# Patient Record
Sex: FEMALE | Race: WHITE | NOT HISPANIC OR LATINO | Employment: OTHER | URBAN - METROPOLITAN AREA
[De-identification: names, ages, dates, MRNs, and addresses within clinical notes are randomized per-mention and may not be internally consistent; named-entity substitution may affect disease eponyms.]

---

## 2017-01-09 ENCOUNTER — INFUSION THERAPY VISIT (OUTPATIENT)
Dept: INFUSION THERAPY | Facility: CLINIC | Age: 72
End: 2017-01-09
Attending: INTERNAL MEDICINE
Payer: MEDICARE

## 2017-01-09 VITALS
SYSTOLIC BLOOD PRESSURE: 140 MMHG | RESPIRATION RATE: 16 BRPM | TEMPERATURE: 97.3 F | DIASTOLIC BLOOD PRESSURE: 88 MMHG | HEART RATE: 76 BPM

## 2017-01-09 DIAGNOSIS — J44.9 STEROID-DEPENDENT CHRONIC OBSTRUCTIVE PULMONARY DISEASE (H): Primary | ICD-10-CM

## 2017-01-09 DIAGNOSIS — Z92.241 STEROID-DEPENDENT CHRONIC OBSTRUCTIVE PULMONARY DISEASE (H): Primary | ICD-10-CM

## 2017-01-09 DIAGNOSIS — J45.50 UNCOMPLICATED SEVERE PERSISTENT ASTHMA (H): ICD-10-CM

## 2017-01-09 PROCEDURE — 96372 THER/PROPH/DIAG INJ SC/IM: CPT | Mod: ZF

## 2017-01-09 PROCEDURE — 25000128 H RX IP 250 OP 636: Mod: ZF | Performed by: INTERNAL MEDICINE

## 2017-01-09 PROCEDURE — 96401 CHEMO ANTI-NEOPL SQ/IM: CPT

## 2017-01-09 RX ADMIN — MEPOLIZUMAB 100 MG: 100 INJECTION, POWDER, FOR SOLUTION SUBCUTANEOUS at 16:10

## 2017-01-09 NOTE — PROGRESS NOTES
Pt reports to [facility] for monthly Nucala injection. Pt denies any concerns today; she states that she has been able to be off steroids since beginning Nucala.   and her asthma has been very well controlled.   She is afebrile, vss.    1610 Nucala injection given subcutaneously in left arm , without difficulty. Pt then discharged to home. Moises Ayoub RN

## 2017-01-22 ASSESSMENT — ENCOUNTER SYMPTOMS
SPUTUM PRODUCTION: 1
COUGH: 1
SINUS CONGESTION: 1
COUGH DISTURBING SLEEP: 1

## 2017-01-24 ENCOUNTER — PRE VISIT (OUTPATIENT)
Dept: CARDIOLOGY | Facility: CLINIC | Age: 72
End: 2017-01-24

## 2017-01-24 NOTE — TELEPHONE ENCOUNTER
CTA chest/abdomen/pelvis : 9/19/16  1. 4.4 cm aneurysmal dilatation of ascending aorta, not significant changed as compared to 6/17/2015 exam where it measures 4.5 cm.  2. 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  3. Multiple stable bilateral pulmonary nodules, the largest is a 7 x 5 mm posterior left lower lobe pulmonary nodule, previously measured 7 x 4 mm on 6/17/2013 exam.

## 2017-01-25 ENCOUNTER — OFFICE VISIT (OUTPATIENT)
Dept: CARDIOLOGY | Facility: CLINIC | Age: 72
End: 2017-01-25
Payer: MEDICARE

## 2017-01-25 VITALS
BODY MASS INDEX: 26.21 KG/M2 | HEIGHT: 67 IN | WEIGHT: 167 LBS | SYSTOLIC BLOOD PRESSURE: 127 MMHG | OXYGEN SATURATION: 96 % | DIASTOLIC BLOOD PRESSURE: 85 MMHG | HEART RATE: 84 BPM

## 2017-01-25 DIAGNOSIS — I71.20 THORACIC AORTIC ANEURYSM WITHOUT RUPTURE (H): Primary | ICD-10-CM

## 2017-01-25 PROBLEM — R91.8 PULMONARY NODULES: Status: ACTIVE | Noted: 2017-01-25

## 2017-01-25 PROBLEM — I10 ESSENTIAL HYPERTENSION: Status: ACTIVE | Noted: 2017-01-25

## 2017-01-25 PROBLEM — K22.4 ESOPHAGEAL HYPERTENSION: Status: RESOLVED | Noted: 2017-01-25 | Resolved: 2017-01-25

## 2017-01-25 PROBLEM — K22.4 ESOPHAGEAL HYPERTENSION: Status: ACTIVE | Noted: 2017-01-25

## 2017-01-25 PROCEDURE — 99213 OFFICE O/P EST LOW 20 MIN: CPT | Mod: ZF

## 2017-01-25 PROCEDURE — 99203 OFFICE O/P NEW LOW 30 MIN: CPT | Mod: ZP

## 2017-01-25 ASSESSMENT — PAIN SCALES - GENERAL: PAINLEVEL: NO PAIN (0)

## 2017-01-25 NOTE — NURSING NOTE
Chief Complaint   Patient presents with     New Patient     72 y/o female for initial evaluation of hypertension and thoracic aortic aneurysm.

## 2017-01-25 NOTE — NURSING NOTE
Cardiac Testing: Patient given instructions regarding  echocardiogram prior to 1 year follow up. Discussed purpose, preparation, procedure and when to expect results reported back to the patient. Patient demonstrated understanding of this information and agreed to call with further questions or concerns.  Med Reconcile: Reviewed and verified all current medications with the patient. The updated medication list was printed and given to the patient.  Patient will monitor home blood pressures twice per week.  Blood pressure goal is less than 130/90.  If trending higher than this patient will contact this office.  Patient states understanding and agrees to call with any questions or concerns.  Return Appointment: 1 year with Dr. Patiño.  Patient given instructions regarding scheduling next clinic visit. Patient demonstrated understanding of this information and agreed to call with further questions or concerns.  Patient stated she understood all health information given and agreed to call with further questions or concerns.

## 2017-01-25 NOTE — PROGRESS NOTES
HPI: Niurka Cisneros is a 71 year old year old patient who receives primary care from Dr. Mara Combs.  The patient is seen in consultation at the request of Dr. Inman in order to assume her vascular care in the context of her dialted 4.4 cm aortic root.    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 Lorenzo Inman in order for me to assume surveillance of her ascending aortic dilation, with a stable 3-year followup diameter of 4.4 cm, and with a tricuspid aortic valve.  At today's visit, I introduced Roopa to our program of aortic disease followup, and I believe that she was quite reassured.      As per my routine, I also noted her significant persistent asthma; exposure to chronic high-dose steroids; and previously documented pulmonary nodules for which CTA followup is, I assume, performed by you.  We also reviewed her global cardiovascular risk, which is surprisingly low based on her current treated risk factors, despite her current age.  She is compliant with use of Pravachol, prefers not to take aspirin but would consider doing so; and was appreciative that followup will be sustained.      Her only prominent current risk factor is that of her hypertension, but she was not aware of her blood pressure goal.  This goal is usually best set in individuals with aortic disease to be similar as that for any individual who attempts to avoid heart attack and stroke, and thus a less than 130/85 mmHg goal has been set.  In the past, she has utilized blood pressures reported in the primary care center, but she notes that often she is wheezing at that time.  We  discussed the roles of office-based blood pressures; use of an ambulatory blood pressure monitor; or of a calibrated home cuff.  She prefers to use a calibrated home cuff and report these both to you and to Dell Hunt, the vascular medicine nurse clinician.  Blood pressure to date have generally been in the target range.      Finally, I note her most recent CT scan performed on 09/19/2016 did demonstrate a stable root diameter of 4.4 cm.  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  The pulmonary nodules were noted and are known to you.     PAST MEDICAL HISTORY  Past Medical History   Diagnosis Date     Abdominal aortic aneurysm without rupture (H) 11/10/2015     [  ] repeat imaging due spring 2016 Descending aortic aneurysm.  Being monitored with serial angiogram      Female bladder prolapse 11/10/2015     Seizures (H) 11/10/2015     Temporal seziure d/o.  Does not have LOC.  Has prodromal symptoms      Severe persistent asthma 11/10/2015     Since childhood.  Has been steroid dependent for many years. Has had cydney x2       Steroid-dependent chronic obstructive pulmonary disease (H) 11/10/2015     secondary to asthma     Hearing loss      CURRENT MEDICATIONS  Current Outpatient Prescriptions   Medication Sig Dispense Refill     albuterol (PROAIR HFA, PROVENTIL HFA, VENTOLIN HFA) 108 (90 BASE) MCG/ACT inhaler Inhale 2 puffs into the lungs every 6 hours as needed for shortness of breath / dyspnea or wheezing 3 Inhaler 3     beclomethasone (QVAR) 80 MCG/ACT Inhaler Inhale 2 puffs into the lungs 2 times daily 3 Inhaler 3     estradiol (ESTRING) 2 MG vaginal ring Place 1 each vaginally every 3 months 1 each 3     fluticasone-salmeterol (ADVAIR) 500-50 MCG/DOSE diskus inhaler Inhale 1 puff into the lungs 2 times daily 3 Inhaler 3     montelukast (SINGULAIR) 10 MG tablet Take 1 tablet (10 mg) by mouth as needed 30 tablet 11      "triamterene-hydrochlorothiazide (MAXZIDE-25) 37.5-25 MG per tablet Take 1 tablet by mouth daily 90 tablet 3     losartan (COZAAR) 25 MG tablet Take 1 tablet (25 mg) by mouth daily 90 tablet 3     pravastatin (PRAVACHOL) 20 MG tablet Take 1 tablet (20 mg) by mouth daily 90 tablet 3     predniSONE (DELTASONE) 10 MG tablet Take 1 tablet (10 mg) by mouth every other day 30 tablet 11     predniSONE (DELTASONE) 20 MG tablet Take 1 tablet (20 mg) by mouth daily As needed with acute exacerbation of asthma 10 tablet 2     potassium chloride SA (POTASSIUM CHLORIDE) 20 MEQ tablet Take 1 tablet (20 mEq) by mouth 2 times daily 180 tablet 3     Cranberry 300 MG TABS        mepolizumab (NUCALA) 100 MG injection Inject 1 mL (100 mg) Subcutaneous every 28 days 1 mL 11     CALCIUM-VITAMIN D PO Take 1 tablet by mouth daily       PAST SURGICAL HISTORY:  Past Surgical History   Procedure Laterality Date     Hysterectomy       fibroids     Nissen fundoplication       x2     H cataract surgical package       Joint replacement Left      ALLERGIES   No Known Allergies    FAMILY HISTORY  Family History   Problem Relation Age of Onset     Dementia Mother      Asthma Sister      x2     Depression Sister      CANCER Father      prostate cancer     Heart Failure Mother      Schizophrenia Maternal Grandmother      Coronary Artery Disease Maternal Grandfather      he was diabetic and smoked     Coronary Artery Disease Sister      MI when being ventilated for asthma     Hypertension Mother      Hypertension Father      Breast Cancer Mother      post menopausal     Prostate Cancer Father       of it at 82     Asthma Father      \"outgrew\" it     Asthma Sister      both sisters with asthma, one severe     SOCIAL HISTORY  Social History     Social History     Marital Status:      Spouse Name: N/A     Number of Children: N/A     Years of Education: 20     Occupational History     pediatrician      Social History Main Topics     Smoking " "status: Never Smoker      Smokeless tobacco: Never Used     Alcohol Use: 0.0 oz/week     0 Standard drinks or equivalent per week      Comment: 1 glass of wine with dinner     Drug Use: Not on file     Sexual Activity: Not on file     Other Topics Concern     Not on file     Social History Narrative    Retired [employer]       ROS:   Constitutional: No fever, chills, or sweats. No weight gain/loss   ENT: No visual disturbance, ear ache, epistaxis, sore throat  Allergies/Immunologic: Negative  Respiratory: No cough, hemoptysia  Cardiovascular: As per HPI  GI: No nausea, vomiting, hematemesis, melena, or hematochezia  : No urinary frequency, dysuria, or hematuria  Integument: Negative  Psychiatric: Negative  Neuro: Negative  Endocrinology: Negative   Musculoskeletal: Negative  Vascular: No walking impairment, claudication, ischemic rest pain or nonhealing wounds    EXAM:  /85 mmHg  Pulse 84  Ht 1.702 m (5' 7\")  Wt 75.751 kg (167 lb)  BMI 26.15 kg/m2  SpO2 96%  In general, the patient is a pleasant female in no apparent distress.    HEENT: NC/AT.  PERRLA.  EOMI.  Sclerae white, not injected.  Nares clear.  Pharynx without erythema or exudate.  Dentition intact.    Neck: No adenopathy.  No thyromegaly. Carotids +2/2 bilaterally without bruits.  No jugular venous distension.   Heart: RRR. Normal S1, S2 splits physiologically. No murmur, rub, click, or gallop. The PMI is in the 5th ICS in the midclavicular line. There is no heave.    Lungs: CTA.  No ronchi, wheezes, rales.  No dullness to percussion.   Abdomen: Soft, nontender, nondistended. No organomegaly. No AAA.  No bruits.   Extremities: No clubbing, cyanosis, or edema.  No wounds. No varicose veins signs of chronic venous insufficiency.   Vascular: No bruits are noted.   Brachial Radial Ulnar Femoral Popliteal DP PT   Left 2/2 2/2 2/2 2/2 2/2 2/2 2/2   Right 2/2 2/2 2/2 2/2 2/2 2/2 2/2     Labs:  LIPID RESULTS:  Lab Results   Component Value Date    " CHOL 163 11/10/2015    HDL 55 11/10/2015    LDL 87 11/10/2015    TRIG 106 11/10/2015    CHOLHDLRATIO 3.0 11/10/2015     CBC RESULTS:  Lab Results   Component Value Date    WBC 6.4 05/16/2016    RBC 4.56 05/16/2016    HGB 12.7 05/16/2016    HCT 38.0 05/16/2016    MCV 83 05/16/2016    MCH 27.9 05/16/2016    MCHC 33.4 05/16/2016    RDW 13.5 05/16/2016     05/16/2016     BMP RESULTS:  Lab Results   Component Value Date     11/10/2015    POTASSIUM 3.3* 11/10/2015    CHLORIDE 104 11/10/2015    CO2 26 11/10/2015    ANIONGAP 9 11/10/2015    * 11/10/2015    BUN 14 11/10/2015    CR 0.75 11/10/2015    GFRESTIMATED 77 11/10/2015    GFRESTBLACK >90   GFR Calc   11/10/2015    ZACH 9.3 11/10/2015      A1C RESULTS:  Lab Results   Component Value Date    A1C 6.0 09/07/2016     Procedures:    CTA of the chest and abdomen and pelvis dated 9/19/2016 2:31 PM     CLINICAL INFORMATION: The study is done to evaluate for potential dissection of the aorta.       TECHNIQUE:  Axial images through the chest and abdomen were obtained before the administration of iopamidol (ISOVUE-370) 76% solution 100 mL ccs of Ultravist 370 intravenous contrast media and following the injection of contrast media  in the  arterial phase. Source images were reviewed as well as 3D and multi-planar reconstructions at a 3D workstation.     COMPARISON: Outside CTA on 6/17/2013 .  Ordering provider: CHRIS PARDO     FINDINGS:        Diameters:       Sinuses of Valsalva: 3.6 x 3.4 x 3.4 cm    Sinotubular ridge ; 3.3 x 3.4 cm    Ascending aorta ;  4.4 x 4.3 cm    Arch ; 3.3 x 3.4 cm    Proximal descending thoracic aorta ; 2.7 x 2.7 cm    Mid descending thoracic aorta ; 2.3 x 2.2 cm    Descending thoracic aorta at the diaphragm ; 2.5 x 2.5 cm       There is normal branching pattern of the great vessels. Origins of the brachiocephalic artery, left common carotid artery and left subclavian artery show no focal abnormality. The proximal  pulmonary vasculature    appears normal.       The celiac axis, SMA and TARIQ are patent . The renal arteries are patent bilaterally. 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     Chest/mediastinum: Thyroid gland is unremarkable caliber of the main pulmonary artery is within normal limits. Heart size is mildly enlarged. No pericardial effusion. Few prominent mediastinal and hilar lymph nodes. Trachea and central bronchi are clear. Few scattered atherosclerotic calcified and noncalcified plaques of the coronary arteries and major vessels.     Lung/pleura: No evidence of pleural effusion or pneumothorax. Few scattered pulmonary nodules, for example 4 mm left upper lobe pulmonary nodule (series 10 image 181), not significantly changed as  compared to 6/25/2012 exam. 7 x 5 mm posterior left lower lobe pulmonary nodule (series 10 image 172), not significantly changed as compared to 6/17/2013 exam. 4 mm right middle lobe pulmonary nodule  (series 10 image 195), not significantly changed as compared to 6/17/2013 exam. Few tiny triangular densities along the lung fissures, most evident along the right major fissure (series 10 image 170, 173  and 176), consistent with intrafissural lymph nodes.     Abdomen/pelvis: Postsurgical changes of Nissen's fundoplication and hysterectomy with vaginal pessary. The liver, spleen, adrenal glands, gallbladder and kidneys are unremarkable. Mild fatty infiltration of  the pancreas. No abnormally dilated bowel loops. No significant retroperitoneal or mesenteric lymph nodes. Colonic diverticula stenosis without CT evidence of acute diverticulitis.     Bones: Dextroconvex rotoscoliosis of the lower thoracic and levoconvex rotoscoliosis of the lumbar spine with associated multilevel degenerative changes of the thoracolumbar  spine. Postsurgical changes  of total left hip arthroplasty.     Dose: 877 mGy-cm.                                                                     Impression:  1. 4.4 cm aneurysmal dilatation of ascending aorta, not significant changed as compared to 6/17/2015 exam where it measures 4.5 cm.  2. 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  3. Multiple stable bilateral pulmonary nodules, the largest is a 7 x 5 mm posterior left lower lobe pulmonary nodule, previously measured 7 x 4 mm on 6/17/2013 exam.    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  Thus, as stability has been demonstrated for 3 years, it is quite likely that she may never require operative intervention during her lifetime.  This would please her.      As you may or may not be aware, blood pressure control is not closely associated (and no classic risk factors are associated) with aortic expansion except for exposure to tobacco smoke.  Certainly, there has been no evidence of alpha-1 antitrypsin deficiency, although I suspect that this has been evaluated in the past.  Thus, she is doing the best she can do simply maintaining her risk factor control, using a home blood pressure cuff and maintaining her use of pravastatin.  She prefers at the current time not to utilize additional low-dose aspirin.      I would like to see Roopa back again in September when her annual CT study is performed that will evaluate both the aortic root and all first-order branches including the saccular right renal artery aneurysm.      As for any woman at her age, despite any low calculated cardiovascular risk, any atypical chest symptoms that are suspicious of a  coronary ischemic event should prompt an ER evaluation and/or a call to our office.  As always, Francesca, my best wishes for a healthy and productive winter season.      Sincerely yours,            Etienne Patiño MD  Director, Vascular Medicine Program  Professor of Medicine, Epidemiology and Community Health  [facility] [place]  [address]    Tel: [phone]  Fax: [phone]  Pager: [phone]    Vascular medicine nurse clinician: Dell Hunt - Office [phone]  : Nivia Diamond - Office [phone]

## 2017-01-25 NOTE — Clinical Note
2017      RE: Niurka Cisneros  [address]   [place] [address]       Dear Colleague,    Thank you for the opportunity to participate in the care of your patient, Niurka Cisneros, at the [facility]. Please see a copy of my visit note below.    HPI: Niurka Cisneros is a 71 year old year old patient who receives primary care from Dr. Mara Combs.  The patient is seen in consultation at the request of Dr. Inman in order to assume her vascular care in the context of her dialted 4.4 cm aortic root.    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 Lorenzo Inman in order for me to assume surveillance of her ascending aortic dilation, with a stable 3-year followup diameter of 4.4 cm, and with a tricuspid aortic valve.  At today's visit, I introduced Roopa to our program of aortic disease followup, and I believe that she was quite reassured.      As per my routine, I also noted her significant persistent asthma; exposure to chronic high-dose steroids; and previously documented pulmonary nodules for which CTA followup is, I assume, performed by you.  We also reviewed her global cardiovascular risk, which is surprisingly low based on her current treated risk factors, despite her current age.  She is compliant with use of Pravachol, prefers not to take aspirin but would consider doing so; and was appreciative that followup will be sustained.      Her only prominent current risk factor is that of her hypertension, but she was not aware of her blood pressure goal.  This goal is usually best set  in individuals with aortic disease to be similar as that for any individual who attempts to avoid heart attack and stroke, and thus a less than 130/85 mmHg goal has been set.  In the past, she has utilized blood pressures reported in the primary care center, but she notes that often she is wheezing at that time.  We discussed the roles of office-based blood pressures; use of an ambulatory blood pressure monitor; or of a calibrated home cuff.  She prefers to use a calibrated home cuff and report these both to you and to Dell Hunt, the vascular medicine nurse clinician.  Blood pressure to date have generally been in the target range.      Finally, I note her most recent CT scan performed on 09/19/2016 did demonstrate a stable root diameter of 4.4 cm.  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  The pulmonary nodules were noted and are known to you.     PAST MEDICAL HISTORY  Past Medical History   Diagnosis Date     Abdominal aortic aneurysm without rupture (H) 11/10/2015     [  ] repeat imaging due spring 2016 Descending aortic aneurysm.  Being monitored with serial angiogram      Female bladder prolapse 11/10/2015     Seizures (H) 11/10/2015     Temporal seziure d/o.  Does not have LOC.  Has prodromal symptoms      Severe persistent asthma 11/10/2015     Since childhood.  Has been steroid dependent for many years. Has had cydney x2       Steroid-dependent chronic obstructive pulmonary disease (H) 11/10/2015     secondary to asthma     Hearing loss      CURRENT MEDICATIONS  Current Outpatient Prescriptions   Medication Sig Dispense Refill     albuterol (PROAIR HFA, PROVENTIL HFA, VENTOLIN HFA) 108 (90 BASE) MCG/ACT inhaler Inhale 2 puffs into the lungs every 6 hours as needed for shortness of breath / dyspnea or wheezing 3 Inhaler 3     beclomethasone (QVAR) 80 MCG/ACT Inhaler Inhale 2 puffs into the lungs 2 times daily 3  Inhaler 3     estradiol (ESTRING) 2 MG vaginal ring Place 1 each vaginally every 3 months 1 each 3     fluticasone-salmeterol (ADVAIR) 500-50 MCG/DOSE diskus inhaler Inhale 1 puff into the lungs 2 times daily 3 Inhaler 3     montelukast (SINGULAIR) 10 MG tablet Take 1 tablet (10 mg) by mouth as needed 30 tablet 11     triamterene-hydrochlorothiazide (MAXZIDE-25) 37.5-25 MG per tablet Take 1 tablet by mouth daily 90 tablet 3     losartan (COZAAR) 25 MG tablet Take 1 tablet (25 mg) by mouth daily 90 tablet 3     pravastatin (PRAVACHOL) 20 MG tablet Take 1 tablet (20 mg) by mouth daily 90 tablet 3     predniSONE (DELTASONE) 10 MG tablet Take 1 tablet (10 mg) by mouth every other day 30 tablet 11     predniSONE (DELTASONE) 20 MG tablet Take 1 tablet (20 mg) by mouth daily As needed with acute exacerbation of asthma 10 tablet 2     potassium chloride SA (POTASSIUM CHLORIDE) 20 MEQ tablet Take 1 tablet (20 mEq) by mouth 2 times daily 180 tablet 3     Cranberry 300 MG TABS        mepolizumab (NUCALA) 100 MG injection Inject 1 mL (100 mg) Subcutaneous every 28 days 1 mL 11     CALCIUM-VITAMIN D PO Take 1 tablet by mouth daily       PAST SURGICAL HISTORY:  Past Surgical History   Procedure Laterality Date     Hysterectomy       fibroids     Nissen fundoplication       x2     H cataract surgical package       Joint replacement Left      ALLERGIES   No Known Allergies    FAMILY HISTORY  Family History   Problem Relation Age of Onset     Dementia Mother      Asthma Sister      x2     Depression Sister      CANCER Father      prostate cancer     Heart Failure Mother      Schizophrenia Maternal Grandmother      Coronary Artery Disease Maternal Grandfather      he was diabetic and smoked     Coronary Artery Disease Sister      MI when being ventilated for asthma     Hypertension Mother      Hypertension Father      Breast Cancer Mother      post menopausal     Prostate Cancer Father       of it at 82     Asthma Father       "\"outgrew\" it     Asthma Sister      both sisters with asthma, one severe     SOCIAL HISTORY  Social History     Social History     Marital Status:      Spouse Name: N/A     Number of Children: N/A     Years of Education: 20     Occupational History     pediatrician      Social History Main Topics     Smoking status: Never Smoker      Smokeless tobacco: Never Used     Alcohol Use: 0.0 oz/week     0 Standard drinks or equivalent per week      Comment: 1 glass of wine with dinner     Drug Use: Not on file     Sexual Activity: Not on file     Other Topics Concern     Not on file     Social History Narrative    Retired [employer]       ROS:   Constitutional: No fever, chills, or sweats. No weight gain/loss   ENT: No visual disturbance, ear ache, epistaxis, sore throat  Allergies/Immunologic: Negative  Respiratory: No cough, hemoptysia  Cardiovascular: As per HPI  GI: No nausea, vomiting, hematemesis, melena, or hematochezia  : No urinary frequency, dysuria, or hematuria  Integument: Negative  Psychiatric: Negative  Neuro: Negative  Endocrinology: Negative   Musculoskeletal: Negative  Vascular: No walking impairment, claudication, ischemic rest pain or nonhealing wounds    EXAM:  /85 mmHg  Pulse 84  Ht 1.702 m (5' 7\")  Wt 75.751 kg (167 lb)  BMI 26.15 kg/m2  SpO2 96%  In general, the patient is a pleasant female in no apparent distress.    HEENT: NC/AT.  PERRLA.  EOMI.  Sclerae white, not injected.  Nares clear.  Pharynx without erythema or exudate.  Dentition intact.    Neck: No adenopathy.  No thyromegaly. Carotids +2/2 bilaterally without bruits.  No jugular venous distension.   Heart: RRR. Normal S1, S2 splits physiologically. No murmur, rub, click, or gallop. The PMI is in the 5th ICS in the midclavicular line. There is no heave.    Lungs: CTA.  No ronchi, wheezes, rales.  No dullness to percussion.   Abdomen: Soft, nontender, nondistended. No organomegaly. No AAA.  No bruits.   Extremities: No " clubbing, cyanosis, or edema.  No wounds. No varicose veins signs of chronic venous insufficiency.   Vascular: No bruits are noted.   Brachial Radial Ulnar Femoral Popliteal DP PT   Left 2/2 2/2 2/2 2/2 2/2 2/2 2/2   Right 2/2 2/2 2/2 2/2 2/2 2/2 2/2     Labs:  LIPID RESULTS:  Lab Results   Component Value Date    CHOL 163 11/10/2015    HDL 55 11/10/2015    LDL 87 11/10/2015    TRIG 106 11/10/2015    CHOLHDLRATIO 3.0 11/10/2015     CBC RESULTS:  Lab Results   Component Value Date    WBC 6.4 05/16/2016    RBC 4.56 05/16/2016    HGB 12.7 05/16/2016    HCT 38.0 05/16/2016    MCV 83 05/16/2016    MCH 27.9 05/16/2016    MCHC 33.4 05/16/2016    RDW 13.5 05/16/2016     05/16/2016     BMP RESULTS:  Lab Results   Component Value Date     11/10/2015    POTASSIUM 3.3* 11/10/2015    CHLORIDE 104 11/10/2015    CO2 26 11/10/2015    ANIONGAP 9 11/10/2015    * 11/10/2015    BUN 14 11/10/2015    CR 0.75 11/10/2015    GFRESTIMATED 77 11/10/2015    GFRESTBLACK >90   GFR Calc   11/10/2015    ZACH 9.3 11/10/2015      A1C RESULTS:  Lab Results   Component Value Date    A1C 6.0 09/07/2016     Procedures:    CTA of the chest and abdomen and pelvis dated 9/19/2016 2:31 PM     CLINICAL INFORMATION: The study is done to evaluate for potential dissection of the aorta.       TECHNIQUE:  Axial images through the chest and abdomen were obtained before the administration of iopamidol (ISOVUE-370) 76% solution 100 mL ccs of Ultravist 370 intravenous contrast media and following the injection of contrast media  in the  arterial phase. Source images were reviewed as well as 3D and multi-planar reconstructions at a 3D workstation.     COMPARISON: Outside CTA on 6/17/2013 .  Ordering provider: CHRIS PARDO     FINDINGS:        Diameters:       Sinuses of Valsalva: 3.6 x 3.4 x 3.4 cm    Sinotubular ridge ; 3.3 x 3.4 cm    Ascending aorta ;  4.4 x 4.3 cm    Arch ; 3.3 x 3.4 cm    Proximal descending thoracic aorta ; 2.7  x 2.7 cm    Mid descending thoracic aorta ; 2.3 x 2.2 cm    Descending thoracic aorta at the diaphragm ; 2.5 x 2.5 cm       There is normal branching pattern of the great vessels. Origins of the brachiocephalic artery, left common carotid artery and left subclavian artery show no focal abnormality. The proximal pulmonary vasculature    appears normal.       The celiac axis, SMA and TARIQ are patent . The renal arteries are patent bilaterally. 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     Chest/mediastinum: Thyroid gland is unremarkable caliber of the main pulmonary artery is within normal limits. Heart size is mildly enlarged. No pericardial effusion. Few prominent mediastinal and hilar lymph nodes. Trachea and central bronchi are clear. Few scattered atherosclerotic calcified and noncalcified plaques of the coronary arteries and major vessels.     Lung/pleura: No evidence of pleural effusion or pneumothorax. Few scattered pulmonary nodules, for example 4 mm left upper lobe pulmonary nodule (series 10 image 181), not significantly changed as  compared to 6/25/2012 exam. 7 x 5 mm posterior left lower lobe pulmonary nodule (series 10 image 172), not significantly changed as compared to 6/17/2013 exam. 4 mm right middle lobe pulmonary nodule  (series 10 image 195), not significantly changed as compared to 6/17/2013 exam. Few tiny triangular densities along the lung fissures, most evident along the right major fissure (series 10 image 170, 173  and 176), consistent with intrafissural lymph nodes.     Abdomen/pelvis: Postsurgical changes of Nissen's fundoplication and hysterectomy with vaginal pessary. The liver, spleen, adrenal glands, gallbladder and kidneys are unremarkable. Mild fatty infiltration of  the pancreas. No abnormally dilated bowel loops. No  significant retroperitoneal or mesenteric lymph nodes. Colonic diverticula stenosis without CT evidence of acute diverticulitis.     Bones: Dextroconvex rotoscoliosis of the lower thoracic and levoconvex rotoscoliosis of the lumbar spine with associated multilevel degenerative changes of the thoracolumbar spine. Postsurgical changes  of total left hip arthroplasty.     Dose: 877 mGy-cm.                                                                     Impression:  1. 4.4 cm aneurysmal dilatation of ascending aorta, not significant changed as compared to 6/17/2015 exam where it measures 4.5 cm.  2. 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  3. Multiple stable bilateral pulmonary nodules, the largest is a 7 x 5 mm posterior left lower lobe pulmonary nodule, previously measured 7 x 4 mm on 6/17/2013 exam.    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  Thus, as stability has been demonstrated for 3 years, it is quite likely that she may never require operative intervention during her lifetime.  This would please her.      As you may or may not be aware, blood pressure control is not closely associated (and no classic risk factors are associated) with aortic expansion except for exposure to tobacco smoke.  Certainly, there has been no evidence of alpha-1 antitrypsin deficiency, although I suspect that this has been evaluated in the past.  Thus, she is doing the best she can do simply maintaining her risk factor control, using a home blood pressure cuff and maintaining her use of pravastatin.  She prefers at the current time not to utilize additional low-dose aspirin.      I would like to see Roopa back again in  September when her annual CT study is performed that will evaluate both the aortic root and all first-order branches including the saccular right renal artery aneurysm.      As for any woman at her age, despite any low calculated cardiovascular risk, any atypical chest symptoms that are suspicious of a coronary ischemic event should prompt an ER evaluation and/or a call to our office.  As always, Francesca, my best wishes for a healthy and productive winter season.      Sincerely yours,            Etienne Patiño MD  Director, Vascular Medicine Program  Professor of Medicine, Epidemiology and Community Health  [facility] [place]  [address]    Tel: [phone]  Fax: [phone]  Pager: [phone]    Vascular medicine nurse clinician: Dell Hunt - Office [phone]  : Nivia Diamond - Office [phone]

## 2017-01-25 NOTE — PATIENT INSTRUCTIONS
You were seen today in the Cardiovascular Clinic at the [facility].      Cardiology Providers you saw during your visit:  Dr. Patiño    Diagnosis:  Ascending aortic aneurysm    Results:  None today    Recommendations:   Home blood pressures.  Twice weekly.  Goal is less than 130/90.  If trending above that let Dell know.     Echo in September prior to appointment.    Follow-up:  September with Dr. Patiño.      For emergencies call 911.    For any scheduling needs, please call [phone]. Option 1 then option 3    Thank you for your visit today!     Please call if you have any questions or concerns.  Dell Hunt RN

## 2017-02-13 ENCOUNTER — INFUSION THERAPY VISIT (OUTPATIENT)
Dept: INFUSION THERAPY | Facility: CLINIC | Age: 72
End: 2017-02-13
Attending: INTERNAL MEDICINE
Payer: MEDICARE

## 2017-02-13 VITALS
HEART RATE: 74 BPM | DIASTOLIC BLOOD PRESSURE: 77 MMHG | OXYGEN SATURATION: 97 % | TEMPERATURE: 95.5 F | SYSTOLIC BLOOD PRESSURE: 131 MMHG

## 2017-02-13 DIAGNOSIS — Z92.241 STEROID-DEPENDENT CHRONIC OBSTRUCTIVE PULMONARY DISEASE (H): Primary | ICD-10-CM

## 2017-02-13 DIAGNOSIS — J45.50 UNCOMPLICATED SEVERE PERSISTENT ASTHMA (H): ICD-10-CM

## 2017-02-13 DIAGNOSIS — J44.9 STEROID-DEPENDENT CHRONIC OBSTRUCTIVE PULMONARY DISEASE (H): Primary | ICD-10-CM

## 2017-02-13 PROCEDURE — 96401 CHEMO ANTI-NEOPL SQ/IM: CPT

## 2017-02-13 PROCEDURE — 25000128 H RX IP 250 OP 636: Mod: ZF | Performed by: INTERNAL MEDICINE

## 2017-02-13 RX ADMIN — MEPOLIZUMAB 100 MG: 100 INJECTION, POWDER, FOR SOLUTION SUBCUTANEOUS at 13:28

## 2017-02-13 NOTE — NURSING NOTE
Patient presents to the [facility] for Nucala injection.  Order written by Dr. Ring was completed today. Name and  verified with patient. See MAR for medication details. Medication was given in the following sites left upper arm SubQ. Patient tolerated injection well and was discharged to home.    Administrations This Visit     mepolizumab (NUCALA) injection 100 mg     Admin Date Action Dose Route Administered By             2017 Given 100 mg Subcutaneous Lawrence Eastman LPN Shacara Hunt LPN

## 2017-02-13 NOTE — MR AVS SNAPSHOT
After Visit Summary   2/13/2017    Niurka Cisneros    MRN: [id number]           Patient Information     Date Of Birth          1945        Visit Information        Provider Department      2/13/2017 1:00 PM UC 45 ATC; UC SPEC INFUSION [facility]        Today's Diagnoses     Steroid-dependent chronic obstructive pulmonary disease (H)    -  1    Uncomplicated severe persistent asthma           Follow-ups after your visit        Your next 10 appointments already scheduled     Mar 29, 2017  3:00 PM CDT   Infusion 60 with UC SPEC INFUSION, UC 46 ATC   [facility] ([facility])    [address] [place] [address]  [address]nd [facility] [address]   [phone]            Apr 10, 2017  1:00 PM CDT   PFT VISIT with SHANELL PFL DAMARI   [facility] Pulmonary Function Testing ([facility])    [address] [place] [id number] [phone]            Apr 10, 2017  1:30 PM CDT   (Arrive by 1:15 PM)   Return Visit with Stephania Ring MD   [facility] ([facility])    [address] [place] [id number] [phone]              Who to contact     If you have questions or need follow up information about today's clinic visit or your schedule please contact [facility] PROC[facility] directly at [phone].  Normal or non-critical lab and imaging results will be communicated to you by MyChart, letter or phone within 4 business days after the clinic has received the results. If you do not hear from us within 7 days, please contact the clinic through MyChart or phone. If you have a critical or abnormal lab result, we will notify you by phone as soon as possible.  Submit refill requests through [employer] or call your pharmacy and they will  forward the refill request to us. Please allow 3 business days for your refill to be completed.          Additional Information About Your Visit        [facility] Information     [email or web address] gives you secure access to your electronic health record. If you see a primary care provider, you can also send messages to your care team and make appointments. If you have questions, please call your primary care clinic.  If you do not have a primary care provider, please call [phone] and they will assist you.        Care EveryWhere ID     This is your Care EveryWhere ID. This could be used by other organizations to access your [place] medical records  [id number]        Your Vitals Were     Pulse Temperature Pulse Oximetry             74 95.5  F (35.3  C) (Oral) 97%          Blood Pressure from Last 3 Encounters:   02/13/17 131/77   01/25/17 127/85   01/09/17 140/88    Weight from Last 3 Encounters:   01/25/17 75.8 kg (167 lb)   10/21/16 74.9 kg (165 lb 3.2 oz)   09/07/16 75.9 kg (167 lb 4.8 oz)              We Performed the Following     Treatment Conditions     Treatment Conditions        Primary Care Provider Office Phone # Fax #    Maragerman Combs [phone]       [address]  [facility] [address]        Thank you!     Thank you for choosing [facility]  for your care. Our goal is always to provide you with excellent care. Hearing back from our patients is one way we can continue to improve our services. Please take a few minutes to complete the written survey that you may receive in the mail after your visit with us. Thank you!             Your Updated Medication List - Protect others around you: Learn how to safely use, store and throw away your medicines at www.disposemymeds.org.          This list is accurate as of: 2/13/17  2:08 PM.  Always use your most recent med list.                   Brand Name Dispense  Instructions for use    albuterol 108 (90 BASE) MCG/ACT Inhaler    PROAIR HFA/PROVENTIL HFA/VENTOLIN HFA    3 Inhaler    Inhale 2 puffs into the lungs every 6 hours as needed for shortness of breath / dyspnea or wheezing       beclomethasone 80 MCG/ACT Inhaler    QVAR    3 Inhaler    Inhale 2 puffs into the lungs 2 times daily       CALCIUM-VITAMIN D PO      Take 1 tablet by mouth daily       Cranberry 300 MG Tabs          estradiol 2 MG vaginal ring    ESTRING    1 each    Place 1 each vaginally every 3 months       fluticasone-salmeterol 500-50 MCG/DOSE diskus inhaler    ADVAIR    3 Inhaler    Inhale 1 puff into the lungs 2 times daily       losartan 25 MG tablet    COZAAR    90 tablet    Take 1 tablet (25 mg) by mouth daily       mepolizumab 100 MG injection    NUCALA    1 mL    Inject 1 mL (100 mg) Subcutaneous every 28 days       montelukast 10 MG tablet    SINGULAIR    30 tablet    Take 1 tablet (10 mg) by mouth as needed       potassium chloride SA 20 MEQ CR tablet    potassium chloride    180 tablet    Take 1 tablet (20 mEq) by mouth 2 times daily       pravastatin 20 MG tablet    PRAVACHOL    90 tablet    Take 1 tablet (20 mg) by mouth daily       * predniSONE 10 MG tablet    DELTASONE    30 tablet    Take 1 tablet (10 mg) by mouth every other day       * predniSONE 20 MG tablet    DELTASONE    10 tablet    Take 1 tablet (20 mg) by mouth daily As needed with acute exacerbation of asthma       triamterene-hydrochlorothiazide 37.5-25 MG per tablet    MAXZIDE-25    90 tablet    Take 1 tablet by mouth daily       * Notice:  This list has 2 medication(s) that are the same as other medications prescribed for you. Read the directions carefully, and ask your doctor or other care provider to review them with you.

## 2017-02-23 DIAGNOSIS — I10 BENIGN ESSENTIAL HYPERTENSION: ICD-10-CM

## 2017-02-23 DIAGNOSIS — J45.50 UNCOMPLICATED SEVERE PERSISTENT ASTHMA (H): ICD-10-CM

## 2017-02-23 RX ORDER — MONTELUKAST SODIUM 10 MG/1
10 TABLET ORAL PRN
Qty: 90 TABLET | Refills: 1 | Status: SHIPPED | OUTPATIENT
Start: 2017-02-23 | End: 2017-04-10

## 2017-02-23 NOTE — TELEPHONE ENCOUNTER
Losartan   Last Written Prescription Date: 9/7/16  Last Fill Quantity: 90, # refills: 3  Last Office Visit with [facility] or [facility] prescribing provider: 9/7/16      Potassium   Date Value Ref Range Status   11/10/2015 3.3 (L) 3.4 - 5.3 mmol/L Final     Creatinine   Date Value Ref Range Status   11/10/2015 0.75 0.52 - 1.04 mg/dL Final     BP Readings from Last 3 Encounters:   02/13/17 131/77   01/25/17 127/85   01/09/17 140/88     Pt states mail order pharmacy can not locate rx sent 9/7/16 pt need supply sent to local pharmacy and then another 90 day supply sent to mail order. Please approve local pharmacy and then I will set up for Mail order  Routing due to K+ overdue per protocol

## 2017-02-23 NOTE — TELEPHONE ENCOUNTER
Singular  Last Written Prescription Date:  9/7/16  Last Fill Quantity: 30,   # refills: 11  Last Office Visit : 9/7/16  Future Office visit:  No future    Routing refill request to provider for review/approval because:  Pt requesting 90 day supply rather #30 with refills for mail order savings

## 2017-02-24 RX ORDER — LOSARTAN POTASSIUM 25 MG/1
25 TABLET ORAL DAILY
Qty: 30 TABLET | Refills: 0 | Status: SHIPPED | OUTPATIENT
Start: 2017-02-24 | End: 2017-02-24

## 2017-02-24 RX ORDER — LOSARTAN POTASSIUM 25 MG/1
25 TABLET ORAL DAILY
Qty: 90 TABLET | Refills: 3 | Status: SHIPPED | OUTPATIENT
Start: 2017-02-24 | End: 2017-11-22

## 2017-03-29 ENCOUNTER — INFUSION THERAPY VISIT (OUTPATIENT)
Dept: INFUSION THERAPY | Facility: CLINIC | Age: 72
End: 2017-03-29
Attending: INTERNAL MEDICINE
Payer: MEDICARE

## 2017-03-29 VITALS
HEART RATE: 85 BPM | DIASTOLIC BLOOD PRESSURE: 83 MMHG | TEMPERATURE: 96.1 F | SYSTOLIC BLOOD PRESSURE: 140 MMHG | OXYGEN SATURATION: 94 %

## 2017-03-29 DIAGNOSIS — Z92.241 STEROID-DEPENDENT CHRONIC OBSTRUCTIVE PULMONARY DISEASE (H): Primary | ICD-10-CM

## 2017-03-29 DIAGNOSIS — J44.9 STEROID-DEPENDENT CHRONIC OBSTRUCTIVE PULMONARY DISEASE (H): Primary | ICD-10-CM

## 2017-03-29 DIAGNOSIS — J45.50 UNCOMPLICATED SEVERE PERSISTENT ASTHMA (H): ICD-10-CM

## 2017-03-29 PROCEDURE — 25000128 H RX IP 250 OP 636: Mod: ZF | Performed by: INTERNAL MEDICINE

## 2017-03-29 PROCEDURE — 96401 CHEMO ANTI-NEOPL SQ/IM: CPT

## 2017-03-29 RX ADMIN — MEPOLIZUMAB 100 MG: 100 INJECTION, POWDER, FOR SOLUTION SUBCUTANEOUS at 16:26

## 2017-03-29 NOTE — PROGRESS NOTES
Infusion Nursing Note  Niurka Cisneros [facility]:   Chief Complaint   Patient presents with     Other     Nucala injection     During [facility]'s [facility] appointment, orders from Dr. Ring were completed.  Frequency: Q28 days    Progress note:  Patient identification verified by name and date of birth.  Assessment completed.  Vitals recorded in Doc Flowsheets.  Patient was provided with education regarding infusion and possible side effects.  Patient verbalized understanding.     Premedications: were not ordered.  Injection: Administered to anterior LUE SQ  Patient tolerated injection: well    Discharge Plan:   Follow up plan of care with: ongoing infusions at [facility]. and primary medical doctor.  Discharge instructions were reviewed with patient.  Patient/representative verbalized understanding of discharge instructions and all questions answered.  [facility] discharged [facility] in stable condition.    Minoo Gutierrez RN      Administrations This Visit     mepolizumab (NUCALA) injection 100 mg     Admin Date Action Dose Route Administered By             03/29/2017 Given 100 mg Subcutaneous Minoo Gutierrez RN                          /83  Pulse 85  Temp 96.1  F (35.6  C) (Oral)  SpO2 94%

## 2017-03-29 NOTE — MR AVS SNAPSHOT
After Visit Summary   3/29/2017    Niurka Cisneros    MRN: [id number]           Patient Information     Date Of Birth          1945        Visit Information        Provider Department      3/29/2017 4:00 PM UC 46 ATC; UC SPEC INFUSION [facility] Specialty and Procedure        Today's Diagnoses     Steroid-dependent chronic obstructive pulmonary disease (H)    -  1    Uncomplicated severe persistent asthma           Follow-ups after your visit        Your next 10 appointments already scheduled     Apr 10, 2017  1:00 PM CDT   PFT VISIT with SHANELL PFL DAMARI   [facility] Pulmonary Function Testing ([facility])    [address]  3rd [facility] [id number] [phone]            Apr 10, 2017  1:30 PM CDT   (Arrive by 1:15 PM)   Return Visit with Stephania Ring MD   [facility] ([facility])    [address] [place] [id number] [phone]            Apr 26, 2017  1:00 PM CDT   Infusion 60 with UC SPEC INFUSION, UC 44 ATC   [facility] Specialty and Procedure ([facility])    [address]  2nd [facility] [id number] [phone]            May 24, 2017  1:00 PM CDT   Infusion 60 with UC SPEC INFUSION, UC 49 ATC   [facility] Specialty and Procedure ([facility])    [address] [id number] [phone]              Who to contact     If you have questions or need follow up information about today's clinic visit or your schedule please contact [facility] SPECIALTY AND PROCEDURE directly at [phone].  Normal or non-critical lab and imaging results will be communicated to you by MyChart, letter or phone within 4 business days after the clinic has received the results. If you  do not hear from us within 7 days, please contact the clinic through [employer] or phone. If you have a critical or abnormal lab result, we will notify you by phone as soon as possible.  Submit refill requests through [employer] or call your pharmacy and they will forward the refill request to us. Please allow 3 business days for your refill to be completed.          Additional Information About Your Visit        [employer] Information     [employer] gives you secure access to your electronic health record. If you see a primary care provider, you can also send messages to your care team and make appointments. If you have questions, please call your primary care clinic.  If you do not have a primary care provider, please call [phone] and they will assist you.        Care EveryWhere ID     This is your Care EveryWhere ID. This could be used by other organizations to access your [facility] medical records  [id number]        Your Vitals Were     Pulse Temperature Pulse Oximetry             85 96.1  F (35.6  C) (Oral) 94%          Blood Pressure from Last 3 Encounters:   03/29/17 140/83   02/13/17 131/77   01/25/17 127/85    Weight from Last 3 Encounters:   01/25/17 75.8 kg (167 lb)   10/21/16 74.9 kg (165 lb 3.2 oz)   09/07/16 75.9 kg (167 lb 4.8 oz)              We Performed the Following     Treatment Conditions     Treatment Conditions        Primary Care Provider Office Phone # Fax #    Mara Regina Combs [phone]       [address] [place] [address]        Thank you!     Thank you for choosing [facility] SPECIALTY AND PROCEDURE  for your care. Our goal is always to provide you with excellent care. Hearing back from our patients is one way we can continue to improve our services. Please take a few minutes to complete the written survey that you may receive in the mail after your visit with us. Thank you!             Your Updated Medication  List - Protect others around you: Learn how to safely use, store and throw away your medicines at www.disposemymeds.org.          This list is accurate as of: 3/29/17  4:32 PM.  Always use your most recent med list.                   Brand Name Dispense Instructions for use    albuterol 108 (90 BASE) MCG/ACT Inhaler    PROAIR HFA/PROVENTIL HFA/VENTOLIN HFA    3 Inhaler    Inhale 2 puffs into the lungs every 6 hours as needed for shortness of breath / dyspnea or wheezing       beclomethasone 80 MCG/ACT Inhaler    QVAR    3 Inhaler    Inhale 2 puffs into the lungs 2 times daily       CALCIUM-VITAMIN D PO      Take 1 tablet by mouth daily       Cranberry 300 MG Tabs          estradiol 2 MG vaginal ring    ESTRING    1 each    Place 1 each vaginally every 3 months       fluticasone-salmeterol 500-50 MCG/DOSE diskus inhaler    ADVAIR    3 Inhaler    Inhale 1 puff into the lungs 2 times daily       losartan 25 MG tablet    COZAAR    90 tablet    Take 1 tablet (25 mg) by mouth daily       mepolizumab 100 MG injection    NUCALA    1 mL    Inject 1 mL (100 mg) Subcutaneous every 28 days       montelukast 10 MG tablet    SINGULAIR    90 tablet    Take 1 tablet (10 mg) by mouth as needed       potassium chloride SA 20 MEQ CR tablet    potassium chloride    180 tablet    Take 1 tablet (20 mEq) by mouth 2 times daily       pravastatin 20 MG tablet    PRAVACHOL    90 tablet    Take 1 tablet (20 mg) by mouth daily       * predniSONE 10 MG tablet    DELTASONE    30 tablet    Take 1 tablet (10 mg) by mouth every other day       * predniSONE 20 MG tablet    DELTASONE    10 tablet    Take 1 tablet (20 mg) by mouth daily As needed with acute exacerbation of asthma       triamterene-hydrochlorothiazide 37.5-25 MG per tablet    MAXZIDE-25    90 tablet    Take 1 tablet by mouth daily       * Notice:  This list has 2 medication(s) that are the same as other medications prescribed for you. Read the directions carefully, and ask your doctor  or other care provider to review them with you.

## 2017-04-05 ASSESSMENT — ENCOUNTER SYMPTOMS
POSTURAL DYSPNEA: 0
COUGH: 1
HEMOPTYSIS: 0
SPUTUM PRODUCTION: 1
RESPIRATORY PAIN: 0
DYSPNEA ON EXERTION: 1
SHORTNESS OF BREATH: 1
COUGH DISTURBING SLEEP: 0
WHEEZING: 1
SNORES LOUDLY: 1

## 2017-04-10 ENCOUNTER — OFFICE VISIT (OUTPATIENT)
Dept: PULMONOLOGY | Facility: CLINIC | Age: 72
End: 2017-04-10
Attending: INTERNAL MEDICINE
Payer: MEDICARE

## 2017-04-10 VITALS
HEART RATE: 76 BPM | HEIGHT: 67 IN | RESPIRATION RATE: 16 BRPM | WEIGHT: 162 LBS | DIASTOLIC BLOOD PRESSURE: 75 MMHG | TEMPERATURE: 98.1 F | OXYGEN SATURATION: 97 % | BODY MASS INDEX: 25.43 KG/M2 | SYSTOLIC BLOOD PRESSURE: 133 MMHG

## 2017-04-10 DIAGNOSIS — Z92.241 STEROID-DEPENDENT CHRONIC OBSTRUCTIVE PULMONARY DISEASE (H): ICD-10-CM

## 2017-04-10 DIAGNOSIS — J45.50 UNCOMPLICATED SEVERE PERSISTENT ASTHMA (H): Primary | ICD-10-CM

## 2017-04-10 DIAGNOSIS — J44.9 STEROID-DEPENDENT CHRONIC OBSTRUCTIVE PULMONARY DISEASE (H): ICD-10-CM

## 2017-04-10 DIAGNOSIS — J45.50 UNCOMPLICATED SEVERE PERSISTENT ASTHMA (H): ICD-10-CM

## 2017-04-10 PROCEDURE — 99212 OFFICE O/P EST SF 10 MIN: CPT | Mod: ZF

## 2017-04-10 RX ORDER — MONTELUKAST SODIUM 10 MG/1
10 TABLET ORAL PRN
Qty: 90 TABLET | Refills: 3 | Status: SHIPPED | OUTPATIENT
Start: 2017-04-10 | End: 2017-11-22

## 2017-04-10 RX ORDER — ALBUTEROL SULFATE 90 UG/1
2 AEROSOL, METERED RESPIRATORY (INHALATION) EVERY 6 HOURS PRN
Qty: 3 INHALER | Refills: 3 | Status: SHIPPED | OUTPATIENT
Start: 2017-04-10 | End: 2017-11-22

## 2017-04-10 RX ORDER — PREDNISONE 20 MG/1
20 TABLET ORAL DAILY
Qty: 10 TABLET | Refills: 2 | Status: SHIPPED | OUTPATIENT
Start: 2017-04-10 | End: 2017-11-22

## 2017-04-10 ASSESSMENT — PAIN SCALES - GENERAL: PAINLEVEL: NO PAIN (0)

## 2017-04-10 NOTE — PROGRESS NOTES
Answers for HPI/ROS submitted by the patient on 4/5/2017   General Symptoms: No  Skin Symptoms: No  HENT Symptoms: No  EYE SYMPTOMS: No  HEART SYMPTOMS: No  LUNG SYMPTOMS: Yes  INTESTINAL SYMPTOMS: No  URINARY SYMPTOMS: No  GYNECOLOGIC SYMPTOMS: No  BREAST SYMPTOMS: No  SKELETAL SYMPTOMS: No  BLOOD SYMPTOMS: No  NERVOUS SYSTEM SYMPTOMS: No  MENTAL HEALTH SYMPTOMS: No  Cough: Yes  Sputum or phlegm: Yes  Coughing up blood: No  Difficulty breating or shortness of breath: Yes  Snoring: Yes  Wheezing: Yes  Difficulty breathing on exertion: Yes  Respiratory pain: No  Nighttime Cough: No  Difficulty breathing when lying flat: No

## 2017-04-10 NOTE — LETTER
4/10/2017      RE: Niurka Cisneros  [address]   [place] [address]     CHIEF COMPLAINT:  Asthma.      HISTORY OF PRESENT ILLNESS:  The patient is a 71-year-old woman with a history of asthma.  She has been on fluticasone/salmeterol, Singulair and mepolizumab.  She says that she has not required steroids since starting the mepolizumab, which has been a big victory for her.  She is feeling a little bit congested today.  She thinks that this might be the explanation for the slight decline in her pulmonary function testing today.  Overall, she feels like her breathing is very good.  She has no concerns today.      PAST MEDICAL HISTORY:   1.  Asthma.   2.  History of seizure.   3.  History of AAA.      FAMILY HISTORY:  Mother had breast cancer, hypertension and dementia.  Father had hypertension, asthma and prostate cancer.  Sister has asthma and depression.      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  She has a dog.  She has 2 grandchildren who live in the [facility].      REVIEW OF SYSTEMS:  A full 14-point review of systems was done and is negative, except as noted above and in the HPI.      PHYSICAL EXAMINATION:   VITAL SIGNS:  Blood pressure 133/75, pulse is 76, respiratory rate is 16, SpO2 is 97% on room air, BMI is 25.37.   GENERAL APPEARANCE:  Well-developed, well-nourished, in no distress.   EYES:  PERRL.  No conjunctival injection.    HEENT:  Nasal mucosa is within normal limits.   MOUTH:  Oral mucosa is moist.  No posterior oropharyngeal erythema or exudate.   RESPIRATORY:  Good air movement bilaterally.  No wheezes, rales or rhonchi.   CARDIOVASCULAR:  Regular rate and rhythm, normal S1, S2, no murmurs, rubs or gallops.      RESULTS:  Pulmonary function testing from today shows mild obstruction, no significant bronchodilator response, normal DLCO.      ASSESSMENT AND PLAN:  The patient is a 71-year-old woman with a history of asthma, here for ongoing  followup.  Currently on fluticasone/salmeterol, Montelukast and mepolizumab.  This is a stable regimen for her, and we would like to continue it.  I will try and refill her mepolizumab therapy planned today.  Other medications placed for refill within the [facility].  I request a 1-year followup, and she will contact me with any issues that come up in the meantime.         DAYANA NEWELL MD             D: 04/10/2017 14:14   T: 04/10/2017 15:42   MT: AAMIR      Name:     JANESSA LAWRENCE   MRN:      [id number]        Account:      [id number]   :      1945           Service Date: 04/10/2017      Document: [id number]

## 2017-04-10 NOTE — PATIENT INSTRUCTIONS
It was nice to see you again today.    I will make sure the Nucala plan is updated.    Please call if you have any issues.    Stephania Ring

## 2017-04-10 NOTE — PROGRESS NOTES
CHIEF COMPLAINT:  Asthma.      HISTORY OF PRESENT ILLNESS:  The patient is a 71-year-old woman with a history of asthma.  She has been on fluticasone/salmeterol, Singulair and mepolizumab.  She says that she has not required steroids since starting the mepolizumab, which has been a big victory for her.  She is feeling a little bit congested today.  She thinks that this might be the explanation for the slight decline in her pulmonary function testing today.  Overall, she feels like her breathing is very good.  She has no concerns today.      PAST MEDICAL HISTORY:   1.  Asthma.   2.  History of seizure.   3.  History of AAA.      FAMILY HISTORY:  Mother had breast cancer, hypertension and dementia.  Father had hypertension, asthma and prostate cancer.  Sister has asthma and depression.      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  She has a dog.  She has 2 grandchildren who live in the [facility].      REVIEW OF SYSTEMS:  A full 14-point review of systems was done and is negative, except as noted above and in the HPI.      PHYSICAL EXAMINATION:   VITAL SIGNS:  Blood pressure 133/75, pulse is 76, respiratory rate is 16, SpO2 is 97% on room air, BMI is 25.37.   GENERAL APPEARANCE:  Well-developed, well-nourished, in no distress.   EYES:  PERRL.  No conjunctival injection.    HEENT:  Nasal mucosa is within normal limits.   MOUTH:  Oral mucosa is moist.  No posterior oropharyngeal erythema or exudate.   RESPIRATORY:  Good air movement bilaterally.  No wheezes, rales or rhonchi.   CARDIOVASCULAR:  Regular rate and rhythm, normal S1, S2, no murmurs, rubs or gallops.      RESULTS:  Pulmonary function testing from today shows mild obstruction, no significant bronchodilator response, normal DLCO.      ASSESSMENT AND PLAN:  The patient is a 71-year-old woman with a history of asthma, here for ongoing followup.  Currently on fluticasone/salmeterol, Montelukast and mepolizumab.  This is a  stable regimen for her, and we would like to continue it.  I will try and refill her mepolizumab therapy planned today.  Other medications placed for refill within the [facility].  I request a 1-year followup, and she will contact me with any issues that come up in the meantime.         DAYANA NEWELL MD             D: 04/10/2017 14:14   T: 04/10/2017 15:42   MT: AAMIR      Name:     JANESSA LAWRENCE   MRN:      [id number]        Account:      [id number]   :      1945           Service Date: 04/10/2017      Document: [id number]

## 2017-04-10 NOTE — MR AVS SNAPSHOT
After Visit Summary   4/10/2017    Niurka Cisneros    MRN: [id number]           Patient Information     Date Of Birth          1945        Visit Information        Provider Department      4/10/2017 1:30 PM Stephania Ring MD [facility] Lung Science and Health        Today's Diagnoses     Uncomplicated severe persistent asthma        Steroid-dependent chronic obstructive pulmonary disease (H)          Care Instructions    It was nice to see you again today.    I will make sure the Nucala plan is updated.    Please call if you have any issues.    Stephania Ring         Follow-ups after your visit        Follow-up notes from your care team     Return in about 1 year (around 4/10/2018).      Your next 10 appointments already scheduled     Apr 26, 2017  5:00 PM CDT   Infusion 60 with UC SPEC INFUSION, UC 47 ATC   [facility] Specialty and Procedure ([facility])    [address] [place] [id number] [phone]            May 24, 2017  1:00 PM CDT   Infusion 60 with UC SPEC INFUSION, UC 49 ATC   [facility] Specialty and Procedure ([facility])    [address] [place] [id number] [phone]            Apr 09, 2018  1:00 PM CDT   (Arrive by 12:45 PM)   Return Visit with Stephania Ring MD   [facility] Lung Science and Health ([facility])    [address] [place] [id number] [phone]              Who to contact     If you have questions or need follow up information about today's clinic visit or your schedule please contact [facility] LUNG SCIENCE AND HEALTH directly at [phone].  Normal or non-critical lab and imaging results will be communicated to you by MyChart, letter or phone within 4 business days after the clinic has  "received the results. If you do not hear from us within 7 days, please contact the clinic through [employer] or phone. If you have a critical or abnormal lab result, we will notify you by phone as soon as possible.  Submit refill requests through [employer] or call your pharmacy and they will forward the refill request to us. Please allow 3 business days for your refill to be completed.          Additional Information About Your Visit        [employer] Information     [employer] gives you secure access to your electronic health record. If you see a primary care provider, you can also send messages to your care team and make appointments. If you have questions, please call your primary care clinic.  If you do not have a primary care provider, please call [phone] and they will assist you.        Care EveryWhere ID     This is your Care EveryWhere ID. This could be used by other organizations to access your [place] medical records  [id number]        Your Vitals Were     Pulse Temperature Respirations Height Pulse Oximetry BMI (Body Mass Index)    76 98.1  F (36.7  C) (Oral) 16 1.702 m (5' 7\") 97% 25.37 kg/m2       Blood Pressure from Last 3 Encounters:   04/10/17 133/75   03/29/17 140/83   02/13/17 131/77    Weight from Last 3 Encounters:   04/10/17 73.5 kg (162 lb)   01/25/17 75.8 kg (167 lb)   10/21/16 74.9 kg (165 lb 3.2 oz)              Today, you had the following     No orders found for display         Where to get your medicines      [facility] [place] [place] - [address] AT Portal to [facility]istered [facility] Sites  [address], [facility] [address]     Phone:  [phone]     albuterol 108 (90 BASE) MCG/ACT Inhaler    fluticasone-salmeterol 500-50 MCG/DOSE diskus inhaler    montelukast 10 MG tablet    predniSONE 20 MG tablet          Primary Care Provider Office Phone # Fax #    Mara Combs [phone]       [place] [address] " [facility] [address]  [facility] [address]        Thank you!     Thank you for choosing [facility] FOR LUNG SCIENCE AND HEALTH  for your care. Our goal is always to provide you with excellent care. Hearing back from our patients is one way we can continue to improve our services. Please take a few minutes to complete the written survey that you may receive in the mail after your visit with us. Thank you!             Your Updated Medication List - Protect others around you: Learn how to safely use, store and throw away your medicines at www.disposemymeds.org.          This list is accurate as of: 4/10/17  2:17 PM.  Always use your most recent med list.                   Brand Name Dispense Instructions for use    albuterol 108 (90 BASE) MCG/ACT Inhaler    PROAIR HFA/PROVENTIL HFA/VENTOLIN HFA    3 Inhaler    Inhale 2 puffs into the lungs every 6 hours as needed for shortness of breath / dyspnea or wheezing       beclomethasone 80 MCG/ACT Inhaler    QVAR    3 Inhaler    Inhale 2 puffs into the lungs 2 times daily       CALCIUM-VITAMIN D PO      Take 1 tablet by mouth daily       Cranberry 300 MG Tabs          estradiol 2 MG vaginal ring    ESTRING    1 each    Place 1 each vaginally every 3 months       fluticasone-salmeterol 500-50 MCG/DOSE diskus inhaler    ADVAIR    3 Inhaler    Inhale 1 puff into the lungs 2 times daily       losartan 25 MG tablet    COZAAR    90 tablet    Take 1 tablet (25 mg) by mouth daily       mepolizumab 100 MG injection    NUCALA    1 mL    Inject 1 mL (100 mg) Subcutaneous every 28 days       montelukast 10 MG tablet    SINGULAIR    90 tablet    Take 1 tablet (10 mg) by mouth as needed       potassium chloride SA 20 MEQ CR tablet    potassium chloride    180 tablet    Take 1 tablet (20 mEq) by mouth 2 times daily       pravastatin 20 MG tablet    PRAVACHOL    90 tablet    Take 1 tablet (20 mg) by mouth daily       * predniSONE 10 MG tablet    DELTASONE    30 tablet    Take 1 tablet  (10 mg) by mouth every other day       * predniSONE 20 MG tablet    DELTASONE    10 tablet    Take 1 tablet (20 mg) by mouth daily As needed with acute exacerbation of asthma       triamterene-hydrochlorothiazide 37.5-25 MG per tablet    MAXZIDE-25    90 tablet    Take 1 tablet by mouth daily       * Notice:  This list has 2 medication(s) that are the same as other medications prescribed for you. Read the directions carefully, and ask your doctor or other care provider to review them with you.

## 2017-04-25 LAB
DLCOUNC-%PRED-PRE: 104 %
DLCOUNC-PRE: 22.73 ML/MIN/MMHG
DLCOUNC-PRED: 21.78 ML/MIN/MMHG
ERV-%PRED-PRE: 34 %
ERV-PRE: 0.27 L
ERV-PRED: 0.79 L
EXPTIME-PRE: 10.33 SEC
FEF2575-%PRED-POST: 41 %
FEF2575-%PRED-PRE: 33 %
FEF2575-POST: 0.84 L/SEC
FEF2575-PRE: 0.68 L/SEC
FEF2575-PRED: 2.01 L/SEC
FEFMAX-%PRED-PRE: 70 %
FEFMAX-PRE: 4.35 L/SEC
FEFMAX-PRED: 6.15 L/SEC
FEV1-%PRED-PRE: 57 %
FEV1-PRE: 1.41 L
FEV1FEV6-PRE: 58 %
FEV1FEV6-PRED: 79 %
FEV1FVC-PRE: 56 %
FEV1FVC-PRED: 78 %
FEV1SVC-PRE: 54 %
FEV1SVC-PRED: 71 %
FIFMAX-PRE: 5.5 L/SEC
FVC-%PRED-PRE: 78 %
FVC-PRE: 2.51 L
FVC-PRED: 3.18 L
IC-%PRED-PRE: 88 %
IC-PRE: 2.35 L
IC-PRED: 2.66 L
VA-%PRED-PRE: 77 %
VA-PRE: 4.32 L
VC-%PRED-PRE: 76 %
VC-PRE: 2.62 L
VC-PRED: 3.45 L

## 2017-04-26 ENCOUNTER — INFUSION THERAPY VISIT (OUTPATIENT)
Dept: INFUSION THERAPY | Facility: CLINIC | Age: 72
End: 2017-04-26
Attending: INTERNAL MEDICINE
Payer: MEDICARE

## 2017-04-26 VITALS
DIASTOLIC BLOOD PRESSURE: 67 MMHG | RESPIRATION RATE: 16 BRPM | HEART RATE: 79 BPM | SYSTOLIC BLOOD PRESSURE: 150 MMHG | TEMPERATURE: 98.1 F

## 2017-04-26 DIAGNOSIS — J44.9 STEROID-DEPENDENT CHRONIC OBSTRUCTIVE PULMONARY DISEASE (H): Primary | ICD-10-CM

## 2017-04-26 DIAGNOSIS — Z92.241 STEROID-DEPENDENT CHRONIC OBSTRUCTIVE PULMONARY DISEASE (H): Primary | ICD-10-CM

## 2017-04-26 DIAGNOSIS — J45.50 UNCOMPLICATED SEVERE PERSISTENT ASTHMA (H): ICD-10-CM

## 2017-04-26 PROCEDURE — 96372 THER/PROPH/DIAG INJ SC/IM: CPT

## 2017-04-26 PROCEDURE — 25000128 H RX IP 250 OP 636: Mod: ZF | Performed by: INTERNAL MEDICINE

## 2017-04-26 PROCEDURE — 96401 CHEMO ANTI-NEOPL SQ/IM: CPT

## 2017-04-26 RX ADMIN — MEPOLIZUMAB 100 MG: 100 INJECTION, POWDER, FOR SOLUTION SUBCUTANEOUS at 16:32

## 2017-04-26 ASSESSMENT — PAIN SCALES - GENERAL: PAINLEVEL: NO PAIN (0)

## 2017-04-26 NOTE — MR AVS SNAPSHOT
After Visit Summary   4/26/2017    Niurka Cisneros    MRN: [id number]           Patient Information     Date Of Birth          1945        Visit Information        Provider Department      4/26/2017 5:00 PM UC 47 ATC; UC SPEC INFUSION [facility]        Today's Diagnoses     Steroid-dependent chronic obstructive pulmonary disease (H)    -  1    Uncomplicated severe persistent asthma           Follow-ups after your visit        Your next 10 appointments already scheduled     Apr 26, 2017  5:00 PM CDT   Infusion 60 with UC SPEC INFUSION, UC 47 ATC   [facility] ([facility])    [address] [place] [address]  2nd [facility] [id number] [phone]            May 24, 2017  1:00 PM CDT   Infusion 60 with UC SPEC INFUSION, UC 49 ATC   [facility] ([facility])    [address] [place] [id number] [phone]            Apr 09, 2018  1:00 PM CDT   (Arrive by 12:45 PM)   Return Visit with Stephania Ring MD   [facility] ([facility])    [address] [place] [address]  3rd [facility] [id number] [phone]              Who to contact     If you have questions or need follow up information about today's clinic visit or your schedule please contact [facility]ED[facility] directly at [phone].  Normal or non-critical lab and imaging results will be communicated to you by MyChart, letter or phone within 4 business days after the clinic has received the results. If you do not hear from us within 7 days, please contact the clinic through MyChart or phone. If you have a critical or abnormal lab result, we will notify you by phone as soon as possible.  Submit refill requests through  [employer] or call your pharmacy and they will forward the refill request to us. Please allow 3 business days for your refill to be completed.          Additional Information About Your Visit        [employer] Information     [employer] gives you secure access to your electronic health record. If you see a primary care provider, you can also send messages to your care team and make appointments. If you have questions, please call your primary care clinic.  If you do not have a primary care provider, please call [phone] and they will assist you.        Care EveryWhere ID     This is your Care EveryWhere ID. This could be used by other organizations to access your [place] medical records  [id number]        Your Vitals Were     Pulse Temperature Respirations             79 98.1  F (36.7  C) (Oral) 16          Blood Pressure from Last 3 Encounters:   04/26/17 150/67   04/10/17 133/75   03/29/17 140/83    Weight from Last 3 Encounters:   04/10/17 73.5 kg (162 lb)   01/25/17 75.8 kg (167 lb)   10/21/16 74.9 kg (165 lb 3.2 oz)              We Performed the Following     Treatment Conditions     Treatment Conditions        Primary Care Provider Office Phone # Fax #    Mara Regina Combs [phone]       [place] [address]  [facility] [address]        Thank you!     Thank you for choosing [facility] PROCED[facility]  for your care. Our goal is always to provide you with excellent care. Hearing back from our patients is one way we can continue to improve our services. Please take a few minutes to complete the written survey that you may receive in the mail after your visit with us. Thank you!             Your Updated Medication List - Protect others around you: Learn how to safely use, store and throw away your medicines at www.disposemymeds.org.          This list is accurate as of: 4/26/17  4:35 PM.  Always use your most recent med list.                    Brand Name Dispense Instructions for use    albuterol 108 (90 BASE) MCG/ACT Inhaler    PROAIR HFA/PROVENTIL HFA/VENTOLIN HFA    3 Inhaler    Inhale 2 puffs into the lungs every 6 hours as needed for shortness of breath / dyspnea or wheezing       beclomethasone 80 MCG/ACT Inhaler    QVAR    3 Inhaler    Inhale 2 puffs into the lungs 2 times daily       CALCIUM-VITAMIN D PO      Take 1 tablet by mouth daily       Cranberry 300 MG Tabs          estradiol 2 MG vaginal ring    ESTRING    1 each    Place 1 each vaginally every 3 months       fluticasone-salmeterol 500-50 MCG/DOSE diskus inhaler    ADVAIR    3 Inhaler    Inhale 1 puff into the lungs 2 times daily       losartan 25 MG tablet    COZAAR    90 tablet    Take 1 tablet (25 mg) by mouth daily       mepolizumab 100 MG injection    NUCALA    1 mL    Inject 1 mL (100 mg) Subcutaneous every 28 days       montelukast 10 MG tablet    SINGULAIR    90 tablet    Take 1 tablet (10 mg) by mouth as needed       potassium chloride SA 20 MEQ CR tablet    potassium chloride    180 tablet    Take 1 tablet (20 mEq) by mouth 2 times daily       pravastatin 20 MG tablet    PRAVACHOL    90 tablet    Take 1 tablet (20 mg) by mouth daily       * predniSONE 10 MG tablet    DELTASONE    30 tablet    Take 1 tablet (10 mg) by mouth every other day       * predniSONE 20 MG tablet    DELTASONE    10 tablet    Take 1 tablet (20 mg) by mouth daily As needed with acute exacerbation of asthma       triamterene-hydrochlorothiazide 37.5-25 MG per tablet    MAXZIDE-25    90 tablet    Take 1 tablet by mouth daily       * Notice:  This list has 2 medication(s) that are the same as other medications prescribed for you. Read the directions carefully, and ask your doctor or other care provider to review them with you.

## 2017-04-26 NOTE — PROGRESS NOTES
"PRIOR TO INFUSION OF BIOLOGICAL MEDICATIONS OR ANY OF THESE AS LISTED: Nucala \".rheumbiologicalchecklist\"    Prior to Infusion of biological medications or any of these as listed:    1. Elevated temperature, fever, chills, productive cough or abnormal vital signs, night sweats, coughing up blood or sputum, no appetite or abnormal vital signs : NO    2. Open wounds or new incisions: NO    3. Recent hospitalization: NO    4.  Recent surgeries:  NO    5. Any upcoming surgeries or dental procedures?:NO    6. Any current or recent bouts of illness or infection? On any antibiotics? : NO    7. Any new, sudden or worsening abdominal pain :NO    8. Vaccination within 4 weeks? Patient or someone in the household is scheduled to receive vaccination? No live virus vaccines prior to or during treatment :NO    9. Any nervous system diseases [i.e. multiple sclerosis, Guillain-[place], seizures, neurological  changes]: NO    10. Pregnant or breast feeding; or plans on pregnancy in the future: NO    11. Signs of worsening depression or suicidal ideations while taking benlysta:NO    12. New-onset medical symptoms: NO    13.  New cancer diagnosis or on chemotherapy or radiation NO    14.  Evaluate for any sign of active TB [Unexplained weight loss, Loss of appetite, Night sweats, Fever, Fatigue, Chills, Coughing for 3 weeks or longer, Hemoptysis (coughing up blood), Chest pain]: NO    **Note: If answered yes to any of the above, hold the infusion and contact ordering rheumatologist or on-call rheumatologist.     Ingrid Calixto RN    Pt given nucala in left upper arm SQ.   Pt tolerated injection without any adverse effects.    Thao Loza R.N.  Administrations This Visit     mepolizumab (NUCALA) injection 100 mg     Admin Date Action Dose Route Administered By             04/26/2017 Given 100 mg Subcutaneous Thao Loza RN                          "

## 2017-05-24 ENCOUNTER — INFUSION THERAPY VISIT (OUTPATIENT)
Dept: INFUSION THERAPY | Facility: CLINIC | Age: 72
End: 2017-05-24
Attending: INTERNAL MEDICINE
Payer: MEDICARE

## 2017-05-24 VITALS — DIASTOLIC BLOOD PRESSURE: 78 MMHG | HEART RATE: 80 BPM | SYSTOLIC BLOOD PRESSURE: 131 MMHG

## 2017-05-24 DIAGNOSIS — J45.50 UNCOMPLICATED SEVERE PERSISTENT ASTHMA (H): ICD-10-CM

## 2017-05-24 DIAGNOSIS — J44.9 STEROID-DEPENDENT CHRONIC OBSTRUCTIVE PULMONARY DISEASE (H): Primary | ICD-10-CM

## 2017-05-24 DIAGNOSIS — Z92.241 STEROID-DEPENDENT CHRONIC OBSTRUCTIVE PULMONARY DISEASE (H): Primary | ICD-10-CM

## 2017-05-24 PROCEDURE — 96401 CHEMO ANTI-NEOPL SQ/IM: CPT

## 2017-05-24 PROCEDURE — 25000128 H RX IP 250 OP 636: Mod: ZF | Performed by: INTERNAL MEDICINE

## 2017-05-24 RX ADMIN — MEPOLIZUMAB 100 MG: 100 INJECTION, POWDER, FOR SOLUTION SUBCUTANEOUS at 13:34

## 2017-05-24 NOTE — PROGRESS NOTES
General assessment for IV and SQ medications    1. Elevated temperature, fever, chills, productive cough or abnormal vital signs, night sweats, coughing up blood or sputum, no appetite or abnormal vital signs, SOB : NO    2. Open wounds or new incisions: NO    3. Recent hospitalization: NO    4.  Recent surgeries:  NO    5. Any upcoming surgeries or dental procedures?:NO    6. Any current or recent bouts of illness or infection? On any antibiotics? : NO    7. Any new, sudden or worsening abdominal pain :NO    8. Vaccination within 4 weeks? Patient or someone in the household is scheduled to receive vaccination? No live virus vaccines prior to or during treatment :NO    9. Any nervous system diseases [i.e. multiple sclerosis, Guillain-[place], seizures, neurological  Changes]  Ask if on a biologic medication* : NO    10. Pregnant or breast feeding; or plans on pregnancy in the future: N/A    11. Signs of worsening depression or suicidal ideations while taking benlysta n/a    12. New-onset medical symptoms: NO    13.  New cancer diagnosis or on chemotherapy or radiation NO    14.  Evaluate for any sign of active TB [Unexplained weight loss, Loss of appetite, Night sweats, Fever, Fatigue, Chills, Coughing for 3 weeks or longer, Hemoptysis (coughing up blood), Chest pain]: NO          **Note: If answered yes to any of the above, hold the infusion and contact ordering provider.

## 2017-05-24 NOTE — NURSING NOTE
Patient presents to the [facility] for Nucala.  Order written by Dr Stephania Ring was completed today. Name and  verified with patient. See MAR for medication details. Medication was given in the following sites: left arm SQ Patient tolerated injection well and was discharged to home.  MICHELLE Parkinson

## 2017-05-24 NOTE — MR AVS SNAPSHOT
After Visit Summary   5/24/2017    Niurka Cisneros    MRN: [id number]           Patient Information     Date Of Birth          1945        Visit Information        Provider Department      5/24/2017 1:00 PM UC 49 ATC; UC SPEC INFUSION [facility]        Today's Diagnoses     Steroid-dependent chronic obstructive pulmonary disease (H)    -  1    Uncomplicated severe persistent asthma           Follow-ups after your visit        Your next 10 appointments already scheduled     Jun 21, 2017  2:00 PM CDT   Infusion 60 with UC SPEC INFUSION, UC 49 ATC   [facility] ([facility])    [address]  2nd [facility] [id number] [phone]            Jul 19, 2017  2:00 PM CDT   Infusion 60 with UC SPEC INFUSION, UC 45 ATC   [facility] ([facility])    [address]  2nd [facility] [id number] [phone]            Aug 16, 2017  2:00 PM CDT   Infusion 60 with UC SPEC INFUSION, UC 50 ATC   [facility] ([facility])    [address]  2[address] [facility] [id number] [phone]            Apr 09, 2018  1:00 PM CDT   (Arrive by 12:45 PM)   Return Visit with Stephania Ring MD   [facility] ([facility])    [address] [place] [address]  3rd [facility] [id number] [phone]              Who to contact     If you have questions or need follow up information about today's clinic visit or your schedule please contact [facility] SPECIAL[facility] AND PROCEDURE directly at [phone].  Normal or non-critical lab and imaging results will be communicated to you by MyChart, letter or phone within 4 business days after  the clinic has received the results. If you do not hear from us within 7 days, please contact the clinic through [employer] or phone. If you have a critical or abnormal lab result, we will notify you by phone as soon as possible.  Submit refill requests through [employer] or call your pharmacy and they will forward the refill request to us. Please allow 3 business days for your refill to be completed.          Additional Information About Your Visit        [employer] Information     [employer] gives you secure access to your electronic health record. If you see a primary care provider, you can also send messages to your care team and make appointments. If you have questions, please call your primary care clinic.  If you do not have a primary care provider, please call [phone] and they will assist you.        Care EveryWhere ID     This is your Care EveryWhere ID. This could be used by other organizations to access your [place] medical records  [id number]        Your Vitals Were     Pulse                   80            Blood Pressure from Last 3 Encounters:   05/24/17 131/78   04/26/17 150/67   04/10/17 133/75    Weight from Last 3 Encounters:   04/10/17 73.5 kg (162 lb)   01/25/17 75.8 kg (167 lb)   10/21/16 74.9 kg (165 lb 3.2 oz)              Today, you had the following     No orders found for display       Primary Care Provider Office Phone # Fax #    Maragerman Combs [phone]       [place] [address]  [facility] [address]        Thank you!     Thank you for choosing [facility] PROCEDURE  for your care. Our goal is always to provide you with excellent care. Hearing back from our patients is one way we can continue to improve our services. Please take a few minutes to complete the written survey that you may receive in the mail after your visit with us. Thank you!             Your Updated Medication List - Protect others around  you: Learn how to safely use, store and throw away your medicines at www.disposemymeds.org.          This list is accurate as of: 5/24/17  1:46 PM.  Always use your most recent med list.                   Brand Name Dispense Instructions for use    albuterol 108 (90 BASE) MCG/ACT Inhaler    PROAIR HFA/PROVENTIL HFA/VENTOLIN HFA    3 Inhaler    Inhale 2 puffs into the lungs every 6 hours as needed for shortness of breath / dyspnea or wheezing       beclomethasone 80 MCG/ACT Inhaler    QVAR    3 Inhaler    Inhale 2 puffs into the lungs 2 times daily       CALCIUM-VITAMIN D PO      Take 1 tablet by mouth daily       Cranberry 300 MG Tabs          estradiol 2 MG vaginal ring    ESTRING    1 each    Place 1 each vaginally every 3 months       fluticasone-salmeterol 500-50 MCG/DOSE diskus inhaler    ADVAIR    3 Inhaler    Inhale 1 puff into the lungs 2 times daily       losartan 25 MG tablet    COZAAR    90 tablet    Take 1 tablet (25 mg) by mouth daily       mepolizumab 100 MG injection    NUCALA    1 mL    Inject 1 mL (100 mg) Subcutaneous every 28 days       montelukast 10 MG tablet    SINGULAIR    90 tablet    Take 1 tablet (10 mg) by mouth as needed       potassium chloride SA 20 MEQ CR tablet    potassium chloride    180 tablet    Take 1 tablet (20 mEq) by mouth 2 times daily       pravastatin 20 MG tablet    PRAVACHOL    90 tablet    Take 1 tablet (20 mg) by mouth daily       * predniSONE 10 MG tablet    DELTASONE    30 tablet    Take 1 tablet (10 mg) by mouth every other day       * predniSONE 20 MG tablet    DELTASONE    10 tablet    Take 1 tablet (20 mg) by mouth daily As needed with acute exacerbation of asthma       triamterene-hydrochlorothiazide 37.5-25 MG per tablet    MAXZIDE-25    90 tablet    Take 1 tablet by mouth daily       * Notice:  This list has 2 medication(s) that are the same as other medications prescribed for you. Read the directions carefully, and ask your doctor or other care provider to  review them with you.

## 2017-06-23 ENCOUNTER — INFUSION THERAPY VISIT (OUTPATIENT)
Dept: INFUSION THERAPY | Facility: CLINIC | Age: 72
End: 2017-06-23
Attending: INTERNAL MEDICINE
Payer: MEDICARE

## 2017-06-23 VITALS
WEIGHT: 164 LBS | DIASTOLIC BLOOD PRESSURE: 67 MMHG | TEMPERATURE: 97.7 F | RESPIRATION RATE: 16 BRPM | HEART RATE: 74 BPM | OXYGEN SATURATION: 96 % | SYSTOLIC BLOOD PRESSURE: 124 MMHG | BODY MASS INDEX: 25.69 KG/M2

## 2017-06-23 DIAGNOSIS — J45.50 UNCOMPLICATED SEVERE PERSISTENT ASTHMA (H): ICD-10-CM

## 2017-06-23 DIAGNOSIS — J44.9 STEROID-DEPENDENT CHRONIC OBSTRUCTIVE PULMONARY DISEASE (H): Primary | ICD-10-CM

## 2017-06-23 DIAGNOSIS — Z92.241 STEROID-DEPENDENT CHRONIC OBSTRUCTIVE PULMONARY DISEASE (H): Primary | ICD-10-CM

## 2017-06-23 PROCEDURE — 96401 CHEMO ANTI-NEOPL SQ/IM: CPT

## 2017-06-23 PROCEDURE — 25000128 H RX IP 250 OP 636: Mod: ZF | Performed by: INTERNAL MEDICINE

## 2017-06-23 RX ADMIN — MEPOLIZUMAB 100 MG: 100 INJECTION, POWDER, FOR SOLUTION SUBCUTANEOUS at 15:33

## 2017-06-23 ASSESSMENT — PAIN SCALES - GENERAL: PAINLEVEL: NO PAIN (0)

## 2017-06-23 NOTE — PROGRESS NOTES
Nursing Note  Niur[facility] Cisneros [facility]: Nucala injection  During [facility]'s [facility] appointment, orders from Dr. Ring were completed.  Frequency: every 28 days    Progress note:  Patient identification verified by name and date of birth.  Assessment completed.  Vitals recorded in Doc Flowsheets.  Patient was provided with education regarding infusion and possible side effects.  Patient verbalized understanding.      needed: No  Premedications: were not ordered.  Injection given SubQ to abdomen.   Labs: were not ordered for this appointment.  Treatment Conditions: Biologic/Chemo Checklist   ~~~ NOTE: If the patient answers yes to any of the questions below, hold the infusion and contact ordering provider or on-call provider.    1. Have you recently had an elevated temperature, fever, chills, productive cough, coughing for 3 weeks or longer or hemoptysis,  abnormal vital signs, night sweats,  chest pain or have you noticed a decrease in your appetite, unexplained weight loss or fatigue? No  2. Do you have any open wounds or new incisions? No  3. Do you have any recent or upcoming hospitalizations, surgeries or dental procedures? No  4. Do you currently have or recently have had any signs of illness or infection or are you on any antibiotics? No  5. Have you had any new, sudden or worsening abdominal pain? No  6. Have you or anyone in your household received a live vaccination in the past 4 weeks? Please note:  No live vaccines while on biologic/chemotherapy until 6 months after the last treatment.  Patient can receive the flu vaccine (shot only) and the pneumovax.  It is optimal for the patient to get these vaccines mid cycle, but they can be given at any time as long as it is not on the day of the infusion. No  7. Have you recently been diagnosed with any new nervous system diseases (ie. Multiple sclerosis, Guillain  [place], seizures, neurological changes) or cancer diagnosis? Are you on any form of radiation or chemotherapy? No  8. Are you pregnant or breast feeding or do you have plans of pregnancy in the future? No  9. Have you been having any signs of worsening depression or suicidal ideations?  (benlysta only) No  10. Have there been any other new onset medical symptoms? No    Discharge Plan:   AVS printed and given to patient.   Follow up plan of care with: ongoing [facility]usions [facility].  Discharge instructions were reviewed with patient.  Patient/representative verbalized understanding of discharge instructions and all questions answered.  Patient discharged from [facility] in stable condition.    Janes Rivas RN

## 2017-06-23 NOTE — MR AVS SNAPSHOT
After Visit Summary   6/23/2017    Niurka Cisneros    MRN: [id number]           Patient Information     Date Of Birth          1945        Visit Information        Provider Department      6/23/2017 3:00 PM UC 49 ATC; UC SPEC INFUSION [facility] Specialty and Procedure        Today's Diagnoses     Steroid-dependent chronic obstructive pulmonary disease (H)    -  1    Uncomplicated severe persistent asthma          Care Instructions    Dear Niurka Cisneros    Thank you for choosing [facility] ([facility]) for your Nucala injection.  The following information is a summary of our appointment as well as important reminders.      We look forward in seeing you on your next appointment here at [facility].  Please don t hesitate to call us at [phone] to reschedule any of your appointments or to speak with one of the [facility] registered nurses.  It was a pleasure taking care of you today.    Sincerely,  Ilsa ROJAS RN  [facility]  [address] [place] [address]  [address]  Phone:  [phone]            Follow-ups after your visit        Your next 10 appointments already scheduled     Jul 21, 2017  3:00 PM CDT   Infusion 60 with UC SPEC INFUSION, UC 46 ATC   [facility] Specialty and Procedure ([facility])    [address] [place] [address] [phone]            Aug 18, 2017  1:00 PM CDT   Infusion 60 with UC SPEC INFUSION, UC 50 ATC   [facility] Specialty and Procedure ([facility])    [address] [place] [id number] [phone]            Apr 09, 2018  1:00 PM CDT   (Arrive by 12:45 PM)   Return Visit with Stephania Ring MD   [facility] for [facility] ([facility])    [address]  [address] [facility] [address] [phone]              Who to contact     If you have questions or need follow up information about today's clinic visit or your schedule please contact [facility] SPECIALTY AND PROCEDURE directly at [phone].  Normal or non-critical lab and imaging results will be communicated to you by MyChart, letter or phone within 4 business days after the clinic has received the results. If you do not hear from us within 7 days, please contact the clinic through Whereoscopehart or phone. If you have a critical or abnormal lab result, we will notify you by phone as soon as possible.  Submit refill requests through Belly or call your pharmacy and they will forward the refill request to us. Please allow 3 business days for your refill to be completed.          Additional Information About Your Visit        MyChart Information     Belly gives you secure access to your electronic health record. If you see a primary care provider, you can also send messages to your care team and make appointments. If you have questions, please call your primary care clinic.  If you do not have a primary care provider, please call [phone] and they will assist you.        Care EveryWhere ID     This is your Care EveryWhere ID. This could be used by other organizations to access your [place] medical records  [id number]        Your Vitals Were     Pulse Temperature Respirations Pulse Oximetry BMI (Body Mass Index)       74 97.7  F (36.5  C) (Oral) 16 96% 25.69 kg/m2        Blood Pressure from Last 3 Encounters:   06/23/17 124/67   05/24/17 131/78   04/26/17 150/67    Weight from Last 3 Encounters:   06/23/17 74.4 kg (164 lb)   04/10/17 73.5 kg (162 lb)   01/25/17 75.8 kg (167 lb)              Today, you had the following     No orders found for display       Primary Care Provider Office Phone # Fax #    Mara Combs MD  [phone]       [address]  [facility] [address]        Equal Access to Services     CINTHIA JOSEPH : Hadii aad ku hadsoniajoaquina Babcock, waalessia duff, julianemikey cortezjudith jimenez, ashwini spann laTriciazana alvarado. So [facility] [phone].    ATENCIÓN: Si habla español, tiene a dinh disposición servicios gratuitos de asistencia lingüística. Llame al [phone].    We comply with applicable federal civil rights laws and [place] laws. We do not discriminate on the basis of race, color, national origin, age, disability sex, sexual orientation or gender identity.            Thank you!     Thank you for choosing [facility] SPECIALTY AND PROCEDURE  for your care. Our goal is always to provide you with excellent care. Hearing back from our patients is one way we can continue to improve our services. Please take a few minutes to complete the written survey that you may receive in the mail after your visit with us. Thank you!             Your Updated Medication List - Protect others around you: Learn how to safely use, store and throw away your medicines at www.disposemymeds.org.          This list is accurate as of: 6/23/17  3:16 PM.  Always use your most recent med list.                   Brand Name Dispense Instructions for use Diagnosis    albuterol 108 (90 BASE) MCG/ACT Inhaler    PROAIR HFA/PROVENTIL HFA/VENTOLIN HFA    3 Inhaler    Inhale 2 puffs into the lungs every 6 hours as needed for shortness of breath / dyspnea or wheezing    Uncomplicated severe persistent asthma       beclomethasone 80 MCG/ACT Inhaler    QVAR    3 Inhaler    Inhale 2 puffs into the lungs 2 times daily    Uncomplicated severe persistent asthma       CALCIUM-VITAMIN D PO      Take 1 tablet by mouth daily        Cranberry 300 MG Tabs           estradiol 2 MG vaginal ring    ESTRING    1 each    Place 1 each vaginally every 3 months    Bladder prolapse, female, acquired        fluticasone-salmeterol 500-50 MCG/DOSE diskus inhaler    ADVAIR    3 Inhaler    Inhale 1 puff into the lungs 2 times daily    Uncomplicated severe persistent asthma       losartan 25 MG tablet    COZAAR    90 tablet    Take 1 tablet (25 mg) by mouth daily    Benign essential hypertension       mepolizumab 100 MG injection    NUCALA    1 mL    Inject 1 mL (100 mg) Subcutaneous every 28 days    Steroid-dependent chronic obstructive pulmonary disease (H), Uncomplicated severe persistent asthma       montelukast 10 MG tablet    SINGULAIR    90 tablet    Take 1 tablet (10 mg) by mouth as needed    Uncomplicated severe persistent asthma       potassium chloride SA 20 MEQ CR tablet    potassium chloride    180 tablet    Take 1 tablet (20 mEq) by mouth 2 times daily    Uncomplicated severe persistent asthma       pravastatin 20 MG tablet    PRAVACHOL    90 tablet    Take 1 tablet (20 mg) by mouth daily    Steroid-dependent chronic obstructive pulmonary disease (H)       * predniSONE 10 MG tablet    DELTASONE    30 tablet    Take 1 tablet (10 mg) by mouth every other day    Steroid-dependent chronic obstructive pulmonary disease (H)       * predniSONE 20 MG tablet    DELTASONE    10 tablet    Take 1 tablet (20 mg) by mouth daily As needed with acute exacerbation of asthma    Steroid-dependent chronic obstructive pulmonary disease (H)       triamterene-hydrochlorothiazide 37.5-25 MG per tablet    MAXZIDE-25    90 tablet    Take 1 tablet by mouth daily    Benign essential hypertension       * Notice:  This list has 2 medication(s) that are the same as other medications prescribed for you. Read the directions carefully, and ask your doctor or other care provider to review them with you.

## 2017-06-23 NOTE — PATIENT INSTRUCTIONS
Dear Niurka Cisneros    Thank you for choosing [facility] ([facility]) for your Nucala injection.  The following information is a summary of our appointment as well as important reminders.      We look forward in seeing you on your next appointment here at [facility].  Please don t hesitate to call us at [phone] to reschedule any of your appointments or to speak with one of the [facility] registered nurses.  It was a pleasure taking care of you today.    Sincerely,  Ilsa ROJAS RN  [facility]  Specialty [facility]fusion [facility]  [address] [place] [address]  [address]  Phone:  [phone]

## 2017-07-07 DIAGNOSIS — J45.50 UNCOMPLICATED SEVERE PERSISTENT ASTHMA (H): ICD-10-CM

## 2017-07-07 DIAGNOSIS — I10 ESSENTIAL HYPERTENSION: Primary | ICD-10-CM

## 2017-07-07 RX ORDER — POTASSIUM CHLORIDE 1500 MG/1
20 TABLET, EXTENDED RELEASE ORAL 2 TIMES DAILY
Qty: 120 TABLET | Refills: 3 | Status: SHIPPED | OUTPATIENT
Start: 2017-07-07 | End: 2017-11-22

## 2017-07-07 NOTE — TELEPHONE ENCOUNTER
potassium chloride SA (POTASSIUM CHLORIDE) 20 MEQ tablet   Last Written Prescription Date:  9/7/16  Last Fill Quantity: 180,   # refills: 3  Last [facility] Visit with [facility] provider: 9/7/16  Future Office visit:   None    Potassium   Date Value Ref Range Status   11/10/2015 3.3 (L) 3.4 - 5.3 mmol/L Final   ]  Routing refill request to provider for review/approval because:   potassium chloride SA (POTASSIUM CHLORIDE) 20 MEQ tablet.. K+   L,    past due.

## 2017-07-10 NOTE — TELEPHONE ENCOUNTER
Regarding: Drug interaction  Contact: [phone]  Larisa [facility]  Ref # [id number]  Re: potassium chloride SA (POTASSIUM CHLORIDE) 20 MEQ CR tablet  She reports there is a drug to drug interaction between the potassium chloride and triamterene-hydrochlorothiazide (MAXZIDE-25) 37.5-25 MG per tablet.  Wants to know if that is okay and if they should refill it.     July 10, 2017 3:22 PM  Spoke with pharmacist at [facility]. Drug interaction is that Maxide is a potassium-sparing diuretic, and with being on KCl, potassium level could be elevated. Advised that okay to fill both medications. Patient has been on both medications, and is being asked to check BMP.   Left detailed message for patient regarding need to check BMP. Order placed. Requested call back with any questions/concerns.   Stacey Whitehead RN, Care Coordinator

## 2017-08-04 ENCOUNTER — INFUSION THERAPY VISIT (OUTPATIENT)
Dept: INFUSION THERAPY | Facility: CLINIC | Age: 72
End: 2017-08-04
Attending: INTERNAL MEDICINE
Payer: MEDICARE

## 2017-08-04 VITALS
DIASTOLIC BLOOD PRESSURE: 65 MMHG | BODY MASS INDEX: 25.37 KG/M2 | RESPIRATION RATE: 18 BRPM | OXYGEN SATURATION: 97 % | SYSTOLIC BLOOD PRESSURE: 144 MMHG | WEIGHT: 162 LBS | TEMPERATURE: 98.2 F | HEART RATE: 64 BPM

## 2017-08-04 DIAGNOSIS — Z92.241 STEROID-DEPENDENT CHRONIC OBSTRUCTIVE PULMONARY DISEASE (H): Primary | ICD-10-CM

## 2017-08-04 DIAGNOSIS — I10 ESSENTIAL HYPERTENSION: ICD-10-CM

## 2017-08-04 DIAGNOSIS — J44.9 STEROID-DEPENDENT CHRONIC OBSTRUCTIVE PULMONARY DISEASE (H): Primary | ICD-10-CM

## 2017-08-04 DIAGNOSIS — J45.50 UNCOMPLICATED SEVERE PERSISTENT ASTHMA (H): ICD-10-CM

## 2017-08-04 LAB
ANION GAP SERPL CALCULATED.3IONS-SCNC: 6 MMOL/L (ref 3–14)
BUN SERPL-MCNC: 17 MG/DL (ref 7–30)
CALCIUM SERPL-MCNC: 9 MG/DL (ref 8.5–10.1)
CHLORIDE SERPL-SCNC: 106 MMOL/L (ref 94–109)
CO2 SERPL-SCNC: 27 MMOL/L (ref 20–32)
CREAT SERPL-MCNC: 0.76 MG/DL (ref 0.52–1.04)
GFR SERPL CREATININE-BSD FRML MDRD: 75 ML/MIN/1.7M2
GLUCOSE SERPL-MCNC: 93 MG/DL (ref 70–99)
POTASSIUM SERPL-SCNC: 4 MMOL/L (ref 3.4–5.3)
SODIUM SERPL-SCNC: 139 MMOL/L (ref 133–144)

## 2017-08-04 PROCEDURE — 25000128 H RX IP 250 OP 636: Mod: ZF | Performed by: INTERNAL MEDICINE

## 2017-08-04 PROCEDURE — 96372 THER/PROPH/DIAG INJ SC/IM: CPT

## 2017-08-04 RX ADMIN — MEPOLIZUMAB 100 MG: 100 INJECTION, POWDER, FOR SOLUTION SUBCUTANEOUS at 09:50

## 2017-08-04 NOTE — NURSING NOTE
General assessment for IV and SQ medications    1. Elevated temperature, fever, chills, productive cough or abnormal vital signs, night sweats, coughing up blood or sputum, no appetite or abnormal vital signs, SOB : NO  2. Open wounds or new incisions: NO  3. Recent hospitalization: NO  4.  Recent surgeries:  NO  5. Any upcoming surgeries or dental procedures?:NO  6. Any current or recent bouts of illness or infection? On any antibiotics? : NO  7. Any new, sudden or worsening abdominal pain :NO  8. Vaccination within 4 weeks? Patient or someone in the household is scheduled to receive vaccination? No live virus vaccines prior to or during treatment :NO  9. Any nervous system diseases [i.e. multiple sclerosis, Guillain-[place], seizures, neurological  Changes]  Ask if on a biologic medication* : NO  10. Pregnant or breast feeding; or plans on pregnancy in the future: NO  11. Signs of worsening depression or suicidal ideations while taking benlysta:NO  12. New-onset medical symptoms: NO  13.  New cancer diagnosis or on chemotherapy or radiation NO  14.  Evaluate for any sign of active TB [Unexplained weight loss, Loss of appetite, Night sweats, Fever, Fatigue, Chills, Coughing for 3 weeks or longer, Hemoptysis (coughing up blood), Chest pain]: NO  15.  Current weight 162.0 lbs    **Note: If answered yes to any of the above, hold the infusion and contact ordering provider.

## 2017-08-04 NOTE — PROGRESS NOTES
Patient presents to the [facility] for Nucala.  Order written by Dr Stephania Ring was completed today. Name and  verified with patient. See MAR for medication details. Medication was given in the following sites: left arm SQ Patient tolerated injection well and was discharged to home.

## 2017-08-04 NOTE — MR AVS SNAPSHOT
After Visit Summary   8/4/2017    Niurka Cisneros    MRN: [id number]           Patient Information     Date Of Birth          1945        Visit Information        Provider Department      8/4/2017 9:00 AM UC 44 ATC; UC SPEC INFUSION [facility] Specialty and Procedure        Today's Diagnoses     Steroid-dependent chronic obstructive pulmonary disease (H)    -  1    Uncomplicated severe persistent asthma           Follow-ups after your visit        Your next 10 appointments already scheduled     Sep 01, 2017 10:00 AM CDT   Infusion 60 with UC SPEC INFUSION, UC 50 ATC   [facility] Specialty and Procedure ([facility])    [address] [place] [address]  [address] [facility] [address] [phone]            Sep 29, 2017  8:00 AM CDT   Infusion 60 with UC SPEC INFUSION, UC 51 ATC   [facility] Specialty and Procedure ([facility])    [address] [place] [id number] [phone]            Apr 09, 2018  1:00 PM CDT   (Arrive by 12:45 PM)   Return Visit with Stephania Ring MD   [facility] for Lung Science and Health ([facility])    [address] [place] [address]  [address] [facility] [id number] [phone]              Who to contact     If you have questions or need follow up information about today's clinic visit or your schedule please contact [facility] SPECIALTY AND PROCEDURE directly at [phone].  Normal or non-critical lab and imaging results will be communicated to you by MyChart, letter or phone within 4 business days after the clinic has received the results. If you do not hear from us within 7 days, please contact the clinic through MyChart or phone. If you have a critical or abnormal lab result, we will notify you by phone as soon as possible.  Submit refill requests through  Your Policy Managerfreya or call your pharmacy and they will forward the refill request to us. Please allow 3 business days for your refill to be completed.          Additional Information About Your Visit        MyChart Information     ArticleAlley gives you secure access to your electronic health record. If you see a primary care provider, you can also send messages to your care team and make appointments. If you have questions, please call your primary care clinic.  If you do not have a primary care provider, please call [phone] and they will assist you.        Care EveryWhere ID     This is your Care EveryWhere ID. This could be used by other organizations to access your [place] medical records  [id number]        Your Vitals Were     Pulse Temperature Respirations Pulse Oximetry BMI (Body Mass Index)       64 98.2  F (36.8  C) (Oral) 18 97% 25.37 kg/m2        Blood Pressure from Last 3 Encounters:   08/04/17 144/65   06/23/17 124/67   05/24/17 131/78    Weight from Last 3 Encounters:   08/04/17 73.5 kg (162 lb)   06/23/17 74.4 kg (164 lb)   04/10/17 73.5 kg (162 lb)              Today, you had the following     No orders found for display       Primary Care Provider Office Phone # Fax #    Mara Regina Combs [phone]       [address] [place] [address]        Equal Access to Services     CINTHIA JOSEPH : Hadii aad ku hadasho Soadonis, waaxda luqadaha, qaybta kaalmada adejustinyada, ashwini hernadez . So [facility] [phone].    ATENCIÓN: Si habla español, tiene a dinh disposición servicios gratuitos de asistencia lingüística. Llame al [phone].    We comply with applicable federal civil rights laws and [place] laws. We do not discriminate on the basis of race, color, national origin, age, disability sex, sexual orientation or gender identity.            Thank you!     Thank you for choosing [facility] SPECIALTY AND PROCEDURE   for your care. Our goal is always to provide you with excellent care. Hearing back from our patients is one way we can continue to improve our services. Please take a few minutes to complete the written survey that you may receive in the mail after your visit with us. Thank you!             Your Updated Medication List - Protect others around you: Learn how to safely use, store and throw away your medicines at www.disposemymeds.org.          This list is accurate as of: 8/4/17 11:30 AM.  Always use your most recent med list.                   Brand Name Dispense Instructions for use Diagnosis    albuterol 108 (90 BASE) MCG/ACT Inhaler    PROAIR HFA/PROVENTIL HFA/VENTOLIN HFA    3 Inhaler    Inhale 2 puffs into the lungs every 6 hours as needed for shortness of breath / dyspnea or wheezing    Uncomplicated severe persistent asthma       beclomethasone 80 MCG/ACT Inhaler    QVAR    3 Inhaler    Inhale 2 puffs into the lungs 2 times daily    Uncomplicated severe persistent asthma       CALCIUM-VITAMIN D PO      Take 1 tablet by mouth daily        Cranberry 300 MG Tabs           estradiol 2 MG vaginal ring    ESTRING    1 each    Place 1 each vaginally every 3 months    Bladder prolapse, female, acquired       fluticasone-salmeterol 500-50 MCG/DOSE diskus inhaler    ADVAIR    3 Inhaler    Inhale 1 puff into the lungs 2 times daily    Uncomplicated severe persistent asthma       losartan 25 MG tablet    COZAAR    90 tablet    Take 1 tablet (25 mg) by mouth daily    Benign essential hypertension       mepolizumab 100 MG injection    NUCALA    1 mL    Inject 1 mL (100 mg) Subcutaneous every 28 days    Steroid-dependent chronic obstructive pulmonary disease (H), Uncomplicated severe persistent asthma       montelukast 10 MG tablet    SINGULAIR    90 tablet    Take 1 tablet (10 mg) by mouth as needed    Uncomplicated severe persistent asthma       potassium chloride SA 20 MEQ CR tablet    potassium chloride    120 tablet     Take 1 tablet (20 mEq) by mouth 2 times daily    Uncomplicated severe persistent asthma       pravastatin 20 MG tablet    PRAVACHOL    90 tablet    Take 1 tablet (20 mg) by mouth daily    Steroid-dependent chronic obstructive pulmonary disease (H)       * predniSONE 10 MG tablet    DELTASONE    30 tablet    Take 1 tablet (10 mg) by mouth every other day    Steroid-dependent chronic obstructive pulmonary disease (H)       * predniSONE 20 MG tablet    DELTASONE    10 tablet    Take 1 tablet (20 mg) by mouth daily As needed with acute exacerbation of asthma    Steroid-dependent chronic obstructive pulmonary disease (H)       triamterene-hydrochlorothiazide 37.5-25 MG per tablet    MAXZIDE-25    90 tablet    Take 1 tablet by mouth daily    Benign essential hypertension       * Notice:  This list has 2 medication(s) that are the same as other medications prescribed for you. Read the directions carefully, and ask your doctor or other care provider to review them with you.

## 2017-09-01 ENCOUNTER — INFUSION THERAPY VISIT (OUTPATIENT)
Dept: INFUSION THERAPY | Facility: CLINIC | Age: 72
End: 2017-09-01
Attending: INTERNAL MEDICINE
Payer: MEDICARE

## 2017-09-01 VITALS
DIASTOLIC BLOOD PRESSURE: 73 MMHG | RESPIRATION RATE: 18 BRPM | SYSTOLIC BLOOD PRESSURE: 129 MMHG | TEMPERATURE: 96.5 F | OXYGEN SATURATION: 97 %

## 2017-09-01 DIAGNOSIS — J45.50 UNCOMPLICATED SEVERE PERSISTENT ASTHMA (H): ICD-10-CM

## 2017-09-01 DIAGNOSIS — J44.9 STEROID-DEPENDENT CHRONIC OBSTRUCTIVE PULMONARY DISEASE (H): Primary | ICD-10-CM

## 2017-09-01 DIAGNOSIS — Z92.241 STEROID-DEPENDENT CHRONIC OBSTRUCTIVE PULMONARY DISEASE (H): Primary | ICD-10-CM

## 2017-09-01 PROCEDURE — 96401 CHEMO ANTI-NEOPL SQ/IM: CPT

## 2017-09-01 PROCEDURE — 25000128 H RX IP 250 OP 636: Mod: ZF | Performed by: INTERNAL MEDICINE

## 2017-09-01 RX ADMIN — MEPOLIZUMAB 100 MG: 100 INJECTION, POWDER, FOR SOLUTION SUBCUTANEOUS at 10:38

## 2017-09-01 NOTE — PROGRESS NOTES
Nursing Note  Niurka Cisneros [facility] for her monthly Nucala injection as ordered by Dr. Ring    Patient identification verified by name and date of birth.  Vitals recorded. Patient was provided with education regarding medication and possible side effects.  Patient verbalized understanding.     Injection given SQ to left posterior arm.   Vitals stable  No s/s of reaction.    Discharge instructions were reviewed with patient. Declined AVS.  Patient/representative verbalized understanding of discharge instructions and all questions answered.  [facility] in stable condition.    Ericka Severson, RN    Administrations This Visit     mepolizumab (NUCALA) injection 100 mg     Admin Date Action Dose Route Administered By             09/01/2017 Given 100 mg Subcutaneous Severson, Ericka, RN                          /73  Temp 96.5  F (35.8  C) (Oral)  Resp 18  SpO2 97%     ~~~ NOTE: If the patient answers yes to any of the questions below, hold the infusion and contact ordering provider or on-call provider.    1. Have you recently had an elevated temperature, fever, chills, productive cough, coughing for 3 weeks or longer or hemoptysis,  abnormal vital signs, night sweats,  chest pain or have you noticed a decrease in your appetite, unexplained weight loss or fatigue? No  2. Do you have any open wounds or new incisions? No  3. Do you have any recent or upcoming hospitalizations, surgeries or dental procedures? No  4. Do you currently have or recently have had any signs of illness or infection or are you on any antibiotics? No  5. Have you had any new, sudden or worsening abdominal pain? No  6. Have you or anyone in your household received a live vaccination in the past 4 weeks? Please note:  No live vaccines while on biologic/chemotherapy until 6 months after the last treatment.  Patient can receive the flu  vaccine (shot only) and the pneumovax.  It is optimal for the patient to get these vaccines mid cycle, but they can be given at any time as long as it is not on the day of the infusion. No  7. Have you recently been diagnosed with any new nervous system diseases (ie. Multiple sclerosis, Guillain [place], seizures, neurological changes) or cancer diagnosis? Are you on any form of radiation or chemotherapy? No  8. Are you pregnant or breast feeding or do you have plans of pregnancy in the future? No  9. Have you been having any signs of worsening depression or suicidal ideations?  (benlysta only) No  10. Have there been any other new onset medical symptoms? No

## 2017-09-01 NOTE — MR AVS SNAPSHOT
After Visit Summary   9/1/2017    Niurka Cisneros    MRN: [id number]           Patient Information     Date Of Birth          1945        Visit Information        Provider Department      9/1/2017 10:00 AM UC 50 ATC; UC SPEC INFUSION [facility]        Today's Diagnoses     Steroid-dependent chronic obstructive pulmonary disease (H)    -  1    Uncomplicated severe persistent asthma           Follow-ups after your visit        Your next 10 appointments already scheduled     Sep 29, 2017  8:00 AM CDT   Infusion 60 with UC SPEC INFUSION, UC 51 ATC   [facility] ([facility])    [address] [place] [address]  [address] [facility] [id number] [phone]            Oct 27, 2017 10:00 AM CDT   Infusion 60 with UC SPEC INFUSION, UC 44 ATC   [facility] ([facility])    [address] [place] [id number] [phone]            Apr 09, 2018  1:00 PM CDT   (Arrive by 12:45 PM)   Return Visit with Stephania Ring MD   [facility] ([facility])    [address] [place] [address]  [address] [facility] [id number] [phone]              Who to contact     If you have questions or need follow up information about today's clinic visit or your schedule please contact [facility] directly at [phone].  Normal or non-critical lab and imaging results will be communicated to you by MyChart, letter or phone within 4 business days after the clinic has received the results. If you do not hear from us within 7 days, please contact the clinic through MyChart or phone. If you have a critical or abnormal lab result, we will notify you by phone as soon as possible.  Submit refill requests through  [employer] or call your pharmacy and they will forward the refill request to us. Please allow 3 business days for your refill to be completed.          Additional Information About Your Visit        MyChart Information     [employer] gives you secure access to your electronic health record. If you see a primary care provider, you can also send messages to your care team and make appointments. If you have questions, please call your primary care clinic.  If you do not have a primary care provider, please call [phone] and they will assist you.        Care EveryWhere ID     This is your Care EveryWhere ID. This could be used by other organizations to access your [place] medical records  [id number]        Your Vitals Were     Temperature Respirations Pulse Oximetry             96.5  F (35.8  C) (Oral) 18 97%          Blood Pressure from Last 3 Encounters:   09/01/17 129/73   08/04/17 144/65   06/23/17 124/67    Weight from Last 3 Encounters:   08/04/17 73.5 kg (162 lb)   06/23/17 74.4 kg (164 lb)   04/10/17 73.5 kg (162 lb)              Today, you had the following     No orders found for display       Primary Care Provider Office Phone # Fax #    Mara Regina Combs [phone]       [address] [place] [address] [address] [place] [id number]        Equal Access to Services     CINTHIA JOSEPH : Hadii yvette reeves hadasho Soadonis, waaxda luqadaha, qaybta kaalmada adeegyada, ashwini donnelly hayzana hernadez . So [facility] [phone].    ATENCIÓN: Si habla español, tiene a dinh disposición servicios gratuitos de asistencia lingüística. Llame al [phone].    We comply with applicable federal civil rights laws and [place] laws. We do not discriminate on the basis of race, color, national origin, age, disability sex, sexual orientation or gender identity.            Thank you!     Thank you for choosing [facility] PROCED[facility]  for your care. Our goal is always to provide you  with excellent care. Hearing back from our patients is one way we can continue to improve our services. Please take a few minutes to complete the written survey that you may receive in the mail after your visit with us. Thank you!             Your Updated Medication List - Protect others around you: Learn how to safely use, store and throw away your medicines at www.disposemymeds.org.          This list is accurate as of: 9/1/17  3:34 PM.  Always use your most recent med list.                   Brand Name Dispense Instructions for use Diagnosis    albuterol 108 (90 BASE) MCG/ACT Inhaler    PROAIR HFA/PROVENTIL HFA/VENTOLIN HFA    3 Inhaler    Inhale 2 puffs into the lungs every 6 hours as needed for shortness of breath / dyspnea or wheezing    Uncomplicated severe persistent asthma       beclomethasone 80 MCG/ACT Inhaler    QVAR    3 Inhaler    Inhale 2 puffs into the lungs 2 times daily    Uncomplicated severe persistent asthma       CALCIUM-VITAMIN D PO      Take 1 tablet by mouth daily        Cranberry 300 MG Tabs           estradiol 2 MG vaginal ring    ESTRING    1 each    Place 1 each vaginally every 3 months    Bladder prolapse, female, acquired       fluticasone-salmeterol 500-50 MCG/DOSE diskus inhaler    ADVAIR    3 Inhaler    Inhale 1 puff into the lungs 2 times daily    Uncomplicated severe persistent asthma       losartan 25 MG tablet    COZAAR    90 tablet    Take 1 tablet (25 mg) by mouth daily    Benign essential hypertension       mepolizumab 100 MG injection    NUCALA    1 mL    Inject 1 mL (100 mg) Subcutaneous every 28 days    Steroid-dependent chronic obstructive pulmonary disease (H), Uncomplicated severe persistent asthma       montelukast 10 MG tablet    SINGULAIR    90 tablet    Take 1 tablet (10 mg) by mouth as needed    Uncomplicated severe persistent asthma       potassium chloride SA 20 MEQ CR tablet    potassium chloride    120 tablet    Take 1 tablet (20 mEq) by mouth 2 times daily     Uncomplicated severe persistent asthma       pravastatin 20 MG tablet    PRAVACHOL    90 tablet    Take 1 tablet (20 mg) by mouth daily    Steroid-dependent chronic obstructive pulmonary disease (H)       * predniSONE 10 MG tablet    DELTASONE    30 tablet    Take 1 tablet (10 mg) by mouth every other day    Steroid-dependent chronic obstructive pulmonary disease (H)       * predniSONE 20 MG tablet    DELTASONE    10 tablet    Take 1 tablet (20 mg) by mouth daily As needed with acute exacerbation of asthma    Steroid-dependent chronic obstructive pulmonary disease (H)       triamterene-hydrochlorothiazide 37.5-25 MG per tablet    MAXZIDE-25    90 tablet    Take 1 tablet by mouth daily    Benign essential hypertension       * Notice:  This list has 2 medication(s) that are the same as other medications prescribed for you. Read the directions carefully, and ask your doctor or other care provider to review them with you.

## 2017-09-18 ENCOUNTER — MYC MEDICAL ADVICE (OUTPATIENT)
Dept: INTERNAL MEDICINE | Facility: CLINIC | Age: 72
End: 2017-09-18

## 2017-09-18 DIAGNOSIS — I10 BENIGN ESSENTIAL HYPERTENSION: ICD-10-CM

## 2017-09-18 RX ORDER — TRIAMTERENE/HYDROCHLOROTHIAZID 37.5-25 MG
1 TABLET ORAL DAILY
Qty: 90 TABLET | Refills: 0 | Status: SHIPPED | OUTPATIENT
Start: 2017-09-18 | End: 2017-11-07

## 2017-09-29 ENCOUNTER — INFUSION THERAPY VISIT (OUTPATIENT)
Dept: INFUSION THERAPY | Facility: CLINIC | Age: 72
End: 2017-09-29
Attending: INTERNAL MEDICINE
Payer: MEDICARE

## 2017-09-29 VITALS
OXYGEN SATURATION: 98 % | SYSTOLIC BLOOD PRESSURE: 140 MMHG | HEART RATE: 74 BPM | DIASTOLIC BLOOD PRESSURE: 78 MMHG | TEMPERATURE: 97.8 F

## 2017-09-29 DIAGNOSIS — J45.50 UNCOMPLICATED SEVERE PERSISTENT ASTHMA (H): ICD-10-CM

## 2017-09-29 DIAGNOSIS — Z92.241 STEROID-DEPENDENT CHRONIC OBSTRUCTIVE PULMONARY DISEASE (H): Primary | ICD-10-CM

## 2017-09-29 DIAGNOSIS — J44.9 STEROID-DEPENDENT CHRONIC OBSTRUCTIVE PULMONARY DISEASE (H): Primary | ICD-10-CM

## 2017-09-29 PROCEDURE — 96372 THER/PROPH/DIAG INJ SC/IM: CPT

## 2017-09-29 PROCEDURE — 25000128 H RX IP 250 OP 636: Mod: ZF | Performed by: INTERNAL MEDICINE

## 2017-09-29 RX ADMIN — MEPOLIZUMAB 100 MG: 100 INJECTION, POWDER, FOR SOLUTION SUBCUTANEOUS at 08:26

## 2017-09-29 NOTE — PROGRESS NOTES
Patient presents to the [facility] for Nucula injection.  Order written by Dr. Ring was completed today. Name and  verified with patient. See MAR for medication details. Medication was in 1 syringe given by pharmacy and given in the following sites left back side of upper arm. Patient tolerated injection well and was discharged to home. Patient to return in 28 days.    Javed GRAY LPN    Administrations This Visit     mepolizumab (NUCALA) injection 100 mg     Admin Date Action Dose Route Administered By             2017 Given 100 mg Subcutaneous Javed Cary LPN

## 2017-09-29 NOTE — MR AVS SNAPSHOT
After Visit Summary   9/29/2017    Niurka Cisneros    MRN: [id number]           Patient Information     Date Of Birth          1945        Visit Information        Provider Department      9/29/2017 8:00 AM UC 51 ATC; UC SPEC INFUSION [facility] Procedure        Today's Diagnoses     Steroid-dependent chronic obstructive pulmonary disease (H)    -  1    Uncomplicated severe persistent asthma           Follow-ups after your visit        Your next 10 appointments already scheduled     Oct 27, 2017 10:00 AM CDT   Infusion 60 with UC SPEC INFUSION, UC 44 ATC   [facility] ([facility])    [address] [facility]  2nd Floor  [facility] [id number] [phone]            Nov 22, 2017  1:30 PM CST   (Arrive by 1:15 PM)   Negrita Gutierrez with Mara Combs MD   [facility] ([facility])    [address] [place] [address]  4th Floor  [facility] [phone] [phone]            Apr 09, 2018  1:00 PM CDT   (Arrive by 12:45 PM)   Return Visit with Stephania iRng MD   [facility] ([facility])    [address] [place] [address]  3rd Floor  [facility] [id number] [phone]              Who to contact     If you have questions or need follow up information about today's clinic visit or your schedule please contact [facility] PROCEDURE directly at [phone].  Normal or non-critical lab and imaging results will be communicated to you by MyChart, letter or phone within 4 business days after the clinic has received the results. If you do not hear from us within 7 days, please contact the clinic through [employer] or phone. If you have a critical or abnormal lab result, we will notify you by phone as soon as possible.  Submit refill requests through [employer]  or call your pharmacy and they will forward the refill request to us. Please allow 3 business days for your refill to be completed.          Additional Information About Your Visit        MyChart Information     [employer] gives you secure access to your electronic health record. If you see a primary care provider, you can also send messages to your care team and make appointments. If you have questions, please call your primary care clinic.  If you do not have a primary care provider, please call [phone] and they will assist you.        Care EveryWhere ID     This is your Care EveryWhere ID. This could be used by other organizations to access your [place] medical records  [id number]        Your Vitals Were     Pulse Temperature Pulse Oximetry             74 97.8  F (36.6  C) (Oral) 98%          Blood Pressure from Last 3 Encounters:   09/29/17 140/78   09/01/17 129/73   08/04/17 144/65    Weight from Last 3 Encounters:   08/04/17 73.5 kg (162 lb)   06/23/17 74.4 kg (164 lb)   04/10/17 73.5 kg (162 lb)              Today, you had the following     No orders found for display       Primary Care Provider Office Phone # Fax #    Mara Regina Combs [phone]       [address] [place] [address] [address] [place] [address]        Equal Access to Services     CINTHIA JOSEPH : Hadii yvette ku hadasho Soomaali, waaxda luqadaha, qaybta kaalmada adeegyada, ashwini donnelly hayzana hernadez . So [facility] [phone].    ATENCIÓN: Si habla español, tiene a dinh disposición servicios gratuitos de asistencia lingüística. Llame al [phone].    We comply with applicable federal civil rights laws and [place] laws. We do not discriminate on the basis of race, color, national origin, age, disability sex, sexual orientation or gender identity.            Thank you!     Thank you for choosing [facility] PROCEDURE  for your care. Our goal is always to provide you with excellent  care. Hearing back from our patients is one way we can continue to improve our services. Please take a few minutes to complete the written survey that you may receive in the mail after your visit with us. Thank you!             Your Updated Medication List - Protect others around you: Learn how to safely use, store and throw away your medicines at www.disposemymeds.org.          This list is accurate as of: 9/29/17 10:15 AM.  Always use your most recent med list.                   Brand Name Dispense Instructions for use Diagnosis    albuterol 108 (90 BASE) MCG/ACT Inhaler    PROAIR HFA/PROVENTIL HFA/VENTOLIN HFA    3 Inhaler    Inhale 2 puffs into the lungs every 6 hours as needed for shortness of breath / dyspnea or wheezing    Uncomplicated severe persistent asthma       beclomethasone 80 MCG/ACT Inhaler    QVAR    3 Inhaler    Inhale 2 puffs into the lungs 2 times daily    Uncomplicated severe persistent asthma       CALCIUM-VITAMIN D PO      Take 1 tablet by mouth daily        Cranberry 300 MG Tabs           estradiol 2 MG vaginal ring    ESTRING    1 each    Place 1 each vaginally every 3 months    Bladder prolapse, female, acquired       fluticasone-salmeterol 500-50 MCG/DOSE diskus inhaler    ADVAIR    3 Inhaler    Inhale 1 puff into the lungs 2 times daily    Uncomplicated severe persistent asthma       losartan 25 MG tablet    COZAAR    90 tablet    Take 1 tablet (25 mg) by mouth daily    Benign essential hypertension       mepolizumab 100 MG injection    NUCALA    1 mL    Inject 1 mL (100 mg) Subcutaneous every 28 days    Steroid-dependent chronic obstructive pulmonary disease (H), Uncomplicated severe persistent asthma       montelukast 10 MG tablet    SINGULAIR    90 tablet    Take 1 tablet (10 mg) by mouth as needed    Uncomplicated severe persistent asthma       potassium chloride SA 20 MEQ CR tablet    KLOR-CON    120 tablet    Take 1 tablet (20 mEq) by mouth 2 times daily    Uncomplicated severe  persistent asthma       pravastatin 20 MG tablet    PRAVACHOL    90 tablet    Take 1 tablet (20 mg) by mouth daily    Steroid-dependent chronic obstructive pulmonary disease (H)       * predniSONE 10 MG tablet    DELTASONE    30 tablet    Take 1 tablet (10 mg) by mouth every other day    Steroid-dependent chronic obstructive pulmonary disease (H)       * predniSONE 20 MG tablet    DELTASONE    10 tablet    Take 1 tablet (20 mg) by mouth daily As needed with acute exacerbation of asthma    Steroid-dependent chronic obstructive pulmonary disease (H)       triamterene-hydrochlorothiazide 37.5-25 MG per tablet    MAXZIDE-25    90 tablet    Take 1 tablet by mouth daily    Benign essential hypertension       * Notice:  This list has 2 medication(s) that are the same as other medications prescribed for you. Read the directions carefully, and ask your doctor or other care provider to review them with you.

## 2017-10-08 ENCOUNTER — MYC MEDICAL ADVICE (OUTPATIENT)
Dept: INTERNAL MEDICINE | Facility: CLINIC | Age: 72
End: 2017-10-08

## 2017-10-09 ENCOUNTER — TELEPHONE (OUTPATIENT)
Dept: INTERNAL MEDICINE | Facility: CLINIC | Age: 72
End: 2017-10-09
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
## 2017-10-10 ENCOUNTER — OFFICE VISIT (OUTPATIENT)
Dept: INTERNAL MEDICINE | Facility: CLINIC | Age: 72
End: 2017-10-10

## 2017-10-10 VITALS
WEIGHT: 162.9 LBS | SYSTOLIC BLOOD PRESSURE: 116 MMHG | BODY MASS INDEX: 25.51 KG/M2 | HEART RATE: 87 BPM | DIASTOLIC BLOOD PRESSURE: 76 MMHG

## 2017-10-10 DIAGNOSIS — S72.112A: ICD-10-CM

## 2017-10-10 DIAGNOSIS — M25.552 HIP PAIN, LEFT: Primary | ICD-10-CM

## 2017-10-10 RX ORDER — TRAMADOL HYDROCHLORIDE 50 MG/1
TABLET ORAL
Qty: 30 TABLET | Refills: 0 | Status: SHIPPED | OUTPATIENT
Start: 2017-10-10 | End: 2017-11-22

## 2017-10-10 ASSESSMENT — PAIN SCALES - GENERAL: PAINLEVEL: MODERATE PAIN (5)

## 2017-10-10 NOTE — MR AVS SNAPSHOT
After Visit Summary   10/10/2017    Niurka Cisneros    MRN: [id number]           Patient Information     Date Of Birth          1945        Visit Information        Provider Department      10/10/2017 7:35 AM Sabino Saxena MD [facility]        Today's Diagnoses     Hip pain, left    -  1    Closed avulsion fracture of greater trochanter of left femur, initial encounter (H)           Follow-ups after your visit        Additional Services     ORTHOPEDICS ADULT REFERRAL       Your provider has referred you to: PREFERRED PROVIDERS:    Please be aware that coverage of these services is subject to the terms and limitations of your health insurance plan.  Call member services at your health plan with any benefit or coverage questions.      Please bring the following to your appointment:    >>   Any x-rays, CTs or MRIs which have been performed.  Contact the facility where they were done to arrange for  prior to your scheduled appointment.    >>   List of current medications   >>   This referral request   >>   Any documents/labs given to you for this referral                  Your next 10 appointments already scheduled     Oct 10, 2017 10:10 AM CDT   (Arrive by 9:55 AM)   XR HIP LEFT G/E 2 VIEWS with UCXR1   [facility] ([facility])    [address] [place] [address] [phone]           Please bring a list of your current medicines to your exam. (Include vitamins, minerals and over-thecounter medicines.) Leave your valuables at home.  Tell your doctor if there is a chance you may be pregnant.  You do not need to do anything special for this exam.            Oct 10, 2017 10:35 AM CDT   XR PELVIS 1/2 VIEWS with UCXR1   [facility] ([facility])    [address] [address] [phone]           Please bring a list of your  current medicines to your exam. (Include vitamins, minerals and over-thecounter medicines.) Leave your valuables at home.  Tell your doctor if there is a chance you may be pregnant.  You do not need to do anything special for this exam.            Nov 10, 2017  7:00 AM CST   Infusion 60 with UC SPEC INFUSION, UC 51 ATC   [facility] ([facility])    [address] [place] [address]  2nd Floor  [facility] [id number] [phone]            Nov 22, 2017  1:30 PM CST   (Arrive by 1:15 PM)   MyChart Long with Mara Combs MD   [facility] ([facility])    [address] [place] [address]  [address] [facility] [address] [phone]            Apr 09, 2018  1:00 PM CDT   (Arrive by 12:45 PM)   Return Visit with Stephania Ring MD   [facility] ([facility])    [address] [place] [address]  [address] [facility] [address] [phone]              Who to contact     Please call your clinic at [phone] to:    Ask questions about your health    Make or cancel appointments    Discuss your medicines    Learn about your test results    Speak to your doctor   If you have compliments or concerns about an experience at your clinic, or if you wish to file a complaint, please contact [facility] Patient Relations at [phone] or email us at [email or web address]         Additional Information About Your Visit        [facility] Information     [employer] gives you secure access to your electronic health record. If you see a primary care provider, you can also send messages to your care team and make appointments. If you have questions, please call your primary care clinic.  If you do not have a primary care provider, please call [phone] and they will assist you.      [employer] is an electronic gateway that provides  easy, online access to your medical records. With via680, you can request a clinic appointment, read your test results, renew a prescription or communicate with your care team.     To access your existing account, please contact your [facility] or call [phone] for assistance.        Care EveryWhere ID     This is your Care EveryWhere ID. This could be used by other organizations to access your [facility] medical records  [id number]        Your Vitals Were     Pulse Breastfeeding? BMI (Body Mass Index)             87 No 25.51 kg/m2          Blood Pressure from Last 3 Encounters:   10/10/17 116/76   09/29/17 140/78   09/01/17 129/73    Weight from Last 3 Encounters:   10/10/17 73.9 kg (162 lb 14.4 oz)   08/04/17 73.5 kg (162 lb)   06/23/17 74.4 kg (164 lb)              We Performed the Following     ORTHOPEDICS ADULT REFERRAL          Today's Medication Changes          These changes are accurate as of: 10/10/17  9:21 AM.  If you have any questions, ask your nurse or doctor.               Start taking these medicines.        Dose/Directions    traMADol 50 MG tablet   Commonly known as:  ULTRAM   Used for:  Closed avulsion fracture of greater trochanter of left femur, initial encounter (H)   Started by:  Sabino Saxena MD        Take one tablet daily as needed for break thru pain   Quantity:  30 tablet   Refills:  0            Where to get your medicines      Some of these will need a paper prescription and others can be bought over the counter.  Ask your nurse if you have questions.     Bring a paper prescription for each of these medications     traMADol 50 MG tablet                Primary Care Provider Office Phone # Fax #    Mara Combs [phone]       [address] [facility] [address]  [facility] [address]        Equal Access to Services     CINTHIA JOSEPH AH: giovanni Le qaybta kaalmada adeegyada, waxay idiin hayaan  christi luciowaleskamalik seth'aan ah. So [facility] [phone].    ATENCIÓN: Si habla martinez, tiene a dinh disposición servicios gratuitos de asistencia lingüística. Aissatou al [phone].    We comply with applicable federal civil rights laws and [place] laws. We do not discriminate on the basis of race, color, national origin, age, disability, sex, sexual orientation, or gender identity.            Thank you!     Thank you for choosing [facility]  for your care. Our goal is always to provide you with excellent care. Hearing back from our patients is one way we can continue to improve our services. Please take a few minutes to complete the written survey that you may receive in the mail after your visit with us. Thank you!             Your Updated Medication List - Protect others around you: Learn how to safely use, store and throw away your medicines at www.disposemymeds.org.          This list is accurate as of: 10/10/17  9:21 AM.  Always use your most recent med list.                   Brand Name Dispense Instructions for use Diagnosis    albuterol 108 (90 BASE) MCG/ACT Inhaler    PROAIR HFA/PROVENTIL HFA/VENTOLIN HFA    3 Inhaler    Inhale 2 puffs into the lungs every 6 hours as needed for shortness of breath / dyspnea or wheezing    Uncomplicated severe persistent asthma       beclomethasone 80 MCG/ACT Inhaler    QVAR    3 Inhaler    Inhale 2 puffs into the lungs 2 times daily    Uncomplicated severe persistent asthma       CALCIUM-VITAMIN D PO      Take 1 tablet by mouth daily        Cranberry 300 MG Tabs           estradiol 2 MG vaginal ring    ESTRING    1 each    Place 1 each vaginally every 3 months    Bladder prolapse, female, acquired       fluticasone-salmeterol 500-50 MCG/DOSE diskus inhaler    ADVAIR    3 Inhaler    Inhale 1 puff into the lungs 2 times daily    Uncomplicated severe persistent asthma       losartan 25 MG tablet    COZAAR    90 tablet    Take 1 tablet (25 mg) by mouth daily    Benign  essential hypertension       mepolizumab 100 MG injection    NUCALA    1 mL    Inject 1 mL (100 mg) Subcutaneous every 28 days    Steroid-dependent chronic obstructive pulmonary disease (H), Uncomplicated severe persistent asthma       montelukast 10 MG tablet    SINGULAIR    90 tablet    Take 1 tablet (10 mg) by mouth as needed    Uncomplicated severe persistent asthma       NAPROXEN PO      Take 220 mg by mouth        potassium chloride SA 20 MEQ CR tablet    KLOR-CON    120 tablet    Take 1 tablet (20 mEq) by mouth 2 times daily    Uncomplicated severe persistent asthma       pravastatin 20 MG tablet    PRAVACHOL    90 tablet    Take 1 tablet (20 mg) by mouth daily    Steroid-dependent chronic obstructive pulmonary disease (H)       * predniSONE 10 MG tablet    DELTASONE    30 tablet    Take 1 tablet (10 mg) by mouth every other day    Steroid-dependent chronic obstructive pulmonary disease (H)       * predniSONE 20 MG tablet    DELTASONE    10 tablet    Take 1 tablet (20 mg) by mouth daily As needed with acute exacerbation of asthma    Steroid-dependent chronic obstructive pulmonary disease (H)       traMADol 50 MG tablet    ULTRAM    30 tablet    Take one tablet daily as needed for break thru pain    Closed avulsion fracture of greater trochanter of left femur, initial encounter (H)       triamterene-hydrochlorothiazide 37.5-25 MG per tablet    MAXZIDE-25    90 tablet    Take 1 tablet by mouth daily    Benign essential hypertension       * Notice:  This list has 2 medication(s) that are the same as other medications prescribed for you. Read the directions carefully, and ask your doctor or other care provider to review them with you.
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
## 2017-10-24 ENCOUNTER — OFFICE VISIT (OUTPATIENT)
Dept: ORTHOPEDICS | Facility: CLINIC | Age: 72
End: 2017-10-24

## 2017-10-24 VITALS
BODY MASS INDEX: 25.43 KG/M2 | HEIGHT: 67 IN | SYSTOLIC BLOOD PRESSURE: 154 MMHG | WEIGHT: 162 LBS | DIASTOLIC BLOOD PRESSURE: 80 MMHG

## 2017-10-24 DIAGNOSIS — M25.552 HIP PAIN, LEFT: Primary | ICD-10-CM
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
## 2017-10-24 NOTE — MR AVS SNAPSHOT
After Visit Summary   10/24/2017    Niurka Cisneros    MRN: [id number]           Patient Information     Date Of Birth          1945        Visit Information        Provider Department      10/24/2017 4:20 PM Terry Sue DO [facility]        Today's Diagnoses     Hip pain, left    -  1       Follow-ups after your visit        Your next 10 appointments already scheduled     Nov 10, 2017  7:00 AM CST   Infusion 60 with UC SPEC INFUSION, UC 51 ATC   [facility] [place] [facility] ([facility])    [address] [place] [address]  2nd [facility] [id number] [phone]            Nov 22, 2017  1:30 PM CST   (Arrive by 1:15 PM)   MyChart Long with Mara Combs MD   [facility] ([facility])    [address] [place] [address]  4th [facility] [id number] [phone]            Apr 09, 2018  1:00 PM CDT   (Arrive by 12:45 PM)   Return Visit with Stephania Ring MD   [facility] ([facility])    [address] [place] [address]  3rd [facility] [id number] [phone]              Who to contact     Please call your clinic at [phone] to:    Ask questions about your health    Make or cancel appointments    Discuss your medicines    Learn about your test results    Speak to your doctor   If you have compliments or concerns about an experience at your clinic, or if you wish to file a complaint, please contact [facility] Patient Relations at [phone] or email us at [email or web address]         Additional Information About Your Visit        MyChart Information     [employer] gives you secure access to your electronic health record. If you see a primary care provider, you can also send messages to your care team and make appointments. If you have questions, please call  "your primary care clinic.  If you do not have a primary care provider, please call [phone] and they will assist you.      [employer] is an electronic gateway that provides easy, online access to your medical records. With [employer], you can request a clinic appointment, read your test results, renew a prescription or communicate with your care team.     To access your existing account, please contact your [facility] or call [phone] for assistance.        Care EveryWhere ID     This is your Care EveryWhere ID. This could be used by other organizations to access your [facility] medical records  [id number]        Your Vitals Were     Height BMI (Body Mass Index)                5' 7\" (1.702 m) 25.37 kg/m2           Blood Pressure from Last 3 Encounters:   10/24/17 154/80   10/10/17 116/76   09/29/17 140/78    Weight from Last 3 Encounters:   10/24/17 162 lb (73.5 kg)   10/10/17 162 lb 14.4 oz (73.9 kg)   08/04/17 162 lb (73.5 kg)               Primary Care Provider Office Phone # Fax #    Mara Regina Combs [phone]       [address] [place] [address] 741  [facility] [address]        Equal Access to Services     CINTHIA JOSEPH : Hadii aad ku hadasho Soomaali, waaxda luqadaha, qaybta kaalmada adeegyada, waxay idiin hayjayceen christi khjeronimo lapatrice alvarado. So [facility] [phone].    ATENCIÓN: Si habla español, tiene a dinh disposición servicios gratuitos de asistencia lingüística. Llame al [phone].    We comply with applicable federal civil rights laws and [place] laws. We do not discriminate on the basis of race, color, national origin, age, disability, sex, sexual orientation, or gender identity.            Thank you!     Thank you for choosing [facility]  for your care. Our goal is always to provide you with excellent care. Hearing back from our patients is one way we can continue to improve our services. Please take a few minutes to complete the written " survey that you may receive in the mail after your visit with us. Thank you!             Your Updated Medication List - Protect others around you: Learn how to safely use, store and throw away your medicines at www.disposemymeds.org.          This list is accurate as of: 10/24/17  6:09 PM.  Always use your most recent med list.                   Brand Name Dispense Instructions for use Diagnosis    albuterol 108 (90 BASE) MCG/ACT Inhaler    PROAIR HFA/PROVENTIL HFA/VENTOLIN HFA    3 Inhaler    Inhale 2 puffs into the lungs every 6 hours as needed for shortness of breath / dyspnea or wheezing    Uncomplicated severe persistent asthma       beclomethasone 80 MCG/ACT Inhaler    QVAR    3 Inhaler    Inhale 2 puffs into the lungs 2 times daily    Uncomplicated severe persistent asthma       CALCIUM-VITAMIN D PO      Take 1 tablet by mouth daily        Cranberry 300 MG Tabs           estradiol 2 MG vaginal ring    ESTRING    1 each    Place 1 each vaginally every 3 months    Bladder prolapse, female, acquired       fluticasone-salmeterol 500-50 MCG/DOSE diskus inhaler    ADVAIR    3 Inhaler    Inhale 1 puff into the lungs 2 times daily    Uncomplicated severe persistent asthma       losartan 25 MG tablet    COZAAR    90 tablet    Take 1 tablet (25 mg) by mouth daily    Benign essential hypertension       mepolizumab 100 MG injection    NUCALA    1 mL    Inject 1 mL (100 mg) Subcutaneous every 28 days    Steroid-dependent chronic obstructive pulmonary disease (H), Uncomplicated severe persistent asthma       montelukast 10 MG tablet    SINGULAIR    90 tablet    Take 1 tablet (10 mg) by mouth as needed    Uncomplicated severe persistent asthma       NAPROXEN PO      Take 220 mg by mouth        potassium chloride SA 20 MEQ CR tablet    KLOR-CON    120 tablet    Take 1 tablet (20 mEq) by mouth 2 times daily    Uncomplicated severe persistent asthma       pravastatin 20 MG tablet    PRAVACHOL    90 tablet    Take 1 tablet (20  mg) by mouth daily    Steroid-dependent chronic obstructive pulmonary disease (H)       * predniSONE 10 MG tablet    DELTASONE    30 tablet    Take 1 tablet (10 mg) by mouth every other day    Steroid-dependent chronic obstructive pulmonary disease (H)       * predniSONE 20 MG tablet    DELTASONE    10 tablet    Take 1 tablet (20 mg) by mouth daily As needed with acute exacerbation of asthma    Steroid-dependent chronic obstructive pulmonary disease (H)       traMADol 50 MG tablet    ULTRAM    30 tablet    Take one tablet daily as needed for break thru pain    Closed avulsion fracture of greater trochanter of left femur, initial encounter (H)       triamterene-hydrochlorothiazide 37.5-25 MG per tablet    MAXZIDE-25    90 tablet    Take 1 tablet by mouth daily    Benign essential hypertension       * Notice:  This list has 2 medication(s) that are the same as other medications prescribed for you. Read the directions carefully, and ask your doctor or other care provider to review them with you.

## 2017-10-25 ENCOUNTER — TELEPHONE (OUTPATIENT)
Dept: ORTHOPEDICS | Facility: CLINIC | Age: 72
End: 2017-10-25

## 2017-10-25 NOTE — TELEPHONE ENCOUNTER
Dr Ferguson reviewed Niurka's chart and images, stating she could be weight bearing as tolerated and follow up with Dr Ferguson in about three weeks.  He states her greater trochanteric fracture can be treated non surgically.  Voicemail was left for Niurka with the above message and to call clinic and we will help set up an appt for November 15 at 11:00 (overbook) or November 8 at 5:15, if either works for her.

## 2017-11-01 ASSESSMENT — ENCOUNTER SYMPTOMS
DYSPNEA ON EXERTION: 1
WHEEZING: 1
NECK PAIN: 0
COUGH: 1
ARTHRALGIAS: 1
SHORTNESS OF BREATH: 1
JOINT SWELLING: 0
MYALGIAS: 0
MUSCLE CRAMPS: 0
SNORES LOUDLY: 0
STIFFNESS: 0
BACK PAIN: 0
MUSCLE WEAKNESS: 0

## 2017-11-07 ENCOUNTER — PRE VISIT (OUTPATIENT)
Dept: ORTHOPEDICS | Facility: CLINIC | Age: 72
End: 2017-11-07

## 2017-11-07 DIAGNOSIS — I10 BENIGN ESSENTIAL HYPERTENSION: ICD-10-CM

## 2017-11-07 NOTE — TELEPHONE ENCOUNTER
APPT INFO    Date /Time: 11/15/17- 11 AM    Reason for Appt: Left greater trochanteric fracture   Ref Provider/Clinic: Dr. Terry Sue    Are there internal records? If yes, list: -[facility]  -[facility]     Patient Contact (Y/N) & Call Details: No- Pt is referred. Records and imaging in Epic/PACS.    Action: Closing encounter.

## 2017-11-09 RX ORDER — TRIAMTERENE/HYDROCHLOROTHIAZID 37.5-25 MG
1 TABLET ORAL DAILY
Qty: 90 TABLET | Refills: 0 | Status: SHIPPED | OUTPATIENT
Start: 2017-11-09 | End: 2017-11-22

## 2017-11-09 NOTE — TELEPHONE ENCOUNTER
Last Written Prescription Date:  9/18/17  Last Fill Quantity: 90,   # refills: 0  Last Office Visit : 10/10/17  Future Office visit:  11/22/17  *  Can refill for 3 months if BP > 140/90, and refer to PCP for follow up

## 2017-11-10 ENCOUNTER — INFUSION THERAPY VISIT (OUTPATIENT)
Dept: INFUSION THERAPY | Facility: CLINIC | Age: 72
End: 2017-11-10
Attending: INTERNAL MEDICINE
Payer: MEDICARE

## 2017-11-10 VITALS
HEART RATE: 78 BPM | DIASTOLIC BLOOD PRESSURE: 90 MMHG | SYSTOLIC BLOOD PRESSURE: 145 MMHG | OXYGEN SATURATION: 97 % | TEMPERATURE: 98.3 F

## 2017-11-10 DIAGNOSIS — J44.9 STEROID-DEPENDENT CHRONIC OBSTRUCTIVE PULMONARY DISEASE (H): Primary | ICD-10-CM

## 2017-11-10 DIAGNOSIS — Z92.241 STEROID-DEPENDENT CHRONIC OBSTRUCTIVE PULMONARY DISEASE (H): Primary | ICD-10-CM

## 2017-11-10 DIAGNOSIS — J45.50 UNCOMPLICATED SEVERE PERSISTENT ASTHMA (H): ICD-10-CM

## 2017-11-10 PROCEDURE — 96372 THER/PROPH/DIAG INJ SC/IM: CPT

## 2017-11-10 PROCEDURE — 25000128 H RX IP 250 OP 636: Mod: ZF | Performed by: INTERNAL MEDICINE

## 2017-11-10 RX ADMIN — MEPOLIZUMAB 100 MG: 100 INJECTION, POWDER, FOR SOLUTION SUBCUTANEOUS at 07:42

## 2017-11-10 NOTE — PROGRESS NOTES
Nursing Note  Niurka Cisneros [facility] for her monthly Nucala injection as ordered by Dr. Ring    Patient identification verified by name and date of birth.  Vitals recorded. Patient was provided with education regarding medication and possible side effects.  Patient verbalized understanding.     Injection given SQ to left posterior arm.   Vitals stable  No s/s of reaction.    Discharge instructions were reviewed with patient. Declined AVS.  Patient/representative verbalized understanding of discharge instructions and all questions answered.  Patient discharged from [facility] in stable condition.    Regina Lees RN        /90  Pulse 78  Temp 98.3  F (36.8  C) (Tympanic)  SpO2 97%

## 2017-11-10 NOTE — MR AVS SNAPSHOT
After Visit Summary   11/10/2017    Niurka Cisneros    MRN: [id number]           Patient Information     Date Of Birth          1945        Visit Information        Provider Department      11/10/2017 7:00 AM UC 51 ATC; UC SPEC INFUSION [facility]        Today's Diagnoses     Steroid-dependent chronic obstructive pulmonary disease (H)    -  1    Uncomplicated severe persistent asthma           Follow-ups after your visit        Your next 10 appointments already scheduled     Nov 15, 2017 11:00 AM CST   (Arrive by 10:45 AM)   NEW HIP with William Ferguson MD   [facility] ([facility])    [address] [place] [address]  4th [facility] [id number] [phone]            Nov 22, 2017  1:30 PM CST   (Arrive by 1:15 PM)   Negrita Gutierrez with Mara Combs MD   [facility] ([facility])    [address] [place] [address]  4th [facility] [id number] [phone]            Apr 09, 2018  1:00 PM CDT   (Arrive by 12:45 PM)   Return Visit with Stephania Ring MD   [facility] ([facility])    [address] [place] [address]  3rd [facility] [id number] [phone]              Who to contact     If you have questions or need follow up information about today's clinic visit or your schedule please contact [facility] directly at [phone].  Normal or non-critical lab and imaging results will be communicated to you by MyChart, letter or phone within 4 business days after the clinic has received the results. If you do not hear from us within 7 days, please contact the clinic through [employer] or phone. If you have a critical or abnormal lab result, we will notify you by phone as soon as possible.  Submit refill requests through [employer] or call your  pharmacy and they will forward the refill request to us. Please allow 3 business days for your refill to be completed.          Additional Information About Your Visit        MyChart Information     [employer] gives you secure access to your electronic health record. If you see a primary care provider, you can also send messages to your care team and make appointments. If you have questions, please call your primary care clinic.  If you do not have a primary care provider, please call [phone] and they will assist you.        Care EveryWhere ID     This is your Care EveryWhere ID. This could be used by other organizations to access your [place] medical records  [id number]        Your Vitals Were     Pulse Temperature Pulse Oximetry             78 98.3  F (36.8  C) (Tympanic) 97%          Blood Pressure from Last 3 Encounters:   11/10/17 145/90   10/24/17 154/80   10/10/17 116/76    Weight from Last 3 Encounters:   10/24/17 73.5 kg (162 lb)   10/10/17 73.9 kg (162 lb 14.4 oz)   08/04/17 73.5 kg (162 lb)              Today, you had the following     No orders found for display       Primary Care Provider Office Phone # Fax #    Mara Regina Combs [phone]       [address] [place] [address] [address] [place] [address]        Equal Access to Services     CINTHIA JOSEPH : Hadii aad ku hadasho Soomaali, waaxda luqadaha, qaybta kaalmada adeegyada, ashwini donnelly hayzana hernadez . So [facility] [phone].    ATENCIÓN: Si habla español, tiene a dinh disposición servicios gratuitos de asistencia lingüística. Aissatou al [phone].    We comply with applicable federal civil rights laws and [place] laws. We do not discriminate on the basis of race, color, national origin, age, disability, sex, sexual orientation, or gender identity.            Thank you!     Thank you for choosing [facility]  for your care. Our goal is always to provide you with excellent  care. Hearing back from our patients is one way we can continue to improve our services. Please take a few minutes to complete the written survey that you may receive in the mail after your visit with us. Thank you!             Your Updated Medication List - Protect others around you: Learn how to safely use, store and throw away your medicines at www.disposemymeds.org.          This list is accurate as of: 11/10/17  7:50 AM.  Always use your most recent med list.                   Brand Name Dispense Instructions for use Diagnosis    albuterol 108 (90 BASE) MCG/ACT Inhaler    PROAIR HFA/PROVENTIL HFA/VENTOLIN HFA    3 Inhaler    Inhale 2 puffs into the lungs every 6 hours as needed for shortness of breath / dyspnea or wheezing    Uncomplicated severe persistent asthma       beclomethasone 80 MCG/ACT Inhaler    QVAR    3 Inhaler    Inhale 2 puffs into the lungs 2 times daily    Uncomplicated severe persistent asthma       CALCIUM-VITAMIN D PO      Take 1 tablet by mouth daily        Cranberry 300 MG Tabs           estradiol 2 MG vaginal ring    ESTRING    1 each    Place 1 each vaginally every 3 months    Bladder prolapse, female, acquired       fluticasone-salmeterol 500-50 MCG/DOSE diskus inhaler    ADVAIR    3 Inhaler    Inhale 1 puff into the lungs 2 times daily    Uncomplicated severe persistent asthma       losartan 25 MG tablet    COZAAR    90 tablet    Take 1 tablet (25 mg) by mouth daily    Benign essential hypertension       mepolizumab 100 MG injection    NUCALA    1 mL    Inject 1 mL (100 mg) Subcutaneous every 28 days    Steroid-dependent chronic obstructive pulmonary disease (H), Uncomplicated severe persistent asthma       montelukast 10 MG tablet    SINGULAIR    90 tablet    Take 1 tablet (10 mg) by mouth as needed    Uncomplicated severe persistent asthma       NAPROXEN PO      Take 220 mg by mouth        potassium chloride SA 20 MEQ CR tablet    KLOR-CON    120 tablet    Take 1 tablet (20 mEq) by  mouth 2 times daily    Uncomplicated severe persistent asthma       pravastatin 20 MG tablet    PRAVACHOL    90 tablet    Take 1 tablet (20 mg) by mouth daily    Steroid-dependent chronic obstructive pulmonary disease (H)       * predniSONE 10 MG tablet    DELTASONE    30 tablet    Take 1 tablet (10 mg) by mouth every other day    Steroid-dependent chronic obstructive pulmonary disease (H)       * predniSONE 20 MG tablet    DELTASONE    10 tablet    Take 1 tablet (20 mg) by mouth daily As needed with acute exacerbation of asthma    Steroid-dependent chronic obstructive pulmonary disease (H)       traMADol 50 MG tablet    ULTRAM    30 tablet    Take one tablet daily as needed for break thru pain    Closed avulsion fracture of greater trochanter of left femur, initial encounter (H)       triamterene-hydrochlorothiazide 37.5-25 MG per tablet    MAXZIDE-25    90 tablet    Take 1 tablet by mouth daily    Benign essential hypertension       * Notice:  This list has 2 medication(s) that are the same as other medications prescribed for you. Read the directions carefully, and ask your doctor or other care provider to review them with you.

## 2017-11-12 ASSESSMENT — ENCOUNTER SYMPTOMS
NECK PAIN: 0
ARTHRALGIAS: 1
STIFFNESS: 0
MYALGIAS: 0
MUSCLE CRAMPS: 0
MUSCLE WEAKNESS: 0
BACK PAIN: 0
JOINT SWELLING: 0

## 2017-11-12 ASSESSMENT — ACTIVITIES OF DAILY LIVING (ADL)
IN_THE_PAST_7_DAYS,_DID_YOU_NEED_HELP_FROM_OTHERS_TO_TAKE_CARE_OF_THINGS_SUCH_AS_LAUNDRY_AND_HOUSEKEEPING,_BANKING,_SHOPPING,_USING_THE_TELEPHONE,_FOOD_PREPARATION,_TRANSPORTATION,_OR_TAKING_YOUR_OWN_MEDICATIONS?: N
IN_THE_PAST_7_DAYS,_DID_YOU_NEED_HELP_FROM_OTHERS_TO_PERFORM_EVERYDAY_ACTIVITIES_SUCH_AS_EATING,_GETTING_DRESSED,_GROOMING,_BATHING,_WALKING,_OR_USING_THE_TOILET: N

## 2017-11-15 ENCOUNTER — OFFICE VISIT (OUTPATIENT)
Dept: ORTHOPEDICS | Facility: CLINIC | Age: 72
End: 2017-11-15

## 2017-11-15 VITALS — WEIGHT: 161 LBS | HEIGHT: 67 IN | BODY MASS INDEX: 25.27 KG/M2

## 2017-11-15 DIAGNOSIS — M25.552 HIP PAIN, LEFT: Primary | ICD-10-CM

## 2017-11-15 DIAGNOSIS — S72.002A HIP FRACTURE, LEFT (H): Primary | ICD-10-CM
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
## 2017-11-15 NOTE — MR AVS SNAPSHOT
After Visit Summary   11/15/2017    Niurka Cisneros    MRN: [id number]           Patient Information     Date Of Birth          1945        Visit Information        Provider Department      11/15/2017 11:00 AM William Ferguson MD [facility]        Today's Diagnoses     Hip pain, left    -  1       Follow-ups after your visit        Your next 10 appointments already scheduled     Nov 22, 2017  1:30 PM CST   (Arrive by 1:15 PM)   Negrita Gutierrez with Mara Combs MD   [facility] ([facility])    [address] [place] [address]  [address] [facility] [address]   [phone]            Dec 08, 2017 10:00 AM CST   Infusion 60 with UC SPEC INFUSION, UC 50 ATC   [facility] ([facility])    [address] [place] [id number]   [phone]            Jan 05, 2018 12:00 PM CST   Infusion 60 with UC SPEC INFUSION, UC 50 ATC   [facility] ([facility])    [address] [place] [id number]   [phone]            Feb 02, 2018 10:00 AM CST   Infusion 60 with UC SPEC INFUSION, UC 44 ATC   [facility] ([facility])    [address] [place] [address]   [phone]            Mar 02, 2018  8:00 AM CST   Infusion 60 with UC SPEC INFUSION, UC 44 ATC   [facility] ([facility])    [address] [place] [id number]   [phone]            Apr 02, 2018  8:00 AM CDT   Infusion 60 with UC SPEC INFUSION   [facility] ([facility])    [address] [place] [address]  "  [phone]              Who to contact     Please call your clinic at [phone] to:    Ask questions about your health    Make or cancel appointments    Discuss your medicines    Learn about your test results    Speak to your doctor   If you have compliments or concerns about an experience at your clinic, or if you wish to file a complaint, please contact [facility] Patient Relations at [phone] or email us at [email or web address]         Additional Information About Your Visit        [employer] Information     [employer] gives you secure access to your electronic health record. If you see a primary care provider, you can also send messages to your care team and make appointments. If you have questions, please call your primary care clinic.  If you do not have a primary care provider, please call [phone] and they will assist you.      [employer] is an electronic gateway that provides easy, online access to your medical records. With [employer], you can request a clinic appointment, read your test results, renew a prescription or communicate with your care team.     To access your existing account, please contact your [facility] or call [phone] for assistance.        Care EveryWhere ID     This is your Care EveryWhere ID. This could be used by other organizations to access your [place] medical records  [id number]        Your Vitals Were     Height BMI (Body Mass Index)                1.702 m (5' 7\") 25.22 kg/m2           Blood Pressure from Last 3 Encounters:   11/10/17 145/90   10/24/17 154/80   10/10/17 116/76    Weight from Last 3 Encounters:   11/15/17 73 kg (161 lb)   10/24/17 73.5 kg (162 lb)   10/10/17 73.9 kg (162 lb 14.4 oz)              Today, you had the following     No orders found for display       Primary Care Provider Office Phone # Fax #    Mara Combs [phone]       [address] [place] [address]" [phone]  [facility] [address]        Equal Access to Services     CINTHIA JOSEPH : Hadii aad ku hadsoniajoaquina Analiali, wamichaelda luqdomingoha, qapacota diegobiancaashwini zhou. So [facility] [phone].    ATENCIÓN: Si habla español, tiene a dinh disposición servicios gratuitos de asistencia lingüística. Kinaame al [phone].    We comply with applicable federal civil rights laws and [place] laws. We do not discriminate on the basis of race, color, national origin, age, disability, sex, sexual orientation, or gender identity.            Thank you!     Thank you for choosing [facility]  for your care. Our goal is always to provide you with excellent care. Hearing back from our patients is one way we can continue to improve our services. Please take a few minutes to complete the written survey that you may receive in the mail after your visit with us. Thank you!             Your Updated Medication List - Protect others around you: Learn how to safely use, store and throw away your medicines at www.disposemymeds.org.          This list is accurate as of: 11/15/17 11:30 AM.  Always use your most recent med list.                   Brand Name Dispense Instructions for use Diagnosis    albuterol 108 (90 BASE) MCG/ACT Inhaler    PROAIR HFA/PROVENTIL HFA/VENTOLIN HFA    3 Inhaler    Inhale 2 puffs into the lungs every 6 hours as needed for shortness of breath / dyspnea or wheezing    Uncomplicated severe persistent asthma       beclomethasone 80 MCG/ACT Inhaler    QVAR    3 Inhaler    Inhale 2 puffs into the lungs 2 times daily    Uncomplicated severe persistent asthma       CALCIUM-VITAMIN D PO      Take 1 tablet by mouth daily        Cranberry 300 MG Tabs           estradiol 2 MG vaginal ring    ESTRING    1 each    Place 1 each vaginally every 3 months    Bladder prolapse, female, acquired       fluticasone-salmeterol 500-50 MCG/DOSE diskus inhaler    ADVAIR    3 Inhaler    Inhale 1 puff  into the lungs 2 times daily    Uncomplicated severe persistent asthma       losartan 25 MG tablet    COZAAR    90 tablet    Take 1 tablet (25 mg) by mouth daily    Benign essential hypertension       mepolizumab 100 MG injection    NUCALA    1 mL    Inject 1 mL (100 mg) Subcutaneous every 28 days    Steroid-dependent chronic obstructive pulmonary disease (H), Uncomplicated severe persistent asthma       montelukast 10 MG tablet    SINGULAIR    90 tablet    Take 1 tablet (10 mg) by mouth as needed    Uncomplicated severe persistent asthma       NAPROXEN PO      Take 220 mg by mouth        potassium chloride SA 20 MEQ CR tablet    KLOR-CON    120 tablet    Take 1 tablet (20 mEq) by mouth 2 times daily    Uncomplicated severe persistent asthma       pravastatin 20 MG tablet    PRAVACHOL    90 tablet    Take 1 tablet (20 mg) by mouth daily    Steroid-dependent chronic obstructive pulmonary disease (H)       * predniSONE 10 MG tablet    DELTASONE    30 tablet    Take 1 tablet (10 mg) by mouth every other day    Steroid-dependent chronic obstructive pulmonary disease (H)       * predniSONE 20 MG tablet    DELTASONE    10 tablet    Take 1 tablet (20 mg) by mouth daily As needed with acute exacerbation of asthma    Steroid-dependent chronic obstructive pulmonary disease (H)       traMADol 50 MG tablet    ULTRAM    30 tablet    Take one tablet daily as needed for break thru pain    Closed avulsion fracture of greater trochanter of left femur, initial encounter (H)       triamterene-hydrochlorothiazide 37.5-25 MG per tablet    MAXZIDE-25    90 tablet    Take 1 tablet by mouth daily    Benign essential hypertension       * Notice:  This list has 2 medication(s) that are the same as other medications prescribed for you. Read the directions carefully, and ask your doctor or other care provider to review them with you.
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
## 2017-11-22 ENCOUNTER — OFFICE VISIT (OUTPATIENT)
Dept: INTERNAL MEDICINE | Facility: CLINIC | Age: 72
End: 2017-11-22

## 2017-11-22 VITALS
HEART RATE: 87 BPM | DIASTOLIC BLOOD PRESSURE: 72 MMHG | OXYGEN SATURATION: 99 % | WEIGHT: 161.2 LBS | BODY MASS INDEX: 25.25 KG/M2 | SYSTOLIC BLOOD PRESSURE: 116 MMHG

## 2017-11-22 DIAGNOSIS — Z00.00 ROUTINE GENERAL MEDICAL EXAMINATION AT A HEALTH CARE FACILITY: Primary | ICD-10-CM

## 2017-11-22 DIAGNOSIS — Z92.241 STEROID-DEPENDENT CHRONIC OBSTRUCTIVE PULMONARY DISEASE (H): ICD-10-CM

## 2017-11-22 DIAGNOSIS — Z00.00 ROUTINE GENERAL MEDICAL EXAMINATION AT A HEALTH CARE FACILITY: ICD-10-CM

## 2017-11-22 DIAGNOSIS — Z78.0 ASYMPTOMATIC MENOPAUSAL STATE: ICD-10-CM

## 2017-11-22 DIAGNOSIS — I10 BENIGN ESSENTIAL HYPERTENSION: ICD-10-CM

## 2017-11-22 DIAGNOSIS — Z13.820 SCREENING FOR OSTEOPOROSIS: ICD-10-CM

## 2017-11-22 DIAGNOSIS — I71.20 THORACIC AORTIC ANEURYSM WITHOUT RUPTURE (H): ICD-10-CM

## 2017-11-22 DIAGNOSIS — N81.10 BLADDER PROLAPSE, FEMALE, ACQUIRED: ICD-10-CM

## 2017-11-22 DIAGNOSIS — J45.50 UNCOMPLICATED SEVERE PERSISTENT ASTHMA (H): ICD-10-CM

## 2017-11-22 DIAGNOSIS — R73.03 PREDIABETES: ICD-10-CM

## 2017-11-22 DIAGNOSIS — J44.9 STEROID-DEPENDENT CHRONIC OBSTRUCTIVE PULMONARY DISEASE (H): ICD-10-CM

## 2017-11-22 LAB — HBA1C MFR BLD: 5.5 % (ref 4.3–6)

## 2017-11-22 RX ORDER — TRIAMTERENE/HYDROCHLOROTHIAZID 37.5-25 MG
1 TABLET ORAL DAILY
Qty: 90 TABLET | Refills: 3 | Status: SHIPPED | OUTPATIENT
Start: 2017-11-22 | End: 2018-12-26

## 2017-11-22 RX ORDER — LOSARTAN POTASSIUM 25 MG/1
25 TABLET ORAL DAILY
Qty: 90 TABLET | Refills: 3 | Status: SHIPPED | OUTPATIENT
Start: 2017-11-22 | End: 2018-12-26

## 2017-11-22 RX ORDER — MONTELUKAST SODIUM 10 MG/1
10 TABLET ORAL PRN
Qty: 90 TABLET | Refills: 3 | Status: SHIPPED | OUTPATIENT
Start: 2017-11-22 | End: 2018-04-09

## 2017-11-22 RX ORDER — POTASSIUM CHLORIDE 1500 MG/1
20 TABLET, EXTENDED RELEASE ORAL DAILY
Qty: 90 TABLET | Refills: 3 | Status: SHIPPED | OUTPATIENT
Start: 2017-11-22 | End: 2018-12-26

## 2017-11-22 RX ORDER — ALBUTEROL SULFATE 90 UG/1
2 AEROSOL, METERED RESPIRATORY (INHALATION) EVERY 6 HOURS PRN
Qty: 3 INHALER | Refills: 3 | Status: SHIPPED | OUTPATIENT
Start: 2017-11-22 | End: 2018-04-09

## 2017-11-22 RX ORDER — PRAVASTATIN SODIUM 20 MG
20 TABLET ORAL DAILY
Qty: 90 TABLET | Refills: 3 | Status: SHIPPED | OUTPATIENT
Start: 2017-11-22 | End: 2018-12-26
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
## 2017-11-22 NOTE — MR AVS SNAPSHOT
After Visit Summary   11/22/2017    Niurka Cisneros    MRN: [id number]           Patient Information     Date Of Birth          1945        Visit Information        Provider Department      11/22/2017 1:30 PM Mara Combs MD [facility]        Today's Diagnoses     Routine general medical examination at a health care facility    -  1    Thoracic aortic aneurysm without rupture (H)        Benign essential hypertension        Steroid-dependent chronic obstructive pulmonary disease (H)        Uncomplicated severe persistent asthma        Bladder prolapse, female, acquired        Screening for osteoporosis        Asymptomatic menopausal state         Prediabetes          Care Instructions    DEXA Screening Tool    Dexa Scan  Is the patient taking any calcium supplements? yes, if yes patient must stop calcium supplements 24 hours prior to appointment.    Dexa [phone] [facility]    Cardiovascular [phone] ([facility], 3rd Floor S)   Ophthalmology [phone] ([facility], 4th Floor S)             Follow-ups after your visit        Additional Services     Cardiovascular Surgery Referral       Dr Carina Jones            OPHTHALMOLOGY ADULT REFERRAL       Your provider has referred you to: PREFERRED PROVIDERS:    Please be aware that coverage of these services is subject to the terms and limitations of your health insurance plan.  Call member services at your health plan with any benefit or coverage questions.      Please bring the following with you to your appointment:    (1) Any X-Rays, CTs or MRIs which have been performed.  Contact the facility where they were done to arrange for  prior to your scheduled appointment.    (2) List of current medications  (3) This referral request   (4) Any documents/labs given to you for this referral                  Your next 10 appointments already scheduled     Dec 08, 2017 10:00 AM CST   Infusion 60 with UC SPEC  INFUSION, UC 50 ATC   [facility] ([facility])    [address]  2nd [facility] [address]   [phone]            Jan 05, 2018 12:00 PM CST   Infusion 60 with UC SPEC INFUSION, UC 50 ATC   [facility] ([facility])    [address]  2nd [facility] [address]   [phone]            Feb 02, 2018 10:00 AM CST   Infusion 60 with UC SPEC INFUSION, UC 44 ATC   [facility] ([facility])    [address]  2[address] [facility] [address]   [phone]            Mar 02, 2018  8:00 AM CST   Infusion 60 with UC SPEC INFUSION, UC 44 ATC   [facility] ([facility])    [address] [address]   [phone]            Apr 02, 2018  8:00 AM CDT   Infusion 60 with UC SPEC INFUSION, UC 51 ATC   [facility] ([facility])    [address] [address]   [phone]              Future tests that were ordered for you today     Open Future Orders        Priority Expected Expires Ordered    Hemoglobin A1c Routine 11/22/2017 12/6/2017 11/22/2017    Dexa hip/pelvis/spine* Routine  11/22/2018 11/22/2017            Who to contact     Please call your clinic at [phone] to:    Ask questions about your health    Make or cancel appointments    Discuss your medicines    Learn about your test results    Speak to your doctor   If you have compliments or concerns about an experience at your clinic, or if you wish to file a complaint, please contact [facility] Patient Relations at [phone] or email us at [email or web address]          Additional Information About Your Visit        FeedBurn[employer] Information     [email or web address] gives you secure access to your electronic health record. If you see a primary care provider, you can also send messages to your care team and make appointments. If you have questions, please call your primary care clinic.  If you do not have a primary care provider, please call [phone] and they will assist you.      [email or web address] is an electronic gateway that provides easy, online access to your medical records. With [email or web address], you can request a clinic appointment, read your test results, renew a prescription or communicate with your care team.     To access your existing account, please contact your [facility] or call [phone] for assistance.        Care EveryWhere ID     This is your Care EveryWhere ID. This could be used by other organizations to access your [place] medical records  [id number]        Your Vitals Were     Pulse Pulse Oximetry BMI (Body Mass Index)             87 99% 25.25 kg/m2          Blood Pressure from Last 3 Encounters:   11/22/17 116/72   11/10/17 145/90   10/24/17 154/80    Weight from Last 3 Encounters:   11/22/17 73.1 kg (161 lb 3.2 oz)   11/15/17 73 kg (161 lb)   10/24/17 73.5 kg (162 lb)              We Performed the Following     Cardiovascular Surgery Referral     OPHTHALMOLOGY ADULT REFERRAL          Today's Medication Changes          These changes are accurate as of: 11/22/17  2:08 PM.  If you have any questions, ask your nurse or doctor.               Stop taking these medicines if you haven't already. Please contact your care team if you have questions.     Cranberry 300 MG Tabs           predniSONE 10 MG tablet   Commonly known as:  DELTASONE           predniSONE 20 MG tablet   Commonly known as:  DELTASONE           traMADol 50 MG tablet   Commonly known as:  ULTRAM                Where to get your medicines      These medications were sent to [facility]  [facility] - [place], [place] - [address] AT Portal to Registered [facility]  [address], [facility] [address]     Phone:  [phone]     albuterol 108 (90 BASE) MCG/ACT Inhaler    estradiol 2 MG vaginal ring    fluticasone-salmeterol 500-50 MCG/DOSE diskus inhaler    losartan 25 MG tablet    montelukast 10 MG tablet    potassium chloride SA 20 MEQ CR tablet    pravastatin 20 MG tablet    triamterene-hydrochlorothiazide 37.5-25 MG per tablet                Primary Care Provider Office Phone # Fax #    Mara Regina Combs [phone]       [address] [place] [address] [address] [place] [address]        Equal Access to Services     CINTHIA JOSEPH : Yajaira lirao Soadonis, waaxda luqadaha, qaybta kaalmada adeegyaruss, ashwini alvarado. So [facility] [phone].    ATENCIÓN: Si habla español, tiene a dinh disposición servicios gratuitos de asistencia lingüística. [facility] [phone].    We comply with applicable federal civil rights laws and [place] laws. We do not discriminate on the basis of race, color, national origin, age, disability, sex, sexual orientation, or gender identity.            Thank you!     Thank you for choosing [facility]  for your care. Our goal is always to provide you with excellent care. Hearing back from our patients is one way we can continue to improve our services. Please take a few minutes to complete the written survey that you may receive in the mail after your visit with us. Thank you!             Your Updated Medication List - Protect others around you: Learn how to safely use, store and throw away your medicines at www.disposemymeds.org.          This list is accurate as of: 11/22/17  2:08 PM.  Always use your most recent med list.                   Brand Name Dispense Instructions for use Diagnosis    albuterol 108 (90 BASE) MCG/ACT Inhaler    PROAIR HFA/PROVENTIL HFA/VENTOLIN HFA    3 Inhaler    Inhale  2 puffs into the lungs every 6 hours as needed for shortness of breath / dyspnea or wheezing    Uncomplicated severe persistent asthma       beclomethasone 80 MCG/ACT Inhaler    QVAR    3 Inhaler    Inhale 2 puffs into the lungs 2 times daily    Uncomplicated severe persistent asthma       CALCIUM-VITAMIN D PO      Take 1 tablet by mouth daily        estradiol 2 MG vaginal ring    ESTRING    1 each    Place 1 each vaginally every 3 months    Bladder prolapse, female, acquired       fluticasone-salmeterol 500-50 MCG/DOSE diskus inhaler    ADVAIR    3 Inhaler    Inhale 1 puff into the lungs 2 times daily    Uncomplicated severe persistent asthma       losartan 25 MG tablet    COZAAR    90 tablet    Take 1 tablet (25 mg) by mouth daily    Benign essential hypertension       mepolizumab 100 MG injection    NUCALA    1 mL    Inject 1 mL (100 mg) Subcutaneous every 28 days    Steroid-dependent chronic obstructive pulmonary disease (H), Uncomplicated severe persistent asthma       montelukast 10 MG tablet    SINGULAIR    90 tablet    Take 1 tablet (10 mg) by mouth as needed    Uncomplicated severe persistent asthma       NAPROXEN PO      Take 220 mg by mouth 2 times daily (with meals)        potassium chloride SA 20 MEQ CR tablet    KLOR-CON    90 tablet    Take 1 tablet (20 mEq) by mouth daily    Uncomplicated severe persistent asthma       pravastatin 20 MG tablet    PRAVACHOL    90 tablet    Take 1 tablet (20 mg) by mouth daily    Steroid-dependent chronic obstructive pulmonary disease (H)       triamterene-hydrochlorothiazide 37.5-25 MG per tablet    MAXZIDE-25    90 tablet    Take 1 tablet by mouth daily    Benign essential hypertension

## 2017-11-22 NOTE — NURSING NOTE
Rooming Note    Health Maintenance  Health Maintenance Due   Topic Date Due     ADVANCE DIRECTIVE PLANNING Q5 YRS  09/08/2002     DEXA SCAN SCREENING (SYSTEM ASSIGNED)  09/08/2012   All health maintenance items discussed and pended.

## 2017-11-22 NOTE — PATIENT INSTRUCTIONS
DEXA Screening Tool    Dexa Scan  Is the patient taking any calcium supplements? yes, if yes patient must stop calcium supplements 24 hours prior to appointment.    Dexa [phone] [facility]    Cardiovascular [phone] ([facility], 3rd Floor S)   Ophthalmology [phone] ([facility], 4th Floor S)

## 2017-12-01 ASSESSMENT — ENCOUNTER SYMPTOMS
NECK PAIN: 0
TROUBLE SWALLOWING: 0
DEPRESSION: 0
ARTHRALGIAS: 1
CONSTIPATION: 0
INCREASED ENERGY: 0
SPEECH CHANGE: 0
NERVOUS/ANXIOUS: 0
EYE PAIN: 0
INSOMNIA: 0
DISTURBANCES IN COORDINATION: 0
HEMOPTYSIS: 0
MYALGIAS: 0
SMELL DISTURBANCE: 0
DECREASED CONCENTRATION: 0
COUGH: 0
POLYDIPSIA: 0
SKIN CHANGES: 0
PANIC: 0
STIFFNESS: 0
TACHYCARDIA: 0
DIFFICULTY URINATING: 0
RECTAL BLEEDING: 0
NAIL CHANGES: 0
ABDOMINAL PAIN: 0
MUSCLE WEAKNESS: 0
CLAUDICATION: 0
LIGHT-HEADEDNESS: 0
SNORES LOUDLY: 0
TINGLING: 0
EXTREMITY NUMBNESS: 0
BREAST PAIN: 0
CHILLS: 0
MEMORY LOSS: 0
POLYPHAGIA: 0
MUSCLE CRAMPS: 0
COUGH DISTURBING SLEEP: 0
PALPITATIONS: 0
SPUTUM PRODUCTION: 0
RESPIRATORY PAIN: 0
JAUNDICE: 0
HOT FLASHES: 0
NUMBNESS: 0
DECREASED LIBIDO: 0
WHEEZING: 0
DYSPNEA ON EXERTION: 0
TASTE DISTURBANCE: 0
LEG SWELLING: 0
BRUISES/BLEEDS EASILY: 0
DIZZINESS: 0
NECK MASS: 0
WEAKNESS: 0
SHORTNESS OF BREATH: 0
VOMITING: 0
LOSS OF CONSCIOUSNESS: 0
DIARRHEA: 0
SORE THROAT: 0
POSTURAL DYSPNEA: 0
FEVER: 0
DECREASED APPETITE: 0
SLEEP DISTURBANCES DUE TO BREATHING: 0
FLANK PAIN: 0
EYE REDNESS: 0
BLOOD IN STOOL: 0
SEIZURES: 0
PARALYSIS: 0
FATIGUE: 0
RECTAL PAIN: 0
NAUSEA: 0
SINUS CONGESTION: 0
JOINT SWELLING: 0
ALTERED TEMPERATURE REGULATION: 0
SINUS PAIN: 0
WEIGHT GAIN: 0
HYPERTENSION: 0
ORTHOPNEA: 0
HEADACHES: 0
HOARSE VOICE: 0
WEIGHT LOSS: 0
DYSURIA: 0
EYE IRRITATION: 0
POOR WOUND HEALING: 0
HYPOTENSION: 0
LEG PAIN: 0
EYE WATERING: 0
BREAST MASS: 0
NIGHT SWEATS: 0
BOWEL INCONTINENCE: 0
BACK PAIN: 0
HALLUCINATIONS: 0
BLOATING: 0
SWOLLEN GLANDS: 0
DOUBLE VISION: 0
EXERCISE INTOLERANCE: 0
TREMORS: 0
SYNCOPE: 0
HEMATURIA: 0
HEARTBURN: 0

## 2017-12-06 DIAGNOSIS — I71.20 THORACIC AORTIC ANEURYSM WITHOUT RUPTURE (H): Primary | ICD-10-CM

## 2017-12-08 ENCOUNTER — INFUSION THERAPY VISIT (OUTPATIENT)
Dept: INFUSION THERAPY | Facility: CLINIC | Age: 72
End: 2017-12-08
Attending: INTERNAL MEDICINE
Payer: MEDICARE

## 2017-12-08 VITALS
OXYGEN SATURATION: 96 % | HEART RATE: 85 BPM | RESPIRATION RATE: 16 BRPM | DIASTOLIC BLOOD PRESSURE: 72 MMHG | SYSTOLIC BLOOD PRESSURE: 105 MMHG | TEMPERATURE: 97.6 F

## 2017-12-08 DIAGNOSIS — J44.9 STEROID-DEPENDENT CHRONIC OBSTRUCTIVE PULMONARY DISEASE (H): Primary | ICD-10-CM

## 2017-12-08 DIAGNOSIS — Z92.241 STEROID-DEPENDENT CHRONIC OBSTRUCTIVE PULMONARY DISEASE (H): Primary | ICD-10-CM

## 2017-12-08 DIAGNOSIS — J45.50 UNCOMPLICATED SEVERE PERSISTENT ASTHMA (H): ICD-10-CM

## 2017-12-08 PROCEDURE — 25000128 H RX IP 250 OP 636: Mod: ZF | Performed by: INTERNAL MEDICINE

## 2017-12-08 PROCEDURE — 96372 THER/PROPH/DIAG INJ SC/IM: CPT

## 2017-12-08 RX ADMIN — MEPOLIZUMAB 100 MG: 100 INJECTION, POWDER, FOR SOLUTION SUBCUTANEOUS at 10:43

## 2017-12-08 NOTE — MR AVS SNAPSHOT
After Visit Summary   12/8/2017    Niurka Cisneros    MRN: [id number]           Patient Information     Date Of Birth          1945        Visit Information        Provider Department      12/8/2017 10:00 AM  50 ATC;  SPEC INFUSION [facility]        Today's Diagnoses     Steroid-dependent chronic obstructive pulmonary disease (H)    -  1    Uncomplicated severe persistent asthma           Follow-ups after your visit        Your next 10 appointments already scheduled     Dec 08, 2017 11:00 AM CST   DX HIP/PELVIS/SPINE with UCDX1   [facility] Dexa ([facility])    [address] [place] [address]  1st [facility] [id number] [phone]           Please do not take any of the following 24 hours prior to the day of your exam: vitamins, calcium tablets, antacids.  If possible, please wear clothes without metal (snaps, zippers). A sweatsuit works well.            Dec 14, 2017  9:30 AM CST   Ech Complete with UCECHCR1   [facility] ([facility])    [address] [place] [address]  3rd [facility] [id number] [phone]           1. Please bring or wear a comfortable two-piece outfit. 2. You may eat, drink and take your normal medicines. 3. For any questions that cannot be answered, please contact the ordering physician            Dec 14, 2017 10:45 AM CST   (Arrive by 10:30 AM)   MA SCREENING BILATERAL W/ MARGO with UCBCMA1   [facility] ([facility])    [address] [place] [address]  2nd [facility] [id number] [phone]           Three-dimensional (3D) mammograms are available at [place] locations in [facility], [facility], [facility], [place], [place], and [place]. [facility] locations include [place] [facility] in [place]. Benefits of 3D mammograms include: - Improved rate of cancer detection  "- Decreases your chance of having to go back for more tests, which means fewer: - \"False-positive\" results (This means that there is an abnormal area but it isn't cancer.) - Invasive testing procedures, such as a biopsy or surgery - Can provide clearer images of the breast if you have dense breast tissue. 3D mammography is an optional exam that anyone can have with a 2D mammogram. It doesn't replace or take the place of a 2D mammogram. 2D mammograms remain an effective screening test for all women.  Not all insurance companies cover the cost of a 3D mammogram. Check with your insurance.            Dec 14, 2017 11:30 AM CST   (Arrive by 11:15 AM)   New Patient Visit with Elliot Thao MD   [facility] ([facility])    [address] [place] [address]  3rd Floor  [facility] [address]   [phone]            Jan 05, 2018 12:00 PM CST   Infusion 60 with UC SPEC INFUSION, UC 50 ATC   [facility] ([facility])    [address] [place] [address]  2nd [facility] [address]   [phone]            Feb 02, 2018  7:30 AM CST   NEW GENERAL with Maddie Bal MD   [facility] ([facility])    [address] Clin [address]  [facility] [address]   [phone]            Feb 02, 2018 10:00 AM CST   Infusion 60 with UC SPEC INFUSION, UC 44 ATC   [facility] ([facility])    [address] [facility]  2nd [facility] [address]   [phone]            Mar 02, 2018  8:00 AM CST   Infusion 60 with UC SPEC INFUSION   [facility] ([facility])    [address] [address] [phone]              Who to contact     If you have questions or need follow up information about today's clinic visit or your schedule " please contact [facility] PROCED[facility] directly at [phone].  Normal or non-critical lab and imaging results will be communicated to you by MyChart, letter or phone within 4 business days after the clinic has received the results. If you do not hear from us within 7 days, please contact the clinic through [employer] or phone. If you have a critical or abnormal lab result, we will notify you by phone as soon as possible.  Submit refill requests through [employer] or call your pharmacy and they will forward the refill request to us. Please allow 3 business days for your refill to be completed.          Additional Information About Your Visit        [employer] Information     [employer] gives you secure access to your electronic health record. If you see a primary care provider, you can also send messages to your care team and make appointments. If you have questions, please call your primary care clinic.  If you do not have a primary care provider, please call [phone] and they will assist you.        Care EveryWhere ID     This is your Care EveryWhere ID. This could be used by other organizations to access your [place] medical records  [id number]        Your Vitals Were     Pulse Temperature Respirations Pulse Oximetry          85 97.6  F (36.4  C) (Oral) 16 96%         Blood Pressure from Last 3 Encounters:   12/08/17 105/72   11/22/17 116/72   11/10/17 145/90    Weight from Last 3 Encounters:   11/22/17 73.1 kg (161 lb 3.2 oz)   11/15/17 73 kg (161 lb)   10/24/17 73.5 kg (162 lb)              Today, you had the following     No orders found for display       Primary Care Provider Office Phone # Fax #    Mara Regina Combs [phone]       [address] [place] [address] [address] [place] [address]        Equal Access to Services     CINTHIA JOSEPH : Yajaira Babcock, giovanni duff, ashwini garrison. So  [facility] [phone].    ATENCIÓN: Si mariela martinez, tiene a dinh disposición servicios gratuitos de asistencia lingüística. Aissatou hensley [phone].    We comply with applicable federal civil rights laws and [place] laws. We do not discriminate on the basis of race, color, national origin, age, disability, sex, sexual orientation, or gender identity.            Thank you!     Thank you for choosing [facility] SPECIALTY AND PROCEDURE  for your care. Our goal is always to provide you with excellent care. Hearing back from our patients is one way we can continue to improve our services. Please take a few minutes to complete the written survey that you may receive in the mail after your visit with us. Thank you!             Your Updated Medication List - Protect others around you: Learn how to safely use, store and throw away your medicines at www.disposemymeds.org.          This list is accurate as of: 12/8/17 10:59 AM.  Always use your most recent med list.                   Brand Name Dispense Instructions for use Diagnosis    albuterol 108 (90 BASE) MCG/ACT Inhaler    PROAIR HFA/PROVENTIL HFA/VENTOLIN HFA    3 Inhaler    Inhale 2 puffs into the lungs every 6 hours as needed for shortness of breath / dyspnea or wheezing    Uncomplicated severe persistent asthma       beclomethasone 80 MCG/ACT Inhaler    QVAR    3 Inhaler    Inhale 2 puffs into the lungs 2 times daily    Uncomplicated severe persistent asthma       CALCIUM-VITAMIN D PO      Take 1 tablet by mouth daily        estradiol 2 MG vaginal ring    ESTRING    1 each    Place 1 each vaginally every 3 months    Bladder prolapse, female, acquired       fluticasone-salmeterol 500-50 MCG/DOSE diskus inhaler    ADVAIR    3 Inhaler    Inhale 1 puff into the lungs 2 times daily    Uncomplicated severe persistent asthma       losartan 25 MG tablet    COZAAR    90 tablet    Take 1 tablet (25 mg) by mouth daily    Benign essential hypertension        mepolizumab 100 MG injection    NUCALA    1 mL    Inject 1 mL (100 mg) Subcutaneous every 28 days    Steroid-dependent chronic obstructive pulmonary disease (H), Uncomplicated severe persistent asthma       montelukast 10 MG tablet    SINGULAIR    90 tablet    Take 1 tablet (10 mg) by mouth as needed    Uncomplicated severe persistent asthma       NAPROXEN PO      Take 220 mg by mouth 2 times daily (with meals)        potassium chloride SA 20 MEQ CR tablet    KLOR-CON    90 tablet    Take 1 tablet (20 mEq) by mouth daily    Uncomplicated severe persistent asthma       pravastatin 20 MG tablet    PRAVACHOL    90 tablet    Take 1 tablet (20 mg) by mouth daily    Steroid-dependent chronic obstructive pulmonary disease (H)       triamterene-hydrochlorothiazide 37.5-25 MG per tablet    MAXZIDE-25    90 tablet    Take 1 tablet by mouth daily    Benign essential hypertension

## 2017-12-08 NOTE — PROGRESS NOTES
Nursing Note  Niur[facility]neros [facility]:   Chief Complaint   Patient presents with     Imm/Inj     Nucala     During today's [facility] appointment, orders from Dr ALYSIA Ring were completed.  Frequency: monthly    Progress note:  Patient identification verified by name and date of birth.  Assessment completed.  Vitals recorded in Doc Flowsheets.  Patient was provided with education regarding injection and possible side effects.  Patient verbalized understanding.    needed: No  Premedications: were not ordered.  Labs: were not ordered for this appointment.  Vascular access: NA  Treatment Conditions: ~~~ NOTE: If the patient answers yes to any of the questions below, hold the infusion and contact ordering provider or on-call provider.    1. Have you recently had an elevated temperature, fever, chills, productive cough, coughing for 3 weeks or longer or hemoptysis,  abnormal vital signs, night sweats,  chest pain or have you noticed a decrease in your appetite, unexplained weight loss or fatigue? No  2. Do you have any open wounds or new incisions? No  3. Do you have any recent or upcoming hospitalizations, surgeries or dental procedures? No  4. Do you currently have or recently have had any signs of illness or infection or are you on any antibiotics? No  5. Have you had any new, sudden or worsening abdominal pain? No  6. Have you or anyone in your household received a live vaccination in the past 4 weeks? Please note:  No live vaccines while on biologic/chemotherapy until 6 months after the last treatment.  Patient can receive the flu vaccine (shot only) and the pneumovax.  It is optimal for the patient to get these vaccines mid cycle, but they can be given at any time as long as it is not on the day of the infusion. No  7. Have you recently been diagnosed with any new nervous system diseases (ie. Multiple sclerosis,  Guillain [place], seizures, neurological changes) or cancer diagnosis? Are you on any form of radiation or chemotherapy? No  8. Are you pregnant or breast feeding or do you have plans of pregnancy in the future? No  9. Have you been having any signs of worsening depression or suicidal ideations?  (benlysta only) NA  10. Have there been any other new onset medical symptoms? No    Patient tolerated injection: well.    Discharge Plan:   Follow up plan of care with: ongoing [facility].  Discharge instructions were reviewed with patient.  Patient/representative verbalized understanding of discharge instructions and all questions answered.  Patient discharged from [facility] in stable condition.    Breana Newman RN       Administrations This Visit     mepolizumab (NUCALA) injection 100 mg     Admin Date Action Dose Route Administered By             12/08/2017 Given 100 mg Subcutaneous Breana Newman RN                            /72 (BP Location: Left arm)  Pulse 85  Temp 97.6  F (36.4  C) (Oral)  Resp 16  SpO2 96%

## 2017-12-11 ASSESSMENT — ENCOUNTER SYMPTOMS
POSTURAL DYSPNEA: 0
JOINT SWELLING: 0
DYSPNEA ON EXERTION: 1
MUSCLE WEAKNESS: 0
COUGH: 1
SHORTNESS OF BREATH: 1
BACK PAIN: 0
MYALGIAS: 0
MUSCLE CRAMPS: 0
ARTHRALGIAS: 1
COUGH DISTURBING SLEEP: 0
SNORES LOUDLY: 0
HEMOPTYSIS: 0
SPUTUM PRODUCTION: 1
NECK PAIN: 0
WHEEZING: 1

## 2017-12-13 ENCOUNTER — PRE VISIT (OUTPATIENT)
Dept: CARDIOLOGY | Facility: CLINIC | Age: 72
End: 2017-12-13

## 2017-12-13 NOTE — TELEPHONE ENCOUNTER
ASKED BY REFERRING PHYSICIAN: Mara Combs MD    CHIEF COMPLAINT: Pre Visit Planning - Done (F/U to monitor ascending aortic aneurysm)      HPI: Niurka is a 72 year old female who presents to establish with new cardiothoracic surgeon.  Previously saw Dr. Inman and Dr. Patiño. Per Dr. Patiño's previous note BP goal is <130/85.  Patient  has generally met this target.  Patient does monitor her BP at home.  Patient's other history includes; esophageal hypertension, essential hypertension, seizures, and asthma.       PROCEDURES/IMAGING    ECHOCARDIOGRAM: 12/14/17  Pending    ECHO:  09/19/16  Interpretation Summary  Global and regional left ventricular function is normal with an EF of 55-60%. Right ventricular function, chamber size, wall motion, and thickness are normal. No pericardial effusion is present. No significant valvular dysfunction. Pulmonary artery systolic pressure cannot be assessed. Ascending aorta measures 4.4 cm (2.3 cm/m2), aortic arch measures 4.0 cm (2.1 cm/m2).    Left Ventricle  Global and regional left ventricular function is normal with an EF of 55-60%. Left ventricular size is normal. Borderline concentric wall thickening consistent with left ventricular hypertrophy is present. Grade I or early diastolic dysfunction. No regional wall motion abnormalities are seen.      Right Ventricle  Right ventricular function, chamber size, wall motion, and thickness are normal.    Atria  The right atria appears normal. Mild to moderate left atrial enlargement is present.     Mitral Valve  Trace to mild mitral insufficiency is present.     Aortic Valve  Aortic valve is normal in structure and function. The aortic valve is tricuspid.     Tricuspid Valve  The tricuspid valve is normal. Trace tricuspid insufficiency is present. The peak velocity of the tricuspid regurgitant jet is not obtainable. Pulmonary artery systolic pressure cannot be assessed.      Pulmonic Valve  The pulmonic valve is  normal.     Vessels  Ascending aorta measures 4.4 cm (2.3 cm/m2), aortic arch measures 4.0 cm (2.1 cm/m2),. The pulmonary artery cannot be assessed. The inferior vena cava was normal in size with preserved respiratory variability. Ascending aorta 4.4 cm.    Pericardium  No pericardial effusion is present.       ASSESSMENT/PLAN:   Niurka is a 72 year old female who presents with          Approximately 50 minutes spent with this case including review of the clinical history and data; discussion with the patient and his family and coordination of the care    Thank you for including me in the care of this kind patient. Do not hesitate to contact me with any questions.    Dr. Elliot Thao     Cardiothoracic Surgery  [facility]  Patient Care Team:  Mara Combs MD as PCP - General (Internal Medicine)  Mara Combs MD as MD (Internal Medicine)  Stephania Ring MD as MD (Pulmonary)  Carmen Goodman MD as MD (Urology)  Mara Combs MD as Referring Physician (Internal Medicine)  Sabino Saxena MD as Resident (Student in organized health care education/training program)  Maddie Bal MD as Resident (Student in organized health care education/training program)

## 2017-12-14 ENCOUNTER — RADIANT APPOINTMENT (OUTPATIENT)
Dept: CARDIOLOGY | Facility: CLINIC | Age: 72
End: 2017-12-14
Attending: THORACIC SURGERY (CARDIOTHORACIC VASCULAR SURGERY)
Payer: COMMERCIAL

## 2017-12-14 ENCOUNTER — OFFICE VISIT (OUTPATIENT)
Dept: CARDIOLOGY | Facility: CLINIC | Age: 72
End: 2017-12-14
Attending: THORACIC SURGERY (CARDIOTHORACIC VASCULAR SURGERY)
Payer: MEDICARE

## 2017-12-14 ENCOUNTER — RADIANT APPOINTMENT (OUTPATIENT)
Dept: MAMMOGRAPHY | Facility: CLINIC | Age: 72
End: 2017-12-14
Attending: INTERNAL MEDICINE
Payer: COMMERCIAL

## 2017-12-14 VITALS
DIASTOLIC BLOOD PRESSURE: 88 MMHG | HEIGHT: 67 IN | OXYGEN SATURATION: 96 % | SYSTOLIC BLOOD PRESSURE: 136 MMHG | HEART RATE: 72 BPM

## 2017-12-14 DIAGNOSIS — I71.20 THORACIC AORTIC ANEURYSM WITHOUT RUPTURE (H): ICD-10-CM

## 2017-12-14 DIAGNOSIS — Z12.31 VISIT FOR SCREENING MAMMOGRAM: ICD-10-CM

## 2017-12-14 PROCEDURE — 99213 OFFICE O/P EST LOW 20 MIN: CPT | Mod: ZF

## 2017-12-14 ASSESSMENT — PAIN SCALES - GENERAL: PAINLEVEL: NO PAIN (0)

## 2017-12-14 NOTE — NURSING NOTE
Chief Complaint   Patient presents with     New Patient     F/U to monitor ascending aortic aneurysm     Vitals were taken and medications were reconciled.  Nelson Gray, RMA  11:09 AM]\

## 2017-12-14 NOTE — LETTER
12/14/2017      RE: Niurka Cisneros  [address]   [place] [address]       Dear Colleague,    Thank you for the opportunity to participate in the care of your patient, Niurka Cisneros, at the [facility] at [facility]. Please see a copy of my visit note below.    ASKED BY REFERRING PHYSICIAN: Mara Combs MD to see patient regarding treatment opinion of ascending aortic aneurysm     CHIEF COMPLAINT: F/U to monitor ascending aortic aneurysm)      HPI: Niurka is a 72 year old female who presents to establish with new cardiothoracic surgeon to follow up with her ascending aortic aneurysm.  Previously saw Dr. Inman and Dr. Patiño. Per Dr. Patiño's previous note BP goal is <130/85.  Patient  has generally met this target.  Patient does monitor her BP at home.  Patient's other history includes; esophageal hypertension, essential hypertension, seizures, and asthma.     She denies chest pain or back pain.     PAST MEDICAL HISTORY:  Past Medical History:   Diagnosis Date     Abdominal aortic aneurysm without rupture (H) 11/10/2015    [  ] repeat imaging due spring 2016 Descending aortic aneurysm.  Being monitored with serial angiogram      Esophageal hypertension 1/25/2017     Essential hypertension 1/25/2017     Female bladder prolapse 11/10/2015     Hearing loss      Pulmonary nodules 1/25/2017     Seizures (H) 11/10/2015    Temporal seziure d/o.  Does not have LOC.  Has prodromal symptoms      Severe persistent asthma 11/10/2015    Since childhood.  Has been steroid dependent for many years. Has had cydney x2         PAST SURGICAL HISTORY:  Past Surgical History:   Procedure Laterality Date     H CATARACT SURGICAL PACKAGE       HYSTERECTOMY      fibroids     JOINT REPLACEMENT Left      NISSEN FUNDOPLICATION      x2       FAMILY HISTORY:   Family History   Problem Relation Age of Onset     Dementia Mother      Heart Failure Mother      Hypertension Mother   "    Breast Cancer Mother      post menopausal     Asthma Sister      x2     Depression Sister      CANCER Father      prostate cancer     Hypertension Father      Prostate Cancer Father       of it at 82     Asthma Father      \"outgrew\" it     Schizophrenia Maternal Grandmother      Coronary Artery Disease Maternal Grandfather      he was diabetic and smoked     Coronary Artery Disease Sister      MI when being ventilated for asthma     Asthma Sister      both sisters with asthma, one severe       SOCIAL HISTORY:  Social History     Social History     Marital status:      Spouse name: N/A     Number of children: N/A     Years of education: 20     Occupational History     pediatrician      Social History Main Topics     Smoking status: Never Smoker     Smokeless tobacco: Never Used     Alcohol use 0.0 oz/week     0 Standard drinks or equivalent per week      Comment: 1 glass of wine with dinner     Drug use: Not on file     Sexual activity: Not on file     Other Topics Concern     Not on file     Social History Narrative    Retired Pediatrician, 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        ALLERGIES:   No Known Allergies    CURRENT MEDICATIONS:   Prescription Medications as of 2017             triamterene-hydrochlorothiazide (MAXZIDE-25) 37.5-25 MG per tablet Take 1 tablet by mouth daily    pravastatin (PRAVACHOL) 20 MG tablet Take 1 tablet (20 mg) by mouth daily    potassium chloride SA (KLOR-CON) 20 MEQ CR tablet Take 1 tablet (20 mEq) by mouth daily    montelukast (SINGULAIR) 10 MG tablet Take 1 tablet (10 mg) by mouth as needed    losartan (COZAAR) 25 MG tablet Take 1 tablet (25 mg) by mouth daily    fluticasone-salmeterol (ADVAIR) 500-50 MCG/DOSE diskus inhaler Inhale 1 puff into the lungs 2 times daily    estradiol (ESTRING) 2 MG vaginal ring Place 1 each vaginally every 3 months    albuterol (PROAIR HFA/PROVENTIL HFA/VENTOLIN HFA) 108 (90 BASE) MCG/ACT Inhaler Inhale 2 puffs into the lungs " "every 6 hours as needed for shortness of breath / dyspnea or wheezing    NAPROXEN PO Take 220 mg by mouth 2 times daily (with meals)     mepolizumab (NUCALA) 100 MG injection Inject 1 mL (100 mg) Subcutaneous every 28 days    CALCIUM-VITAMIN D PO Take 1 tablet by mouth daily    beclomethasone (QVAR) 80 MCG/ACT Inhaler Inhale 2 puffs into the lungs 2 times daily          REVIEW OF SYSTEMS:   Gen: denies frequent headaches, double/blurry vision, insomnia, fatigue, unexplained weight loss/gain. No previous anesthesia reactions.  CV: denies chest pain, shortness of breath, palpitations, peripheral edema.  Patient sleeps   Pulm: denies shortness of breath, asthma or wheezing  GI/: denies liver or kidney problems, blood in stool or BRBPR, difficulty urinating  Endo: denies thyroid problems or Diabetes  Heme/Onc: denies bleeding or clotting disorders, no family problems with bleeding/clotting diorders  MS: no weakness, tremors or gait instability  Neuro: denies depression, memory problems, no dysesthesias, no previous strokes, no migraines, no dysphagia  Skin: No petechiae, purpura or rash.    PHYSICAL EXAMINATION:   /88 (BP Location: Left arm, Patient Position: Chair, Cuff Size: Adult Regular)  Pulse 72  Ht 1.702 m (5' 7\")  SpO2 96%  General: alert and oriented x 3, pleasant, no acute distress  CV: S1 S2, no murmurs, rubs or gallops, regular rate and rhythm, no peripheral edema, no carotid or abdomenal bruits, pulses in upper and lower extremities palpable  Pulm: bilateral breath sounds, clear to auscultation, easy work of breathing  GI: (+) bowel sounds, soft non-tender and non-distended  : voiding without problems  MS: moves all extremities x 4,  5+/5+ equal strength bilaterally  Neuro: pupils equal round and reactive to light, cranial nerves, II-XII grossly intact, no gross neurologic deficits noted    LABS:  BMP RESULTS:  Lab Results   Component Value Date     08/04/2017    POTASSIUM 4.0 08/04/2017 "    CHLORIDE 106 08/04/2017    CO2 27 08/04/2017    ANIONGAP 6 08/04/2017    GLC 93 08/04/2017    BUN 17 08/04/2017    CR 0.76 08/04/2017    GFRESTIMATED 75 08/04/2017    GFRESTBLACK >90   GFR Calc   08/04/2017    ZACH 9.0 08/04/2017        CBC RESULTS:  Lab Results   Component Value Date    WBC 6.4 05/16/2016    RBC 4.56 05/16/2016    HGB 12.7 05/16/2016    HCT 38.0 05/16/2016    MCV 83 05/16/2016    MCH 27.9 05/16/2016    MCHC 33.4 05/16/2016    RDW 13.5 05/16/2016     05/16/2016       No results found for: INR  No results found for: PTT  No results found for: UA  Lab Results   Component Value Date    A1C 5.5 11/22/2017       PROCEDURES/IMAGING:    CTA of the chest and abdomen and pelvis dated 9/19/2016 2:31 PM     CLINICAL INFORMATION: The study is done to evaluate for potential  dissection of the aorta.      TECHNIQUE:  Axial images through the chest and abdomen were obtained  before the administration of iopamidol (ISOVUE-370) 76% solution 100  mL ccs of Ultravist 370 intravenous contrast media and following the  injection of contrast media  in the  arterial phase. Source images  were reviewed as well as 3D and multi-planar reconstructions at a 3D  workstation.     COMPARISON: Outside CTA on 6/17/2013 .     Ordering provider: CHRIS PARDO     FINDINGS:       Diameters:       Sinuses of Valsalva: 3.6 x 3.4 x 3.4 cm    Sinotubular ridge ; 3.3 x 3.4 cm    Ascending aorta ;  4.4 x 4.3 cm    Arch ; 3.3 x 3.4 cm    Proximal descending thoracic aorta ; 2.7 x 2.7 cm    Mid descending thoracic aorta ; 2.3 x 2.2 cm    Descending thoracic aorta at the diaphragm ; 2.5 x 2.5 cm      There is normal branching pattern of the great vessels. Origins of the  brachiocephalic artery, left common carotid artery and left subclavian  artery show no focal abnormality. The proximal pulmonary vasculature   appears normal.      The celiac axis, SMA and TARIQ are patent . The renal arteries are  patent bilaterally.  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     Chest/mediastinum: Thyroid gland is unremarkable caliber of the main  pulmonary artery is within normal limits. Heart size is mildly  enlarged. No pericardial effusion. Few prominent mediastinal and hilar  lymph nodes. Trachea and central bronchi are clear. Few scattered  atherosclerotic calcified and noncalcified plaques of the coronary  arteries and major vessels.     Lung/pleura: No evidence of pleural effusion or pneumothorax. Few  scattered pulmonary nodules, for example 4 mm left upper lobe  pulmonary nodule (series 10 image 181), not significantly changed as  compared to 6/25/2012 exam. 7 x 5 mm posterior left lower lobe  pulmonary nodule (series 10 image 172), not significantly changed as  compared to 6/17/2013 exam. 4 mm right middle lobe pulmonary nodule  (series 10 image 195), not significantly changed as compared to  6/17/2013 exam. Few tiny triangular densities along the lung fissures,  most evident along the right major fissure (series 10 image 170, 173  and 176), consistent with intrafissural lymph nodes.     Abdomen/pelvis: Postsurgical changes of Nissen's fundoplication and  hysterectomy with vaginal pessary. The liver, spleen, adrenal glands,  gallbladder and kidneys are unremarkable. Mild fatty infiltration of  the pancreas. No abnormally dilated bowel loops. No significant  retroperitoneal or mesenteric lymph nodes. Colonic diverticula  stenosis without CT evidence of acute diverticulitis.     Bones: Dextroconvex rotoscoliosis of the lower thoracic and levoconvex  rotoscoliosis of the lumbar spine with associated multilevel  degenerative changes of the thoracolumbar spine. Postsurgical changes  of total left hip arthroplasty.     Dose: 877 mGy-cm.         Impression:  1. 4.4 cm  aneurysmal dilatation of ascending aorta, not significant  changed as compared to 2015 exam where it measures 4.5 cm.  2. 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  3. Multiple stable bilateral pulmonary nodules, the largest is a 7 x 5  mm posterior left lower lobe pulmonary nodule, previously measured 7 x  4 mm on 2013 exam.     I have personally reviewed the examination and initial interpretation  and I agree with the findings.     NAKUL KEANE MD    ECHOCARDIOGRAM: 17       Echocardiogram Complete   Order: [id number]   Status:  Edited Result - FINAL   Visible to patient:  No (Not Released) Next appt:  2018 at 12:00 PM in Infusion Therapy (Specialty Infusion) Dx:  Thoracic aortic aneurysm without rupt...   Details   Reading Physician Reading Date Result Priority   Beto Cassidy MD 2017    Narrative         [id number]  [facility]ly [facility]  [id number]  [id number]^LILLIANA^TARAS^ZION           [facility]  Diagnostic and Treamtent-3rd Floor  [address] [id number]     Name: JANESSA LAWRENCE  MRN: [id number]  : 1945  Study Date: 2017 09:33 AM  Age: 72 yrs  Gender: Female  [place] [facility] [place]  Reason For Study: Thoracic aortic aneurysm without rupture (H)  Ordering Physician: TARAS TIJERINA  Referring Physician: TARAS TIJERINA  Performed By: Cindy Lucas RDCS     BSA: 1.8 m2  Height: 67 in  Weight: 161 lb  HR: 72  BP: 105/72 mmHg  _____________________________________________________________________________  __        Procedure  Echocardiogram with two-dimensional, color and spectral Doppler performed.  _____________________________________________________________________________  __        Interpretation Summary  Normal left  ventricular size, thickness, and systolic function. EF 60-65%.  Mild right ventricular dilation with normal function.  Moderate dilation of the proximal ascending aorta (4.5 cm, 2.5 cm/m2, Z score  +4.5). Mild dilation of the aortic arch (4.0 cm, 2.2 cm/m2).  No pericardial effusion.     Compared to the prior study 9/19/16 there has been no significant change  (ascending aorta 4.4 cm -> 4.5 cm).  _____________________________________________________________________________  __        Left Ventricle  Left ventricular size is normal. Left ventricular wall thickness is normal.  Global and regional left ventricular function is normal with an EF of 60-65%.  Left ventricular diastolic function is indeterminate.     Right Ventricle  Global right ventricular function is normal. Mild right ventricular dilation  is present.     Atria  The right atria appears normal. Severe left atrial enlargement is present.     Mitral Valve  The mitral valve is normal. Trace mitral insufficiency is present.        Aortic Valve  The aortic valve is tricuspid. Mild aortic valve sclerosis is present. Trace  aortic insufficiency is present.     Tricuspid Valve  The tricuspid valve is normal. Trace tricuspid insufficiency is present. The  peak velocity of the tricuspid regurgitant jet is not obtainable.     Pulmonic Valve  The pulmonic valve is normal.     Vessels  The inferior vena cava was normal in size with preserved respiratory  variability. Moderate dilation of the proximal ascending aorta (4.5 cm, 2.5  cm/m2, Z score +4.5). Mild dilation of the aortic arch (4.0 cm, 2.2 cm/m2).  Estimated mean right atrial pressure is 3 mmHg.     Pericardium  No pericardial effusion is present.        Attestation  I have personally viewed the imaging and agree with the interpretation and  report as documented by the fellow, De العلي, and/or edited by me.  _____________________________________________________________________________  __  MMode/2D  Measurements & Calculations  IVSd: 1.1 cm     LVIDd: 3.9 cm  LVIDs: 2.3 cm  LVPWd: 1.1 cm  FS: 40.7 %  EDV(Teich): 65.5 ml  ESV(Teich): 18.2 ml  LV mass(C)d: 136.4 grams  LV mass(C)dI: 74.0 grams/m2  asc Aorta Diam: 4.5 cm  LVOT diam: 2.3 cm  LVOT area: 4.1 cm2  LA Volume (BP): 91.3 ml  LA Volume Index (BP): 49.6 ml/m2  RWT: 0.57           Doppler Measurements & Calculations  MV E max krysten: 67.1 cm/sec  MV A max krysten: 79.0 cm/sec  MV E/A: 0.85  MV dec time: 0.24 sec  E/E' av.5  Lateral E/e': 10.9  Medial E/e': 12.1     _____________________________________________________________________________  __           Report approved by: Jose Maria Faustin 2017 11:57 AM         Specimen Collected: 17  9:33 AM Last Resulted: 17  9:33 AM                ECHO:  16  Interpretation Summary  Global and regional left ventricular function is normal with an EF of 55-60%. Right ventricular function, chamber size, wall motion, and thickness are normal. No pericardial effusion is present. No significant valvular dysfunction. Pulmonary artery systolic pressure cannot be assessed. Ascending aorta measures 4.4 cm (2.3 cm/m2), aortic arch measures 4.0 cm (2.1 cm/m2).    Left Ventricle  Global and regional left ventricular function is normal with an EF of 55-60%. Left ventricular size is normal. Borderline concentric wall thickening consistent with left ventricular hypertrophy is present. Grade I or early diastolic dysfunction. No regional wall motion abnormalities are seen.      Right Ventricle  Right ventricular function, chamber size, wall motion, and thickness are normal.   Atria  The right atria appears normal. Mild to moderate left atrial enlargement is present.     Mitral Valve  Trace to mild mitral insufficiency is present.     Aortic Valve  Aortic valve is normal in structure and function. The aortic valve is tricuspid.     Tricuspid Valve  The tricuspid valve is normal. Trace tricuspid insufficiency is  present. The peak velocity of the tricuspid regurgitant jet is not obtainable. Pulmonary artery systolic pressure cannot be assessed.      Pulmonic Valve  The pulmonic valve is normal.     Vessels  Ascending aorta measures 4.4 cm (2.3 cm/m2), aortic arch measures 4.0 cm (2.1 cm/m2),. The pulmonary artery cannot be assessed. The inferior vena cava was normal in size with preserved respiratory variability. Ascending aorta 4.4 cm.   Pericardium  No pericardial effusion is present.      ASSESSMENT/PLAN:     Niurka is a 72 year old female who presents with following up with her ascending aortic aneurysm. Repeat ECHO over one year apart only showed 1 cm change In diameter.  Patient remains asymptomatic .   Explained to the patient about the findings .   Advise patient to return in one year with anther CT of chest or ECHO to followup the aneurysm change.      Approximately 50 minutes spent with this case including review of the clinical history and data; discussion with the patient and his family and coordination of the care     Thank you for including me in the care of this kind patient. Do not hesitate to contact me with any questions.     Dr. Elliot Thao     Cardiothoracic Surgery  [facility]  Patient Care Team:  Robin Oakley MD as PCP - General (Internal Medicine)  Robin Oakley MD as MD (Internal Medicine)  Stephania Ring MD as MD (Pulmonary)  Carmen Goodman MD as MD (Urology)  Robin Oakley MD as Referring Physician (Internal Medicine)  Sabino Saxena MD as Resident (Student in organized health care education/training program)  Maddie Bal MD as Resident (Student in organized health care education/training program)  Genevieve Tovar, RN as Nurse Coordinator (Thoracic Surgery (Cardiothoracic Vascular Surgery))  ROBIN OAKLEY    Please do not hesitate to contact me if you have any questions/concerns.     Sincerely,      Elliot Thao MD

## 2017-12-14 NOTE — MR AVS SNAPSHOT
After Visit Summary   12/14/2017    Niurka Cisneros    MRN: [id number]           Patient Information     Date Of Birth          1945        Visit Information        Provider Department      12/14/2017 11:30 AM Elliot Thao MD [facility]        Today's Diagnoses     Thoracic aortic aneurysm without rupture (H)          Care Instructions    You were seen today in the [facility]                     Cardiothoracic Surgery Clinic    Your surgeon was Dr Elliot Thao recommends:    1. Follow up in one year with Dr Brandee Wang with a CT of the Chest with Contrast.      Please feel free to call me with any questions or concerns.    Genevieve Tovar, RN Care Coordinator  Cardiothoracic Surgery Division  [facility]   [phone]                  Follow-ups after your visit        Follow-up notes from your care team     Return in about 1 year (around 12/14/2018).      Your next 10 appointments already scheduled     Jan 05, 2018 12:00 PM CST   Infusion 60 with UC SPEC INFUSION, UC 50 ATC   [facility] ([facility])    [address]  [address] [facility] [address]   [phone]            Feb 02, 2018  7:30 AM CST   NEW GENERAL with Maddie Bal MD   Eye Clinic ([facility])    [address]  [facility] [address]   [phone]            Feb 02, 2018 10:00 AM CST   Infusion 60 with UC SPEC INFUSION, UC 44 ATC   [facility] ([facility])    [address] [address]   [phone]            Mar 02, 2018  8:00 AM CST   Infusion 60 with UC SPEC INFUSION, UC 44 ATC   [facility] ([facility])    [address] [place] [address]  Floor  [facility] [address] [phone]            Apr 02, 2018  8:00 AM CDT   Infusion 60 with UC SPEC INFUSION, UC 51 ATC   [facility] Specialty and Procedure ([facility])    [address] [place] [address]  [address] [facility] [address] [phone]            Apr 09, 2018  1:00 PM CDT   (Arrive by 12:45 PM)   Return Visit with Stephania Ring MD   [facility] ([facility])    [address] [place] [address]  3rd [facility] [id number] [phone]              Who to contact     If you have questions or need follow up information about today's clinic visit or your schedule please contact [facility] directly at [phone].  Normal or non-critical lab and imaging results will be communicated to you by MyChart, letter or phone within 4 business days after the clinic has received the results. If you do not hear from us within 7 days, please contact the clinic through [employer] or phone. If you have a critical or abnormal lab result, we will notify you by phone as soon as possible.  Submit refill requests through [employer] or call your pharmacy and they will forward the refill request to us. Please allow 3 business days for your refill to be completed.          Additional Information About Your Visit        MyChart Information     [employer] gives you secure access to your electronic health record. If you see a primary care provider, you can also send messages to your care team and make appointments. If you have questions, please call your primary care clinic.  If you do not have a primary care provider, please call [phone] and they will assist you.        Care EveryWhere ID     This is your Care EveryWhere ID. This could be used by other organizations to access your [facility] medical records  [id number]        Your Vitals Were     Pulse Height Pulse Oximetry             72 1.702 m  "(5' 7\") 96%          Blood Pressure from Last 3 Encounters:   12/14/17 136/88   12/08/17 105/72   11/22/17 116/72    Weight from Last 3 Encounters:   11/22/17 73.1 kg (161 lb 3.2 oz)   11/15/17 73 kg (161 lb)   10/24/17 73.5 kg (162 lb)              Today, you had the following     No orders found for display       Primary Care Provider Office Phone # Fax #    Mara Regina Combs [phone]       [address] [place] [address]  [facility] [address]        Equal Access to Services     [facility]: Hadii aad leandro hadsoniao Soadonis, waaxda luqadaha, qaybta kaalmada adejustinyada, ashwini hernadez . So [facility] [phone].    ATENCIÓN: Si habla español, tiene a dinh disposición servicios gratuitos de asistencia lingüística. [facility] [phone].    We comply with applicable federal civil rights laws and [place] laws. We do not discriminate on the basis of race, color, national origin, age, disability, sex, sexual orientation, or gender identity.            Thank you!     Thank you for choosing [facility]  for your care. Our goal is always to provide you with excellent care. Hearing back from our patients is one way we can continue to improve our services. Please take a few minutes to complete the written survey that you may receive in the mail after your visit with us. Thank you!             Your Updated Medication List - Protect others around you: Learn how to safely use, store and throw away your medicines at www.disposemymeds.org.          This list is accurate as of: 12/14/17 12:11 PM.  Always use your most recent med list.                   Brand Name Dispense Instructions for use Diagnosis    albuterol 108 (90 BASE) MCG/ACT Inhaler    PROAIR HFA/PROVENTIL HFA/VENTOLIN HFA    3 Inhaler    Inhale 2 puffs into the lungs every 6 hours as needed for shortness of breath / dyspnea or wheezing    Uncomplicated severe persistent asthma       beclomethasone 80 MCG/ACT Inhaler    " QVAR    3 Inhaler    Inhale 2 puffs into the lungs 2 times daily    Uncomplicated severe persistent asthma       CALCIUM-VITAMIN D PO      Take 1 tablet by mouth daily        estradiol 2 MG vaginal ring    ESTRING    1 each    Place 1 each vaginally every 3 months    Bladder prolapse, female, acquired       fluticasone-salmeterol 500-50 MCG/DOSE diskus inhaler    ADVAIR    3 Inhaler    Inhale 1 puff into the lungs 2 times daily    Uncomplicated severe persistent asthma       losartan 25 MG tablet    COZAAR    90 tablet    Take 1 tablet (25 mg) by mouth daily    Benign essential hypertension       mepolizumab 100 MG injection    NUCALA    1 mL    Inject 1 mL (100 mg) Subcutaneous every 28 days    Steroid-dependent chronic obstructive pulmonary disease (H), Uncomplicated severe persistent asthma       montelukast 10 MG tablet    SINGULAIR    90 tablet    Take 1 tablet (10 mg) by mouth as needed    Uncomplicated severe persistent asthma       NAPROXEN PO      Take 220 mg by mouth 2 times daily (with meals)        potassium chloride SA 20 MEQ CR tablet    KLOR-CON    90 tablet    Take 1 tablet (20 mEq) by mouth daily    Uncomplicated severe persistent asthma       pravastatin 20 MG tablet    PRAVACHOL    90 tablet    Take 1 tablet (20 mg) by mouth daily    Steroid-dependent chronic obstructive pulmonary disease (H)       triamterene-hydrochlorothiazide 37.5-25 MG per tablet    MAXZIDE-25    90 tablet    Take 1 tablet by mouth daily    Benign essential hypertension

## 2017-12-14 NOTE — PROGRESS NOTES
"ASKED BY REFERRING PHYSICIAN: Mara Combs MD to see patient regarding treatment opinion of ascending aortic aneurysm     CHIEF COMPLAINT: F/U to monitor ascending aortic aneurysm)      HPI: Niurka is a 72 year old female who presents to establish with new cardiothoracic surgeon to follow up with her ascending aortic aneurysm.  Previously saw Dr. Inman and Dr. Patiño. Per Dr. Patiño's previous note BP goal is <130/85.  Patient  has generally met this target.  Patient does monitor her BP at home.  Patient's other history includes; esophageal hypertension, essential hypertension, seizures, and asthma.     She denies chest pain or back pain.     PAST MEDICAL HISTORY:  Past Medical History:   Diagnosis Date     Abdominal aortic aneurysm without rupture (H) 11/10/2015    [  ] repeat imaging due spring 2016 Descending aortic aneurysm.  Being monitored with serial angiogram      Esophageal hypertension 2017     Essential hypertension 2017     Female bladder prolapse 11/10/2015     Hearing loss      Pulmonary nodules 2017     Seizures (H) 11/10/2015    Temporal seziure d/o.  Does not have LOC.  Has prodromal symptoms      Severe persistent asthma 11/10/2015    Since childhood.  Has been steroid dependent for many years. Has had cydney x2         PAST SURGICAL HISTORY:  Past Surgical History:   Procedure Laterality Date     H CATARACT SURGICAL PACKAGE       HYSTERECTOMY      fibroids     JOINT REPLACEMENT Left      NISSEN FUNDOPLICATION      x2       FAMILY HISTORY:   Family History   Problem Relation Age of Onset     Dementia Mother      Heart Failure Mother      Hypertension Mother      Breast Cancer Mother      post menopausal     Asthma Sister      x2     Depression Sister      CANCER Father      prostate cancer     Hypertension Father      Prostate Cancer Father       of it at 82     Asthma Father      \"outgrew\" it     Schizophrenia Maternal Grandmother      Coronary Artery Disease Maternal " Grandfather      he was diabetic and smoked     Coronary Artery Disease Sister      MI when being ventilated for asthma     Asthma Sister      both sisters with asthma, one severe       SOCIAL HISTORY:  Social History     Social History     Marital status:      Spouse name: N/A     Number of children: N/A     Years of education: 20     Occupational History     pediatrician      Social History Main Topics     Smoking status: Never Smoker     Smokeless tobacco: Never Used     Alcohol use 0.0 oz/week     0 Standard drinks or equivalent per week      Comment: 1 glass of wine with dinner     Drug use: Not on file     Sexual activity: Not on file     Other Topics Concern     Not on file     Social History Narrative    Retired Pediatrician, 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        ALLERGIES:   No Known Allergies    CURRENT MEDICATIONS:   Prescription Medications as of 12/14/2017             triamterene-hydrochlorothiazide (MAXZIDE-25) 37.5-25 MG per tablet Take 1 tablet by mouth daily    pravastatin (PRAVACHOL) 20 MG tablet Take 1 tablet (20 mg) by mouth daily    potassium chloride SA (KLOR-CON) 20 MEQ CR tablet Take 1 tablet (20 mEq) by mouth daily    montelukast (SINGULAIR) 10 MG tablet Take 1 tablet (10 mg) by mouth as needed    losartan (COZAAR) 25 MG tablet Take 1 tablet (25 mg) by mouth daily    fluticasone-salmeterol (ADVAIR) 500-50 MCG/DOSE diskus inhaler Inhale 1 puff into the lungs 2 times daily    estradiol (ESTRING) 2 MG vaginal ring Place 1 each vaginally every 3 months    albuterol (PROAIR HFA/PROVENTIL HFA/VENTOLIN HFA) 108 (90 BASE) MCG/ACT Inhaler Inhale 2 puffs into the lungs every 6 hours as needed for shortness of breath / dyspnea or wheezing    NAPROXEN PO Take 220 mg by mouth 2 times daily (with meals)     mepolizumab (NUCALA) 100 MG injection Inject 1 mL (100 mg) Subcutaneous every 28 days    CALCIUM-VITAMIN D PO Take 1 tablet by mouth daily    beclomethasone (QVAR) 80 MCG/ACT Inhaler  "Inhale 2 puffs into the lungs 2 times daily          REVIEW OF SYSTEMS:   Gen: denies frequent headaches, double/blurry vision, insomnia, fatigue, unexplained weight loss/gain. No previous anesthesia reactions.  CV: denies chest pain, shortness of breath, palpitations, peripheral edema.  Patient sleeps   Pulm: denies shortness of breath, asthma or wheezing  GI/: denies liver or kidney problems, blood in stool or BRBPR, difficulty urinating  Endo: denies thyroid problems or Diabetes  Heme/Onc: denies bleeding or clotting disorders, no family problems with bleeding/clotting diorders  MS: no weakness, tremors or gait instability  Neuro: denies depression, memory problems, no dysesthesias, no previous strokes, no migraines, no dysphagia  Skin: No petechiae, purpura or rash.    PHYSICAL EXAMINATION:   /88 (BP Location: Left arm, Patient Position: Chair, Cuff Size: Adult Regular)  Pulse 72  Ht 1.702 m (5' 7\")  SpO2 96%  General: alert and oriented x 3, pleasant, no acute distress  CV: S1 S2, no murmurs, rubs or gallops, regular rate and rhythm, no peripheral edema, no carotid or abdomenal bruits, pulses in upper and lower extremities palpable  Pulm: bilateral breath sounds, clear to auscultation, easy work of breathing  GI: (+) bowel sounds, soft non-tender and non-distended  : voiding without problems  MS: moves all extremities x 4,  5+/5+ equal strength bilaterally  Neuro: pupils equal round and reactive to light, cranial nerves, II-XII grossly intact, no gross neurologic deficits noted    LABS:  BMP RESULTS:  Lab Results   Component Value Date     08/04/2017    POTASSIUM 4.0 08/04/2017    CHLORIDE 106 08/04/2017    CO2 27 08/04/2017    ANIONGAP 6 08/04/2017    GLC 93 08/04/2017    BUN 17 08/04/2017    CR 0.76 08/04/2017    GFRESTIMATED 75 08/04/2017    GFRESTBLACK >90   GFR Calc   08/04/2017    ZACH 9.0 08/04/2017        CBC RESULTS:  Lab Results   Component Value Date    WBC 6.4 " 05/16/2016    RBC 4.56 05/16/2016    HGB 12.7 05/16/2016    HCT 38.0 05/16/2016    MCV 83 05/16/2016    MCH 27.9 05/16/2016    MCHC 33.4 05/16/2016    RDW 13.5 05/16/2016     05/16/2016       No results found for: INR  No results found for: PTT  No results found for: UA  Lab Results   Component Value Date    A1C 5.5 11/22/2017       PROCEDURES/IMAGING:    CTA of the chest and abdomen and pelvis dated 9/19/2016 2:31 PM     CLINICAL INFORMATION: The study is done to evaluate for potential  dissection of the aorta.      TECHNIQUE:  Axial images through the chest and abdomen were obtained  before the administration of iopamidol (ISOVUE-370) 76% solution 100  mL ccs of Ultravist 370 intravenous contrast media and following the  injection of contrast media  in the  arterial phase. Source images  were reviewed as well as 3D and multi-planar reconstructions at a 3D  workstation.     COMPARISON: Outside CTA on 6/17/2013 .     Ordering provider: CHRIS PARDO     FINDINGS:       Diameters:       Sinuses of Valsalva: 3.6 x 3.4 x 3.4 cm    Sinotubular ridge ; 3.3 x 3.4 cm    Ascending aorta ;  4.4 x 4.3 cm    Arch ; 3.3 x 3.4 cm    Proximal descending thoracic aorta ; 2.7 x 2.7 cm    Mid descending thoracic aorta ; 2.3 x 2.2 cm    Descending thoracic aorta at the diaphragm ; 2.5 x 2.5 cm      There is normal branching pattern of the great vessels. Origins of the  brachiocephalic artery, left common carotid artery and left subclavian  artery show no focal abnormality. The proximal pulmonary vasculature   appears normal.      The celiac axis, SMA and TARIQ are patent . The renal arteries are  patent bilaterally. 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     Chest/mediastinum: Thyroid gland is unremarkable caliber of the main  pulmonary artery is within  normal limits. Heart size is mildly  enlarged. No pericardial effusion. Few prominent mediastinal and hilar  lymph nodes. Trachea and central bronchi are clear. Few scattered  atherosclerotic calcified and noncalcified plaques of the coronary  arteries and major vessels.     Lung/pleura: No evidence of pleural effusion or pneumothorax. Few  scattered pulmonary nodules, for example 4 mm left upper lobe  pulmonary nodule (series 10 image 181), not significantly changed as  compared to 6/25/2012 exam. 7 x 5 mm posterior left lower lobe  pulmonary nodule (series 10 image 172), not significantly changed as  compared to 6/17/2013 exam. 4 mm right middle lobe pulmonary nodule  (series 10 image 195), not significantly changed as compared to  6/17/2013 exam. Few tiny triangular densities along the lung fissures,  most evident along the right major fissure (series 10 image 170, 173  and 176), consistent with intrafissural lymph nodes.     Abdomen/pelvis: Postsurgical changes of Nissen's fundoplication and  hysterectomy with vaginal pessary. The liver, spleen, adrenal glands,  gallbladder and kidneys are unremarkable. Mild fatty infiltration of  the pancreas. No abnormally dilated bowel loops. No significant  retroperitoneal or mesenteric lymph nodes. Colonic diverticula  stenosis without CT evidence of acute diverticulitis.     Bones: Dextroconvex rotoscoliosis of the lower thoracic and levoconvex  rotoscoliosis of the lumbar spine with associated multilevel  degenerative changes of the thoracolumbar spine. Postsurgical changes  of total left hip arthroplasty.     Dose: 877 mGy-cm.         Impression:  1. 4.4 cm aneurysmal dilatation of ascending aorta, not significant  changed as compared to 6/17/2015 exam where it measures 4.5 cm.  2. 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  3. Multiple stable bilateral pulmonary nodules, the largest is a 7 x 5  mm posterior left lower lobe pulmonary nodule, previously measured 7 x  4 mm on 2013 exam.     I have personally reviewed the examination and initial interpretation  and I agree with the findings.     NAKUL KEANE MD    ECHOCARDIOGRAM: 17       Echocardiogram Complete   Order: [id number]   Status:  Edited Result - FINAL   Visible to patient:  No (Not Released) Next appt:  2018 at 12:00 PM in Infusion Therapy (Specialty Infusion) Dx:  Thoracic aortic aneurysm without rupt...   Details   Reading Physician Reading Date Result Priority   Beto Cassidy MD 2017    Narrative         [id number]  [id number]  [id number]  [id number]^LILLIANA^TARAS^ZION           [facility]  Diagnostic and Treamtent-3rd Floor  [address] [place] [address]     Name: JANESSA LAWRENCE  MRN: [id number]  : 1945  Study Date: 2017 09:33 AM  Age: 72 yrs  Gender: Female  Patient Location: [facility]  Reason For Study: Thoracic aortic aneurysm without rupture (H)  Ordering Physician: TARAS TIJERINA  Referring Physician: TARAS TIJERINA  Performed By: Cindy Lucas RDCS     BSA: 1.8 m2  Height: 67 in  Weight: 161 lb  HR: 72  BP: 105/72 mmHg  _____________________________________________________________________________  __        Procedure  Echocardiogram with two-dimensional, color and spectral Doppler performed.  _____________________________________________________________________________  __        Interpretation Summary  Normal left ventricular size, thickness, and systolic function. EF 60-65%.  Mild right ventricular dilation with normal function.  Moderate dilation of the proximal ascending aorta (4.5 cm, 2.5 cm/m2, Z score  +4.5). Mild dilation of the aortic arch (4.0 cm, 2.2 cm/m2).  No pericardial effusion.     Compared to the prior study  9/19/16 there has been no significant change  (ascending aorta 4.4 cm -> 4.5 cm).  _____________________________________________________________________________  __        Left Ventricle  Left ventricular size is normal. Left ventricular wall thickness is normal.  Global and regional left ventricular function is normal with an EF of 60-65%.  Left ventricular diastolic function is indeterminate.     Right Ventricle  Global right ventricular function is normal. Mild right ventricular dilation  is present.     Atria  The right atria appears normal. Severe left atrial enlargement is present.     Mitral Valve  The mitral valve is normal. Trace mitral insufficiency is present.        Aortic Valve  The aortic valve is tricuspid. Mild aortic valve sclerosis is present. Trace  aortic insufficiency is present.     Tricuspid Valve  The tricuspid valve is normal. Trace tricuspid insufficiency is present. The  peak velocity of the tricuspid regurgitant jet is not obtainable.     Pulmonic Valve  The pulmonic valve is normal.     Vessels  The inferior vena cava was normal in size with preserved respiratory  variability. Moderate dilation of the proximal ascending aorta (4.5 cm, 2.5  cm/m2, Z score +4.5). Mild dilation of the aortic arch (4.0 cm, 2.2 cm/m2).  Estimated mean right atrial pressure is 3 mmHg.     Pericardium  No pericardial effusion is present.        Attestation  I have personally viewed the imaging and agree with the interpretation and  report as documented by the fellow, De العلي, and/or edited by me.  _____________________________________________________________________________  __  MMode/2D Measurements & Calculations  IVSd: 1.1 cm     LVIDd: 3.9 cm  LVIDs: 2.3 cm  LVPWd: 1.1 cm  FS: 40.7 %  EDV(Teich): 65.5 ml  ESV(Teich): 18.2 ml  LV mass(C)d: 136.4 grams  LV mass(C)dI: 74.0 grams/m2  asc Aorta Diam: 4.5 cm  LVOT diam: 2.3 cm  LVOT area: 4.1 cm2  LA Volume (BP): 91.3 ml  LA Volume Index (BP): 49.6  ml/m2  RWT: 0.57           Doppler Measurements & Calculations  MV E max krysten: 67.1 cm/sec  MV A max krysten: 79.0 cm/sec  MV E/A: 0.85  MV dec time: 0.24 sec  E/E' av.5  Lateral E/e': 10.9  Medial E/e': 12.1     _____________________________________________________________________________  __           Report approved by: Jose Maria Faustin 2017 11:57 AM         Specimen Collected: 17  9:33 AM Last Resulted: 17  9:33 AM                ECHO:  16  Interpretation Summary  Global and regional left ventricular function is normal with an EF of 55-60%. Right ventricular function, chamber size, wall motion, and thickness are normal. No pericardial effusion is present. No significant valvular dysfunction. Pulmonary artery systolic pressure cannot be assessed. Ascending aorta measures 4.4 cm (2.3 cm/m2), aortic arch measures 4.0 cm (2.1 cm/m2).    Left Ventricle  Global and regional left ventricular function is normal with an EF of 55-60%. Left ventricular size is normal. Borderline concentric wall thickening consistent with left ventricular hypertrophy is present. Grade I or early diastolic dysfunction. No regional wall motion abnormalities are seen.      Right Ventricle  Right ventricular function, chamber size, wall motion, and thickness are normal.   Atria  The right atria appears normal. Mild to moderate left atrial enlargement is present.     Mitral Valve  Trace to mild mitral insufficiency is present.     Aortic Valve  Aortic valve is normal in structure and function. The aortic valve is tricuspid.     Tricuspid Valve  The tricuspid valve is normal. Trace tricuspid insufficiency is present. The peak velocity of the tricuspid regurgitant jet is not obtainable. Pulmonary artery systolic pressure cannot be assessed.      Pulmonic Valve  The pulmonic valve is normal.     Vessels  Ascending aorta measures 4.4 cm (2.3 cm/m2), aortic arch measures 4.0 cm (2.1 cm/m2),. The pulmonary artery cannot be  assessed. The inferior vena cava was normal in size with preserved respiratory variability. Ascending aorta 4.4 cm.   Pericardium  No pericardial effusion is present.      ASSESSMENT/PLAN:     Niurka is a 72 year old female who presents with following up with her ascending aortic aneurysm. Repeat ECHO over one year apart only showed 1 cm change In diameter.  Patient remains asymptomatic .   Explained to the patient about the findings .   Advise patient to return in one year with anther CT of chest or ECHO to followup the aneurysm change.      Approximately 50 minutes spent with this case including review of the clinical history and data; discussion with the patient and his family and coordination of the care     Thank you for including me in the care of this kind patient. Do not hesitate to contact me with any questions.     Dr. Elliot Thao     Cardiothoracic Surgery  [facility]  Patient Care Team:  Robin Oakley MD as PCP - General (Internal Medicine)  Robin Oakley MD as MD (Internal Medicine)  Stephania Ring MD as MD (Pulmonary)  Carmen Goodman MD as MD (Urology)  Robin Oakley MD as Referring Physician (Internal Medicine)  Sabino Saxena MD as Resident (Student in organized health care education/training program)  Maddie Bal MD as Resident (Student in organized health care education/training program)  Genevieve Tovar, RN as Nurse Coordinator (Thoracic Surgery (Cardiothoracic Vascular Surgery))  ROBIN OAKLEY

## 2017-12-14 NOTE — LETTER
12/14/2017      RE: Niurka Cisneros  [address]   [place] [address]       Dear Colleague,    Thank you for the opportunity to participate in the care of your patient, Niurka Cisneros, at the [facility] at [facility]. Please see a copy of my visit note below.    ASKED BY REFERRING PHYSICIAN: Mara Combs MD to see patient regarding treatment opinion of ascending aortic aneurysm     CHIEF COMPLAINT: F/U to monitor ascending aortic aneurysm)      HPI: Niurka is a 72 year old female who presents to establish with new cardiothoracic surgeon to follow up with her ascending aortic aneurysm.  Previously saw Dr. Inman and Dr. Patiño. Per Dr. Patiño's previous note BP goal is <130/85.  Patient  has generally met this target.  Patient does monitor her BP at home.  Patient's other history includes; esophageal hypertension, essential hypertension, seizures, and asthma.     She denies chest pain or back pain.     PAST MEDICAL HISTORY:  Past Medical History:   Diagnosis Date     Abdominal aortic aneurysm without rupture (H) 11/10/2015    [  ] repeat imaging due spring 2016 Descending aortic aneurysm.  Being monitored with serial angiogram      Esophageal hypertension 1/25/2017     Essential hypertension 1/25/2017     Female bladder prolapse 11/10/2015     Hearing loss      Pulmonary nodules 1/25/2017     Seizures (H) 11/10/2015    Temporal seziure d/o.  Does not have LOC.  Has prodromal symptoms      Severe persistent asthma 11/10/2015    Since childhood.  Has been steroid dependent for many years. Has had cydney x2       PAST SURGICAL HISTORY:  Past Surgical History:   Procedure Laterality Date     H CATARACT SURGICAL PACKAGE       HYSTERECTOMY      fibroids     JOINT REPLACEMENT Left      NISSEN FUNDOPLICATION      x2     FAMILY HISTORY:   Family History   Problem Relation Age of Onset     Dementia Mother      Heart Failure Mother      Hypertension Mother       "Breast Cancer Mother      post menopausal     Asthma Sister      x2     Depression Sister      CANCER Father      prostate cancer     Hypertension Father      Prostate Cancer Father       of it at 82     Asthma Father      \"outgrew\" it     Schizophrenia Maternal Grandmother      Coronary Artery Disease Maternal Grandfather      he was diabetic and smoked     Coronary Artery Disease Sister      MI when being ventilated for asthma     Asthma Sister      both sisters with asthma, one severe     SOCIAL HISTORY:  Social History     Social History     Marital status:      Spouse name: N/A     Number of children: N/A     Years of education: 20     Occupational History     pediatrician      Social History Main Topics     Smoking status: Never Smoker     Smokeless tobacco: Never Used     Alcohol use 0.0 oz/week     0 Standard drinks or equivalent per week      Comment: 1 glass of wine with dinner     Drug use: Not on file     Sexual activity: Not on file     Other Topics Concern     Not on file     Social History Narrative    Retired Pediatrician, 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      ALLERGIES:   No Known Allergies    CURRENT MEDICATIONS:   Prescription Medications as of 2017             triamterene-hydrochlorothiazide (MAXZIDE-25) 37.5-25 MG per tablet Take 1 tablet by mouth daily    pravastatin (PRAVACHOL) 20 MG tablet Take 1 tablet (20 mg) by mouth daily    potassium chloride SA (KLOR-CON) 20 MEQ CR tablet Take 1 tablet (20 mEq) by mouth daily    montelukast (SINGULAIR) 10 MG tablet Take 1 tablet (10 mg) by mouth as needed    losartan (COZAAR) 25 MG tablet Take 1 tablet (25 mg) by mouth daily    fluticasone-salmeterol (ADVAIR) 500-50 MCG/DOSE diskus inhaler Inhale 1 puff into the lungs 2 times daily    estradiol (ESTRING) 2 MG vaginal ring Place 1 each vaginally every 3 months    albuterol (PROAIR HFA/PROVENTIL HFA/VENTOLIN HFA) 108 (90 BASE) MCG/ACT Inhaler Inhale 2 puffs into the lungs every 6 " "hours as needed for shortness of breath / dyspnea or wheezing    NAPROXEN PO Take 220 mg by mouth 2 times daily (with meals)     mepolizumab (NUCALA) 100 MG injection Inject 1 mL (100 mg) Subcutaneous every 28 days    CALCIUM-VITAMIN D PO Take 1 tablet by mouth daily    beclomethasone (QVAR) 80 MCG/ACT Inhaler Inhale 2 puffs into the lungs 2 times daily        REVIEW OF SYSTEMS:   Gen: denies frequent headaches, double/blurry vision, insomnia, fatigue, unexplained weight loss/gain. No previous anesthesia reactions.  CV: denies chest pain, shortness of breath, palpitations, peripheral edema.  Patient sleeps   Pulm: denies shortness of breath, asthma or wheezing  GI/: denies liver or kidney problems, blood in stool or BRBPR, difficulty urinating  Endo: denies thyroid problems or Diabetes  Heme/Onc: denies bleeding or clotting disorders, no family problems with bleeding/clotting diorders  MS: no weakness, tremors or gait instability  Neuro: denies depression, memory problems, no dysesthesias, no previous strokes, no migraines, no dysphagia  Skin: No petechiae, purpura or rash.    PHYSICAL EXAMINATION:   /88 (BP Location: Left arm, Patient Position: Chair, Cuff Size: Adult Regular)  Pulse 72  Ht 1.702 m (5' 7\")  SpO2 96%  General: alert and oriented x 3, pleasant, no acute distress  CV: S1 S2, no murmurs, rubs or gallops, regular rate and rhythm, no peripheral edema, no carotid or abdomenal bruits, pulses in upper and lower extremities palpable  Pulm: bilateral breath sounds, clear to auscultation, easy work of breathing  GI: (+) bowel sounds, soft non-tender and non-distended  : voiding without problems  MS: moves all extremities x 4,  5+/5+ equal strength bilaterally  Neuro: pupils equal round and reactive to light, cranial nerves, II-XII grossly intact, no gross neurologic deficits noted    LABS:  BMP RESULTS:  Lab Results   Component Value Date     08/04/2017    POTASSIUM 4.0 08/04/2017    " CHLORIDE 106 08/04/2017    CO2 27 08/04/2017    ANIONGAP 6 08/04/2017    GLC 93 08/04/2017    BUN 17 08/04/2017    CR 0.76 08/04/2017    GFRESTIMATED 75 08/04/2017    GFRESTBLACK >90   GFR Calc   08/04/2017    ZACH 9.0 08/04/2017      CBC RESULTS:  Lab Results   Component Value Date    WBC 6.4 05/16/2016    RBC 4.56 05/16/2016    HGB 12.7 05/16/2016    HCT 38.0 05/16/2016    MCV 83 05/16/2016    MCH 27.9 05/16/2016    MCHC 33.4 05/16/2016    RDW 13.5 05/16/2016     05/16/2016     No results found for: INR  No results found for: PTT  No results found for: UA  Lab Results   Component Value Date    A1C 5.5 11/22/2017     PROCEDURES/IMAGING:  CTA of the chest and abdomen and pelvis dated 9/19/2016 2:31 PM     CLINICAL INFORMATION: The study is done to evaluate for potential  dissection of the aorta.      TECHNIQUE:  Axial images through the chest and abdomen were obtained  before the administration of iopamidol (ISOVUE-370) 76% solution 100  mL ccs of Ultravist 370 intravenous contrast media and following the  injection of contrast media  in the  arterial phase. Source images  were reviewed as well as 3D and multi-planar reconstructions at a 3D  workstation.     COMPARISON: Outside CTA on 6/17/2013 .     Ordering provider: CHRIS PARDO     FINDINGS:       Diameters:    Sinuses of Valsalva: 3.6 x 3.4 x 3.4 cm    Sinotubular ridge ; 3.3 x 3.4 cm    Ascending aorta ;  4.4 x 4.3 cm    Arch ; 3.3 x 3.4 cm    Proximal descending thoracic aorta ; 2.7 x 2.7 cm    Mid descending thoracic aorta ; 2.3 x 2.2 cm    Descending thoracic aorta at the diaphragm ; 2.5 x 2.5 cm      There is normal branching pattern of the great vessels. Origins of the  brachiocephalic artery, left common carotid artery and left subclavian  artery show no focal abnormality. The proximal pulmonary vasculature   appears normal.      The celiac axis, SMA and TARIQ are patent . The renal arteries are  patent bilaterally. 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     Chest/mediastinum: Thyroid gland is unremarkable caliber of the main  pulmonary artery is within normal limits. Heart size is mildly  enlarged. No pericardial effusion. Few prominent mediastinal and hilar  lymph nodes. Trachea and central bronchi are clear. Few scattered  atherosclerotic calcified and noncalcified plaques of the coronary  arteries and major vessels.     Lung/pleura: No evidence of pleural effusion or pneumothorax. Few  scattered pulmonary nodules, for example 4 mm left upper lobe  pulmonary nodule (series 10 image 181), not significantly changed as  compared to 6/25/2012 exam. 7 x 5 mm posterior left lower lobe  pulmonary nodule (series 10 image 172), not significantly changed as  compared to 6/17/2013 exam. 4 mm right middle lobe pulmonary nodule  (series 10 image 195), not significantly changed as compared to  6/17/2013 exam. Few tiny triangular densities along the lung fissures,  most evident along the right major fissure (series 10 image 170, 173  and 176), consistent with intrafissural lymph nodes.     Abdomen/pelvis: Postsurgical changes of Nissen's fundoplication and  hysterectomy with vaginal pessary. The liver, spleen, adrenal glands,  gallbladder and kidneys are unremarkable. Mild fatty infiltration of  the pancreas. No abnormally dilated bowel loops. No significant  retroperitoneal or mesenteric lymph nodes. Colonic diverticula  stenosis without CT evidence of acute diverticulitis.     Bones: Dextroconvex rotoscoliosis of the lower thoracic and levoconvex  rotoscoliosis of the lumbar spine with associated multilevel  degenerative changes of the thoracolumbar spine. Postsurgical changes  of total left hip arthroplasty.     Dose: 877 mGy-cm.      Impression:  1. 4.4 cm aneurysmal dilatation  of ascending aorta, not significant  changed as compared to 2015 exam where it measures 4.5 cm.  2. 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  3. Multiple stable bilateral pulmonary nodules, the largest is a 7 x 5  mm posterior left lower lobe pulmonary nodule, previously measured 7 x  4 mm on 2013 exam.     I have personally reviewed the examination and initial interpretation  and I agree with the findings.     NAKUL KEANE MD    ECHOCARDIOGRAM: 17     Echocardiogram Complete   Order: [id number]   Status:  Edited Result - FINAL   Visible to patient:  No (Not Released) Next appt:  2018 at 12:00 PM in Infusion Therapy (Specialty Infusion) Dx:  Thoracic aortic aneurysm without rupt...   Details   Reading Physician Reading Date Result Priority   Beto Cassidy MD 2017    Narrative         [id number]  [facility]  [id number]  [id number]^LILLIANA^TARAS^ZION           [facility]  Diagnostic and Treamtent-3rd Floor  [address] [place] [id number]     Name: JANESSA LAWRENCE  MRN: [id number]  : 1945  Study Date: 2017 09:33 AM  Age: 72 yrs  Gender: Female  Patient Location: [facility] [place]  Reason For Study: Thoracic aortic aneurysm without rupture (H)  Ordering Physician: TARAS TIJERINA  Referring Physician: TARAS TIJERINA  Performed By: Cindy Lucas RDCS     BSA: 1.8 m2  Height: 67 in  Weight: 161 lb  HR: 72  BP: 105/72 mmHg  _____________________________________________________________________________  __        Procedure  Echocardiogram with two-dimensional, color and spectral Doppler performed.  _____________________________________________________________________________  __        Interpretation Summary  Normal left ventricular size,  thickness, and systolic function. EF 60-65%.  Mild right ventricular dilation with normal function.  Moderate dilation of the proximal ascending aorta (4.5 cm, 2.5 cm/m2, Z score  +4.5). Mild dilation of the aortic arch (4.0 cm, 2.2 cm/m2).  No pericardial effusion.     Compared to the prior study 9/19/16 there has been no significant change  (ascending aorta 4.4 cm -> 4.5 cm).  _____________________________________________________________________________  __        Left Ventricle  Left ventricular size is normal. Left ventricular wall thickness is normal.  Global and regional left ventricular function is normal with an EF of 60-65%.  Left ventricular diastolic function is indeterminate.     Right Ventricle  Global right ventricular function is normal. Mild right ventricular dilation  is present.     Atria  The right atria appears normal. Severe left atrial enlargement is present.     Mitral Valve  The mitral valve is normal. Trace mitral insufficiency is present.        Aortic Valve  The aortic valve is tricuspid. Mild aortic valve sclerosis is present. Trace  aortic insufficiency is present.     Tricuspid Valve  The tricuspid valve is normal. Trace tricuspid insufficiency is present. The  peak velocity of the tricuspid regurgitant jet is not obtainable.     Pulmonic Valve  The pulmonic valve is normal.     Vessels  The inferior vena cava was normal in size with preserved respiratory  variability. Moderate dilation of the proximal ascending aorta (4.5 cm, 2.5  cm/m2, Z score +4.5). Mild dilation of the aortic arch (4.0 cm, 2.2 cm/m2).  Estimated mean right atrial pressure is 3 mmHg.     Pericardium  No pericardial effusion is present.        Attestation  I have personally viewed the imaging and agree with the interpretation and  report as documented by the fellow, De العلي, and/or edited by me.  _____________________________________________________________________________  __  MMode/2D Measurements &  Calculations  IVSd: 1.1 cm     LVIDd: 3.9 cm  LVIDs: 2.3 cm  LVPWd: 1.1 cm  FS: 40.7 %  EDV(Teich): 65.5 ml  ESV(Teich): 18.2 ml  LV mass(C)d: 136.4 grams  LV mass(C)dI: 74.0 grams/m2  asc Aorta Diam: 4.5 cm  LVOT diam: 2.3 cm  LVOT area: 4.1 cm2  LA Volume (BP): 91.3 ml  LA Volume Index (BP): 49.6 ml/m2  RWT: 0.57           Doppler Measurements & Calculations  MV E max krysten: 67.1 cm/sec  MV A max krysten: 79.0 cm/sec  MV E/A: 0.85  MV dec time: 0.24 sec  E/E' av.5  Lateral E/e': 10.9  Medial E/e': 12.1     _____________________________________________________________________________  __           Report approved by: Jose Maria Faustin 2017 11:57 AM         Specimen Collected: 17  9:33 AM Last Resulted: 17  9:33 AM              ECHO:  16  Interpretation Summary  Global and regional left ventricular function is normal with an EF of 55-60%. Right ventricular function, chamber size, wall motion, and thickness are normal. No pericardial effusion is present. No significant valvular dysfunction. Pulmonary artery systolic pressure cannot be assessed. Ascending aorta measures 4.4 cm (2.3 cm/m2), aortic arch measures 4.0 cm (2.1 cm/m2).    Left Ventricle  Global and regional left ventricular function is normal with an EF of 55-60%. Left ventricular size is normal. Borderline concentric wall thickening consistent with left ventricular hypertrophy is present. Grade I or early diastolic dysfunction. No regional wall motion abnormalities are seen.      Right Ventricle  Right ventricular function, chamber size, wall motion, and thickness are normal.   Atria  The right atria appears normal. Mild to moderate left atrial enlargement is present.     Mitral Valve  Trace to mild mitral insufficiency is present.     Aortic Valve  Aortic valve is normal in structure and function. The aortic valve is tricuspid.     Tricuspid Valve  The tricuspid valve is normal. Trace tricuspid insufficiency is present. The peak  velocity of the tricuspid regurgitant jet is not obtainable. Pulmonary artery systolic pressure cannot be assessed.      Pulmonic Valve  The pulmonic valve is normal.     Vessels  Ascending aorta measures 4.4 cm (2.3 cm/m2), aortic arch measures 4.0 cm (2.1 cm/m2),. The pulmonary artery cannot be assessed. The inferior vena cava was normal in size with preserved respiratory variability. Ascending aorta 4.4 cm.   Pericardium  No pericardial effusion is present.      ASSESSMENT/PLAN:   Niurka is a 72 year old female who presents with following up with her ascending aortic aneurysm. Repeat ECHO over one year apart only showed 1 cm change In diameter.  Patient remains asymptomatic .   Explained to the patient about the findings .   Advise patient to return in one year with anther CT of chest or ECHO to followup the aneurysm change.      Approximately 50 minutes spent with this case including review of the clinical history and data; discussion with the patient and his family and coordination of the care     Thank you for including me in the care of this kind patient. Do not hesitate to contact me with any questions.     Dr. Elliot Thao     Cardiothoracic Surgery  [facility]  Patient Care Team:  Mara Combs MD as PCP - General (Internal Medicine)  Stephania Ring MD as MD (Pulmonary)  Carmen Goodman MD as MD (Urology)  Sabino Saxena MD as Resident (Student in organized health care education/training program)  Maddie Bal MD as Resident (Student in organized health care education/training program)  Genevieve Tovar, RN as Nurse Coordinator (Thoracic Surgery (Cardiothoracic Vascular Surgery))

## 2017-12-15 NOTE — NURSING NOTE
Patient seen today for one year follow up for ascending aortic aneurysm.     Results of echo reviewed with patient and aneurysm has not changed in size.    Patient is to follow up in one year with a CT and a clinic visit with Dr Wang.    Patient will be called to schedule.    Patient verbalized understanding of instructions and when to call.

## 2018-01-05 ENCOUNTER — INFUSION THERAPY VISIT (OUTPATIENT)
Dept: INFUSION THERAPY | Facility: CLINIC | Age: 73
End: 2018-01-05
Attending: INTERNAL MEDICINE
Payer: MEDICARE

## 2018-01-05 VITALS
SYSTOLIC BLOOD PRESSURE: 135 MMHG | TEMPERATURE: 97.1 F | RESPIRATION RATE: 16 BRPM | HEART RATE: 82 BPM | DIASTOLIC BLOOD PRESSURE: 81 MMHG

## 2018-01-05 DIAGNOSIS — J44.9 STEROID-DEPENDENT CHRONIC OBSTRUCTIVE PULMONARY DISEASE (H): Primary | ICD-10-CM

## 2018-01-05 DIAGNOSIS — J45.50 UNCOMPLICATED SEVERE PERSISTENT ASTHMA (H): ICD-10-CM

## 2018-01-05 DIAGNOSIS — Z92.241 STEROID-DEPENDENT CHRONIC OBSTRUCTIVE PULMONARY DISEASE (H): Primary | ICD-10-CM

## 2018-01-05 PROCEDURE — 25000128 H RX IP 250 OP 636: Mod: ZF | Performed by: INTERNAL MEDICINE

## 2018-01-05 PROCEDURE — 96372 THER/PROPH/DIAG INJ SC/IM: CPT

## 2018-01-05 RX ADMIN — MEPOLIZUMAB 100 MG: 100 INJECTION, POWDER, FOR SOLUTION SUBCUTANEOUS at 12:46

## 2018-01-05 NOTE — MR AVS SNAPSHOT
After Visit Summary   1/5/2018    Niurka Cisneros    MRN: [id number]           Patient Information     Date Of Birth          1945        Visit Information        Provider Department      1/5/2018 12:00 PM UC 50 ATC; UC SPEC INFUSION [facility] Specialty and Procedure        Today's Diagnoses     Steroid-dependent chronic obstructive pulmonary disease (H)    -  1    Uncomplicated severe persistent asthma          Care Instructions    Dear Niurka Cisneros    Thank you for choosing [facility] ([facility]) for your injection.  The following information is a summary of our appointment as well as important reminders.      POST-INFUSION OF BIOLOGICAL MEDICATION:    Reviewed with patient.  Given biologic medication or medication hand-out. Inform patient if any fever, chills or signs of infection, new symptoms, abdominal pain, heart palpitations, shortness of breath, reaction, weakness, neurological changes, seek medical attention immediately and should not receive infusions. No live virus vaccines prior to or during treatment or up to 6 months post infusion. If the patient has an upcoming procedure or surgery, this should be discussed with the rheumatologist and surgeon or provider.      We look forward in seeing you on your next appointment here at [facility].  Please don t hesitate to call us at [phone] to reschedule any of your appointments or to speak with one of the [facility] registered nurses.  It was a pleasure taking care of you today.    Sincerely,    [facility]  Specialty Infusion & Procedure Center  [address] [place] [address]  [address]  Phone:  [phone]            Follow-ups after your visit        Your next 10 appointments already scheduled     Feb 02, 2018  7:30 AM MAKEDA HERNANDEZ with Maddie Bal MD   Eye Clinic ([facility])    [facility] [address]  [address]  [address] Clin [address]  [facility] [address]   [phone]            Feb 02, 2018 10:00 AM CST   Infusion 60 with UC SPEC INFUSION, UC 44 ATC   [facility] Specialty and Procedure ([facility])    [address]  [address]  [facility] [address]   [phone]            Mar 02, 2018  8:00 AM CST   Infusion 60 with UC SPEC INFUSION, UC 44 ATC   [facility] Specialty and Procedure ([facility])    [address]  [address]  [facility] [address]   [phone]            Apr 02, 2018  8:00 AM CDT   Infusion 60 with UC SPEC INFUSION, UC 51 ATC   [facility] Specialty and Procedure ([facility])    [address]  [address]  [facility] [address]   [phone]            Apr 09, 2018  1:00 PM CDT   (Arrive by 12:45 PM)   Return Visit with Stephania Ring MD   [facility] ([facility])    [address]  [address]  [facility] [address]   [phone]              Who to contact     If you have questions or need follow up information about today's clinic visit or your schedule please contact [facility] SPECIALTY AND PROCEDURE directly at [phone].  Normal or non-critical lab and imaging results will be communicated to you by MyChart, letter or phone within 4 business days after the clinic has received the results. If you do not hear from us within 7 days, please contact the clinic through iGluehart or phone. If you have a critical or abnormal lab result, we will notify you by phone as soon as possible.  Submit refill requests through Houserie or call your pharmacy and they will forward the refill request to us. Please allow 3 business days for your refill to be completed.          Additional Information About Your Visit        [facility] Care[facility]  Information     [employer]-S[employer]Game gives you secure access to your electronic health record. If you see a primary care provider, you can also send messages to your care team and make appointments. If you have questions, please call your primary care clinic.  If you do not have a primary care provider, please call [phone] and they will assist you.        Care EveryWhere ID     This is your Care EveryWhere ID. This could be used by other organizations to access your [place] medical records  [id number]        Your Vitals Were     Pulse Temperature Respirations             82 97.1  F (36.2  C) (Oral) 16          Blood Pressure from Last 3 Encounters:   01/05/18 135/81   12/14/17 136/88   12/08/17 105/72    Weight from Last 3 Encounters:   11/22/17 73.1 kg (161 lb 3.2 oz)   11/15/17 73 kg (161 lb)   10/24/17 73.5 kg (162 lb)              Today, you had the following     No orders found for display       Primary Care Provider Office Phone # Fax #    Mara Regina Combs [phone]       [address] [place] [address]  [facility] [address]        Equal Access to Services     CHARLEE [facility] HospitalAL[facility] : Hadii aad ku hadasho Soadonis, waaxda luqadaha, qaybta kaalmada adeegyada, ashwini hernadez . So [facility] [phone].    ATENCIÓN: Si habla español, tiene a dinh disposición servicios gratuitos de asistencia lingüística. Llame al [phone].    We comply with applicable federal civil rights laws and [place] laws. We do not discriminate on the basis of race, color, national origin, age, disability, sex, sexual orientation, or gender identity.            Thank you!     Thank you for choosing [facility] SPECIALTY AND PROCEDURE  for your care. Our goal is always to provide you with excellent care. Hearing back from our patients is one way we can continue to improve our services. Please take a few minutes to complete the written survey that you may receive in the mail after your  visit with us. Thank you!             Your Updated Medication List - Protect others around you: Learn how to safely use, store and throw away your medicines at www.disposemymeds.org.          This list is accurate as of: 1/5/18  1:04 PM.  Always use your most recent med list.                   Brand Name Dispense Instructions for use Diagnosis    albuterol 108 (90 BASE) MCG/ACT Inhaler    PROAIR HFA/PROVENTIL HFA/VENTOLIN HFA    3 Inhaler    Inhale 2 puffs into the lungs every 6 hours as needed for shortness of breath / dyspnea or wheezing    Uncomplicated severe persistent asthma       beclomethasone 80 MCG/ACT Inhaler    QVAR    3 Inhaler    Inhale 2 puffs into the lungs 2 times daily    Uncomplicated severe persistent asthma       CALCIUM-VITAMIN D PO      Take 1 tablet by mouth daily        estradiol 2 MG vaginal ring    ESTRING    1 each    Place 1 each vaginally every 3 months    Bladder prolapse, female, acquired       fluticasone-salmeterol 500-50 MCG/DOSE diskus inhaler    ADVAIR    3 Inhaler    Inhale 1 puff into the lungs 2 times daily    Uncomplicated severe persistent asthma       losartan 25 MG tablet    COZAAR    90 tablet    Take 1 tablet (25 mg) by mouth daily    Benign essential hypertension       mepolizumab 100 MG injection    NUCALA    1 mL    Inject 1 mL (100 mg) Subcutaneous every 28 days    Steroid-dependent chronic obstructive pulmonary disease (H), Uncomplicated severe persistent asthma       montelukast 10 MG tablet    SINGULAIR    90 tablet    Take 1 tablet (10 mg) by mouth as needed    Uncomplicated severe persistent asthma       NAPROXEN PO      Take 220 mg by mouth 2 times daily (with meals)        potassium chloride SA 20 MEQ CR tablet    KLOR-CON    90 tablet    Take 1 tablet (20 mEq) by mouth daily    Uncomplicated severe persistent asthma       pravastatin 20 MG tablet    PRAVACHOL    90 tablet    Take 1 tablet (20 mg) by mouth daily    Steroid-dependent chronic obstructive  pulmonary disease (H)       triamterene-hydrochlorothiazide 37.5-25 MG per tablet    MAXZIDE-25    90 tablet    Take 1 tablet by mouth daily    Benign essential hypertension

## 2018-01-05 NOTE — PROGRESS NOTES
Nursing Note  Niurka Cisneros presents today [facility] for:   Chief Complaint   Patient presents with     Imm/Inj     Nucala     During today's [facility] appointment, orders from Dr. Ring were completed.  Frequency: monthly    Progress note:  Patient identification verified by name and date of birth.  Assessment completed.  Vitals recorded in Doc Flowsheets.  Patient was provided with education regarding infusion and possible side effects.  Patient verbalized understanding.      needed: No  Premedications: were not ordered.  Approximate appointment length:45 minutes.   Labs: were not ordered for this appointment.  Vascular access: N/A  Treatment Conditions: Biologic/Chemo Checklist   ~~~ NOTE: If the patient answers yes to any of the questions below, hold the infusion and contact ordering provider or on-call provider.    1. Have you recently had an elevated temperature, fever, chills, productive cough, coughing for 3 weeks or longer or hemoptysis,  abnormal vital signs, night sweats,  chest pain or have you noticed a decrease in your appetite, unexplained weight loss or fatigue? No  2. Do you have any open wounds or new incisions? No  3. Do you have any recent or upcoming hospitalizations, surgeries or dental procedures? No  4. Do you currently have or recently have had any signs of illness or infection or are you on any antibiotics? No  5. Have you had any new, sudden or worsening abdominal pain? No  6. Have you or anyone in your household received a live vaccination in the past 4 weeks? Please note:  No live vaccines while on biologic/chemotherapy until 6 months after the last treatment.  Patient can receive the flu vaccine (shot only) and the pneumovax.  It is optimal for the patient to get these vaccines mid cycle, but they can be given at any time as long as it is not on the day of the infusion. No  7. Have you recently been  diagnosed with any new nervous system diseases (ie. Multiple sclerosis, Guillain Beulah, seizures, neurological changes) or cancer diagnosis? Are you on any form of radiation or chemotherapy? No  8. Are you pregnant or breast feeding or do you have plans of pregnancy in the future? No  9. Have you been having any signs of worsening depression or suicidal ideations?  (benlysta only) No  10. Have there been any other new onset medical symptoms? No    Patient tolerated infusion: well.      Discharge Plan:   Follow up plan of care with: ongoing infusions at [facility].  Discharge instructions were reviewed with patient.  Patient/representative verbalized understanding of discharge instructions and all questions answered.  Patient discharged from [facility] in stable condition.    ERIC MADISON RN    Administrations This Visit     mepolizumab (NUCALA) injection 100 mg     Admin Date Action Dose Route Administered By             01/05/2018 Given 100 mg Subcutaneous Karissa Ibarra RN                          /81  Pulse 82  Temp 97.1  F (36.2  C) (Oral)  Resp 16

## 2018-01-05 NOTE — PATIENT INSTRUCTIONS
Dear Niurka Cisneros    Thank you for choosing [facility] Specialty Infusion and Procedure Center ([facility]) for your injection.  The following information is a summary of our appointment as well as important reminders.      POST-INFUSION OF BIOLOGICAL MEDICATION:    Reviewed with patient.  Given biologic medication or medication hand-out. Inform patient if any fever, chills or signs of infection, new symptoms, abdominal pain, heart palpitations, shortness of breath, reaction, weakness, neurological changes, seek medical attention immediately and should not receive infusions. No live virus vaccines prior to or during treatment or up to 6 months post infusion. If the patient has an upcoming procedure or surgery, this should be discussed with the rheumatologist and surgeon or provider.      We look forward in seeing you on your next appointment here at [facility].  Please don t hesitate to call us at [phone] to reschedule any of your appointments or to speak with one of the [facility] registered nurses.  It was a pleasure taking care of you today.    Sincerely,    [facility]  Specialty Infusion & Procedure Center  [address] [place] [address]  [address]  Phone:  [phone]

## 2018-02-02 ENCOUNTER — OFFICE VISIT (OUTPATIENT)
Dept: OPHTHALMOLOGY | Facility: CLINIC | Age: 73
End: 2018-02-02
Attending: OPHTHALMOLOGY
Payer: MEDICARE

## 2018-02-02 ENCOUNTER — INFUSION THERAPY VISIT (OUTPATIENT)
Dept: INFUSION THERAPY | Facility: CLINIC | Age: 73
End: 2018-02-02
Attending: INTERNAL MEDICINE
Payer: MEDICARE

## 2018-02-02 VITALS
RESPIRATION RATE: 16 BRPM | TEMPERATURE: 98.1 F | DIASTOLIC BLOOD PRESSURE: 77 MMHG | HEART RATE: 66 BPM | SYSTOLIC BLOOD PRESSURE: 144 MMHG

## 2018-02-02 DIAGNOSIS — R73.03 PREDIABETES: ICD-10-CM

## 2018-02-02 DIAGNOSIS — H52.7 REFRACTIVE ERROR: Primary | ICD-10-CM

## 2018-02-02 DIAGNOSIS — Z92.241 STEROID-DEPENDENT CHRONIC OBSTRUCTIVE PULMONARY DISEASE (H): Primary | ICD-10-CM

## 2018-02-02 DIAGNOSIS — J44.9 STEROID-DEPENDENT CHRONIC OBSTRUCTIVE PULMONARY DISEASE (H): Primary | ICD-10-CM

## 2018-02-02 DIAGNOSIS — J45.50 UNCOMPLICATED SEVERE PERSISTENT ASTHMA (H): ICD-10-CM

## 2018-02-02 DIAGNOSIS — Z96.1 PSEUDOPHAKIA OF BOTH EYES: ICD-10-CM

## 2018-02-02 PROCEDURE — 92015 DETERMINE REFRACTIVE STATE: CPT | Mod: GY,ZF

## 2018-02-02 PROCEDURE — 96372 THER/PROPH/DIAG INJ SC/IM: CPT

## 2018-02-02 PROCEDURE — 25000128 H RX IP 250 OP 636: Mod: ZF | Performed by: INTERNAL MEDICINE

## 2018-02-02 PROCEDURE — G0463 HOSPITAL OUTPT CLINIC VISIT: HCPCS | Mod: 25

## 2018-02-02 PROCEDURE — G0463 HOSPITAL OUTPT CLINIC VISIT: HCPCS | Mod: ZF

## 2018-02-02 RX ADMIN — MEPOLIZUMAB 100 MG: 100 INJECTION, POWDER, FOR SOLUTION SUBCUTANEOUS at 09:45

## 2018-02-02 ASSESSMENT — REFRACTION_WEARINGRX
OD_ADD: +2.00
OS_CYLINDER: +1.00
OS_AXIS: 170
OD_SPHERE: PLANO
OD_CYLINDER: +1.00
OS_ADD: +2.00
OS_SPHERE: -0.75
OD_AXIS: 165

## 2018-02-02 ASSESSMENT — CUP TO DISC RATIO
OS_RATIO: 0.3
OD_RATIO: 0.35

## 2018-02-02 ASSESSMENT — REFRACTION_MANIFEST
OD_CYLINDER: +1.00
OD_ADD: +2.50
OD_SPHERE: -0.75
OD_AXIS: 155
OS_ADD: +2.50
OS_CYLINDER: +0.50
OS_AXIS: 005
OS_SPHERE: +1.00

## 2018-02-02 ASSESSMENT — EXTERNAL EXAM - LEFT EYE: OS_EXAM: NORMAL

## 2018-02-02 ASSESSMENT — CONF VISUAL FIELD
OS_NORMAL: 1
METHOD: COUNTING FINGERS
OD_NORMAL: 1

## 2018-02-02 ASSESSMENT — PAIN SCALES - GENERAL: PAINLEVEL: NO PAIN (0)

## 2018-02-02 ASSESSMENT — VISUAL ACUITY
METHOD: SNELLEN - LINEAR
OD_CC: 20/30
OS_CC: 20/25
CORRECTION_TYPE: GLASSES

## 2018-02-02 ASSESSMENT — TONOMETRY
IOP_METHOD: TONOPEN
OS_IOP_MMHG: 14
OD_IOP_MMHG: 13

## 2018-02-02 ASSESSMENT — EXTERNAL EXAM - RIGHT EYE: OD_EXAM: NORMAL

## 2018-02-02 ASSESSMENT — SLIT LAMP EXAM - LIDS
COMMENTS: MGD 1+
COMMENTS: MGD 1+

## 2018-02-02 NOTE — NURSING NOTE
Chief Complaints and History of Present Illnesses   Patient presents with     Eye Exam For Diabetes     HPI    Affected eye(s):  Both   Symptoms:        Frequency:  Constant       Do you have eye pain now?:  No      Comments:  States the near va has decreased  Dist va is good  No F&F  No red watery or dry   BS does not check  Lab Results       Component                Value               Date                       A1C                      5.5                 11/22/2017                 A1C                      6.0                 09/07/2016                 A1C                      5.6                 11/10/2015            Lurdes Batista COT 7:19 AM February 2, 2018

## 2018-02-02 NOTE — PROGRESS NOTES
Chief Complaints and History of Present Illnesses   Patient presents with     Eye Exam For Diabetes       HPI: Patient is a 72 y.o F who presents today for pre-diabetic eye exam. Patient reports she has had borderline high sugars in the past, most recently 110s. Last A1C was 5.5 on 11/22/17 currently with diet control.  Patient reports she has noted a progressive decrease in her near vision and feels that she may need some new glasses. Eyes are comfortable without irritation, eye pain, or itching.     No family history of Glaucoma, macular degeneration, or ocular issues outside of refractive error.     POHx:  Pseudophakia both eyes     Past medical history:   Asthma     Current Eye Medications:   None     Assessment & Plan     Niurka Cisneros is a 72 year old female with the following diagnoses:   1. Refractive error    2. Prediabetes       1. Prediabetes  No signs of diabetic retinopathy  A1C 5.5  Plan:   -Continue good BS/BG control    - Would recommend continuation of monitoring q1-2 years    2. Refractive Error   Myopia with astigmatism and presbyopia right eye  Hyperopia with astigmatism and presbyopia left eye   Plan:   -Refracts to 20/20 both eyes    -New MRx given      3. Pseudophakia both eyes   S/p yag. Centrally clear.   Plan:   -monitor    Return to clinic 1-2 year for Annual Visit     Maddie Bal MD  Ophthalmology PGY3       ~~~~~~~~~~~~~~~~~~~~~~~~~~~~~~~~~~~~~~~~~~~~~~~~~~~~~~~~~~~~~~~~    Complete documentation of historical and exam elements from today's encounter can be found in the full encounter summary report (not reduplicated in this progress note). I personally obtained the chief complaint(s) and history of present illness.  I confirmed and edited as necessary the review of systems, past medical/surgical history, family history, social history, and examination findings as documented by others.  I examined the patient myself, and I personally reviewed the relevant tests, images, and reports as  documented above. I formulated and edited as necessary the assessment and plan and discussed the findings and management plan with the patient and family.     Harley Mendieta MD, MA  Director, Cornea & Anterior Segment  [facility] Department of Ophthalmology & Visual Neuroscience

## 2018-02-02 NOTE — PROGRESS NOTES
FYI: Pt here for nucala injection and she stated that in January when cross country skiing she got SOB with wheezing.  It lasted a week so she took a burst of prednisone that she had at hand 20mg  Daily x 5 days.    She is now feeling well without any SOB.      Nucala administered SQ left upper arm.    Pt tolerated the injection without any adverse effects.      Administrations This Visit     mepolizumab (NUCALA) injection 100 mg     Admin Date Action Dose Route Administered By             02/02/2018 Given 100 mg Subcutaneous Thao Loza RN

## 2018-02-02 NOTE — MR AVS SNAPSHOT
After Visit Summary   2/2/2018    Niurka Cisneros    MRN: [id number]           Patient Information     Date Of Birth          1945        Visit Information        Provider Department      2/2/2018 7:30 AM Maddie Bal MD [facility] Clinic        Today's Diagnoses     Refractive error    -  1    Prediabetes        Pseudophakia of both eyes           Follow-ups after your visit        Follow-up notes from your care team     Return in about 1 year (around 2/2/2019) for Annual Visit.      Your next 10 appointments already scheduled     Feb 02, 2018 10:00 AM CST   Infusion 60 with UC SPEC INFUSION, UC 44 ATC   [facility] Specialty and Procedure ([facility])    [address]  [address]  [facility] [id number] [phone]            Mar 02, 2018  8:00 AM CST   Infusion 60 with UC SPEC INFUSION, UC 44 ATC   [facility] Specialty and Procedure ([facility])    [address]  [address]  [facility] [address] [phone]            Apr 02, 2018  8:00 AM CDT   Infusion 60 with UC SPEC INFUSION, UC 51 ATC   [facility] Specialty and Procedure ([facility])    [address]  [address]  [facility] [address] [phone]            Apr 09, 2018  1:00 PM CDT   (Arrive by 12:45 PM)   Return Visit with Stephania Ring MD   [facility] for [facility] and Health ([facility])    [address] [place] [address]  [address]  [facility] [address] [phone]              Who to contact     Please call your clinic at [phone] to:    Ask questions about your health    Make or cancel appointments    Discuss your medicines    Learn about your test results    Speak to your doctor   If you have compliments or concerns about an experience at your clinic, or if you wish to file a complaint, please contact  [facility] Patient Relations at [phone] or email us at [email or web address]         Additional Information About Your Visit        MyChart Information     Urbitahart gives you secure access to your electronic health record. If you see a primary care provider, you can also send messages to your care team and make appointments. If you have questions, please call your primary care clinic.  If you do not have a primary care provider, please call [phone] and they will assist you.      Oncolix is an electronic gateway that provides easy, online access to your medical records. With Oncolix, you can request a clinic appointment, read your test results, renew a prescription or communicate with your care team.     To access your existing account, please contact your [facility] Clinic or call [phone] for assistance.        Care EveryWhere ID     This is your Care EveryWhere ID. This could be used by other organizations to access your [place] medical records  [id number]         Blood Pressure from Last 3 Encounters:   01/05/18 135/81   12/14/17 136/88   12/08/17 105/72    Weight from Last 3 Encounters:   11/22/17 73.1 kg (161 lb 3.2 oz)   11/15/17 73 kg (161 lb)   10/24/17 73.5 kg (162 lb)              Today, you had the following     No orders found for display       Primary Care Provider Office Phone # Fax #    Mara Regina Combs [phone]       [address]  [facility] [address]        Equal Access to Services     CINTHIA JOSEPH : Hadii aad ku hadasho Soomaali, waaxda luqadaha, qaybta kaalmada adeegyada, ashwini alvarado. So [facility] [phone].    ATENCIÓN: Si habla español, tiene a dinh disposición servicios gratuitos de asistencia lingüística. Llame al [phone].    We comply with applicable federal civil rights laws and [place] laws. We do not discriminate on the basis of race, color,  national origin, age, disability, sex, sexual orientation, or gender identity.            Thank you!     Thank you for choosing EYE CLINIC  for your care. Our goal is always to provide you with excellent care. Hearing back from our patients is one way we can continue to improve our services. Please take a few minutes to complete the written survey that you may receive in the mail after your visit with us. Thank you!             Your Updated Medication List - Protect others around you: Learn how to safely use, store and throw away your medicines at www.disposemymeds.org.          This list is accurate as of 2/2/18  8:54 AM.  Always use your most recent med list.                   Brand Name Dispense Instructions for use Diagnosis    albuterol 108 (90 BASE) MCG/ACT Inhaler    PROAIR HFA/PROVENTIL HFA/VENTOLIN HFA    3 Inhaler    Inhale 2 puffs into the lungs every 6 hours as needed for shortness of breath / dyspnea or wheezing    Uncomplicated severe persistent asthma       beclomethasone 80 MCG/ACT Inhaler    QVAR    3 Inhaler    Inhale 2 puffs into the lungs 2 times daily    Uncomplicated severe persistent asthma       CALCIUM-VITAMIN D PO      Take 1 tablet by mouth daily        estradiol 2 MG vaginal ring    ESTRING    1 each    Place 1 each vaginally every 3 months    Bladder prolapse, female, acquired       fluticasone-salmeterol 500-50 MCG/DOSE diskus inhaler    ADVAIR    3 Inhaler    Inhale 1 puff into the lungs 2 times daily    Uncomplicated severe persistent asthma       losartan 25 MG tablet    COZAAR    90 tablet    Take 1 tablet (25 mg) by mouth daily    Benign essential hypertension       mepolizumab 100 MG injection    NUCALA    1 mL    Inject 1 mL (100 mg) Subcutaneous every 28 days    Steroid-dependent chronic obstructive pulmonary disease (H), Uncomplicated severe persistent asthma       montelukast 10 MG tablet    SINGULAIR    90 tablet    Take 1 tablet (10 mg) by mouth as needed    Uncomplicated  severe persistent asthma       NAPROXEN PO      Take 220 mg by mouth 2 times daily (with meals)        potassium chloride SA 20 MEQ CR tablet    KLOR-CON    90 tablet    Take 1 tablet (20 mEq) by mouth daily    Uncomplicated severe persistent asthma       pravastatin 20 MG tablet    PRAVACHOL    90 tablet    Take 1 tablet (20 mg) by mouth daily    Steroid-dependent chronic obstructive pulmonary disease (H)       triamterene-hydrochlorothiazide 37.5-25 MG per tablet    MAXZIDE-25    90 tablet    Take 1 tablet by mouth daily    Benign essential hypertension

## 2018-02-02 NOTE — MR AVS SNAPSHOT
After Visit Summary   2/2/2018    Niurka Cisneros    MRN: [id number]           Patient Information     Date Of Birth          1945        Visit Information        Provider Department      2/2/2018 10:00 AM UC 44 ATC; UC SPEC INFUSION [facility] Specialty and Procedure        Today's Diagnoses     Steroid-dependent chronic obstructive pulmonary disease (H)    -  1    Uncomplicated severe persistent asthma           Follow-ups after your visit        Your next 10 appointments already scheduled     Mar 02, 2018  8:00 AM CST   Infusion 60 with UC SPEC INFUSION, UC 44 ATC   [facility] ([facility])    [address]  [address]  [facility] [address] [phone]            Apr 02, 2018  8:00 AM CDT   Infusion 60 with UC SPEC INFUSION, UC 51 ATC   [facility] ([facility])    [address] [place] [address]  [address]  [facility] [id number] [phone]            Apr 09, 2018  1:00 PM CDT   (Arrive by 12:45 PM)   Return Visit with Stephania Ring MD   [facility] ([facility])    [address] [place] [address]  [address]  [facility] [id number] [phone]              Who to contact     If you have questions or need follow up information about today's clinic visit or your schedule please contact [facility] SPECIALTY AND PROCEDURE directly at [phone].  Normal or non-critical lab and imaging results will be communicated to you by MyChart, letter or phone within 4 business days after the clinic has received the results. If you do not hear from us within 7 days, please contact the clinic through MyChart or phone. If you have a critical or abnormal lab result, we will notify you by phone as soon as possible.  Submit refill requests through  Mambu or call your pharmacy and they will forward the refill request to us. Please allow 3 business days for your refill to be completed.          Additional Information About Your Visit        MyChart Information     Mambu gives you secure access to your electronic health record. If you see a primary care provider, you can also send messages to your care team and make appointments. If you have questions, please call your primary care clinic.  If you do not have a primary care provider, please call [phone] and they will assist you.        Care EveryWhere ID     This is your Care EveryWhere ID. This could be used by other organizations to access your [place] medical records  [id number]        Your Vitals Were     Pulse Temperature Respirations             66 98.1  F (36.7  C) (Oral) 16          Blood Pressure from Last 3 Encounters:   02/02/18 144/77   01/05/18 135/81   12/14/17 136/88    Weight from Last 3 Encounters:   11/22/17 73.1 kg (161 lb 3.2 oz)   11/15/17 73 kg (161 lb)   10/24/17 73.5 kg (162 lb)              Today, you had the following     No orders found for display       Primary Care Provider Office Phone # Fax #    Mara Regina Combs [phone]       [address] [place] [address] [address] [place] [address]        Equal Access to Services     CINTHIA JOSEPH : Hadii yvette ku hadasho Soomaali, waaxda luqadaha, qaybta kaalmada adeegyada, ashwini donnelly hayzana hernadez . So [facility] [phone].    ATENCIÓN: Si habla español, tiene a dinh disposición servicios gratuitos de asistencia lingüística. Kinaame al [phone].    We comply with applicable federal civil rights laws and [place] laws. We do not discriminate on the basis of race, color, national origin, age, disability, sex, sexual orientation, or gender identity.            Thank you!     Thank you for choosing [facility] SPECIALTY AND PROCEDURE  for your care. Our goal is always to provide you  with excellent care. Hearing back from our patients is one way we can continue to improve our services. Please take a few minutes to complete the written survey that you may receive in the mail after your visit with us. Thank you!             Your Updated Medication List - Protect others around you: Learn how to safely use, store and throw away your medicines at www.disposemymeds.org.          This list is accurate as of 2/2/18 11:59 PM.  Always use your most recent med list.                   Brand Name Dispense Instructions for use Diagnosis    albuterol 108 (90 BASE) MCG/ACT Inhaler    PROAIR HFA/PROVENTIL HFA/VENTOLIN HFA    3 Inhaler    Inhale 2 puffs into the lungs every 6 hours as needed for shortness of breath / dyspnea or wheezing    Uncomplicated severe persistent asthma       beclomethasone 80 MCG/ACT Inhaler    QVAR    3 Inhaler    Inhale 2 puffs into the lungs 2 times daily    Uncomplicated severe persistent asthma       CALCIUM-VITAMIN D PO      Take 1 tablet by mouth daily        estradiol 2 MG vaginal ring    ESTRING    1 each    Place 1 each vaginally every 3 months    Bladder prolapse, female, acquired       fluticasone-salmeterol 500-50 MCG/DOSE diskus inhaler    ADVAIR    3 Inhaler    Inhale 1 puff into the lungs 2 times daily    Uncomplicated severe persistent asthma       losartan 25 MG tablet    COZAAR    90 tablet    Take 1 tablet (25 mg) by mouth daily    Benign essential hypertension       mepolizumab 100 MG injection    NUCALA    1 mL    Inject 1 mL (100 mg) Subcutaneous every 28 days    Steroid-dependent chronic obstructive pulmonary disease (H), Uncomplicated severe persistent asthma       montelukast 10 MG tablet    SINGULAIR    90 tablet    Take 1 tablet (10 mg) by mouth as needed    Uncomplicated severe persistent asthma       NAPROXEN PO      Take 220 mg by mouth 2 times daily (with meals)        potassium chloride SA 20 MEQ CR tablet    KLOR-CON    90 tablet    Take 1 tablet (20  mEq) by mouth daily    Uncomplicated severe persistent asthma       pravastatin 20 MG tablet    PRAVACHOL    90 tablet    Take 1 tablet (20 mg) by mouth daily    Steroid-dependent chronic obstructive pulmonary disease (H)       triamterene-hydrochlorothiazide 37.5-25 MG per tablet    MAXZIDE-25    90 tablet    Take 1 tablet by mouth daily    Benign essential hypertension

## 2018-03-02 ENCOUNTER — INFUSION THERAPY VISIT (OUTPATIENT)
Dept: INFUSION THERAPY | Facility: CLINIC | Age: 73
End: 2018-03-02
Attending: INTERNAL MEDICINE
Payer: MEDICARE

## 2018-03-02 VITALS
BODY MASS INDEX: 25.98 KG/M2 | DIASTOLIC BLOOD PRESSURE: 77 MMHG | SYSTOLIC BLOOD PRESSURE: 123 MMHG | WEIGHT: 165.9 LBS | HEART RATE: 68 BPM

## 2018-03-02 DIAGNOSIS — J44.9 STEROID-DEPENDENT CHRONIC OBSTRUCTIVE PULMONARY DISEASE (H): Primary | ICD-10-CM

## 2018-03-02 DIAGNOSIS — J45.50 UNCOMPLICATED SEVERE PERSISTENT ASTHMA (H): ICD-10-CM

## 2018-03-02 DIAGNOSIS — Z92.241 STEROID-DEPENDENT CHRONIC OBSTRUCTIVE PULMONARY DISEASE (H): Primary | ICD-10-CM

## 2018-03-02 PROCEDURE — 96372 THER/PROPH/DIAG INJ SC/IM: CPT

## 2018-03-02 PROCEDURE — 25000128 H RX IP 250 OP 636: Mod: ZF | Performed by: INTERNAL MEDICINE

## 2018-03-02 RX ADMIN — MEPOLIZUMAB 100 MG: 100 INJECTION, POWDER, FOR SOLUTION SUBCUTANEOUS at 08:37

## 2018-03-02 NOTE — MR AVS SNAPSHOT
After Visit Summary   3/2/2018    Niurka Cisneros    MRN: [id number]           Patient Information     Date Of Birth          1945        Visit Information        Provider Department      3/2/2018 8:00 AM UC 44 ATC; UC SPEC INFUSION [facility] Specialty and Procedure        Today's Diagnoses     Steroid-dependent chronic obstructive pulmonary disease (H)    -  1    Uncomplicated severe persistent asthma           Follow-ups after your visit        Your next 10 appointments already scheduled     Apr 02, 2018  8:00 AM CDT   Infusion 60 with UC SPEC INFUSION, UC 51 ATC   [facility] Specialty and Procedure ([facility])    [address]  [address]  [facility] [address] [phone]            Apr 09, 2018  1:00 PM CDT   (Arrive by 12:45 PM)   Return Visit with Stephania Ring MD   [facility] ([facility])    [address]  [address]  [facility] [address] [phone]              Who to contact     If you have questions or need follow up information about today's clinic visit or your schedule please contact [facility] SPECIALTY AND PROCEDURE directly at [phone].  Normal or non-critical lab and imaging results will be communicated to you by MyChart, letter or phone within 4 business days after the clinic has received the results. If you do not hear from us within 7 days, please contact the clinic through [employer] or phone. If you have a critical or abnormal lab result, we will notify you by phone as soon as possible.  Submit refill requests through [employer] or call your pharmacy and they will forward the refill request to us. Please allow 3 business days for your refill to be completed.          Additional Information About Your Visit        [employer] Information     [employer] gives you secure access to your  electronic health record. If you see a primary care provider, you can also send messages to your care team and make appointments. If you have questions, please call your primary care clinic.  If you do not have a primary care provider, please call [phone] and they will assist you.        Care EveryWhere ID     This is your Care EveryWhere ID. This could be used by other organizations to access your [place] medical records  [id number]        Your Vitals Were     Pulse BMI (Body Mass Index)                68 25.98 kg/m2           Blood Pressure from Last 3 Encounters:   03/02/18 123/77   02/02/18 144/77   01/05/18 135/81    Weight from Last 3 Encounters:   03/02/18 75.3 kg (165 lb 14.4 oz)   11/22/17 73.1 kg (161 lb 3.2 oz)   11/15/17 73 kg (161 lb)              Today, you had the following     No orders found for display       Primary Care Provider Office Phone # Fax #    Mara Regina Combs [phone]       [address] [place] [address] [address] [place] [address]        Equal Access to Services     [facility] : Hadii yvette Babcock, waaxda omega, qaybta vanessa jimenez, ashwini hernadez . So [facility] [phone].    ATENCIÓN: Si habla español, tiene a dinh disposición servicios gratuitos de asistencia lingüística. [facility] [phone].    We comply with applicable federal civil rights laws and [place] laws. We do not discriminate on the basis of race, color, national origin, age, disability, sex, sexual orientation, or gender identity.            Thank you!     Thank you for choosing [facility] SPECIALTY AND PROCEDURE  for your care. Our goal is always to provide you with excellent care. Hearing back from our patients is one way we can continue to improve our services. Please take a few minutes to complete the written survey that you may receive in the mail after your visit with us. Thank you!             Your Updated Medication  List - Protect others around you: Learn how to safely use, store and throw away your medicines at www.disposemymeds.org.          This list is accurate as of 3/2/18  8:49 AM.  Always use your most recent med list.                   Brand Name Dispense Instructions for use Diagnosis    albuterol 108 (90 BASE) MCG/ACT Inhaler    PROAIR HFA/PROVENTIL HFA/VENTOLIN HFA    3 Inhaler    Inhale 2 puffs into the lungs every 6 hours as needed for shortness of breath / dyspnea or wheezing    Uncomplicated severe persistent asthma       beclomethasone 80 MCG/ACT Inhaler    QVAR    3 Inhaler    Inhale 2 puffs into the lungs 2 times daily    Uncomplicated severe persistent asthma       CALCIUM-VITAMIN D PO      Take 1 tablet by mouth daily        estradiol 2 MG vaginal ring    ESTRING    1 each    Place 1 each vaginally every 3 months    Bladder prolapse, female, acquired       fluticasone-salmeterol 500-50 MCG/DOSE diskus inhaler    ADVAIR    3 Inhaler    Inhale 1 puff into the lungs 2 times daily    Uncomplicated severe persistent asthma       losartan 25 MG tablet    COZAAR    90 tablet    Take 1 tablet (25 mg) by mouth daily    Benign essential hypertension       mepolizumab 100 MG injection    NUCALA    1 mL    Inject 1 mL (100 mg) Subcutaneous every 28 days    Steroid-dependent chronic obstructive pulmonary disease (H), Uncomplicated severe persistent asthma       montelukast 10 MG tablet    SINGULAIR    90 tablet    Take 1 tablet (10 mg) by mouth as needed    Uncomplicated severe persistent asthma       NAPROXEN PO      Take 220 mg by mouth 2 times daily (with meals)        potassium chloride SA 20 MEQ CR tablet    KLOR-CON    90 tablet    Take 1 tablet (20 mEq) by mouth daily    Uncomplicated severe persistent asthma       pravastatin 20 MG tablet    PRAVACHOL    90 tablet    Take 1 tablet (20 mg) by mouth daily    Steroid-dependent chronic obstructive pulmonary disease (H)       triamterene-hydrochlorothiazide 37.5-25 MG  per tablet    MAXZIDE-25    90 tablet    Take 1 tablet by mouth daily    Benign essential hypertension

## 2018-03-02 NOTE — PROGRESS NOTES
Nursing Note  [facility]ur[facility]:   Chief Complaint   Patient presents with     Other     Nucala injection     During [facility]'s [facility] appointment, orders from Dr. Ring were completed.  Frequency: every 4 weeks    Progress note:  Patient identification verified by name and date of birth.  Assessment completed.  Vitals recorded in Doc Flowsheets.  Patient was provided with education regarding infusion and possible side effects.  Patient verbalized understanding.      needed: No  Premedications: were not ordered.  Treatment Conditions: patient denies fever, chills, signs of infection, recent illness, on antibiotics, productive cough or elevated temperature.  Patient tolerated injection: well.    Discharge Plan:   Follow up plan of care with: ongoing infusions [facility].  Discharge instructions were reviewed with patient.  Patient/representative verbalized understanding of discharge instructions and all questions answered.  [facility] in stable condition.    Arin Augustin RN     /77 (BP Location: Right arm)  Pulse 68  Wt 75.3 kg (165 lb 14.4 oz)  BMI 25.98 kg/m2    Administrations This Visit     mepolizumab (NUCALA) injection 100 mg     Admin Date Action Dose Route Administered By             03/02/2018 Given 100 mg Subcutaneous Arin Augustin, RN

## 2018-04-02 ENCOUNTER — INFUSION THERAPY VISIT (OUTPATIENT)
Dept: INFUSION THERAPY | Facility: CLINIC | Age: 73
End: 2018-04-02
Attending: INTERNAL MEDICINE
Payer: MEDICARE

## 2018-04-02 VITALS
HEART RATE: 71 BPM | TEMPERATURE: 97.1 F | OXYGEN SATURATION: 95 % | SYSTOLIC BLOOD PRESSURE: 135 MMHG | DIASTOLIC BLOOD PRESSURE: 81 MMHG | RESPIRATION RATE: 16 BRPM

## 2018-04-02 DIAGNOSIS — Z92.241 STEROID-DEPENDENT CHRONIC OBSTRUCTIVE PULMONARY DISEASE (H): Primary | ICD-10-CM

## 2018-04-02 DIAGNOSIS — J44.9 STEROID-DEPENDENT CHRONIC OBSTRUCTIVE PULMONARY DISEASE (H): Primary | ICD-10-CM

## 2018-04-02 DIAGNOSIS — J45.50 UNCOMPLICATED SEVERE PERSISTENT ASTHMA (H): ICD-10-CM

## 2018-04-02 PROCEDURE — 96372 THER/PROPH/DIAG INJ SC/IM: CPT

## 2018-04-02 PROCEDURE — 25000128 H RX IP 250 OP 636: Mod: ZF | Performed by: INTERNAL MEDICINE

## 2018-04-02 RX ADMIN — MEPOLIZUMAB 100 MG: 100 INJECTION, POWDER, FOR SOLUTION SUBCUTANEOUS at 09:34

## 2018-04-02 ASSESSMENT — ENCOUNTER SYMPTOMS
POSTURAL DYSPNEA: 0
SPUTUM PRODUCTION: 1
COUGH DISTURBING SLEEP: 0
SNORES LOUDLY: 0
COUGH: 1
SHORTNESS OF BREATH: 1
WHEEZING: 1
DYSPNEA ON EXERTION: 1
HEMOPTYSIS: 0

## 2018-04-02 NOTE — MR AVS SNAPSHOT
After Visit Summary   4/2/2018    Niurka Cisneros    MRN: [id number]           Patient Information     Date Of Birth          1945        Visit Information        Provider Department      4/2/2018 8:00 AM UC 51 ATC; UC SPEC INFUSION [facility] Specialty and Procedure        Today's Diagnoses     Steroid-dependent chronic obstructive pulmonary disease (H)    -  1    Uncomplicated severe persistent asthma           Follow-ups after your visit        Your next 10 appointments already scheduled     Apr 09, 2018  1:00 PM CDT   (Arrive by 12:45 PM)   Return Visit with Stephania Ring MD   [facility] ([facility])    [address]  [address]  [facility] [address] [phone]            May 02, 2018  1:00 PM CDT   Infusion 60 with UC SPEC INFUSION, UC 41 ATC   [facility] Specialty and Procedure ([facility])    [address]  [address]  [facility] [id number] [phone]            May 30, 2018 10:00 AM CDT   Infusion 60 with UC SPEC INFUSION, UC 51 ATC   [facility] Specialty and Procedure ([facility])    [address]  [address]  [facility] [address] [phone]            Jun 27, 2018  1:00 PM CDT   Infusion 60 with UC SPEC INFUSION, UC 41 ATC   [facility] Specialty and Procedure ([facility])    [address]  [address]  [facility] [address] [phone]            Jul 25, 2018  8:00 AM CDT   Infusion 60 with UC SPEC INFUSION, UC 51 ATC   [facility] Specialty [facility] Procedure ([facility])    [address]  [address]  [facility] [address] [phone]              Who to contact     If you have questions or need follow up information about today's  clinic visit or your schedule please contact [facility] SPECIAL[facility] AND PROCEDURE directly at [phone].  Normal or non-critical lab and imaging results will be communicated to you by MyChart, letter or phone within 4 business days after the clinic has received the results. If you do not hear from us within 7 days, please contact the clinic through [employer] or phone. If you have a critical or abnormal lab result, we will notify you by phone as soon as possible.  Submit refill requests through [employer] or call your pharmacy and they will forward the refill request to us. Please allow 3 business days for your refill to be completed.          Additional Information About Your Visit        [employer] Information     [employer] gives you secure access to your electronic health record. If you see a primary care provider, you can also send messages to your care team and make appointments. If you have questions, please call your primary care clinic.  If you do not have a primary care provider, please call [phone] and they will assist you.        Care EveryWhere ID     This is your Care EveryWhere ID. This could be used by other organizations to access your [place] medical records  [id number]        Your Vitals Were     Pulse Temperature Respirations Pulse Oximetry          71 97.1  F (36.2  C) (Oral) 16 95%         Blood Pressure from Last 3 Encounters:   04/02/18 135/81   03/02/18 123/77   02/02/18 144/77    Weight from Last 3 Encounters:   03/02/18 75.3 kg (165 lb 14.4 oz)   11/22/17 73.1 kg (161 lb 3.2 oz)   11/15/17 73 kg (161 lb)              Today, you had the following     No orders found for display       Primary Care Provider Office Phone # Fax #    Mara Combs [phone]       [address] [place] [address] [address] [place] [address]        Equal Access to Services     CINTHIA JOSEPH : Yajaira Babcock, giovanni duff, ashwini garrison  quiana dominiquejustin khadijahjeronimo lapatrice ah. So [facility] [phone].    ATENCIÓN: Si habla martinez, tiene a dinh disposición servicios gratuitos de asistencia lingüística. Aissatou al [phone].    We comply with applicable federal civil rights laws and [place] laws. We do not discriminate on the basis of race, color, national origin, age, disability, sex, sexual orientation, or gender identity.            Thank you!     Thank you for choosing [facility] SPECIALTY AND PROCEDURE  for your care. Our goal is always to provide you with excellent care. Hearing back from our patients is one way we can continue to improve our services. Please take a few minutes to complete the written survey that you may receive in the mail after your visit with us. Thank you!             Your Updated Medication List - Protect others around you: Learn how to safely use, store and throw away your medicines at www.disposemymeds.org.          This list is accurate as of 4/2/18  3:43 PM.  Always use your most recent med list.                   Brand Name Dispense Instructions for use Diagnosis    albuterol 108 (90 BASE) MCG/ACT Inhaler    PROAIR HFA/PROVENTIL HFA/VENTOLIN HFA    3 Inhaler    Inhale 2 puffs into the lungs every 6 hours as needed for shortness of breath / dyspnea or wheezing    Uncomplicated severe persistent asthma       beclomethasone 80 MCG/ACT Inhaler    QVAR    3 Inhaler    Inhale 2 puffs into the lungs 2 times daily    Uncomplicated severe persistent asthma       CALCIUM-VITAMIN D PO      Take 1 tablet by mouth daily        estradiol 2 MG vaginal ring    ESTRING    1 each    Place 1 each vaginally every 3 months    Bladder prolapse, female, acquired       fluticasone-salmeterol 500-50 MCG/DOSE diskus inhaler    ADVAIR    3 Inhaler    Inhale 1 puff into the lungs 2 times daily    Uncomplicated severe persistent asthma       losartan 25 MG tablet    COZAAR    90 tablet    Take 1 tablet (25 mg) by mouth daily     Benign essential hypertension       mepolizumab 100 MG injection    NUCALA    1 mL    Inject 1 mL (100 mg) Subcutaneous every 28 days    Steroid-dependent chronic obstructive pulmonary disease (H), Uncomplicated severe persistent asthma       montelukast 10 MG tablet    SINGULAIR    90 tablet    Take 1 tablet (10 mg) by mouth as needed    Uncomplicated severe persistent asthma       NAPROXEN PO      Take 220 mg by mouth 2 times daily (with meals)        potassium chloride SA 20 MEQ CR tablet    KLOR-CON    90 tablet    Take 1 tablet (20 mEq) by mouth daily    Uncomplicated severe persistent asthma       pravastatin 20 MG tablet    PRAVACHOL    90 tablet    Take 1 tablet (20 mg) by mouth daily    Steroid-dependent chronic obstructive pulmonary disease (H)       triamterene-hydrochlorothiazide 37.5-25 MG per tablet    MAXZIDE-25    90 tablet    Take 1 tablet by mouth daily    Benign essential hypertension

## 2018-04-02 NOTE — PROGRESS NOTES
Nursing Note  Niurka Cisneros [facility] for:   Chief Complaint   Patient presents with     Imm/Inj     Nucala     During [facility]'s [facility] appointment, orders from Dr ALYSIA Ring were completed.  Frequency: monthly    Progress note:  Patient identification verified by name and date of birth.  Assessment completed.  Vitals recorded in Doc Flowsheets.  Patient was provided with education regarding injection and possible side effects.  Patient verbalized understanding.    needed: No  Premedications: were not ordered.  Labs: were not ordered for this appointment.  Vascular access: NA  Treatment Conditions: ~~~ NOTE: If the patient answers yes to any of the questions below, hold the infusion and contact ordering provider or on-call provider.    1. Have you recently had an elevated temperature, fever, chills, productive cough, coughing for 3 weeks or longer or hemoptysis,  abnormal vital signs, night sweats,  chest pain or have you noticed a decrease in your appetite, unexplained weight loss or fatigue? No  2. Do you have any open wounds or new incisions? No  3. Do you have any recent or upcoming hospitalizations, surgeries or dental procedures? No  4. Do you currently have or recently have had any signs of illness or infection or are you on any antibiotics? No  5. Have you had any new, sudden or worsening abdominal pain? No  6. Have you or anyone in your household received a live vaccination in the past 4 weeks? Please note:  No live vaccines while on biologic/chemotherapy until 6 months after the last treatment.  Patient can receive the flu vaccine (shot only) and the pneumovax.  It is optimal for the patient to get these vaccines mid cycle, but they can be given at any time as long as it is not on the day of the infusion. No  7. Have you recently been diagnosed with any new nervous system diseases (ie. Multiple sclerosis,  Guillain [place], seizures, neurological changes) or cancer diagnosis? Are you on any form of radiation or chemotherapy? No  8. Are you pregnant or breast feeding or do you have plans of pregnancy in the future? No  9. Have you been having any signs of worsening depression or suicidal ideations?  (benlysta only) NA  10. Have there been any other new onset medical symptoms? No    Patient tolerated injection: well.    Discharge Plan:   Follow up plan of care with: ongoing infusions [facility].  Discharge instructions were reviewed with patient.  Patient/representative verbalized understanding of discharge instructions and all questions answered.  Patient discharged [facility] in stable condition.    Regina De La Paz RN       Administrations This Visit     mepolizumab (NUCALA) injection 100 mg     Admin Date Action Dose Route Administered By             04/02/2018 Given 100 mg Subcutaneous Regina De La Paz RN                                /81  Pulse 71  Temp 97.1  F (36.2  C) (Oral)  Resp 16  SpO2 95%

## 2018-04-09 ENCOUNTER — OFFICE VISIT (OUTPATIENT)
Dept: PULMONOLOGY | Facility: CLINIC | Age: 73
End: 2018-04-09
Attending: INTERNAL MEDICINE
Payer: MEDICARE

## 2018-04-09 VITALS
OXYGEN SATURATION: 96 % | RESPIRATION RATE: 16 BRPM | SYSTOLIC BLOOD PRESSURE: 146 MMHG | BODY MASS INDEX: 25.11 KG/M2 | HEART RATE: 69 BPM | WEIGHT: 160 LBS | HEIGHT: 67 IN | DIASTOLIC BLOOD PRESSURE: 84 MMHG

## 2018-04-09 DIAGNOSIS — J45.50 UNCOMPLICATED SEVERE PERSISTENT ASTHMA (H): ICD-10-CM

## 2018-04-09 PROCEDURE — G0463 HOSPITAL OUTPT CLINIC VISIT: HCPCS | Mod: ZF

## 2018-04-09 RX ORDER — MONTELUKAST SODIUM 10 MG/1
10 TABLET ORAL PRN
Qty: 90 TABLET | Refills: 3 | Status: SHIPPED | OUTPATIENT
Start: 2018-04-09 | End: 2018-12-26

## 2018-04-09 RX ORDER — ALBUTEROL SULFATE 90 UG/1
2 AEROSOL, METERED RESPIRATORY (INHALATION) EVERY 6 HOURS PRN
Qty: 3 INHALER | Refills: 3 | Status: SHIPPED | OUTPATIENT
Start: 2018-04-09 | End: 2018-12-26

## 2018-04-09 ASSESSMENT — PAIN SCALES - GENERAL: PAINLEVEL: NO PAIN (0)

## 2018-04-09 NOTE — LETTER
4/9/2018       RE: Niurka Cisneros  [address]   [place] [address]     Dear Colleague,    Thank you for referring your patient, Niurka Cisneros, to the [facility] at [facility]. Please see a copy of my visit note below.      Service Date: 04/09/2018      CHIEF COMPLAINT:  Asthma.      HISTORY OF PRESENT ILLNESS:  The patient is a 72-year-old woman known to me from previous visits, here for followup of asthma.  She says that for the most part she has been doing very well.  Her exercise tolerance is limited to roughly 2 flights of stairs at a time.  She is quite active and she rides a bike.  She says that since starting mepolizumab injections she has not required any prednisone which is a significant improvement for her.  She is continuing to use her albuterol on a relatively regular basis.  She uses it before taking her fluticasone/salmeterol and maybe once to twice a day in addition.  Overall, she is very pleased with her current level of asthma control.  She has been enrolled in the severe asthma research program (SARP) since its inception.      PAST MEDICAL HISTORY:   1.  Asthma.   2.  Seizure.   3.  Abdominal aortic aneurysm.      FAMILY HISTORY:  Mother had breast cancer, hypertension and dementia.  Father had hypertension and prostate cancer.  She has a sister with asthma and depression.      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  She has a dog.      REVIEW OF SYSTEMS:  A full 14-point review of systems was done and is negative except as noted above and in the HPI.      PHYSICAL EXAMINATION:   VITAL SIGNS:  Blood pressure 146/84, pulse is 69, respiratory rate is 16, SpO2 is 96% on room air.   GENERAL APPEARANCE:  Well-developed, well-nourished, in no distress.   EYES:  PERRL.  No conjunctival injection.   HEENT:  Nasal mucosa is  within normal limits.   MOUTH:  Oral mucosa is moist.  No posterior oropharyngeal erythema or exudate.   RESPIRATORY:  Good air movement bilaterally.  No wheezes, rales or rhonchi.   CARDIOVASCULAR:  Regular rate and rhythm, normal S1, S2, no murmurs, rubs or gallops.  JVP not appreciated with patient sitting upright at 90 degrees and no lower extremity edema is noted.      RESULTS:  Pulmonary function testing reviewed directly by me from 2017.  Mild obstruction, no significant bronchodilator response, normal DLCO.      ASSESSMENT AND PLAN:  The patient is a 72-year-old woman with asthma, here for ongoing followup.      Severe persistent asthma.  The patient is on a current inhaler package of fluticasone/salmeterol and montelukast, and takes albuterol as needed.  She has been on mepolizumab for several years and has had significant improvement in her overall asthma control with a reduced rate of exacerbations since then.  She does have some questions about rate of lung function decline and generally we expect that people lose approximately 30 mL per year of lung capacity after about age 30.  This is within the expected rate of loss for her with potentially some mild increase given her longstanding asthma.  She does have irreversible airflow obstruction, again related to longstanding asthma.  I will have her come back and see me in approximately 1 year for ongoing followup.  I refilled all of her asthma medications and will renew her mepolizumab therapy plan when the current therapy plan expires.         DAYANA NEWELL MD             D: 2018   T: 04/10/2018   MT: LG      Name:     JANESSA LAWRENCE   MRN:      [id number]        Account:      [id number]   :      1945           Service Date: 2018      Document: [id number]

## 2018-04-09 NOTE — PROGRESS NOTES
Answers for HPI/ROS submitted by the patient on 4/2/2018   General Symptoms: No  Skin Symptoms: No  HENT Symptoms: No  EYE SYMPTOMS: No  HEART SYMPTOMS: No  LUNG SYMPTOMS: Yes  INTESTINAL SYMPTOMS: No  URINARY SYMPTOMS: No  GYNECOLOGIC SYMPTOMS: No  BREAST SYMPTOMS: No  SKELETAL SYMPTOMS: No  BLOOD SYMPTOMS: No  NERVOUS SYSTEM SYMPTOMS: No  MENTAL HEALTH SYMPTOMS: No  Cough: Yes  Sputum or phlegm: Yes  Coughing up blood: No  Difficulty breating or shortness of breath: Yes  Snoring: No  Wheezing: Yes  Difficulty breathing on exertion: Yes  Nighttime Cough: No  Difficulty breathing when lying flat: No

## 2018-04-09 NOTE — MR AVS SNAPSHOT
After Visit Summary   4/9/2018    Niurka Cisneros    MRN: [id number]           Patient Information     Date Of Birth          1945        Visit Information        Provider Department      4/9/2018 1:00 PM Stephania Ring MD [facility]        Today's Diagnoses     Uncomplicated severe persistent asthma          Care Instructions    It was nice to see you again today. I'm glad everything is stable.    Stephania Ring           Follow-ups after your visit        Follow-up notes from your care team     Return in about 1 year (around 4/9/2019).      Your next 10 appointments already scheduled     May 02, 2018  1:00 PM CDT   Infusion 60 with UC SPEC INFUSION, UC 41 ATC   [facility] Special[facility] Procedure ([facility])    [address] [place] [address]  [address]  [facility] [id number] [phone]            May 30, 2018 10:00 AM CDT   Infusion 60 with UC SPEC INFUSION, UC 51 ATC   [facility] ([facility])    [address] [place] [address]  [address]  [facility] [id number] [phone]            Jun 27, 2018  1:00 PM CDT   Infusion 60 with UC SPEC INFUSION, UC 41 ATC   [facility] ([facility])    [address] [place] [address]  [address]  [facility] [id number] [phone]            Jul 25, 2018  8:00 AM CDT   Infusion 60 with UC SPEC INFUSION, UC 51 ATC   [facility] ([facility])    [address] [place] [address]  [address]  [facility] [id number] [phone]              Who to contact     If you have questions or need follow up information about today's clinic visit or your schedule please contact [facility] LUNG SCIENCE AND HEALTH directly at [phone].  Normal or non-critical lab  "and imaging results will be communicated to you by MyChart, letter or phone within 4 business days after the clinic has received the results. If you do not hear from us within 7 days, please contact the clinic through [employer] or phone. If you have a critical or abnormal lab result, we will notify you by phone as soon as possible.  Submit refill requests through [employer] or call your pharmacy and they will forward the refill request to us. Please allow 3 business days for your refill to be completed.          Additional Information About Your Visit        [employer] Information     [employer] gives you secure access to your electronic health record. If you see a primary care provider, you can also send messages to your care team and make appointments. If you have questions, please call your primary care clinic.  If you do not have a primary care provider, please call [phone] and they will assist you.        Care EveryWhere ID     This is your Care EveryWhere ID. This could be used by other organizations to access your [place] medical records  [id number]        Your Vitals Were     Pulse Respirations Height Pulse Oximetry BMI (Body Mass Index)       69 16 1.702 m (5' 7\") 96% 25.06 kg/m2        Blood Pressure from Last 3 Encounters:   04/09/18 146/84   04/02/18 135/81   03/02/18 123/77    Weight from Last 3 Encounters:   04/09/18 72.6 kg (160 lb)   03/02/18 75.3 kg (165 lb 14.4 oz)   11/22/17 73.1 kg (161 lb 3.2 oz)              Today, you had the following     No orders found for display         Where to get your medicines      These medications were sent to [facility] - [place], [place] - [address] AT Portal to Registered [facility] Sites  [address], [facility] [address]     Phone:  [phone]     albuterol 108 (90 Base) MCG/ACT Inhaler    fluticasone-salmeterol 500-50 MCG/DOSE diskus inhaler    montelukast 10 MG tablet          Primary Care Provider Office Phone # Fax #    Mara " Regina Combs -[phone]       [address] [place] [address]  [facility] [address]        Equal Access to Services     CINTHIA JOSEPH : Hadii aad ku hadsoniajoaquina Babcock, giovanni duff, julianemikey cortezbiancaruss foxshailaruss, ashwini tatyanain hayaaky foxjustin laurenmalik alvarado. So [facility] [phone].    ATENCIÓN: Si habla español, tiene a dinh disposición servicios gratuitos de asistencia lingüística. Llame al [phone].    We comply with applicable federal civil rights laws and [place] laws. We do not discriminate on the basis of race, color, national origin, age, disability, sex, sexual orientation, or gender identity.            Thank you!     Thank you for choosing [facility] FOR LUNG SCIENCE AND HEALTH  for your care. Our goal is always to provide you with excellent care. Hearing back from our patients is one way we can continue to improve our services. Please take a few minutes to complete the written survey that you may receive in the mail after your visit with us. Thank you!             Your Updated Medication List - Protect others around you: Learn how to safely use, store and throw away your medicines at www.disposemymeds.org.          This list is accurate as of 4/9/18 11:59 PM.  Always use your most recent med list.                   Brand Name Dispense Instructions for use Diagnosis    albuterol 108 (90 Base) MCG/ACT Inhaler    PROAIR HFA/PROVENTIL HFA/VENTOLIN HFA    3 Inhaler    Inhale 2 puffs into the lungs every 6 hours as needed for shortness of breath / dyspnea or wheezing    Uncomplicated severe persistent asthma       beclomethasone 80 MCG/ACT Inhaler    QVAR    3 Inhaler    Inhale 2 puffs into the lungs 2 times daily    Uncomplicated severe persistent asthma       CALCIUM-VITAMIN D PO      Take 1 tablet by mouth daily        estradiol 2 MG vaginal ring    ESTRING    1 each    Place 1 each vaginally every 3 months    Bladder prolapse, female, acquired       fluticasone-salmeterol 500-50 MCG/DOSE  diskus inhaler    ADVAIR    3 Inhaler    Inhale 1 puff into the lungs 2 times daily    Uncomplicated severe persistent asthma       losartan 25 MG tablet    COZAAR    90 tablet    Take 1 tablet (25 mg) by mouth daily    Benign essential hypertension       mepolizumab 100 MG injection    NUCALA    1 mL    Inject 1 mL (100 mg) Subcutaneous every 28 days    Steroid-dependent chronic obstructive pulmonary disease (H), Uncomplicated severe persistent asthma       montelukast 10 MG tablet    SINGULAIR    90 tablet    Take 1 tablet (10 mg) by mouth as needed    Uncomplicated severe persistent asthma       NAPROXEN PO      Take 220 mg by mouth 2 times daily (with meals)        potassium chloride SA 20 MEQ CR tablet    KLOR-CON    90 tablet    Take 1 tablet (20 mEq) by mouth daily    Uncomplicated severe persistent asthma       pravastatin 20 MG tablet    PRAVACHOL    90 tablet    Take 1 tablet (20 mg) by mouth daily    Steroid-dependent chronic obstructive pulmonary disease (H)       triamterene-hydrochlorothiazide 37.5-25 MG per tablet    MAXZIDE-25    90 tablet    Take 1 tablet by mouth daily    Benign essential hypertension

## 2018-04-10 NOTE — PROGRESS NOTES
Service Date: 04/09/2018      CHIEF COMPLAINT:  Asthma.      HISTORY OF PRESENT ILLNESS:  The patient is a 72-year-old woman known to me from previous visits, here for followup of asthma.  She says that for the most part she has been doing very well.  Her exercise tolerance is limited to roughly 2 flights of stairs at a time.  She is quite active and she rides a bike.  She says that since starting mepolizumab injections she has not required any prednisone which is a significant improvement for her.  She is continuing to use her albuterol on a relatively regular basis.  She uses it before taking her fluticasone/salmeterol and maybe once to twice a day in addition.  Overall, she is very pleased with her current level of asthma control.  She has been enrolled in the severe asthma research program (SARP) since its inception.      PAST MEDICAL HISTORY:   1.  Asthma.   2.  Seizure.   3.  Abdominal aortic aneurysm.      FAMILY HISTORY:  Mother had breast cancer, hypertension and dementia.  Father had hypertension and prostate cancer.  She has a sister with asthma and depression.      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  She has a dog.      REVIEW OF SYSTEMS:  A full 14-point review of systems was done and is negative except as noted above and in the HPI.      PHYSICAL EXAMINATION:   VITAL SIGNS:  Blood pressure 146/84, pulse is 69, respiratory rate is 16, SpO2 is 96% on room air.   GENERAL APPEARANCE:  Well-developed, well-nourished, in no distress.   EYES:  PERRL.  No conjunctival injection.   HEENT:  Nasal mucosa is within normal limits.   MOUTH:  Oral mucosa is moist.  No posterior oropharyngeal erythema or exudate.   RESPIRATORY:  Good air movement bilaterally.  No wheezes, rales or rhonchi.   CARDIOVASCULAR:  Regular rate and rhythm, normal S1, S2, no murmurs, rubs or gallops.  JVP not appreciated with patient sitting  upright at 90 degrees and no lower extremity edema is noted.      RESULTS:  Pulmonary function testing reviewed directly by me from 2017.  Mild obstruction, no significant bronchodilator response, normal DLCO.      ASSESSMENT AND PLAN:  The patient is a 72-year-old woman with asthma, here for ongoing followup.      Severe persistent asthma.  The patient is on a current inhaler package of fluticasone/salmeterol and montelukast, and takes albuterol as needed.  She has been on mepolizumab for several years and has had significant improvement in her overall asthma control with a reduced rate of exacerbations since then.  She does have some questions about rate of lung function decline and generally we expect that people lose approximately 30 mL per year of lung capacity after about age 30.  This is within the expected rate of loss for her with potentially some mild increase given her longstanding asthma.  She does have irreversible airflow obstruction, again related to longstanding asthma.  I will have her come back and see me in approximately 1 year for ongoing followup.  I refilled all of her asthma medications and will renew her mepolizumab therapy plan when the current therapy plan expires.         DAYANA NEWELL MD             D: 2018   T: 04/10/2018   MT: NICK      Name:     JANESSA LAWRENCE   MRN:      [id number]        Account:      [id number]   :      1945           Service Date: 2018      Document: [id number]

## 2018-05-02 ENCOUNTER — INFUSION THERAPY VISIT (OUTPATIENT)
Dept: INFUSION THERAPY | Facility: CLINIC | Age: 73
End: 2018-05-02
Attending: INTERNAL MEDICINE
Payer: MEDICARE

## 2018-05-02 VITALS
TEMPERATURE: 97.8 F | HEART RATE: 86 BPM | SYSTOLIC BLOOD PRESSURE: 126 MMHG | OXYGEN SATURATION: 96 % | DIASTOLIC BLOOD PRESSURE: 82 MMHG

## 2018-05-02 DIAGNOSIS — Z92.241 STEROID-DEPENDENT CHRONIC OBSTRUCTIVE PULMONARY DISEASE (H): Primary | ICD-10-CM

## 2018-05-02 DIAGNOSIS — J44.9 STEROID-DEPENDENT CHRONIC OBSTRUCTIVE PULMONARY DISEASE (H): Primary | ICD-10-CM

## 2018-05-02 DIAGNOSIS — J45.50 UNCOMPLICATED SEVERE PERSISTENT ASTHMA (H): ICD-10-CM

## 2018-05-02 PROCEDURE — 96372 THER/PROPH/DIAG INJ SC/IM: CPT

## 2018-05-02 PROCEDURE — 25000128 H RX IP 250 OP 636: Mod: ZF | Performed by: INTERNAL MEDICINE

## 2018-05-02 RX ADMIN — MEPOLIZUMAB 100 MG: 100 INJECTION, POWDER, FOR SOLUTION SUBCUTANEOUS at 13:31

## 2018-05-02 NOTE — MR AVS SNAPSHOT
After Visit Summary   5/2/2018    Niurka Cisneros    MRN: [id number]           Patient Information     Date Of Birth          1945        Visit Information        Provider Department      5/2/2018 1:00 PM UC 41 ATC; UC SPEC INFUSION [facility] Specialty and Procedure        Today's Diagnoses     Steroid-dependent chronic obstructive pulmonary disease (H)    -  1    Uncomplicated severe persistent asthma           Follow-ups after your visit        Your next 10 appointments already scheduled     May 30, 2018 10:00 AM CDT   Infusion 60 with UC SPEC INFUSION, UC 51 ATC   [facility] ([facility])    [address]  [address]  [facility] [address] [phone]            Jun 27, 2018  1:00 PM CDT   Infusion 60 with UC SPEC INFUSION, UC 41 ATC   [facility] ([facility])    [address]  [address]  [facility] [id number] [phone]            Jul 25, 2018  8:00 AM CDT   Infusion 60 with UC SPEC INFUSION, UC 51 ATC   [facility] ([facility])    [address]  [address]  [facility] [id number] [phone]              Who to contact     If you have questions or need follow up information about today's clinic visit or your schedule please contact [facility] SPECIALTY AND PROCEDURE directly at [phone].  Normal or non-critical lab and imaging results will be communicated to you by MyChart, letter or phone within 4 business days after the clinic has received the results. If you do not hear from us within 7 days, please contact the clinic through MyChart or phone. If you have a critical or abnormal lab result, we will notify you by phone as soon as possible.  Submit refill requests through [employer] or call  your pharmacy and they will forward the refill request to us. Please allow 3 business days for your refill to be completed.          Additional Information About Your Visit        MyChart Information     [employer] gives you secure access to your electronic health record. If you see a primary care provider, you can also send messages to your care team and make appointments. If you have questions, please call your primary care clinic.  If you do not have a primary care provider, please call [phone] and they will assist you.        Care EveryWhere ID     This is your Care EveryWhere ID. This could be used by other organizations to access your [place] medical records  [id number]        Your Vitals Were     Pulse Temperature Pulse Oximetry             86 97.8  F (36.6  C) (Oral) 96%          Blood Pressure from Last 3 Encounters:   05/02/18 126/82   04/09/18 146/84   04/02/18 135/81    Weight from Last 3 Encounters:   04/09/18 72.6 kg (160 lb)   03/02/18 75.3 kg (165 lb 14.4 oz)   11/22/17 73.1 kg (161 lb 3.2 oz)              Today, you had the following     No orders found for display       Primary Care Provider Office Phone # Fax #    Mara Regina Combs [phone]       [address] [place] [address] [address] [place] [address]        Equal Access to Services     [facility]: Hadii aad ku hadasho Soomaali, waaxda luqadaha, qaybta kaalmada adeegyada, ashwini hernadez . So [facility] [phone].    ATENCIÓN: Si habla español, tiene a dinh disposición servicios gratuitos de asistencia lingüística. Llame al [phone].    We comply with applicable federal civil rights laws and [place] laws. We do not discriminate on the basis of race, color, national origin, age, disability, sex, sexual orientation, or gender identity.            Thank you!     Thank you for choosing [facility] SPECIALTY AND PROCEDURE  for your care. Our goal is always to provide you with  excellent care. Hearing back from our patients is one way we can continue to improve our services. Please take a few minutes to complete the written survey that you may receive in the mail after your visit with us. Thank you!             Your Updated Medication List - Protect others around you: Learn how to safely use, store and throw away your medicines at www.disposemymeds.org.          This list is accurate as of 5/2/18  1:38 PM.  Always use your most recent med list.                   Brand Name Dispense Instructions for use Diagnosis    albuterol 108 (90 Base) MCG/ACT Inhaler    PROAIR HFA/PROVENTIL HFA/VENTOLIN HFA    3 Inhaler    Inhale 2 puffs into the lungs every 6 hours as needed for shortness of breath / dyspnea or wheezing    Uncomplicated severe persistent asthma       beclomethasone 80 MCG/ACT Inhaler    QVAR    3 Inhaler    Inhale 2 puffs into the lungs 2 times daily    Uncomplicated severe persistent asthma       CALCIUM-VITAMIN D PO      Take 1 tablet by mouth daily        estradiol 2 MG vaginal ring    ESTRING    1 each    Place 1 each vaginally every 3 months    Bladder prolapse, female, acquired       fluticasone-salmeterol 500-50 MCG/DOSE diskus inhaler    ADVAIR    3 Inhaler    Inhale 1 puff into the lungs 2 times daily    Uncomplicated severe persistent asthma       losartan 25 MG tablet    COZAAR    90 tablet    Take 1 tablet (25 mg) by mouth daily    Benign essential hypertension       mepolizumab 100 MG injection    NUCALA    1 mL    Inject 1 mL (100 mg) Subcutaneous every 28 days    Steroid-dependent chronic obstructive pulmonary disease (H), Uncomplicated severe persistent asthma       montelukast 10 MG tablet    SINGULAIR    90 tablet    Take 1 tablet (10 mg) by mouth as needed    Uncomplicated severe persistent asthma       NAPROXEN PO      Take 220 mg by mouth 2 times daily (with meals)        potassium chloride SA 20 MEQ CR tablet    KLOR-CON    90 tablet    Take 1 tablet (20 mEq) by  mouth daily    Uncomplicated severe persistent asthma       pravastatin 20 MG tablet    PRAVACHOL    90 tablet    Take 1 tablet (20 mg) by mouth daily    Steroid-dependent chronic obstructive pulmonary disease (H)       triamterene-hydrochlorothiazide 37.5-25 MG per tablet    MAXZIDE-25    90 tablet    Take 1 tablet by mouth daily    Benign essential hypertension

## 2018-05-02 NOTE — PROGRESS NOTES
Patient presents to the [facility] for Nucala injection.  Order written by Dr. Ring was completed today. Name and  verified with patient. See MAR for medication details. Medication was divided into 1 syringes by pharmacy and given in the following sites left back side of upper arm. Patient tolerated injection well and was discharged to home. Patient to return back in a month.    HUONG Cary LPN    Administrations This Visit     mepolizumab (NUCALA) injection 100 mg     Admin Date Action Dose Route Administered By             2018 Given 100 mg Subcutaneous Javed Cary LPN

## 2018-05-30 ENCOUNTER — INFUSION THERAPY VISIT (OUTPATIENT)
Dept: INFUSION THERAPY | Facility: CLINIC | Age: 73
End: 2018-05-30
Attending: INTERNAL MEDICINE
Payer: MEDICARE

## 2018-05-30 VITALS
DIASTOLIC BLOOD PRESSURE: 67 MMHG | OXYGEN SATURATION: 95 % | TEMPERATURE: 97.8 F | HEART RATE: 76 BPM | SYSTOLIC BLOOD PRESSURE: 118 MMHG

## 2018-05-30 DIAGNOSIS — J45.50 UNCOMPLICATED SEVERE PERSISTENT ASTHMA (H): ICD-10-CM

## 2018-05-30 DIAGNOSIS — J44.9 STEROID-DEPENDENT CHRONIC OBSTRUCTIVE PULMONARY DISEASE (H): Primary | ICD-10-CM

## 2018-05-30 DIAGNOSIS — Z92.241 STEROID-DEPENDENT CHRONIC OBSTRUCTIVE PULMONARY DISEASE (H): Primary | ICD-10-CM

## 2018-05-30 PROCEDURE — 96372 THER/PROPH/DIAG INJ SC/IM: CPT

## 2018-05-30 PROCEDURE — 25000128 H RX IP 250 OP 636: Mod: ZF | Performed by: INTERNAL MEDICINE

## 2018-05-30 RX ADMIN — MEPOLIZUMAB 100 MG: 100 INJECTION, POWDER, FOR SOLUTION SUBCUTANEOUS at 10:16

## 2018-05-30 NOTE — PROGRESS NOTES
Patient presents to the [facility] for Nucala injection.  Order written by Dr. Ring was completed today. Name and  verified with patient. See MAR for medication details. Medication was divided into 1 syringes by pharmacy and given in the following sites back side of left upper arm. Patient tolerated injection well and was discharged to home. Patient to return back in 4 weeks.     HUONG Cary LPN    Administrations This Visit     mepolizumab (NUCALA) injection 100 mg     Admin Date Action Dose Route Administered By             2018 Given 100 mg Subcutaneous Javed Cary LPN

## 2018-05-30 NOTE — PROGRESS NOTES

## 2018-05-30 NOTE — MR AVS SNAPSHOT
After Visit Summary   5/30/2018    Niurka Cisneros    MRN: [id number]           Patient Information     Date Of Birth          1945        Visit Information        Provider Department      5/30/2018 10:00 AM UC 51 ATC; UC SPEC INFUSION [facility]ty [facility] Procedure        Today's Diagnoses     Steroid-dependent chronic obstructive pulmonary disease (H)    -  1    Uncomplicated severe persistent asthma           Follow-ups after your visit        Your next 10 appointments already scheduled     Jun 27, 2018  1:00 PM CDT   Infusion 60 with UC SPEC INFUSION, UC 41 ATC   [facility] Procedure ([facility])    [address]  [address]  [facility] [id number] [phone]            Jul 18, 2018  4:45 PM CDT   (Arrive by 4:30 PM)   RETURN HIP with William Ferguson MD   [facility] ([facility])    [address] [place] [address]  4th Floor  [facility] [id number] [phone]            Jul 25, 2018  8:00 AM CDT   Infusion 60 with UC SPEC INFUSION, UC 51 ATC   [facility] ([facility])    [address]  [address]  [facility] [id number] [phone]              Who to contact     If you have questions or need follow up information about today's clinic visit or your schedule please contact [facility] SPECIALTY AND PROCEDURE directly at [phone].  Normal or non-critical lab and imaging results will be communicated to you by MyChart, letter or phone within 4 business days after the clinic has received the results. If you do not hear from us within 7 days, please contact the clinic through MyChart or phone. If you have a critical or abnormal lab result, we will notify you by phone as soon as possible.  Submit refill requests through [employer] or call your  pharmacy and they will forward the refill request to us. Please allow 3 business days for your refill to be completed.          Additional Information About Your Visit        MyChart Information     [employer] gives you secure access to your electronic health record. If you see a primary care provider, you can also send messages to your care team and make appointments. If you have questions, please call your primary care clinic.  If you do not have a primary care provider, please call [phone] and they will assist you.        Care EveryWhere ID     This is your Care EveryWhere ID. This could be used by other organizations to access your [facility] medical records  [id number]        Your Vitals Were     Pulse Temperature Pulse Oximetry             76 97.8  F (36.6  C) (Oral) 95%          Blood Pressure from Last 3 Encounters:   05/30/18 118/67   05/02/18 126/82   04/09/18 146/84    Weight from Last 3 Encounters:   04/09/18 72.6 kg (160 lb)   03/02/18 75.3 kg (165 lb 14.4 oz)   11/22/17 73.1 kg (161 lb 3.2 oz)              Today, you had the following     No orders found for display       Primary Care Provider Office Phone # Fax #    Mara Regina Combs [phone]       [address] [place] [address] [address] [place] [address]        Equal Access to Services     CINTHIA JOSEPH : Hadii aad ku hadasho Soadonis, waaxda luqadaha, qaybta kaalmada adeegyada, ashwini hernadez . So [facility] [phone].    ATENCIÓN: Si habla español, tiene a dinh disposición servicios gratuitos de asistencia lingüística. Llame al [phone].    We comply with applicable federal civil rights laws and [place] laws. We do not discriminate on the basis of race, color, national origin, age, disability, sex, sexual orientation, or gender identity.            Thank you!     Thank you for choosing [facility] SPECIALTY AND PROCEDURE  for your care. Our goal is always to provide you with  excellent care. Hearing back from our patients is one way we can continue to improve our services. Please take a few minutes to complete the written survey that you may receive in the mail after your visit with us. Thank you!             Your Updated Medication List - Protect others around you: Learn how to safely use, store and throw away your medicines at www.disposemymeds.org.          This list is accurate as of 5/30/18 10:28 AM.  Always use your most recent med list.                   Brand Name Dispense Instructions for use Diagnosis    albuterol 108 (90 Base) MCG/ACT Inhaler    PROAIR HFA/PROVENTIL HFA/VENTOLIN HFA    3 Inhaler    Inhale 2 puffs into the lungs every 6 hours as needed for shortness of breath / dyspnea or wheezing    Uncomplicated severe persistent asthma       beclomethasone 80 MCG/ACT Inhaler    QVAR    3 Inhaler    Inhale 2 puffs into the lungs 2 times daily    Uncomplicated severe persistent asthma       CALCIUM-VITAMIN D PO      Take 1 tablet by mouth daily        estradiol 2 MG vaginal ring    ESTRING    1 each    Place 1 each vaginally every 3 months    Bladder prolapse, female, acquired       fluticasone-salmeterol 500-50 MCG/DOSE diskus inhaler    ADVAIR    3 Inhaler    Inhale 1 puff into the lungs 2 times daily    Uncomplicated severe persistent asthma       losartan 25 MG tablet    COZAAR    90 tablet    Take 1 tablet (25 mg) by mouth daily    Benign essential hypertension       mepolizumab 100 MG injection    NUCALA    1 mL    Inject 1 mL (100 mg) Subcutaneous every 28 days    Steroid-dependent chronic obstructive pulmonary disease (H), Uncomplicated severe persistent asthma       montelukast 10 MG tablet    SINGULAIR    90 tablet    Take 1 tablet (10 mg) by mouth as needed    Uncomplicated severe persistent asthma       NAPROXEN PO      Take 220 mg by mouth 2 times daily (with meals)        potassium chloride SA 20 MEQ CR tablet    KLOR-CON    90 tablet    Take 1 tablet (20 mEq) by  mouth daily    Uncomplicated severe persistent asthma       pravastatin 20 MG tablet    PRAVACHOL    90 tablet    Take 1 tablet (20 mg) by mouth daily    Steroid-dependent chronic obstructive pulmonary disease (H)       triamterene-hydrochlorothiazide 37.5-25 MG per tablet    MAXZIDE-25    90 tablet    Take 1 tablet by mouth daily    Benign essential hypertension

## 2018-06-27 ENCOUNTER — INFUSION THERAPY VISIT (OUTPATIENT)
Dept: INFUSION THERAPY | Facility: CLINIC | Age: 73
End: 2018-06-27
Attending: INTERNAL MEDICINE
Payer: MEDICARE

## 2018-06-27 VITALS
HEART RATE: 64 BPM | OXYGEN SATURATION: 96 % | DIASTOLIC BLOOD PRESSURE: 89 MMHG | TEMPERATURE: 97.9 F | SYSTOLIC BLOOD PRESSURE: 135 MMHG

## 2018-06-27 DIAGNOSIS — J45.50 UNCOMPLICATED SEVERE PERSISTENT ASTHMA (H): ICD-10-CM

## 2018-06-27 DIAGNOSIS — Z92.241 STEROID-DEPENDENT CHRONIC OBSTRUCTIVE PULMONARY DISEASE (H): Primary | ICD-10-CM

## 2018-06-27 DIAGNOSIS — J44.9 STEROID-DEPENDENT CHRONIC OBSTRUCTIVE PULMONARY DISEASE (H): Primary | ICD-10-CM

## 2018-06-27 PROCEDURE — 25000128 H RX IP 250 OP 636: Mod: ZF | Performed by: INTERNAL MEDICINE

## 2018-06-27 PROCEDURE — 96372 THER/PROPH/DIAG INJ SC/IM: CPT

## 2018-06-27 RX ADMIN — MEPOLIZUMAB 100 MG: 100 INJECTION, POWDER, FOR SOLUTION SUBCUTANEOUS at 13:16

## 2018-06-27 NOTE — MR AVS SNAPSHOT
After Visit Summary   6/27/2018    Niurka Cisneros    MRN: [id number]           Patient Information     Date Of Birth          1945        Visit Information        Provider Department      6/27/2018 1:00 PM UC 41 ATC; UC SPEC INFUSION [facility] Specialty and Procedure        Today's Diagnoses     Steroid-dependent chronic obstructive pulmonary disease (H)    -  1    Uncomplicated severe persistent asthma           Follow-ups after your visit        Your next 10 appointments already scheduled     Jul 18, 2018  4:45 PM CDT   (Arrive by 4:30 PM)   RETURN HIP with William Ferguson MD   [facility] ([facility])    [address]  4th Floor  [facility] [id number] [phone]            Jul 25, 2018  8:00 AM CDT   Infusion 60 with UC SPEC INFUSION, UC 51 ATC   [facility] Specialty and Procedure ([facility])    [address]  [address]  [facility] [id number] [phone]              Who to contact     If you have questions or need follow up information about today's clinic visit or your schedule please contact [facility] SPECIALTY AND PROCEDURE directly at [phone].  Normal or non-critical lab and imaging results will be communicated to you by MyChart, letter or phone within 4 business days after the clinic has received the results. If you do not hear from us within 7 days, please contact the clinic through [employer] or phone. If you have a critical or abnormal lab result, we will notify you by phone as soon as possible.  Submit refill requests through [employer] or call your pharmacy and they will forward the refill request to us. Please allow 3 business days for your refill to be completed.          Additional Information About Your Visit        MyChart Information     [employer] gives you secure access to your electronic health record. If  you see a primary care provider, you can also send messages to your care team and make appointments. If you have questions, please call your primary care clinic.  If you do not have a primary care provider, please call [phone] and they will assist you.        Care EveryWhere ID     This is your Care EveryWhere ID. This could be used by other organizations to access your [place] medical records  [id number]        Your Vitals Were     Pulse Temperature Pulse Oximetry             64 97.9  F (36.6  C) (Oral) 96%          Blood Pressure from Last 3 Encounters:   06/27/18 135/89   05/30/18 118/67   05/02/18 126/82    Weight from Last 3 Encounters:   04/09/18 72.6 kg (160 lb)   03/02/18 75.3 kg (165 lb 14.4 oz)   11/22/17 73.1 kg (161 lb 3.2 oz)              Today, you had the following     No orders found for display       Primary Care Provider Office Phone # Fax #    Mara Regina Combs [phone]       [address] [place] [address] [address] [place] [address]        Equal Access to Services     [facility] : Hadii aad ku hadasho Soadonis, waaxda luqadaha, qaybta kaalmada adetamera, ashwini hernadez . So [facility] [phone].    ATENCIÓN: Si habla español, tiene a dinh disposición servicios gratuitos de asistencia lingüística. [facility] [phone].    We comply with applicable federal civil rights laws and [place] laws. We do not discriminate on the basis of race, color, national origin, age, disability, sex, sexual orientation, or gender identity.            Thank you!     Thank you for choosing [facility] SPECIALTY AND PROCEDURE  for your care. Our goal is always to provide you with excellent care. Hearing back from our patients is one way we can continue to improve our services. Please take a few minutes to complete the written survey that you may receive in the mail after your visit with us. Thank you!             Your Updated Medication List -  Protect others around you: Learn how to safely use, store and throw away your medicines at www.disposemymeds.org.          This list is accurate as of 6/27/18  1:39 PM.  Always use your most recent med list.                   Brand Name Dispense Instructions for use Diagnosis    albuterol 108 (90 Base) MCG/ACT Inhaler    PROAIR HFA/PROVENTIL HFA/VENTOLIN HFA    3 Inhaler    Inhale 2 puffs into the lungs every 6 hours as needed for shortness of breath / dyspnea or wheezing    Uncomplicated severe persistent asthma       beclomethasone 80 MCG/ACT Inhaler    QVAR    3 Inhaler    Inhale 2 puffs into the lungs 2 times daily    Uncomplicated severe persistent asthma       CALCIUM-VITAMIN D PO      Take 1 tablet by mouth daily        estradiol 2 MG vaginal ring    ESTRING    1 each    Place 1 each vaginally every 3 months    Bladder prolapse, female, acquired       fluticasone-salmeterol 500-50 MCG/DOSE diskus inhaler    ADVAIR    3 Inhaler    Inhale 1 puff into the lungs 2 times daily    Uncomplicated severe persistent asthma       losartan 25 MG tablet    COZAAR    90 tablet    Take 1 tablet (25 mg) by mouth daily    Benign essential hypertension       mepolizumab 100 MG injection    NUCALA    1 mL    Inject 1 mL (100 mg) Subcutaneous every 28 days    Steroid-dependent chronic obstructive pulmonary disease (H), Uncomplicated severe persistent asthma       montelukast 10 MG tablet    SINGULAIR    90 tablet    Take 1 tablet (10 mg) by mouth as needed    Uncomplicated severe persistent asthma       NAPROXEN PO      Take 220 mg by mouth 2 times daily (with meals)        potassium chloride SA 20 MEQ CR tablet    KLOR-CON    90 tablet    Take 1 tablet (20 mEq) by mouth daily    Uncomplicated severe persistent asthma       pravastatin 20 MG tablet    PRAVACHOL    90 tablet    Take 1 tablet (20 mg) by mouth daily    Steroid-dependent chronic obstructive pulmonary disease (H)       triamterene-hydrochlorothiazide 37.5-25 MG per  tablet    MAXZIDE-25    90 tablet    Take 1 tablet by mouth daily    Benign essential hypertension

## 2018-06-27 NOTE — PROGRESS NOTES
Patient presents to the [facility] for Nucala injection.  Order written by Dr. Ring was completed today. Name and  verified with patient. See MAR for medication details. Medication was divided into 1 syringes by pharmacy and given in the following sites back side of left upper arm. Patient tolerated injection well and was discharged to home. Patient to return back in 4 weeks.      HUONG Cary LPN    Administrations This Visit     mepolizumab (NUCALA) injection 100 mg     Admin Date Action Dose Route Administered By             2018 Given 100 mg Subcutaneous Javed Cary LPN

## 2018-07-18 ENCOUNTER — RADIANT APPOINTMENT (OUTPATIENT)
Dept: GENERAL RADIOLOGY | Facility: CLINIC | Age: 73
End: 2018-07-18
Attending: ORTHOPAEDIC SURGERY
Payer: COMMERCIAL

## 2018-07-18 ENCOUNTER — OFFICE VISIT (OUTPATIENT)
Dept: ORTHOPEDICS | Facility: CLINIC | Age: 73
End: 2018-07-18
Payer: COMMERCIAL

## 2018-07-18 VITALS — BODY MASS INDEX: 26 KG/M2 | WEIGHT: 166 LBS

## 2018-07-18 DIAGNOSIS — M25.552 HIP PAIN, LEFT: ICD-10-CM

## 2018-07-18 DIAGNOSIS — Z96.642 HISTORY OF LEFT HIP REPLACEMENT: Primary | ICD-10-CM

## 2018-07-18 ASSESSMENT — HOOS S4: HOW SEVERE IS YOUR HIP JOINT STIFFNESS AFTER FIRST WAKENING IN THE MORNING?: MILD

## 2018-07-18 ASSESSMENT — ACTIVITIES OF DAILY LIVING (ADL)
ADL_SUM: 3
ADL_SUBSCALE_SCORE: 95.58
ADL_MEAN: .17
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
## 2018-07-18 NOTE — MR AVS SNAPSHOT
After Visit Summary   7/18/2018    Niurka Cisneros    MRN: [id number]           Patient Information     Date Of Birth          1945        Visit Information        Provider Department      7/18/2018 4:45 PM William Ferguson MD [facility]        Today's Diagnoses     History of left hip replacement    -  1       Follow-ups after your visit        Your next 10 appointments already scheduled     Aug 22, 2018  8:00 AM CDT   Infusion 60 with UC SPEC INFUSION, UC 44 ATC   [facility] ([facility])    [address]  [address]  [facility] [address] [phone]            Sep 26, 2018 10:00 AM CDT   Infusion 60 with UC SPEC INFUSION, UC 44 ATC   [facility] ([facility])    [address]  [address]  [facility] [id number] [phone]            Oct 24, 2018  8:30 AM CDT   Infusion 60 with UC SPEC INFUSION, UC 48 ATC   [facility] ([facility])    [address]  [address]  [facility] [id number] [phone]              Who to contact     Please call your clinic at [phone] to:    Ask questions about your health    Make or cancel appointments    Discuss your medicines    Learn about your test results    Speak to your doctor            Additional Information About Your Visit        MyChart Information     [employer] gives you secure access to your electronic health record. If you see a primary care provider, you can also send messages to your care team and make appointments. If you have questions, please call your primary care clinic.  If you do not have a primary care provider, please call [phone] and they will assist you.      [employer] is an electronic gateway that provides easy, online access to your medical records. With [facility],  you can request a clinic appointment, read your test results, renew a prescription or communicate with your care team.     To access your existing account, please contact your [facility] or call [phone] for assistance.        Care EveryWhere ID     This is your Care EveryWhere ID. This could be used by other organizations to access your [facility] medical records  [id number]        Your Vitals Were     BMI (Body Mass Index)                   26 kg/m2            Blood Pressure from Last 3 Encounters:   07/25/18 138/84   06/27/18 135/89   05/30/18 118/67    Weight from Last 3 Encounters:   07/18/18 75.3 kg (166 lb)   04/09/18 72.6 kg (160 lb)   03/02/18 75.3 kg (165 lb 14.4 oz)               Primary Care Provider Office Phone # Fax #    Mara Regina Combs [phone]       [address] [place] [address] [address]  [facility] [address]        Equal Access to Services     CINTHIA JOSEPH : Hadii aad ku hadasho Soomaali, waaxda luqadaha, qaybta kaalmada adeegyada, waxay idiin hayaan christi lucioaramalik hernadez . So [facility] [phone].    ATENCIÓN: Si habla español, tiene a dinh disposición servicios gratuitos de asistencia lingüística. Llame al [phone].    We comply with applicable federal civil rights laws and [place] laws. We do not discriminate on the basis of race, color, national origin, age, disability, sex, sexual orientation, or gender identity.            Thank you!     Thank you for choosing [facility]  for your care. Our goal is always to provide you with excellent care. Hearing back from our patients is one way we can continue to improve our services. Please take a few minutes to complete the written survey that you may receive in the mail after your visit with us. Thank you!             Your Updated Medication List - Protect others around you: Learn how to safely use, store and throw away your medicines at www.disposemymeds.org.          This list is  accurate as of 7/18/18 11:59 PM.  Always use your most recent med list.                   Brand Name Dispense Instructions for use Diagnosis    albuterol 108 (90 Base) MCG/ACT Inhaler    PROAIR HFA/PROVENTIL HFA/VENTOLIN HFA    3 Inhaler    Inhale 2 puffs into the lungs every 6 hours as needed for shortness of breath / dyspnea or wheezing    Uncomplicated severe persistent asthma       beclomethasone 80 MCG/ACT Inhaler    QVAR    3 Inhaler    Inhale 2 puffs into the lungs 2 times daily    Uncomplicated severe persistent asthma       CALCIUM-VITAMIN D PO      Take 1 tablet by mouth daily        estradiol 2 MG vaginal ring    ESTRING    1 each    Place 1 each vaginally every 3 months    Bladder prolapse, female, acquired       fluticasone-salmeterol 500-50 MCG/DOSE diskus inhaler    ADVAIR    3 Inhaler    Inhale 1 puff into the lungs 2 times daily    Uncomplicated severe persistent asthma       losartan 25 MG tablet    COZAAR    90 tablet    Take 1 tablet (25 mg) by mouth daily    Benign essential hypertension       mepolizumab 100 MG injection    NUCALA    1 mL    Inject 1 mL (100 mg) Subcutaneous every 28 days    Steroid-dependent chronic obstructive pulmonary disease (H), Uncomplicated severe persistent asthma       montelukast 10 MG tablet    SINGULAIR    90 tablet    Take 1 tablet (10 mg) by mouth as needed    Uncomplicated severe persistent asthma       NAPROXEN PO      Take 220 mg by mouth 2 times daily (with meals)        potassium chloride SA 20 MEQ CR tablet    KLOR-CON    90 tablet    Take 1 tablet (20 mEq) by mouth daily    Uncomplicated severe persistent asthma       pravastatin 20 MG tablet    PRAVACHOL    90 tablet    Take 1 tablet (20 mg) by mouth daily    Steroid-dependent chronic obstructive pulmonary disease (H)       triamterene-hydrochlorothiazide 37.5-25 MG per tablet    MAXZIDE-25    90 tablet    Take 1 tablet by mouth daily    Benign essential hypertension
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
## 2018-07-18 NOTE — NURSING NOTE
Reason For Visit:   Chief Complaint   Patient presents with     Hip Pain     F/U left hip pain       Primary MD: Mara Combs      Wt 75.3 kg (166 lb)  BMI 26 kg/m2    Pain Assessment  Patient Currently in Pain: Yes  0-10 Pain Scale: 1  Primary Pain Location: Hip  Pain Orientation: Left  Pain Descriptors: Aching    Current Outpatient Prescriptions   Medication     albuterol (PROAIR HFA/PROVENTIL HFA/VENTOLIN HFA) 108 (90 BASE) MCG/ACT Inhaler     CALCIUM-VITAMIN D PO     estradiol (ESTRING) 2 MG vaginal ring     fluticasone-salmeterol (ADVAIR) 500-50 MCG/DOSE diskus inhaler     losartan (COZAAR) 25 MG tablet     mepolizumab (NUCALA) 100 MG injection     montelukast (SINGULAIR) 10 MG tablet     NAPROXEN PO     potassium chloride SA (KLOR-CON) 20 MEQ CR tablet     pravastatin (PRAVACHOL) 20 MG tablet     triamterene-hydrochlorothiazide (MAXZIDE-25) 37.5-25 MG per tablet     beclomethasone (QVAR) 80 MCG/ACT Inhaler     No current facility-administered medications for this visit.         No Known Allergies        Alba Reyes LPN

## 2018-07-25 ENCOUNTER — INFUSION THERAPY VISIT (OUTPATIENT)
Dept: INFUSION THERAPY | Facility: CLINIC | Age: 73
End: 2018-07-25
Attending: INTERNAL MEDICINE
Payer: MEDICARE

## 2018-07-25 VITALS
DIASTOLIC BLOOD PRESSURE: 84 MMHG | TEMPERATURE: 98 F | OXYGEN SATURATION: 96 % | SYSTOLIC BLOOD PRESSURE: 138 MMHG | HEART RATE: 76 BPM

## 2018-07-25 DIAGNOSIS — Z92.241 STEROID-DEPENDENT CHRONIC OBSTRUCTIVE PULMONARY DISEASE (H): Primary | ICD-10-CM

## 2018-07-25 DIAGNOSIS — J44.9 STEROID-DEPENDENT CHRONIC OBSTRUCTIVE PULMONARY DISEASE (H): Primary | ICD-10-CM

## 2018-07-25 DIAGNOSIS — J45.50 UNCOMPLICATED SEVERE PERSISTENT ASTHMA (H): ICD-10-CM

## 2018-07-25 PROCEDURE — 96372 THER/PROPH/DIAG INJ SC/IM: CPT

## 2018-07-25 PROCEDURE — 25000128 H RX IP 250 OP 636: Mod: ZF | Performed by: INTERNAL MEDICINE

## 2018-07-25 RX ADMIN — MEPOLIZUMAB 100 MG: 100 INJECTION, POWDER, FOR SOLUTION SUBCUTANEOUS at 08:17

## 2018-07-25 ASSESSMENT — PAIN SCALES - GENERAL: PAINLEVEL: NO PAIN (0)

## 2018-07-25 NOTE — PROGRESS NOTES
Patient presents to the [facility] for Nucala injection.  Order written by Dr. Ring was completed today. Name and  verified with patient. See MAR for medication details. Medication was divided into 1 syringes by pharmacy and given in the following sites left back sides of upper arm. Patient tolerated injection well and was discharged to home. Patient to return back in 4 weeks.     HUONG Cary LPN    Administrations This Visit     mepolizumab (NUCALA) injection 100 mg     Admin Date Action Dose Route Administered By             2018 Given 100 mg Subcutaneous Javed Cary LPN

## 2018-07-25 NOTE — MR AVS SNAPSHOT
After Visit Summary   7/25/2018    Niurka Cisneros    MRN: [id number]           Patient Information     Date Of Birth          1945        Visit Information        Provider Department      7/25/2018 8:00 AM UC 51 ATC; UC SPEC INFUSION [facility]        Today's Diagnoses     Steroid-dependent chronic obstructive pulmonary disease (H)    -  1    Uncomplicated severe persistent asthma           Follow-ups after your visit        Your next 10 appointments already scheduled     Aug 22, 2018  8:00 AM CDT   Infusion 60 with UC SPEC INFUSION, UC 44 ATC   [facility] ([facility])    [address]  [address]  [facility] [id number] [phone]            Sep 26, 2018 10:00 AM CDT   Infusion 60 with UC SPEC INFUSION, UC 44 ATC   [facility] ([facility])    [address]  [address]  [facility] [id number] [phone]            Oct 24, 2018  8:30 AM CDT   Infusion 60 with UC SPEC INFUSION, UC 48 ATC   [facility] ([facility])    [address]  [address]  [facility] [id number] [phone]              Who to contact     If you have questions or need follow up information about today's clinic visit or your schedule please contact [facility] PROCED[facility] directly at [phone].  Normal or non-critical lab and imaging results will be communicated to you by MyChart, letter or phone within 4 business days after the clinic has received the results. If you do not hear from us within 7 days, please contact the clinic through MyChart or phone. If you have a critical or abnormal lab result, we will notify you by phone as soon as possible.  Submit refill requests through [employer] or  call your pharmacy and they will forward the refill request to us. Please allow 3 business days for your refill to be completed.          Additional Information About Your Visit        MyChart Information     [facility] gives you secure access to your electronic health record. If you see a primary care provider, you can also send messages to your care team and make appointments. If you have questions, please call your primary care clinic.  If you do not have a primary care provider, please call [phone] and they will assist you.        Care EveryWhere ID     This is your Care EveryWhere ID. This could be used by other organizations to access your [facility] medical records  [id number]        Your Vitals Were     Pulse Temperature Pulse Oximetry             76 98  F (36.7  C) (Oral) 96%          Blood Pressure from Last 3 Encounters:   07/25/18 138/84   06/27/18 135/89   05/30/18 118/67    Weight from Last 3 Encounters:   07/18/18 75.3 kg (166 lb)   04/09/18 72.6 kg (160 lb)   03/02/18 75.3 kg (165 lb 14.4 oz)              Today, you had the following     No orders found for display       Primary Care Provider Office Phone # Fax #    Mara Regina Combs [phone]       [address] [place] [address] [address] [place] [address]        Equal Access to Services     CINTHIA JOSEPH : Hadii aad ku hadasho Soomaali, waaxda luqadaha, qaybta kaalmada adeegyada, ashwini donnelly hayzana alvarado. So [facility] [phone].    ATENCIÓN: Si habla español, tiene a dinh disposición servicios gratuitos de asistencia lingüística. Aissatou al [phone].    We comply with applicable federal civil rights laws and [place] laws. We do not discriminate on the basis of race, color, national origin, age, disability, sex, sexual orientation, or gender identity.            Thank you!     Thank you for choosing [facility] PROCED[facility]  for your care. Our goal is always to provide you with  excellent care. Hearing back from our patients is one way we can continue to improve our services. Please take a few minutes to complete the written survey that you may receive in the mail after your visit with us. Thank you!             Your Updated Medication List - Protect others around you: Learn how to safely use, store and throw away your medicines at www.disposemymeds.org.          This list is accurate as of 7/25/18  8:34 AM.  Always use your most recent med list.                   Brand Name Dispense Instructions for use Diagnosis    albuterol 108 (90 Base) MCG/ACT Inhaler    PROAIR HFA/PROVENTIL HFA/VENTOLIN HFA    3 Inhaler    Inhale 2 puffs into the lungs every 6 hours as needed for shortness of breath / dyspnea or wheezing    Uncomplicated severe persistent asthma       beclomethasone 80 MCG/ACT Inhaler    QVAR    3 Inhaler    Inhale 2 puffs into the lungs 2 times daily    Uncomplicated severe persistent asthma       CALCIUM-VITAMIN D PO      Take 1 tablet by mouth daily        estradiol 2 MG vaginal ring    ESTRING    1 each    Place 1 each vaginally every 3 months    Bladder prolapse, female, acquired       fluticasone-salmeterol 500-50 MCG/DOSE diskus inhaler    ADVAIR    3 Inhaler    Inhale 1 puff into the lungs 2 times daily    Uncomplicated severe persistent asthma       losartan 25 MG tablet    COZAAR    90 tablet    Take 1 tablet (25 mg) by mouth daily    Benign essential hypertension       mepolizumab 100 MG injection    NUCALA    1 mL    Inject 1 mL (100 mg) Subcutaneous every 28 days    Steroid-dependent chronic obstructive pulmonary disease (H), Uncomplicated severe persistent asthma       montelukast 10 MG tablet    SINGULAIR    90 tablet    Take 1 tablet (10 mg) by mouth as needed    Uncomplicated severe persistent asthma       NAPROXEN PO      Take 220 mg by mouth 2 times daily (with meals)        potassium chloride SA 20 MEQ CR tablet    KLOR-CON    90 tablet    Take 1 tablet (20 mEq) by  mouth daily    Uncomplicated severe persistent asthma       pravastatin 20 MG tablet    PRAVACHOL    90 tablet    Take 1 tablet (20 mg) by mouth daily    Steroid-dependent chronic obstructive pulmonary disease (H)       triamterene-hydrochlorothiazide 37.5-25 MG per tablet    MAXZIDE-25    90 tablet    Take 1 tablet by mouth daily    Benign essential hypertension

## 2018-08-22 ENCOUNTER — INFUSION THERAPY VISIT (OUTPATIENT)
Dept: INFUSION THERAPY | Facility: CLINIC | Age: 73
End: 2018-08-22
Attending: INTERNAL MEDICINE
Payer: MEDICARE

## 2018-08-22 VITALS
TEMPERATURE: 97.6 F | DIASTOLIC BLOOD PRESSURE: 78 MMHG | HEIGHT: 67 IN | SYSTOLIC BLOOD PRESSURE: 140 MMHG | RESPIRATION RATE: 16 BRPM | HEART RATE: 72 BPM | OXYGEN SATURATION: 96 %

## 2018-08-22 DIAGNOSIS — J44.9 STEROID-DEPENDENT CHRONIC OBSTRUCTIVE PULMONARY DISEASE (H): Primary | ICD-10-CM

## 2018-08-22 DIAGNOSIS — J45.50 UNCOMPLICATED SEVERE PERSISTENT ASTHMA (H): ICD-10-CM

## 2018-08-22 DIAGNOSIS — Z92.241 STEROID-DEPENDENT CHRONIC OBSTRUCTIVE PULMONARY DISEASE (H): Primary | ICD-10-CM

## 2018-08-22 PROCEDURE — 96372 THER/PROPH/DIAG INJ SC/IM: CPT

## 2018-08-22 PROCEDURE — 25000128 H RX IP 250 OP 636: Mod: ZF | Performed by: INTERNAL MEDICINE

## 2018-08-22 RX ADMIN — MEPOLIZUMAB 100 MG: 100 INJECTION, POWDER, FOR SOLUTION SUBCUTANEOUS at 08:32

## 2018-08-22 ASSESSMENT — PAIN SCALES - GENERAL: PAINLEVEL: NO PAIN (0)

## 2018-08-22 NOTE — PROGRESS NOTES
"Patient presents to the [facility] for Nucala injection. Order written by Stephania Ring MD was completed today. Name and  verified with patient. See MAR for medication details. Medication was divided into 1 syringes by pharmacy and given in the following sites: left arm. Patient tolerated injection well and was discharged to home.     /78  Pulse 72  Temp 97.6  F (36.4  C) (Oral)  Resp 16  Ht 1.702 m (5' 7\")  SpO2 96%    Administrations This Visit     mepolizumab (NUCALA) injection 100 mg     Admin Date Action Dose Route Administered By             2018 Given 100 mg Subcutaneous Ainsley Simmons, HAN Simmons CMA      "

## 2018-08-22 NOTE — MR AVS SNAPSHOT
After Visit Summary   8/22/2018    Niurka Cisneros    MRN: [id number]           Patient Information     Date Of Birth          1945        Visit Information        Provider Department      8/22/2018 8:00 AM UC 44 ATC; UC SPEC INFUSION [facility] Specialty and Procedure        Today's Diagnoses     Steroid-dependent chronic obstructive pulmonary disease (H)    -  1    Uncomplicated severe persistent asthma           Follow-ups after your visit        Your next 10 appointments already scheduled     Sep 26, 2018 10:00 AM CDT   Infusion 60 with UC SPEC INFUSION, UC 44 ATC   [facility] Specialty and Procedure ([facility])    [address]  [address]  [facility] [address] [phone]            Oct 24, 2018  8:30 AM CDT   Infusion 60 with UC SPEC INFUSION, UC 48 ATC   [facility] Specialty and Procedure ([facility])    [address] [place] [address]  [address]  [facility] [address] [phone]              Who to contact     If you have questions or need follow up information about today's clinic visit or your schedule please contact [facility] SPECIALTY AND PROCEDURE directly at [phone].  Normal or non-critical lab and imaging results will be communicated to you by MyChart, letter or phone within 4 business days after the clinic has received the results. If you do not hear from us within 7 days, please contact the clinic through [employer] or phone. If you have a critical or abnormal lab result, we will notify you by phone as soon as possible.  Submit refill requests through [employer] or call your pharmacy and they will forward the refill request to us. Please allow 3 business days for your refill to be completed.          Additional Information About Your Visit        MyChart Information     [employer] gives you secure access to your electronic  "health record. If you see a primary care provider, you can also send messages to your care team and make appointments. If you have questions, please call your primary care clinic.  If you do not have a primary care provider, please call [phone] and they will assist you.        Care EveryWhere ID     This is your Care EveryWhere ID. This could be used by other organizations to access your [place] medical records  [id number]        Your Vitals Were     Pulse Temperature Respirations Height Pulse Oximetry       72 97.6  F (36.4  C) (Oral) 16 1.702 m (5' 7\") 96%        Blood Pressure from Last 3 Encounters:   08/22/18 140/78   07/25/18 138/84   06/27/18 135/89    Weight from Last 3 Encounters:   07/18/18 75.3 kg (166 lb)   04/09/18 72.6 kg (160 lb)   03/02/18 75.3 kg (165 lb 14.4 oz)              Today, you had the following     No orders found for display       Primary Care Provider Office Phone # Fax #    Mara Regina Combs [phone]       [address] [facility] [address]  [facility] [address]        Equal Access to Services     CINTHIA JOSEPH AH: Hadii yvette lirao Sotamicaali, waaxda luqadaha, qaybta kaalmada adeegyada, ashwini alvarado. So [facility] [phone].    ATENCIÓN: Si habla español, tiene a dinh disposición servicios gratuitos de asistencia lingüística. Kinaame al [phone].    We comply with applicable federal civil rights laws and [place] laws. We do not discriminate on the basis of race, color, national origin, age, disability, sex, sexual orientation, or gender identity.            Thank you!     Thank you for choosing [facility] SPECIALTY AND PROCEDURE  for your care. Our goal is always to provide you with excellent care. Hearing back from our patients is one way we can continue to improve our services. Please take a few minutes to complete the written survey that you may receive in the mail after your visit with us. Thank you!      "        Your Updated Medication List - Protect others around you: Learn how to safely use, store and throw away your medicines at www.disposemymeds.org.          This list is accurate as of 8/22/18  8:39 AM.  Always use your most recent med list.                   Brand Name Dispense Instructions for use Diagnosis    albuterol 108 (90 Base) MCG/ACT inhaler    PROAIR HFA/PROVENTIL HFA/VENTOLIN HFA    3 Inhaler    Inhale 2 puffs into the lungs every 6 hours as needed for shortness of breath / dyspnea or wheezing    Uncomplicated severe persistent asthma       beclomethasone 80 MCG/ACT Inhaler    QVAR    3 Inhaler    Inhale 2 puffs into the lungs 2 times daily    Uncomplicated severe persistent asthma       CALCIUM-VITAMIN D PO      Take 1 tablet by mouth daily        estradiol 2 MG vaginal ring    ESTRING    1 each    Place 1 each vaginally every 3 months    Bladder prolapse, female, acquired       fluticasone-salmeterol 500-50 MCG/DOSE diskus inhaler    ADVAIR    3 Inhaler    Inhale 1 puff into the lungs 2 times daily    Uncomplicated severe persistent asthma       losartan 25 MG tablet    COZAAR    90 tablet    Take 1 tablet (25 mg) by mouth daily    Benign essential hypertension       mepolizumab 100 MG injection    NUCALA    1 mL    Inject 1 mL (100 mg) Subcutaneous every 28 days    Steroid-dependent chronic obstructive pulmonary disease (H), Uncomplicated severe persistent asthma       montelukast 10 MG tablet    SINGULAIR    90 tablet    Take 1 tablet (10 mg) by mouth as needed    Uncomplicated severe persistent asthma       NAPROXEN PO      Take 220 mg by mouth 2 times daily (with meals)        potassium chloride SA 20 MEQ CR tablet    KLOR-CON    90 tablet    Take 1 tablet (20 mEq) by mouth daily    Uncomplicated severe persistent asthma       pravastatin 20 MG tablet    PRAVACHOL    90 tablet    Take 1 tablet (20 mg) by mouth daily    Steroid-dependent chronic obstructive pulmonary disease (H)        triamterene-hydrochlorothiazide 37.5-25 MG per tablet    MAXZIDE-25    90 tablet    Take 1 tablet by mouth daily    Benign essential hypertension

## 2018-09-26 ENCOUNTER — INFUSION THERAPY VISIT (OUTPATIENT)
Dept: INFUSION THERAPY | Facility: CLINIC | Age: 73
End: 2018-09-26
Attending: INTERNAL MEDICINE
Payer: MEDICARE

## 2018-09-26 VITALS
RESPIRATION RATE: 16 BRPM | SYSTOLIC BLOOD PRESSURE: 136 MMHG | DIASTOLIC BLOOD PRESSURE: 77 MMHG | HEART RATE: 88 BPM | OXYGEN SATURATION: 97 %

## 2018-09-26 DIAGNOSIS — Z92.241 STEROID-DEPENDENT CHRONIC OBSTRUCTIVE PULMONARY DISEASE (H): Primary | ICD-10-CM

## 2018-09-26 DIAGNOSIS — J45.50 UNCOMPLICATED SEVERE PERSISTENT ASTHMA (H): ICD-10-CM

## 2018-09-26 DIAGNOSIS — J44.9 STEROID-DEPENDENT CHRONIC OBSTRUCTIVE PULMONARY DISEASE (H): Primary | ICD-10-CM

## 2018-09-26 PROCEDURE — 96372 THER/PROPH/DIAG INJ SC/IM: CPT

## 2018-09-26 PROCEDURE — 25000128 H RX IP 250 OP 636: Mod: ZF | Performed by: INTERNAL MEDICINE

## 2018-09-26 RX ADMIN — MEPOLIZUMAB 100 MG: 100 INJECTION, POWDER, FOR SOLUTION SUBCUTANEOUS at 10:20

## 2018-09-26 ASSESSMENT — PAIN SCALES - GENERAL: PAINLEVEL: NO PAIN (0)

## 2018-09-26 NOTE — PROGRESS NOTES
Nucala medication given by student during [facility] appointment. Preceptor observed student during patient care activities.     Ainsley Simmons, CMA

## 2018-09-26 NOTE — PROGRESS NOTES
Pt denies any s/s of infection and is not on any antibiotics.    Okay for CMA to proceed with Nucala injection.

## 2018-09-26 NOTE — MR AVS SNAPSHOT
After Visit Summary   9/26/2018    Niurka Cisneros    MRN: [id number]           Patient Information     Date Of Birth          1945        Visit Information        Provider Department      9/26/2018 10:00 AM UC 44 ATC; UC SPEC INFUSION [facility] Specialty and Procedure        Today's Diagnoses     Steroid-dependent chronic obstructive pulmonary disease (H)    -  1    Uncomplicated severe persistent asthma           Follow-ups after your visit        Your next 10 appointments already scheduled     Oct 24, 2018  8:30 AM CDT   Infusion 60 with UC SPEC INFUSION, UC 48 ATC   [facility] Specialty and Procedure ([facility])    [address]  [address]  [facility] [address] [phone]            Nov 23, 2018 10:00 AM CST   Infusion 60 with UC SPEC INFUSION, UC 44 ATC   [facility] Specialty and Procedure ([facility])    [address]  [address]  [facility] [address] [phone]            Dec 26, 2018 10:00 AM CST   Infusion 60 with UC SPEC INFUSION, UC 44 ATC   [facility] Specialty and Procedure ([facility])    [address]  [address]  [facility] [address] [phone]              Who to contact     If you have questions or need follow up information about today's clinic visit or your schedule please contact [facility] SPECIALTY AND PROCEDURE directly at [phone].  Normal or non-critical lab and imaging results will be communicated to you by MyChart, letter or phone within 4 business days after the clinic has received the results. If you do not hear from us within 7 days, please contact the clinic through MyChart or phone. If you have a critical or abnormal lab result, we will notify you by phone as soon as possible.  Submit refill requests through [employer] or  call your pharmacy and they will forward the refill request to us. Please allow 3 business days for your refill to be completed.          Additional Information About Your Visit        [employer] Information     [employer] gives you secure access to your electronic health record. If you see a primary care provider, you can also send messages to your care team and make appointments. If you have questions, please call your primary care clinic.  If you do not have a primary care provider, please call [phone] and they will assist you.        Care EveryWhere ID     This is your Care EveryWhere ID. This could be used by other organizations to access your [place] medical records  [id number]        Your Vitals Were     Pulse Respirations Pulse Oximetry             88 16 97%          Blood Pressure from Last 3 Encounters:   09/26/18 136/77   08/22/18 140/78   07/25/18 138/84    Weight from Last 3 Encounters:   07/18/18 75.3 kg (166 lb)   04/09/18 72.6 kg (160 lb)   03/02/18 75.3 kg (165 lb 14.4 oz)              Today, you had the following     No orders found for display       Primary Care Provider Office Phone # Fax #    Mara Regina Combs [phone]       [address] [facility] [address]  [facility] [address]        Equal Access to Services     CINTHIA JOSEPH : Hadii yvette ku hadasho Soomaali, waaxda luqadaha, qaybta kaalmada adeegyada, ashwini alvarado. So [facility] [phone].    ATENCIÓN: Si habla español, tiene a dinh disposición servicios gratuitos de asistencia lingüística. Llame al [phone].    We comply with applicable federal civil rights laws and [place] laws. We do not discriminate on the basis of race, color, national origin, age, disability, sex, sexual orientation, or gender identity.            Thank you!     Thank you for choosing [facility] SPECIALTY AND PROCEDURE  for your care. Our goal is always to provide you with excellent care. Hearing  back from our patients is one way we can continue to improve our services. Please take a few minutes to complete the written survey that you may receive in the mail after your visit with us. Thank you!             Your Updated Medication List - Protect others around you: Learn how to safely use, store and throw away your medicines at www.disposemymeds.org.          This list is accurate as of 9/26/18 11:59 PM.  Always use your most recent med list.                   Brand Name Dispense Instructions for use Diagnosis    albuterol 108 (90 Base) MCG/ACT inhaler    PROAIR HFA/PROVENTIL HFA/VENTOLIN HFA    3 Inhaler    Inhale 2 puffs into the lungs every 6 hours as needed for shortness of breath / dyspnea or wheezing    Uncomplicated severe persistent asthma       beclomethasone 80 MCG/ACT Inhaler    QVAR    3 Inhaler    Inhale 2 puffs into the lungs 2 times daily    Uncomplicated severe persistent asthma       CALCIUM-VITAMIN D PO      Take 1 tablet by mouth daily        estradiol 2 MG vaginal ring    ESTRING    1 each    Place 1 each vaginally every 3 months    Bladder prolapse, female, acquired       fluticasone-salmeterol 500-50 MCG/DOSE diskus inhaler    ADVAIR    3 Inhaler    Inhale 1 puff into the lungs 2 times daily    Uncomplicated severe persistent asthma       losartan 25 MG tablet    COZAAR    90 tablet    Take 1 tablet (25 mg) by mouth daily    Benign essential hypertension       mepolizumab 100 MG injection    NUCALA    1 mL    Inject 1 mL (100 mg) Subcutaneous every 28 days    Steroid-dependent chronic obstructive pulmonary disease (H), Uncomplicated severe persistent asthma       montelukast 10 MG tablet    SINGULAIR    90 tablet    Take 1 tablet (10 mg) by mouth as needed    Uncomplicated severe persistent asthma       NAPROXEN PO      Take 220 mg by mouth 2 times daily (with meals)        potassium chloride SA 20 MEQ CR tablet    KLOR-CON    90 tablet    Take 1 tablet (20 mEq) by mouth daily     Uncomplicated severe persistent asthma       pravastatin 20 MG tablet    PRAVACHOL    90 tablet    Take 1 tablet (20 mg) by mouth daily    Steroid-dependent chronic obstructive pulmonary disease (H)       triamterene-hydrochlorothiazide 37.5-25 MG per tablet    MAXZIDE-25    90 tablet    Take 1 tablet by mouth daily    Benign essential hypertension

## 2018-09-26 NOTE — PROGRESS NOTES
Patient presents to the [facility] for Nucala injection.  Order written by Stephania Ring MD was completed today. Name and  verified with patient. See MAR for medication details. Medication was divided into 1 syringe by pharmacy and given in the following sites left arm Patient tolerated injection well and was discharged to home.    Administrations This Visit     mepolizumab (NUCALA) injection 100 mg     Admin Date Action Dose Route Administered By             2018 Given 100 mg Subcutaneous Samantha Hayden                        /77  Pulse 88  Resp 16  SpO2 97%     Samantha Hayden [facility] student

## 2018-10-24 ENCOUNTER — INFUSION THERAPY VISIT (OUTPATIENT)
Dept: INFUSION THERAPY | Facility: CLINIC | Age: 73
End: 2018-10-24
Attending: INTERNAL MEDICINE
Payer: MEDICARE

## 2018-10-24 VITALS
DIASTOLIC BLOOD PRESSURE: 80 MMHG | SYSTOLIC BLOOD PRESSURE: 141 MMHG | HEART RATE: 81 BPM | TEMPERATURE: 97.8 F | OXYGEN SATURATION: 97 % | RESPIRATION RATE: 16 BRPM

## 2018-10-24 DIAGNOSIS — J44.9 STEROID-DEPENDENT CHRONIC OBSTRUCTIVE PULMONARY DISEASE (H): Primary | ICD-10-CM

## 2018-10-24 DIAGNOSIS — J45.50 UNCOMPLICATED SEVERE PERSISTENT ASTHMA (H): ICD-10-CM

## 2018-10-24 DIAGNOSIS — Z92.241 STEROID-DEPENDENT CHRONIC OBSTRUCTIVE PULMONARY DISEASE (H): Primary | ICD-10-CM

## 2018-10-24 PROCEDURE — 25000128 H RX IP 250 OP 636: Mod: ZF | Performed by: INTERNAL MEDICINE

## 2018-10-24 PROCEDURE — 96372 THER/PROPH/DIAG INJ SC/IM: CPT

## 2018-10-24 RX ADMIN — MEPOLIZUMAB 100 MG: 100 INJECTION, POWDER, FOR SOLUTION SUBCUTANEOUS at 09:35

## 2018-10-24 ASSESSMENT — PAIN SCALES - GENERAL: PAINLEVEL: NO PAIN (0)

## 2018-10-24 NOTE — PROGRESS NOTES
Patient presents to the [facility] for Nucala injection. Order written by Stephania Ring MD was completed today. Name and  verified with patient. See MAR for medication details. Medication was divided into 1 syringes by pharmacy and given in the following sites: left arm. Patient tolerated injection well and was discharged to home.     /80  Pulse 81  Temp 97.8  F (36.6  C) (Oral)  Resp 16  SpO2 97%    Administrations This Visit     mepolizumab (NUCALA) injection 100 mg     Admin Date Action Dose Route Administered By             10/24/2018 Given 100 mg Subcutaneous Ainsley Simmons, EFFIE Simmons CMA

## 2018-10-24 NOTE — MR AVS SNAPSHOT
After Visit Summary   10/24/2018    Niurka Cisneros    MRN: [id number]           Patient Information     Date Of Birth          1945        Visit Information        Provider Department      10/24/2018 8:30 AM UC 48 ATC; UC SPEC INFUSION [facility] Specialty and Procedure        Today's Diagnoses     Steroid-dependent chronic obstructive pulmonary disease (H)    -  1    Uncomplicated severe persistent asthma           Follow-ups after your visit        Your next 10 appointments already scheduled     Nov 23, 2018 10:00 AM CST   Infusion 60 with UC SPEC INFUSION, UC 44 ATC   [facility] Specialty and Procedure ([facility])    [address]  [address]  [facility] [address] [phone]            Dec 26, 2018 10:00 AM CST   Infusion 60 with UC SPEC INFUSION, UC 44 ATC   [facility] Specialty and Procedure ([facility])    [address] [place] [address]  [address]  [facility] [address] [phone]              Who to contact     If you have questions or need follow up information about today's clinic visit or your schedule please contact [facility] SPECIALTY AND PROCEDURE directly at [phone].  Normal or non-critical lab and imaging results will be communicated to you by MyChart, letter or phone within 4 business days after the clinic has received the results. If you do not hear from us within 7 days, please contact the clinic through [employer] or phone. If you have a critical or abnormal lab result, we will notify you by phone as soon as possible.  Submit refill requests through [employer] or call your pharmacy and they will forward the refill request to us. Please allow 3 business days for your refill to be completed.          Additional Information About Your Visit        MyChart Information     [employer] gives you secure access to your electronic  health record. If you see a primary care provider, you can also send messages to your care team and make appointments. If you have questions, please call your primary care clinic.  If you do not have a primary care provider, please call [phone] and they will assist you.        Care EveryWhere ID     This is your Care EveryWhere ID. This could be used by other organizations to access your [place] medical records  [id number]        Your Vitals Were     Pulse Temperature Respirations Pulse Oximetry          81 97.8  F (36.6  C) (Oral) 16 97%         Blood Pressure from Last 3 Encounters:   10/24/18 141/80   09/26/18 136/77   08/22/18 140/78    Weight from Last 3 Encounters:   07/18/18 75.3 kg (166 lb)   04/09/18 72.6 kg (160 lb)   03/02/18 75.3 kg (165 lb 14.4 oz)              Today, you had the following     No orders found for display       Primary Care Provider Office Phone # Fax #    Mara Regina Combs [phone]       [address] [place] [address] [address] [place] [address]        Equal Access to Services     [facility] : Hadii aad ku hadasho Sotamicaali, waaxda luqadaha, qaybta kaalmada adejustinyada, ashwini hernadez . So [facility] [phone].    ATENCIÓN: Si habla español, tiene a dinh disposición servicios gratuitos de asistencia lingüística. Ll[facility]ostoria City [facility] [phone].    We comply with applicable federal civil rights laws and [place] laws. We do not discriminate on the basis of race, color, national origin, age, disability, sex, sexual orientation, or gender identity.            Thank you!     Thank you for choosing [facility] SPECIALTY AND PROCEDURE  for your care. Our goal is always to provide you with excellent care. Hearing back from our patients is one way we can continue to improve our services. Please take a few minutes to complete the written survey that you may receive in the mail after your visit with us. Thank you!             Your Updated  Medication List - Protect others around you: Learn how to safely use, store and throw away your medicines at www.disposemymeds.org.          This list is accurate as of 10/24/18  9:54 AM.  Always use your most recent med list.                   Brand Name Dispense Instructions for use Diagnosis    albuterol 108 (90 Base) MCG/ACT inhaler    PROAIR HFA/PROVENTIL HFA/VENTOLIN HFA    3 Inhaler    Inhale 2 puffs into the lungs every 6 hours as needed for shortness of breath / dyspnea or wheezing    Uncomplicated severe persistent asthma       beclomethasone 80 MCG/ACT Aers IS A DISCONTINUED MEDICATION    QVAR    3 Inhaler    Inhale 2 puffs into the lungs 2 times daily    Uncomplicated severe persistent asthma       CALCIUM-VITAMIN D PO      Take 1 tablet by mouth daily        estradiol 2 MG vaginal ring    ESTRING    1 each    Place 1 each vaginally every 3 months    Bladder prolapse, female, acquired       fluticasone-salmeterol 500-50 MCG/DOSE diskus inhaler    ADVAIR    3 Inhaler    Inhale 1 puff into the lungs 2 times daily    Uncomplicated severe persistent asthma       losartan 25 MG tablet    COZAAR    90 tablet    Take 1 tablet (25 mg) by mouth daily    Benign essential hypertension       mepolizumab 100 MG injection    NUCALA    1 mL    Inject 1 mL (100 mg) Subcutaneous every 28 days    Steroid-dependent chronic obstructive pulmonary disease (H), Uncomplicated severe persistent asthma       montelukast 10 MG tablet    SINGULAIR    90 tablet    Take 1 tablet (10 mg) by mouth as needed    Uncomplicated severe persistent asthma       NAPROXEN PO      Take 220 mg by mouth 2 times daily (with meals)        potassium chloride SA 20 MEQ CR tablet    KLOR-CON    90 tablet    Take 1 tablet (20 mEq) by mouth daily    Uncomplicated severe persistent asthma       pravastatin 20 MG tablet    PRAVACHOL    90 tablet    Take 1 tablet (20 mg) by mouth daily    Steroid-dependent chronic obstructive pulmonary disease (H)        triamterene-hydrochlorothiazide 37.5-25 MG per tablet    MAXZIDE-25    90 tablet    Take 1 tablet by mouth daily    Benign essential hypertension

## 2018-11-13 ENCOUNTER — HEALTH MAINTENANCE LETTER (OUTPATIENT)
Age: 73
End: 2018-11-13

## 2018-11-23 ENCOUNTER — INFUSION THERAPY VISIT (OUTPATIENT)
Dept: INFUSION THERAPY | Facility: CLINIC | Age: 73
End: 2018-11-23
Attending: INTERNAL MEDICINE
Payer: MEDICARE

## 2018-11-23 VITALS
SYSTOLIC BLOOD PRESSURE: 133 MMHG | HEART RATE: 59 BPM | DIASTOLIC BLOOD PRESSURE: 82 MMHG | TEMPERATURE: 98.1 F | OXYGEN SATURATION: 96 % | RESPIRATION RATE: 16 BRPM

## 2018-11-23 DIAGNOSIS — Z92.241 STEROID-DEPENDENT CHRONIC OBSTRUCTIVE PULMONARY DISEASE (H): Primary | ICD-10-CM

## 2018-11-23 DIAGNOSIS — J44.9 STEROID-DEPENDENT CHRONIC OBSTRUCTIVE PULMONARY DISEASE (H): Primary | ICD-10-CM

## 2018-11-23 DIAGNOSIS — J45.50 UNCOMPLICATED SEVERE PERSISTENT ASTHMA (H): ICD-10-CM

## 2018-11-23 PROCEDURE — 96372 THER/PROPH/DIAG INJ SC/IM: CPT

## 2018-11-23 PROCEDURE — 25000128 H RX IP 250 OP 636: Mod: ZF | Performed by: INTERNAL MEDICINE

## 2018-11-23 RX ADMIN — MEPOLIZUMAB 100 MG: 100 INJECTION, POWDER, FOR SOLUTION SUBCUTANEOUS at 10:11

## 2018-11-23 ASSESSMENT — PAIN SCALES - GENERAL: PAINLEVEL: NO PAIN (0)

## 2018-11-23 NOTE — PROGRESS NOTES
Patient presents to the [facility] for Nucala injection. Order written by Stephania Ring MD was completed today. Name and  verified with patient. See MAR for medication details. Medication was divided into 1 syringes by pharmacy and given in the following sites: left arm. Patient tolerated injection well and was discharged to home.     Ainsley Simmons CMA    /82  Pulse 59  Temp 98.1  F (36.7  C) (Oral)  Resp 16  SpO2 96%    Administrations This Visit     mepolizumab (NUCALA) injection 100 mg     Admin Date Action Dose Route Administered By             2018 Given 100 mg Subcutaneous Ainsley Simmons CMA

## 2018-11-23 NOTE — MR AVS SNAPSHOT
After Visit Summary   11/23/2018    Niurka Cisneros    MRN: [id number]           Patient Information     Date Of Birth          1945        Visit Information        Provider Department      11/23/2018 10:00 AM UC 44 ATC; UC SPEC INFUSION [facility] Procedure        Today's Diagnoses     Steroid-dependent chronic obstructive pulmonary disease (H)    -  1    Uncomplicated severe persistent asthma           Follow-ups after your visit        Your next 10 appointments already scheduled     Dec 26, 2018 10:00 AM CST   Infusion 60 with UC SPEC INFUSION, UC 44 ATC   [facility] ([facility])    [address]  [address]  [facility] [address] [phone]              Who to contact     If you have questions or need follow up information about today's clinic visit or your schedule please contact [facility] SPECIAL[facility] PROCEDURE directly at [phone].  Normal or non-critical lab and imaging results will be communicated to you by Kuraturhart, letter or phone within 4 business days after the clinic has received the results. If you do not hear from us within 7 days, please contact the clinic through Kuraturhart or phone. If you have a critical or abnormal lab result, we will notify you by phone as soon as possible.  Submit refill requests through iStreamPlanet or call your pharmacy and they will forward the refill request to us. Please allow 3 business days for your refill to be completed.          Additional Information About Your Visit        MyChart Information     iStreamPlanet gives you secure access to your electronic health record. If you see a primary care provider, you can also send messages to your care team and make appointments. If you have questions, please call your primary care clinic.  If you do not have a primary care provider, please call [phone] and they  will assist you.        Care EveryWhere ID     This is your Care EveryWhere ID. This could be used by other organizations to access your [place] medical records  [id number]        Your Vitals Were     Pulse Temperature Respirations Pulse Oximetry          59 98.1  F (36.7  C) (Oral) 16 96%         Blood Pressure from Last 3 Encounters:   11/23/18 133/82   10/24/18 141/80   09/26/18 136/77    Weight from Last 3 Encounters:   07/18/18 75.3 kg (166 lb)   04/09/18 72.6 kg (160 lb)   03/02/18 75.3 kg (165 lb 14.4 oz)              Today, you had the following     No orders found for display       Primary Care Provider Office Phone # Fax #    Mara Regina Combs [phone]       [address] [place] [address] [address]  [facility] [address]        Equal Access to Services     [facility] : Hadii yvette reeves hadasho Soadonis, waaxda luqadaha, qaybta kaalmada adeegyada, ashwini hernadez . So [facility] [phone].    ATENCIÓN: Si habla español, tiene a dinh disposición servicios gratuitos de asistencia lingüística. Aissatou al [phone].    We comply with applicable federal civil rights laws and [place] laws. We do not discriminate on the basis of race, color, national origin, age, disability, sex, sexual orientation, or gender identity.            Thank you!     Thank you for choosing [facility]  for your care. Our goal is always to provide you with excellent care. Hearing back from our patients is one way we can continue to improve our services. Please take a few minutes to complete the written survey that you may receive in the mail after your visit with us. Thank you!             Your Updated Medication List - Protect others around you: Learn how to safely use, store and throw away your medicines at www.disposemymeds.org.          This list is accurate as of 11/23/18 10:13 AM.  Always use your most recent med list.                   Brand Name  Dispense Instructions for use Diagnosis    albuterol 108 (90 Base) MCG/ACT inhaler    PROAIR HFA/PROVENTIL HFA/VENTOLIN HFA    3 Inhaler    Inhale 2 puffs into the lungs every 6 hours as needed for shortness of breath / dyspnea or wheezing    Uncomplicated severe persistent asthma       beclomethasone 80 MCG/ACT Aers IS A DISCONTINUED MEDICATION    QVAR    3 Inhaler    Inhale 2 puffs into the lungs 2 times daily    Uncomplicated severe persistent asthma       CALCIUM-VITAMIN D PO      Take 1 tablet by mouth daily        estradiol 2 MG vaginal ring    ESTRING    1 each    Place 1 each vaginally every 3 months    Bladder prolapse, female, acquired       fluticasone-salmeterol 500-50 MCG/DOSE inhaler    ADVAIR    3 Inhaler    Inhale 1 puff into the lungs 2 times daily    Uncomplicated severe persistent asthma       losartan 25 MG tablet    COZAAR    90 tablet    Take 1 tablet (25 mg) by mouth daily    Benign essential hypertension       mepolizumab 100 MG injection    NUCALA    1 mL    Inject 1 mL (100 mg) Subcutaneous every 28 days    Steroid-dependent chronic obstructive pulmonary disease (H), Uncomplicated severe persistent asthma       montelukast 10 MG tablet    SINGULAIR    90 tablet    Take 1 tablet (10 mg) by mouth as needed    Uncomplicated severe persistent asthma       NAPROXEN PO      Take 220 mg by mouth 2 times daily (with meals)        potassium chloride SA 20 MEQ CR tablet    KLOR-CON    90 tablet    Take 1 tablet (20 mEq) by mouth daily    Uncomplicated severe persistent asthma       pravastatin 20 MG tablet    PRAVACHOL    90 tablet    Take 1 tablet (20 mg) by mouth daily    Steroid-dependent chronic obstructive pulmonary disease (H)       triamterene-hydrochlorothiazide 37.5-25 MG per tablet    MAXZIDE-25    90 tablet    Take 1 tablet by mouth daily    Benign essential hypertension

## 2018-12-12 ENCOUNTER — PATIENT OUTREACH (OUTPATIENT)
Dept: CARE COORDINATION | Facility: CLINIC | Age: 73
End: 2018-12-12

## 2018-12-26 ENCOUNTER — ANCILLARY PROCEDURE (OUTPATIENT)
Dept: MAMMOGRAPHY | Facility: CLINIC | Age: 73
End: 2018-12-26
Attending: INTERNAL MEDICINE
Payer: MEDICARE

## 2018-12-26 ENCOUNTER — TELEPHONE (OUTPATIENT)
Dept: GASTROENTEROLOGY | Facility: CLINIC | Age: 73
End: 2018-12-26

## 2018-12-26 ENCOUNTER — INFUSION THERAPY VISIT (OUTPATIENT)
Dept: INFUSION THERAPY | Facility: CLINIC | Age: 73
End: 2018-12-26
Attending: INTERNAL MEDICINE
Payer: MEDICARE

## 2018-12-26 ENCOUNTER — OFFICE VISIT (OUTPATIENT)
Dept: INTERNAL MEDICINE | Facility: CLINIC | Age: 73
End: 2018-12-26
Payer: MEDICARE

## 2018-12-26 VITALS
HEART RATE: 74 BPM | SYSTOLIC BLOOD PRESSURE: 125 MMHG | BODY MASS INDEX: 25.69 KG/M2 | WEIGHT: 164 LBS | OXYGEN SATURATION: 97 % | DIASTOLIC BLOOD PRESSURE: 82 MMHG

## 2018-12-26 VITALS
HEART RATE: 61 BPM | TEMPERATURE: 97.4 F | DIASTOLIC BLOOD PRESSURE: 79 MMHG | OXYGEN SATURATION: 97 % | SYSTOLIC BLOOD PRESSURE: 137 MMHG

## 2018-12-26 DIAGNOSIS — Z12.31 VISIT FOR SCREENING MAMMOGRAM: ICD-10-CM

## 2018-12-26 DIAGNOSIS — Z92.241 STEROID-DEPENDENT CHRONIC OBSTRUCTIVE PULMONARY DISEASE (H): Primary | ICD-10-CM

## 2018-12-26 DIAGNOSIS — Z12.11 SPECIAL SCREENING FOR MALIGNANT NEOPLASMS, COLON: Primary | ICD-10-CM

## 2018-12-26 DIAGNOSIS — R79.9 ABNORMAL FINDING OF BLOOD CHEMISTRY: ICD-10-CM

## 2018-12-26 DIAGNOSIS — I10 BENIGN ESSENTIAL HYPERTENSION: ICD-10-CM

## 2018-12-26 DIAGNOSIS — J44.9 STEROID-DEPENDENT CHRONIC OBSTRUCTIVE PULMONARY DISEASE (H): Primary | ICD-10-CM

## 2018-12-26 DIAGNOSIS — J44.9 STEROID-DEPENDENT CHRONIC OBSTRUCTIVE PULMONARY DISEASE (H): ICD-10-CM

## 2018-12-26 DIAGNOSIS — J45.50 UNCOMPLICATED SEVERE PERSISTENT ASTHMA (H): ICD-10-CM

## 2018-12-26 DIAGNOSIS — N81.10 BLADDER PROLAPSE, FEMALE, ACQUIRED: ICD-10-CM

## 2018-12-26 DIAGNOSIS — I71.20 THORACIC AORTIC ANEURYSM WITHOUT RUPTURE (H): ICD-10-CM

## 2018-12-26 DIAGNOSIS — Z92.241 STEROID-DEPENDENT CHRONIC OBSTRUCTIVE PULMONARY DISEASE (H): ICD-10-CM

## 2018-12-26 LAB
ANION GAP SERPL CALCULATED.3IONS-SCNC: 6 MMOL/L (ref 3–14)
BUN SERPL-MCNC: 19 MG/DL (ref 7–30)
CALCIUM SERPL-MCNC: 8.9 MG/DL (ref 8.5–10.1)
CHLORIDE SERPL-SCNC: 103 MMOL/L (ref 94–109)
CHOLEST SERPL-MCNC: 190 MG/DL
CO2 SERPL-SCNC: 28 MMOL/L (ref 20–32)
CREAT SERPL-MCNC: 0.8 MG/DL (ref 0.52–1.04)
GFR SERPL CREATININE-BSD FRML MDRD: 74 ML/MIN/{1.73_M2}
GLUCOSE SERPL-MCNC: 88 MG/DL (ref 70–99)
HBA1C MFR BLD: 5.8 % (ref 0–5.6)
HDLC SERPL-MCNC: 55 MG/DL
LDLC SERPL CALC-MCNC: 104 MG/DL
NONHDLC SERPL-MCNC: 135 MG/DL
POTASSIUM SERPL-SCNC: 3.8 MMOL/L (ref 3.4–5.3)
SODIUM SERPL-SCNC: 138 MMOL/L (ref 133–144)
TRIGL SERPL-MCNC: 156 MG/DL

## 2018-12-26 PROCEDURE — 96372 THER/PROPH/DIAG INJ SC/IM: CPT

## 2018-12-26 PROCEDURE — 25000128 H RX IP 250 OP 636: Mod: ZF | Performed by: INTERNAL MEDICINE

## 2018-12-26 RX ORDER — LOSARTAN POTASSIUM 25 MG/1
25 TABLET ORAL DAILY
Qty: 90 TABLET | Refills: 3 | Status: SHIPPED | OUTPATIENT
Start: 2018-12-26 | End: 2019-12-01

## 2018-12-26 RX ORDER — POTASSIUM CHLORIDE 1500 MG/1
20 TABLET, EXTENDED RELEASE ORAL DAILY
Qty: 90 TABLET | Refills: 3 | Status: SHIPPED | OUTPATIENT
Start: 2018-12-26 | End: 2019-12-20

## 2018-12-26 RX ORDER — PRAVASTATIN SODIUM 20 MG
20 TABLET ORAL DAILY
Qty: 90 TABLET | Refills: 3 | Status: SHIPPED | OUTPATIENT
Start: 2018-12-26 | End: 2019-12-01

## 2018-12-26 RX ORDER — ALBUTEROL SULFATE 90 UG/1
2 AEROSOL, METERED RESPIRATORY (INHALATION) EVERY 6 HOURS PRN
Qty: 3 INHALER | Refills: 3 | Status: SHIPPED | OUTPATIENT
Start: 2018-12-26 | End: 2019-04-22

## 2018-12-26 RX ORDER — MONTELUKAST SODIUM 10 MG/1
10 TABLET ORAL PRN
Qty: 90 TABLET | Refills: 3 | Status: SHIPPED | OUTPATIENT
Start: 2018-12-26 | End: 2019-09-16

## 2018-12-26 RX ORDER — TRIAMTERENE/HYDROCHLOROTHIAZID 37.5-25 MG
1 TABLET ORAL DAILY
Qty: 90 TABLET | Refills: 3 | Status: SHIPPED | OUTPATIENT
Start: 2018-12-26 | End: 2019-02-18

## 2018-12-26 RX ADMIN — MEPOLIZUMAB 100 MG: 100 INJECTION, POWDER, FOR SOLUTION SUBCUTANEOUS at 10:40

## 2018-12-26 ASSESSMENT — PAIN SCALES - GENERAL
PAINLEVEL: NO PAIN (0)
PAINLEVEL: NO PAIN (0)

## 2018-12-26 NOTE — PROGRESS NOTES
Rooming Note  Health Maintenance   Health Maintenance Due   Topic Date Due     COPD ACTION PLAN Q1 YR  1945     ASTHMA ACTION PLAN Q1 YR  09/08/1952     ASTHMA CONTROL TEST Q6 MOS  09/08/1952     ADVANCE DIRECTIVE PLANNING Q5 YRS  09/08/2002     ZOSTER IMMUNIZATION (2 of 3) 11/26/2007     INFLUENZA VACCINE (1) 09/01/2018     COLONOSCOPY Q5 YR  09/09/2018        Patient got her flu shot in October, declines shingles shot today as she is planning on getting it from her pharmacy in the future. The patient would like to schedule a colonoscopy today.

## 2018-12-26 NOTE — PROGRESS NOTES
CC: Annual physical exam    HPI:    Niurka Cisneros is here for a routine physical.  SHe is overall doing well.    HTN: 's at home.    Gyne:  vagianl estrogen continues to be helpful    Depression screen:  PHQ-2 Score:     PHQ-2 ( 1999 Pfizer) 7/18/2018 11/10/2015   Q1: Little interest or pleasure in doing things 0 0   Q2: Feeling down, depressed or hopeless 0 0   PHQ-2 Score 0 0   Q1: Little interest or pleasure in doing things Not at all -   Q2: Feeling down, depressed or hopeless Not at all -   PHQ-2 Score 0 -       Tobacco screen:  History   Smoking Status     Never Smoker   Smokeless Tobacco     Never Used     Obesity screen:  Body mass index is 25.69 kg/m .  Exercising regularly with biking    ROS    Medications, allergies, family history, and social history were reviewed and updated in the chart    Past medical history was reviewed and updated  Patient Active Problem List   Diagnosis     Seizures (H)     Thoracic aortic aneurysm without rupture (H)     Moderate persistent asthma without complication     Prediabetes     Pulmonary nodules     Essential hypertension     Osteopenia of multiple sites       OBJECTIVE:  Physical Exam:  /82 (BP Location: Right arm, Patient Position: Sitting)   Pulse 74   Wt 74.4 kg (164 lb)   LMP  (LMP Unknown)   SpO2 97%   Breastfeeding? No   BMI 25.69 kg/m      GENERAL APPEARANCE: healthy, alert and no distress     EYES: EOMI, fundi benign- PERRL     HENT: ear canals and TM's normal and nose and mouth without ulcers or lesions     NECK: no adenopathy, no asymmetry, masses, or scars and thyroid normal to palpation     RESP: few wheezes bl.  Good air movement     CV: regular rates and rhythm, normal S1 S2, no S3 or S4 and no murmur, click or rub -     ABDOMEN:  soft, nontender, no HSM or masses and bowel sounds normal     MS: extremities normal- no gross deformities noted     SKIN: no suspicious lesions or rashes     NEURO: mentation intact and speech normal      PSYCH: mentation appears normal. and affect normal/bright     LYMPHATICS: No cervical, or supraclavicular nodes      ASSESSMENT/PLAN:  # Preventive Care Visit:     Special screening for malignant neoplasms, colon  - GASTROENTEROLOGY ADULT REF PROCEDURE ONLY    Benign essential hypertension  - triamterene-HCTZ (MAXZIDE-25) 37.5-25 MG tablet  Dispense: 90 tablet; Refill: 3  - losartan (COZAAR) 25 MG tablet  Dispense: 90 tablet; Refill: 3  - Lipid panel reflex to direct LDL Fasting  - Basic metabolic panel  - Hemoglobin A1c    Steroid-dependent chronic obstructive pulmonary disease (H)  - pravastatin (PRAVACHOL) 20 MG tablet  Dispense: 90 tablet; Refill: 3    Uncomplicated severe persistent asthma  - potassium chloride ER (KLOR-CON) 20 MEQ CR tablet  Dispense: 90 tablet; Refill: 3  - montelukast (SINGULAIR) 10 MG tablet  Dispense: 90 tablet; Refill: 3  - fluticasone-salmeterol (ADVAIR) 500-50 MCG/DOSE inhaler  Dispense: 3 Inhaler; Refill: 3  - albuterol (PROAIR HFA/PROVENTIL HFA/VENTOLIN HFA) 108 (90 Base) MCG/ACT inhaler  Dispense: 3 Inhaler; Refill: 3    Bladder prolapse, female, acquired  - estradiol (ESTRING) 2 MG vaginal ring  Dispense: 1 each; Refill: 3    Thoracic aortic aneurysm without rupture (H)  - Cardiovascular Surgery Referral    Abnormal finding of blood chemistry   - Hemoglobin A1c      RTC: prn      Mara Combs MD

## 2018-12-26 NOTE — NURSING NOTE
Chief Complaint   Patient presents with     Refill Request     patient here to renew medications      Desiree Ardon at 8:30 AM on 12/26/2018.

## 2018-12-26 NOTE — PROGRESS NOTES
Patient presents to the [facility] for Nucala injection.  Order written by Dr. Ring was completed today. Name and  verified with patient. See MAR for medication details. Medication was divided into 1 syringes by pharmacy and given in the following sites back side of left upper arm. Patient tolerated injection well and was discharged to home. Patient to return back in 4 weeks.    HUONG Cary LPN    Administrations This Visit     mepolizumab (NUCALA) injection 100 mg     Admin Date  2018 Action  Given Dose  100 mg Route  Subcutaneous Administered By  Javed Cary LPN

## 2018-12-26 NOTE — PROGRESS NOTES
1. Have you recently had an elevated temperature, fever, chills, productive cough, coughing for 3 weeks or longer or hemoptysis,  abnormal vital signs, night sweats,  chest pain or have you noticed a decrease in your appetite, unexplained weight loss or fatigue? No  2. Do you have any open wounds or new incisions? No  3. Do you have any recent or upcoming hospitalizations, surgeries or dental procedures? No  4. Do you currently have or recently have had any signs of illness or infection or are you on any antibiotics? No  5. Have you had any new, sudden or worsening abdominal pain? No  6. Have you or anyone in your household received a live vaccination in the past 4 weeks? Please note:  No live vaccines while on biologic/chemotherapy until 6 months after the last treatment.  Patient can receive the flu vaccine (shot only) and the pneumovax.  It is optimal for the patient to get these vaccines mid cycle, but they can be given at any time as long as it is not on the day of the infusion. No  7. Have you recently been diagnosed with any new nervous system diseases (ie. Multiple sclerosis, Guillain [place], seizures, neurological changes) or cancer diagnosis? Are you on any form of radiation or chemotherapy? No  8. Are you pregnant or breast feeding or do you have plans of pregnancy in the future? No  9. Have you been having any signs of worsening depression or suicidal ideations?  (benlysta only) NA  10. Have there been any other new onset medical symptoms? No    Regina Lees RN

## 2019-01-04 ENCOUNTER — TELEPHONE (OUTPATIENT)
Dept: INTERNAL MEDICINE | Facility: CLINIC | Age: 74
End: 2019-01-04

## 2019-01-04 DIAGNOSIS — H91.90 HEARING LOSS: Primary | ICD-10-CM

## 2019-01-04 NOTE — TELEPHONE ENCOUNTER
Patient calling asking for a referral for hearing test. Patient has hearing aids and would like to start coming to audiology here at the [facility]. Order was placed. Trudy Cho LPN 1/4/2019 2:23 PM

## 2019-01-04 NOTE — TELEPHONE ENCOUNTER
[facility] Call Center    Phone Message    May a detailed message be left on voicemail: yes    Reason for Call: Other: pt request orders for hearing test.      Action Taken: Message routed to:  [facility] ([facility] RA

## 2019-01-09 ENCOUNTER — TELEPHONE (OUTPATIENT)
Dept: CARDIOLOGY | Facility: CLINIC | Age: 74
End: 2019-01-09

## 2019-01-09 DIAGNOSIS — I71.21 ASCENDING AORTIC ANEURYSM (H): Primary | ICD-10-CM

## 2019-01-09 NOTE — TELEPHONE ENCOUNTER
Called patient to explain that she will be scheduled with Dr Maria Del Rosario Wang in vascular cardiology and she will be following the patient and monitoring her ascending aortic aneurysm until or if it reaches the point when surgery is recommended.   Left voicemail message, waiting return call.

## 2019-01-10 NOTE — TELEPHONE ENCOUNTER
Called patient and discussed reasoning for asking her to see Dr Wang to follow her aneurysm.  Patient verbalized understanding and will wait to hear from Dell Hunt RN to schedule the echo, CT and appointment.

## 2019-01-11 DIAGNOSIS — I71.20 THORACIC AORTIC ANEURYSM WITHOUT RUPTURE (H): Primary | ICD-10-CM

## 2019-02-04 ENCOUNTER — INFUSION THERAPY VISIT (OUTPATIENT)
Dept: INFUSION THERAPY | Facility: CLINIC | Age: 74
End: 2019-02-04
Attending: INTERNAL MEDICINE
Payer: MEDICARE

## 2019-02-04 VITALS
DIASTOLIC BLOOD PRESSURE: 82 MMHG | SYSTOLIC BLOOD PRESSURE: 131 MMHG | OXYGEN SATURATION: 96 % | HEART RATE: 81 BPM | TEMPERATURE: 97.5 F

## 2019-02-04 DIAGNOSIS — J45.50 UNCOMPLICATED SEVERE PERSISTENT ASTHMA (H): ICD-10-CM

## 2019-02-04 DIAGNOSIS — Z92.241 STEROID-DEPENDENT CHRONIC OBSTRUCTIVE PULMONARY DISEASE (H): Primary | ICD-10-CM

## 2019-02-04 DIAGNOSIS — J44.9 STEROID-DEPENDENT CHRONIC OBSTRUCTIVE PULMONARY DISEASE (H): Primary | ICD-10-CM

## 2019-02-04 PROCEDURE — 25000128 H RX IP 250 OP 636: Mod: ZF | Performed by: INTERNAL MEDICINE

## 2019-02-04 PROCEDURE — 96372 THER/PROPH/DIAG INJ SC/IM: CPT

## 2019-02-04 RX ADMIN — MEPOLIZUMAB 100 MG: 100 INJECTION, POWDER, FOR SOLUTION SUBCUTANEOUS at 12:08

## 2019-02-04 ASSESSMENT — PAIN SCALES - GENERAL: PAINLEVEL: NO PAIN (0)

## 2019-02-04 NOTE — PROGRESS NOTES
Patient reports feeling healthy. Denies being on any antibiotics or having fevers. Nucala released.     Mara Camilo RN

## 2019-02-04 NOTE — PROGRESS NOTES
Patient presents to the [facility] for Nucala injection.  Order written by Dr. Ring was completed today. Name and  verified with patient. See MAR for medication details. Medication was divided into 1 syringes by pharmacy and given in the following sites back side of left upper arm. Patient tolerated injection well and was discharged to home. Patient to return back in 4 weeks.    HUONG Cary LPN    Administrations This Visit     mepolizumab (NUCALA) injection 100 mg     Admin Date  2019 Action  Given Dose  100 mg Route  Subcutaneous Administered By  Javed Cary LPN

## 2019-02-07 ENCOUNTER — TELEPHONE (OUTPATIENT)
Dept: GASTROENTEROLOGY | Facility: CLINIC | Age: 74
End: 2019-02-07

## 2019-02-07 DIAGNOSIS — Z12.11 SPECIAL SCREENING FOR MALIGNANT NEOPLASMS, COLON: Primary | ICD-10-CM

## 2019-02-07 NOTE — TELEPHONE ENCOUNTER
Associated Diagnoses     Special screening for malignant neoplasms, colon [Z12.11]  - Primary        Patient scheduled for colonoscoy    Indication for procedure. Previous polypectomies    Referring Provider. Mara Combs    ? no    Arrival time verified? 7:45 am    Facility location verified? [address]; 1st floor    Instructions given regarding prep and procedure    Prep Type golytely sent E script to [facility]     Are you taking any anticoagulants or blood thinners? no    Instructions given? yes    Electronic implanted devices? no    Pre procedure teaching completed? Yes    Transportation from procedure? yes    H&P / Pre op physical completed? n/a

## 2019-02-13 RX ORDER — ONDANSETRON 2 MG/ML
4 INJECTION INTRAMUSCULAR; INTRAVENOUS
Status: CANCELLED | OUTPATIENT
Start: 2019-02-13

## 2019-02-13 RX ORDER — LIDOCAINE 40 MG/G
CREAM TOPICAL
Status: CANCELLED | OUTPATIENT
Start: 2019-02-13

## 2019-02-14 ENCOUNTER — HOSPITAL ENCOUNTER (OUTPATIENT)
Facility: CLINIC | Age: 74
Discharge: HOME | End: 2019-02-14
Attending: INTERNAL MEDICINE | Admitting: INTERNAL MEDICINE
Payer: MEDICARE

## 2019-02-14 VITALS
HEART RATE: 67 BPM | SYSTOLIC BLOOD PRESSURE: 126 MMHG | RESPIRATION RATE: 19 BRPM | BODY MASS INDEX: 25.69 KG/M2 | OXYGEN SATURATION: 97 % | HEIGHT: 67 IN | DIASTOLIC BLOOD PRESSURE: 74 MMHG

## 2019-02-14 LAB — COLONOSCOPY: NORMAL

## 2019-02-14 PROCEDURE — 25000128 H RX IP 250 OP 636: Performed by: INTERNAL MEDICINE

## 2019-02-14 PROCEDURE — 88305 TISSUE EXAM BY PATHOLOGIST: CPT | Performed by: INTERNAL MEDICINE

## 2019-02-14 PROCEDURE — 25000132 ZZH RX MED GY IP 250 OP 250 PS 637: Mod: GY | Performed by: INTERNAL MEDICINE

## 2019-02-14 PROCEDURE — 45378 DIAGNOSTIC COLONOSCOPY: CPT | Performed by: INTERNAL MEDICINE

## 2019-02-14 PROCEDURE — 88341 IMHCHEM/IMCYTCHM EA ADD ANTB: CPT | Performed by: INTERNAL MEDICINE

## 2019-02-14 PROCEDURE — G0500 MOD SEDAT ENDO SERVICE >5YRS: HCPCS | Performed by: INTERNAL MEDICINE

## 2019-02-14 PROCEDURE — 99153 MOD SED SAME PHYS/QHP EA: CPT | Performed by: INTERNAL MEDICINE

## 2019-02-14 PROCEDURE — G0105 COLORECTAL SCRN; HI RISK IND: HCPCS | Performed by: INTERNAL MEDICINE

## 2019-02-14 PROCEDURE — 88342 IMHCHEM/IMCYTCHM 1ST ANTB: CPT | Performed by: INTERNAL MEDICINE

## 2019-02-14 RX ORDER — FLUMAZENIL 0.1 MG/ML
0.2 INJECTION, SOLUTION INTRAVENOUS
Status: CANCELLED | OUTPATIENT
Start: 2019-02-14 | End: 2019-02-14

## 2019-02-14 RX ORDER — ONDANSETRON 4 MG/1
4 TABLET, ORALLY DISINTEGRATING ORAL EVERY 6 HOURS PRN
Status: CANCELLED | OUTPATIENT
Start: 2019-02-14

## 2019-02-14 RX ORDER — SIMETHICONE
LIQUID (ML) MISCELLANEOUS PRN
Status: DISCONTINUED | OUTPATIENT
Start: 2019-02-14 | End: 2019-02-14 | Stop reason: HOSPADM

## 2019-02-14 RX ORDER — ONDANSETRON 2 MG/ML
4 INJECTION INTRAMUSCULAR; INTRAVENOUS EVERY 6 HOURS PRN
Status: CANCELLED | OUTPATIENT
Start: 2019-02-14

## 2019-02-14 RX ORDER — FENTANYL CITRATE 50 UG/ML
INJECTION, SOLUTION INTRAMUSCULAR; INTRAVENOUS PRN
Status: DISCONTINUED | OUTPATIENT
Start: 2019-02-14 | End: 2019-02-14 | Stop reason: HOSPADM

## 2019-02-14 RX ORDER — NALOXONE HYDROCHLORIDE 0.4 MG/ML
.1-.4 INJECTION, SOLUTION INTRAMUSCULAR; INTRAVENOUS; SUBCUTANEOUS
Status: CANCELLED | OUTPATIENT
Start: 2019-02-14 | End: 2019-02-15

## 2019-02-14 NOTE — DISCHARGE INSTRUCTIONS
Discharge Instructions after Colonoscopy    Today you had a __x__ Colonoscopy ____ Sigmoidoscopy    Activity and Diet  You were given medicine for pain. You may be dizzy or sleepy.  For 24 hours:    Do not drive or use heavy equipment.    Do not make important decisions.    Do not drink any alcohol.  You may return to your normal diet and medicines.    Discomfort    Air was placed in your colon during the exam in order to see it. Walking helps to pass the air.    You may take Tylenol (acetaminophen) for pain unless your doctor has told you not to.  Do not take aspirin or ibuprofen (Advil, Motrin, or other anti-inflammatory  drugs) for ___3__ days.    Follow-up  __x__ We took small tissue samples or polyps to study. Your doctor will call you or send you the results  within two weeks.    When to call:    Call right away if you have:    Unusual pain in belly or chest pain not relieved with passing air.    More than 1 to 2 Tablespoons of bleeding from your rectum.    Fever above 100.6  F (37.5  C).    If you have severe pain, bleeding, or shortness of breath, go to an emergency room.    If you have questions, call:  Monday to Friday, 7 a.m. to 4:30 p.m.  Endoscopy: [phone] (We may have to call you back)    After hours  Hospital: [phone] (Ask for the GI fellow on call)

## 2019-02-14 NOTE — LETTER
"                  2/20/2019     Niurka Cisneros  [address]   [place] [address]      Dear Niurka:    The pathology results returned from the \"polyps\" removed at your recent colonoscopy.    The \"polyps\" were benign tissue with no added risk of colon cancer.    Current guidelines recommend that you undergo a follow-up colonoscopy in 5-10 years based on your prior history of polyps.    You should discuss the need for a follow-up colonoscopy based on your health with your primary care physician closer to the time.    Sincerely,            Haim Moreno MD  [facility] [place], ENDOSCOPY  [address], [place] [address]  Phone: [phone]     "

## 2019-02-16 ENCOUNTER — MYC MEDICAL ADVICE (OUTPATIENT)
Dept: INTERNAL MEDICINE | Facility: CLINIC | Age: 74
End: 2019-02-16

## 2019-02-16 DIAGNOSIS — I10 BENIGN ESSENTIAL HYPERTENSION: ICD-10-CM

## 2019-02-18 RX ORDER — TRIAMTERENE/HYDROCHLOROTHIAZID 37.5-25 MG
1 TABLET ORAL DAILY
Qty: 30 TABLET | Refills: 0 | Status: SHIPPED | OUTPATIENT
Start: 2019-02-18 | End: 2019-03-19

## 2019-02-20 LAB — COPATH REPORT: NORMAL

## 2019-03-04 ENCOUNTER — INFUSION THERAPY VISIT (OUTPATIENT)
Dept: INFUSION THERAPY | Facility: CLINIC | Age: 74
End: 2019-03-04
Attending: INTERNAL MEDICINE
Payer: MEDICARE

## 2019-03-04 VITALS
RESPIRATION RATE: 16 BRPM | DIASTOLIC BLOOD PRESSURE: 77 MMHG | SYSTOLIC BLOOD PRESSURE: 118 MMHG | TEMPERATURE: 98.4 F | OXYGEN SATURATION: 97 % | HEART RATE: 72 BPM

## 2019-03-04 DIAGNOSIS — J44.9 STEROID-DEPENDENT CHRONIC OBSTRUCTIVE PULMONARY DISEASE (H): Primary | ICD-10-CM

## 2019-03-04 DIAGNOSIS — Z92.241 STEROID-DEPENDENT CHRONIC OBSTRUCTIVE PULMONARY DISEASE (H): Primary | ICD-10-CM

## 2019-03-04 DIAGNOSIS — J45.50 UNCOMPLICATED SEVERE PERSISTENT ASTHMA (H): ICD-10-CM

## 2019-03-04 PROCEDURE — 25000128 H RX IP 250 OP 636: Mod: ZF | Performed by: INTERNAL MEDICINE

## 2019-03-04 PROCEDURE — 96372 THER/PROPH/DIAG INJ SC/IM: CPT

## 2019-03-04 RX ADMIN — MEPOLIZUMAB 100 MG: 100 INJECTION, POWDER, FOR SOLUTION SUBCUTANEOUS at 12:19

## 2019-03-04 ASSESSMENT — PAIN SCALES - GENERAL: PAINLEVEL: NO PAIN (0)

## 2019-03-04 NOTE — PROGRESS NOTES
Patient presents to the [facility] for Nucala injection. Order written by Stephania Ring MD was completed today. Name and  verified with patient. See MAR for medication details. Medication was divided into 1 syringes by pharmacy and given in the following sites: left arm. Patient tolerated injection well and was discharged to home.     Aisnley Simmons CMA    /77   Pulse 72   Temp 98.4  F (36.9  C) (Oral)   Resp 16   LMP  (LMP Unknown)   SpO2 97%     Administrations This Visit     mepolizumab (NUCALA) injection 100 mg     Admin Date  2019 Action  Given Dose  100 mg Route  Subcutaneous Administered By  Ainsley Simmons CMA

## 2019-03-19 DIAGNOSIS — I10 BENIGN ESSENTIAL HYPERTENSION: Primary | ICD-10-CM

## 2019-03-19 RX ORDER — TRIAMTERENE/HYDROCHLOROTHIAZID 37.5-25 MG
1 TABLET ORAL DAILY
Qty: 30 TABLET | Refills: 9 | Status: SHIPPED | OUTPATIENT
Start: 2019-03-19 | End: 2019-04-09 | Stop reason: DRUGHIGH

## 2019-04-08 ENCOUNTER — INFUSION THERAPY VISIT (OUTPATIENT)
Dept: INFUSION THERAPY | Facility: CLINIC | Age: 74
End: 2019-04-08
Attending: INTERNAL MEDICINE
Payer: MEDICARE

## 2019-04-08 ENCOUNTER — OFFICE VISIT (OUTPATIENT)
Dept: AUDIOLOGY | Facility: CLINIC | Age: 74
End: 2019-04-08
Payer: MEDICARE

## 2019-04-08 ENCOUNTER — OFFICE VISIT (OUTPATIENT)
Dept: OTOLARYNGOLOGY | Facility: CLINIC | Age: 74
End: 2019-04-08
Payer: MEDICARE

## 2019-04-08 VITALS
BODY MASS INDEX: 26.06 KG/M2 | HEART RATE: 80 BPM | WEIGHT: 166 LBS | SYSTOLIC BLOOD PRESSURE: 136 MMHG | HEIGHT: 67 IN | DIASTOLIC BLOOD PRESSURE: 86 MMHG | TEMPERATURE: 98.1 F

## 2019-04-08 VITALS
DIASTOLIC BLOOD PRESSURE: 83 MMHG | TEMPERATURE: 98.4 F | HEART RATE: 79 BPM | SYSTOLIC BLOOD PRESSURE: 131 MMHG | OXYGEN SATURATION: 95 %

## 2019-04-08 DIAGNOSIS — H93.13 TINNITUS OF BOTH EARS: ICD-10-CM

## 2019-04-08 DIAGNOSIS — J44.9 STEROID-DEPENDENT CHRONIC OBSTRUCTIVE PULMONARY DISEASE (H): Primary | ICD-10-CM

## 2019-04-08 DIAGNOSIS — Z92.241 STEROID-DEPENDENT CHRONIC OBSTRUCTIVE PULMONARY DISEASE (H): Primary | ICD-10-CM

## 2019-04-08 DIAGNOSIS — J45.50 UNCOMPLICATED SEVERE PERSISTENT ASTHMA (H): ICD-10-CM

## 2019-04-08 DIAGNOSIS — H90.3 SENSORINEURAL HEARING LOSS, ASYMMETRICAL: Primary | ICD-10-CM

## 2019-04-08 DIAGNOSIS — T16.2XXA FB EAR, LEFT, INITIAL ENCOUNTER: Primary | ICD-10-CM

## 2019-04-08 PROCEDURE — 96372 THER/PROPH/DIAG INJ SC/IM: CPT

## 2019-04-08 PROCEDURE — 25000128 H RX IP 250 OP 636: Mod: ZF | Performed by: INTERNAL MEDICINE

## 2019-04-08 RX ADMIN — MEPOLIZUMAB 100 MG: 100 INJECTION, POWDER, FOR SOLUTION SUBCUTANEOUS at 11:59

## 2019-04-08 ASSESSMENT — MIFFLIN-ST. JEOR: SCORE: 1299.47

## 2019-04-08 ASSESSMENT — PAIN SCALES - GENERAL
PAINLEVEL: NO PAIN (0)
PAINLEVEL: NO PAIN (0)

## 2019-04-08 NOTE — PROGRESS NOTES
HISTORY OF PRESENT ILLNESS:  Ms. Cisneros is here today to have a hearing aid part removed from her left ear canal.        PROCEDURE NOTE:  The left ear is visualized under the microscope.  The patient has the cap from a hearing aid earphone in her ear canal against her eardrum.  This is removed under the microscope with alligator forceps revealing a normal tympanic membrane.  The patient tolerated the procedure well.

## 2019-04-08 NOTE — PROGRESS NOTES
Patient presents to the [facility] for Nucala injection.  Order written by Dr. Ring was completed today. Name and  verified with patient. See MAR for medication details. Medication was divided into 1 syringes by pharmacy and given in the following sites back side of left upper arm. Patient tolerated injection well and was discharged to home. Patient to return back in 28 days.     HUONG Cary LPN    Administrations This Visit     mepolizumab (NUCALA) injection 100 mg     Admin Date  2019 Action  Given Dose  100 mg Route  Subcutaneous Administered By  Javed Cary LPN

## 2019-04-08 NOTE — PROGRESS NOTES
AUDIOLOGY REPORT    SUBJECTIVE:  Niurka Cisneros is a 73 year old female who was seen in the Audiology Clinic at the [facility] for audiologic evaluation and to establish care as a hearing aid patient, referred by Mara Combs MD.  The patient has a known bilateral hearing loss for which she wears binaural ReSound hearing aids fit at an outside clinic. The patient reports bilateral constant tinnitus that has been present for many years. The patient denies bilateral otalgia, bilateral drainage, bilateral aural fullness, and dizziness.  The patient notes difficulty with communication in a variety of listening situations.  She works as a pediatrician.    It was reviewed with the patient that because she did not obtain the hearing aids from us there would be a charge for programming of the devices. She expressed understanding and wanted to proceed.    OBJECTIVE:  Fall Risk Screen:  1. Have you fallen two or more times in the past year? No  2. Have you fallen and had an injury in the past year? No    Timed Up and Go Score (in seconds): not tested  Is patient a fall risk? No  Referral initiated: No  Fall Risk Assessment Completed by Audiology    Otoscopic exam indicates a clear right ear canal.  An older dome was visualized in the left ear, provider attempted to remove but it was too deep. Patient saw ENT prior to hearing evaluation for dome removal from the left ear canal.    Pure Tone Thresholds assessed using conventional audiometry with good  reliability from 250-8000 Hz bilaterally using insert earphones and circumaural headphones     RIGHT:  Normal to severe sensorineural hearing loss    LEFT:    Asymmetric normal to severe sensorineural hearing loss    Tympanogram:    RIGHT: normal eardrum mobility    LEFT:   normal eardrum mobility    Reflexes (reported by stimulus ear):  RIGHT: Ipsilateral is present at normal levels  RIGHT: Contralateral is present at  "normal levels  LEFT:   Ipsilateral is present at normal levels  LEFT:   Contralateral is present at normal levels    Speech Reception Threshold:    RIGHT: 15 dB HL    LEFT:   15 dB HL    Word Recognition Score:     RIGHT: 100% at 65 dB HL using NU-6 recorded word list.    LEFT:   84% at 70 dB HL using NU-6 recorded word list.    The ReSound Linx 3D 9 RITE hearing aids were cleaned. A listening check revealed that the hearing aids sounded crisp and clear with no distortion or weakness noted. An electroacoustic evaluation confirmed that the hearing aids were functioning appropriately.    Simulated real-ear measurements in the Verifit test box revealed that the right hearing aid was meeting NAL-NL1 target gain but that the left hearing aid was well below target gain in the high frequencies.  The domes were changed to Phonak Medium closed domes and the hearing aids were re-calibrated in an effort to raise the limits of feedback.  Gain in the left hearing aid was increased as much as possible but it was limited due to feedback. Patient was counseled that the hearing aid was fit under-target but was now within about 5 dB of target gain in the high frequencies. She expressed understanding.  Under environmental optimizer, soft speech was decreased and patient reported good quality with her own voice. Sound shaper was turned on to \"moderate\" in the left ear.    Patient reported good sound quality and volume following the changes. The possibility of a custom earmold given her hearing thresholds in the high frequencies was reviewed. Patient will try out the settings and return if she would like additional changes.      ASSESSMENT:    Today's results revealed a bilateral asymmetric sensorineural hearing loss.  A hearing aid check was also performed today. Today s results were discussed with the patient in detail.     PLAN:  Patient was counseled regarding hearing loss and impact on communication.  Patient continues to be a good " candidate for amplification at this time. It is recommended that the patient follow-up with an ENT regarding the significant ear difference (patient reported that she thought she had already done that and so did not wish to schedule an appointment today).  Patient should continue to monitor her hearing status every 1-2 years, sooner if concerns. Patient is welcome to return as needed for hearing aid adjustments, it is recommended to have the hearing aids cleaned and evaluated every 10-12 months, sooner if concerns. Please call this clinic with questions regarding these results or recommendations.        Chele Brice  Audiologist  [place] License  #[id number]

## 2019-04-08 NOTE — NURSING NOTE
"Chief Complaint   Patient presents with     Consult     ear cap per audio      Blood pressure 136/86, pulse 80, temperature 98.1  F (36.7  C), height 1.7 m (5' 6.93\"), weight 75.3 kg (166 lb), not currently breastfeeding.    Ezequiel Das LPN    "

## 2019-04-08 NOTE — LETTER
4/8/2019       RE: Niurka Cisneros  [address]  [place] [address]     Dear Colleague,    Thank you for referring your patient, Niurka Cisneros, to the [facility] EAR NOSE AND THROAT at [facility]. Please see a copy of my visit note below.    HISTORY OF PRESENT ILLNESS:  Ms. Cisneros is here today to have a hearing aid part removed from her left ear canal.        PROCEDURE NOTE:  The left ear is visualized under the microscope.  The patient has the cap from a hearing aid earphone in her ear canal against her eardrum.  This is removed under the microscope with alligator forceps revealing a normal tympanic membrane.  The patient tolerated the procedure well.         Again, thank you for allowing me to participate in the care of your patient.      Sincerely,    Santosh Real MD

## 2019-04-09 ENCOUNTER — OFFICE VISIT (OUTPATIENT)
Dept: CARDIOLOGY | Facility: CLINIC | Age: 74
End: 2019-04-09
Attending: INTERNAL MEDICINE
Payer: MEDICARE

## 2019-04-09 ENCOUNTER — ANCILLARY PROCEDURE (OUTPATIENT)
Dept: CARDIOLOGY | Facility: CLINIC | Age: 74
End: 2019-04-09
Payer: MEDICARE

## 2019-04-09 VITALS
DIASTOLIC BLOOD PRESSURE: 84 MMHG | BODY MASS INDEX: 24.48 KG/M2 | OXYGEN SATURATION: 96 % | SYSTOLIC BLOOD PRESSURE: 145 MMHG | HEART RATE: 73 BPM | WEIGHT: 156 LBS | HEIGHT: 67 IN

## 2019-04-09 DIAGNOSIS — I10 BENIGN ESSENTIAL HYPERTENSION: ICD-10-CM

## 2019-04-09 DIAGNOSIS — I72.2 ANEURYSM OF RIGHT RENAL ARTERY (H): ICD-10-CM

## 2019-04-09 DIAGNOSIS — I71.21 ASCENDING AORTIC ANEURYSM (H): Primary | ICD-10-CM

## 2019-04-09 DIAGNOSIS — I71.20 THORACIC AORTIC ANEURYSM WITHOUT RUPTURE (H): ICD-10-CM

## 2019-04-09 PROCEDURE — 93010 ELECTROCARDIOGRAM REPORT: CPT | Mod: ZP | Performed by: INTERNAL MEDICINE

## 2019-04-09 PROCEDURE — G0463 HOSPITAL OUTPT CLINIC VISIT: HCPCS

## 2019-04-09 PROCEDURE — 99204 OFFICE O/P NEW MOD 45 MIN: CPT | Mod: 25 | Performed by: INTERNAL MEDICINE

## 2019-04-09 PROCEDURE — 93005 ELECTROCARDIOGRAM TRACING: CPT | Mod: ZF

## 2019-04-09 RX ORDER — ZOSTER VACCINE RECOMBINANT, ADJUVANTED 50 MCG/0.5
KIT INTRAMUSCULAR
COMMUNITY
Start: 2019-02-15 | End: 2022-04-22

## 2019-04-09 RX ORDER — TRIAMTERENE AND HYDROCHLOROTHIAZIDE 75; 50 MG/1; MG/1
1 TABLET ORAL DAILY
Qty: 30 TABLET | Refills: 3 | Status: SHIPPED | OUTPATIENT
Start: 2019-04-09 | End: 2019-04-09

## 2019-04-09 RX ORDER — TRIAMTERENE AND HYDROCHLOROTHIAZIDE 75; 50 MG/1; MG/1
1 TABLET ORAL DAILY
Qty: 90 TABLET | Refills: 3 | Status: SHIPPED | OUTPATIENT
Start: 2019-04-09 | End: 2020-04-19

## 2019-04-09 ASSESSMENT — MIFFLIN-ST. JEOR: SCORE: 1255.24

## 2019-04-09 ASSESSMENT — PAIN SCALES - GENERAL: PAINLEVEL: NO PAIN (0)

## 2019-04-09 NOTE — NURSING NOTE
Cardiac/Vascular Testing: Patient given instructions regarding CT head/neck and renal artery ultrasound in next 2-3 weeks.  CT chest/abdomen/pelvis prior to 1 year follow up. Discussed purpose, preparation, procedure and when to expect results reported back to the patient. Patient demonstrated understanding of this information and agreed to call with further questions or concerns.  Labs:  Patient was instructed to return for the next laboratory testing in 1 week . Patient demonstrated understanding of this information and agreed to call with further questions or concerns.   Med Reconcile: Reviewed and verified all current medications with the patient. The updated medication list was printed and given to the patient.  Return Appointment: 1 year with Dr. Wang in genetics clinic.  Patient given instructions regarding scheduling next clinic visit. Patient demonstrated understanding of this information and agreed to call with further questions or concerns.  Medication Change: increase tramtarene/Hctz to 75/50 mg once per day.  Patient was educated regarding prescribed medication change, including discussion of the indication, administration, side effects, and when to report to MD or RN. Patient demonstrated understanding of this information and agreed to call with further questions or concerns.  Patient stated she understood all health information given and agreed to call with further questions or concerns.

## 2019-04-09 NOTE — LETTER
4/9/2019      RE: Niurka Cisneros  [address]  [place] [address]       Dear Colleague,    Thank you for the opportunity to participate in the care of your patient, Niurka Cisneros, at the [facility]. Please see a copy of my visit note below.             Vascular Cardiology Consultation: Adult Congenital Clinic      HPI:     This is a 73 year old female here for new patient visit. Has a PMH of bladder prolapse, HTN, seizures, asthma and a newly diagnosed thoracic aneurysm (descending) measuring 4.4 cm.    Reviewed her history in detail: grandmother with aneurysm, at 89 years old, possible rupture and inoperable, leading to death. She has had 2 kids normal vaginal delivery who are healthy in their 40s. She has had 1 miscarriage. H/o bladder prolapse. Mild hypermobility, chronic back pain from scoliosis. Denies abnormal wound healing or bruising/bleeding, hernias, translucent skin, dental crowding, palatal deformities or chest wall deformities.     Thoracic aneurysm picked up 5 years ago and no scanning since. 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 She reports h/o HTN for 10 years, controlled, on 2 drug regimen. Has been on losartan and cannot take beta blocker due to asthma.      PAST MEDICAL HISTORY  Past Medical History:   Diagnosis Date     Abdominal aortic aneurysm without rupture (H) 11/10/2015    [  ] repeat imaging due spring 2016 Descending aortic aneurysm.  Being monitored with serial angiogram      Esophageal hypertension 1/25/2017     Essential hypertension 1/25/2017     Female bladder prolapse 11/10/2015     Hearing loss      Pulmonary nodules 1/25/2017     Seizures (H) 11/10/2015    Temporal seziure d/o.  Does not have LOC.  Has prodromal symptoms      Severe persistent asthma 11/10/2015    Since childhood.  Has been steroid dependent for many years. Has had cydney x2         CURRENT MEDICATIONS  Current  Outpatient Medications   Medication Sig Dispense Refill     albuterol (PROAIR HFA/PROVENTIL HFA/VENTOLIN HFA) 108 (90 Base) MCG/ACT inhaler Inhale 2 puffs into the lungs every 6 hours as needed for shortness of breath / dyspnea or wheezing 3 Inhaler 3     CALCIUM-VITAMIN D PO Take 1 tablet by mouth daily       estradiol (ESTRING) 2 MG vaginal ring Place 1 each vaginally every 3 months 1 each 3     fluticasone-salmeterol (ADVAIR) 500-50 MCG/DOSE inhaler Inhale 1 puff into the lungs 2 times daily 3 Inhaler 3     losartan (COZAAR) 25 MG tablet Take 1 tablet (25 mg) by mouth daily 90 tablet 3     mepolizumab (NUCALA) 100 MG injection Inject 1 mL (100 mg) Subcutaneous every 28 days 1 mL 11     montelukast (SINGULAIR) 10 MG tablet Take 1 tablet (10 mg) by mouth as needed 90 tablet 3     NAPROXEN PO Take 220 mg by mouth 2 times daily (with meals)        potassium chloride ER (KLOR-CON) 20 MEQ CR tablet Take 1 tablet (20 mEq) by mouth daily 90 tablet 3     pravastatin (PRAVACHOL) 20 MG tablet Take 1 tablet (20 mg) by mouth daily 90 tablet 3     SHINGRIX injection        triamterene-HCTZ (MAXZIDE-25) 37.5-25 MG tablet Take 1 tablet by mouth daily 30 tablet 9       PAST SURGICAL HISTORY:  Past Surgical History:   Procedure Laterality Date     CATARACT IOL, RT/LT Bilateral      COLONOSCOPY N/A 2019    Procedure: COLONOSCOPY;  Surgeon: Haim Burgos MD;  Location: [facility]ACT SURGICAL [facility]ACKAGE       HYSTERECTOMY      fibroids     JOINT REPLACEMENT Left      NISSEN FUNDOPLICATION      x2       ALLERGIES   No Known Allergies    FAMILY HISTORY  Family History   Problem Relation Age of Onset     Dementia Mother      Heart Failure Mother      Hypertension Mother      Breast Cancer Mother         post menopausal     Asthma Sister         x2     Depression Sister      Cancer Father         prostate cancer     Hypertension Father      Prostate Cancer Father          of it at 82     Asthma Father          "\"outgrew\" it     Schizophrenia Maternal Grandmother      Coronary Artery Disease Maternal Grandfather         he was diabetic and smoked     Coronary Artery Disease Sister         MI when being ventilated for asthma     Asthma Sister         both sisters with asthma, one severe       SOCIAL HISTORY  Social History     Socioeconomic History     Marital status:      Spouse name: Not on file     Number of children: Not on file     Years of education: 20     Highest education level: Not on file   Occupational History     Occupation: pediatrician   Social Needs     Financial resource strain: Not on file     Food insecurity:     Worry: Not on file     Inability: Not on file     Transportation needs:     Medical: Not on file     Non-medical: Not on file   Tobacco Use     Smoking status: Never Smoker     Smokeless tobacco: Never Used   Substance and Sexual Activity     Alcohol use: Yes     Alcohol/week: 0.0 oz     Comment: 1 glass of wine with dinner     Drug use: No     Sexual activity: Not on file   Lifestyle     Physical activity:     Days per week: Not on file     Minutes per session: Not on file     Stress: Not on file   Relationships     Social connections:     Talks on phone: Not on file     Gets together: Not on file     Attends Oriental orthodox service: Not on file     Active member of club or organization: Not on file     Attends meetings of clubs or organizations: Not on file     Relationship status: Not on file     Intimate partner violence:     Fear of current or ex partner: Not on file     Emotionally abused: Not on file     Physically abused: Not on file     Forced sexual activity: Not on file   Other Topics Concern     Parent/sibling w/ CABG, MI or angioplasty before 65F 55M? 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       ROS:   Constitutional: No fever, chills, or sweats. No weight gain/loss   ENT: No visual disturbance, ear ache, epistaxis, sore " "throat  Allergies/Immunologic: Negative  Respiratory: No cough, hemoptysia  Cardiovascular: As per HPI  GI: No nausea, vomiting, hematemesis, melena, or hematochezia  : No urinary frequency, dysuria, or hematuria  Integument: Negative  Psychiatric: Negative  Neuro: Negative  Endocrinology: Negative   Musculoskeletal: Negative  Vascular: No walking impairment, claudication, ischemic rest pain or nonhealing wounds    EXAM:  /84 (BP Location: Left arm, Patient Position: Chair, Cuff Size: Adult Regular)   Pulse 73   Ht 1.702 m (5' 7\")   Wt 70.8 kg (156 lb)   LMP  (LMP Unknown)   SpO2 96%   BMI 24.43 kg/m     In general, the patient is a pleasant female in no apparent distress.    HEENT: NC/AT.  PERRLA.  EOMI.  Sclerae white, not injected.  Nares clear.  Pharynx without erythema or exudate.  Dentition intact.    Neck: No adenopathy.  No thyromegaly. Carotids +2/2 bilaterally without bruits.  No jugular venous distension.   Heart: RRR. Normal S1, S2 splits physiologically. No murmur, rub, click, or gallop. The PMI is in the 5th ICS in the midclavicular line. There is no heave.    Lungs: CTA.  No ronchi, wheezes, rales.  No dullness to percussion.   Abdomen: Soft, nontender, nondistended. No organomegaly. No AAA.  No bruits.   Extremities: No clubbing, cyanosis, or edema.  No wounds. No varicose veins signs of chronic venous insufficiency.   Vascular: No bruits are noted. 2+DP, PT, Fem, radial, carotid    Labs:  LIPID RESULTS:  Lab Results   Component Value Date    CHOL 190 12/26/2018    HDL 55 12/26/2018     (H) 12/26/2018    TRIG 156 (H) 12/26/2018    CHOLHDLRATIO 3.0 11/10/2015    NHDL 135 (H) 12/26/2018       LIVER ENZYME RESULTS:  No results found for: AST, ALT    CBC RESULTS:  Lab Results   Component Value Date    WBC 6.4 05/16/2016    RBC 4.56 05/16/2016    HGB 12.7 05/16/2016    HCT 38.0 05/16/2016    MCV 83 05/16/2016    MCH 27.9 05/16/2016    MCHC 33.4 05/16/2016    RDW 13.5 05/16/2016    PLT " 254 05/16/2016       BMP RESULTS:  Lab Results   Component Value Date     12/26/2018    POTASSIUM 3.8 12/26/2018    CHLORIDE 103 12/26/2018    CO2 28 12/26/2018    ANIONGAP 6 12/26/2018    GLC 88 12/26/2018    BUN 19 12/26/2018    CR 0.80 12/26/2018    GFRESTIMATED 74 12/26/2018    GFRESTBLACK 86 12/26/2018    ZACH 8.9 12/26/2018        A1C RESULTS:  Lab Results   Component Value Date    A1C 5.8 (H) 12/26/2018       Procedures:                                                                   CT Chest/Abdomen/Pelvis   Angiogram 9/2016     Lung/pleura: No evidence of pleural effusion or pneumothorax. Few  scattered pulmonary nodules, for example 4 mm left upper lobe  pulmonary nodule (series 10 image 181), not significantly changed as  compared to 6/25/2012 exam. 7 x 5 mm posterior left lower lobe  pulmonary nodule (series 10 image 172), not significantly changed as  compared to 6/17/2013 exam. 4 mm right middle lobe pulmonary nodule  (series 10 image 195), not significantly changed as compared to  6/17/2013 exam. Few tiny triangular densities along the lung fissures,  most evident along the right major fissure (series 10 image 170, 173  and 176), consistent with intrafissural lymph nodes.     Abdomen/pelvis: Postsurgical changes of Nissen's fundoplication and  hysterectomy with vaginal pessary. The liver, spleen, adrenal glands,  gallbladder and kidneys are unremarkable. Mild fatty infiltration of  the pancreas. No abnormally dilated bowel loops. No significant  retroperitoneal or mesenteric lymph nodes. Colonic diverticula  stenosis without CT evidence of acute diverticulitis.     Bones: Dextroconvex rotoscoliosis of the lower thoracic and levoconvex  rotoscoliosis of the lumbar spine with associated multilevel  degenerative changes of the thoracolumbar spine. Postsurgical changes  of total left hip arthroplasty.    Impression:  1. 4.4 cm aneurysmal dilatation of ascending aorta, not significant  changed as  compared to 6/17/2015 exam where it measures 4.5 cm.  2. Focal saccular partially calcified aneurysm of the distal right  renal artery, measuring 1.8 cm x 1.4 cm maximum diameter, not  significantly changed as compared to 6/25/2012 exam, where it measured  1.7 x 1.5 cm.  3. Multiple stable bilateral pulmonary nodules, the largest is a 7 x 5  mm posterior left lower lobe pulmonary nodule, previously measured 7 x  4 mm on 6/17/2013 exam.    Echocardiogram 4/9/2019     Interpretation Summary  Global and regional left ventricular function is normal with an EF of 60-65%.  Global right ventricular function is normal.  No significant valvular abnormalities were noted.  Mild dilation of the proximal ascending aorta (4.4 cm, 2.4 cm/m2)  The inferior vena cava was normal in size with preserved respiratory  variability.  No pericardial effusion is present.    EKG 4/9/2019          Assessment and Plan:     73 year old female with scoliosis, bladder prolapse, family history of aneurysmal disease with newly diagnosed thoracic aortic aneurysm measuring 4.4 cm as well as saccular renal artery aneurysm. 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    We discussed genetic testing and counseling, where the variable penetrance and expression of various mutations associated with FTAAD make a definitive diagnosis difficult. TGFBR2 (~5%), ACTA2 (~14%) and MYH11 (~1%) are known mutations that comprise up to 20% of positively tested cases. There is an 80% chance that no identifiable mutation will be discovered in testing. No bicuspid valve by echocardiogram.    With this information in mind, the patient wishes to think about the genetic counseling and testing and discuss with family. Regardless, patient warrants careful surveillance as FTAAD or other MCTD associated aneurysms can rupture/dissect in a more unpredictable manner than degenerative types.     Renal artery  aneurysm will continue to monitor. Not at a point needing repair at this time and stable in size on surveillance imaging. Do not suspect it is altering any renal blood flow.    Plan:    1) CTA C/A/P  2) CTA Head/Neck as prevalent brain aneurysms associated with thoracic aneurysmal disease  3) SBP goal < 120, HR < 80 which she is at  4) No heavy lifting > 30 lbs  5) Echocardiography screening for 1st degree relatives recommended  6) Will potentially offer genetic testing and counseling if patient wishes during future visit     Brandee Wang MD MSc   Aortopathy Clinic  Staff Cardiologist  Vascular Section  [facility]

## 2019-04-09 NOTE — NURSING NOTE
Chief Complaint   Patient presents with     Follow Up      74 y/o for annual follow up of thoracic aortic aneurysm     Medications reviewed and vitals/ EKG performed.  Haley Jose CMA

## 2019-04-09 NOTE — PROGRESS NOTES
Vascular Cardiology Consultation: [facility]      HPI:     This is a 73 year old female here for new patient visit. Has a PMH of bladder prolapse, HTN, seizures, asthma and a newly diagnosed thoracic aneurysm (descending) measuring 4.4 cm.    Reviewed her history in detail: grandmother with aneurysm, at 89 years old, possible rupture and inoperable, leading to death. She has had 2 kids normal vaginal delivery who are healthy in their 40s. She has had 1 miscarriage. H/o bladder prolapse. Mild hypermobility, chronic back pain from scoliosis. Denies abnormal wound healing or bruising/bleeding, hernias, translucent skin, dental crowding, palatal deformities or chest wall deformities.     Thoracic aneurysm picked up 5 years ago and no scanning since. 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 She reports h/o HTN for 10 years, controlled, on 2 drug regimen. Has been on losartan and cannot take beta blocker due to asthma.      PAST MEDICAL HISTORY  Past Medical History:   Diagnosis Date     Abdominal aortic aneurysm without rupture (H) 11/10/2015    [  ] repeat imaging due spring 2016 Descending aortic aneurysm.  Being monitored with serial angiogram      Esophageal hypertension 1/25/2017     Essential hypertension 1/25/2017     Female bladder prolapse 11/10/2015     Hearing loss      Pulmonary nodules 1/25/2017     Seizures (H) 11/10/2015    Temporal seziure d/o.  Does not have LOC.  Has prodromal symptoms      Severe persistent asthma 11/10/2015    Since childhood.  Has been steroid dependent for many years. Has had cydney x2         CURRENT MEDICATIONS  Current Outpatient Medications   Medication Sig Dispense Refill     albuterol (PROAIR HFA/PROVENTIL HFA/VENTOLIN HFA) 108 (90 Base) MCG/ACT inhaler Inhale 2 puffs into the lungs every 6 hours as needed for shortness of breath / dyspnea or wheezing 3 Inhaler 3     CALCIUM-VITAMIN D PO Take 1 tablet by mouth daily       estradiol  "(ESTRING) 2 MG vaginal ring Place 1 each vaginally every 3 months 1 each 3     fluticasone-salmeterol (ADVAIR) 500-50 MCG/DOSE inhaler Inhale 1 puff into the lungs 2 times daily 3 Inhaler 3     losartan (COZAAR) 25 MG tablet Take 1 tablet (25 mg) by mouth daily 90 tablet 3     mepolizumab (NUCALA) 100 MG injection Inject 1 mL (100 mg) Subcutaneous every 28 days 1 mL 11     montelukast (SINGULAIR) 10 MG tablet Take 1 tablet (10 mg) by mouth as needed 90 tablet 3     NAPROXEN PO Take 220 mg by mouth 2 times daily (with meals)        potassium chloride ER (KLOR-CON) 20 MEQ CR tablet Take 1 tablet (20 mEq) by mouth daily 90 tablet 3     pravastatin (PRAVACHOL) 20 MG tablet Take 1 tablet (20 mg) by mouth daily 90 tablet 3     SHINGRIX injection        triamterene-HCTZ (MAXZIDE-25) 37.5-25 MG tablet Take 1 tablet by mouth daily 30 tablet 9       PAST SURGICAL HISTORY:  Past Surgical History:   Procedure Laterality Date     CATARACT IOL, RT/LT Bilateral      COLONOSCOPY N/A 2019    Procedure: COLONOSCOPY;  Surgeon: Haim Burgos MD;  Location:  GI     H CATARACT SURGICAL PACKAGE       HYSTERECTOMY      fibroids     JOINT REPLACEMENT Left      NISSEN FUNDOPLICATION      x2       ALLERGIES   No Known Allergies    FAMILY HISTORY  Family History   Problem Relation Age of Onset     Dementia Mother      Heart Failure Mother      Hypertension Mother      Breast Cancer Mother         post menopausal     Asthma Sister         x2     Depression Sister      Cancer Father         prostate cancer     Hypertension Father      Prostate Cancer Father          of it at 82     Asthma Father         \"outgrew\" it     Schizophrenia Maternal Grandmother      Coronary Artery Disease Maternal Grandfather         he was diabetic and smoked     Coronary Artery Disease Sister         MI when being ventilated for asthma     Asthma Sister         both sisters with asthma, one severe       SOCIAL HISTORY  Social History "     Socioeconomic History     Marital status:      Spouse name: Not on file     Number of children: Not on file     Years of education: 20     Highest education level: Not on file   Occupational History     Occupation: pediatrician   Social Needs     Financial resource strain: Not on file     Food insecurity:     Worry: Not on file     Inability: Not on file     Transportation needs:     Medical: Not on file     Non-medical: Not on file   Tobacco Use     Smoking status: Never Smoker     Smokeless tobacco: Never Used   Substance and Sexual Activity     Alcohol use: Yes     Alcohol/week: 0.0 oz     Comment: 1 glass of wine with dinner     Drug use: No     Sexual activity: Not on file   Lifestyle     Physical activity:     Days per week: Not on file     Minutes per session: Not on file     Stress: Not on file   Relationships     Social connections:     Talks on phone: Not on file     Gets together: Not on file     Attends [employer] service: Not on file     Active member of club or organization: Not on file     Attends meetings of clubs or organizations: Not on file     Relationship status: Not on file     Intimate partner violence:     Fear of current or ex partner: Not on file     Emotionally abused: Not on file     Physically abused: Not on file     Forced sexual activity: Not on file   Other Topics Concern     Parent/sibling w/ CABG, MI or angioplasty before 65F 55M? 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       ROS:   Constitutional: No fever, chills, or sweats. No weight gain/loss   ENT: No visual disturbance, ear ache, epistaxis, sore throat  Allergies/Immunologic: Negative  Respiratory: No cough, hemoptysia  Cardiovascular: As per HPI  GI: No nausea, vomiting, hematemesis, melena, or hematochezia  : No urinary frequency, dysuria, or hematuria  Integument: Negative  Psychiatric: Negative  Neuro: Negative  Endocrinology: Negative  "  Musculoskeletal: Negative  Vascular: No walking impairment, claudication, ischemic rest pain or nonhealing wounds    EXAM:  /84 (BP Location: Left arm, Patient Position: Chair, Cuff Size: Adult Regular)   Pulse 73   Ht 1.702 m (5' 7\")   Wt 70.8 kg (156 lb)   LMP  (LMP Unknown)   SpO2 96%   BMI 24.43 kg/m    In general, the patient is a pleasant female in no apparent distress.    HEENT: NC/AT.  PERRLA.  EOMI.  Sclerae white, not injected.  Nares clear.  Pharynx without erythema or exudate.  Dentition intact.    Neck: No adenopathy.  No thyromegaly. Carotids +2/2 bilaterally without bruits.  No jugular venous distension.   Heart: RRR. Normal S1, S2 splits physiologically. No murmur, rub, click, or gallop. The PMI is in the 5th ICS in the midclavicular line. There is no heave.    Lungs: CTA.  No ronchi, wheezes, rales.  No dullness to percussion.   Abdomen: Soft, nontender, nondistended. No organomegaly. No AAA.  No bruits.   Extremities: No clubbing, cyanosis, or edema.  No wounds. No varicose veins signs of chronic venous insufficiency.   Vascular: No bruits are noted. 2+DP, PT, Fem, radial, carotid    Labs:  LIPID RESULTS:  Lab Results   Component Value Date    CHOL 190 12/26/2018    HDL 55 12/26/2018     (H) 12/26/2018    TRIG 156 (H) 12/26/2018    CHOLHDLRATIO 3.0 11/10/2015    NHDL 135 (H) 12/26/2018       LIVER ENZYME RESULTS:  No results found for: AST, ALT    CBC RESULTS:  Lab Results   Component Value Date    WBC 6.4 05/16/2016    RBC 4.56 05/16/2016    HGB 12.7 05/16/2016    HCT 38.0 05/16/2016    MCV 83 05/16/2016    MCH 27.9 05/16/2016    MCHC 33.4 05/16/2016    RDW 13.5 05/16/2016     05/16/2016       BMP RESULTS:  Lab Results   Component Value Date     12/26/2018    POTASSIUM 3.8 12/26/2018    CHLORIDE 103 12/26/2018    CO2 28 12/26/2018    ANIONGAP 6 12/26/2018    GLC 88 12/26/2018    BUN 19 12/26/2018    CR 0.80 12/26/2018    GFRESTIMATED 74 12/26/2018    GFRESTBLACK " 86 12/26/2018    ZACH 8.9 12/26/2018        A1C RESULTS:  Lab Results   Component Value Date    A1C 5.8 (H) 12/26/2018       Procedures:                                                                   CT Chest/Abdomen/Pelvis   Angiogram 9/2016     Lung/pleura: No evidence of pleural effusion or pneumothorax. Few  scattered pulmonary nodules, for example 4 mm left upper lobe  pulmonary nodule (series 10 image 181), not significantly changed as  compared to 6/25/2012 exam. 7 x 5 mm posterior left lower lobe  pulmonary nodule (series 10 image 172), not significantly changed as  compared to 6/17/2013 exam. 4 mm right middle lobe pulmonary nodule  (series 10 image 195), not significantly changed as compared to  6/17/2013 exam. Few tiny triangular densities along the lung fissures,  most evident along the right major fissure (series 10 image 170, 173  and 176), consistent with intrafissural lymph nodes.     Abdomen/pelvis: Postsurgical changes of Nissen's fundoplication and  hysterectomy with vaginal pessary. The liver, spleen, adrenal glands,  gallbladder and kidneys are unremarkable. Mild fatty infiltration of  the pancreas. No abnormally dilated bowel loops. No significant  retroperitoneal or mesenteric lymph nodes. Colonic diverticula  stenosis without CT evidence of acute diverticulitis.     Bones: Dextroconvex rotoscoliosis of the lower thoracic and levoconvex  rotoscoliosis of the lumbar spine with associated multilevel  degenerative changes of the thoracolumbar spine. Postsurgical changes  of total left hip arthroplasty.    Impression:  1. 4.4 cm aneurysmal dilatation of ascending aorta, not significant  changed as compared to 6/17/2015 exam where it measures 4.5 cm.  2. Focal saccular partially calcified aneurysm of the distal right  renal artery, measuring 1.8 cm x 1.4 cm maximum diameter, not  significantly changed as compared to 6/25/2012 exam, where it measured  1.7 x 1.5 cm.  3. Multiple stable bilateral  pulmonary nodules, the largest is a 7 x 5  mm posterior left lower lobe pulmonary nodule, previously measured 7 x  4 mm on 6/17/2013 exam.    Echocardiogram 4/9/2019     Interpretation Summary  Global and regional left ventricular function is normal with an EF of 60-65%.  Global right ventricular function is normal.  No significant valvular abnormalities were noted.  Mild dilation of the proximal ascending aorta (4.4 cm, 2.4 cm/m2)  The inferior vena cava was normal in size with preserved respiratory  variability.  No pericardial effusion is present.    EKG 4/9/2019          Assessment and Plan:     73 year old female with scoliosis, bladder prolapse, family history of aneurysmal disease with newly diagnosed thoracic aortic aneurysm measuring 4.4 cm as well as saccular renal artery aneurysm. 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    We discussed genetic testing and counseling, where the variable penetrance and expression of various mutations associated with FTAAD make a definitive diagnosis difficult. TGFBR2 (~5%), ACTA2 (~14%) and MYH11 (~1%) are known mutations that comprise up to 20% of positively tested cases. There is an 80% chance that no identifiable mutation will be discovered in testing. No bicuspid valve by echocardiogram.    With this information in mind, the patient wishes to think about the genetic counseling and testing and discuss with family. Regardless, patient warrants careful surveillance as FTAAD or other MCTD associated aneurysms can rupture/dissect in a more unpredictable manner than degenerative types.     Renal artery aneurysm will continue to monitor. Not at a point needing repair at this time and stable in size on surveillance imaging. Do not suspect it is altering any renal blood flow.    Plan:    1) CTA C/A/P  2) CTA Head/Neck as prevalent brain aneurysms associated with thoracic aneurysmal disease  3) SBP goal  < 120, HR < 80 which she is at  4) No heavy lifting > 30 lbs  5) Echocardiography screening for 1st degree relatives recommended  6) Will potentially offer genetic testing and counseling if patient wishes during future visit     Brandee Wang MD MSc   [facility]  Staff Cardiologist  Vascular Section  [facility]

## 2019-04-09 NOTE — PATIENT INSTRUCTIONS
You were seen today in the Cardiovascular Clinic at the [facility].      Cardiology Providers you saw during your visit:   Dr. Wang    Diagnosis:    Ascending aortic aneurysm (H)     Aneurysm of right renal artery    Results:  EKG and echo reviewed    Recommendations:   CT Head/neck in the next 2-3 weeks     Renal artery ultrasound.  Can be done same day as CT Head/neck     Increase Triamtarene/HCTZ to 75/50 mg     Labs one week after starting increased dosing.     CT chest/abdomen/pelvis prior to 1 year follow up    Follow-up:   1 year with Dr. Wang (genetics)    For emergencies call [phone].    For any scheduling needs, please call [phone].     Thank you for your visit today!     Please call if you have any questions or concerns.  Dell Hunt RN

## 2019-04-10 LAB — INTERPRETATION ECG - MUSE: NORMAL

## 2019-04-15 ENCOUNTER — OFFICE VISIT (OUTPATIENT)
Dept: PULMONOLOGY | Facility: CLINIC | Age: 74
End: 2019-04-15
Attending: INTERNAL MEDICINE
Payer: MEDICARE

## 2019-04-15 VITALS
DIASTOLIC BLOOD PRESSURE: 77 MMHG | BODY MASS INDEX: 25.9 KG/M2 | WEIGHT: 165 LBS | HEART RATE: 67 BPM | SYSTOLIC BLOOD PRESSURE: 135 MMHG | OXYGEN SATURATION: 96 % | RESPIRATION RATE: 17 BRPM | HEIGHT: 67 IN

## 2019-04-15 DIAGNOSIS — I10 BENIGN ESSENTIAL HYPERTENSION: ICD-10-CM

## 2019-04-15 DIAGNOSIS — J45.50 SEVERE PERSISTENT ASTHMA WITHOUT COMPLICATION (H): ICD-10-CM

## 2019-04-15 DIAGNOSIS — I72.2 ANEURYSM OF RIGHT RENAL ARTERY (H): ICD-10-CM

## 2019-04-15 DIAGNOSIS — J45.40 MODERATE PERSISTENT ASTHMA WITHOUT COMPLICATION: Primary | ICD-10-CM

## 2019-04-15 LAB
ANION GAP SERPL CALCULATED.3IONS-SCNC: 3 MMOL/L (ref 3–14)
BUN SERPL-MCNC: 19 MG/DL (ref 7–30)
CALCIUM SERPL-MCNC: 9.4 MG/DL (ref 8.5–10.1)
CHLORIDE SERPL-SCNC: 105 MMOL/L (ref 94–109)
CO2 SERPL-SCNC: 29 MMOL/L (ref 20–32)
CREAT SERPL-MCNC: 0.72 MG/DL (ref 0.52–1.04)
DLCOUNC-%PRED-PRE: 90 %
DLCOUNC-PRE: 19.38 ML/MIN/MMHG
DLCOUNC-PRED: 21.42 ML/MIN/MMHG
ERV-%PRED-PRE: 54 %
ERV-PRE: 0.41 L
ERV-PRED: 0.75 L
EXPTIME-PRE: 8.16 SEC
FEF2575-%PRED-PRE: 37 %
FEF2575-PRE: 0.74 L/SEC
FEF2575-PRED: 1.96 L/SEC
FEFMAX-%PRED-PRE: 65 %
FEFMAX-PRE: 3.96 L/SEC
FEFMAX-PRED: 6.04 L/SEC
FEV1-%PRED-PRE: 59 %
FEV1-PRE: 1.43 L
FEV1FEV6-PRE: 60 %
FEV1FEV6-PRED: 79 %
FEV1FVC-PRE: 60 %
FEV1FVC-PRED: 78 %
FEV1SVC-PRE: 58 %
FEV1SVC-PRED: 70 %
FIFMAX-PRE: 4.27 L/SEC
FVC-%PRED-PRE: 76 %
FVC-PRE: 2.39 L
FVC-PRED: 3.13 L
GFR SERPL CREATININE-BSD FRML MDRD: 83 ML/MIN/{1.73_M2}
GLUCOSE SERPL-MCNC: 81 MG/DL (ref 70–99)
IC-%PRED-PRE: 77 %
IC-PRE: 2.07 L
IC-PRED: 2.67 L
POTASSIUM SERPL-SCNC: 3.5 MMOL/L (ref 3.4–5.3)
SODIUM SERPL-SCNC: 138 MMOL/L (ref 133–144)
VA-%PRED-PRE: 77 %
VA-PRE: 4.13 L
VC-%PRED-PRE: 72 %
VC-PRE: 2.49 L
VC-PRED: 3.42 L

## 2019-04-15 PROCEDURE — G0463 HOSPITAL OUTPT CLINIC VISIT: HCPCS | Mod: ZF

## 2019-04-15 RX ORDER — PREDNISONE 5 MG/1
TABLET ORAL
Qty: 84 TABLET | Refills: 0 | Status: SHIPPED | OUTPATIENT
Start: 2019-04-15 | End: 2019-07-11

## 2019-04-15 RX ORDER — THEOPHYLLINE 100 MG/1
200 TABLET, EXTENDED RELEASE ORAL 2 TIMES DAILY
Qty: 60 TABLET | Refills: 11 | Status: SHIPPED | OUTPATIENT
Start: 2019-04-15 | End: 2019-04-17

## 2019-04-15 ASSESSMENT — ENCOUNTER SYMPTOMS
DYSPNEA ON EXERTION: 1
MUSCLE CRAMPS: 0
NECK PAIN: 0
COUGH DISTURBING SLEEP: 0
POSTURAL DYSPNEA: 0
BACK PAIN: 1
ARTHRALGIAS: 0
HEMOPTYSIS: 0
JOINT SWELLING: 0
SNORES LOUDLY: 0
WHEEZING: 1
STIFFNESS: 0
SHORTNESS OF BREATH: 1
COUGH: 1
MYALGIAS: 0
MUSCLE WEAKNESS: 0
SPUTUM PRODUCTION: 1

## 2019-04-15 ASSESSMENT — MIFFLIN-ST. JEOR: SCORE: 1296.07

## 2019-04-15 ASSESSMENT — PAIN SCALES - GENERAL: PAINLEVEL: NO PAIN (0)

## 2019-04-15 NOTE — PROGRESS NOTES
Answers for HPI/ROS submitted by the patient on 4/15/2019   General Symptoms: No  Skin Symptoms: No  HENT Symptoms: No  EYE SYMPTOMS: No  HEART SYMPTOMS: No  LUNG SYMPTOMS: Yes  INTESTINAL SYMPTOMS: No  URINARY SYMPTOMS: No  GYNECOLOGIC SYMPTOMS: No  BREAST SYMPTOMS: No  SKELETAL SYMPTOMS: Yes  BLOOD SYMPTOMS: No  NERVOUS SYSTEM SYMPTOMS: No  MENTAL HEALTH SYMPTOMS: No  Cough: Yes  Sputum or phlegm: Yes  Coughing up blood: No  Difficulty breating or shortness of breath: Yes  Snoring: No  Wheezing: Yes  Difficulty breathing on exertion: Yes  Nighttime Cough: No  Difficulty breathing when lying flat: No  Back pain: Yes  Muscle aches: No  Neck pain: No  Swollen joints: No  Joint pain: No  Bone pain: No  Muscle cramps: No  Muscle weakness: No  Joint stiffness: No  Bone fracture: No

## 2019-04-15 NOTE — Clinical Note
Completed note for prior authorization for benralizumab (switch from mepolizumab- mepo therapy plan not discontinued)

## 2019-04-15 NOTE — LETTER
4/15/2019       RE: Niurka Cisneros  [address]  [place] [address]     Dear Colleague,    Thank you for referring your patient, Niurka Cisneros, to the [facility] at [facility]. Please see a copy of my visit note below.      Answers for HPI/ROS submitted by the patient on 4/15/2019   General Symptoms: No  Skin Symptoms: No  HENT Symptoms: No  EYE SYMPTOMS: No  HEART SYMPTOMS: No  LUNG SYMPTOMS: Yes  INTESTINAL SYMPTOMS: No  URINARY SYMPTOMS: No  GYNECOLOGIC SYMPTOMS: No  BREAST SYMPTOMS: No  SKELETAL SYMPTOMS: Yes  BLOOD SYMPTOMS: No  NERVOUS SYSTEM SYMPTOMS: No  MENTAL HEALTH SYMPTOMS: No  Cough: Yes  Sputum or phlegm: Yes  Coughing up blood: No  Difficulty breating or shortness of breath: Yes  Snoring: No  Wheezing: Yes  Difficulty breathing on exertion: Yes  Nighttime Cough: No  Difficulty breathing when lying flat: No  Back pain: Yes  Muscle aches: No  Neck pain: No  Swollen joints: No  Joint pain: No  Bone pain: No  Muscle cramps: No  Muscle weakness: No  Joint stiffness: No  Bone fracture: No      Service Date: 04/15/2019      CHIEF COMPLAINT:  Asthma.      HISTORY OF PRESENT ILLNESS:  The patient is a 73-year-old woman with longstanding severe asthma, here for ongoing followup.  She had been followed as part of the severe asthma research cohort for many years just prior to establishing with me.  She was looking to start mepolizumab for chronic eosinophilic asthma and has now been on mepolizumab for approximately 3 years.  Initially, she was able to taper herself off of her every other day chronic prednisone prescription with minimal recurrent exacerbations.  Unfortunately, this winter she has had some increase in shortness of breath, especially with climbing stairs and some increase in cough.  She said that she initially attributed it to the cold weather, but noted that she had no improvement in her shortness of breath or  exercise tolerance when spending the month of March in [place] where it was warmer.  She did eventually take a prescription for prednisone and had some improvement in her breathing; however, approximately 2 weeks after discontinuing that medication she has had a progressive worsening of her shortness of breath.  She is currently using her albuterol about 4 times daily.  Previously she used it scheduled prior to taking Advair but is also using it with exertion currently.  She overall feels like her breathing capacity is simply a little bit worse than it was before.  She is here to discuss alternatives and other management options in order to help improve her breathing.      PAST MEDICAL HISTORY:   1. Asthma.   2. Seizure.   3. Abdominal aortic aneurysm.      FAMILY HISTORY:  Mother had breast cancer, hypertension, dementia.  Father hypertension and prostate cancer.  Sister with asthma and depression.      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  She has a dog.      REVIEW OF SYSTEMS:  A full 10-point review of systems was done and is negative except as noted above and in the HPI.      PHYSICAL EXAMINATION:   VITAL SIGNS:  Blood pressure is 135/77, pulse is 67, respiratory rate 17, SpO2 is 96% on room air, BMI is 25.84.   GENERAL APPEARANCE:  Appears stated age, no distress.   EYES:  PERRLA.  No conjunctival injection.   RESPIRATORY:  Good air movement bilaterally.  No wheezes, rales or rhonchi.   CARDIOVASCULAR:  Regular rate and rhythm, normal S1, S2, no murmurs, rubs or gallops.  JVP not appreciated with patient sitting upright at 90 degrees.   MUSCULOSKELETAL:  No clubbing or cyanosis.   SKIN:  No rash on limited exam.   NEUROLOGIC:  Mentation appears intact and speech is fluent.      RESULTS:   Pulmonary function testing from today shows moderate obstruction with normal DLCO.      Most recent chest imaging is  from 09/2016, formal impression by Dr. Guerrero   1. 4.4 cm aneurysmal dilation of the ascending aorta, not significantly changed compared to 2015.   2. 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   3. Multiple stable bilateral pulmonary nodules, the largest is 7x5 mm in the posterior left lower lobe, previously measured 7x4.     Most recent CBC with differential was from 05/2016 showed a peripheral eosinophil count of 500.  The one prior to that was 11/2015 and showed a peripheral eosinophil count of 800.      ASSESSMENT AND PLAN:  The patient is a 73-year-old woman with a history of asthma, here for ongoing followup.      Severe asthma with recent exacerbation.  The patient has longstanding severe eosinophilic asthma.  She has been on mepolizumab which was started for management of chronic steroid dependent asthma.  She had been on chronic daily steroids for many years, but was able to taper off of steroids after starting mepolizumab.  Unfortunately, she has had some loss of asthma control and I am worried that she is developing neutralizing antibodies against mepolizumab.  I therefore think it would be reasonable to switch to benralizumab.      She has had consistently demonstrated elevated peripheral eosinophil count prior to initiating IL5 therapy.  Since she is currently on IL5 therapy current measurement of peripheral eosinophil count would not accurately reflect her known eosinophilic asthma phenotype.      Given my concern for neutralizing antibodies, I think switching to an alternative (ie Benralizumab, an IL-5Ralpha inhibitor) would be reasonable and I did write therapy plan for this today.      She is interested in trying additional bronchodilators therapy as well and she has had good effect using theophylline in the past.  I wrote a prescription for theophylline 200 twice daily for her today.      I also suggested potentially trying a longer slower steroid taper.  She  is hesitant to move forward with this, so I provided her with a paper prescription to fill in the future if she wishes.      She is currently on fluticasone/salmeterol as her controller medication as well as montelukast.  I did talk to her about adding long-acting muscarinic antagonist, however, with prior trials of LAMA she had no improvement in her symptoms, so will hold off on this option for now.      I will have her come back and see me in approximately 6 months.  We will work through prior authorization paperwork in the meantime.         DAYANA NEWELL MD             D: 04/15/2019   T: 04/15/2019   MT: [id number]      Name:     JANESSA LAWRENCE   MRN:      [id number]        Account:      [id number]   :      1945           Service Date: 04/15/2019      Document: [id number]

## 2019-04-15 NOTE — PATIENT INSTRUCTIONS
It was nice to see you again today.    Plan:   Theophylline first.  Prednisone/long taper if ineffective  I will do the prior authorization to switch from mepolizumab to benralizumab (Braulio).    Stephania Ring

## 2019-04-15 NOTE — PROGRESS NOTES
Service Date: 04/15/2019      CHIEF COMPLAINT:  Asthma.      HISTORY OF PRESENT ILLNESS:  The patient is a 73-year-old woman with longstanding severe asthma, here for ongoing followup.  She had been followed as part of the severe asthma research cohort for many years just prior to establishing with me.  She was looking to start mepolizumab for chronic eosinophilic asthma and has now been on mepolizumab for approximately 3 years.  Initially, she was able to taper herself off of her every other day chronic prednisone prescription with minimal recurrent exacerbations.  Unfortunately, this winter she has had some increase in shortness of breath, especially with climbing stairs and some increase in cough.  She said that she initially attributed it to the cold weather, but noted that she had no improvement in her shortness of breath or exercise tolerance when spending the month of March in [place] where it was warmer.  She did eventually take a prescription for prednisone and had some improvement in her breathing; however, approximately 2 weeks after discontinuing that medication she has had a progressive worsening of her shortness of breath.  She is currently using her albuterol about 4 times daily.  Previously she used it scheduled prior to taking Advair but is also using it with exertion currently.  She overall feels like her breathing capacity is simply a little bit worse than it was before.  She is here to discuss alternatives and other management options in order to help improve her breathing.      PAST MEDICAL HISTORY:   1. Asthma.   2. Seizure.   3. Abdominal aortic aneurysm.      FAMILY HISTORY:  Mother had breast cancer, hypertension, dementia.  Father hypertension and prostate cancer.  Sister with asthma and depression.      SOCIAL HISTORY:  She is a retired pediatrician.  She practiced in [place] before moving to the [facility] to be closer to her grandkids.  She has a dog.      REVIEW OF SYSTEMS:  A full  10-point review of systems was done and is negative except as noted above and in the HPI.      PHYSICAL EXAMINATION:   VITAL SIGNS:  Blood pressure is 135/77, pulse is 67, respiratory rate 17, SpO2 is 96% on room air, BMI is 25.84.   GENERAL APPEARANCE:  Appears stated age, no distress.   EYES:  PERRLA.  No conjunctival injection.   RESPIRATORY:  Good air movement bilaterally.  No wheezes, rales or rhonchi.   CARDIOVASCULAR:  Regular rate and rhythm, normal S1, S2, no murmurs, rubs or gallops.  JVP not appreciated with patient sitting upright at 90 degrees.   MUSCULOSKELETAL:  No clubbing or cyanosis.   SKIN:  No rash on limited exam.   NEUROLOGIC:  Mentation appears intact and speech is fluent.      RESULTS:   Pulmonary function testing from today shows moderate obstruction with normal DLCO.      Most recent chest imaging is from 09/2016, formal impression by Dr. Guerrero   1. 4.4 cm aneurysmal dilation of the ascending aorta, not significantly changed compared to 2015.   2. 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   3. Multiple stable bilateral pulmonary nodules, the largest is 7x5 mm in the posterior left lower lobe, previously measured 7x4.     Most recent CBC with differential was from 05/2016 showed a peripheral eosinophil count of 500.  The one prior to that was 11/2015 and showed a peripheral eosinophil count of 800.      ASSESSMENT AND PLAN:  The patient is a 73-year-old woman with a history of asthma, here for ongoing followup.      Severe asthma with recent exacerbation.  The patient has longstanding severe eosinophilic asthma.  She has been on mepolizumab which was started for management of chronic steroid dependent asthma.  She had been on chronic daily steroids for many years, but was able to taper off of steroids after starting mepolizumab.  Unfortunately, she has had some loss of asthma control and I am worried that she is developing neutralizing antibodies  against mepolizumab.  I therefore think it would be reasonable to switch to benralizumab.      She has had consistently demonstrated elevated peripheral eosinophil count prior to initiating IL5 therapy.  Since she is currently on IL5 therapy current measurement of peripheral eosinophil count would not accurately reflect her known eosinophilic asthma phenotype.      Given my concern for neutralizing antibodies, I think switching to an alternative (ie Benralizumab, an IL-5Ralpha inhibitor) would be reasonable and I did write therapy plan for this today.      She is interested in trying additional bronchodilators therapy as well and she has had good effect using theophylline in the past.  I wrote a prescription for theophylline 200 twice daily for her today.      I also suggested potentially trying a longer slower steroid taper.  She is hesitant to move forward with this, so I provided her with a paper prescription to fill in the future if she wishes.      She is currently on fluticasone/salmeterol as her controller medication as well as montelukast.  I did talk to her about adding long-acting muscarinic antagonist, however, with prior trials of LAMA she had no improvement in her symptoms, so will hold off on this option for now.      I will have her come back and see me in approximately 6 months.  We will work through prior authorization paperwork in the meantime.         DAYANA NEWELL MD             D: 04/15/2019   T: 04/15/2019   MT: tania      Name:     JANESSA LAWRENCE   MRN:      [id number]        Account:      [id number]   :      1945           Service Date: 04/15/2019      Document: [id number]

## 2019-04-17 ENCOUNTER — TELEPHONE (OUTPATIENT)
Dept: PULMONOLOGY | Facility: CLINIC | Age: 74
End: 2019-04-17

## 2019-04-17 DIAGNOSIS — J45.50 SEVERE PERSISTENT ASTHMA (H): Primary | ICD-10-CM

## 2019-04-17 NOTE — TELEPHONE ENCOUNTER
[facility] Call Center    Phone Message    May a detailed message be left on voicemail: no    Reason for Call: Other: [facility] requesting call back to discuss and alternative to script of theophylline (DENIA-24) 400 MG 24 hr capsule     Action Taken: Message routed to:  [facility] ([facility] pulm

## 2019-04-17 NOTE — TELEPHONE ENCOUNTER
Contacted pt to let her know that Fasenra is approved. When pt was doing Nucala, she had a $0 co-pay so she will likely have a $0 co-pay for Fasenra. She asked me to contact her insurance company to verify these benefits. I will do this in the morning tomorrow and then contact patient to schedule first injection.

## 2019-04-18 NOTE — TELEPHONE ENCOUNTER
[employer] will cover the co-insurance amount that is left over after Medicare. I am going to reach out to Dr. Ring to see when she would like Niurak to have her first Fasenra injection.

## 2019-04-22 DIAGNOSIS — J45.50 UNCOMPLICATED SEVERE PERSISTENT ASTHMA (H): ICD-10-CM

## 2019-04-22 RX ORDER — ALBUTEROL SULFATE 90 UG/1
2 AEROSOL, METERED RESPIRATORY (INHALATION) EVERY 6 HOURS PRN
Qty: 54 G | Refills: 3 | Status: SHIPPED | OUTPATIENT
Start: 2019-04-22 | End: 2019-09-16

## 2019-04-24 ENCOUNTER — ANCILLARY PROCEDURE (OUTPATIENT)
Dept: CT IMAGING | Facility: CLINIC | Age: 74
End: 2019-04-24
Attending: INTERNAL MEDICINE
Payer: MEDICARE

## 2019-04-24 ENCOUNTER — ANCILLARY PROCEDURE (OUTPATIENT)
Dept: ULTRASOUND IMAGING | Facility: CLINIC | Age: 74
End: 2019-04-24
Attending: INTERNAL MEDICINE
Payer: MEDICARE

## 2019-04-24 DIAGNOSIS — I10 BENIGN ESSENTIAL HYPERTENSION: ICD-10-CM

## 2019-04-24 DIAGNOSIS — I72.2 ANEURYSM OF RIGHT RENAL ARTERY (H): ICD-10-CM

## 2019-04-24 DIAGNOSIS — I71.21 ASCENDING AORTIC ANEURYSM (H): ICD-10-CM

## 2019-04-24 RX ORDER — IOPAMIDOL 755 MG/ML
75 INJECTION, SOLUTION INTRAVASCULAR ONCE
Status: COMPLETED | OUTPATIENT
Start: 2019-04-24 | End: 2019-04-24

## 2019-04-24 RX ADMIN — IOPAMIDOL 75 ML: 755 INJECTION, SOLUTION INTRAVASCULAR at 08:26

## 2019-04-24 NOTE — DISCHARGE INSTRUCTIONS

## 2019-05-16 ENCOUNTER — INFUSION THERAPY VISIT (OUTPATIENT)
Dept: INFUSION THERAPY | Facility: CLINIC | Age: 74
End: 2019-05-16
Attending: INTERNAL MEDICINE
Payer: MEDICARE

## 2019-05-16 VITALS
RESPIRATION RATE: 16 BRPM | OXYGEN SATURATION: 97 % | DIASTOLIC BLOOD PRESSURE: 88 MMHG | SYSTOLIC BLOOD PRESSURE: 134 MMHG | TEMPERATURE: 98.2 F | HEART RATE: 96 BPM

## 2019-05-16 DIAGNOSIS — J45.50 SEVERE PERSISTENT ASTHMA WITHOUT COMPLICATION (H): Primary | ICD-10-CM

## 2019-05-16 PROCEDURE — 96372 THER/PROPH/DIAG INJ SC/IM: CPT

## 2019-05-16 PROCEDURE — 25000128 H RX IP 250 OP 636: Mod: ZF | Performed by: INTERNAL MEDICINE

## 2019-05-16 RX ADMIN — BENRALIZUMAB 30 MG: 30 INJECTION, SOLUTION SUBCUTANEOUS at 08:43

## 2019-05-16 ASSESSMENT — PAIN SCALES - GENERAL: PAINLEVEL: NO PAIN (0)

## 2019-05-16 NOTE — PROGRESS NOTES
Patient presents to the [facility] for 1st dose of Fasenra injection. Order written by Stephania Ring MD was completed today. Name and  verified with patient. See MAR for medication details. Medication was divided into 1 syringes by pharmacy and given in the following sites: back of the left arm. Patient tolerated injection well and was discharged to home after 2 hour observation period.    Ainsley Simmons CMA    /72   Pulse 90   Temp 98.2  F (36.8  C) (Oral)   Resp 16   LMP  (LMP Unknown)   SpO2 97%     Administrations This Visit     benralizumab (FASENRA) injection 30 mg     Admin Date  2019 Action  Given Dose  30 mg Route  Subcutaneous Administered By  Ainsley Simmons CMA

## 2019-05-16 NOTE — PROGRESS NOTES

## 2019-05-16 NOTE — PATIENT INSTRUCTIONS
Dear Niurka Cisneros    Thank you for choosing [facility] ([facility]) for your injection  The following information is a summary of our appointment as well as important reminders.      You received your first injection of fasenra    We look forward in seeing you on your next appointment here at [facility] ([facility]).  Please don t hesitate to call us at [phone] to reschedule any of your appointments or to speak with one of the [facility] registered nurses.  It was a pleasure taking care of you today.    Sincerely,    [facility]  [address] [place] [address]  [id number]  Phone:  [phone]    Patient Education     Benralizumab injection  Brand Name: Fasenra  What is this medicine?  BENRALIZUMAB (DAYANA ra FELIPE oo mab) is used to help treat severe asthma. It should be used in combination with other asthma treatments.  How should I use this medicine?  This medicine is for injection under the skin. It is give by a health care professional in a hospital or clinic setting.  Talk to your pediatrician regarding the use of this medicine in children. While this drug may be prescribed for children as young as 12 years for selected conditions, precautions do apply.  What side effects may I notice from receiving this medicine?  Side effects that you should report to your doctor or health care professional as soon as possible:    allergic reactions like skin rash, itching or hives, swelling of the face, lips, or tongue    breathing problems    painful rash or blisters    signs and symptoms of low blood pressure like dizziness; feeling faint or lightheaded, falls    signs and symptoms of infection like fever or chills; cough; sore throat; pain or trouble passing urine  Side effects that usually do not require medical attention (report these to your doctor or health care  professional if they continue or are bothersome):    headache    pain, redness, or irritation at the site where injected    weak or tired  What may interact with this medicine?  Interactions are not expected.  What if I miss a dose?  Keep appointments for follow-up doses as directed. It is important not to miss your dose. Call your doctor or health care professional if you are unable to keep an appointment.  Where should I keep my medicine?  This drug is given in a hospital or clinic and will not be stored at home.  What should I tell my health care provider before I take this medicine?  They need to know if you have any of these conditions:    parasitic (helminth) infection    an unusual or allergic reaction to benralizumab, hamster proteins, other medicines, foods, dyes, or preservatives    pregnant or trying to get pregnant    breast-feeding  What should I watch for while using this medicine?  Tell your doctor or healthcare professional if your symptoms do not start to get better or if they get worse.  Do not stop taking your other asthma medicines unless instructed to do so by your doctor or health care professional.  Your condition will be monitored carefully while you are receiving this medicine.  NOTE:This sheet is a summary. It may not cover all possible information. If you have questions about this medicine, talk to your doctor, pharmacist, or health care provider. Copyright  2018 [employer]vier

## 2019-06-03 DIAGNOSIS — Z86.73 HISTORY OF STROKE: ICD-10-CM

## 2019-06-03 DIAGNOSIS — R93.0 ABNORMAL CT SCAN OF HEAD: Primary | ICD-10-CM

## 2019-06-03 DIAGNOSIS — Z87.820 HISTORY OF MULTIPLE CONCUSSIONS: ICD-10-CM

## 2019-06-04 ENCOUNTER — CARE COORDINATION (OUTPATIENT)
Dept: CARDIOLOGY | Facility: CLINIC | Age: 74
End: 2019-06-04

## 2019-06-04 NOTE — PROGRESS NOTES
Date: 6/4/2019    Time of Call: 1:27 PM     Diagnosis:  History of descending thoracic aneurysm      [ TORB ] Ordering provider: Dr Brandee Wang  Order: Neurology referral- There was an incidental findings however of a small old stroke in the frontal lobe. Also some mild plaque in the arteries of the neck      Order received by: Moses Calixto RN     Follow-up/additional notes: Patient sent My Chart message with recommendations from Dr Wang

## 2019-06-06 ENCOUNTER — TELEPHONE (OUTPATIENT)
Dept: CARDIOLOGY | Facility: CLINIC | Age: 74
End: 2019-06-06

## 2019-06-06 NOTE — TELEPHONE ENCOUNTER
Date: 6/6/2019    Time of Call: 11:19 AM     Diagnosis:  Atherosclerosis of carotid arteries     [ TORB ] Ordering provider: Brandee Wang MD  Order: ASA 81 mg po qd     Order received by: Dell Hunt RN     Follow-up/additional notes: spoke with patient by phone.  Dr. Wang recommends starting ASA 81 mg once per day.  Patient states that she has taken ASA in the past and has not had any reaction. She will start ASA today.  The patient states understanding and agrees to call with any further questions or concerns.

## 2019-06-13 ENCOUNTER — INFUSION THERAPY VISIT (OUTPATIENT)
Dept: INFUSION THERAPY | Facility: CLINIC | Age: 74
End: 2019-06-13
Attending: INTERNAL MEDICINE
Payer: MEDICARE

## 2019-06-13 ENCOUNTER — OFFICE VISIT (OUTPATIENT)
Dept: AUDIOLOGY | Facility: CLINIC | Age: 74
End: 2019-06-13
Payer: MEDICARE

## 2019-06-13 VITALS
OXYGEN SATURATION: 97 % | SYSTOLIC BLOOD PRESSURE: 131 MMHG | DIASTOLIC BLOOD PRESSURE: 81 MMHG | TEMPERATURE: 97.7 F | HEART RATE: 66 BPM

## 2019-06-13 DIAGNOSIS — H90.3 SENSORINEURAL HEARING LOSS, BILATERAL: Primary | ICD-10-CM

## 2019-06-13 DIAGNOSIS — J45.50 SEVERE PERSISTENT ASTHMA WITHOUT COMPLICATION (H): Primary | ICD-10-CM

## 2019-06-13 PROCEDURE — 25000128 H RX IP 250 OP 636: Mod: ZF | Performed by: INTERNAL MEDICINE

## 2019-06-13 PROCEDURE — 96372 THER/PROPH/DIAG INJ SC/IM: CPT

## 2019-06-13 RX ORDER — ASPIRIN 81 MG/1
81 TABLET ORAL DAILY
COMMUNITY
End: 2020-01-23

## 2019-06-13 RX ADMIN — BENRALIZUMAB 30 MG: 30 INJECTION, SOLUTION SUBCUTANEOUS at 08:18

## 2019-06-13 ASSESSMENT — PAIN SCALES - GENERAL: PAINLEVEL: NO PAIN (0)

## 2019-06-13 NOTE — PROGRESS NOTES
General assessment for IV and SQ medications    1. Elevated temperature, fever, chills, productive cough or abnormal vital signs, night sweats, coughing up blood or sputum, no appetite or abnormal vital signs, SOB : NO    2. Open wounds or new incisions: NO    3. Recent hospitalization: NO    4.  Recent surgeries:  NO    5. Any upcoming surgeries or dental procedures?:NO    6. Any current or recent bouts of illness or infection? On any antibiotics? : NO    7. Any new, sudden or worsening abdominal pain :NO    8. Vaccination within 4 weeks? Patient or someone in the household is scheduled to receive vaccination? No live virus vaccines prior to or during treatment :NO    9. Any nervous system diseases [i.e. multiple sclerosis, Guillain-[place], seizures, neurological  Changes]  Ask if on a biologic medication* : NO    10. Pregnant or breast feeding; or plans on pregnancy in the future: NO    11. Signs of worsening depression or suicidal ideations while taking benlysta:NO    12. New-onset medical symptoms: NO    13.  New cancer diagnosis or on chemotherapy or radiation NO    14.  Evaluate for any sign of active TB [Unexplained weight loss, Loss of appetite, Night sweats, Fever, Fatigue, Chills, Coughing for 3 weeks or longer, Hemoptysis (coughing up blood), Chest pain]: NO          **Note: If answered yes to any of the above, hold the infusion and contact ordering provider.

## 2019-06-13 NOTE — PROGRESS NOTES
Patient presents to the [facility] for Fasenra injection 2nd dose.  Order written by Dr. Ring was completed today. Name and  verified with patient. See MAR for medication details. Medication was divided into 1 syringes by pharmacy and given in the following sites back side of right upper arm. Patient tolerated injection well and was monitored for 2 hours after administration. Patient was discharged to home. Patient to return back in 4 weeks.    HUONG Cary LPN    Administrations This Visit     benralizumab (FASENRA) injection 30 mg     Admin Date  2019 Action  Given Dose  30 mg Route  Subcutaneous Administered By  Javed Cary LPN

## 2019-06-14 NOTE — PROGRESS NOTES
AUDIOLOGY REPORT    SUBJECTIVE:  Niurka Cisneros is a 73 year old female who was seen in the Audiology Clinic at the [facility] and [facility] for a hearing aid check The patient has a bilateral normal to severe sensorineural hearing loss for which she wears binaural ReSound hearing aids fit at an outside clinic. She reports excessive feedback from the hearing aids.      OBJECTIVE:  Otoscopy revealed clear canals bilaterally.    The feedback system was rerun and was increased to Strong. Feedback was significantly decreased with this change. We discussed that if she continues to get feedback that we will need to occlude the ears more or decrease gain. She would like to try the hearing aids as is first.    ASSESSMENT:   A hearing aid check was performed today. Today s results were discussed with the patient in detail.     PLAN:  Niurka will return for follow-up as needed, or at least every 9-12 months for cleaning and assessment of hearing aid.  . Please call this clinic with any questions regarding today s appointment.    Gucci Santos, [facility]  Licensed Audiologist  [place] License #[id number]

## 2019-07-11 ENCOUNTER — INFUSION THERAPY VISIT (OUTPATIENT)
Dept: INFUSION THERAPY | Facility: CLINIC | Age: 74
End: 2019-07-11
Attending: INTERNAL MEDICINE
Payer: MEDICARE

## 2019-07-11 ENCOUNTER — OFFICE VISIT (OUTPATIENT)
Dept: NEUROLOGY | Facility: CLINIC | Age: 74
End: 2019-07-11
Attending: INTERNAL MEDICINE
Payer: MEDICARE

## 2019-07-11 VITALS
DIASTOLIC BLOOD PRESSURE: 89 MMHG | SYSTOLIC BLOOD PRESSURE: 153 MMHG | HEART RATE: 80 BPM | TEMPERATURE: 98.4 F | OXYGEN SATURATION: 97 %

## 2019-07-11 VITALS
SYSTOLIC BLOOD PRESSURE: 153 MMHG | DIASTOLIC BLOOD PRESSURE: 89 MMHG | HEART RATE: 80 BPM | TEMPERATURE: 98.4 F | OXYGEN SATURATION: 97 %

## 2019-07-11 DIAGNOSIS — Z87.820 HISTORY OF MULTIPLE CONCUSSIONS: ICD-10-CM

## 2019-07-11 DIAGNOSIS — R93.0 ABNORMAL CT SCAN OF HEAD: ICD-10-CM

## 2019-07-11 DIAGNOSIS — J45.50 SEVERE PERSISTENT ASTHMA WITHOUT COMPLICATION (H): Primary | ICD-10-CM

## 2019-07-11 PROCEDURE — 96372 THER/PROPH/DIAG INJ SC/IM: CPT

## 2019-07-11 PROCEDURE — 25000128 H RX IP 250 OP 636: Mod: ZF | Performed by: INTERNAL MEDICINE

## 2019-07-11 RX ADMIN — BENRALIZUMAB 30 MG: 30 INJECTION, SOLUTION SUBCUTANEOUS at 08:55

## 2019-07-11 ASSESSMENT — PAIN SCALES - GENERAL
PAINLEVEL: NO PAIN (0)
PAINLEVEL: NO PAIN (0)

## 2019-07-11 NOTE — NURSING NOTE
Chief Complaint   Patient presents with     Consult     [facility] NEW - ABNORMAL FINDINGS OF CT HEAD/NECK, INDICATED OLD STROKE       Federico Evans, EMT

## 2019-07-11 NOTE — PROGRESS NOTES
Patient presents to the [facility] for Fasenra injection 3rd dose.  Order written by Dr. Ring was completed today. Name and  verified with patient. See MAR for medication details. Medication was divided into 1 syringes by pharmacy and given in the following sites back side of right upper arm.  Patient tolerated injection well and was monitored for 2 hours after administration. Patient was discharged to home. Patient to return back in 8 weeks.     HUONG Cary LPN    Administrations This Visit     benralizumab (FASENRA) injection 30 mg     Admin Date  2019 Action  Given Dose  30 mg Route  Subcutaneous Administered By  Javed Cary LPN
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
## 2019-09-05 ENCOUNTER — INFUSION THERAPY VISIT (OUTPATIENT)
Dept: INFUSION THERAPY | Facility: CLINIC | Age: 74
End: 2019-09-05
Attending: INTERNAL MEDICINE
Payer: MEDICARE

## 2019-09-05 VITALS
DIASTOLIC BLOOD PRESSURE: 83 MMHG | TEMPERATURE: 97.3 F | HEART RATE: 69 BPM | SYSTOLIC BLOOD PRESSURE: 135 MMHG | OXYGEN SATURATION: 95 %

## 2019-09-05 DIAGNOSIS — J45.50 UNCOMPLICATED SEVERE PERSISTENT ASTHMA (H): ICD-10-CM

## 2019-09-05 DIAGNOSIS — Z92.241 STEROID-DEPENDENT CHRONIC OBSTRUCTIVE PULMONARY DISEASE (H): Primary | ICD-10-CM

## 2019-09-05 DIAGNOSIS — J44.9 STEROID-DEPENDENT CHRONIC OBSTRUCTIVE PULMONARY DISEASE (H): Primary | ICD-10-CM

## 2019-09-05 DIAGNOSIS — J45.50 SEVERE PERSISTENT ASTHMA WITHOUT COMPLICATION (H): ICD-10-CM

## 2019-09-05 PROCEDURE — 96372 THER/PROPH/DIAG INJ SC/IM: CPT

## 2019-09-05 PROCEDURE — 25000128 H RX IP 250 OP 636: Mod: ZF | Performed by: INTERNAL MEDICINE

## 2019-09-05 RX ADMIN — BENRALIZUMAB 30 MG: 30 INJECTION, SOLUTION SUBCUTANEOUS at 08:40

## 2019-09-05 ASSESSMENT — PAIN SCALES - GENERAL: PAINLEVEL: NO PAIN (0)

## 2019-09-05 NOTE — PROGRESS NOTES
Patient presents to the [facility] for Fasenra injection.  Order written by Dr. Landeros was completed today. Name and  verified with patient. See MAR for medication details. Medication was divided into 1 syringes by pharmacy and given in the following sites back side of left upper arm. Patient tolerated injection well and was monitored for 30 minutes after administration. Patient was discharged to home. Patient to return back in 8 weeks.     HUONG Cary LPN    Administrations This Visit     benralizumab (FASENRA) injection 30 mg     Admin Date  2019 Action  Given Dose  30 mg Route  Subcutaneous Administered By  Javed Cary LPN

## 2019-09-16 ENCOUNTER — OFFICE VISIT (OUTPATIENT)
Dept: PULMONOLOGY | Facility: CLINIC | Age: 74
End: 2019-09-16
Payer: MEDICARE

## 2019-09-16 VITALS
OXYGEN SATURATION: 97 % | HEIGHT: 67 IN | BODY MASS INDEX: 24.8 KG/M2 | WEIGHT: 158 LBS | RESPIRATION RATE: 17 BRPM | DIASTOLIC BLOOD PRESSURE: 72 MMHG | HEART RATE: 79 BPM | SYSTOLIC BLOOD PRESSURE: 108 MMHG

## 2019-09-16 DIAGNOSIS — J45.40 MODERATE PERSISTENT ASTHMA WITHOUT COMPLICATION: Primary | ICD-10-CM

## 2019-09-16 DIAGNOSIS — R91.8 PULMONARY NODULES: ICD-10-CM

## 2019-09-16 DIAGNOSIS — J45.50 SEVERE PERSISTENT ASTHMA, UNSPECIFIED WHETHER COMPLICATED (H): Primary | ICD-10-CM

## 2019-09-16 PROCEDURE — G0463 HOSPITAL OUTPT CLINIC VISIT: HCPCS | Mod: ZF

## 2019-09-16 RX ORDER — ALBUTEROL SULFATE 90 UG/1
2 AEROSOL, METERED RESPIRATORY (INHALATION) EVERY 6 HOURS PRN
Qty: 54 G | Refills: 3 | Status: SHIPPED | OUTPATIENT
Start: 2019-09-16 | End: 2020-11-02

## 2019-09-16 RX ORDER — MONTELUKAST SODIUM 10 MG/1
10 TABLET ORAL PRN
Qty: 90 TABLET | Refills: 3 | Status: SHIPPED | OUTPATIENT
Start: 2019-09-16 | End: 2020-11-02

## 2019-09-16 ASSESSMENT — ASTHMA QUESTIONNAIRES
ACT_TOTALSCORE: 17
QUESTION_1 LAST FOUR WEEKS HOW MUCH OF THE TIME DID YOUR ASTHMA KEEP YOU FROM GETTING AS MUCH DONE AT WORK, SCHOOL OR AT HOME: A LITTLE OF THE TIME
QUESTION_5 LAST FOUR WEEKS HOW WOULD YOU RATE YOUR ASTHMA CONTROL: WELL CONTROLLED
QUESTION_2 LAST FOUR WEEKS HOW OFTEN HAVE YOU HAD SHORTNESS OF BREATH: ONCE A DAY
QUESTION_3 LAST FOUR WEEKS HOW OFTEN DID YOUR ASTHMA SYMPTOMS (WHEEZING, COUGHING, SHORTNESS OF BREATH, CHEST TIGHTNESS OR PAIN) WAKE YOU UP AT NIGHT OR EARLIER THAN USUAL IN THE MORNING: NOT AT ALL
QUESTION_4 LAST FOUR WEEKS HOW OFTEN HAVE YOU USED YOUR RESCUE INHALER OR NEBULIZER MEDICATION (SUCH AS ALBUTEROL): ONE OR TWO TIMES PER DAY

## 2019-09-16 ASSESSMENT — PAIN SCALES - GENERAL: PAINLEVEL: NO PAIN (0)

## 2019-09-16 ASSESSMENT — MIFFLIN-ST. JEOR: SCORE: 1259.31

## 2019-09-16 NOTE — NURSING NOTE
Chief Complaint   Patient presents with     RECHECK     3 month follow up     Medications reviewed and vital signs taken.   Maxx Loera CMA

## 2019-09-16 NOTE — LETTER
My Asthma Action Plan    Name: Niurka Cisneros   YOB: 1945  Date: 9/16/2019   My doctor: Jose De Jesus Vega MD   My clinic: [facility]        My Control Medicine: Fluticasone propionate + salmeterol (Advair Diskus or Wixela Inhub) -  500/50 mcg 1 puff BID, Fasenra  My Rescue Medicine: Albuterol (Proair/Ventolin/Proventil HFA) 2-4 puffs EVERY 4 HOURS as needed. Use a spacer if recommended by your provider.   My Asthma Severity:   Severe Persistent  Know your asthma triggers: Ragweed               GREEN ZONE   Good Control    I feel good    No cough or wheeze    Can work, sleep and play without asthma symptoms       Take your asthma control medicine every day.     1. If exercise triggers your asthma, take your rescue medication    15 minutes before exercise or sports, and    During exercise if you have asthma symptoms  2. Spacer to use with inhaler: If you have a spacer, make sure to use it with your inhaler             YELLOW ZONE Getting Worse  I have ANY of these:    I do not feel good    Cough or wheeze    Chest feels tight    Wake up at night   1. Keep taking your Green Zone medications  2. Start taking your rescue medicine:    every 20 minutes for up to 1 hour. Then every 4 hours for 24-48 hours.  3. If you stay in the Yellow Zone for more than 12-24 hours, contact your doctor.  4. If you do not return to the Green Zone in 12-24 hours or you get worse, start taking your oral steroid medicine if prescribed by your provider.           RED ZONE Medical Alert - Get Help  I have ANY of these:    I feel awful    Medicine is not helping    Breathing getting harder    Trouble walking or talking    Nose opens wide to breathe       1. Take your rescue medicine NOW  2. If your provider has prescribed an oral steroid medicine, start taking it NOW  3. Call your doctor NOW  4. If you are still in the Red Zone after 20 minutes and you have not reached your doctor:    Take  your rescue medicine again and    Call 911 or go to the emergency room right away    See your regular doctor within 2 weeks of an Emergency Room or Urgent Care visit for follow-up treatment.          Annual Reminders:  Meet with Asthma Educator,  Flu Shot in the Fall, consider Pneumonia Vaccination for patients with asthma (aged 19 and older).    Pharmacy[facility]    [id number] - [place], [place] - [address] [facility] [place], [place] - [address] AT [address] TO [facility] S[address]  [id number] - [place], [place] - [address]                          Asthma Triggers  How To Control Things That Make Your Asthma Worse    Triggers are things that make your asthma worse.  Look at the list below to help you find your triggers and what you can do about them.  You can help prevent asthma flare-ups by staying away from your triggers.      Trigger                                                          What you can do   Cigarette Smoke  Tobacco smoke can make asthma worse. Do not allow smoking in your home, car or around you.  Be sure no one smokes at a child s day care or school.  If you smoke, ask your health care provider for ways to help you quit.  Ask family members to quit too.  Ask your health care provider for a referral to Quit Plan to help you quit smoking, or call 0-743-168-PLAN.     Colds, Flu, Bronchitis  These are common triggers of asthma. Wash your hands often.  Don t touch your eyes, nose or mouth.  Get a flu shot every year.     Dust Mites  These are tiny bugs that live in cloth or carpet. They are too small to see. Wash sheets and blankets in hot water every week.   Encase pillows and mattress in dust mite proof covers.  Avoid having carpet if you can. If you have carpet, vacuum weekly.   Use a dust mask and HEPA vacuum.   Pollen and Outdoor Mold  Some people are allergic to trees, grass, or weed pollen, or molds. Try to  keep your windows closed.  Limit time out doors when pollen count is high.   Ask you health care provider about taking medicine during allergy season.     Animal Dander  Some people are allergic to skin flakes, urine or saliva from pets with fur or feathers. Keep pets with fur or feathers out of your home.    If you can t keep the pet outdoors, then keep the pet out of your bedroom.  Keep the bedroom door closed.  Keep pets off cloth furniture and away from stuffed toys.     Mice, Rats, and Cockroaches   Some people are allergic to the waste from these pests.   Cover food and garbage.  Clean up spills and food crumbs.  Store grease in the refrigerator.   Keep food out of the bedroom.   Indoor Mold  This can be a trigger if your home has high moisture. Fix leaking faucets, pipes, or other sources of water.   Clean moldy surfaces.  Dehumidify basement if it is damp and smelly.   Smoke, Strong Odors, and Sprays  These can reduce air quality. Stay away from strong odors and sprays, such as perfume, powder, hair spray, paints, smoke incense, paint, cleaning products, candles and new carpet.   Exercise or Sports  Some people with asthma have this trigger. Be active!  Ask your doctor about taking medicine before sports or exercise to prevent symptoms.    Warm up for 5-10 minutes before and after sports or exercise.     Other Triggers of Asthma  Cold air:  Cover your nose and mouth with a scarf.  Sometimes laughing or crying can be a trigger.  Some medicines and food can trigger asthma.

## 2019-09-16 NOTE — LETTER
9/16/2019       RE: Niurka Cisneros  [address]  [place] [address]     Dear Colleague,    Thank you for referring your patient, Niurka Cisneros, to the [facility] at [facility]. Please see a copy of my visit note below.    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  She did have an exacerbation on Nucala.  She is on inhaled corticosteroid and long-acting beta agonist in addition to theophylline.  She says theophylline is the most effective medication for her.  She is currently taking 400 mg daily.  I think it is worthwhile to continue on with Fasenra given her history of eosinophilia in the past, and her stable pulmonary symptoms.  She has also had improvement in her forced expiratory volume in 1 second.    she has received her pneumococcal vaccinations in the past.  She will get her fall flu vaccine.    RTC in 1 year       Rodney Vega MD          Today's visit note:     Chief Complaint: Niurka Cisneros is a 74 year old year old female who is being seen for RECHECK (3 month follow up )      HISTORY OF PRESENT ILLNESS:    This is a 74-year-old female with a history of asthma who is presenting to the pulmonary clinic today for regular follow-up visit.    She has had asthma her whole life.  Currently her symptoms are under good control.  She had previously been on Nucala, but had an exacerbation while on that.  She was switched to Fasenra in May, and so far her symptoms have been under control.  She can walk up a flight of stairs no problem.  She rides a bike.  She has a daily cough with a small amount of sputum.  She takes naproxen, but does not note that her symptoms are different when on this medication.               Past Medical and Surgical History:     Past Medical History:  "  Diagnosis Date     Abdominal aortic aneurysm without rupture (H) 11/10/2015    [  ] repeat imaging due spring 2016 Descending aortic aneurysm.  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  Does not have LOC.  Has prodromal symptoms      Severe persistent asthma 11/10/2015    Since childhood.  Has been steroid dependent for many years. Has had cydney x2       Past Surgical History:   Procedure Laterality Date     CATARACT IOL, RT/LT Bilateral      COLONOSCOPY N/A 2019    Procedure: COLONOSCOPY;  Surgeon: Haim Burgos MD;  Location: [facility]       HYSTERECTOMY      fibroids     JOINT REPLACEMENT Left      NISSEN FUNDOPLICATION      x2           Family History:     Family History   Problem Relation Age of Onset     Dementia Mother      Heart Failure Mother      Hypertension Mother      Breast Cancer Mother         post menopausal     Asthma Sister         x2     Depression Sister      Cancer Father         prostate cancer     Hypertension Father      Prostate Cancer Father          of it at 82     Asthma Father         \"outgrew\" it     Schizophrenia Maternal Grandmother      Coronary Artery Disease Maternal Grandfather         he was diabetic and smoked     Coronary Artery Disease Sister         MI when being ventilated for asthma     Asthma Sister         both sisters with asthma, one severe              Social History:     Social History     Socioeconomic History     Marital status:      Spouse name: Not on file     Number of children: Not on file     Years of education: 20     Highest education level: Not on file   Occupational History     Occupation: pediatrician   Social Needs     Financial resource " strain: Not on file     Food insecurity:     Worry: Not on file     Inability: Not on file     Transportation needs:     Medical: Not on file     Non-medical: Not on file   Tobacco Use     Smoking status: Never Smoker     Smokeless tobacco: Never Used   Substance and Sexual Activity     Alcohol use: Yes     Alcohol/week: 0.0 oz     Comment: 1 glass of wine with dinner     Drug use: No     Sexual activity: Not on file   Lifestyle     Physical activity:     Days per week: Not on file     Minutes per session: Not on file     Stress: Not on file   Relationships     Social connections:     Talks on phone: Not on file     Gets together: Not on file     Attends [employer] service: Not on file     Active member of club or organization: Not on file     Attends meetings of clubs or organizations: Not on file     Relationship status: Not on file     Intimate partner violence:     Fear of current or ex partner: Not on file     Emotionally abused: Not on file     Physically abused: Not on file     Forced sexual activity: Not on file   Other Topics Concern     Parent/sibling w/ CABG, MI or angioplasty before 65F 55M? Not Asked   Social History Narrative    Retired Pediatrician, moved to [facility] from [place], [place].            Medications:     Current Outpatient Medications   Medication     albuterol (PROAIR HFA/PROVENTIL HFA/VENTOLIN HFA) 108 (90 Base) MCG/ACT inhaler     aspirin 81 MG EC tablet     CALCIUM-VITAMIN D PO     estradiol (ESTRING) 2 MG vaginal ring     fluticasone-salmeterol (ADVAIR) 500-50 MCG/DOSE inhaler     losartan (COZAAR) 25 MG tablet     montelukast (SINGULAIR) 10 MG tablet     NAPROXEN PO     potassium chloride ER (KLOR-CON) 20 MEQ CR tablet     pravastatin (PRAVACHOL) 20 MG tablet     SHINGRIX injection     theophylline (DENIA-24) 400 MG 24 hr capsule     triamterene-HCTZ (MAXZIDE) 75-50 MG tablet     No current facility-administered medications for this visit.             Review of Systems:       A complete  "review of systems was otherwise negative except as noted in the HPI.      PHYSICAL EXAM:  Pulse 79   Resp 17   Ht 1.702 m (5' 7\")   Wt 71.7 kg (158 lb)   LMP  (LMP Unknown)   SpO2 97%   BMI 24.75 kg/m        General: Well developed, well nourished, No apparent distress  Eyes: Anicteric  Ears: Hearing grossly normal  Neck: supple  Respiratory: Good air movement. No crackles. No rhonchi. No wheezes  Cardiac: RRR, normal S1, S2. No murmurs. No JVD  Musculoskeletal: Extremities normal. No clubbing. No cyanosis. No edema.  Skin: No rash on limited exam  Neuro: Normal mentation. Normal speech.  Psych:Normal affect           Data:   All laboratory and imaging data reviewed.      Absolute eosinophils in May 2016 were 500    PFT:       PFT Interpretation:  Moderate airflow obstruction.  There has been improvement in her FEV1 compared to prior.  Valid Maneuver      Recent Results (from the past 168 hour(s))   General PFT Lab (Please always keep checked)    Collection Time: 09/16/19  9:25 AM   Result Value Ref Range    FVC-Pred 3.09 L    FVC-Pre 2.68 L    FVC-%Pred-Pre 86 %    FEV1-Pre 1.67 L    FEV1-%Pred-Pre 70 %    FEV1FVC-Pred 78 %    FEV1FVC-Pre 62 %    FEFMax-Pred 5.96 L/sec    FEFMax-Pre 5.17 L/sec    FEFMax-%Pred-Pre 86 %    FEF2575-Pred 1.92 L/sec    FEF2575-Pre 0.87 L/sec    DDS5865-%Pred-Pre 45 %    ExpTime-Pre 9.96 sec    FIFMax-Pre 3.30 L/sec    FEV1FEV6-Pred 79 %    FEV1FEV6-Pre 64 %              Again, thank you for allowing me to participate in the care of your patient.      Sincerely,    Jose De Jesus Vega MD      "

## 2019-09-16 NOTE — PROGRESS NOTES
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  She did have an exacerbation on Nucala.  She is on inhaled corticosteroid and long-acting beta agonist in addition to theophylline.  She says theophylline is the most effective medication for her.  She is currently taking 400 mg daily.  I think it is worthwhile to continue on with Fasenra given her history of eosinophilia in the past, and her stable pulmonary symptoms.  She has also had improvement in her forced expiratory volume in 1 second.    she has received her pneumococcal vaccinations in the past.  She will get her fall flu vaccine.    RTC in 1 year       Rodney Vega MD          Today's visit note:     Chief Complaint: Niurka Cisneros is a 74 year old year old female who is being seen for RECHECK (3 month follow up )      HISTORY OF PRESENT ILLNESS:    This is a 74-year-old female with a history of asthma who is presenting to the pulmonary clinic today for regular follow-up visit.    She has had asthma her whole life.  Currently her symptoms are under good control.  She had previously been on Nucala, but had an exacerbation while on that.  She was switched to Fasenra in May, and so far her symptoms have been under control.  She can walk up a flight of stairs no problem.  She rides a bike.  She has a daily cough with a small amount of sputum.  She takes naproxen, but does not note that her symptoms are different when on this medication.               Past Medical and Surgical History:     Past Medical History:   Diagnosis Date     Abdominal aortic aneurysm without rupture (H) 11/10/2015    [  ] repeat imaging due spring 2016 Descending aortic aneurysm.  Being monitored with serial angiogram      Aneurysm of right renal artery (H) 4/9/2019     Esophageal hypertension 1/25/2017     Essential hypertension 1/25/2017      "Female bladder prolapse 11/10/2015     Hearing loss      Pulmonary nodules 2017     Seizures (H) 11/10/2015    Temporal seziure d/o.  Does not have LOC.  Has prodromal symptoms      Severe persistent asthma 11/10/2015    Since childhood.  Has been steroid dependent for many years. Has had cydney x2       Past Surgical History:   Procedure Laterality Date     CATARACT IOL, RT/LT Bilateral      COLONOSCOPY N/A 2019    Procedure: COLONOSCOPY;  Surgeon: Haim Burgos MD;  Location: [facility]     H CATARACT SURGICAL PACKAGE       HYSTERECTOMY      fibroids     JOINT REPLACEMENT Left      NISSEN FUNDOPLICATION      x2           Family History:     Family History   Problem Relation Age of Onset     Dementia Mother      Heart Failure Mother      Hypertension Mother      Breast Cancer Mother         post menopausal     Asthma Sister         x2     Depression Sister      Cancer Father         prostate cancer     Hypertension Father      Prostate Cancer Father          of it at 82     Asthma Father         \"outgrew\" it     Schizophrenia Maternal Grandmother      Coronary Artery Disease Maternal Grandfather         he was diabetic and smoked     Coronary Artery Disease Sister         MI when being ventilated for asthma     Asthma Sister         both sisters with asthma, one severe              Social History:     Social History     Socioeconomic History     Marital status:      Spouse name: Not on file     Number of children: Not on file     Years of education: 20     Highest education level: Not on file   Occupational History     Occupation: pediatrician   Social Needs     Financial resource strain: Not on file     Food insecurity:     Worry: Not on file     Inability: Not on file     Transportation needs:     Medical: Not on file     Non-medical: Not on file   Tobacco Use     Smoking status: Never Smoker     Smokeless tobacco: Never Used   Substance and Sexual Activity     Alcohol use: Yes     " "Alcohol/week: 0.0 oz     Comment: 1 glass of wine with dinner     Drug use: No     Sexual activity: Not on file   Lifestyle     Physical activity:     Days per week: Not on file     Minutes per session: Not on file     Stress: Not on file   Relationships     Social connections:     Talks on phone: Not on file     Gets together: Not on file     Attends Hinduism service: Not on file     Active member of club or organization: Not on file     Attends meetings of clubs or organizations: Not on file     Relationship status: Not on file     Intimate partner violence:     Fear of current or ex partner: Not on file     Emotionally abused: Not on file     Physically abused: Not on file     Forced sexual activity: Not on file   Other Topics Concern     Parent/sibling w/ CABG, MI or angioplasty before 65F 55M? 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            Medications:     Current Outpatient Medications   Medication     albuterol (PROAIR HFA/PROVENTIL HFA/VENTOLIN HFA) 108 (90 Base) MCG/ACT inhaler     aspirin 81 MG EC tablet     CALCIUM-VITAMIN D PO     estradiol (ESTRING) 2 MG vaginal ring     fluticasone-salmeterol (ADVAIR) 500-50 MCG/DOSE inhaler     losartan (COZAAR) 25 MG tablet     montelukast (SINGULAIR) 10 MG tablet     NAPROXEN PO     potassium chloride ER (KLOR-CON) 20 MEQ CR tablet     pravastatin (PRAVACHOL) 20 MG tablet     SHINGRIX injection     theophylline (DENIA-24) 400 MG 24 hr capsule     triamterene-HCTZ (MAXZIDE) 75-50 MG tablet     No current facility-administered medications for this visit.             Review of Systems:       A complete review of systems was otherwise negative except as noted in the HPI.      PHYSICAL EXAM:  Pulse 79   Resp 17   Ht 1.702 m (5' 7\")   Wt 71.7 kg (158 lb)   LMP  (LMP Unknown)   SpO2 97%   BMI 24.75 kg/m       General: Well developed, well nourished, No apparent distress  Eyes: Anicteric  Ears: Hearing " grossly normal  Neck: supple  Respiratory: Good air movement. No crackles. No rhonchi. No wheezes  Cardiac: RRR, normal S1, S2. No murmurs. No JVD  Musculoskeletal: Extremities normal. No clubbing. No cyanosis. No edema.  Skin: No rash on limited exam  Neuro: Normal mentation. Normal speech.  Psych:Normal affect           Data:   All laboratory and imaging data reviewed.      Absolute eosinophils in May 2016 were 500    PFT:       PFT Interpretation:  Moderate airflow obstruction.  There has been improvement in her FEV1 compared to prior.  Valid Maneuver      Recent Results (from the past 168 hour(s))   General PFT Lab (Please always keep checked)    Collection Time: 09/16/19  9:25 AM   Result Value Ref Range    FVC-Pred 3.09 L    FVC-Pre 2.68 L    FVC-%Pred-Pre 86 %    FEV1-Pre 1.67 L    FEV1-%Pred-Pre 70 %    FEV1FVC-Pred 78 %    FEV1FVC-Pre 62 %    FEFMax-Pred 5.96 L/sec    FEFMax-Pre 5.17 L/sec    FEFMax-%Pred-Pre 86 %    FEF2575-Pred 1.92 L/sec    FEF2575-Pre 0.87 L/sec    XZQ9646-%Pred-Pre 45 %    ExpTime-Pre 9.96 sec    FIFMax-Pre 3.30 L/sec    FEV1FEV6-Pred 79 %    FEV1FEV6-Pre 64 %

## 2019-09-17 LAB
EXPTIME-PRE: 9.96 SEC
FEF2575-%PRED-PRE: 45 %
FEF2575-PRE: 0.87 L/SEC
FEF2575-PRED: 1.92 L/SEC
FEFMAX-%PRED-PRE: 86 %
FEFMAX-PRE: 5.17 L/SEC
FEFMAX-PRED: 5.96 L/SEC
FEV1-%PRED-PRE: 70 %
FEV1-PRE: 1.67 L
FEV1FEV6-PRE: 64 %
FEV1FEV6-PRED: 79 %
FEV1FVC-PRE: 62 %
FEV1FVC-PRED: 78 %
FIFMAX-PRE: 3.3 L/SEC
FVC-%PRED-PRE: 86 %
FVC-PRE: 2.68 L
FVC-PRED: 3.09 L

## 2019-09-17 ASSESSMENT — ASTHMA QUESTIONNAIRES: ACT_TOTALSCORE: 17

## 2019-11-01 ENCOUNTER — INFUSION THERAPY VISIT (OUTPATIENT)
Dept: INFUSION THERAPY | Facility: CLINIC | Age: 74
End: 2019-11-01
Attending: INTERNAL MEDICINE
Payer: MEDICARE

## 2019-11-01 VITALS
DIASTOLIC BLOOD PRESSURE: 81 MMHG | HEART RATE: 73 BPM | RESPIRATION RATE: 16 BRPM | TEMPERATURE: 97.4 F | SYSTOLIC BLOOD PRESSURE: 137 MMHG

## 2019-11-01 DIAGNOSIS — J45.50 SEVERE PERSISTENT ASTHMA WITHOUT COMPLICATION (H): Primary | ICD-10-CM

## 2019-11-01 PROCEDURE — 96372 THER/PROPH/DIAG INJ SC/IM: CPT

## 2019-11-01 PROCEDURE — 25000128 H RX IP 250 OP 636: Mod: ZF | Performed by: INTERNAL MEDICINE

## 2019-11-01 RX ADMIN — BENRALIZUMAB 30 MG: 30 INJECTION, SOLUTION SUBCUTANEOUS at 08:34

## 2019-11-01 ASSESSMENT — PAIN SCALES - GENERAL: PAINLEVEL: NO PAIN (0)

## 2019-11-01 NOTE — PROGRESS NOTES
Infusion nursing note:    Niurka Cisneros presents today to [facility] for a fasenra injection  Frequency: every 4 weeks    Progress note:  ID verified by name and .  Assessment completed.  Vitals were stable throughout time in [facility].  verbal education given to patient/representative regarding infusion and possible side effects.  Patient verbalized understanding.    Note: given subcutaneous in left upper arm      Administrations This Visit     benralizumab (FASENRA) injection 30 mg     Admin Date  2019 Action  Given Dose  30 mg Route  Subcutaneous Administered By  Thao Loza RN                /81   Pulse 73   Temp 97.4  F (36.3  C) (Oral)   Resp 16   LMP  (LMP Unknown)       Discharge plan:    Discharge instructions were reviewed with patient: Yes  Patient/representative verbalized understanding of discharge instructions and all questions answered: Yes.    Discharged from [facility] after 30 minutes of observation in stable condition.    Thao Loza RN          ~~~ NOTE: If the patient answers yes to any of the questions below, hold the infusion and contact ordering provider or on-call provider.    1. Have you recently had an elevated temperature, fever, chills, productive cough, coughing for 3 weeks or longer or hemoptysis,  abnormal vital signs, night sweats,  chest pain or have you noticed a decrease in your appetite, unexplained weight loss or fatigue? No  2. Do you have any open wounds or new incisions? No  3. Do you have any recent or upcoming hospitalizations, surgeries or dental procedures? No  4. Do you currently have or recently have had any signs of illness or infection or are you on any antibiotics? No  5. Have you had any new, sudden or worsening abdominal pain? No  6. Have you or anyone in your household received a live vaccination in the past 4 weeks? Please note:  No live vaccines while on biologic/chemotherapy until 6 months after the last treatment.  Patient can receive the flu  vaccine (shot only) and the pneumovax.  It is optimal for the patient to get these vaccines mid cycle, but they can be given at any time as long as it is not on the day of the infusion. No  7. Have you recently been diagnosed with any new nervous system diseases (ie. Multiple sclerosis, Guillain [place], seizures, neurological changes) or cancer diagnosis? Are you on any form of radiation or chemotherapy? No  8. Are you pregnant or breast feeding or do you have plans of pregnancy in the future? .n/a  9. Have you been having any signs of worsening depression or suicidal ideations?  (benlysta only) n/a  10. Have there been any other new onset medical symptoms? No

## 2019-12-01 DIAGNOSIS — I10 BENIGN ESSENTIAL HYPERTENSION: ICD-10-CM

## 2019-12-01 DIAGNOSIS — Z92.241 STEROID-DEPENDENT CHRONIC OBSTRUCTIVE PULMONARY DISEASE (H): ICD-10-CM

## 2019-12-01 DIAGNOSIS — J44.9 STEROID-DEPENDENT CHRONIC OBSTRUCTIVE PULMONARY DISEASE (H): ICD-10-CM

## 2019-12-03 RX ORDER — LOSARTAN POTASSIUM 25 MG/1
25 TABLET ORAL DAILY
Qty: 90 TABLET | Refills: 0 | Status: SHIPPED | OUTPATIENT
Start: 2019-12-03 | End: 2019-12-20

## 2019-12-03 RX ORDER — PRAVASTATIN SODIUM 20 MG
20 TABLET ORAL DAILY
Qty: 90 TABLET | Refills: 0 | Status: SHIPPED | OUTPATIENT
Start: 2019-12-03 | End: 2019-12-20

## 2019-12-03 NOTE — TELEPHONE ENCOUNTER
PRAVASTATIN  TAB 20MG   Last Written Prescription Date:  12/26/2018  Last Fill Quantity: 90,   # refills: 3  Last Office Visit : 12/26/2018  Future Office visit:  12/20/2019  90 Tabs, 0 Refills sent to pharmacy.  Pt has office visit on 12/20/2019      LOSARTAN TAB 25MG   Last Written Prescription Date:  12/26/2018  Last Fill Quantity: 90,   # refills: 3  Last Office Visit : 12/26/2018  Future Office visit:  12/20/2019  90 Tabs, 0 Refills sent to pharmacy.  Pt has office visit on 12/20/2019    Maribel Melo RN  Central Triage Red Flags/Med Refills

## 2019-12-06 ASSESSMENT — ENCOUNTER SYMPTOMS
COUGH: 1
DYSPNEA ON EXERTION: 1
BACK PAIN: 1
SHORTNESS OF BREATH: 1
ARTHRALGIAS: 1
SPUTUM PRODUCTION: 1

## 2019-12-06 ASSESSMENT — ACTIVITIES OF DAILY LIVING (ADL)
IN_THE_PAST_7_DAYS,_DID_YOU_NEED_HELP_FROM_OTHERS_TO_PERFORM_EVERYDAY_ACTIVITIES_SUCH_AS_EATING,_GETTING_DRESSED,_GROOMING,_BATHING,_WALKING,_OR_USING_THE_TOILET: N
IN_THE_PAST_7_DAYS,_DID_YOU_NEED_HELP_FROM_OTHERS_TO_TAKE_CARE_OF_THINGS_SUCH_AS_LAUNDRY_AND_HOUSEKEEPING,_BANKING,_SHOPPING,_USING_THE_TELEPHONE,_FOOD_PREPARATION,_TRANSPORTATION,_OR_TAKING_YOUR_OWN_MEDICATIONS?: N

## 2019-12-20 ENCOUNTER — OFFICE VISIT (OUTPATIENT)
Dept: INTERNAL MEDICINE | Facility: CLINIC | Age: 74
End: 2019-12-20
Payer: MEDICARE

## 2019-12-20 VITALS
DIASTOLIC BLOOD PRESSURE: 89 MMHG | OXYGEN SATURATION: 96 % | HEART RATE: 69 BPM | SYSTOLIC BLOOD PRESSURE: 132 MMHG | WEIGHT: 157 LBS | BODY MASS INDEX: 24.59 KG/M2

## 2019-12-20 DIAGNOSIS — M85.80 OSTEOPENIA, UNSPECIFIED LOCATION: Primary | ICD-10-CM

## 2019-12-20 DIAGNOSIS — I10 BENIGN ESSENTIAL HYPERTENSION: ICD-10-CM

## 2019-12-20 DIAGNOSIS — N81.10 BLADDER PROLAPSE, FEMALE, ACQUIRED: ICD-10-CM

## 2019-12-20 DIAGNOSIS — Z92.241 STEROID-DEPENDENT CHRONIC OBSTRUCTIVE PULMONARY DISEASE (H): ICD-10-CM

## 2019-12-20 DIAGNOSIS — M85.80 OSTEOPENIA, UNSPECIFIED LOCATION: ICD-10-CM

## 2019-12-20 DIAGNOSIS — E78.5 HYPERLIPIDEMIA LDL GOAL <100: ICD-10-CM

## 2019-12-20 DIAGNOSIS — Z78.0 ASYMPTOMATIC POSTMENOPAUSAL STATUS: ICD-10-CM

## 2019-12-20 DIAGNOSIS — J44.9 STEROID-DEPENDENT CHRONIC OBSTRUCTIVE PULMONARY DISEASE (H): ICD-10-CM

## 2019-12-20 DIAGNOSIS — R73.03 PREDIABETES: ICD-10-CM

## 2019-12-20 LAB
CHOLEST SERPL-MCNC: 170 MG/DL
DEPRECATED CALCIDIOL+CALCIFEROL SERPL-MC: 47 UG/L (ref 20–75)
HBA1C MFR BLD: 5.7 % (ref 0–5.6)
HDLC SERPL-MCNC: 65 MG/DL
LDLC SERPL CALC-MCNC: 83 MG/DL
NONHDLC SERPL-MCNC: 105 MG/DL
TRIGL SERPL-MCNC: 110 MG/DL

## 2019-12-20 PROCEDURE — 82306 VITAMIN D 25 HYDROXY: CPT | Performed by: INTERNAL MEDICINE

## 2019-12-20 RX ORDER — PRAVASTATIN SODIUM 20 MG
20 TABLET ORAL DAILY
Qty: 90 TABLET | Refills: 0 | Status: SHIPPED | OUTPATIENT
Start: 2019-12-20 | End: 2020-04-15

## 2019-12-20 RX ORDER — LOSARTAN POTASSIUM 25 MG/1
50 TABLET ORAL DAILY
Qty: 90 TABLET | Refills: 0
Start: 2019-12-20 | End: 2020-01-23 | Stop reason: SINTOL

## 2019-12-20 ASSESSMENT — ENCOUNTER SYMPTOMS
INCREASED ENERGY: 0
POOR WOUND HEALING: 0
SHORTNESS OF BREATH: 1
HYPERTENSION: 0
HYPOTENSION: 0
PARALYSIS: 0
TINGLING: 0
DEPRESSION: 0
DIZZINESS: 0
BACK PAIN: 1
BLOOD IN STOOL: 0
BREAST MASS: 0
DOUBLE VISION: 0
SWOLLEN GLANDS: 0
COUGH: 1
TREMORS: 0
HOT FLASHES: 0
WEIGHT GAIN: 0
CLAUDICATION: 0
EYE IRRITATION: 0
SPEECH CHANGE: 0
DECREASED CONCENTRATION: 0
SPUTUM PRODUCTION: 1
DYSPNEA ON EXERTION: 1
BREAST PAIN: 0
POLYPHAGIA: 0
HEADACHES: 0
LEG PAIN: 0
NAUSEA: 0
EYE PAIN: 0
SEIZURES: 0
VOMITING: 0
ALTERED TEMPERATURE REGULATION: 0
FATIGUE: 0
LIGHT-HEADEDNESS: 0
SYNCOPE: 0
CONSTIPATION: 0
MEMORY LOSS: 0
EYE REDNESS: 0
INSOMNIA: 0
EYE WATERING: 0
ORTHOPNEA: 0
EXERCISE INTOLERANCE: 0
DECREASED LIBIDO: 0
PALPITATIONS: 0
DECREASED APPETITE: 0
DIARRHEA: 0
WEAKNESS: 0
JAUNDICE: 0
HALLUCINATIONS: 0
PANIC: 0
SLEEP DISTURBANCES DUE TO BREATHING: 0
ARTHRALGIAS: 1
LOSS OF CONSCIOUSNESS: 0
CHILLS: 0
HEARTBURN: 0
BOWEL INCONTINENCE: 0
ABDOMINAL PAIN: 0
NERVOUS/ANXIOUS: 0
RECTAL BLEEDING: 0
POLYDIPSIA: 0
NAIL CHANGES: 0
FEVER: 0
TACHYCARDIA: 0
NUMBNESS: 0
LEG SWELLING: 0
EXTREMITY NUMBNESS: 0
BRUISES/BLEEDS EASILY: 0
BLOATING: 0
WEIGHT LOSS: 0
DISTURBANCES IN COORDINATION: 0
SKIN CHANGES: 0
RECTAL PAIN: 0
NIGHT SWEATS: 0

## 2019-12-20 ASSESSMENT — PAIN SCALES - GENERAL: PAINLEVEL: NO PAIN (0)

## 2019-12-20 NOTE — NURSING NOTE
Chief Complaint   Patient presents with     Physical     Kimberly Nissen, EMT at 8:33 AM on 12/20/2019

## 2019-12-20 NOTE — PROGRESS NOTES
CC:   Chief Complaint   Patient presents with     Physical       History of Present Illness   Niurka Cisneros is here for a routine physical.        BP: fluctuates, ~160/92 about 25% of time.  No lightheadedness or dizziness.    Checked sugar at home, has had some fasting sugars in the 105.  A1C has fluctuated in the prediabetic range int he past.  She is working on limiting sweets and losing weight    Has some chronic hip pain related to arthritis.  Using NSAIDs. Tries to minimize use.  No GI symptoms    Pulmonary symptoms have been under good control.  Only single course of steroids this year    Gyne:  Continue to use vaginal estrogen    Depression screen:  PHQ-2 Score:     PHQ-2 ( 1999 Pfizer) 4/8/2019 7/18/2018   Q1: Little interest or pleasure in doing things 0 0   Q2: Feeling down, depressed or hopeless 0 0   PHQ-2 Score 0 0   Q1: Little interest or pleasure in doing things - Not at all   Q2: Feeling down, depressed or hopeless - Not at all   PHQ-2 Score - 0       Tobacco screen:  History   Smoking Status     Never Smoker   Smokeless Tobacco     Never Used     Obesity screen:  Body mass index is 24.59 kg/m .  [facility]  2 yoga classes, weight strengthening and pilates.    Review of Systems   Review of Systems     Constitutional:  Negative for fever, chills, weight loss, weight gain, fatigue, decreased appetite, night sweats, recent stressors, height gain, height loss, post-operative complications, incisional pain, hallucinations, increased energy, hyperactivity and confused.   HENT:  Positive for hearing loss and tinnitus.    Eyes:  Negative for double vision, pain, redness, eye pain, decreased vision, eye watering, eye bulging, eye dryness, flashing lights, spots, floaters, strabismus, tunnel vision, jaundice and eye irritation.   Respiratory:   Positive for cough, sputum production, shortness of breath and dyspnea on exertion. Negative for sleep disturbances due to breathing.     Cardiovascular:  Positive for dyspnea on exertion. Negative for chest pain, palpitations, orthopnea, claudication, leg swelling, fingers/toes turn blue, hypertension, hypotension, syncope, history of heart murmur, chest pain on exertion, chest pain at rest, pacemaker, few scattered varicosities, leg pain, sleep disturbances due to breathing, tachycardia, light-headedness, exercise intolerance and edema.   Gastrointestinal:  Negative for heartburn, nausea, vomiting, abdominal pain, diarrhea, constipation, blood in stool, melena, rectal pain, bloating, hemorrhoids, bowel incontinence, jaundice, rectal bleeding, coffee ground emesis and change in stool.   Genitourinary:  Positive for nocturia. Negative for vaginal discharge, genital sores, dyspareunia, decreased libido, arousal difficulty, abnormal vaginal bleeding, excessive menstruation, menstrual changes, hot flashes, vaginal dryness and postmenopausal bleeding.   Musculoskeletal:  Positive for back pain and arthralgias.   Skin:  Negative for nail changes, itching, poor wound healing, rash, hair changes, skin changes, acne, warts, poor wound healing, scarring, flaky skin, Raynaud's phenomenon, sensitivity to sunlight and skin thickening.   Neurological:  Negative for dizziness, tingling, tremors, speech change, seizures, loss of consciousness, weakness, light-headedness, numbness, headaches, disturbances in coordination, extremity numbness, memory loss, difficulty walking and paralysis.   Endo/Heme:  Negative for anemia, swollen glands and bruises/bleeds easily.   Psychiatric/Behavioral:  Negative for depression, hallucinations, memory loss, decreased concentration, mood swings and panic attacks.    Breast:  Negative for breast discharge, breast mass, breast pain and nipple retraction.   Endocrine:  Negative for altered temperature regulation, polyphagia, polydipsia, unwanted hair growth and change in facial hair.      Medications   Medications and allergies were  reviewed and updated in the chart    Family History   Family history was reviewed and updated as needed in the chart    Past Medical History   Past medical history was reviewed and updated  Patient Active Problem List   Diagnosis     Seizures (H)     Thoracic aortic aneurysm without rupture (H)     Moderate persistent asthma without complication     Prediabetes     Pulmonary nodules     Essential hypertension     Osteopenia of multiple sites     Aneurysm of right renal artery (H)     Severe persistent asthma without complication     Abnormal CT scan of head       Physical Exam   /89   Pulse 69   Wt 71.2 kg (157 lb)   LMP  (LMP Unknown)   SpO2 96%   BMI 24.59 kg/m      GENERAL APPEARANCE: healthy, alert and no distress     EYES: EOMI, PERRL     HENT: ear canals and TM's normal and nose and mouth without ulcers or lesions     NECK: no adenopathy, no asymmetry, masses, or scars and thyroid normal to palpation     RESP: lungs clear to auscultation - no rales, rhonchi or wheezes     CV: regular rates and rhythm, normal S1 S2 and no murmur, click or rub -     ABDOMEN:  soft, nontender, no HSM or masses and bowel sounds normal     MS: extremities normal     SKIN: no suspicious lesions or rashes     NEURO: mentation intact and speech normal     PSYCH: mentation appears normal. and affect normal/bright     LYMPHATICS: No  cervical, or supraclavicular nodes      Assessment & Plan   # Preventive Care Visit:     Benign essential hypertension  Increase losartan to 50mg and stop potassium replacement.  Will check BMP in 1 week.  She will send BP readings for continued titration  - Basic metabolic panel  - losartan (COZAAR) 25 MG tablet  Dispense: 90 tablet; Refill: 0    Steroid-dependent chronic obstructive pulmonary disease (H)  - Hemoglobin A1c  - pravastatin (PRAVACHOL) 20 MG tablet  Dispense: 90 tablet; Refill: 0    Bladder prolapse, female, acquired  - estradiol (ESTRING) 2 MG vaginal ring  Dispense: 1 each;  Refill: 3    Osteopenia, unspecified location  Asymptomatic postmenopausal status  - DEXA HIP/PELVIS/SPINE - Future  - Vitamin D Deficiency    Hyperlipidemia LDL goal <100  - Lipid panel reflex to direct LDL Fasting    Prediabetes  Has had elevated sugars at home  - Hemoglobin A1c      RTC: Return in about 1 year (around 12/20/2020).      Mara Combs MD

## 2019-12-27 ENCOUNTER — INFUSION THERAPY VISIT (OUTPATIENT)
Dept: INFUSION THERAPY | Facility: CLINIC | Age: 74
End: 2019-12-27
Attending: INTERNAL MEDICINE
Payer: MEDICARE

## 2019-12-27 ENCOUNTER — ANCILLARY PROCEDURE (OUTPATIENT)
Dept: BONE DENSITY | Facility: CLINIC | Age: 74
End: 2019-12-27
Attending: INTERNAL MEDICINE
Payer: MEDICARE

## 2019-12-27 ENCOUNTER — ANCILLARY PROCEDURE (OUTPATIENT)
Dept: MAMMOGRAPHY | Facility: CLINIC | Age: 74
End: 2019-12-27
Payer: MEDICARE

## 2019-12-27 VITALS
HEART RATE: 70 BPM | SYSTOLIC BLOOD PRESSURE: 120 MMHG | OXYGEN SATURATION: 97 % | DIASTOLIC BLOOD PRESSURE: 79 MMHG | TEMPERATURE: 97.7 F

## 2019-12-27 DIAGNOSIS — Z78.0 ASYMPTOMATIC POSTMENOPAUSAL STATUS: ICD-10-CM

## 2019-12-27 DIAGNOSIS — M85.80 OSTEOPENIA, UNSPECIFIED LOCATION: ICD-10-CM

## 2019-12-27 DIAGNOSIS — Z12.31 VISIT FOR SCREENING MAMMOGRAM: ICD-10-CM

## 2019-12-27 DIAGNOSIS — J45.50 SEVERE PERSISTENT ASTHMA WITHOUT COMPLICATION (H): Primary | ICD-10-CM

## 2019-12-27 DIAGNOSIS — I10 BENIGN ESSENTIAL HYPERTENSION: ICD-10-CM

## 2019-12-27 LAB
ANION GAP SERPL CALCULATED.3IONS-SCNC: 4 MMOL/L (ref 3–14)
BUN SERPL-MCNC: 15 MG/DL (ref 7–30)
CALCIUM SERPL-MCNC: 9.4 MG/DL (ref 8.5–10.1)
CHLORIDE SERPL-SCNC: 101 MMOL/L (ref 94–109)
CO2 SERPL-SCNC: 30 MMOL/L (ref 20–32)
CREAT SERPL-MCNC: 0.76 MG/DL (ref 0.52–1.04)
GFR SERPL CREATININE-BSD FRML MDRD: 78 ML/MIN/{1.73_M2}
GLUCOSE SERPL-MCNC: 89 MG/DL (ref 70–99)
POTASSIUM SERPL-SCNC: 3.2 MMOL/L (ref 3.4–5.3)
SODIUM SERPL-SCNC: 134 MMOL/L (ref 133–144)

## 2019-12-27 PROCEDURE — 25000128 H RX IP 250 OP 636: Mod: ZF

## 2019-12-27 PROCEDURE — 96372 THER/PROPH/DIAG INJ SC/IM: CPT

## 2019-12-27 RX ORDER — POTASSIUM CHLORIDE 1500 MG/1
20 TABLET, EXTENDED RELEASE ORAL 2 TIMES DAILY
Start: 2019-12-27 | End: 2020-04-19

## 2019-12-27 RX ADMIN — BENRALIZUMAB 30 MG: 30 INJECTION, SOLUTION SUBCUTANEOUS at 08:51

## 2019-12-27 ASSESSMENT — PAIN SCALES - GENERAL: PAINLEVEL: NO PAIN (0)

## 2019-12-27 NOTE — PROGRESS NOTES
Patient presents to the [facility] for Fasenra injection.  Order written by Dr. Vega was completed today. Name and  verified with patient. See MAR for medication details. Medication was divided into 1 syringes by pharmacy and given in the following sites back side of left upper arm. Patient tolerated injection well and was monitored for 30 minutes after administration. Patient was discharged to home. Patient to return back in 8 weeks.     HUONG Cary LPN     Administrations This Visit     benralizumab (FASENRA) injection 30 mg     Admin Date  2019 Action  Given Dose  30 mg Route  Subcutaneous Administered By  Javed Cary LPN

## 2019-12-27 NOTE — PROGRESS NOTES
General assessment for IV and SQ medications    1. Elevated temperature, fever, chills, productive cough or abnormal vital signs, night sweats, coughing up blood or sputum, no appetite or abnormal vital signs, SOB : NO    2. Open wounds or new incisions: NO    3. Recent hospitalization: NO    4.  Recent surgeries:  NO    5. Any upcoming surgeries or dental procedures?:NO    6. Any current or recent bouts of illness or infection? On any antibiotics? : NO    7. Any new, sudden or worsening abdominal pain :NO    8. Vaccination within 4 weeks? Patient or someone in the household is scheduled to receive vaccination? No live virus vaccines prior to or during treatment :NO    9. Any nervous system diseases [i.e. multiple sclerosis, Guillain-[place], seizures, neurological  Changes]  Ask if on a biologic medication* : NO    10. Pregnant or breast feeding; or plans on pregnancy in the future: NO    11. Signs of worsening depression or suicidal ideations while taking benlysta:NO    12. New-onset medical symptoms: NO    13.  New cancer diagnosis or on chemotherapy or radiation NO    14.  Evaluate for any sign of active TB [Unexplained weight loss, Loss of appetite, Night sweats, Fever, Fatigue, Chills, Coughing for 3 weeks or longer, Hemoptysis (coughing up blood), Chest pain]: NO            **Note: If answered yes to any of the above, hold the infusion and contact ordering provider.

## 2020-01-10 ENCOUNTER — MYC MEDICAL ADVICE (OUTPATIENT)
Dept: INTERNAL MEDICINE | Facility: CLINIC | Age: 75
End: 2020-01-10

## 2020-01-10 DIAGNOSIS — M85.89 OSTEOPENIA OF MULTIPLE SITES: ICD-10-CM

## 2020-01-17 PROBLEM — M85.89 OSTEOPENIA OF MULTIPLE SITES: Status: ACTIVE | Noted: 2017-12-15

## 2020-01-17 RX ORDER — HEPARIN SODIUM (PORCINE) LOCK FLUSH IV SOLN 100 UNIT/ML 100 UNIT/ML
5 SOLUTION INTRAVENOUS
Status: CANCELLED | OUTPATIENT
Start: 2020-01-17

## 2020-01-17 RX ORDER — HEPARIN SODIUM,PORCINE 10 UNIT/ML
5 VIAL (ML) INTRAVENOUS
Status: CANCELLED | OUTPATIENT
Start: 2020-01-17

## 2020-01-17 RX ORDER — ZOLEDRONIC ACID 5 MG/100ML
5 INJECTION, SOLUTION INTRAVENOUS ONCE
Status: CANCELLED
Start: 2020-01-17

## 2020-01-17 NOTE — TELEPHONE ENCOUNTER
Will order zolendronic acid infusion.    Charles -- can you help with BP?  I assume she is referring to losartan which we increased in December?  She had been on losartan prevously, so I'm not sure the risk of developing a cough with dose increase.  I liked the ACE/ARB option because she's chronically had some hypokalemia and required potassium supplements.  But we could try a CCB and restart K supplements.

## 2020-01-21 ENCOUNTER — TELEPHONE (OUTPATIENT)
Dept: PHARMACY | Facility: CLINIC | Age: 75
End: 2020-01-21

## 2020-01-21 NOTE — TELEPHONE ENCOUNTER
I called Niurka today to set up time to discuss her blood pressure medications over the phone. We agree to talk on Thursday, January 23rd at 9 AM.

## 2020-01-23 ENCOUNTER — ALLIED HEALTH/NURSE VISIT (OUTPATIENT)
Dept: PHARMACY | Facility: CLINIC | Age: 75
End: 2020-01-23
Payer: COMMERCIAL

## 2020-01-23 DIAGNOSIS — I10 ESSENTIAL HYPERTENSION: Primary | ICD-10-CM

## 2020-01-23 DIAGNOSIS — E78.5 HYPERLIPIDEMIA LDL GOAL <100: ICD-10-CM

## 2020-01-23 DIAGNOSIS — J45.50 SEVERE PERSISTENT ASTHMA WITHOUT COMPLICATION (H): ICD-10-CM

## 2020-01-23 DIAGNOSIS — Z78.9 TAKES DIETARY SUPPLEMENTS: ICD-10-CM

## 2020-01-23 PROCEDURE — 99605 MTMS BY PHARM NP 15 MIN: CPT | Performed by: PHARMACIST

## 2020-01-23 PROCEDURE — 99607 MTMS BY PHARM ADDL 15 MIN: CPT | Performed by: PHARMACIST

## 2020-01-23 RX ORDER — MULTIVITAMIN WITH IRON
1 TABLET ORAL DAILY
COMMUNITY

## 2020-01-23 NOTE — PROGRESS NOTES
SUBJECTIVE/OBJECTIVE:                           Niurka Cisneros is a 74 year old female called for an initial visit for Medication Therapy Management.  She was referred to me from Mara Combs.    Chief Complaint: blood pressure control    Allergies/ADRs: Reviewed in Epic  Tobacco:  reports that she has never smoked. She has never used smokeless tobacco.  Alcohol: not currently using  Caffeine: 2-3 cups/day of coffee  Activity: not reviewed  PMH: Reviewed in Epic    Medication Adherence/Access:  no issues reported  Patient is a retired pediatrician.     Hypertension: Current medications include potassium ER 20 mEq twice daily, losartan 50 mg daily (recent dose increase; not taking at all) and triamterene-hydrochlorothiazide 75-50 mg daily.  Patient does self-monitor BP.  Patient reports no current medication side effects but was having some issues with losartan. A few weeks after increasing the losartan dose, she had an increase in cough and thought it was related to the dose increase in losartan (similar reaction lisinopril; occurred 4-5 years ago). The similar reaction she had with lisinopril in the past coincided with a fungal infection in her lung. She feels the reaction she is having with losartan is similar to what she had with lisinopril despite the complicating factor of a pneumonia. She is pretty confident that this is not a cold and she would prefer to avoid use of ACE/ARBs in the future if she needs additional treatment for blood pressure.     12//82   12//64   1/3-120/82   1/9-117/67    BP Readings from Last 3 Encounters:   12/27/19 120/79   12/20/19 132/89   11/01/19 137/81     Last Comprehensive Metabolic Panel:  Sodium   Date Value Ref Range Status   12/27/2019 134 133 - 144 mmol/L Final     Potassium   Date Value Ref Range Status   12/27/2019 3.2 (L) 3.4 - 5.3 mmol/L Final     Chloride   Date Value Ref Range Status   12/27/2019 101 94 - 109 mmol/L Final     Carbon Dioxide   Date  Value Ref Range Status   12/27/2019 30 20 - 32 mmol/L Final     Anion Gap   Date Value Ref Range Status   12/27/2019 4 3 - 14 mmol/L Final     Glucose   Date Value Ref Range Status   12/27/2019 89 70 - 99 mg/dL Final     Urea Nitrogen   Date Value Ref Range Status   12/27/2019 15 7 - 30 mg/dL Final     Creatinine   Date Value Ref Range Status   12/27/2019 0.76 0.52 - 1.04 mg/dL Final     GFR Estimate   Date Value Ref Range Status   12/27/2019 78 >60 mL/min/[1.73_m2] Final     Comment:     Non  GFR Calc  Starting 12/18/2018, serum creatinine based estimated GFR (eGFR) will be   calculated using the Chronic Kidney Disease Epidemiology Collaboration   (CKD-EPI) equation.       Calcium   Date Value Ref Range Status   12/27/2019 9.4 8.5 - 10.1 mg/dL Final     Estimated Creatinine Clearance: 75.2 mL/min (based on SCr of 0.76 mg/dL).    Severe Persistent Asthma: Current medications: Short-Acting Bronchodilator: Albuterol MDI  ICS/LABA- Wixela 500-50 mcg/g 1 puff twice daily  Montelukast (Singulair) once daily as needed  Theophylline 400 mg once daily. Pt rinses their mouth after using steroid inhaler.    She follows with Dr. Vega in pulmonology. She is currently using prednisone to treat the cough she reports.   Asthma triggers include: upper respiratory infections.  Pt reports the following symptoms: none.  AAP on file: NO  ACT Total Scores 9/16/2019   ACT TOTAL SCORE (Goal Greater than or Equal to 20) 17   In the past 12 months, how many times did you visit the emergency room for your asthma without being admitted to the hospital? 0   In the past 12 months, how many times were you hospitalized overnight because of your asthma? 0     Hyperlipidemia: Current therapy includes pravastatin 20 mg once daily and aspirin 81 mg daily.  Pt reports no significant myalgias or other side effects. Patient is fairly sure that she has not had a stroke. Imaging has suggested that she may have had a stroke in the  "distant pass but it is more likely related to a head injury.   The ASCVD Risk score ([place] OLVERA Jr., et al., 2013) failed to calculate for the following reasons:    The patient has a prior MI or stroke diagnosis     Recent Labs   Lab Test 12/20/19  0939 12/26/18  0926 11/10/15  0857   CHOL 170 190 163   HDL 65 55 55   LDL 83 104* 87   TRIG 110 156* 106   CHOLHDLRATIO  --   --  3.0     Liver Function Studies - No results for input(s): PROTTOTAL, ALBUMIN, BILITOTAL, ALKPHOS, AST, ALT, BILIDIRECT in the last 46373 hours.    Vitamins/OTCs: Current medications include calcium+D daily, magnesium 250 mg daily. She has no questions or concerns today.     Today's Vitals: LMP  (LMP Unknown)  - telemed     BP Readings from Last 1 Encounters:   12/27/19 120/79     Pulse Readings from Last 1 Encounters:   12/27/19 70     Wt Readings from Last 1 Encounters:   12/20/19 157 lb (71.2 kg)     Ht Readings from Last 1 Encounters:   09/16/19 5' 7\" (1.702 m)     Estimated body mass index is 24.59 kg/m  as calculated from the following:    Height as of 9/16/19: 5' 7\" (1.702 m).    Weight as of 12/20/19: 157 lb (71.2 kg).    Temp Readings from Last 1 Encounters:   12/27/19 97.7  F (36.5  C) (Oral)     ASSESSMENT:                            Medication Adherence: good, no issues identified    Hypertension: Stable. Patient is meeting BP goal of < 130/80mmHg. If BP remains well controlled on triamterene-hydrochlorothiazide alone, another therapy will not be required. Continue to monitor. If BP begins to rise again, would add a CCB next, respecting patient's preference to avoid ACE/ARB therapy.     Severe Persistent Asthma: Stable. Patient would benefit from follow-up with pulmonology regarding this cough vs. ACE/ARB cough. Cough with ARB is very unusual.     Hyperlipidemia: Stable. LDL is satisfactory.     Vitamins/OTCs: Stable.     PLAN:                            1. Continue self-monitoring of blood pressure.     I spent 20 minutes with this " patient today. A copy of the visit note was provided to the patient's primary care provider.    Will follow up in 2 weeks via [employer].    The patient declined a summary of these recommendations as an after visit summary.     Rosmery Nieves Pharm.D., [facility]  Medication Therapy Management Pharmacist  Page/VM:  [phone]

## 2020-02-06 ENCOUNTER — ALLIED HEALTH/NURSE VISIT (OUTPATIENT)
Dept: PHARMACY | Facility: CLINIC | Age: 75
End: 2020-02-06
Payer: COMMERCIAL

## 2020-02-06 DIAGNOSIS — I10 ESSENTIAL HYPERTENSION: Primary | ICD-10-CM

## 2020-02-06 PROCEDURE — 99606 MTMS BY PHARM EST 15 MIN: CPT | Performed by: PHARMACIST

## 2020-02-06 NOTE — PROGRESS NOTES
"SUBJECTIVE/OBJECTIVE:                Niurka Cisneros is a 74 year old female called for a follow-up visit for Medication Therapy Management.  She was referred to me from Mara Combs.     Chief Complaint: Follow up from MT visit on 1/23/20.  We are following up on high blood pressure.    Tobacco:  reports that she has never smoked. She has never used smokeless tobacco.  Alcohol: not currently using    Medication Adherence/Access:  no issues reported    Hypertension: Current medications include potassium ER 20 mEq twice daily triamterene-hydrochlorothiazide 75-50 mg daily.  Patient does self-monitor BP.  Patient reports no current medication side effects. We review the risks and benefits of treating blood pressure at this time and goals associated with her BP.     Ranges are 115/83 - 150/96    Home readings (at various times of the day):  115/83  135/??  129/??  124/??  150/96    The 150/90s seem to be an outlier at this time, according to patient.     Measurements from last visit:    12//82   12//64   1/3-120/82   1/9-117/67    Today's Vitals: LMP  (LMP Unknown)  - telemed     BP Readings from Last 1 Encounters:   12/27/19 120/79     Pulse Readings from Last 1 Encounters:   12/27/19 70     Wt Readings from Last 1 Encounters:   12/20/19 157 lb (71.2 kg)     Ht Readings from Last 1 Encounters:   09/16/19 5' 7\" (1.702 m)     Estimated body mass index is 24.59 kg/m  as calculated from the following:    Height as of 9/16/19: 5' 7\" (1.702 m).    Weight as of 12/20/19: 157 lb (71.2 kg).    Temp Readings from Last 1 Encounters:   12/27/19 97.7  F (36.5  C) (Oral)     ASSESSMENT:                  Medication Adherence: good, no issues identified    Hypertension: Stable. Patient is meeting BP goal of < 130/80mmHg most of the time at home. If BP remains well controlled on triamterene-hydrochlorothiazide alone, another therapy will not be required. Continue to monitor. If BP begins to rise again, would add a " CCB next, respecting patient's preference to avoid ACE/ARB therapy.      PLAN:                  1. Continue home monitoring. Write on MyChart if readings trend higher than 140/90 on a routine basis.     I spent 15 minutes with this patient today. A copy of the visit note was provided to the patient's primary care provider.     Will follow up in 1-2 months.    The patient declined a summary of these recommendations as an after visit summary.    Rosmery Nieves, Pharm.D., [facility]  Medication Therapy Management Pharmacist  Page/VM:  [phone]

## 2020-02-21 ENCOUNTER — INFUSION THERAPY VISIT (OUTPATIENT)
Dept: INFUSION THERAPY | Facility: CLINIC | Age: 75
End: 2020-02-21
Attending: INTERNAL MEDICINE
Payer: MEDICARE

## 2020-02-21 VITALS
HEART RATE: 73 BPM | SYSTOLIC BLOOD PRESSURE: 126 MMHG | TEMPERATURE: 98 F | DIASTOLIC BLOOD PRESSURE: 75 MMHG | OXYGEN SATURATION: 95 %

## 2020-02-21 DIAGNOSIS — J45.50 SEVERE PERSISTENT ASTHMA WITHOUT COMPLICATION (H): Primary | ICD-10-CM

## 2020-02-21 PROCEDURE — 25000128 H RX IP 250 OP 636: Mod: ZF

## 2020-02-21 PROCEDURE — 96372 THER/PROPH/DIAG INJ SC/IM: CPT

## 2020-02-21 RX ADMIN — BENRALIZUMAB 30 MG: 30 INJECTION, SOLUTION SUBCUTANEOUS at 10:29

## 2020-02-21 ASSESSMENT — PAIN SCALES - GENERAL: PAINLEVEL: NO PAIN (0)

## 2020-02-21 NOTE — PROGRESS NOTES
Treatment Conditions: ~~~ NOTE: If the patient answers yes to any of the questions below, hold the infusion and contact ordering provider or on-call provider.    1. Have you recently had an elevated temperature, fever, chills, productive cough, coughing for 3 weeks or longer or hemoptysis, abnormal vital signs, night sweats,  chest pain or have you noticed a decrease in your appetite, unexplained weight loss or fatigue? No  2. Do you have any open wounds or new incisions? No  3. Do you have any recent or upcoming hospitalizations, surgeries or dental procedures? No  4. Do you currently have or recently have had any signs of illness or infection or are you on any antibiotics? No  5. Have you had any new, sudden or worsening abdominal pain? No  6. Have you or anyone in your household received a live vaccination in the past 4 weeks? Please note:  No live vaccines while on biologic/chemotherapy until 6 months after the last treatment.  Patient can receive the flu vaccine (shot only) and the pneumovax.  It is optimal for the patient to get these vaccines mid cycle, but they can be given at any time as long as it is not on the day of the infusion. No  7. Have you recently been diagnosed with any new nervous system diseases (ie. Multiple sclerosis, Guillain [place], seizures, neurological changes) or cancer diagnosis? No  8. Are you on any form of radiation or chemotherapy? No  9. Are you pregnant or breast feeding or do you have plans of pregnancy in the future? No  10. Have you been having any signs of worsening depression or suicidal ideations?  (benlysta only) No  11. Have there been any other new onset medical symptoms? No      
Patient presents to the [facility] for Fasenra.  Order written by Dr. Vega was completed today. Name and  verified with patient. See MAR for medication details. Medication was divided into 1 syringes by pharmacy and given in the following sites back side of upper left arm.  Patient was monitored for 30 minutes. Patient tolerated injection well and was discharged to home.  Patient is to return in 8 weeks.    Carissa Corona MA    Administrations This Visit     benralizumab (FASENRA) injection 30 mg     Admin Date  2020 Action  Given Dose  30 mg Route  Subcutaneous Administered By  Carissa Corona MA                  
stretcher

## 2020-04-13 DIAGNOSIS — Z92.241 STEROID-DEPENDENT CHRONIC OBSTRUCTIVE PULMONARY DISEASE (H): ICD-10-CM

## 2020-04-13 DIAGNOSIS — J44.9 STEROID-DEPENDENT CHRONIC OBSTRUCTIVE PULMONARY DISEASE (H): ICD-10-CM

## 2020-04-15 RX ORDER — PRAVASTATIN SODIUM 20 MG
20 TABLET ORAL DAILY
Qty: 90 TABLET | Refills: 2 | Status: SHIPPED | OUTPATIENT
Start: 2020-04-15 | End: 2020-12-02

## 2020-04-17 ENCOUNTER — INFUSION THERAPY VISIT (OUTPATIENT)
Dept: INFUSION THERAPY | Facility: CLINIC | Age: 75
End: 2020-04-17
Attending: INTERNAL MEDICINE
Payer: MEDICARE

## 2020-04-17 VITALS
OXYGEN SATURATION: 95 % | HEART RATE: 83 BPM | SYSTOLIC BLOOD PRESSURE: 128 MMHG | TEMPERATURE: 97.6 F | DIASTOLIC BLOOD PRESSURE: 85 MMHG

## 2020-04-17 DIAGNOSIS — J45.50 SEVERE PERSISTENT ASTHMA WITHOUT COMPLICATION (H): Primary | ICD-10-CM

## 2020-04-17 PROCEDURE — 25000128 H RX IP 250 OP 636: Mod: ZF

## 2020-04-17 PROCEDURE — 96372 THER/PROPH/DIAG INJ SC/IM: CPT

## 2020-04-17 RX ADMIN — BENRALIZUMAB 30 MG: 30 INJECTION, SOLUTION SUBCUTANEOUS at 10:26

## 2020-04-17 ASSESSMENT — PAIN SCALES - GENERAL: PAINLEVEL: NO PAIN (0)

## 2020-04-17 NOTE — PROGRESS NOTES
Patient presents to the [facility] for Fasenra.  Order written by Dr. Vega was completed today. Name and  verified with patient. See MAR for medication details. Medication was divided into 1 syringes by pharmacy and given in the following sites back side of upper left arm.  Patient was monitored for 30 minutes. Patient tolerated injection well and was discharged to home.  Patient is to return in 8 weeks.  Carissa Corona MA  Administrations This Visit     benralizumab (FASENRA) injection 30 mg     Admin Date  2020 Action  Given Dose  30 mg Route  Subcutaneous Administered By  Carissa Corona MA

## 2020-04-17 NOTE — PROGRESS NOTES
General assessment for IV and SQ medications    1. Elevated temperature, fever, chills, productive cough or abnormal vital signs, night sweats, coughing up blood or sputum, no appetite or abnormal vital signs, SOB : NO    2. Open wounds or new incisions: NO    3. Recent hospitalization: NO    4.  Recent surgeries:  NO    5. Any upcoming surgeries or dental procedures?:NO    6. Any current or recent bouts of illness or infection? On any antibiotics? : NO    7. Any new, sudden or worsening abdominal pain :NO    8. Vaccination within 4 weeks? Patient or someone in the household is scheduled to receive vaccination? No live virus vaccines prior to or during treatment :NO    9. Any nervous system diseases [i.e. multiple sclerosis, Guillain-[place], seizures, neurological  Changes]  Ask if on a biologic medication* : NO    10. Pregnant or breast feeding; or plans on pregnancy in the future: NO    11. Signs of worsening depression or suicidal ideations while taking benlysta:NO    12. New-onset medical symptoms: NO    13.  New cancer diagnosis or on chemotherapy or radiation NO    14.  Evaluate for any sign of active TB [Unexplained weight loss, Loss of appetite, Night sweats, Fever, Fatigue, Chills, Coughing for 3 weeks or longer, Hemoptysis (coughing up blood), Chest pain]: NO      **Note: If answered yes to any of the above, hold the infusion and contact ordering provider.

## 2020-04-19 ENCOUNTER — MYC REFILL (OUTPATIENT)
Dept: INTERNAL MEDICINE | Facility: CLINIC | Age: 75
End: 2020-04-19

## 2020-04-19 ENCOUNTER — MYC REFILL (OUTPATIENT)
Dept: CARDIOLOGY | Facility: CLINIC | Age: 75
End: 2020-04-19

## 2020-04-19 DIAGNOSIS — I10 BENIGN ESSENTIAL HYPERTENSION: ICD-10-CM

## 2020-04-19 DIAGNOSIS — I72.2 ANEURYSM OF RIGHT RENAL ARTERY (H): ICD-10-CM

## 2020-04-19 DIAGNOSIS — I71.21 ASCENDING AORTIC ANEURYSM (H): ICD-10-CM

## 2020-04-20 RX ORDER — TRIAMTERENE AND HYDROCHLOROTHIAZIDE 75; 50 MG/1; MG/1
1 TABLET ORAL DAILY
Qty: 90 TABLET | Refills: 0 | Status: SHIPPED | OUTPATIENT
Start: 2020-04-20 | End: 2020-06-17

## 2020-04-20 RX ORDER — POTASSIUM CHLORIDE 1500 MG/1
20 TABLET, EXTENDED RELEASE ORAL DAILY
Qty: 90 TABLET | Refills: 2 | Status: SHIPPED | OUTPATIENT
Start: 2020-04-20 | End: 2020-12-02

## 2020-04-20 NOTE — TELEPHONE ENCOUNTER
Last Clinic Visit: 4/9/2019  [facility]  Scheduling has been notified to contact the pt for appointment.

## 2020-04-23 ENCOUNTER — MYC MEDICAL ADVICE (OUTPATIENT)
Dept: CARDIOLOGY | Facility: CLINIC | Age: 75
End: 2020-04-23

## 2020-04-23 NOTE — TELEPHONE ENCOUNTER
RN will send to Dr. Wang to inquire if any testing needed prior to virtual visit or if virtual visit can be completed and then necessary testing determined at time of virtual visit and scheduled for future date.           [employer] message received.    I  received a letter from the cardiovascular center to schedule and appointment for follow-up on my dilated aorta.  Are you doing such appointments yet?  Usually I have my imaging procedure before the appointment. What procedure do you want done this year? Angiogram or echocardiogram?  Are those being done on an elective basis yet?

## 2020-04-24 NOTE — TELEPHONE ENCOUNTER
RN responded to patient via [employer].       Dr. Wang's recommendation    Can do virtual and then determine follow up imaging for now

## 2020-04-28 ENCOUNTER — VIRTUAL VISIT (OUTPATIENT)
Dept: CARDIOLOGY | Facility: CLINIC | Age: 75
End: 2020-04-28
Payer: MEDICARE

## 2020-04-28 DIAGNOSIS — I71.21 ASCENDING AORTIC ANEURYSM (H): Primary | ICD-10-CM

## 2020-04-28 DIAGNOSIS — I72.2 ANEURYSM OF RIGHT RENAL ARTERY (H): ICD-10-CM

## 2020-04-28 DIAGNOSIS — Z86.73 HISTORY OF STROKE: ICD-10-CM

## 2020-04-28 DIAGNOSIS — I10 BENIGN ESSENTIAL HYPERTENSION: ICD-10-CM

## 2020-04-28 PROCEDURE — 99214 OFFICE O/P EST MOD 30 MIN: CPT | Mod: 95 | Performed by: INTERNAL MEDICINE

## 2020-04-28 NOTE — PROGRESS NOTES
"      Niurka Cisneros is a 74 year old female who is being evaluated via a billable video visit.      The patient has been notified of following:     \"This video visit will be conducted via a call between you and your physician/provider. We have found that certain health care needs can be provided without the need for an in-person physical exam.  This service lets us provide the care you need with a video conversation.  If a prescription is necessary we can send it directly to your pharmacy.  If lab work is needed we can place an order for that and you can then stop by our lab to have the test done at a later time.    Video visits are billed at different rates depending on your insurance coverage.  Please reach out to your insurance provider with any questions.    If during the course of the call the physician/provider feels a video visit is not appropriate, you will not be charged for this service.\"    Patient has given verbal consent for Video visit? Yes    How would you like to obtain your AVS? Negrita    Patient would like the video invitation sent by: Text to cell phone: [phone]    Will anyone else be joining your video visit? No    VITALS REPORTED BY PATIENT:  Weight-156 lbs  BP- 122/86    Review Of Systems:  Skin: NEGATIVE  Eyes:Ears/Nose/Throat: NEGATIVE  Respiratory: POSITIVE: Asthma  Cardiovascular:NEGATIVE  Gastrointestinal: NEGATIVE  Genitourinary:NEGATIVE   Musculoskeletal: NEGATIVE  Neurologic: NEGATIVE  Psychiatric: NEGATIVE  Hematologic/Lymphatic/Immunologic: NEGATIVE  Endocrine:  NEGATIVE    MICHELLE Boykin    HPI and Plan:     This is a 73 year old female here for follow up visit. Has a PMH of bladder prolapse, HTN, seizures, asthma and a newly diagnosed thoracic aneurysm (descending) measuring 4.4 cm.     Prior History:   Reviewed her history in detail: grandmother with aneurysm, at 89 years old, possible rupture and inoperable, leading to death. She has had 2 kids normal vaginal delivery " who are healthy in their 40s. She has had 1 miscarriage. H/o bladder prolapse. Mild hypermobility, chronic back pain from scoliosis. Denies abnormal wound healing or bruising/bleeding, hernias, translucent skin, dental crowding, palatal deformities or chest wall deformities. Thoracic aneurysm picked up 5 years ago and no scanning since. 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 She reports h/o HTN for 10 years, controlled, on 2 drug regimen. Has been on losartan and cannot take beta blocker due to asthma.     Since last visit, her daughter has been screened and has mild aneurysm. She is 47 year olds and will be seeing me in clinic. Older sister screened negative by echo, and son's echocardiogram was canceled.     She denies chest pain, back pain, fevers, chills, ns, LE edema, orthopnea, PND. She is wondering about upcoming imaging which has been deferred.       Assessment and Plan:     74 year old female with scoliosis, bladder prolapse, family history of aneurysmal disease with newly diagnosed thoracic aortic aneurysm measuring 4.4 cm as well as saccular renal artery aneurysm. 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 We again discussed genetic testing and counseling, where the variable penetrance and expression of various mutations associated with FTAAD make a definitive diagnosis difficult. TGFBR2 (~5%), ACTA2 (~14%) and MYH11 (~1%) are known mutations that comprise up to 20% of positively tested cases. There is an 80% chance that no identifiable mutation will be discovered in testing. No bicuspid valve by echocardiogram. We decided to defer on genetic testing at this time and her family is undergoing screening by echocardiogram. 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      Renal artery aneurysm:  will continue to monitor. Not at a point needing repair at this time and  stable in size on surveillance imaging. Do not suspect it is altering any renal blood flow. We discussed obtaining yearly renal artery ultrasound for this and avoiding CTA unless there is any concern in increased size by ultrasound. Similarly, if aneurysm increases in size by echocardiogram then we can obtain CTA at that time. Her CTA head/neck demonstrated no FMD or aneurysms but there was some plaque. We can also obtain surveillance carotid ultrasound.     HTN: Stopped losartan and BP has been stable.     Plan:     1) 6 month follow up with me  2) Surveillance ultrasound of the neck every 2 years for mild <50% stenosis    3) SBP goal < 120, HR < 80 which she is at  4) No heavy lifting > 30 lbs  5) Echocardiography screening for 1st degree relatives recommended and is underway  6) Will defer on offer genetic testing and counseling if patient wishes during future visit   7) CTA deferred, echo and renal us in 6 months before visit   8) Daughter sushma can make virtual appointment with me for TAAD     Virtual Video Visit Duration: 32 minutes    Brandee Wang MD [facility]    CURRENT MEDICATIONS:  Current Outpatient Medications   Medication Sig Dispense Refill     albuterol (PROAIR HFA/PROVENTIL HFA/VENTOLIN HFA) 108 (90 Base) MCG/ACT inhaler Inhale 2 puffs into the lungs every 6 hours as needed for shortness of breath / dyspnea or wheezing 54 g 3     CALCIUM-VITAMIN D PO Take 1 tablet by mouth daily       estradiol (ESTRING) 2 MG vaginal ring Place 1 each vaginally every 3 months 1 each 3     fluticasone-salmeterol (WIXELA INHUB) 500-50 MCG/DOSE inhaler Inhale 1 puff into the lungs every 12 hours 3 Inhaler 3     magnesium 250 MG tablet Take 1 tablet by mouth daily       montelukast (SINGULAIR) 10 MG tablet Take 1 tablet (10 mg) by mouth as needed 90 tablet 3     NAPROXEN PO Take 220 mg by mouth as needed        potassium chloride ER (KLOR-CON M) 20 MEQ CR tablet Take 1 tablet (20 mEq) by mouth daily 90  tablet 2     pravastatin (PRAVACHOL) 20 MG tablet Take 1 tablet (20 mg) by mouth daily 90 tablet 2     theophylline (DENIA-24) 400 MG 24 hr capsule Take 1 capsule (400 mg) by mouth daily 90 capsule 3     triamterene-HCTZ (MAXZIDE) 75-50 MG tablet Take 1 tablet by mouth daily Call clinic to schedule follow up appointment. 90 tablet 0     SHINGRIX injection          ALLERGIES   No Known Allergies    PAST MEDICAL, SURGICAL, FAMILY, SOCIAL HISTORY:  History was reviewed and updated as needed, see medical record.    Review of Systems:  A 12-point review of systems was completed, see medical record for detailed review of systems information.    Physical Exam:    GENERAL: healthy, alert and no distress  EYES: Eyes grossly normal to inspection, conjunctivae and sclerae normal  RESP: no audible wheeze, cough, or visible cyanosis.  No visible retractions or increased work of breathing.  Able to speak fully in complete sentences  NEURO: Cranial nerves grossly intact, mentation intact and speech normal  PSYCH: mentation appears normal, affect normal/bright, judgement and insight intact, normal speech and appearance well-groomed  CARDIOVASCULAR: Neck veins not appreciated indicating probable normal JVP. No LE edema. Normal skin appearance, no stasis dermatitis.       Recent Lab Results:  LIPID RESULTS:  Lab Results   Component Value Date    CHOL 170 12/20/2019    HDL 65 12/20/2019    LDL 83 12/20/2019    TRIG 110 12/20/2019    CHOLHDLRATIO 3.0 11/10/2015       LIVER ENZYME RESULTS:  No results found for: AST, ALT    CBC RESULTS:  Lab Results   Component Value Date    WBC 6.4 05/16/2016    RBC 4.56 05/16/2016    HGB 12.7 05/16/2016    HCT 38.0 05/16/2016    MCV 83 05/16/2016    MCH 27.9 05/16/2016    MCHC 33.4 05/16/2016    RDW 13.5 05/16/2016     05/16/2016       BMP RESULTS:  Lab Results   Component Value Date     12/27/2019    POTASSIUM 3.2 (L) 12/27/2019    CHLORIDE 101 12/27/2019    CO2 30 12/27/2019    ANIONGAP  4 12/27/2019    GLC 89 12/27/2019    BUN 15 12/27/2019    CR 0.76 12/27/2019    GFRESTIMATED 78 12/27/2019    GFRESTBLACK >90 12/27/2019    ZACH 9.4 12/27/2019        A1C RESULTS:  Lab Results   Component Value Date    A1C 5.7 (H) 12/20/2019       INR RESULTS:  No results found for: INR      CC  No referring provider defined for this encounter.      Video-Visit Details    Type of service:  Video Visit    Time of Video Visit: 32 minutes     Originating Location (pt. Location): Home     Distant Location (provider location):  [facility] : Home Office     Mode of Communication:  [facility] Conference [facility]

## 2020-04-28 NOTE — LETTER
4/28/2020      RE: Niurka Cisneros  [address]  [place] [address]       Dear Colleague,    Thank you for the opportunity to participate in the care of your patient, Niurka Cisneros, at the [facility]. Please see a copy of my visit note below.    This is a 73 year old female here for follow up visit. Has a PMH of bladder prolapse, HTN, seizures, asthma and a newly diagnosed thoracic aneurysm (descending) measuring 4.4 cm.     Prior History:   Reviewed her history in detail: grandmother with aneurysm, at 89 years old, possible rupture and inoperable, leading to death. She has had 2 kids normal vaginal delivery who are healthy in their 40s. She has had 1 miscarriage. H/o bladder prolapse. Mild hypermobility, chronic back pain from scoliosis. Denies abnormal wound healing or bruising/bleeding, hernias, translucent skin, dental crowding, palatal deformities or chest wall deformities. Thoracic aneurysm picked up 5 years ago and no scanning since. 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 She reports h/o HTN for 10 years, controlled, on 2 drug regimen. Has been on losartan and cannot take beta blocker due to asthma.     Since last visit, her daughter has been screened and has mild aneurysm. She is 47 year olds and will be seeing me in clinic. Older sister screened negative by echo, and son's echocardiogram was canceled.     She denies chest pain, back pain, fevers, chills, ns, LE edema, orthopnea, PND. She is wondering about upcoming imaging which has been deferred.     Assessment and Plan:     74 year old female with scoliosis, bladder prolapse, family history of aneurysmal disease with newly diagnosed thoracic aortic aneurysm measuring 4.4 cm as well as saccular renal artery aneurysm. 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  We again discussed genetic testing and counseling, where the variable penetrance and expression of various mutations associated with FTAAD make a definitive diagnosis difficult. TGFBR2 (~5%), ACTA2 (~14%) and MYH11 (~1%) are known mutations that comprise up to 20% of positively tested cases. There is an 80% chance that no identifiable mutation will be discovered in testing. No bicuspid valve by echocardiogram. We decided to defer on genetic testing at this time and her family is undergoing screening by echocardiogram. 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      Renal artery aneurysm:  will continue to monitor. Not at a point needing repair at this time and stable in size on surveillance imaging. Do not suspect it is altering any renal blood flow. We discussed obtaining yearly renal artery ultrasound for this and avoiding CTA unless there is any concern in increased size by ultrasound. Similarly, if aneurysm increases in size by echocardiogram then we can obtain CTA at that time. Her CTA head/neck demonstrated no FMD or aneurysms but there was some plaque. We can also obtain surveillance carotid ultrasound.     HTN: Stopped losartan and BP has been stable.     Plan:     1) 6 month follow up with me  2) Surveillance ultrasound of the neck every 2 years for mild <50% stenosis    3) SBP goal < 120, HR < 80 which she is at  4) No heavy lifting > 30 lbs  5) Echocardiography screening for 1st degree relatives recommended and is underway  6) Will defer on offer genetic testing and counseling if patient wishes during future visit   7) CTA deferred, echo and renal us in 6 months before visit   8) Daughter sushma can make virtual appointment with me for TAAD     Please do not hesitate to contact me if you have any questions/concerns.     Sincerely,     Brandee Wang MD

## 2020-06-12 ENCOUNTER — INFUSION THERAPY VISIT (OUTPATIENT)
Dept: INFUSION THERAPY | Facility: CLINIC | Age: 75
End: 2020-06-12
Attending: INTERNAL MEDICINE
Payer: MEDICARE

## 2020-06-12 VITALS
TEMPERATURE: 98.3 F | DIASTOLIC BLOOD PRESSURE: 73 MMHG | OXYGEN SATURATION: 97 % | SYSTOLIC BLOOD PRESSURE: 121 MMHG | HEART RATE: 72 BPM

## 2020-06-12 DIAGNOSIS — J45.50 SEVERE PERSISTENT ASTHMA WITHOUT COMPLICATION (H): Primary | ICD-10-CM

## 2020-06-12 PROCEDURE — 96372 THER/PROPH/DIAG INJ SC/IM: CPT

## 2020-06-12 PROCEDURE — 25000128 H RX IP 250 OP 636: Mod: ZF

## 2020-06-12 RX ADMIN — BENRALIZUMAB 30 MG: 30 INJECTION, SOLUTION SUBCUTANEOUS at 10:21

## 2020-06-12 ASSESSMENT — PAIN SCALES - GENERAL: PAINLEVEL: NO PAIN (0)

## 2020-06-12 NOTE — LETTER
2020         RE: Niurka Cisneros  [address]  [place] [address]        Dear Colleague,    Thank you for referring your patient, Niurka Cisneros, to the [facility]. Please see a copy of my visit note below.    General assessment for IV and SQ medications    1. Elevated temperature, fever, chills, productive cough or abnormal vital signs, night sweats, coughing up blood or sputum, no appetite or abnormal vital signs, SOB : NO    2. Open wounds or new incisions: NO    3. Recent hospitalization: NO    4.  Recent surgeries:  NO    5. Any upcoming surgeries or dental procedures?:NO    6. Any current or recent bouts of illness or infection? On any antibiotics? : NO    7. Any new, sudden or worsening abdominal pain :NO    8. Vaccination within 4 weeks? Patient or someone in the household is scheduled to receive vaccination? No live virus vaccines prior to or during treatment :NO    9. Any nervous system diseases [i.e. multiple sclerosis, Guillain-[place], seizures, neurological  Changes]  Ask if on a biologic medication* : NO    10. Pregnant or breast feeding; or plans on pregnancy in the future: NO    11. Signs of worsening depression or suicidal ideations while taking benlysta:NO    12. New-onset medical symptoms: NO    13.  New cancer diagnosis or on chemotherapy or radiation NO    14.  Evaluate for any sign of active TB [Unexplained weight loss, Loss of appetite, Night sweats, Fever, Fatigue, Chills, Coughing for 3 weeks or longer, Hemoptysis (coughing up blood), Chest pain]: NO    **Note: If answered yes to any of the above, hold the infusion and contact ordering provider.          Patient presents to the [facility] for Fasenra.  Order written by Dr. Vega  was completed today. Name and  verified with patient. See MAR for medication details. Medication was divided into 1 syringes by pharmacy and given in the following sites back side of upper right  arm.   Patient was monitored for 30 minutes. Patient tolerated injection well and was discharged to home.  Patient is to return in 56 days.    Carissa Corona MA    Administrations This Visit     benralizumab (FASENRA) injection 30 mg     Admin Date  06/12/2020 Action  Given Dose  30 mg Route  Subcutaneous Administered By  Carissa Corona MA                    Again, thank you for allowing me to participate in the care of your patient.        Sincerely,        [facility]

## 2020-06-12 NOTE — PROGRESS NOTES
General assessment for IV and SQ medications    1. Elevated temperature, fever, chills, productive cough or abnormal vital signs, night sweats, coughing up blood or sputum, no appetite or abnormal vital signs, SOB : NO    2. Open wounds or new incisions: NO    3. Recent hospitalization: NO    4.  Recent surgeries:  NO    5. Any upcoming surgeries or dental procedures?:NO    6. Any current or recent bouts of illness or infection? On any antibiotics? : NO    7. Any new, sudden or worsening abdominal pain :NO    8. Vaccination within 4 weeks? Patient or someone in the household is scheduled to receive vaccination? No live virus vaccines prior to or during treatment :NO    9. Any nervous system diseases [i.e. multiple sclerosis, Guillain-[place], seizures, neurological  Changes]  Ask if on a biologic medication* : NO    10. Pregnant or breast feeding; or plans on pregnancy in the future: NO    11. Signs of worsening depression or suicidal ideations while taking benlysta:NO    12. New-onset medical symptoms: NO    13.  New cancer diagnosis or on chemotherapy or radiation NO    14.  Evaluate for any sign of active TB [Unexplained weight loss, Loss of appetite, Night sweats, Fever, Fatigue, Chills, Coughing for 3 weeks or longer, Hemoptysis (coughing up blood), Chest pain]: NO    **Note: If answered yes to any of the above, hold the infusion and contact ordering provider.

## 2020-06-12 NOTE — PROGRESS NOTES
Patient presents to the [facility] for Fasenra.  Order written by Dr. Vega  was completed today. Name and  verified with patient. See MAR for medication details. Medication was divided into 1 syringes by pharmacy and given in the following sites back side of upper right arm.   Patient was monitored for 30 minutes. Patient tolerated injection well and was discharged to home.  Patient is to return in 56 days.    Carissa Corona MA    Administrations This Visit     benralizumab (FASENRA) injection 30 mg     Admin Date  2020 Action  Given Dose  30 mg Route  Subcutaneous Administered By  Carissa Corona MA

## 2020-06-14 DIAGNOSIS — I10 BENIGN ESSENTIAL HYPERTENSION: ICD-10-CM

## 2020-06-14 DIAGNOSIS — I71.21 ASCENDING AORTIC ANEURYSM (H): ICD-10-CM

## 2020-06-14 DIAGNOSIS — I72.2 ANEURYSM OF RIGHT RENAL ARTERY (H): ICD-10-CM

## 2020-06-17 RX ORDER — TRIAMTERENE AND HYDROCHLOROTHIAZIDE 75; 50 MG/1; MG/1
TABLET ORAL
Qty: 90 TABLET | Refills: 0 | Status: SHIPPED | OUTPATIENT
Start: 2020-06-17 | End: 2020-07-14

## 2020-06-17 RX ORDER — LOSARTAN POTASSIUM 25 MG/1
TABLET ORAL
Qty: 90 TABLET | Refills: 0 | OUTPATIENT
Start: 2020-06-17

## 2020-06-17 NOTE — TELEPHONE ENCOUNTER
losartan (COZAAR) 25 MG tablet  Take 1 tablet (25 mg) by mouth daily        Last Written Prescription Date:  12/3/19-12/20/19,  Last Fill Quantity:  90  # refills: 0  Last Office Visit : 12/20/19  Future Office visit: 1/8/20    Routing refill request to provider for review/approval because:  Drug not active on patient's medication list

## 2020-06-17 NOTE — TELEPHONE ENCOUNTER
TRIAMT/HCTZ  TAB 75-50MG   Last Written Prescription Date:  4/20/20  Last Fill Quantity: 90,   # refills: 0  Last Office Visit : 4/28/20 kasia Wang  Future Office visit:  None    Routing refill request to provider for review/approval because: K=3.2 12/27/2019   [facility] : Home Office

## 2020-07-11 DIAGNOSIS — I10 BENIGN ESSENTIAL HYPERTENSION: ICD-10-CM

## 2020-07-11 DIAGNOSIS — I72.2 ANEURYSM OF RIGHT RENAL ARTERY (H): ICD-10-CM

## 2020-07-11 DIAGNOSIS — I71.21 ASCENDING AORTIC ANEURYSM (H): ICD-10-CM

## 2020-07-14 RX ORDER — TRIAMTERENE AND HYDROCHLOROTHIAZIDE 75; 50 MG/1; MG/1
1 TABLET ORAL DAILY
Qty: 90 TABLET | Refills: 3 | Status: SHIPPED | OUTPATIENT
Start: 2020-07-14 | End: 2020-12-02

## 2020-07-14 NOTE — TELEPHONE ENCOUNTER
TRIAMT/HCTZ  TAB 75-50MG     Last Written Prescription Date:  6/17/2020  Last Fill Quantity: 90,   # refills: 0  Last Office Visit : 4/28/2020  Future Office visit:  None  90 Tabs, 3 Refills sent to pharm 7/14/2020    Maribel Melo RN  Central Triage Red Flags/Med Refills        Overridden by Yue Carrero LPN on Jun 17, 2020 1:42 PM    Drug-Drug    1. POTASSIUM-SPARING DIURETICS / POTASSIUM PREPARATIONS [Level: Major]    Other Orders:  potassium chloride ER (KLOR-CON M) 20 MEQ CR tablet

## 2020-08-07 ENCOUNTER — INFUSION THERAPY VISIT (OUTPATIENT)
Dept: INFUSION THERAPY | Facility: CLINIC | Age: 75
End: 2020-08-07
Payer: MEDICARE

## 2020-08-07 VITALS
RESPIRATION RATE: 16 BRPM | OXYGEN SATURATION: 95 % | TEMPERATURE: 98.8 F | DIASTOLIC BLOOD PRESSURE: 77 MMHG | HEART RATE: 78 BPM | SYSTOLIC BLOOD PRESSURE: 128 MMHG

## 2020-08-07 DIAGNOSIS — J45.50 SEVERE PERSISTENT ASTHMA WITHOUT COMPLICATION (H): Primary | ICD-10-CM

## 2020-08-07 PROCEDURE — 25000128 H RX IP 250 OP 636: Mod: ZF

## 2020-08-07 PROCEDURE — 96372 THER/PROPH/DIAG INJ SC/IM: CPT

## 2020-08-07 RX ADMIN — BENRALIZUMAB 30 MG: 30 INJECTION, SOLUTION SUBCUTANEOUS at 07:35

## 2020-08-07 NOTE — LETTER
2020         RE: Niurka Cisneros  [address]  [place] [address]        Dear Colleague,    Thank you for referring your patient, Niurka Cisneros, to the [facility] SPECIALTY [facility] PROCEDURE. Please see a copy of my visit note below.    General assessment for IV and SQ medications    1. Elevated temperature, fever, chills, productive cough or abnormal vital signs, night sweats, coughing up blood or sputum, no appetite or abnormal vital signs, SOB : NO    2. Open wounds or new incisions: NO    3. Recent hospitalization: NO    4.  Recent surgeries:  NO    5. Any upcoming surgeries or dental procedures?:NO    6. Any current or recent bouts of illness or infection? On any antibiotics? : NO    7. Any new, sudden or worsening abdominal pain :NO    8. Vaccination within 4 weeks? Patient or someone in the household is scheduled to receive vaccination? No live virus vaccines prior to or during treatment :NO    9. Any nervous system diseases [i.e. multiple sclerosis, Guillain-[place], seizures, neurological  Changes]  Ask if on a biologic medication* : NO    10. Pregnant or breast feeding; or plans on pregnancy in the future: NO    11. Signs of worsening depression or suicidal ideations while taking benlysta:NO    12. New-onset medical symptoms: NO    13.  New cancer diagnosis or on chemotherapy or radiation NO    14.  Evaluate for any sign of active TB [Unexplained weight loss, Loss of appetite, Night sweats, Fever, Fatigue, Chills, Coughing for 3 weeks or longer, Hemoptysis (coughing up blood), Chest pain]: NO    **Note: If answered yes to any of the above, hold the infusion and contact ordering provider.      Patient presents to the [facility] for Fasenra.  Order written by Dr. Vega  was completed today. Name and  verified with patient. See MAR for medication details. Medication was divided into 1 syringes by pharmacy and given in the following sites back side of upper left arm.    Patient was monitored for 30 minutes. Patient tolerated injection well and was discharged to home.  Patient is to return in 56 days.    Regina De La Paz RN      Administrations This Visit     benralizumab (FASENRA) injection 30 mg     Admin Date  08/07/2020 Action  Given Dose  30 mg Route  Subcutaneous Administered By  Regina De La Paz RN                    Again, thank you for allowing me to participate in the care of your patient.        Sincerely,        [facility]

## 2020-08-07 NOTE — PROGRESS NOTES
General assessment for IV and SQ medications    1. Elevated temperature, fever, chills, productive cough or abnormal vital signs, night sweats, coughing up blood or sputum, no appetite or abnormal vital signs, SOB : NO    2. Open wounds or new incisions: NO    3. Recent hospitalization: NO    4.  Recent surgeries:  NO    5. Any upcoming surgeries or dental procedures?:NO    6. Any current or recent bouts of illness or infection? On any antibiotics? : NO    7. Any new, sudden or worsening abdominal pain :NO    8. Vaccination within 4 weeks? Patient or someone in the household is scheduled to receive vaccination? No live virus vaccines prior to or during treatment :NO    9. Any nervous system diseases [i.e. multiple sclerosis, Guillain-[place], seizures, neurological  Changes]  Ask if on a biologic medication* : NO    10. Pregnant or breast feeding; or plans on pregnancy in the future: NO    11. Signs of worsening depression or suicidal ideations while taking benlysta:NO    12. New-onset medical symptoms: NO    13.  New cancer diagnosis or on chemotherapy or radiation NO    14.  Evaluate for any sign of active TB [Unexplained weight loss, Loss of appetite, Night sweats, Fever, Fatigue, Chills, Coughing for 3 weeks or longer, Hemoptysis (coughing up blood), Chest pain]: NO    **Note: If answered yes to any of the above, hold the infusion and contact ordering provider.      Patient presents to the [facility] for Fasenra.  Order written by Dr. Vega  was completed today. Name and  verified with patient. See MAR for medication details. Medication was divided into 1 syringes by pharmacy and given in the following sites back side of upper left arm.   Patient was monitored for 30 minutes. Patient tolerated injection well and was discharged to home.  Patient is to return in 56 days.    Regina Lees RN      Administrations This Visit     benralizumab (FASENRA) injection 30 mg     Admin Date  2020 Action  Given  Dose  30 mg Route  Subcutaneous Administered By  Regina De La Paz, RN

## 2020-09-28 DIAGNOSIS — J45.909 ASTHMA: ICD-10-CM

## 2020-10-02 ENCOUNTER — INFUSION THERAPY VISIT (OUTPATIENT)
Dept: INFUSION THERAPY | Facility: CLINIC | Age: 75
End: 2020-10-02
Payer: MEDICARE

## 2020-10-02 VITALS
SYSTOLIC BLOOD PRESSURE: 146 MMHG | OXYGEN SATURATION: 97 % | RESPIRATION RATE: 18 BRPM | HEART RATE: 87 BPM | TEMPERATURE: 98 F | DIASTOLIC BLOOD PRESSURE: 88 MMHG

## 2020-10-02 DIAGNOSIS — J45.50 SEVERE PERSISTENT ASTHMA WITHOUT COMPLICATION (H): Primary | ICD-10-CM

## 2020-10-02 PROCEDURE — 96372 THER/PROPH/DIAG INJ SC/IM: CPT

## 2020-10-02 PROCEDURE — 99207 PR NO CHARGE LOS: CPT

## 2020-10-02 PROCEDURE — 250N000011 HC RX IP 250 OP 636

## 2020-10-02 RX ADMIN — BENRALIZUMAB 30 MG: 30 INJECTION, SOLUTION SUBCUTANEOUS at 07:30

## 2020-10-02 NOTE — PATIENT INSTRUCTIONS
Dear Niurka Cisneros    Thank you for choosing [facility] ([facility] for your injectionPatient Education     Benralizumab injection  Brand Name: Fasenra  What is this medicine?  BENRALIZUMAB (DAYANA ra FELIPE oo mab) is used to help treat severe asthma. It should be used in combination with other asthma treatments.  How should I use this medicine?  This medicine is for injection under the skin. It is give by a health care professional in a hospital or clinic setting.  Talk to your pediatrician regarding the use of this medicine in children. While this drug may be prescribed for children as young as 12 years for selected conditions, precautions do apply.  What side effects may I notice from receiving this medicine?  Side effects that you should report to your doctor or health care professional as soon as possible:    allergic reactions like skin rash, itching or hives, swelling of the face, lips, or tongue    breathing problems    painful rash or blisters    signs and symptoms of low blood pressure like dizziness; feeling faint or lightheaded, falls    signs and symptoms of infection like fever or chills; cough; sore throat; pain or trouble passing urine  Side effects that usually do not require medical attention (report these to your doctor or health care professional if they continue or are bothersome):    headache    pain, redness, or irritation at the site where injected    weak or tired  What may interact with this medicine?  Interactions are not expected.  What if I miss a dose?  Keep appointments for follow-up doses as directed. It is important not to miss your dose. Call your doctor or health care professional if you are unable to keep an appointment.  Where should I keep my medicine?  This drug is given in a hospital or clinic and will not be stored at home.  What should I tell my health care provider before I take this medicine?  They need to know if  you have any of these conditions:    parasitic (helminth) infection    an unusual or allergic reaction to benralizumab, hamster proteins, other medicines, foods, dyes, or preservatives    pregnant or trying to get pregnant    breast-feeding  What should I watch for while using this medicine?  Tell your doctor or healthcare professional if your symptoms do not start to get better or if they get worse.  Do not stop taking your other asthma medicines unless instructed to do so by your doctor or health care professional.  Your condition will be monitored carefully while you are receiving this medicine.  NOTE:This sheet is a summary. It may not cover all possible information. If you have questions about this medicine, talk to your doctor, pharmacist, or health care provider. Copyright  2019 [employer]         .  The following information is a summary of our appointment as well as important reminders.      We look forward in seeing you on your next appointment here at [facility] ([facility]).  Please don t hesitate to call us at [phone] to reschedule any of your appointments or to speak with one of the [facility] registered nurses.  It was a pleasure taking care of you today.    Sincerely,    [facility]  [address] [place] [address]  [address]  Phone:  [phone]

## 2020-10-02 NOTE — PROGRESS NOTES
Nursing Note  Niurka Cisneros presents today to [facility] for:   Chief Complaint   Patient presents with     Infusion     Mercedesa     During today's [facility] appointment, orders from Dr. MARICEL Vega were completed.  Frequency: every 8 weeks    Progress note:  Patient identification verified by name and date of birth.  Assessment completed.  Vitals recorded in Doc Flowsheets.  Patient was provided with education regarding medication/procedure and possible side effects.  Patient verbalized understanding.     present during visit today: Not Applicable.    Treatment Conditions: ~~~ NOTE: If the patient answers yes to any of the questions below, hold the infusion and contact ordering provider or on-call provider.    1. Have you recently had an elevated temperature, fever, chills, productive cough, coughing for 3 weeks or longer or hemoptysis, abnormal vital signs, night sweats,  chest pain or have you noticed a decrease in your appetite, unexplained weight loss or fatigue? No  2. Do you have any open wounds or new incisions? No  3. Do you have any recent or upcoming hospitalizations, surgeries or dental procedures? No  4. Do you currently have or recently have had any signs of illness or infection or are you on any antibiotics? No  5. Have you had any new, sudden or worsening abdominal pain? No  6. Have you or anyone in your household received a live vaccination in the past 4 weeks? Please note:  No live vaccines while on biologic/chemotherapy until 6 months after the last treatment.  Patient can receive the flu vaccine (shot only) and the pneumovax.  It is optimal for the patient to get these vaccines mid cycle, but they can be given at any time as long as it is not on the day of the infusion. No  7. Have you recently been diagnosed with any new nervous system diseases (ie. Multiple sclerosis, Guillain [place], seizures, neurological changes) or  cancer diagnosis? No  8. Are you on any form of radiation or chemotherapy? No  9. Are you pregnant or breast feeding or do you have plans of pregnancy in the future? No  10. Have you been having any signs of worsening depression or suicidal ideations?  (benlysta only) No  11. Have there been any other new onset medical symptoms? No  30 min observation post injection    Premedications: were not ordered.    Drug Waste Record: No    The following medication was given:     MEDICATION: benralizumab (Fasenera)  ROUTE: SQ  SITE: Arm - Left 9back of arm)  DOSE: 30 mg  LOT #: [id number]  :  [facility]  EXPIRATION DATE:  07/2021  NDC#: 6749-1733-60       Post Infusion Assessment:  Patient tolerated injection without incident.  Site patent and intact, free from redness, edema or discomfort.   Site covered with Band-Aid after injection.   30 min observation period.     Discharge Plan:   Follow up plan of care with: ongoing injections at [facility].  Discharge instructions were reviewed with patient. Patient declined printed AVS.   Patient/representative verbalized understanding of discharge instructions and all questions answered.  Patient discharged from [facility] in stable condition.    Margo Callahan RN       Administrations This Visit     benralizumab (FASENRA) injection 30 mg     Admin Date  10/02/2020 Action  Given Dose  30 mg Route  Subcutaneous Administered By  Margo Callahan RN                BP (!) 128/91   Pulse 83   Resp 18   LMP  (LMP Unknown)   SpO2 97%

## 2020-10-02 NOTE — LETTER
10/2/2020         RE: Niurka Cisneros  [address]  [place] [address]        Dear Colleague,    Thank you for referring your patient, Niurka Cisneros, to the [facility]. Please see a copy of my visit note below.    Nursing Note  Niurka Cisneros presents [facility] for:   Chief Complaint   Patient presents with     Infusion     Fasnera     During today's [facility] appointment, orders from Dr. MARICEL Vega were completed.  Frequency: every 8 weeks    Progress note:  Patient identification verified by name and date of birth.  Assessment completed.  Vitals recorded in Doc Flowsheets.  Patient was provided with education regarding medication/procedure and possible side effects.  Patient verbalized understanding.     present during visit today: Not Applicable.    Treatment Conditions: ~~~ NOTE: If the patient answers yes to any of the questions below, hold the infusion and contact ordering provider or on-call provider.    1. Have you recently had an elevated temperature, fever, chills, productive cough, coughing for 3 weeks or longer or hemoptysis, abnormal vital signs, night sweats,  chest pain or have you noticed a decrease in your appetite, unexplained weight loss or fatigue? No  2. Do you have any open wounds or new incisions? No  3. Do you have any recent or upcoming hospitalizations, surgeries or dental procedures? No  4. Do you currently have or recently have had any signs of illness or infection or are you on any antibiotics? No  5. Have you had any new, sudden or worsening abdominal pain? No  6. Have you or anyone in your household received a live vaccination in the past 4 weeks? Please note:  No live vaccines while on biologic/chemotherapy until 6 months after the last treatment.  Patient can receive the flu vaccine (shot only) and the pneumovax.  It is optimal for the  patient to get these vaccines mid cycle, but they can be given at any time as long as it is not on the day of the infusion. No  7. Have you recently been diagnosed with any new nervous system diseases (ie. Multiple sclerosis, Guillain [place], seizures, neurological changes) or cancer diagnosis? No  8. Are you on any form of radiation or chemotherapy? No  9. Are you pregnant or breast feeding or do you have plans of pregnancy in the future? No  10. Have you been having any signs of worsening depression or suicidal ideations?  (benlysta only) No  11. Have there been any other new onset medical symptoms? No  30 min observation post injection    Premedications: were not ordered.    Drug Waste Record: No    The following medication was given:     MEDICATION: benralizumab (Fasenera)  ROUTE: SQ  SITE: Arm - Left 9back of arm)  DOSE: 30 mg  LOT #: [id number]  :  [employer]  EXPIRATION DATE:  07/2021  NDC#: 2708-3372-17       Post Infusion Assessment:  Patient tolerated injection without incident.  Site patent and intact, free from redness, edema or discomfort.   Site covered with Band-Aid after injection.   30 min observation period.     Discharge Plan:   Follow up plan of care with: ongoing injections at [facility].  Discharge instructions were reviewed with patient. Patient declined printed AVS.   Patient/representative verbalized understanding of discharge instructions and all questions answered.  Patient discharged from [facility] in stable condition.    Margo Callahan RN       Administrations This Visit     benralizumab (FASENRA) injection 30 mg     Admin Date  10/02/2020 Action  Given Dose  30 mg Route  Subcutaneous Administered By  Margo Callahan RN                BP (!) 128/91   Pulse 83   Resp 18   LMP  (LMP Unknown)   SpO2 97%         Again, thank you for allowing me to participate in the care of your patient.         Sincerely,        [facility]

## 2020-10-09 ENCOUNTER — VIRTUAL VISIT (OUTPATIENT)
Dept: CARDIOLOGY | Facility: CLINIC | Age: 75
End: 2020-10-09
Payer: MEDICARE

## 2020-10-09 DIAGNOSIS — I71.20 THORACIC AORTIC ANEURYSM WITHOUT RUPTURE (H): Primary | ICD-10-CM

## 2020-10-09 PROCEDURE — 99214 OFFICE O/P EST MOD 30 MIN: CPT | Mod: 95 | Performed by: INTERNAL MEDICINE

## 2020-10-09 NOTE — PROGRESS NOTES
"         Vascular Cardiology Consultation      Niurka Cisneros is a 75 year old female who is being evaluated via a billable video visit.      The patient has been notified of following:     \"This video visit will be conducted via a call between you and your physician/provider. We have found that certain health care needs can be provided without the need for an in-person physical exam.  This service lets us provide the care you need with a video conversation.  If a prescription is necessary we can send it directly to your pharmacy.  If lab work is needed we can place an order for that and you can then stop by our lab to have the test done at a later time.    Video visits are billed at different rates depending on your insurance coverage.  Please reach out to your insurance provider with any questions.    If during the course of the call the physician/provider feels a video visit is not appropriate, you will not be charged for this service.\"    Patient has given verbal consent for Video visit? Yes  How would you like to obtain your AVS? MyChart  If you are dropped from the video visit, the video invite should be resent to: Send to e-mail at: [email or web address]  Will anyone else be joining your video visit? Daughter [phone],  Son [phone]  {If patient encounters technical issues they should call [phone]}   Review Of Systems  Skin: NEGATIVE  Eyes:Ears/Nose/Throat: NEGATIVE  Respiratory: Cough in AM  Cardiovascular:NEGATIVE  Gastrointestinal: NEGATIVE  Genitourinary:NEGATIVE   Musculoskeletal: NEGATIVE  Neurologic: temporal lobe seizures  Psychiatric: NEGATIVE  Hematologic/Lymphatic/Immunologic: NEGATIVE  Endocrine:  Prediabetic    Telephone number of patient: [phone]   Vitals - Patient Reported  Systolic (Patient Reported): 133  Diastolic (Patient Reported): (!) 91  Pulse (Patient Reported): 78  Queta Douglass  This is a 73 year old female here for follow up visit. Has a PMH of bladder " prolapse, HTN, seizures, asthma and a newly diagnosed thoracic aneurysm (descending) measuring 4.4 cm.     Prior History:   Reviewed her history in detail: grandmother with aneurysm, at 89 years old, possible rupture and inoperable, leading to death. She has had 2 kids normal vaginal delivery who are healthy in their 40s. She has had 1 miscarriage. H/o bladder prolapse. Mild hypermobility, chronic back pain from scoliosis. Denies abnormal wound healing or bruising/bleeding, hernias, translucent skin, dental crowding, palatal deformities or chest wall deformities. Thoracic aneurysm picked up 5 years ago and no scanning since. 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 She reports h/o HTN for 10 years, controlled, on 2 drug regimen. Has been on losartan and cannot take beta blocker due to asthma.      Since last visit, her daughter has been screened and has mild aneurysm. She is 47 year olds and will be seeing me in clinic. Older sister screened negative by echo, and son's echocardiogram was canceled.      She denies chest pain, back pain, fevers, chills, ns, LE edema, orthopnea, PND. She is wondering about upcoming imaging which has been deferred.         Assessment and Plan:      74 year old female with scoliosis, bladder prolapse, family history of aneurysmal disease with newly diagnosed thoracic aortic aneurysm measuring 4.4 cm as well as saccular renal artery aneurysm. 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 We again discussed genetic testing and counseling, where the variable penetrance and expression of various mutations associated with FTAAD make a definitive diagnosis difficult. TGFBR2 (~5%), ACTA2 (~14%) and MYH11 (~1%) are known mutations that comprise up to 20% of positively tested cases. There is an 80% chance that no identifiable mutation will be discovered in testing. No bicuspid valve by echocardiogram. We  "decided to defer on genetic testing at this time and her family is undergoing screening by echocardiogram. 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      Renal artery aneurysm:  will continue to monitor. Not at a point needing repair at this time and stable in size on surveillance imaging. Do not suspect it is altering any renal blood flow. We discussed obtaining yearly renal artery ultrasound for this and avoiding CTA unless there is any concern in increased size by ultrasound. Similarly, if aneurysm increases in size by echocardiogram then we can obtain CTA at that time. Her CTA head/neck demonstrated no FMD or aneurysms but there was some plaque. We can also obtain surveillance carotid ultrasound.      HTN: Stopped losartan and BP has been stable.      Plan:     1) 6 month follow up with me  2) Surveillance ultrasound of the neck every 2 years for mild <50% stenosis    3) SBP goal < 120, HR < 80 which she is at  4) No heavy lifting > 30 lbs  5) Echocardiography screening for 1st degree relatives recommended and is underway  6) Will defer on offer genetic testing and counseling if patient wishes during future visit   7) CTA deferred, echo and renal us in 6 months before visit   8) Daughter sushma can make virtual appointment with me for TAAD    - saw Dr. Pa     CT old infarct     Video-Visit Details    Type of service:  Video Visit    Video Start Time: {video visit start/end time:152948}  Video End Time: {video visit start/end time:152948}    Originating Location (pt. Location): {video visit patient location:189252::\"Home\"}    Distant Location (provider location):  [facility]     Platform used for Video Visit: {Virtual Visit Platforms:857981::\"Well\"}    {signature options:605744}      "

## 2020-10-09 NOTE — LETTER
"10/9/2020    Mara Combs MD  [address]  [place] [address]    RE: Niurka Cisneros       Dear Colleague,    I had the pleasure of seeing Niurka Cisneros in the [facility].      Niurka Cisneros is a 75 year old female who is being evaluated via a billable video visit.      The patient has been notified of following:     \"This video visit will be conducted via a call between you and your physician/provider. We have found that certain health care needs can be provided without the need for an in-person physical exam.  This service lets us provide the care you need with a video conversation.  If a prescription is necessary we can send it directly to your pharmacy.  If lab work is needed we can place an order for that and you can then stop by our lab to have the test done at a later time.    Video visits are billed at different rates depending on your insurance coverage.  Please reach out to your insurance provider with any questions.    If during the course of the call the physician/provider feels a video visit is not appropriate, you will not be charged for this service.\"    Patient has given verbal consent for Video visit? Yes  How would you like to obtain your AVS? MyChart  If you are dropped from the video visit, the video invite should be resent to: Send to e-mail at: [email or web address]  Will anyone else be joining your video visit? Daughter [phone],  Son [phone]       Review Of Systems  Skin: NEGATIVE  Eyes:Ears/Nose/Throat: NEGATIVE  Respiratory: Cough in AM  Cardiovascular:NEGATIVE  Gastrointestinal: NEGATIVE  Genitourinary:NEGATIVE   Musculoskeletal: NEGATIVE  Neurologic: temporal lobe seizures  Psychiatric: NEGATIVE  Hematologic/Lymphatic/Immunologic: NEGATIVE  Endocrine:  Prediabetic    Telephone number of patient: [phone]   Vitals - Patient Reported  Systolic (Patient Reported): 133  Diastolic (Patient Reported): (!) 91  Pulse " "(Patient Reported): 78  Queta Douglass    PROVIDER NOTES:    This is a 75 year old female here for follow up visit for thoracic aortic aneurysm. Has a PMH of bladder prolapse, HTN, seizures, asthma and a thoracic aneurysm (descending) measuring 4.4 cm.     Prior History:     Reviewed her history in detail: grandmother with aneurysm, at 89 years old, possible rupture and inoperable, leading to death. She has had 2 kids normal vaginal delivery who are healthy in their 40s. She has had 1 miscarriage. H/o bladder prolapse. Mild hypermobility, chronic back pain from scoliosis. Denies abnormal wound healing or bruising/bleeding, hernias, translucent skin, dental crowding, palatal deformities or chest wall deformities.     Thoracic aneurysm had been picked up 5 years prior to visit and she had no scanning since so we obtained echo imaging which documented stability at 4.4 cm. Had not yet had her CTA C/A/P to confirm size.     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 She reports h/o HTN for 10 years, controlled, on 2 drug regimen. Had been on losartan and cannot take beta blocker due to asthma. She recently had to go off of losartan due to side effects. Could also not tolerate ACEI.      Since last visit, her daughter has been screened and has a mild aneurysm. She is 47 year olds and is seeing me along side her mother on today's virtual visit. Son is also on virtual call, from [facility] and had an echocardiogram screen that demonstrated moderate mitral regurgitation, prominent LV trabeculations and mild aortic dilation. All children have hypermobile joints and tall stature. Son is 6'2\".      Niurka denies chest pain, back pain, fevers, chills, ns, LE edema, orthopnea, PND.      ASSESSMENT/PLAN:     Niurka is a 75 year old with ascending aortic aneurysm 4.4 cm and saccular aneurysm of renal artery.     Differential includes fibromuscular " dysplasia, syndromic TAAD with associated primary arteriopathy (MFS, LDS, vEDS), or other type of familial TAAD. It was helpful today to have son on the visit because his findings of mitral regurgitation, aortic aneurysm raise concerns for a possible syndromic (MFS or Marfanoid) TAAD. All family members described flat feet and tall stature.     There is high yield with genetic testing with syndromic types. We also discussed genetic testing and counseling for FTAAD, where the variable penetrance and expression of various mutations associated with FTAAD make a definitive diagnosis difficult. TGFBR2 (~5%), ACTA2 (~14%) and MYH11 (~1%) are known mutations that comprise up to 20% of positively tested cases. There is an 80% chance that no identifiable mutation will be discovered in testing but regardless we will treat patient and her family the same as if they were gene positive. Presumably given both children have aortic aneurysm there is autosomal dominance pattern in their aortopathy. This has implications for children.     With this information in mind, the patient wishes to proceed with genetic counseling and testing. Patient and family warrant careful surveillance as FTAAD or syndromic associated aneurysms can rupture/dissect in a more unpredictable manner than degenerative types. Knowing the particular mutation may also prove helpful in understanding the natural history of patient's specific aneurysmal disease and help in preventive repair planning.    Renal artery aneurysm:  will continue to monitor. Not at a point needing repair at this time and stable in size on surveillance imaging. Do not suspect it is altering any renal blood flow. We discussed CTA every few years with ultrasound in between, along with chest CTA to evaluate FTAAD.     Lastly I have encouraged son to obtain CMR in [place], to evaluate for LV trabeculations and LVNC cardiomyopathy. If positive, I will screen Roopa and Katlyn for this.    Plan:      1. CT angiogram chest/abdomen/pelvis  2. Echocardiogram yearly  3. Genetic testing referral for full TAAD panel  4. Daughter to repeat echocardiogram  5. Son to obtain cardiac MRI and mRA   6. Continue current medications, unable to be on losartan due to side effects. SBP goal < 120/80 mmHg, HR < 60 bpm  7. No heavy lifting > 40 lbs   8. Follow up in 3 months with me     Total time spent 20 minutes.     Brandee Wang MD MSc  [facility]       Orders Placed This Encounter   Procedures     CTA Chest Abdomen Pelvis w Contrast     Follow-Up with Cardiologist     GENETICS REFERRAL     No orders of the defined types were placed in this encounter.    There are no discontinued medications.      Encounter Diagnosis   Name Primary?     Thoracic aortic aneurysm without rupture (H) Yes       CURRENT MEDICATIONS:  Current Outpatient Medications   Medication Sig Dispense Refill     albuterol (PROAIR HFA/PROVENTIL HFA/VENTOLIN HFA) 108 (90 Base) MCG/ACT inhaler Inhale 2 puffs into the lungs every 6 hours as needed for shortness of breath / dyspnea or wheezing 54 g 3     CALCIUM-VITAMIN D PO Take 1 tablet by mouth daily       estradiol (ESTRING) 2 MG vaginal ring Place 1 each vaginally every 3 months 1 each 3     fluticasone-salmeterol (WIXELA INHUB) 500-50 MCG/DOSE inhaler Inhale 1 puff into the lungs every 12 hours 2 Inhaler 0     magnesium 250 MG tablet Take 1 tablet by mouth daily       montelukast (SINGULAIR) 10 MG tablet Take 1 tablet (10 mg) by mouth as needed 90 tablet 3     NAPROXEN PO Take 220 mg by mouth as needed        potassium chloride ER (KLOR-CON M) 20 MEQ CR tablet Take 1 tablet (20 mEq) by mouth daily 90 tablet 2     pravastatin (PRAVACHOL) 20 MG tablet Take 1 tablet (20 mg) by mouth daily 90 tablet 2     SHINGRIX injection        theophylline (DENIA-24) 400 MG 24 hr capsule Take 1 capsule (400 mg) by mouth daily 60 capsule 0     triamterene-HCTZ (MAXZIDE) 75-50 MG tablet Take 1 tablet by mouth  "daily 90 tablet 3       ALLERGIES   No Known Allergies    PAST MEDICAL HISTORY:  Past Medical History:   Diagnosis Date     Abdominal aortic aneurysm without rupture (H) 11/10/2015    [  ] repeat imaging due spring 2016 Descending aortic aneurysm.  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  Does not have LOC.  Has prodromal symptoms      Severe persistent asthma 11/10/2015    Since childhood.  Has been steroid dependent for many years. Has had cydney x2         PAST SURGICAL HISTORY:  Past Surgical History:   Procedure Laterality Date     CATARACT IOL, RT/LT Bilateral      COLONOSCOPY N/A 2019    Procedure: COLONOSCOPY;  Surgeon: Haim Burgos MD;  Location: [facility]ACT SURGICAL PACKAGE       HYSTERECTOMY      fibroids     JOINT REPLACEMENT Left      NISSEN FUNDOPLICATION      x2       FAMILY HISTORY:  Family History   Problem Relation Age of Onset     Dementia Mother      Heart Failure Mother      Hypertension Mother      Breast Cancer Mother         post menopausal     Asthma Sister         x2     Depression Sister      Cancer Father         prostate cancer     Hypertension Father      Prostate Cancer Father          of it at 82     Asthma Father         \"outgrew\" it     Schizophrenia Maternal Grandmother      Coronary Artery Disease Maternal Grandfather         he was diabetic and smoked     Coronary Artery Disease Sister         MI when being ventilated for asthma     Asthma Sister         both sisters with asthma, one severe     Other - See Comments Daughter         Enlarged Aorta       SOCIAL HISTORY:  Social History     Socioeconomic History     Marital status:      Spouse name: None     Number of children: None     " Years of education: 20     Highest education level: None   Occupational History     Occupation: pediatrician   Social Needs     Financial resource strain: None     Food insecurity     Worry: None     Inability: None     Transportation needs     Medical: None     Non-medical: None   Tobacco Use     Smoking status: Never Smoker     Smokeless tobacco: Never Used   Substance and Sexual Activity     Alcohol use: Yes     Alcohol/week: 0.0 standard drinks     Comment: 1 glass of wine with dinner     Drug use: No     Sexual activity: None   Lifestyle     Physical activity     Days per week: None     Minutes per session: None     Stress: None   Relationships     Social connections     Talks on phone: None     Gets together: None     Attends Voodoo service: None     Active member of club or organization: None     Attends meetings of clubs or organizations: None     Relationship status: None     Intimate partner violence     Fear of current or ex partner: None     Emotionally abused: None     Physically abused: None     Forced sexual activity: None   Other Topics Concern     Parent/sibling w/ CABG, MI or angioplasty before 65F 55M? 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       Review of Systems:    As in HPI     Physical Exam:    GENERAL: healthy, alert and no distress  EYES: Eyes grossly normal to inspection, conjunctivae and sclerae normal  RESP: no audible wheeze, cough, or visible cyanosis.  No visible retractions or increased work of breathing.  Able to speak fully in complete sentences  NEURO: Cranial nerves grossly intact, mentation intact and speech normal  PSYCH: mentation appears normal, affect normal/bright, judgement and insight intact, normal speech and appearance well-groomed  CARDIOVASCULAR: Neck veins not appreciated indicating probable normal JVP. No LE edema. Normal skin appearance, no stasis dermatitis.       Recent Lab Results:  LIPID RESULTS:  Lab  Results   Component Value Date    CHOL 170 12/20/2019    HDL 65 12/20/2019    LDL 83 12/20/2019    TRIG 110 12/20/2019    CHOLHDLRATIO 3.0 11/10/2015       LIVER ENZYME RESULTS:  No results found for: AST, ALT    CBC RESULTS:  Lab Results   Component Value Date    WBC 6.4 05/16/2016    RBC 4.56 05/16/2016    HGB 12.7 05/16/2016    HCT 38.0 05/16/2016    MCV 83 05/16/2016    MCH 27.9 05/16/2016    MCHC 33.4 05/16/2016    RDW 13.5 05/16/2016     05/16/2016       BMP RESULTS:  Lab Results   Component Value Date     12/27/2019    POTASSIUM 3.2 (L) 12/27/2019    CHLORIDE 101 12/27/2019    CO2 30 12/27/2019    ANIONGAP 4 12/27/2019    GLC 89 12/27/2019    BUN 15 12/27/2019    CR 0.76 12/27/2019    GFRESTIMATED 78 12/27/2019    GFRESTBLACK >90 12/27/2019    ZACH 9.4 12/27/2019        A1C RESULTS:  Lab Results   Component Value Date    A1C 5.7 (H) 12/20/2019       INR RESULTS:  No results found for: INR      Video-Visit Details    Type of service:  Video Visit    Video Time: 20 minutes    Originating Location (pt. Location): Home    Distant Location (provider location):  [facility]     Platform used for Video Visit: [employer]      Thank you for allowing me to participate in the care of your patient.    Sincerely,     Brandee Wang MD     [facility]

## 2020-10-10 NOTE — PATIENT INSTRUCTIONS
1. Obtain CT angiogram chest/abdomen/pelvis at your convenience   2. Follow up with Dr. Wang in 3 months with son and daughter present  3. Referral for genetic testing placed

## 2020-10-22 ENCOUNTER — ANCILLARY PROCEDURE (OUTPATIENT)
Dept: CT IMAGING | Facility: CLINIC | Age: 75
End: 2020-10-22
Attending: INTERNAL MEDICINE
Payer: MEDICARE

## 2020-10-22 ENCOUNTER — ANCILLARY PROCEDURE (OUTPATIENT)
Dept: GENERAL RADIOLOGY | Facility: CLINIC | Age: 75
End: 2020-10-22
Attending: FAMILY MEDICINE
Payer: MEDICARE

## 2020-10-22 ENCOUNTER — OFFICE VISIT (OUTPATIENT)
Dept: ORTHOPEDICS | Facility: CLINIC | Age: 75
End: 2020-10-22
Payer: MEDICARE

## 2020-10-22 VITALS — WEIGHT: 158 LBS | BODY MASS INDEX: 24.8 KG/M2 | HEIGHT: 67 IN

## 2020-10-22 DIAGNOSIS — I71.20 THORACIC AORTIC ANEURYSM WITHOUT RUPTURE (H): ICD-10-CM

## 2020-10-22 DIAGNOSIS — M25.552 LEFT HIP PAIN: Primary | ICD-10-CM

## 2020-10-22 LAB
CREAT BLD-MCNC: 0.8 MG/DL (ref 0.52–1.04)
GFR SERPL CREATININE-BSD FRML MDRD: 70 ML/MIN/{1.73_M2}

## 2020-10-22 PROCEDURE — 99214 OFFICE O/P EST MOD 30 MIN: CPT | Performed by: FAMILY MEDICINE

## 2020-10-22 PROCEDURE — 74174 CTA ABD&PLVS W/CONTRAST: CPT | Mod: GC | Performed by: RADIOLOGY

## 2020-10-22 PROCEDURE — 73502 X-RAY EXAM HIP UNI 2-3 VIEWS: CPT | Mod: LT | Performed by: RADIOLOGY

## 2020-10-22 PROCEDURE — 71275 CT ANGIOGRAPHY CHEST: CPT | Mod: GC | Performed by: RADIOLOGY

## 2020-10-22 PROCEDURE — 36415 COLL VENOUS BLD VENIPUNCTURE: CPT | Performed by: PATHOLOGY

## 2020-10-22 PROCEDURE — 82565 ASSAY OF CREATININE: CPT | Performed by: PATHOLOGY

## 2020-10-22 RX ORDER — IOPAMIDOL 755 MG/ML
100 INJECTION, SOLUTION INTRAVASCULAR ONCE
Status: COMPLETED | OUTPATIENT
Start: 2020-10-22 | End: 2020-10-22

## 2020-10-22 RX ADMIN — IOPAMIDOL 100 ML: 755 INJECTION, SOLUTION INTRAVASCULAR at 07:31

## 2020-10-22 ASSESSMENT — MIFFLIN-ST. JEOR: SCORE: 1254.31

## 2020-10-22 NOTE — PROGRESS NOTES
SPORTS & ORTHOPEDIC WALK-IN VISIT 10/22/2020    Primary Care Physician: Dr. Combs    Here today for left hip pain.  In 2002 she had a hip replacement.  Has had relatively few problems since that time.  On 10/18/2020 she was standing and she started to have a lot of pain in her left hip. The pain came and went throughout the day. She has noticed a lot of weakness with hip flexion. The majority of her hip pain is on the anterior hip.  Has not noticed any locking or catching.  No radiation of pain down the leg.  Pain was worst during the first 2 days.  Has slowly been getting better.  Seems much better this morning than yesterday.    Reason for visit:     What part of your body is injured / painful?  left hip    What caused the injury /pain? Standing    How long ago did your injury occur or pain begin? 10/18/2020    What are your most bothersome symptoms? Pain and Weakness    How would you characterize your symptom?  sharp    What makes your symptoms better? Rest and advil    What makes your symptoms worse? Standing and Walking    Have you been previously seen for this problem? No    Medical History:    Any recent changes to your medical history? No    Any new medication prescribed since last visit? No    Have you had surgery on this body part before? Yes, hip replacement 2002    Social History:    Occupation: Retired     Handedness: Right    Exercise: 4-5 days/week walking    Review of Systems:    Do you have fever, chills, weight loss? No    Do you have any vision problems? No    Do you have any chest pain or edema? No    Do you have any shortness of breath or wheezing?  Yes, asthma    Do you have stomach problems? No    Do you have any numbness or focal weakness? No    Do you have diabetes? No    Do you have problems with bleeding or clotting? No    Do you have an rashes or other skin lesions? No         Past Medical History, Current Medications, and Allergies are reviewed in the electronic medical record  "as appropriate.       EXAM:Ht 1.702 m (5' 7\")   Wt 71.7 kg (158 lb)   LMP  (LMP Unknown)   BMI 24.75 kg/m      Exam:  Patient is alert, in no acute distress, pleasant and conversational.    Gait: Nonantalgic.  Normal heel toe gait    Standing Exam:  Lumbar spine AROM normal.     left Hip:  Supine PROM:  Flexion: Approximately 90 , no tenderness.  External rotation: approximately 60 , no tenderness.  Internal rotation: Approximately 30 , with some pain      Strength Testing:  Hip flexion: 4+/5.  With pain  Hip adduction: 5/5.  Hip abduction: 4+/5.    Palpation:  negative tenderness to palpation over the greater trochanter.  negative tenderness to palpation over psoas  negative tenderness to palpation over ASIS  negative tenderness to palpation over Iliac crest    Special Tests:  negative Rebecca's test.   negative ELVIN's test  negative FADIR's test  positive  Scour test  negative Reported pain with resisted hip flexion to opposite shoulder     Neurovascularly intact in bilateral lower extremities        Imagin view x-rays of the left hip were performed and reviewed independently demonstrating changes from a left total hip arthroplasty that appears stable.  No signs of fracture or wear.  See EMR for formal radiology report.      Assessment: Patient is a 75 year old female status post left total hip arthroplasty in  here today with increased left anterior hip pain that is suspicious for a joint related etiology based on history and clinical exam.    Recommendations:   Reviewed imaging and assessment with the patient in detail  I recommended relative rest with gentle hip exercises.  Could consider continued anti-inflammatory and icing  If symptoms fail to improve or become recurrent have recommended follow-up with arthroplasty surgeon for further evaluation of her hip.    Ant Hernandez MD            "

## 2020-10-22 NOTE — LETTER
10/22/2020         RE: Niurka Cisneros  [address]  [place] [address]        Dear Colleague,    Thank you for referring your patient, Niurka Cisneros, to the [facility] [place]. Please see a copy of my visit note below.          SPORTS & ORTHOPEDIC WALK-IN VISIT 10/22/2020    Primary Care Physician: Dr. Combs    Here today for left hip pain.  In 2002 she had a hip replacement.  Has had relatively few problems since that time.  On 10/18/2020 she was standing and she started to have a lot of pain in her left hip. The pain came and went throughout the day. She has noticed a lot of weakness with hip flexion. The majority of her hip pain is on the anterior hip.  Has not noticed any locking or catching.  No radiation of pain down the leg.  Pain was worst during the first 2 days.  Has slowly been getting better.  Seems much better this morning than yesterday.    Reason for visit:     What part of your body is injured / painful?  left hip    What caused the injury /pain? Standing    How long ago did your injury occur or pain begin? 10/18/2020    What are your most bothersome symptoms? Pain and Weakness    How would you characterize your symptom?  sharp    What makes your symptoms better? Rest and advil    What makes your symptoms worse? Standing and Walking    Have you been previously seen for this problem? No    Medical History:    Any recent changes to your medical history? No    Any new medication prescribed since last visit? No    Have you had surgery on this body part before? Yes, hip replacement 2002    Social History:    Occupation: Retired     Handedness: Right    Exercise: 4-5 days/week walking    Review of Systems:    Do you have fever, chills, weight loss? No    Do you have any vision problems? No    Do you have any chest pain or edema? No    Do you have any shortness of breath or wheezing?  Yes, asthma    Do you have stomach problems? No    Do you have  "any numbness or focal weakness? No    Do you have diabetes? No    Do you have problems with bleeding or clotting? No    Do you have an rashes or other skin lesions? No         Past Medical History, Current Medications, and Allergies are reviewed in the electronic medical record as appropriate.       EXAM:Ht 1.702 m (5' 7\")   Wt 71.7 kg (158 lb)   LMP  (LMP Unknown)   BMI 24.75 kg/m      Exam:  Patient is alert, in no acute distress, pleasant and conversational.    Gait: Nonantalgic.  Normal heel toe gait    Standing Exam:  Lumbar spine AROM normal.     left Hip:  Supine PROM:  Flexion: Approximately 90 , no tenderness.  External rotation: approximately 60 , no tenderness.  Internal rotation: Approximately 30 , with some pain      Strength Testing:  Hip flexion: 4+/5.  With pain  Hip adduction: 5/5.  Hip abduction: 4+/5.    Palpation:  negative tenderness to palpation over the greater trochanter.  negative tenderness to palpation over psoas  negative tenderness to palpation over ASIS  negative tenderness to palpation over Iliac crest    Special Tests:  negative Rebecca's test.   negative ELVIN's test  negative FADIR's test  positive  Scour test  negative Reported pain with resisted hip flexion to opposite shoulder     Neurovascularly intact in bilateral lower extremities        Imagin view x-rays of the left hip were performed and reviewed independently demonstrating changes from a left total hip arthroplasty that appears stable.  No signs of fracture or wear.  See EMR for formal radiology report.      Assessment: Patient is a 75 year old female status post left total hip arthroplasty in  here today with increased left anterior hip pain that is suspicious for a joint related etiology based on history and clinical exam.    Recommendations:   Reviewed imaging and assessment with the patient in detail  I recommended relative rest with gentle hip exercises.  Could consider continued anti-inflammatory and " icing  If symptoms fail to improve or become recurrent have recommended follow-up with arthroplasty surgeon for further evaluation of her hip.    Ant Hernandez MD

## 2020-11-02 ENCOUNTER — VIRTUAL VISIT (OUTPATIENT)
Dept: PULMONOLOGY | Facility: CLINIC | Age: 75
End: 2020-11-02
Payer: MEDICARE

## 2020-11-02 ENCOUNTER — MYC MEDICAL ADVICE (OUTPATIENT)
Dept: CARDIOLOGY | Facility: CLINIC | Age: 75
End: 2020-11-02

## 2020-11-02 DIAGNOSIS — J45.50 SEVERE PERSISTENT ASTHMA, UNSPECIFIED WHETHER COMPLICATED (H): Primary | ICD-10-CM

## 2020-11-02 PROCEDURE — 99214 OFFICE O/P EST MOD 30 MIN: CPT | Mod: 95

## 2020-11-02 RX ORDER — MONTELUKAST SODIUM 10 MG/1
10 TABLET ORAL PRN
Qty: 90 TABLET | Refills: 3 | Status: SHIPPED | OUTPATIENT
Start: 2020-11-02 | End: 2021-11-15

## 2020-11-02 RX ORDER — ALBUTEROL SULFATE 90 UG/1
2 AEROSOL, METERED RESPIRATORY (INHALATION) EVERY 6 HOURS PRN
Qty: 3 INHALER | Refills: 3 | Status: SHIPPED | OUTPATIENT
Start: 2020-11-02 | End: 2021-08-09

## 2020-11-02 NOTE — LETTER
"    11/2/2020         RE: Niurka Cisneros  [address]  [place] [address]        Dear Colleague,    Thank you for referring your patient, Niurka Cisneros, to the [facility] FOR LUNG SCIENCE AND [facility]. Please see a copy of my visit note below.    Niurka Cisneros is a 75 year old female who is being evaluated via a billable video visit.      The patient has been notified of following:     \"This video visit will be conducted via a call between you and your physician/provider. We have found that certain health care needs can be provided without the need for an in-person physical exam.  This service lets us provide the care you need with a video conversation.  If a prescription is necessary we can send it directly to your pharmacy.  If lab work is needed we can place an order for that and you can then stop by our lab to have the test done at a later time.    Video visits are billed at different rates depending on your insurance coverage.  Please reach out to your insurance provider with any questions.    If during the course of the call the physician/provider feels a video visit is not appropriate, you will not be charged for this service.\"    Patient has given verbal consent for Video visit? Yes  How would you like to obtain your AVS? MyChart  If you are dropped from the video visit, the video invite should be resent to: Other e-mail: Oskar  Will anyone else be joining your video visit? 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  That said, she is on a lot of medication.  Currently she is taking Fasenra, 500 mcg of Wixela, theophylline, Singulair as needed, and albuterol 1-2 times a day usually preemptively prior to any sort of exercise.  With this combination of medications, she has not had any exacerbations " requiring antibiotics or steroids more than a year.  She is very pleased with her current control and does not want to make any changes.  The only thing she requests are 3-month supply of medications through her mail-in pharmacy service.    she has received her pneumococcal vaccinations in the past.  She already got her flu vaccine this year.    RTC in 1 year with spirometry.      Rodney Vega MD          Today's visit note:     Chief Complaint: Niurka Cisneros is a 75 year old year old female who is being seen for RECHECK (Return video visit )      HISTORY OF PRESENT ILLNESS:    This is a 75-year-old female with a history of asthma who is presenting to the pulmonary clinic today for asthma    This is been a good year for her in terms of asthma control.  No asthma exacerbations or hospitalizations.  No prednisone or antibiotics.  No significant adverse reactions to her Fasenra or any of her inhaled medications.  She is currently on Fasenra, Wixela, Singulair as needed, albuterol, and theophylline.  She is maintaining social distancing and rarely leaving the house on a concern for coronavirus.  She has already received her flu vaccine this year.  She has no major questions or concerns about her respiratory health.               Past Medical and Surgical History:     Past Medical History:   Diagnosis Date     Abdominal aortic aneurysm without rupture (H) 11/10/2015    [  ] repeat imaging due spring 2016 Descending aortic aneurysm.  Being monitored with serial angiogram      Aneurysm of right renal artery (H) 4/9/2019     Esophageal hypertension 1/25/2017     Essential hypertension 1/25/2017     Female bladder prolapse 11/10/2015     Hearing loss      Pulmonary nodules 1/25/2017     Seizures (H) 11/10/2015    Temporal seziure d/o.  Does not have LOC.  Has prodromal symptoms      Severe persistent asthma 11/10/2015    Since childhood.  Has been steroid dependent for many years. Has had cydney x2       Past Surgical  "History:   Procedure Laterality Date     CATARACT IOL, RT/LT Bilateral      COLONOSCOPY N/A 2019    Procedure: COLONOSCOPY;  Surgeon: Haim Burgos MD;  Location: [facility] CATARACT SURGICAL PACKAGE       HYSTERECTOMY      fibroids     JOINT REPLACEMENT Left      NISSEN FUNDOPLICATION      x2           Family History:     Family History   Problem Relation Age of Onset     Dementia Mother      Heart Failure Mother      Hypertension Mother      Breast Cancer Mother         post menopausal     Asthma Sister         x2     Depression Sister      Cancer Father         prostate cancer     Hypertension Father      Prostate Cancer Father          of it at 82     Asthma Father         \"outgrew\" it     Schizophrenia Maternal Grandmother      Coronary Artery Disease Maternal Grandfather         he was diabetic and smoked     Coronary Artery Disease Sister         MI when being ventilated for asthma     Asthma Sister         both sisters with asthma, one severe     Other - See Comments Daughter         Enlarged Aorta              Social History:     Social History     Socioeconomic History     Marital status:      Spouse name: Not on file     Number of children: Not on file     Years of education: 20     Highest education level: Not on file   Occupational History     Occupation: pediatrician   Social Needs     Financial resource strain: Not on file     Food insecurity     Worry: Not on file     Inability: Not on file     Transportation needs     Medical: Not on file     Non-medical: Not on file   Tobacco Use     Smoking status: Never Smoker     Smokeless tobacco: Never Used   Substance and Sexual Activity     Alcohol use: Yes     Alcohol/week: 0.0 standard drinks     Comment: 1 glass of wine with dinner     Drug use: No     Sexual activity: Not on file   Lifestyle     Physical activity     Days per week: Not on file     Minutes per session: Not on file     Stress: Not on file   Relationships     " Social connections     Talks on phone: Not on file     Gets together: Not on file     Attends Quaker service: Not on file     Active member of club or organization: Not on file     Attends meetings of clubs or organizations: Not on file     Relationship status: Not on file     Intimate partner violence     Fear of current or ex partner: Not on file     Emotionally abused: Not on file     Physically abused: Not on file     Forced sexual activity: Not on file   Other Topics Concern     Parent/sibling w/ CABG, MI or angioplasty before 65F 55M? 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            Medications:     Current Outpatient Medications   Medication     albuterol (PROAIR HFA/PROVENTIL HFA/VENTOLIN HFA) 108 (90 Base) MCG/ACT inhaler     CALCIUM-VITAMIN D PO     estradiol (ESTRING) 2 MG vaginal ring     fluticasone-salmeterol (WIXELA INHUB) 500-50 MCG/DOSE inhaler     magnesium 250 MG tablet     montelukast (SINGULAIR) 10 MG tablet     NAPROXEN PO     potassium chloride ER (KLOR-CON M) 20 MEQ CR tablet     pravastatin (PRAVACHOL) 20 MG tablet     SHINGRIX injection     theophylline (DENIA-24) 400 MG 24 hr capsule     triamterene-HCTZ (MAXZIDE) 75-50 MG tablet     No current facility-administered medications for this visit.             Review of Systems:       A complete review of systems was otherwise negative except as noted in the HPI.      PHYSICAL EXAM:  LMP  (LMP Unknown)      General: Well developed, well nourished, No apparent distress  Eyes: Anicteric  Ears: Hearing grossly normal  Neck: supple  Respiratory: Normal breathing pattern.  Speaking in full sentences.  Neuro: Normal mentation. Normal speech.  Psych:Normal affect           Data:   All laboratory and imaging data reviewed.      Absolute eosinophils in May 2016 were 500    PFT:       PFT Interpretation:  Moderate airflow obstruction.  There has been improvement in her FEV1 compared to  prior.  Valid Maneuver      Video-Visit Details    Type of service:  Video Visit    Video Start Time: 12:31 PM  Video End Time: 12:39 PM    Originating Location (pt. Location): Home    Distant Location (provider location):  [facility] FOR L[facility]G [facility]     Platform used for Video Visit: Favian Vega MD

## 2020-11-02 NOTE — TELEPHONE ENCOUNTER
[email or web address] message received. Results released to patient in [email or web address]. Will send to Dr. Wang for review and inquire if any further workup needed prior to 3 month f/u apt (patient does have apt with [facility] on 11/6/20).       Has anyone read my CTA?  No result has shown up.  Not urgent, just curious.      CT results  Impression:     1. Dilating ascending aorta (ascending) measures up to 4.6 cm,  previously 4.4 cm.     2. 1.8 cm maximal diameter right renal artery aneurysm with  calcification is stable in size.     I have personally reviewed the examination and initial interpretation  and I agree with the findings.     DANTE CASANOVA      10/9/20 OV Dr. Wang  ASSESSMENT/PLAN:      Niurka is a 75 year old with ascending aortic aneurysm 4.4 cm and saccular aneurysm of renal artery.      Differential includes fibromuscular dysplasia, syndromic TAAD with associated primary arteriopathy (MFS, LDS, vEDS), or other type of familial TAAD. It was helpful today to have son on the visit because his findings of mitral regurgitation, aortic aneurysm raise concerns for a possible syndromic (MFS or Marfanoid) TAAD. All family members described flat feet and tall stature.      There is high yield with genetic testing with syndromic types. We also discussed genetic testing and counseling for FTAAD, where the variable penetrance and expression of various mutations associated with FTAAD make a definitive diagnosis difficult. TGFBR2 (~5%), ACTA2 (~14%) and MYH11 (~1%) are known mutations that comprise up to 20% of positively tested cases. There is an 80% chance that no identifiable mutation will be discovered in testing but regardless we will treat patient and her family the same as if they were gene positive. Presumably given both children have aortic aneurysm there is autosomal dominance pattern in their aortopathy. This has implications for children.      With this information in mind, the patient wishes to proceed with  genetic counseling and testing. Patient and family warrant careful surveillance as FTAAD or syndromic associated aneurysms can rupture/dissect in a more unpredictable manner than degenerative types. Knowing the particular mutation may also prove helpful in understanding the natural history of patient's specific aneurysmal disease and help in preventive repair planning.     Renal artery aneurysm:  will continue to monitor. Not at a point needing repair at this time and stable in size on surveillance imaging. Do not suspect it is altering any renal blood flow. We discussed CTA every few years with ultrasound in between, along with chest CTA to evaluate FTAAD.      Lastly I have encouraged son to obtain CMR in [place], to evaluate for LV trabeculations and LVNC cardiomyopathy. If positive, I will screen Roopa and Katlyn for this.     Plan:      1. CT angiogram chest/abdomen/pelvis  2. Echocardiogram yearly  3. Genetic testing referral for full TAAD panel  4. Daughter to repeat echocardiogram  5. Son to obtain cardiac MRI and mRA   6. Continue current medications, unable to be on losartan due to side effects. SBP goal < 120/80 mmHg, HR < 60 bpm  7. No heavy lifting > 40 lbs   8. Follow up in 3 months with me      Total time spent 20 minutes.      Brandee Wang MD MSc  [facility]

## 2020-11-02 NOTE — PROGRESS NOTES
"Niurka Cisneros is a 75 year old female who is being evaluated via a billable video visit.      The patient has been notified of following:     \"This video visit will be conducted via a call between you and your physician/provider. We have found that certain health care needs can be provided without the need for an in-person physical exam.  This service lets us provide the care you need with a video conversation.  If a prescription is necessary we can send it directly to your pharmacy.  If lab work is needed we can place an order for that and you can then stop by our lab to have the test done at a later time.    Video visits are billed at different rates depending on your insurance coverage.  Please reach out to your insurance provider with any questions.    If during the course of the call the physician/provider feels a video visit is not appropriate, you will not be charged for this service.\"    Patient has given verbal consent for Video visit? Yes  How would you like to obtain your AVS? Jasonhart  If you are dropped from the video visit, the video invite should be resent to: Other e-mail: Oskar  Will anyone else be joining your video visit? 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  That said, she is on a lot of medication.  Currently she is taking Fasenra, 500 mcg of Wixela, theophylline, Singulair as needed, and albuterol 1-2 times a day usually preemptively prior to any sort of exercise.  With this combination of medications, she has not had any exacerbations requiring antibiotics or steroids more than a year.  She is very pleased with her current control and does not want to make any changes.  The only thing she requests are 3-month supply of medications through her mail-in pharmacy service.    she has received her pneumococcal vaccinations in the " past.  She already got her flu vaccine this year.    RTC in 1 year with spirometry.      Rodney Vega MD          Today's visit note:     Chief Complaint: Niurka Cisneros is a 75 year old year old female who is being seen for RECHECK (Return video visit )      HISTORY OF PRESENT ILLNESS:    This is a 75-year-old female with a history of asthma who is presenting to the pulmonary clinic today for asthma    This is been a good year for her in terms of asthma control.  No asthma exacerbations or hospitalizations.  No prednisone or antibiotics.  No significant adverse reactions to her Fasenra or any of her inhaled medications.  She is currently on Fasenra, Wixela, Singulair as needed, albuterol, and theophylline.  She is maintaining social distancing and rarely leaving the house on a concern for coronavirus.  She has already received her flu vaccine this year.  She has no major questions or concerns about her respiratory health.               Past Medical and Surgical History:     Past Medical History:   Diagnosis Date     Abdominal aortic aneurysm without rupture (H) 11/10/2015    [  ] repeat imaging due spring 2016 Descending aortic aneurysm.  Being monitored with serial angiogram      Aneurysm of right renal artery (H) 4/9/2019     Esophageal hypertension 1/25/2017     Essential hypertension 1/25/2017     Female bladder prolapse 11/10/2015     Hearing loss      Pulmonary nodules 1/25/2017     Seizures (H) 11/10/2015    Temporal seziure d/o.  Does not have LOC.  Has prodromal symptoms      Severe persistent asthma 11/10/2015    Since childhood.  Has been steroid dependent for many years. Has had cydney x2       Past Surgical History:   Procedure Laterality Date     CATARACT IOL, RT/LT Bilateral      COLONOSCOPY N/A 2/14/2019    Procedure: COLONOSCOPY;  Surgeon: Haim Burgos MD;  Location: [facility]     H CATARACT SURGICAL PACKAGE       HYSTERECTOMY      fibroids     JOINT REPLACEMENT Left      NISSEN  "FUNDOPLICATION      x2           Family History:     Family History   Problem Relation Age of Onset     Dementia Mother      Heart Failure Mother      Hypertension Mother      Breast Cancer Mother         post menopausal     Asthma Sister         x2     Depression Sister      Cancer Father         prostate cancer     Hypertension Father      Prostate Cancer Father          of it at 82     Asthma Father         \"outgrew\" it     Schizophrenia Maternal Grandmother      Coronary Artery Disease Maternal Grandfather         he was diabetic and smoked     Coronary Artery Disease Sister         MI when being ventilated for asthma     Asthma Sister         both sisters with asthma, one severe     Other - See Comments Daughter         Enlarged Aorta              Social History:     Social History     Socioeconomic History     Marital status:      Spouse name: Not on file     Number of children: Not on file     Years of education: 20     Highest education level: Not on file   Occupational History     Occupation: pediatrician   Social Needs     Financial resource strain: Not on file     Food insecurity     Worry: Not on file     Inability: Not on file     Transportation needs     Medical: Not on file     Non-medical: Not on file   Tobacco Use     Smoking status: Never Smoker     Smokeless tobacco: Never Used   Substance and Sexual Activity     Alcohol use: Yes     Alcohol/week: 0.0 standard drinks     Comment: 1 glass of wine with dinner     Drug use: No     Sexual activity: Not on file   Lifestyle     Physical activity     Days per week: Not on file     Minutes per session: Not on file     Stress: Not on file   Relationships     Social connections     Talks on phone: Not on file     Gets together: Not on file     Attends Sikh service: Not on file     Active member of club or organization: Not on file     Attends meetings of clubs or organizations: Not on file     Relationship status: Not on file     Intimate " partner violence     Fear of current or ex partner: Not on file     Emotionally abused: Not on file     Physically abused: Not on file     Forced sexual activity: Not on file   Other Topics Concern     Parent/sibling w/ CABG, MI or angioplasty before 65F 55M? 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            Medications:     Current Outpatient Medications   Medication     albuterol (PROAIR HFA/PROVENTIL HFA/VENTOLIN HFA) 108 (90 Base) MCG/ACT inhaler     CALCIUM-VITAMIN D PO     estradiol (ESTRING) 2 MG vaginal ring     fluticasone-salmeterol (WIXELA INHUB) 500-50 MCG/DOSE inhaler     magnesium 250 MG tablet     montelukast (SINGULAIR) 10 MG tablet     NAPROXEN PO     potassium chloride ER (KLOR-CON M) 20 MEQ CR tablet     pravastatin (PRAVACHOL) 20 MG tablet     SHINGRIX injection     theophylline (DNEIA-24) 400 MG 24 hr capsule     triamterene-HCTZ (MAXZIDE) 75-50 MG tablet     No current facility-administered medications for this visit.             Review of Systems:       A complete review of systems was otherwise negative except as noted in the HPI.      PHYSICAL EXAM:  LMP  (LMP Unknown)      General: Well developed, well nourished, No apparent distress  Eyes: Anicteric  Ears: Hearing grossly normal  Neck: supple  Respiratory: Normal breathing pattern.  Speaking in full sentences.  Neuro: Normal mentation. Normal speech.  Psych:Normal affect           Data:   All laboratory and imaging data reviewed.      Absolute eosinophils in May 2016 were 500    PFT:       PFT Interpretation:  Moderate airflow obstruction.  There has been improvement in her FEV1 compared to prior.  Valid Maneuver      Video-Visit Details    Type of service:  Video Visit    Video Start Time: 12:31 PM  Video End Time: 12:39 PM    Originating Location (pt. Location): Home    Distant Location (provider location):  [facility]      Platform used for Video Visit: Favian Vega MD

## 2020-11-06 ENCOUNTER — VIRTUAL VISIT (OUTPATIENT)
Dept: CARDIOLOGY | Facility: CLINIC | Age: 75
End: 2020-11-06
Attending: GENETIC COUNSELOR, MS
Payer: MEDICARE

## 2020-11-06 DIAGNOSIS — I71.20 THORACIC AORTIC ANEURYSM WITHOUT RUPTURE (H): ICD-10-CM

## 2020-11-06 PROCEDURE — 96040 HC GENETIC COUNSELING, EACH 30 MINUTES: CPT | Mod: GT | Performed by: GENETIC COUNSELOR, MS

## 2020-11-06 PROCEDURE — 99207 PR NO CHARGE LOS: CPT | Performed by: GENETIC COUNSELOR, MS

## 2020-11-06 NOTE — PATIENT INSTRUCTIONS
"Indication for Genetic Counseling:     Aortic dilation/aneurysm occur when a portion of the aorta, a large blood vessel in the heart, becomes enlarged due to weakening of the artery wall.  Depending on the size of the aneurysm, there may be a risk for the aneurysm to break open and rupture, also known as an aortic dissection.  Aortic aneurysms can be caused by disruptions or mutations in a number of genes, which can be inherited within families.  Aortic aneurysms can be the only symptom caused by the gene disruption, such as in the genetic condition, familial thoracic aortic aneurysm and/or dissection (TAAD).  Aortic aneurysms can also occur as part of a syndrome that includes other symptoms or physical features.  These syndromes are known as connective tissue disorders because the genes involved make connective tissue proteins found in joints, organs, and blood vessels.  One of these syndromes is called Marfan syndrome.  In addition to aortic aneurysms, other features include tall stature, slender limbs, long arm span, scoliosis, and lens dislocation.  Examples of two other connective tissue disorders are Loeys-Stiven syndrome and Magdaleno-Danlos syndrome.     Inheritance:   Humans have over 20,000 genes that instruct our bodies how to function.  We have two copies of each gene because we inherit one from our mother and one from our father.  In most cardiac cases with a genetic component, the condition is inherited in an autosomal dominant (AD) pattern.  This means that in order to have the condition, a person needs to inherit a mutation on one copy of a particular gene.  This mutation or pathogenic variant dominates the \"normal\" working copy of the gene.  When an affected individual has children, they can either pass on the \"normal\" copy of the gene or the mutation.  Therefore, children have a 50% chance of inheriting the mutation.  Other family members also have an increased risk but the specific risk depends on the " degree of relationship.  Additional inheritance patterns can occur within families and may alter the risk of recurrence.     Testing Options:   Genetic testing is available to assess a panel of genes known to cause this condition.  This test reads through the DNA (sequencing) of these genes to look for spelling mistakes or mutations that could cause the condition.      There are three types of results you could receive from this test.     -Positive result (mutation/pathogenic variant identified) - confirms diagnosis and provides an answer to why this happened.  In addition, identifying a mutation allows family members to have testing to determine their risk.     -Negative result (mutation not identified) - no genetic changes were identified.  This does not rule out a genetic cause for the condition since we do not know all genetic causes for this condition.    -Variant of uncertain significance (VUS) - a genetic change was identified, but there is not enough information to determine whether it is disease-causing or normal human genetic variation.     Although genetic testing may identify a mutation, it cannot provide information about the severity of symptoms or the progression of disease.  We cannot predict age of onset or severity of symptoms due to reduced penetrance and variable expressivity.    Logistics:   Genetic test involves test a sample of DNA, thru blood, saliva, or cheek cells.  The sample will be sent to a laboratory to extract the DNA and sequence the genes for mutations.  The laboratory will work with your insurance company to determine the out of pocket (OOP) cost and will notify you if the OOP cost is greater than $100.  When testing is initiated, results take about 2-4 weeks to return.     Genetic Information and Nondiscrimination Act:  The Genetic Information and Nondiscrimination Act of 2008 (REJI) is a federal law that protects individuals from genetic discrimination in health insurance and  employment. Genetic discrimination is defined as the misuse of genetic information. This law does not address potential discrimination regarding life insurance or disability insurance.      This is especially relevant for at risk individuals who are considering presymptomatic testing.    Screening Recommendations:  Recommend that first degree relatives, including parents,siblings and children, be screened for aortic aneurysms. If there is an underlying genetic syndrome, clinical evaluation with a medical geneticist may also be recommended.    Resources:  The Marfan Foundation - marfan.org  The Harjit Ritter Research Program in Aortic and Vascular Diseases - johnritterresearchprogram.org  Aortic Hope - aortichope.org    General   American Heart Association - americanheart.org  Genetics Home Reference - ghr.nlm.nih.gov  Genetic Information and Nondiscrimination Act - ginahelp.org    Contact Information:  Maddie Turner MS  Licensed Genetic Counselor  [facility]  [facility]    Office:  [phone]  Appointments:  [phone]  Fax: [phone]  Email: [email or web address]

## 2020-11-06 NOTE — PROGRESS NOTES
"Niurka Cisneros is a 75 year old female who is being evaluated via a billable video visit.      The patient has been notified of following:     \"This video visit will be conducted via a call between you and your physician/provider. We have found that certain health care needs can be provided without the need for an in-person physical exam.  This service lets us provide the care you need with a video conversation.  If a prescription is necessary we can send it directly to your pharmacy.  If lab work is needed we can place an order for that and you can then stop by our lab to have the test done at a later time.    Video visits are billed at different rates depending on your insurance coverage.  Please reach out to your insurance provider with any questions.    If during the course of the call the physician/provider feels a video visit is not appropriate, you will not be charged for this service.\"    Patient has given verbal consent for Video visit? Yes  How would you like to obtain your AVS? MyChart  If you are dropped from the video visit, the video invite should be resent to:  My Chart  Will anyone else be joining your video visit? No           "

## 2020-11-06 NOTE — PROGRESS NOTES
"Here is a copy of the progress note from your recent genetic counseling visit through the [facility] on Date: 2020.  Due to Covid-19 pandemic, this appointment was moved to a virtual visit.    PROGRESS NOTE:Roopa was referred by Brandee Wang MD for genetic counseling due to her  history of aortic aneurysm .  I had the opportunity to talk with Roopa and her children Katlyn and Reinier today to discuss the genetic component of aortic aneurysms and testing options available to her .     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 She is followed by Dr. Wang.  Differential includes fibromuscular dysplasia, syndromic TAAD with associated primary arteriopathy (MFS, LDS, vEDS), or other type of familial TAAD.     Due to possible association with connective tissue disorders, we reviewed other symptoms.  Roopa states that she was 5'11\" but is now 5'7\" tall and has these associated symptoms: bladder prolapse, mild hypermobility when younger, flat feet, chronic back pain from scoliosis (no treatment), mild bruising as she has gotten older, mild pectus, bladder prolapse, and renal artery aneursym  She denies history of abnormal wound healing or bleeding, hernias, translucent skin, dental crowding, palatal deformities, pneumothorax, lens dislocation, or retinal detachment.      FAMILY HISTORY: A detailed family history was obtained during today's consult.  Family history was significant for the following cardiac history:    Maternal grandmother with aneurysm, at 89 years old, possible rupture and inoperable, leading to death. Maternal grandfather  at 62.  He had a history of heart attack, stroke, diabetes, and he was a smoker.      Roopa's mother  at 98 years.  She had a history of Afib in her 80's which lead to heart failure.  She " "also had diabetes.  She had one sister who  in her 80's of stroke. A brother  at 96 with diabetes, and another sister  by suicide in her 40's.     Daughter with mild aneurysm (3.8 cm). She also reports hypermobile joints, flat feet, and is 5'10\"  She has two children in good health.     Son had an echocardiogram that demonstrated moderate mitral regurgitation, prominent LV trabeculations and mild aortic dilation per Dr. Wang's note. He also has hypermobile joints, flat feet, and tall stature (6'2\").  He has no children.    Roopa has two sisters who have both had imaging of their heart and have normal aortas.      Roopa's father  at 82 years with prostate cancer.  His sister  at 99 after a hip fracture.  His parents  at 99 and 101.    Roopa states that her father and mother distantly related.  Her father's great-great grandfather is also her mother's great -grandfather.     There is no additional history of cardiomyopathy, arrhythmias, heart attacks, fainting, sudden cardiac death, genetic conditions, or birth defects. (A copy of pedigree may be found under media tab).    DISCUSSION:  Explained that aortic aneurysm can be found in association with genetic syndromes, such as Marfan and Loeys-Stiven syndrome or as an isolated event that runs in families, familial thoracic aortic aneurysm and dissection (TAAD).     We reviewed autosomal dominant (AD) inheritance associated with inherited forms of TAAD including the 50% risk for recurrence. Explained reduced penetrance and variable expressivity, meaning that individuals who carry a gene mutation may or may not get the disease and onset and severity can vary from one family member to the next. This explains why you may not see aneurysms each generation of a family.    Explained that gene testing is currently available for thoracic aortic aneurysms. The testing panel includes genes associated with both syndromic and isolated aortic aneurysms. Testing " currently identifies mutations in a large majority of patients with syndromic aneurysms but only about 20% of individuals with familial TAAD. Reviewed capabilities, limitations, and logistics of testing.  The results could directly impact care and treatment.    Explained three possible outcomes of genetic testing including: positive identification of a mutation, no mutation identified, and identification of a variant of unknown significance (VUS). If a mutation is identified, presymptomatic testing would be available to at risk family members. If no mutation is identified, it does not rule out the possibility of a genetic component to this disease. Family members could still be at risk for developing the same condition. If a VUS is identified, it is unclear if the mutation is disease causing or just a normal variation. It may take time and possibly additional testing to determine the meaning of a VUS result.     Test results take approximately 2-4 weeks on average. Discussed cost of testing through commercial lab. Explained that the lab will work with insurance to determine coverage and contact patient if out of pocket costs are expected to exceed $100.     Discussed pros and cons of genetic testing. Explained that results could determine the cause for aortic aneurysm. If a mutation is identified, presymptomatic testing is available to all at risk relatives. Reviewed possible issues associated with presymptomatic testing including genetic discrimination, current laws to prevent discrimination (ie. REJI), insurance issues, and emotional and psychosocial outcomes of testing.     Recommend that first degree relatives, including parents and siblings, be screened for aortic aneurysms.    All questions answered at this time.     PLAN:Roopa elected to proceed with genetic testing.  Requisition and consent forms were completed and signed.  DNA will be collected via saliva sample and sent to [facility] laboratory. I will  contact patient when results are available.  She gave verbal consent to discuss results with her children if they were to call in.    TOTAL TIME SPENT IN COUNSELIN minutes    Maddie Turner MS, CGC  Licensed, Certified Genetic Counselor  [facility]

## 2020-11-06 NOTE — LETTER
"11/6/2020      RE: Niurka Cisneros  [address]  [place] [address]       Dear Colleague,    Thank you for the opportunity to participate in the care of your patient, Niurka Cisneros, at the [facility] HEART [facility] ER at [facility]. Please see a copy of my visit note below.    Niurka Cisneros is a 75 year old female who is being evaluated via a billable video visit.      The patient has been notified of following:     \"This video visit will be conducted via a call between you and your physician/provider. We have found that certain health care needs can be provided without the need for an in-person physical exam.  This service lets us provide the care you need with a video conversation.  If a prescription is necessary we can send it directly to your pharmacy.  If lab work is needed we can place an order for that and you can then stop by our lab to have the test done at a later time.    Video visits are billed at different rates depending on your insurance coverage.  Please reach out to your insurance provider with any questions.    If during the course of the call the physician/provider feels a video visit is not appropriate, you will not be charged for this service.\"    Patient has given verbal consent for Video visit? Yes  How would you like to obtain your AVS? MyChart  If you are dropped from the video visit, the video invite should be resent to:  My Chart  Will anyone else be joining your video visit? No             Here is a copy of the progress note from your recent genetic counseling visit through the [facility] on Date: 11/6/2020.  Due to Covid-19 pandemic, this appointment was moved to a virtual visit.    PROGRESS NOTE:Roopa was referred by Brandee Wang MD for genetic counseling due to her  history of aortic aneurysm .  I had the opportunity to talk with Roopa and her children Katlyn and Reinier today to " "discuss the genetic component of aortic aneurysms and testing options available to her .     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 She is followed by Dr. Wang.  Differential includes fibromuscular dysplasia, syndromic TAAD with associated primary arteriopathy (MFS, LDS, vEDS), or other type of familial TAAD.     Due to possible association with connective tissue disorders, we reviewed other symptoms.  Roopa states that she was 5'11\" but is now 5'7\" tall and has these associated symptoms: bladder prolapse, mild hypermobility when younger, flat feet, chronic back pain from scoliosis (no treatment), mild bruising as she has gotten older, mild pectus, bladder prolapse, and renal artery aneursym  She denies history of abnormal wound healing or bleeding, hernias, translucent skin, dental crowding, palatal deformities, pneumothorax, lens dislocation, or retinal detachment.      FAMILY HISTORY: A detailed family history was obtained during today's consult.  Family history was significant for the following cardiac history:    Maternal grandmother with aneurysm, at 89 years old, possible rupture and inoperable, leading to death. Maternal grandfather  at 62.  He had a history of heart attack, stroke, diabetes, and he was a smoker.      Roopa's mother  at 98 years.  She had a history of Afib in her 80's which lead to heart failure.  She also had diabetes.  She had one sister who  in her 80's of stroke. A brother  at 96 with diabetes, and another sister  by suicide in her 40's.     Daughter with mild aneurysm (3.8 cm). She also reports hypermobile joints, flat feet, and is 5'10\"  She has two children in good health.     Son had an echocardiogram that demonstrated moderate mitral regurgitation, prominent LV trabeculations and mild aortic dilation per Dr. Wang's " "note. He also has hypermobile joints, flat feet, and tall stature (6'2\").  He has no children.    Roopa has two sisters who have both had imaging of their heart and have normal aortas.      Roopa's father  at 82 years with prostate cancer.  His sister  at 99 after a hip fracture.  His parents  at 99 and 101.    Roopa states that her father and mother distantly related.  Her father's great-great grandfather is also her mother's great -grandfather.     There is no additional history of cardiomyopathy, arrhythmias, heart attacks, fainting, sudden cardiac death, genetic conditions, or birth defects. (A copy of pedigree may be found under media tab).    DISCUSSION:  Explained that aortic aneurysm can be found in association with genetic syndromes, such as Marfan and Loeys-Stiven syndrome or as an isolated event that runs in families, familial thoracic aortic aneurysm and dissection (TAAD).     We reviewed autosomal dominant (AD) inheritance associated with inherited forms of TAAD including the 50% risk for recurrence. Explained reduced penetrance and variable expressivity, meaning that individuals who carry a gene mutation may or may not get the disease and onset and severity can vary from one family member to the next. This explains why you may not see aneurysms each generation of a family.    Explained that gene testing is currently available for thoracic aortic aneurysms. The testing panel includes genes associated with both syndromic and isolated aortic aneurysms. Testing currently identifies mutations in a large majority of patients with syndromic aneurysms but only about 20% of individuals with familial TAAD. Reviewed capabilities, limitations, and logistics of testing.  The results could directly impact care and treatment.    Explained three possible outcomes of genetic testing including: positive identification of a mutation, no mutation identified, and identification of a variant of unknown significance " (VUS). If a mutation is identified, presymptomatic testing would be available to at risk family members. If no mutation is identified, it does not rule out the possibility of a genetic component to this disease. Family members could still be at risk for developing the same condition. If a VUS is identified, it is unclear if the mutation is disease causing or just a normal variation. It may take time and possibly additional testing to determine the meaning of a VUS result.     Test results take approximately 2-4 weeks on average. Discussed cost of testing through commercial lab. Explained that the lab will work with insurance to determine coverage and contact patient if out of pocket costs are expected to exceed $100.     Discussed pros and cons of genetic testing. Explained that results could determine the cause for aortic aneurysm. If a mutation is identified, presymptomatic testing is available to all at risk relatives. Reviewed possible issues associated with presymptomatic testing including genetic discrimination, current laws to prevent discrimination (ie. REJI), insurance issues, and emotional and psychosocial outcomes of testing.     Recommend that first degree relatives, including parents and siblings, be screened for aortic aneurysms.    All questions answered at this time.     PLAN:Roopa elected to proceed with genetic testing.  Requisition and consent forms were completed and signed.  DNA will be collected via saliva sample and sent to [facility]. I will contact patient when results are available.  She gave verbal consent to discuss results with her children if they were to call in.    TOTAL TIME SPENT IN COUNSELIN minutes    Maddie Turner MS, [facility]  Licensed, Certified Genetic Counselor  [facility]       Please do not hesitate to contact me if you have any questions/concerns.     Sincerely,     Maddie Turner GC

## 2020-11-10 NOTE — TELEPHONE ENCOUNTER
Brandee Wang MD Graf, Cedric Gutiérrez, RN 2 days ago     Overall stable. Have results been released to patient? 0.2 cm is nothing of major concern. Will continue our current surveillance plan and discuss during our next visit. The renal artery looks stable. Good news!    Message text      Patient updated via Kiosked.

## 2020-11-11 DIAGNOSIS — I71.20 THORACIC AORTIC ANEURYSM WITHOUT RUPTURE (H): ICD-10-CM

## 2020-11-11 DIAGNOSIS — I71.21 ASCENDING AORTIC ANEURYSM (H): Primary | ICD-10-CM

## 2020-11-13 ASSESSMENT — ACTIVITIES OF DAILY LIVING (ADL)
ADL_SUM: 17
ADL_MEAN: 1
ADL_SUBSCALE_SCORE: 75

## 2020-11-13 ASSESSMENT — HOOS S4: HOW SEVERE IS YOUR HIP JOINT STIFFNESS AFTER FIRST WAKENING IN THE MORNING?: MODERATE

## 2020-11-18 ENCOUNTER — OFFICE VISIT (OUTPATIENT)
Dept: ORTHOPEDICS | Facility: CLINIC | Age: 75
End: 2020-11-18
Payer: MEDICARE

## 2020-11-18 DIAGNOSIS — Z96.642 HISTORY OF TOTAL LEFT HIP REPLACEMENT: ICD-10-CM

## 2020-11-18 DIAGNOSIS — Z96.642 HISTORY OF TOTAL LEFT HIP REPLACEMENT: Primary | ICD-10-CM

## 2020-11-18 LAB
CRP SERPL-MCNC: 3.1 MG/L (ref 0–8)
ERYTHROCYTE [SEDIMENTATION RATE] IN BLOOD BY WESTERGREN METHOD: 14 MM/H (ref 0–30)

## 2020-11-18 PROCEDURE — 86140 C-REACTIVE PROTEIN: CPT | Performed by: PATHOLOGY

## 2020-11-18 PROCEDURE — 99213 OFFICE O/P EST LOW 20 MIN: CPT | Performed by: ORTHOPAEDIC SURGERY

## 2020-11-18 PROCEDURE — 36415 COLL VENOUS BLD VENIPUNCTURE: CPT | Performed by: PATHOLOGY

## 2020-11-18 PROCEDURE — 85652 RBC SED RATE AUTOMATED: CPT | Performed by: PATHOLOGY

## 2020-11-18 NOTE — PROGRESS NOTES
[facility], Orthopaedic Surgery, Arthritis, Hip and Knee Replacement    Niurka Cisneros MRN# [id number]   Age: 72 year old YOB: 1945     Requesting physician: Terry Sue              History of Present Illness:   Niurka Cisneros is a 72 year old year old female who presents today for evaluation and management of left hip pain.  She has been doing very well with regards to her left hip.  However, around mid October she was standing at the counter for prolonged period of time and she noted some pain that she describes as being deep within the groin.  The pain was somewhat limiting to the point where she was limping and started using a crutch.  The symptoms gradually improved but then returned.  Few days later the symptoms returned in a very similar fashion.  She did not have any trauma however, she did have some bruising on the medial aspect of the thigh.  The symptoms then slightly improved but then again worsened.  Over the last 24 hours they seem to be slightly improved again.  She is using a single crutch for ambulation.  She is not had any fevers or chills or problems with the incision.  She has not had any recent trauma.  She has not been taking any anticoagulation.             Past Medical History:     Patient Active Problem List   Diagnosis     Seizures (H)     Thoracic aortic aneurysm without rupture (H)     Moderate persistent asthma without complication     Prediabetes     Pulmonary nodules     Essential hypertension     Osteopenia of multiple sites     Aneurysm of right renal artery (H)     Severe persistent asthma without complication     Abnormal CT scan of head     Past Medical History:   Diagnosis Date     Abdominal aortic aneurysm without rupture (H) 11/10/2015    [  ] repeat imaging due spring 2016 Descending aortic aneurysm.  Being monitored with serial angiogram      Aneurysm of right renal artery (H) 4/9/2019     Esophageal hypertension 1/25/2017     Essential  hypertension 1/25/2017     Female bladder prolapse 11/10/2015     Hearing loss      Pulmonary nodules 1/25/2017     Seizures (H) 11/10/2015    Temporal seziure d/o.  Does not have LOC.  Has prodromal symptoms      Severe persistent asthma 11/10/2015    Since childhood.  Has been steroid dependent for many years. Has had cydney x2       Prior history of blood clot: none  Prior history of bleeding problems: none  Prior history of anesthetic complications: none  Currently on opioids:  none  History of Diabetes: no           Past Surgical History:     Past Surgical History:   Procedure Laterality Date     CATARACT IOL, RT/LT Bilateral      COLONOSCOPY N/A 2/14/2019    Procedure: COLONOSCOPY;  Surgeon: Haim Burgos MD;  Location: [facility]       HYSTERECTOMY      fibroids     JOINT REPLACEMENT Left      NISSEN FUNDOPLICATION      x2            Social History:     Social History     Socioeconomic History     Marital status:      Spouse name: Not on file     Number of children: Not on file     Years of education: 20     Highest education level: Not on file   Occupational History     Occupation: pediatrician   Social Needs     Financial resource strain: Not on file     Food insecurity     Worry: Not on file     Inability: Not on file     Transportation needs     Medical: Not on file     Non-medical: Not on file   Tobacco Use     Smoking status: Never Smoker     Smokeless tobacco: Never Used   Substance and Sexual Activity     Alcohol use: Yes     Alcohol/week: 0.0 standard drinks     Comment: 1 glass of wine with dinner     Drug use: No     Sexual activity: Not on file   Lifestyle     Physical activity     Days per week: Not on file     Minutes per session: Not on file     Stress: Not on file   Relationships     Social connections     Talks on phone: Not on file     Gets together: Not on file     Attends Holiness service: Not on file     Active member of club or organization:  "Not on file     Attends meetings of clubs or organizations: Not on file     Relationship status: Not on file     Intimate partner violence     Fear of current or ex partner: Not on file     Emotionally abused: Not on file     Physically abused: Not on file     Forced sexual activity: Not on file   Other Topics Concern     Parent/sibling w/ CABG, MI or angioplasty before 65F 55M? 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       Non smoker         Family History:       Family History   Problem Relation Age of Onset     Dementia Mother      Heart Failure Mother      Hypertension Mother      Breast Cancer Mother         post menopausal     Asthma Sister         x2     Depression Sister      Cancer Father         prostate cancer     Hypertension Father      Prostate Cancer Father          of it at 82     Asthma Father         \"outgrew\" it     Schizophrenia Maternal Grandmother      Coronary Artery Disease Maternal Grandfather         he was diabetic and smoked     Coronary Artery Disease Sister         MI when being ventilated for asthma     Asthma Sister         both sisters with asthma, one severe     Other - See Comments Daughter         Enlarged Aorta              Medications:     Current Outpatient Medications   Medication Sig     albuterol (PROAIR HFA/PROVENTIL HFA/VENTOLIN HFA) 108 (90 Base) MCG/ACT inhaler Inhale 2 puffs into the lungs every 6 hours as needed for shortness of breath / dyspnea or wheezing     CALCIUM-VITAMIN D PO Take 1 tablet by mouth daily     estradiol (ESTRING) 2 MG vaginal ring Place 1 each vaginally every 3 months     fluticasone-salmeterol (WIXELA INHUB) 500-50 MCG/DOSE inhaler Inhale 1 puff into the lungs every 12 hours     magnesium 250 MG tablet Take 1 tablet by mouth daily     montelukast (SINGULAIR) 10 MG tablet Take 1 tablet (10 mg) by mouth as needed (itching or allergies)     NAPROXEN PO Take 220 mg by mouth as needed      " potassium chloride ER (KLOR-CON M) 20 MEQ CR tablet Take 1 tablet (20 mEq) by mouth daily     pravastatin (PRAVACHOL) 20 MG tablet Take 1 tablet (20 mg) by mouth daily     SHINGRIX injection      theophylline (DENIA-24) 400 MG 24 hr capsule Take 1 capsule (400 mg) by mouth daily     triamterene-HCTZ (MAXZIDE) 75-50 MG tablet Take 1 tablet by mouth daily     No current facility-administered medications for this visit.             Physical Exam:     EXAMINATION pertinent findings:   VITAL SIGNS: not currently breastfeeding.  There is no height or weight on file to calculate BMI.  GEN: AOx3, cooperative, no distress  RESP: non labored breathing   ABD: benign   SKIN: grossly normal   LYMPHATIC: grossly normal   NEURO: grossly normal   VASCULAR: satisfactory perfusion of all extremities  MUSCULOSKELETAL:   She walks with a symmetic gait. She has full painless left hip range of motion. She has 5 out of 5 abductor strength. Quadriceps, gastrocsoleus, tibialis anterior are intact. Normal sensation diffusely throughout her foot. 2+ DP pulse.  She has no lateral hip tenderness palpation.  She has no pain with with knee extension active or passively.             Data:   Imaging:   Repeat imaging was reviewed which demonstrates persistent avulsion of the greater trochanteric fracture with mild increase in deplacement. There is also a cementless left total hip replacement. Implants appear to be well fixed and well positioned.          Assessment and Plan:   Assessment:  Niurka is a very pleasant 72-year-old woman who had a hip replacement done around 2000.   Uncertain etiology of her current symptoms.  I's do not see any evidence of implant related problems or loosening.  The bruising in the medial thigh makes me concerned for a muscle injury or hematoma.  We discussed ongoing management options.  She is quite concerned that this is now happened 3 times.  Will obtain ESR and CRP to evaluate for infection.  She will continues to  use the single crutch for support but can do weightbearing as tolerated as her pain improves.  We will also obtain an MRI to evaluate for any soft tissue injury or fluid collection.  I will contact her after the blood test and the MRI has been completed.  She will let us know if she has any questions or concerns in the meantime.         Review of Systems:         William Ferguson M.D.     Arthritis and Joint Replacement  Department of Orthopaedic Surgery, [facility]  [email or web address]  [phone] (pager)

## 2020-11-18 NOTE — LETTER
11/18/2020         RE: Niurka Cisneros  [address]  [place] [address]        Dear Colleague,    Thank you for referring your patient, Niurka Cisneros, to the [facility] [place]. Please see a copy of my visit note below.    [facility], Orthopaedic Surgery, Arthritis, Hip and Knee Replacement    Niurka Cisneros MRN# [id number]   Age: 72 year old YOB: 1945     Requesting physician: Terry Sue              History of Present Illness:   Niurka Cisneros is a 72 year old year old female who presents today for evaluation and management of left hip pain.  She has been doing very well with regards to her left hip.  However, around mid October she was standing at the counter for prolonged period of time and she noted some pain that she describes as being deep within the groin.  The pain was somewhat limiting to the point where she was limping and started using a crutch.  The symptoms gradually improved but then returned.  Few days later the symptoms returned in a very similar fashion.  She did not have any trauma however, she did have some bruising on the medial aspect of the thigh.  The symptoms then slightly improved but then again worsened.  Over the last 24 hours they seem to be slightly improved again.  She is using a single crutch for ambulation.  She is not had any fevers or chills or problems with the incision.  She has not had any recent trauma.  She has not been taking any anticoagulation.             Past Medical History:     Patient Active Problem List   Diagnosis     Seizures (H)     Thoracic aortic aneurysm without rupture (H)     Moderate persistent asthma without complication     Prediabetes     Pulmonary nodules     Essential hypertension     Osteopenia of multiple sites     Aneurysm of right renal artery (H)     Severe persistent asthma without complication     Abnormal CT scan of head     Past Medical History:   Diagnosis Date      Abdominal aortic aneurysm without rupture (H) 11/10/2015    [  ] repeat imaging due spring 2016 Descending aortic aneurysm.  Being monitored with serial angiogram      Aneurysm of right renal artery (H) 4/9/2019     Esophageal hypertension 1/25/2017     Essential hypertension 1/25/2017     Female bladder prolapse 11/10/2015     Hearing loss      Pulmonary nodules 1/25/2017     Seizures (H) 11/10/2015    Temporal seziure d/o.  Does not have LOC.  Has prodromal symptoms      Severe persistent asthma 11/10/2015    Since childhood.  Has been steroid dependent for many years. Has had cydney x2       Prior history of blood clot: none  Prior history of bleeding problems: none  Prior history of anesthetic complications: none  Currently on opioids:  none  History of Diabetes: no           Past Surgical History:     Past Surgical History:   Procedure Laterality Date     CATARACT IOL, RT/LT Bilateral      COLONOSCOPY N/A 2/14/2019    Procedure: COLONOSCOPY;  Surgeon: Haim Burgos MD;  Location: [facility]ARACT SURGICAL [facility]ACKAGE       HYSTERECTOMY      fibroids     JOINT REPLACEMENT Left      NISSEN FUNDOPLICATION      x2            Social History:     Social History     Socioeconomic History     Marital status:      Spouse name: Not on file     Number of children: Not on file     Years of education: 20     Highest education level: Not on file   Occupational History     Occupation: pediatrician   Social Needs     Financial resource strain: Not on file     Food insecurity     Worry: Not on file     Inability: Not on file     Transportation needs     Medical: Not on file     Non-medical: Not on file   Tobacco Use     Smoking status: Never Smoker     Smokeless tobacco: Never Used   Substance and Sexual Activity     Alcohol use: Yes     Alcohol/week: 0.0 standard drinks     Comment: 1 glass of wine with dinner     Drug use: No     Sexual activity: Not on file   Lifestyle     Physical activity     Days per  "week: Not on file     Minutes per session: Not on file     Stress: Not on file   Relationships     Social connections     Talks on phone: Not on file     Gets together: Not on file     Attends [employer] service: Not on file     Active member of club or organization: Not on file     Attends meetings of clubs or organizations: Not on file     Relationship status: Not on file     Intimate partner violence     Fear of current or ex partner: Not on file     Emotionally abused: Not on file     Physically abused: Not on file     Forced sexual activity: Not on file   Other Topics Concern     Parent/sibling w/ CABG, MI or angioplasty before 65F 55M? 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       Non smoker         Family History:       Family History   Problem Relation Age of Onset     Dementia Mother      Heart Failure Mother      Hypertension Mother      Breast Cancer Mother         post menopausal     Asthma Sister         x2     Depression Sister      Cancer Father         prostate cancer     Hypertension Father      Prostate Cancer Father          of it at 82     Asthma Father         \"outgrew\" it     Schizophrenia Maternal Grandmother      Coronary Artery Disease Maternal Grandfather         he was diabetic and smoked     Coronary Artery Disease Sister         MI when being ventilated for asthma     Asthma Sister         both sisters with asthma, one severe     Other - See Comments Daughter         Enlarged Aorta              Medications:     Current Outpatient Medications   Medication Sig     albuterol (PROAIR HFA/PROVENTIL HFA/VENTOLIN HFA) 108 (90 Base) MCG/ACT inhaler Inhale 2 puffs into the lungs every 6 hours as needed for shortness of breath / dyspnea or wheezing     CALCIUM-VITAMIN D PO Take 1 tablet by mouth daily     estradiol (ESTRING) 2 MG vaginal ring Place 1 each vaginally every 3 months     fluticasone-salmeterol (WIXELA INHUB) 500-50 MCG/DOSE " inhaler Inhale 1 puff into the lungs every 12 hours     magnesium 250 MG tablet Take 1 tablet by mouth daily     montelukast (SINGULAIR) 10 MG tablet Take 1 tablet (10 mg) by mouth as needed (itching or allergies)     NAPROXEN PO Take 220 mg by mouth as needed      potassium chloride ER (KLOR-CON M) 20 MEQ CR tablet Take 1 tablet (20 mEq) by mouth daily     pravastatin (PRAVACHOL) 20 MG tablet Take 1 tablet (20 mg) by mouth daily     SHINGRIX injection      theophylline (DENIA-24) 400 MG 24 hr capsule Take 1 capsule (400 mg) by mouth daily     triamterene-HCTZ (MAXZIDE) 75-50 MG tablet Take 1 tablet by mouth daily     No current facility-administered medications for this visit.             Physical Exam:     EXAMINATION pertinent findings:   VITAL SIGNS: not currently breastfeeding.  There is no height or weight on file to calculate BMI.  GEN: AOx3, cooperative, no distress  RESP: non labored breathing   ABD: benign   SKIN: grossly normal   LYMPHATIC: grossly normal   NEURO: grossly normal   VASCULAR: satisfactory perfusion of all extremities  MUSCULOSKELETAL:   She walks with a symmetic gait. She has full painless left hip range of motion. She has 5 out of 5 abductor strength. Quadriceps, gastrocsoleus, tibialis anterior are intact. Normal sensation diffusely throughout her foot. 2+ DP pulse.  She has no lateral hip tenderness palpation.  She has no pain with with knee extension active or passively.             Data:   Imaging:   Repeat imaging was reviewed which demonstrates persistent avulsion of the greater trochanteric fracture with mild increase in deplacement. There is also a cementless left total hip replacement. Implants appear to be well fixed and well positioned.          Assessment and Plan:   Assessment:  Niurka is a very pleasant 72-year-old woman who had a hip replacement done around 2000.   Uncertain etiology of her current symptoms.  I's do not see any evidence of implant related problems or  loosening.  The bruising in the medial thigh makes me concerned for a muscle injury or hematoma.  We discussed ongoing management options.  She is quite concerned that this is now happened 3 times.  Will obtain ESR and CRP to evaluate for infection.  She will continues to use the single crutch for support but can do weightbearing as tolerated as her pain improves.  We will also obtain an MRI to evaluate for any soft tissue injury or fluid collection.  I will contact her after the blood test and the MRI has been completed.  She will let us know if she has any questions or concerns in the meantime.         Review of Systems:         William Ferguson M.D.     Arthritis and Joint Replacement  Department of Orthopaedic Surgery, [facility]  [email or web address]  [phone] (pager)

## 2020-11-18 NOTE — NURSING NOTE
Reason For Visit:   Chief Complaint   Patient presents with     RECHECK     F/U Left hip        Primary MD: Mara Combs  Referring MD: Referred Self      Pain Assessment  Patient Currently in Pain: Yes  0-10 Pain Scale: 4  Primary Pain Location: Hip  Pain Descriptors: Discomfort    Current Outpatient Medications   Medication     albuterol (PROAIR HFA/PROVENTIL HFA/VENTOLIN HFA) 108 (90 Base) MCG/ACT inhaler     CALCIUM-VITAMIN D PO     estradiol (ESTRING) 2 MG vaginal ring     fluticasone-salmeterol (WIXELA INHUB) 500-50 MCG/DOSE inhaler     magnesium 250 MG tablet     montelukast (SINGULAIR) 10 MG tablet     NAPROXEN PO     potassium chloride ER (KLOR-CON M) 20 MEQ CR tablet     pravastatin (PRAVACHOL) 20 MG tablet     SHINGRIX injection     theophylline (DENIA-24) 400 MG 24 hr capsule     triamterene-HCTZ (MAXZIDE) 75-50 MG tablet     No current facility-administered medications for this visit.         No Known Allergies        Alba Reyes LPN

## 2020-11-27 ENCOUNTER — MYC MEDICAL ADVICE (OUTPATIENT)
Dept: CARDIOLOGY | Facility: CLINIC | Age: 75
End: 2020-11-27

## 2020-11-27 ENCOUNTER — INFUSION THERAPY VISIT (OUTPATIENT)
Dept: INFUSION THERAPY | Facility: CLINIC | Age: 75
End: 2020-11-27
Payer: MEDICARE

## 2020-11-27 VITALS
RESPIRATION RATE: 16 BRPM | DIASTOLIC BLOOD PRESSURE: 84 MMHG | SYSTOLIC BLOOD PRESSURE: 143 MMHG | TEMPERATURE: 98.4 F | HEART RATE: 80 BPM

## 2020-11-27 DIAGNOSIS — J45.50 SEVERE PERSISTENT ASTHMA WITHOUT COMPLICATION (H): Primary | ICD-10-CM

## 2020-11-27 PROCEDURE — 96372 THER/PROPH/DIAG INJ SC/IM: CPT

## 2020-11-27 PROCEDURE — 250N000011 HC RX IP 250 OP 636

## 2020-11-27 RX ADMIN — BENRALIZUMAB 30 MG: 30 INJECTION, SOLUTION SUBCUTANEOUS at 07:38

## 2020-11-27 ASSESSMENT — PAIN SCALES - GENERAL: PAINLEVEL: NO PAIN (0)

## 2020-11-27 NOTE — PATIENT INSTRUCTIONS
Patient Education     Benralizumab injection  Brand Name: Fasenra  What is this medicine?  BENRALIZUMAB (DAYANA ra FELIPE oo mab) is used to help treat severe asthma. It should be used in combination with other asthma treatments.  How should I use this medicine?  This medicine is for injection under the skin. It may be administered by a healthcare professional in a hospital or clinic setting or at home. If you get this medicine at home, you will be taught how to prepare and give this medicine. Use exactly as directed. Take your medicine at regular intervals. Do not take your medicine more often than directed.  It is important that you put your used injectors, needles and syringes in a special sharps container. Do not put them in a trash can. If you do not have a sharps container, call your pharmacist or healthcare provider to get one.  Talk to your pediatrician regarding the use of this medicine in children. While this drug may be prescribed for children as young as 12 years for selected conditions, precautions do apply.  What side effects may I notice from receiving this medicine?  Side effects that you should report to your doctor or health care professional as soon as possible:    allergic reactions like skin rash, itching or hives, swelling of the face, lips, or tongue    breathing problems    signs and symptoms of low blood pressure like dizziness; feeling faint or lightheaded, falls    signs and symptoms of infection like fever or chills; cough; sore throat  Side effects that usually do not require medical attention (report these to your doctor or health care professional if they continue or are bothersome):    headache    pain, redness, or irritation at the site where injected  What may interact with this medicine?  Interactions are not expected.  What if I miss a dose?  It is important not to miss your dose. Call your doctor of health care professional if you are unable to keep an appointment. If you give  yourself the medicine and you miss a dose, call your doctor or health care professional for advice.  Where should I keep my medicine?  Keep out of the reach of children.  Store unopened syringes or injectors in a refrigerator between 2 to 8 degrees C (36 to 46 degrees F). Keep in the original container until ready for use. Protect from light. Do not freeze. Do not shake. Prior to use, remove the syringe or injector from the refrigerator and use after 30 minutes at room temperature. Throw away any unused medicine after the expiration date on the label.  If needed, the unopened syringe or injector may be stored at room temperature up to 25 degrees C (77 degrees F) for a maximum of 14 days. Once removed from the refrigerator and brought to room temperature, the syringe or autoinjector must be used within 14 days or discarded.  What should I tell my health care provider before I take this medicine?  They need to know if you have any of these conditions:    parasitic (helminth) infection    an unusual or allergic reaction to benralizumab, hamster proteins, other medicines, foods, dyes, or preservatives    pregnant or trying to get pregnant    breast-feeding  What should I watch for while using this medicine?  Visit your healthcare professional for regular checks on your progress. NEVER use this medicine for an acute asthma attack. Tell your healthcare professional if your symptoms do not start to get better or if they get worse.  Do not stop taking your other asthma medicines unless instructed to do so by your doctor or health care professional.  NOTE:This sheet is a summary. It may not cover all possible information. If you have questions about this medicine, talk to your doctor, pharmacist, or health care provider. Copyright  2020 [employer]

## 2020-11-27 NOTE — LETTER
11/27/2020         RE: Niurka Cisneros  [address]  [place] [address]        Dear Colleague,    Thank you for referring your patient, Niurka Cisneros, to the [facility]. Please see a copy of my visit note below.    Administrations This Visit     benralizumab (FASENRA) injection 30 mg     Admin Date  11/27/2020 Action  Given Dose  30 mg Route  Subcutaneous Administered By  Thao Loza RN              Pt denies any s/s of infection at this time.  Fasenra given subcutaneous in the back of the left arm.      Again, thank you for allowing me to participate in the care of your patient.        Sincerely,        [facility]

## 2020-11-28 ENCOUNTER — MYC MEDICAL ADVICE (OUTPATIENT)
Dept: PULMONOLOGY | Facility: CLINIC | Age: 75
End: 2020-11-28

## 2020-11-30 ENCOUNTER — ANCILLARY PROCEDURE (OUTPATIENT)
Dept: MRI IMAGING | Facility: CLINIC | Age: 75
End: 2020-11-30
Attending: ORTHOPAEDIC SURGERY
Payer: MEDICARE

## 2020-11-30 DIAGNOSIS — Z96.642 HISTORY OF TOTAL LEFT HIP REPLACEMENT: ICD-10-CM

## 2020-11-30 PROCEDURE — 73721 MRI JNT OF LWR EXTRE W/O DYE: CPT | Mod: LT | Performed by: RADIOLOGY

## 2020-11-30 NOTE — TELEPHONE ENCOUNTER
[facility] message received from patient regarding Echo scheduled in January. Patient had CTA done last month.

## 2020-11-30 NOTE — TELEPHONE ENCOUNTER
Spoke with pt and she will email me copy of letter she received from [facility] ([phone]).

## 2020-12-01 ENCOUNTER — TELEPHONE (OUTPATIENT)
Dept: CARDIOLOGY | Facility: CLINIC | Age: 75
End: 2020-12-01

## 2020-12-01 NOTE — TELEPHONE ENCOUNTER
Spoke with Niurka today to review results of genetic testing. 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 Saliva sample was collected on November 5, 2020 and sent to [email or web address].   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  A variant of unknown significance means that there is a genetic change in which we do not have enough information to determine if it is disease causing or not. Labs review published data, functional studies, presences in population databases, similarity to normal sequence, and computer prediction models.    The TNXB gene is expressed in musculoskeletal, cardiac, and dermal tissues and is known to be associated with autosomal recessive (AR) classic-like Magdaleno Danlos Syndrome (EDS). This particular variant occurs in a position that is well conserved (meaning that it is not used to changes) and creates an amino acid with highly dissimilar properties.  Computer models predict this change will be tolerated.  TNXB haploinsufficiency has been identified in women with incomplete penetrance and is characterized by hypermobile EDS and chronic musculoskeletal pain.   Although suspicion is raisied, there is not enough current information to be certain if this variant alone is the cause of Roopa's aneurysm.   Reviewed AR inheritance associated with this gene, meaning that two mutations are necessary to cause disease.  Since two mutations are required and Roopa only has one, this is unlikely to be the sole cause for her symptoms.  In addition, her children have a 50% chance of inheriting this variant but again it seems as though that would not be enough to cause full blown disease.    At this time we do not recommend genetic testing in other family members because we cannot interpret the results and make predictions.    Reviewed recommendation for ongoing cardiac screening in all first degree relatives (parents,  siblings, children).    A summary letter and copy of the results will be sent to patient. All questions answered at this time.     Maddie Turner MS, [facility]  Licensed, Certified Genetic Counselor  [facility]  [facility]

## 2020-12-01 NOTE — TELEPHONE ENCOUNTER
Received copy of denial letter from pt regarding her Fasenra. Emailed Infusion PA team to ask what the next steps are to appeal the denial.

## 2020-12-02 NOTE — PROGRESS NOTES
Administrations This Visit     benralizumab (FASENRA) injection 30 mg     Admin Date  11/27/2020 Action  Given Dose  30 mg Route  Subcutaneous Administered By  Thao Loza RN              Pt denies any s/s of infection at this time.  Fasenra given subcutaneous in the back of the left arm.

## 2020-12-07 NOTE — TELEPHONE ENCOUNTER
"Received message back from infusion prior authorization team and they stated the following:   \"I called insurance and was able to verify that there was no denied claims in reference to this drug. I had already notified the patient. The plan for her next infusion for next year, I will re-check coverage again and update patient.\". Sent pt message on [employer] with this information and asked if she had any further questions.  "

## 2020-12-10 LAB — MISCELLANEOUS TEST: NORMAL

## 2021-01-18 ENCOUNTER — ANCILLARY PROCEDURE (OUTPATIENT)
Dept: CARDIOLOGY | Facility: CLINIC | Age: 76
End: 2021-01-18
Attending: INTERNAL MEDICINE
Payer: MEDICARE

## 2021-01-18 DIAGNOSIS — I71.21 ASCENDING AORTIC ANEURYSM (H): ICD-10-CM

## 2021-01-18 PROCEDURE — 93306 TTE W/DOPPLER COMPLETE: CPT | Performed by: INTERNAL MEDICINE

## 2021-01-22 ENCOUNTER — VIRTUAL VISIT (OUTPATIENT)
Dept: CARDIOLOGY | Facility: CLINIC | Age: 76
End: 2021-01-22
Payer: MEDICARE

## 2021-01-22 DIAGNOSIS — I71.21 ASCENDING AORTIC ANEURYSM (H): Primary | ICD-10-CM

## 2021-01-22 DIAGNOSIS — I71.20 THORACIC AORTIC ANEURYSM WITHOUT RUPTURE (H): ICD-10-CM

## 2021-01-22 PROCEDURE — 99214 OFFICE O/P EST MOD 30 MIN: CPT | Mod: 95 | Performed by: INTERNAL MEDICINE

## 2021-01-22 NOTE — PROGRESS NOTES
Roopa is a 75 year old who is being evaluated via a billable video visit.      How would you like to obtain your AVS? MyChart  If the video visit is dropped, the invitation should be resent by: Send to e-mail at: [email or web address]  Will anyone else be joining your video visit?     ++ Did not answer phone++  Review Of Systems  Skin: NEGATIVE  Eyes:Ears/Nose/Throat: NEGATIVE  Respiratory: Asthma  Cardiovascular:NEGATIVE  Gastrointestinal: NEGATIVE  Genitourinary:NEGATIVE  Musculoskeletal: NEGATIVE  Neurologic: NEGATIVE  Psychiatric: NEGATIVE  Hematologic/Lymphatic/Immunologic: NEGATIVE  Endocrine:  NEGATIVE    MICHELLE Sinha      PROVIDER NOTES:     This is a 75 year old female here for follow up visit for thoracic aortic aneurysm. Has a PMH of bladder prolapse, HTN, seizures, asthma and a thoracic aneurysm (descending) measuring 4.4 cm.     Prior History:      Reviewed her history in detail: grandmother with aneurysm, at 89 years old, possible rupture and inoperable, leading to death. She has had 2 kids normal vaginal delivery who are healthy in their 40s. She has had 1 miscarriage. H/o bladder prolapse. Mild hypermobility, chronic back pain from scoliosis. Denies abnormal wound healing or bruising/bleeding, hernias, translucent skin, dental crowding, palatal deformities or chest wall deformities.      Thoracic aneurysm had been picked up 5 years prior to visit and she had no scanning since so we obtained echo imaging which documented stability at 4.4 cm. Had not yet had her CTA C/A/P to confirm size.      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 She reports h/o HTN for 10 years, controlled, on 2 drug regimen. Had been on losartan and cannot take beta blocker due to asthma. She recently had to go off of losartan due to side effects. Could also not tolerate ACEI.      Since last visit, her daughter has been screened and has a mild aneurysm. She is 47 year  "olds and is seeing me along side her mother on today's virtual visit. Son is also on virtual call, from [facility] and had an echocardiogram screen that demonstrated moderate mitral regurgitation, prominent LV trabeculations and mild aortic dilation. He has not yet had a cardiac MRI. All children have hypermobile joints and tall stature. Son is 6'2\".      Niurka denies chest pain, back pain, fevers, chills, ns, LE edema, orthopnea, PND. SBP has been well controlled in the 130s-140s. HR in the 60s. We are reviewing today her genetic testing results.        ASSESSMENT/PLAN:      Niurka is a 75 year old with ascending aortic aneurysm 4.4 cm and saccular aneurysm of renal artery.       Differential included fibromuscular dysplasia, syndromic TAAD with associated primary arteriopathy (MFS, LDS, vEDS), or other type of familial TAAD. It was helpful today to have son on the visit because his findings of mitral regurgitation, aortic aneurysm raised concerns for a possible syndromic (MFS or Marfanoid) TAAD. All family members described flat feet and tall stature. However MFS and other well described syndromic TAAD was excluded with Roopa's genetic testing.      We also discussed genetic testing and counseling for non syndromic FTAAD, where the variable penetrance and expression of various mutations associated with FTAAD make a definitive diagnosis difficult. TGFBR2 (~5%), ACTA2 (~14%) and MYH11 (~1%) are known mutations that comprise up to 20% of positively tested cases. There is an 80% chance that no identifiable FTAAD mutation will be discovered in testing. Presumably given both children have aortic aneurysm there is autosomal dominance pattern in their aortopathy. This has implications for children.     Interestingly, the following genetic profile was discovered for Roopa.     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  A variant of unknown " significance means that there is a genetic change in which we do not have enough information to determine if it is disease causing or not. Labs review published data, functional studies, presences in population databases, similarity to normal sequence, and computer prediction models.    The TNXB gene is expressed in musculoskeletal, cardiac, and dermal tissues and is known to be associated with autosomal recessive (AR) classic-like Magdaleno Danlos Syndrome (EDS). This particular variant occurs in a position that is well conserved (meaning that it is not used to changes) and creates an amino acid with highly dissimilar properties.  Computer models predict this change will be tolerated.  TNXB haploinsufficiency has been identified in women with incomplete penetrance and is characterized by hypermobile EDS and chronic musculoskeletal pain.   Although suspicion is raisied, there is not enough current information to be certain if this variant alone is the cause of Roopa's aneurysm. Reviewed AR inheritance associated with this gene, meaning that two mutations are necessary to cause disease.  Since two mutations are required and Roopa only has one, this is unlikely to be the sole cause for her symptoms.  In addition, her children have a 50% chance of inheriting this variant but again it seems as though that would not be enough to cause full blown disease. At this time we do not recommend genetic testing in other family members because we cannot interpret the results and make predictions.     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    Plan:    1. Renal artery aneurysm:  will continue to monitor. Not at a point needing repair at this time and  stable in size on surveillance imaging. Do not suspect it is altering any renal blood flow. We discussed CTA every few years with ultrasound in between. Will also monitor kidney function.    2. CT angiogram chest/abdomen/pelvis q6 months     3. I have encouraged son to obtain CMR in [place], to evaluate for LV trabeculations and LVNC cardiomyopathy. If positive, I will screen Roopa and Katlyn for this. I have set him up with Dr. Moore in [place].    4. Continue current medications, unable to be on losartan due to side effects. SBP goal < 120/80 mmHg, HR < 60 bpm. On hydrochlorothiazide triampterene. Metoprolol 12.5 mg twice day. Transitioned to long acting. Watch for COPD and asthma exacerbation.     5. Follow up in 6 months    Brandee Wang MD MSc  [facility]       Encounter Diagnoses   Name Primary?     Ascending aortic aneurysm (H) Yes     Thoracic aortic aneurysm without rupture (H)        CURRENT MEDICATIONS:  Current Outpatient Medications   Medication Sig Dispense Refill     albuterol (PROAIR HFA/PROVENTIL HFA/VENTOLIN HFA) 108 (90 Base) MCG/ACT inhaler Inhale 2 puffs into the lungs every 6 hours as needed for shortness of breath / dyspnea or wheezing 3 Inhaler 3     CALCIUM-VITAMIN D PO Take 1 tablet by mouth daily       estradiol (ESTRING) 2 MG vaginal ring Place 1 each vaginally every 3 months 1 each 0     fluticasone-salmeterol (WIXELA INHUB) 500-50 MCG/DOSE inhaler Inhale 1 puff into the lungs every 12 hours 3 Inhaler 4     magnesium 250 MG tablet Take 1 tablet by mouth daily       montelukast (SINGULAIR) 10 MG tablet Take 1 tablet (10 mg) by mouth as needed (itching or allergies) 90 tablet 3     NAPROXEN PO Take 220 mg by mouth as needed        potassium chloride ER (KLOR-CON M) 20 MEQ CR tablet Take 1 tablet (20 mEq) by mouth daily 90 tablet 0     pravastatin (PRAVACHOL) 20 MG tablet Take 1 tablet (20 mg) by mouth daily 90 tablet 0     SHINGRIX injection        theophylline (DENIA-24)  "400 MG 24 hr capsule Take 1 capsule (400 mg) by mouth daily 90 capsule 3     triamterene-HCTZ (MAXZIDE) 75-50 MG tablet Take 1 tablet by mouth daily 90 tablet 0       ALLERGIES   No Known Allergies    PAST MEDICAL HISTORY:  Past Medical History:   Diagnosis Date     Abdominal aortic aneurysm without rupture (H) 11/10/2015    [  ] repeat imaging due spring 2016 Descending aortic aneurysm.  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  Does not have LOC.  Has prodromal symptoms      Severe persistent asthma 11/10/2015    Since childhood.  Has been steroid dependent for many years. Has had cydney x2         PAST SURGICAL HISTORY:  Past Surgical History:   Procedure Laterality Date     CATARACT IOL, RT/LT Bilateral      COLONOSCOPY N/A 2019    Procedure: COLONOSCOPY;  Surgeon: Haim Burgos MD;  Location: [facility]       HYSTERECTOMY      fibroids     JOINT REPLACEMENT Left      NISSEN FUNDOPLICATION      x2       FAMILY HISTORY:  Family History   Problem Relation Age of Onset     Dementia Mother      Heart Failure Mother      Hypertension Mother      Breast Cancer Mother         post menopausal     Asthma Sister         x2     Depression Sister      Cancer Father         prostate cancer     Hypertension Father      Prostate Cancer Father          of it at 82     Asthma Father         \"outgrew\" it     Schizophrenia Maternal Grandmother      Coronary Artery Disease Maternal Grandfather         he was diabetic and smoked     Coronary Artery Disease Sister         MI when being ventilated for asthma     Asthma Sister         both sisters with asthma, one severe     Other - See Comments Daughter         Enlarged Aorta "       SOCIAL HISTORY:  Social History     Socioeconomic History     Marital status:      Spouse name: Not on file     Number of children: Not on file     Years of education: 20     Highest education level: Not on file   Occupational History     Occupation: pediatrician   Social Needs     Financial resource strain: Not on file     Food insecurity     Worry: Not on file     Inability: Not on file     Transportation needs     Medical: Not on file     Non-medical: Not on file   Tobacco Use     Smoking status: Never Smoker     Smokeless tobacco: Never Used   Substance and Sexual Activity     Alcohol use: Yes     Alcohol/week: 0.0 standard drinks     Comment: 1 glass of wine with dinner     Drug use: No     Sexual activity: Not on file   Lifestyle     Physical activity     Days per week: Not on file     Minutes per session: Not on file     Stress: Not on file   Relationships     Social connections     Talks on phone: Not on file     Gets together: Not on file     Attends [employer] service: Not on file     Active member of club or organization: Not on file     Attends meetings of clubs or organizations: Not on file     Relationship status: Not on file     Intimate partner violence     Fear of current or ex partner: Not on file     Emotionally abused: Not on file     Physically abused: Not on file     Forced sexual activity: Not on file   Other Topics Concern     Parent/sibling w/ CABG, MI or angioplasty before 65F 55M? 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       Physical Exam:  Vitals: LMP  (LMP Unknown)   GENERAL: healthy, alert and no distress  EYES: Eyes grossly normal to inspection, conjunctivae and sclerae normal  RESP: no audible wheeze, cough, or visible cyanosis.  No visible retractions or increased work of breathing.  Able to speak fully in complete sentences  NEURO: Cranial nerves grossly intact, mentation intact and speech normal  PSYCH: mentation  appears normal, affect normal/bright, judgement and insight intact, normal speech and appearance well-groomed  CARDIOVASCULAR: Neck veins not appreciated indicating probable normal JVP. No LE edema. Normal skin appearance, no stasis dermatitis.     Recent Lab Results:  LIPID RESULTS:  Lab Results   Component Value Date    CHOL 170 12/20/2019    HDL 65 12/20/2019    LDL 83 12/20/2019    TRIG 110 12/20/2019    CHOLHDLRATIO 3.0 11/10/2015       LIVER ENZYME RESULTS:  No results found for: AST, ALT    CBC RESULTS:  Lab Results   Component Value Date    WBC 6.4 05/16/2016    RBC 4.56 05/16/2016    HGB 12.7 05/16/2016    HCT 38.0 05/16/2016    MCV 83 05/16/2016    MCH 27.9 05/16/2016    MCHC 33.4 05/16/2016    RDW 13.5 05/16/2016     05/16/2016       BMP RESULTS:  Lab Results   Component Value Date     12/27/2019    POTASSIUM 3.2 (L) 12/27/2019    CHLORIDE 101 12/27/2019    CO2 30 12/27/2019    ANIONGAP 4 12/27/2019    GLC 89 12/27/2019    BUN 15 12/27/2019    CR 0.76 12/27/2019    GFRESTIMATED 70 10/22/2020    GFRESTBLACK 85 10/22/2020    ZACH 9.4 12/27/2019        A1C RESULTS:  Lab Results   Component Value Date    A1C 5.7 (H) 12/20/2019       INR RESULTS:  No results found for: INR      CC  Brandee Wang MD  [address] [place] [address]      Video-Visit Details    Type of service:  Video Visit    Video End Time: 24 minutes    Originating Location (pt. Location):     Distant Location (provider location):  [facility]     Platform used for Video Visit: Well

## 2021-01-22 NOTE — LETTER
1/22/2021    Mara Combs MD  [address]  [facility] [address]    RE: Niurka JOE Cisneros       Dear Colleague,    I had the pleasure of seeing Niurka Cisneros in the [facility].    Roopa is a 75 year old who is being evaluated via a billable video visit.      How would you like to obtain your AVS? MyChart  If the video visit is dropped, the invitation should be resent by: Send to e-mail at: [email or web address]  Will anyone else be joining your video visit? Yes: ***. How would they like to receive their invitation? {:[id number]}  {If patient encounters technical issues they should call [phone] :[id number]}  ++ Did not answer phone++   Review Of Systems  Skin: NEGATIVE  Eyes:Ears/Nose/Throat: NEGATIVE  Respiratory: Asthma  Cardiovascular:NEGATIVE  Gastrointestinal: NEGATIVE  Genitourinary:NEGATIVE  Musculoskeletal: NEGATIVE  Neurologic: NEGATIVE  Psychiatric: NEGATIVE  Hematologic/Lymphatic/Immunologic: NEGATIVE  Endocrine:  NEGATIVE    MICHELLE Sinha      PROVIDER NOTES:     This is a 75 year old female here for follow up visit for thoracic aortic aneurysm. Has a PMH of bladder prolapse, HTN, seizures, asthma and a thoracic aneurysm (descending) measuring 4.4 cm.     Prior History:      Reviewed her history in detail: grandmother with aneurysm, at 89 years old, possible rupture and inoperable, leading to death. She has had 2 kids normal vaginal delivery who are healthy in their 40s. She has had 1 miscarriage. H/o bladder prolapse. Mild hypermobility, chronic back pain from scoliosis. Denies abnormal wound healing or bruising/bleeding, hernias, translucent skin, dental crowding, palatal deformities or chest wall deformities.      Thoracic aneurysm had been picked up 5 years prior to visit and she had no scanning since so we obtained echo imaging which documented stability at 4.4 cm. Had not yet had her CTA C/A/P to confirm size.      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 She reports h/o HTN for 10 years, controlled, on 2 drug regimen. Had been on losartan and cannot take beta blocker due to asthma. She recently had to go off of losartan due to side effects. Could also not tolerate ACEI.      Since last visit, her daughter has been screened and has a mild aneurysm. She is 47 year olds and is seeing me along side her mother on today's virtual visit. Son is also on virtual call, from [facility] and had an echocardiogram screen that demonstrated moderate mitral regurgitation, prominent LV trabeculations and mild aortic dilation. All children have hypermobile joints and tall stature. Son is 6'2\".      Niurka denies chest pain, back pain, fevers, chills, ns, LE edema, orthopnea, PND.     /90. 167/88        ASSESSMENT/PLAN:      Niurka is a 75 year old with ascending aortic aneurysm 4.4 cm and saccular aneurysm of renal artery.       Differential includes fibromuscular dysplasia, syndromic TAAD with associated primary arteriopathy (MFS, LDS, vEDS), or other type of familial TAAD. It was helpful today to have son on the visit because his findings of mitral regurgitation, aortic aneurysm raise concerns for a possible syndromic (MFS or Marfanoid) TAAD. All family members described flat feet and tall stature.      There is high yield with genetic testing with syndromic types. We also discussed genetic testing and counseling for FTAAD, where the variable penetrance and expression of various mutations associated with FTAAD make a definitive diagnosis difficult. TGFBR2 (~5%), ACTA2 (~14%) and MYH11 (~1%) are known mutations that comprise up to 20% of positively tested cases. There is an 80% chance that no identifiable mutation will be discovered in testing but regardless we will treat patient and her family the same as if they were gene positive. Presumably given both children have aortic aneurysm there is " autosomal dominance pattern in their aortopathy. This has implications for children.      With this information in mind, the patient wishes to proceed with genetic counseling and testing. Patient and family warrant careful surveillance as FTAAD or syndromic associated aneurysms can rupture/dissect in a more unpredictable manner than degenerative types. Knowing the particular mutation may also prove helpful in understanding the natural history of patient's specific aneurysmal disease and help in preventive repair planning.     Renal artery aneurysm:  will continue to monitor. Not at a point needing repair at this time and stable in size on surveillance imaging. Do not suspect it is altering any renal blood flow. We discussed CTA every few years with ultrasound in between, along with chest CTA to evaluate FTAAD.      Lastly I have encouraged son to obtain CMR in [place], to evaluate for LV trabeculations and LVNC cardiomyopathy. If positive, I will screen Roopa and Katlyn for this.    Feb 12th apt with Francesca. Kidney function is wnl.     CT 6 month follow up.        Plan:      1. CT angiogram chest/abdomen/pelvis  2. Echocardiogram yearly  3. Genetic testing referral for full TAAD panel  4. Daughter to repeat echocardiogram  5. Son to obtain cardiac MRI and mRA   6. Continue current medications, unable to be on losartan due to side effects. SBP goal < 120/80 mmHg, HR < 60 bpm. hydrochlorothiazide triampterene. Metoprolol 12.5 mg twice day. Can transition to long acting. Watch for COPD and asthma exacerbation.   7. Ct mon ct   7. No heavy lifting > 40 lbs   8. Follow up in 3 months with me     HPI and Plan:   See dictation    No orders of the defined types were placed in this encounter.    No orders of the defined types were placed in this encounter.    There are no discontinued medications.      Encounter Diagnoses   Name Primary?     Ascending aortic aneurysm (H) Yes     Thoracic aortic aneurysm without rupture (H)         CURRENT MEDICATIONS:  Current Outpatient Medications   Medication Sig Dispense Refill     albuterol (PROAIR HFA/PROVENTIL HFA/VENTOLIN HFA) 108 (90 Base) MCG/ACT inhaler Inhale 2 puffs into the lungs every 6 hours as needed for shortness of breath / dyspnea or wheezing 3 Inhaler 3     CALCIUM-VITAMIN D PO Take 1 tablet by mouth daily       estradiol (ESTRING) 2 MG vaginal ring Place 1 each vaginally every 3 months 1 each 0     fluticasone-salmeterol (WIXELA INHUB) 500-50 MCG/DOSE inhaler Inhale 1 puff into the lungs every 12 hours 3 Inhaler 4     magnesium 250 MG tablet Take 1 tablet by mouth daily       montelukast (SINGULAIR) 10 MG tablet Take 1 tablet (10 mg) by mouth as needed (itching or allergies) 90 tablet 3     NAPROXEN PO Take 220 mg by mouth as needed        potassium chloride ER (KLOR-CON M) 20 MEQ CR tablet Take 1 tablet (20 mEq) by mouth daily 90 tablet 0     pravastatin (PRAVACHOL) 20 MG tablet Take 1 tablet (20 mg) by mouth daily 90 tablet 0     SHINGRIX injection        theophylline (DENIA-24) 400 MG 24 hr capsule Take 1 capsule (400 mg) by mouth daily 90 capsule 3     triamterene-HCTZ (MAXZIDE) 75-50 MG tablet Take 1 tablet by mouth daily 90 tablet 0       ALLERGIES   No Known Allergies    PAST MEDICAL HISTORY:  Past Medical History:   Diagnosis Date     Abdominal aortic aneurysm without rupture (H) 11/10/2015    [  ] repeat imaging due spring 2016 Descending aortic aneurysm.  Being monitored with serial angiogram      Aneurysm of right renal artery (H) 4/9/2019     Esophageal hypertension 1/25/2017     Essential hypertension 1/25/2017     Female bladder prolapse 11/10/2015     Hearing loss      Pulmonary nodules 1/25/2017     Seizures (H) 11/10/2015    Temporal seziure d/o.  Does not have LOC.  Has prodromal symptoms      Severe persistent asthma 11/10/2015    Since childhood.  Has been steroid dependent for many years. Has had cydney x2         PAST SURGICAL HISTORY:  Past Surgical History:  "  Procedure Laterality Date     CATARACT IOL, RT/LT Bilateral      COLONOSCOPY N/A 2019    Procedure: COLONOSCOPY;  Surgeon: Haim Burgos MD;  [facility]     H CATARACT SURGICAL PACKAGE       HYSTERECTOMY      fibroids     JOINT REPLACEMENT Left      NISSEN FUNDOPLICATION      x2       FAMILY HISTORY:  Family History   Problem Relation Age of Onset     Dementia Mother      Heart Failure Mother      Hypertension Mother      Breast Cancer Mother         post menopausal     Asthma Sister         x2     Depression Sister      Cancer Father         prostate cancer     Hypertension Father      Prostate Cancer Father          of it at 82     Asthma Father         \"outgrew\" it     Schizophrenia Maternal Grandmother      Coronary Artery Disease Maternal Grandfather         he was diabetic and smoked     Coronary Artery Disease Sister         MI when being ventilated for asthma     Asthma Sister         both sisters with asthma, one severe     Other - See Comments Daughter         Enlarged Aorta       SOCIAL HISTORY:  Social History     Socioeconomic History     Marital status:      Spouse name: Not on file     Number of children: Not on file     Years of education: 20     Highest education level: Not on file   Occupational History     Occupation: pediatrician   Social Needs     Financial resource strain: Not on file     Food insecurity     Worry: Not on file     Inability: Not on file     Transportation needs     Medical: Not on file     Non-medical: Not on file   Tobacco Use     Smoking status: Never Smoker     Smokeless tobacco: Never Used   Substance and Sexual Activity     Alcohol use: Yes     Alcohol/week: 0.0 standard drinks     Comment: 1 glass of wine with dinner     Drug use: No     Sexual activity: Not on file   Lifestyle     Physical activity     Days per week: Not on file     Minutes per session: Not on file     Stress: Not on file   Relationships     Social connections     Talks " on phone: Not on file     Gets together: Not on file     Attends Islam service: Not on file     Active member of club or organization: Not on file     Attends meetings of clubs or organizations: Not on file     Relationship status: Not on file     Intimate partner violence     Fear of current or ex partner: Not on file     Emotionally abused: Not on file     Physically abused: Not on file     Forced sexual activity: Not on file   Other Topics Concern     Parent/sibling w/ CABG, MI or angioplasty before 65F 55M? 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       Review of Systems:  Skin:        Eyes:       ENT:       Respiratory:       Cardiovascular:       Gastroenterology:      Genitourinary:       Musculoskeletal:       Neurologic:       Psychiatric:       Heme/Lymph/Imm:       Endocrine:         Physical Exam:  Vitals: LMP  (LMP Unknown)     Constitutional:           Skin:             Head:           Eyes:           Lymph:      ENT:           Neck:           Respiratory:            Cardiac:                                                           GI:           Extremities and Muscular Skeletal:                 Neurological:           Psych:         Recent Lab Results:  LIPID RESULTS:  Lab Results   Component Value Date    CHOL 170 12/20/2019    HDL 65 12/20/2019    LDL 83 12/20/2019    TRIG 110 12/20/2019    CHOLHDLRATIO 3.0 11/10/2015       LIVER ENZYME RESULTS:  No results found for: AST, ALT    CBC RESULTS:  Lab Results   Component Value Date    WBC 6.4 05/16/2016    RBC 4.56 05/16/2016    HGB 12.7 05/16/2016    HCT 38.0 05/16/2016    MCV 83 05/16/2016    MCH 27.9 05/16/2016    MCHC 33.4 05/16/2016    RDW 13.5 05/16/2016     05/16/2016       BMP RESULTS:  Lab Results   Component Value Date     12/27/2019    POTASSIUM 3.2 (L) 12/27/2019    CHLORIDE 101 12/27/2019    CO2 30 12/27/2019    ANIONGAP 4 12/27/2019    GLC 89 12/27/2019    BUN 15  "12/27/2019    CR 0.76 12/27/2019    GFRESTIMATED 70 10/22/2020    GFRESTBLACK 85 10/22/2020    ZACH 9.4 12/27/2019        A1C RESULTS:  Lab Results   Component Value Date    A1C 5.7 (H) 12/20/2019       INR RESULTS:  No results found for: INR           Video-Visit Details    Type of service:  Video Visit    Video End Time:{video visit start/end time for provider to select:[id number]}    Originating Location (pt. Location): {video visit patient location:[id number]::\"Home\"}    Distant Location (provider location):  [facility]     Platform used for Video Visit: Favian      Thank you for allowing me to participate in the care of your patient.    Sincerely,     Brandee Wang MD     [facility]  "

## 2021-01-25 ENCOUNTER — APPOINTMENT (OUTPATIENT)
Dept: LAB | Facility: CLINIC | Age: 76
End: 2021-01-25
Payer: MEDICARE

## 2021-01-25 ENCOUNTER — INFUSION THERAPY VISIT (OUTPATIENT)
Dept: INFUSION THERAPY | Facility: CLINIC | Age: 76
End: 2021-01-25
Payer: MEDICARE

## 2021-01-25 VITALS
OXYGEN SATURATION: 97 % | BODY MASS INDEX: 25.05 KG/M2 | HEIGHT: 67 IN | TEMPERATURE: 98.2 F | RESPIRATION RATE: 16 BRPM | WEIGHT: 159.6 LBS | HEART RATE: 71 BPM | SYSTOLIC BLOOD PRESSURE: 146 MMHG | DIASTOLIC BLOOD PRESSURE: 89 MMHG

## 2021-01-25 DIAGNOSIS — J45.50 SEVERE PERSISTENT ASTHMA WITHOUT COMPLICATION (H): ICD-10-CM

## 2021-01-25 DIAGNOSIS — J45.40 MODERATE PERSISTENT ASTHMA WITHOUT COMPLICATION: Primary | ICD-10-CM

## 2021-01-25 DIAGNOSIS — M85.89 OSTEOPENIA OF MULTIPLE SITES: ICD-10-CM

## 2021-01-25 LAB
CALCIUM SERPL-MCNC: 9.7 MG/DL (ref 8.5–10.1)
CREAT SERPL-MCNC: 0.71 MG/DL (ref 0.52–1.04)
GFR SERPL CREATININE-BSD FRML MDRD: 84 ML/MIN/{1.73_M2}

## 2021-01-25 PROCEDURE — 82310 ASSAY OF CALCIUM: CPT | Performed by: INTERNAL MEDICINE

## 2021-01-25 PROCEDURE — 82565 ASSAY OF CREATININE: CPT | Performed by: INTERNAL MEDICINE

## 2021-01-25 PROCEDURE — 96372 THER/PROPH/DIAG INJ SC/IM: CPT

## 2021-01-25 PROCEDURE — 250N000011 HC RX IP 250 OP 636

## 2021-01-25 PROCEDURE — 96365 THER/PROPH/DIAG IV INF INIT: CPT

## 2021-01-25 PROCEDURE — 250N000011 HC RX IP 250 OP 636: Performed by: INTERNAL MEDICINE

## 2021-01-25 PROCEDURE — 36415 COLL VENOUS BLD VENIPUNCTURE: CPT

## 2021-01-25 RX ORDER — HEPARIN SODIUM,PORCINE 10 UNIT/ML
5 VIAL (ML) INTRAVENOUS
Status: CANCELLED | OUTPATIENT
Start: 2021-01-25

## 2021-01-25 RX ORDER — HEPARIN SODIUM (PORCINE) LOCK FLUSH IV SOLN 100 UNIT/ML 100 UNIT/ML
5 SOLUTION INTRAVENOUS
Status: CANCELLED | OUTPATIENT
Start: 2021-01-25

## 2021-01-25 RX ORDER — ZOLEDRONIC ACID 5 MG/100ML
5 INJECTION, SOLUTION INTRAVENOUS ONCE
Status: CANCELLED
Start: 2021-01-25 | End: 2021-01-25

## 2021-01-25 RX ORDER — ZOLEDRONIC ACID 5 MG/100ML
5 INJECTION, SOLUTION INTRAVENOUS ONCE
Status: COMPLETED | OUTPATIENT
Start: 2021-01-25 | End: 2021-01-25

## 2021-01-25 RX ADMIN — BENRALIZUMAB 30 MG: 30 INJECTION, SOLUTION SUBCUTANEOUS at 09:23

## 2021-01-25 RX ADMIN — ZOLEDRONIC ACID 5 MG: 0.05 INJECTION, SOLUTION INTRAVENOUS at 09:26

## 2021-01-25 ASSESSMENT — MIFFLIN-ST. JEOR: SCORE: 1261.57

## 2021-01-25 ASSESSMENT — PAIN SCALES - GENERAL: PAINLEVEL: NO PAIN (0)

## 2021-01-25 NOTE — LETTER
1/25/2021         RE: Niurka Cisneros  [address]  [place] [address]        Dear Colleague,    Thank you for referring your patient, Niurka Cisneros, to the [facility] [place]. Please see a copy of my visit note below.    Nursing Note  Niurka Cisneros [facility]:   Chief Complaint   Patient presents with     Infusion     reclast     Imm/Inj     fasenra     During [facility]'s [facility] appointment, orders from Fransico Vega MD for fasenra were completed.  Orders from Mara Combs MD for reclast were completed. X1  Frequency: every 8 weeks for fasenra    Progress note:  Patient identification verified by name and date of birth.  Assessment completed.  Vitals recorded in Doc Flowsheets.  Patient was provided with education regarding medication/procedure and possible side effects.  Patient verbalized understanding.     present during visit today: Not Applicable.    Treatment Conditions: ~~~ NOTE: If the patient answers yes to any of the questions below, hold the infusion and contact ordering provider or on-call provider.    1. Have you recently had an elevated temperature, fever, chills, productive cough, coughing for 3 weeks or longer or hemoptysis, abnormal vital signs, night sweats,  chest pain or have you noticed a decrease in your appetite, unexplained weight loss or fatigue? No  2. Do you have any open wounds or new incisions? No  3. Do you have any recent or upcoming hospitalizations, surgeries or dental procedures? No  4. Do you currently have or recently have had any signs of illness or infection or are you on any antibiotics? No  5. Have you had any new, sudden or worsening abdominal pain? No  6. Have you or anyone in your household received a live vaccination in the past 4 weeks? Please note:  No live vaccines while on biologic/chemotherapy until 6 months  after the last treatment.  Patient can receive the flu vaccine (shot only) and the pneumovax.  It is optimal for the patient to get these vaccines mid cycle, but they can be given at any time as long as it is not on the day of the infusion. No  7. Have you recently been diagnosed with any new nervous system diseases (ie. Multiple sclerosis, Guillain [place], seizures, neurological changes) or cancer diagnosis? No  8. Are you on any form of radiation or chemotherapy? No  9. Are you pregnant or breast feeding or do you have plans of pregnancy in the future? No  10. Have you been having any signs of worsening depression or suicidal ideations?  (benlysta only) No  11. Have there been any other new onset medical symptoms? No      Premedications: were not ordered.    Drug Waste Record: No    Infusion length and rate:  infusion given over approximately 15 minutes, for reclast with 20 minute observation.  Fasenra given sub q in upper/back of right arm with 30 minute observation.    Labs: drawn today, prior to appointment in second floor lab.  Peripheral IV placed at that time.    Treatment Conditions:  Lab Results   Component Value Date     12/27/2019                   Lab Results   Component Value Date    POTASSIUM 3.2 12/27/2019           No results found for: MAG         Lab Results   Component Value Date    CR 0.71 01/25/2021                   Lab Results   Component Value Date    ZACH 9.7 01/25/2021                     Results reviewed, labs MET treatment parameters, ok to proceed with treatment.      Post Infusion Assessment:  Patient tolerated infusion without incident.  Patient observed for 30 minutes post fasenra per protocol.  Blood return noted pre and post infusion.  Site patent and intact, free from redness, edema or discomfort.  No evidence of extravasations.  Access discontinued per protocol.  Biologic Infusion Post Education: Call the triage nurse at your clinic or seek medical attention if you have  chills and/or temperature greater than or equal to 100.5, uncontrolled nausea/vomiting, diarrhea, constipation, dizziness, shortness of breath, chest pain, heart palpitations, weakness or any other new or concerning symptoms, questions or concerns.  You cannot have any live virus vaccines prior to or during treatment or up to 6 months post infusion.  If you have an upcoming surgery, medical procedure or dental procedure during treatment, this should be discussed with your ordering physician and your surgeon/dentist.  If you are having any concerning symptom, if you are unsure if you should get your next infusion or wish to speak to a provider before your next infusion, please call your care coordinator or triage nurse at your clinic to notify them so we can adequately serve you.       Discharge Plan:   Patient and/or family verbalized understanding of discharge instructions and all questions answered.  Patient discharged in stable condition accompanied by: self.  Departure Mode: Ambulatory.    Katherine Perez RN    BP (!) 144/77 (BP Location: Right arm, Patient Position: Sitting, Cuff Size: Adult Regular)   Pulse 86   Temp 98.2  F (36.8  C) (Tympanic)   Resp 16   Wt 72.4 kg (159 lb 9.6 oz)   LMP  (LMP Unknown)   SpO2 97%   BMI 25.00 kg/m            Again, thank you for allowing me to participate in the care of your patient.        Sincerely,        [facility]

## 2021-01-25 NOTE — PROGRESS NOTES
Nursing Note  Niurka Cisneros [facility] for:   Chief Complaint   Patient presents with     Infusion     reclast     Imm/Inj     fasenra     During [facility]'s [facility] appointment, orders from Fransico Vega MD for fasenra were completed.  Orders from Mara Combs MD for reclast were completed. X1  Frequency: every 8 weeks for fasenra    Progress note:  Patient identification verified by name and date of birth.  Assessment completed.  Vitals recorded in Doc Flowsheets.  Patient was provided with education regarding medication/procedure and possible side effects.  Patient verbalized understanding.     present during visit today: Not Applicable.    Treatment Conditions: ~~~ NOTE: If the patient answers yes to any of the questions below, hold the infusion and contact ordering provider or on-call provider.    1. Have you recently had an elevated temperature, fever, chills, productive cough, coughing for 3 weeks or longer or hemoptysis, abnormal vital signs, night sweats,  chest pain or have you noticed a decrease in your appetite, unexplained weight loss or fatigue? No  2. Do you have any open wounds or new incisions? No  3. Do you have any recent or upcoming hospitalizations, surgeries or dental procedures? No  4. Do you currently have or recently have had any signs of illness or infection or are you on any antibiotics? No  5. Have you had any new, sudden or worsening abdominal pain? No  6. Have you or anyone in your household received a live vaccination in the past 4 weeks? Please note:  No live vaccines while on biologic/chemotherapy until 6 months after the last treatment.  Patient can receive the flu vaccine (shot only) and the pneumovax.  It is optimal for the patient to get these vaccines mid cycle, but they can be given at any time as long as it is not on the day of the infusion. No  7. Have you recently been  diagnosed with any new nervous system diseases (ie. Multiple sclerosis, Guillain [place], seizures, neurological changes) or cancer diagnosis? No  8. Are you on any form of radiation or chemotherapy? No  9. Are you pregnant or breast feeding or do you have plans of pregnancy in the future? No  10. Have you been having any signs of worsening depression or suicidal ideations?  (benlysta only) No  11. Have there been any other new onset medical symptoms? No      Premedications: were not ordered.    Drug Waste Record: No    Infusion length and rate:  infusion given over approximately 15 minutes, for reclast with 20 minute observation.  Fasenra given sub q in upper/back of right arm with 30 minute observation.    Labs: drawn today, prior to appointment in second floor lab.  Peripheral IV placed at that time.    Treatment Conditions:  Lab Results   Component Value Date     12/27/2019                   Lab Results   Component Value Date    POTASSIUM 3.2 12/27/2019           No results found for: MAG         Lab Results   Component Value Date    CR 0.71 01/25/2021                   Lab Results   Component Value Date    ZACH 9.7 01/25/2021                     Results reviewed, labs MET treatment parameters, ok to proceed with treatment.      Post Infusion Assessment:  Patient tolerated infusion without incident.  Patient observed for 30 minutes post fasenra per protocol.  Blood return noted pre and post infusion.  Site patent and intact, free from redness, edema or discomfort.  No evidence of extravasations.  Access discontinued per protocol.  Biologic Infusion Post Education: Call the triage nurse at your clinic or seek medical attention if you have chills and/or temperature greater than or equal to 100.5, uncontrolled nausea/vomiting, diarrhea, constipation, dizziness, shortness of breath, chest pain, heart palpitations, weakness or any other new or concerning symptoms, questions or concerns.  You cannot have any live  virus vaccines prior to or during treatment or up to 6 months post infusion.  If you have an upcoming surgery, medical procedure or dental procedure during treatment, this should be discussed with your ordering physician and your surgeon/dentist.  If you are having any concerning symptom, if you are unsure if you should get your next infusion or wish to speak to a provider before your next infusion, please call your care coordinator or triage nurse at your clinic to notify them so we can adequately serve you.       Discharge Plan:   Patient and/or family verbalized understanding of discharge instructions and all questions answered.  Patient discharged in stable condition accompanied by: self.  Departure Mode: Ambulatory.    Katherine Perez RN    BP (!) 144/77 (BP Location: Right arm, Patient Position: Sitting, Cuff Size: Adult Regular)   Pulse 86   Temp 98.2  F (36.8  C) (Tympanic)   Resp 16   Wt 72.4 kg (159 lb 9.6 oz)   LMP  (LMP Unknown)   SpO2 97%   BMI 25.00 kg/m

## 2021-01-26 ENCOUNTER — MYC MEDICAL ADVICE (OUTPATIENT)
Dept: CARDIOLOGY | Facility: CLINIC | Age: 76
End: 2021-01-26

## 2021-01-26 NOTE — TELEPHONE ENCOUNTER
[employer] message received from patient regarding covid vaccine. Will route to Dr. Wang for review.           SIXTO Lujan January 26, 2021 11:46 AM

## 2021-02-01 ENCOUNTER — MYC MEDICAL ADVICE (OUTPATIENT)
Dept: CARDIOLOGY | Facility: CLINIC | Age: 76
End: 2021-02-01

## 2021-02-01 DIAGNOSIS — I10 BENIGN ESSENTIAL HYPERTENSION: Primary | ICD-10-CM

## 2021-02-03 RX ORDER — METOPROLOL SUCCINATE 25 MG/1
25 TABLET, EXTENDED RELEASE ORAL DAILY
Qty: 90 TABLET | Refills: 3 | Status: SHIPPED | OUTPATIENT
Start: 2021-02-03 | End: 2021-03-02

## 2021-02-03 NOTE — TELEPHONE ENCOUNTER
Dr. Wang's recommendations. Rn will send to update to patient via [employer].      She is asking about antibiotic fluoroquinolones. Does she also have question about covid vaccine? Yes I would have her family avoid these types of antibiotics as there is a theoretic risk of aortic tear with them. Dr. Wang.

## 2021-02-03 NOTE — CONFIDENTIAL NOTE
Yes we can do Toprol XL 25 mg daily if you don't mind sending in. I just called the patient about the antibiotic questions so all set.     Dr. Wang         Updated prescription sent to pharmacy per Dr. Wang.      SIXTO Lujan February 3, 2021 8:32 AM

## 2021-02-08 ASSESSMENT — ENCOUNTER SYMPTOMS
SPUTUM PRODUCTION: 1
SNORES LOUDLY: 0
COUGH: 1
HEMOPTYSIS: 0
POSTURAL DYSPNEA: 0
FLANK PAIN: 0
DYSPNEA ON EXERTION: 1
HEMATURIA: 0
WHEEZING: 1
DYSURIA: 0
COUGH DISTURBING SLEEP: 0
DIFFICULTY URINATING: 1
SHORTNESS OF BREATH: 1

## 2021-02-12 ENCOUNTER — OFFICE VISIT (OUTPATIENT)
Dept: INTERNAL MEDICINE | Facility: CLINIC | Age: 76
End: 2021-02-12
Payer: MEDICARE

## 2021-02-12 VITALS — DIASTOLIC BLOOD PRESSURE: 84 MMHG | SYSTOLIC BLOOD PRESSURE: 149 MMHG | OXYGEN SATURATION: 98 % | HEART RATE: 75 BPM

## 2021-02-12 DIAGNOSIS — N81.10 BLADDER PROLAPSE, FEMALE, ACQUIRED: ICD-10-CM

## 2021-02-12 DIAGNOSIS — R73.03 PREDIABETES: Primary | ICD-10-CM

## 2021-02-12 DIAGNOSIS — R73.03 PREDIABETES: ICD-10-CM

## 2021-02-12 DIAGNOSIS — I71.21 ASCENDING AORTIC ANEURYSM (H): ICD-10-CM

## 2021-02-12 DIAGNOSIS — E78.5 HYPERLIPIDEMIA LDL GOAL <100: ICD-10-CM

## 2021-02-12 DIAGNOSIS — Z00.00 ENCOUNTER FOR MEDICARE ANNUAL WELLNESS EXAM: ICD-10-CM

## 2021-02-12 DIAGNOSIS — R56.9 SEIZURES (H): ICD-10-CM

## 2021-02-12 DIAGNOSIS — J44.9 STEROID-DEPENDENT CHRONIC OBSTRUCTIVE PULMONARY DISEASE (H): ICD-10-CM

## 2021-02-12 DIAGNOSIS — I10 BENIGN ESSENTIAL HYPERTENSION: ICD-10-CM

## 2021-02-12 DIAGNOSIS — I72.2 ANEURYSM OF RIGHT RENAL ARTERY (H): ICD-10-CM

## 2021-02-12 DIAGNOSIS — Z92.241 STEROID-DEPENDENT CHRONIC OBSTRUCTIVE PULMONARY DISEASE (H): ICD-10-CM

## 2021-02-12 LAB
ANION GAP SERPL CALCULATED.3IONS-SCNC: 5 MMOL/L (ref 3–14)
BUN SERPL-MCNC: 14 MG/DL (ref 7–30)
CALCIUM SERPL-MCNC: 9.5 MG/DL (ref 8.5–10.1)
CHLORIDE SERPL-SCNC: 105 MMOL/L (ref 94–109)
CHOLEST SERPL-MCNC: 182 MG/DL
CO2 SERPL-SCNC: 31 MMOL/L (ref 20–32)
CREAT SERPL-MCNC: 0.76 MG/DL (ref 0.52–1.04)
GFR SERPL CREATININE-BSD FRML MDRD: 78 ML/MIN/{1.73_M2}
GLUCOSE SERPL-MCNC: 97 MG/DL (ref 70–99)
HBA1C MFR BLD: 5.4 % (ref 0–5.6)
HDLC SERPL-MCNC: 63 MG/DL
LDLC SERPL CALC-MCNC: 96 MG/DL
NONHDLC SERPL-MCNC: 119 MG/DL
POTASSIUM SERPL-SCNC: 3.7 MMOL/L (ref 3.4–5.3)
SODIUM SERPL-SCNC: 141 MMOL/L (ref 133–144)
TRIGL SERPL-MCNC: 115 MG/DL

## 2021-02-12 PROCEDURE — 80048 BASIC METABOLIC PNL TOTAL CA: CPT | Performed by: PATHOLOGY

## 2021-02-12 PROCEDURE — 80061 LIPID PANEL: CPT | Performed by: PATHOLOGY

## 2021-02-12 PROCEDURE — 36415 COLL VENOUS BLD VENIPUNCTURE: CPT | Performed by: PATHOLOGY

## 2021-02-12 PROCEDURE — G0439 PPPS, SUBSEQ VISIT: HCPCS | Performed by: INTERNAL MEDICINE

## 2021-02-12 PROCEDURE — 83036 HEMOGLOBIN GLYCOSYLATED A1C: CPT | Mod: 90 | Performed by: PATHOLOGY

## 2021-02-12 RX ORDER — PRAVASTATIN SODIUM 20 MG
20 TABLET ORAL DAILY
Qty: 90 TABLET | Refills: 3 | Status: SHIPPED | OUTPATIENT
Start: 2021-02-12 | End: 2022-02-08

## 2021-02-12 RX ORDER — CARBAMAZEPINE 200 MG/1
200 TABLET, EXTENDED RELEASE ORAL DAILY PRN
Qty: 30 TABLET | Refills: 1 | Status: SHIPPED | OUTPATIENT
Start: 2021-02-12

## 2021-02-12 RX ORDER — POTASSIUM CHLORIDE 1500 MG/1
20 TABLET, EXTENDED RELEASE ORAL DAILY
Qty: 90 TABLET | Refills: 3 | Status: SHIPPED | OUTPATIENT
Start: 2021-02-12 | End: 2022-02-08

## 2021-02-12 RX ORDER — TRIAMTERENE AND HYDROCHLOROTHIAZIDE 75; 50 MG/1; MG/1
1 TABLET ORAL DAILY
Qty: 90 TABLET | Refills: 3 | Status: SHIPPED | OUTPATIENT
Start: 2021-02-12 | End: 2022-02-08

## 2021-02-12 ASSESSMENT — ENCOUNTER SYMPTOMS
MUSCLE CRAMPS: 0
LEG SWELLING: 0
HEARTBURN: 0
TINGLING: 0
ARTHRALGIAS: 0
WHEEZING: 1
NAUSEA: 0
ORTHOPNEA: 0
EYE WATERING: 0
HEMATURIA: 0
NIGHT SWEATS: 0
TASTE DISTURBANCE: 0
WEIGHT LOSS: 0
EYE PAIN: 0
CLAUDICATION: 0
ABDOMINAL PAIN: 0
LEG PAIN: 0
TREMORS: 0
LIGHT-HEADEDNESS: 0
BLOOD IN STOOL: 0
DOUBLE VISION: 0
ALTERED TEMPERATURE REGULATION: 0
FLANK PAIN: 0
EXTREMITY NUMBNESS: 0
COUGH DISTURBING SLEEP: 0
SWOLLEN GLANDS: 0
VOMITING: 0
HOARSE VOICE: 0
BRUISES/BLEEDS EASILY: 0
NECK PAIN: 0
BOWEL INCONTINENCE: 0
PARALYSIS: 0
HOT FLASHES: 0
TACHYCARDIA: 0
JOINT SWELLING: 0
SORE THROAT: 0
SKIN CHANGES: 0
INCREASED ENERGY: 0
HEADACHES: 0
RECTAL PAIN: 0
PANIC: 0
DIARRHEA: 0
HYPOTENSION: 0
WEIGHT GAIN: 0
SHORTNESS OF BREATH: 1
COUGH: 1
BLOATING: 0
HALLUCINATIONS: 0
CONSTIPATION: 0
LOSS OF CONSCIOUSNESS: 0
RECTAL BLEEDING: 0
POOR WOUND HEALING: 0
EYE IRRITATION: 0
JAUNDICE: 0
POLYDIPSIA: 0
WEAKNESS: 0
DYSURIA: 0
SINUS PAIN: 0
FATIGUE: 0
SLEEP DISTURBANCES DUE TO BREATHING: 0
CHILLS: 0
POSTURAL DYSPNEA: 0
HYPERTENSION: 0
HEMOPTYSIS: 0
INSOMNIA: 0
NAIL CHANGES: 0
MUSCLE WEAKNESS: 0
BACK PAIN: 0
SMELL DISTURBANCE: 0
SEIZURES: 0
DECREASED CONCENTRATION: 0
PALPITATIONS: 0
POLYPHAGIA: 0
EXERCISE INTOLERANCE: 0
BREAST PAIN: 0
NECK MASS: 0
FEVER: 0
DISTURBANCES IN COORDINATION: 0
DECREASED LIBIDO: 0
EYE REDNESS: 0
SINUS CONGESTION: 0
TROUBLE SWALLOWING: 0
STIFFNESS: 0
SPUTUM PRODUCTION: 1
MYALGIAS: 0
DIFFICULTY URINATING: 1
DECREASED APPETITE: 0
DIZZINESS: 0
NERVOUS/ANXIOUS: 0
SPEECH CHANGE: 0
MEMORY LOSS: 0
NUMBNESS: 0
SYNCOPE: 0
BREAST MASS: 0
SNORES LOUDLY: 0
DEPRESSION: 0
DYSPNEA ON EXERTION: 1

## 2021-02-12 NOTE — NURSING NOTE
Medicare Annual Wellness Visit Previsit note    This 75 year old year old female presents for a  Medicare Wellness Exam.           Medical Care     Have you been to an ER or a hospital in the last year? No  What other specialists or organizations are involved in your medical care?    Current providers sharing in care for this patient include:  Patient Care Team       Relationship Specialty Notifications Start End    Mara Combs MD PCP - General Internal Medicine  11/10/15     Phone: [phone] Pager: [phone] Fax: [phone]        [address] [facility] [address]    Mara Combs MD MD Internal Medicine  11/2/15     Phone: [phone] Pager: [phone] Fax: [phone]        9 [facility] 4 [facility] [address]    Stephania Ring MD MD Pulmonary Disease  11/13/15     Carmen Goodman MD MD Urology  11/13/15     Phone: [phone] Fax: [phone]         [address] [place] [address]    Mara Combs MD Referring Physician Internal Medicine  11/13/15     Phone: [phone] Pager: [phone] Fax: [phone]        9 [facility] 4 [facility] [address]    Jose De Jesus Vega MD MD Internal Medicine  6/4/19     Phone: [phone] Fax: [phone]         [address] [place] [address]    Mara Combs MD Assigned PCP   6/21/20     Phone: [phone] Pager: [phone] Fax: [phone]        9 [facility] 4 [facility] [address]    Jose De Jesus Vega MD Assigned Pulmonology Provider   10/23/20     Phone: [phone] Fax: [phone]         [address] [place] [address]    Brandee Wang MD Assigned Heart and Vascular Provider   11/15/20     Phone: [phone] Pager: [phone] Fax: [phone]        7 [facility] [address]    William Ferguson MD Assigned Musculoskeletal Provider   11/22/20     Phone: [phone]  Fax: [phone]         [facility] ORTH[facility] [address] [facility] [address]  [facility] [address]                 Social History     Marital Status:  Who lives in your household?   Does your home have any of the following safety concerns? Loose rugs in the hallway, no grab bars in the bathroom, no handrails on the stairs or have poorly lit areas?  No  Do you feel threatened or controlled by a partner, ex-partner or anyone in your life? No  Has anyone hurt you physically, for example by pushing, hitting, slapping or kicking you   or forcing you to have sex? No  Do you need help with the phone, transportation, shopping, preparing meals, housework, laundry, medications or managing money? No   Have you noticed any hearing difficulties? Yes       Risk Behaviors and Healthy Habits     How many servings of fruits and vegetables do you eat a day? 6-7  How often do you exercise and what do you do? 45 minutes 7 times a week  Do you frequently ride without a seatbelt? No  Do you use tobacco?  No  Do you use any other drugs? No       Do you use alcohol?Yes  Number of drinks per day 1  Number of drinking days a week 3-4      Sexual Health     Are you sexually active? Yes    If yes, with men, women, or both? Male  If yes, do you more than one current partner?No  If yes, do you use condoms? No  Have you had any sexually transmitted infections? No  Any sexual concerns? No       FOR WOMEN ONLY  What year did you stop having periods? 1995  Any vaginal bleeding in the last year? No  Have you ever had an abnormal Pap smear? No    YAQUELIN Hernandez at 9:41 AM on 2/12/2021

## 2021-02-12 NOTE — PATIENT INSTRUCTIONS
Patient Education   Personalized Prevention Plan  You are due for the preventive services outlined below.  Your care team is available to assist you in scheduling these services.  If you have already completed any of these items, please share that information with your care team to update in your medical record.  Health Maintenance Due   Topic Date Due     Annual Wellness Visit  12/20/2020     A1C Lab  12/20/2020     Cholesterol Lab  12/20/2020     FALL RISK ASSESSMENT  12/20/2020     Basic Metabolic Panel  12/27/2020     PHQ-2  01/01/2021

## 2021-02-12 NOTE — NURSING NOTE
Chief Complaint   Patient presents with     Physical       Kimberly Nissen, EMT at 9:04 AM on 2/12/2021

## 2021-02-12 NOTE — PROGRESS NOTES
Niurka Cisneros is a 75 year old year old female who presents for Annual Wellness Visit.    Are you in the first 12 months of your Medicare coverage?  No    Healthy Habits:    Do you get at least three servings of calcium containing foods daily (dairy, green leafy vegetables, etc.)? yes    Amount of exercise or daily activities, outside of work: 1/2 hour(s) per day    Problems taking medications regularly No    Medication side effects: No    Do you have sleep apnea, excessive snoring or daytime drowsiness?no    No concerns with cognition    The patient does not drink >3 drinks per day nor >7 drinks per week.      Today's PHQ-2 Score:      Do you have a Health Care Directive?: Yes, advance care planning is on file.    Current providers sharing in care for this patient include:   Patient Care Team:   Patient Care Team       Relationship Specialty Notifications Start End    Mara Combs MD PCP - General Internal Medicine  11/10/15     Phone: [phone] Pager: [phone] Fax: [phone]        5 [facility] 4 [facility] [address]    Mara Combs MD MD Internal Medicine  11/2/15     Phone: [phone] Pager: [phone] Fax: [phone]        9 [facility] 4 [facility] [address]    Stephania Ring MD MD Pulmonary Disease  11/13/15     Carmen Goodman MD MD Urology  11/13/15     Phone: [phone] Fax: [phone]         [address] [place] [address]    Mara Combs MD Referring Physician Internal Medicine  11/13/15     Phone: [phone] Pager: [phone] Fax: [phone]        9 [facility] 4 [facility] [address]    Jose De Jesus Vega MD MD Internal Medicine  6/4/19     Phone: [phone] Fax: [phone]         0 [facility] [address]    Mara Combs MD Assigned PCP   6/21/20     Phone: [phone] Pager: [phone] Fax: [phone]        [address] [facility] [address]    Jose De Jesus Vega MD Assigned Pulmonology Provider   10/23/20     Phone: [phone] Fax: [phone]         8 [facility] [address]    Brandee Wang MD Assigned Heart and Vascular Provider   11/15/20     Phone: [phone] Pager: [phone] Fax: [phone]        3 [facility] [address]    William Ferguson MD Assigned Musculoskeletal Provider   11/22/20     Phone: [phone] Fax: [phone]         [facility] ORTHOPED[facility] [address] [place] [address]              Hearing impairment: Yes, uses hearing aids    Ability to successfully perform activities of daily living: Yes, no assistance needed     Fall risk:     Fall Risk Assessment completed per order.    Home safety:  none identified    All Histories, problem list, medication list, allergies and medical/social/surgical histories reviewed and updated in [facility] as appropriate.  HM was reviewed and updated     ROS:  Review of Systems     Constitutional:  Negative for fever, chills, weight loss, weight gain, fatigue, decreased appetite, night sweats, recent stressors, height gain, height loss, post-operative complications, incisional pain, hallucinations, increased energy, hyperactivity and confused.   HENT:  Negative for ear pain, hearing loss, tinnitus, nosebleeds, trouble swallowing, hoarse voice, mouth sores, sore throat, ear discharge, tooth pain, gum tenderness, taste disturbance, smell disturbance, hearing aid, bleeding gums, dry mouth, sinus pain, sinus congestion and neck mass.    Eyes:  Negative for double vision, pain, redness, eye pain, decreased vision, eye watering, eye bulging, eye dryness, flashing lights, spots, floaters, strabismus, tunnel vision, jaundice and eye irritation.   Respiratory:   Positive for cough, sputum production, shortness of breath (chronic but stable), wheezing and dyspnea on exertion. Negative for hemoptysis, sleep disturbances due to  breathing, snores loudly, cough disturbing sleep and postural dyspnea.    Cardiovascular:  Positive for dyspnea on exertion. Negative for chest pain, palpitations, orthopnea, claudication, leg swelling, fingers/toes turn blue, hypertension, hypotension, syncope, history of heart murmur, chest pain on exertion, chest pain at rest, pacemaker, few scattered varicosities, leg pain, sleep disturbances due to breathing, tachycardia, light-headedness, exercise intolerance and edema.   Gastrointestinal:  Negative for heartburn, nausea, vomiting, abdominal pain, diarrhea, constipation, blood in stool, melena, rectal pain, bloating, hemorrhoids, bowel incontinence, jaundice, rectal bleeding, coffee ground emesis and change in stool.   Genitourinary:  Positive for difficulty urinating and nocturia. Negative for bladder incontinence, dysuria, urgency, hematuria, flank pain, vaginal discharge, genital sores, dyspareunia, decreased libido, voiding less frequently, arousal difficulty, abnormal vaginal bleeding, excessive menstruation, menstrual changes, hot flashes, vaginal dryness and postmenopausal bleeding.   Musculoskeletal:  Negative for myalgias, back pain, joint swelling, arthralgias, stiffness, muscle cramps, neck pain, bone pain, muscle weakness and fracture.   Skin:  Negative for nail changes, itching, poor wound healing, rash, hair changes, skin changes, acne, warts, poor wound healing, scarring, flaky skin, Raynaud's phenomenon, sensitivity to sunlight and skin thickening.   Neurological:  Negative for dizziness, tingling, tremors, speech change, seizures, loss of consciousness, weakness, light-headedness, numbness, headaches, disturbances in coordination, extremity numbness, memory loss, difficulty walking and paralysis.   Endo/Heme:  Negative for anemia, swollen glands and bruises/bleeds easily.   Psychiatric/Behavioral:  Negative for depression, hallucinations, memory loss, decreased concentration, mood swings and  panic attacks.    Breast:  Negative for breast discharge, breast mass, breast pain and nipple retraction.   Endocrine:  Negative for altered temperature regulation, polyphagia, polydipsia, unwanted hair growth and change in facial hair.        OBJECTIVE:                                                            BP (!) 149/84   Pulse 75   LMP  (LMP Unknown)   SpO2 98%  There is no height or weight on file to calculate BMI.  General:  Pleasant, alert, no acute distress  Eyes:  Pupils 2-3 mm, sclera white, EOM's full.    Ears:  TM's normal, EAC's patent, soft cerumen cerumen  Throat/Mouth:  Mask in place  Neck:  supple, thyroid smooth, symmetric, not enlarge, no nodules  Lungs:  Clear to auscultation throughout, no wheezes, rhonchi or rales.  C/V:  Regular rate and rhythm, no murmurs, rubs or gallops.   No peripheral edema.    Abdomen:  Not distended.  Bowel sounds active.  No tenderness, no hepatosplenomegaly or masses.  No CVA tenderness or masses.  Lymph:  No cervical, lymph nodes.  Neuro:   Face symmetric. No tremor.  Gait steady.   M/S:  No joint deformities noted.  No joint swelling.  Skin:  No suspicious lesions.  No rashes or jaundice.  Normal moisture, good skin turgor.   Psych:  Broad range affect.  Not psychomotor slowed.  No signs of anxiety or agitation.    ASSESSMENT / PLAN:                                                              COUNSELING:  Reviewed preventive health counseling, as reflected in patient instructions    Ascending aortic aneurysm (H)  Aneurysm of right renal artery (H)  Follows with Dr. Wang.  Working on better BP control.  Metoprolol started, she will check BP at home and send readings.  Can increase metoprolol I needed  - triamterene-HCTZ (MAXZIDE) 75-50 MG tablet  Dispense: 90 tablet; Refill: 3    Benign essential hypertension  - Basic metabolic panel  - triamterene-HCTZ (MAXZIDE) 75-50 MG tablet  Dispense: 90 tablet; Refill: 3  - potassium chloride ER (KLOR-CON M) 20 MEQ CR  tablet  Dispense: 90 tablet; Refill: 3    Steroid-dependent chronic obstructive pulmonary disease (H)  No longer requires chronic steroids.    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  Uses carbamazepine just as needed  - carBAMazepine (TEGRETOL XR) 200 MG 12 hr tablet  Dispense: 30 tablet; Refill: 1    Prediabetes  - Hemoglobin A1c    Hyperlipidemia LDL goal <100  - Lipid panel reflex to direct LDL Fasting  - pravastatin (PRAVACHOL) 20 MG tablet  Dispense: 90 tablet; Refill: 3    Encounter for Medicare annual wellness exam      Reviewed patients plan of care and provided an AVS. This Care Plan has been established and reviewed with the Patient.    Mara Combs MD   [facility]

## 2021-02-19 ENCOUNTER — AMBULATORY - HEALTHEAST (OUTPATIENT)
Dept: NURSING | Facility: CLINIC | Age: 76
End: 2021-02-19

## 2021-03-12 ENCOUNTER — AMBULATORY - HEALTHEAST (OUTPATIENT)
Dept: NURSING | Facility: CLINIC | Age: 76
End: 2021-03-12

## 2021-03-22 ENCOUNTER — INFUSION THERAPY VISIT (OUTPATIENT)
Dept: INFUSION THERAPY | Facility: CLINIC | Age: 76
End: 2021-03-22
Payer: MEDICARE

## 2021-03-22 VITALS
OXYGEN SATURATION: 97 % | SYSTOLIC BLOOD PRESSURE: 114 MMHG | HEART RATE: 73 BPM | RESPIRATION RATE: 16 BRPM | TEMPERATURE: 98.1 F | DIASTOLIC BLOOD PRESSURE: 76 MMHG

## 2021-03-22 DIAGNOSIS — J45.50 SEVERE PERSISTENT ASTHMA WITHOUT COMPLICATION (H): Primary | ICD-10-CM

## 2021-03-22 PROCEDURE — 96372 THER/PROPH/DIAG INJ SC/IM: CPT

## 2021-03-22 PROCEDURE — 250N000011 HC RX IP 250 OP 636

## 2021-03-22 RX ADMIN — BENRALIZUMAB 30 MG: 30 INJECTION, SOLUTION SUBCUTANEOUS at 09:38

## 2021-03-22 ASSESSMENT — PAIN SCALES - GENERAL: PAINLEVEL: NO PAIN (0)

## 2021-03-22 NOTE — LETTER
3/22/2021         RE: Niurka Cisneros  [address]  [place] [address]        Dear Colleague,    Thank you for referring your patient, Niurka Cisneros, to the [facility]. Please see a copy of my visit note below.        Treatment Conditions: ~~~ NOTE: If the patient answers yes to any of the questions below, hold the infusion and contact ordering provider or on-call provider.    1. Have you recently had an elevated temperature, fever, chills, productive cough, coughing for 3 weeks or longer or hemoptysis, abnormal vital signs, night sweats,  chest pain or have you noticed a decrease in your appetite, unexplained weight loss or fatigue? No  2. Do you have any open wounds or new incisions? No  3. Do you have any recent or upcoming hospitalizations, surgeries or dental procedures? No  4. Do you currently have or recently have had any signs of illness or infection or are you on any antibiotics? No  5. Have you had any new, sudden or worsening abdominal pain? No  6. Have you or anyone in your household received a live vaccination in the past 4 weeks? Please note:  No live vaccines while on biologic/chemotherapy until 6 months after the last treatment.  Patient can receive the flu vaccine (shot only) and the pneumovax.  It is optimal for the patient to get these vaccines mid cycle, but they can be given at any time as long as it is not on the day of the infusion. No  7. Have you recently been diagnosed with any new nervous system diseases (ie. Multiple sclerosis, Guillain [place], seizures, neurological changes) or cancer diagnosis? No  8. Are you on any form of radiation or chemotherapy? No  9. Are you pregnant or breast feeding or do you have plans of pregnancy in the future? No  10. Have you been having any signs of worsening depression or suicidal ideations?  (benlysta only) No  11. Have there been any other new onset medical symptoms? No  Katherine Perez,  RN                      Patient presents to the [facility] for Fasenra.  Order written by Dr. Vega was completed today. Name and  verified with patient. See MAR for medication details. Medication was divided into 1 syringes by pharmacy and given in the following sites back side of upper left arm.  Patient was monitored for 30 minutes after administration. Patient tolerated injection well and was discharged to home.    Patient denied covid swab.    Carissa Corona MA    Administrations This Visit     benralizumab (FASENRA) injection 30 mg     Admin Date  2021 Action  Given Dose  30 mg Route  Subcutaneous Administered By  Carissa Corona MA                      Again, thank you for allowing me to participate in the care of your patient.        Sincerely,        [facility] [place]

## 2021-03-22 NOTE — PROGRESS NOTES
Patient presents to the [facility] for Fasenra.  Order written by Dr. Vega was completed today. Name and  verified with patient. See MAR for medication details. Medication was divided into 1 syringes by pharmacy and given in the following sites back side of upper left arm.  Patient was monitored for 30 minutes after administration. Patient tolerated injection well and was discharged to home.    Patient denied covid swab.    Carissa Corona MA    Administrations This Visit     benralizumab (FASENRA) injection 30 mg     Admin Date  2021 Action  Given Dose  30 mg Route  Subcutaneous Administered By  Carissa Corona MA

## 2021-03-22 NOTE — PROGRESS NOTES
Treatment Conditions: ~~~ NOTE: If the patient answers yes to any of the questions below, hold the infusion and contact ordering provider or on-call provider.    1. Have you recently had an elevated temperature, fever, chills, productive cough, coughing for 3 weeks or longer or hemoptysis, abnormal vital signs, night sweats,  chest pain or have you noticed a decrease in your appetite, unexplained weight loss or fatigue? No  2. Do you have any open wounds or new incisions? No  3. Do you have any recent or upcoming hospitalizations, surgeries or dental procedures? No  4. Do you currently have or recently have had any signs of illness or infection or are you on any antibiotics? No  5. Have you had any new, sudden or worsening abdominal pain? No  6. Have you or anyone in your household received a live vaccination in the past 4 weeks? Please note:  No live vaccines while on biologic/chemotherapy until 6 months after the last treatment.  Patient can receive the flu vaccine (shot only) and the pneumovax.  It is optimal for the patient to get these vaccines mid cycle, but they can be given at any time as long as it is not on the day of the infusion. No  7. Have you recently been diagnosed with any new nervous system diseases (ie. Multiple sclerosis, Guillain [place], seizures, neurological changes) or cancer diagnosis? No  8. Are you on any form of radiation or chemotherapy? No  9. Are you pregnant or breast feeding or do you have plans of pregnancy in the future? No  10. Have you been having any signs of worsening depression or suicidal ideations?  (benlysta only) No  11. Have there been any other new onset medical symptoms? No  Katherine Perez RN

## 2021-03-24 ENCOUNTER — MYC MEDICAL ADVICE (OUTPATIENT)
Dept: CARDIOLOGY | Facility: CLINIC | Age: 76
End: 2021-03-24

## 2021-03-24 NOTE — TELEPHONE ENCOUNTER
[employer] message received from patient regarding appointment for peng to see Congen[facility] Cardiology. Will route to Dr. Wang for review.     Last OV 1/22/21 w/ Dr. Wang:  Plan:     1. Renal artery aneurysm:  will continue to monitor. Not at a point needing repair at this time and stable in size on surveillance imaging. Do not suspect it is altering any renal blood flow. We discussed CTA every few years with ultrasound in between. Will also monitor kidney function.     2. CT angiogram chest/abdomen/pelvis q6 months      3. I have encouraged son to obtain CMR in [place], to evaluate for LV trabeculations and LVNC cardiomyopathy. If positive, I will screen Roopa and Katlyn for this. I have set him up with Dr. Moore in [place].     4. Continue current medications, unable to be on losartan due to side effects. SBP goal < 120/80 mmHg, HR < 60 bpm. On hydrochlorothiazide triampterene. Metoprolol 12.5 mg twice day. Transitioned to long acting. Watch for COPD and asthma exacerbation.      5. Follow up in 6 months    SIXTO Lujan March 24, 2021 2:39 PM

## 2021-03-25 ENCOUNTER — MYC MEDICAL ADVICE (OUTPATIENT)
Dept: CARDIOLOGY | Facility: CLINIC | Age: 76
End: 2021-03-25

## 2021-03-25 NOTE — TELEPHONE ENCOUNTER
RN updated patient via [employer] and will also request sons's phone number for our records.       Dr. Wang's response    Tell Roopa that I have reached out to Dell Moore myself, he will have his nurses reach out.     Augustine can you get me his son's number so we have in our correspondence?

## 2021-03-25 NOTE — TELEPHONE ENCOUNTER
[employer] message received. Will send to Dr. Wang for review per her request.    My son, Ramiro Cisneros's phone number is [phone].  Thanks for connecting him to the cardiologist.

## 2021-03-29 NOTE — CONFIDENTIAL NOTE
I have reached out to dr. Moore. Please have them notify us when the loop has been closed and there is an appointment made. Thanks!     Dr. Wang          Will MyChart message patient back to let her know.       SIXTO Lujan March 29, 2021 11:33 AM

## 2021-05-04 DIAGNOSIS — Z20.822 ENCOUNTER FOR LABORATORY TESTING FOR COVID-19 VIRUS: ICD-10-CM

## 2021-05-04 PROCEDURE — 36415 COLL VENOUS BLD VENIPUNCTURE: CPT | Performed by: PATHOLOGY

## 2021-05-04 PROCEDURE — 86769 SARS-COV-2 COVID-19 ANTIBODY: CPT | Performed by: PATHOLOGY

## 2021-05-05 LAB
SARS-COV-2 AB PNL SERPL IA: POSITIVE
SARS-COV-2 IGG SERPL IA-ACNC: NORMAL

## 2021-05-17 ENCOUNTER — INFUSION THERAPY VISIT (OUTPATIENT)
Dept: INFUSION THERAPY | Facility: CLINIC | Age: 76
End: 2021-05-17
Payer: MEDICARE

## 2021-05-17 VITALS
RESPIRATION RATE: 16 BRPM | HEART RATE: 70 BPM | SYSTOLIC BLOOD PRESSURE: 124 MMHG | DIASTOLIC BLOOD PRESSURE: 75 MMHG | OXYGEN SATURATION: 96 % | TEMPERATURE: 98 F

## 2021-05-17 DIAGNOSIS — J45.50 SEVERE PERSISTENT ASTHMA WITHOUT COMPLICATION (H): Primary | ICD-10-CM

## 2021-05-17 PROCEDURE — 96372 THER/PROPH/DIAG INJ SC/IM: CPT

## 2021-05-17 PROCEDURE — 250N000011 HC RX IP 250 OP 636

## 2021-05-17 RX ADMIN — BENRALIZUMAB 30 MG: 30 INJECTION, SOLUTION SUBCUTANEOUS at 09:33

## 2021-05-17 ASSESSMENT — PAIN SCALES - GENERAL: PAINLEVEL: NO PAIN (0)

## 2021-05-17 NOTE — LETTER
2021         RE: Niurka Cisneros  [address]  [place] [address]        Dear Colleague,    Thank you for referring your patient, Niurka Cisneros, to the [facility]. Please see a copy of my visit note below.    General assessment for IV and SQ medications    1. Elevated temperature, fever, chills, productive cough or abnormal vital signs, night sweats, coughing up blood or sputum, no appetite or abnormal vital signs, SOB : NO    2. Open wounds or new incisions: NO    3. Recent hospitalization: NO    4.  Recent surgeries:  NO    5. Any upcoming surgeries or dental procedures?:NO    6. Any current or recent bouts of illness or infection? On any antibiotics? : NO    7. Any new, sudden or worsening abdominal pain :NO    8. Vaccination within 4 weeks? Patient or someone in the household is scheduled to receive vaccination? No live virus vaccines prior to or during treatment :NO    9. Any nervous system diseases [i.e. multiple sclerosis, Guillain-[place], seizures, neurological  Changes]  Ask if on a biologic medication* : NO    10. Pregnant or breast feeding; or plans on pregnancy in the future: NO    11. Signs of worsening depression or suicidal ideations while taking benlysta:NO    12. New-onset medical symptoms: NO    13.  New cancer diagnosis or on chemotherapy or radiation NO    14.  Evaluate for any sign of active TB [Unexplained weight loss, Loss of appetite, Night sweats, Fever, Fatigue, Chills, Coughing for 3 weeks or longer, Hemoptysis (coughing up blood), Chest pain]: NO    **Note: If answered yes to any of the above, hold the infusion and contact ordering provider.          Patient presents to the [facility] for Fasenra.  Order written by Dr. Vega was completed today. Name and  verified with patient. See MAR for medication details. Medication was divided into 1 syringes by pharmacy and given in the following sites upper backside of left arm.   Patient was monitored for 30 minutes after administration. Patient tolerated injection well and was discharged to home    Patient declined covid swab.    Carissa Corona MA    Administrations This Visit     benralizumab (FASENRA) injection 30 mg     Admin Date  05/17/2021 Action  Given Dose  30 mg Route  Subcutaneous Administered By  Carissa Corona MA                    Again, thank you for allowing me to participate in the care of your patient.        Sincerely,        [facility]

## 2021-05-17 NOTE — PROGRESS NOTES
Patient presents to the [facility] for Fasenra.  Order written by Dr. Vega was completed today. Name and  verified with patient. See MAR for medication details. Medication was divided into 1 syringes by pharmacy and given in the following sites upper backside of left arm.  Patient was monitored for 30 minutes after administration. Patient tolerated injection well and was discharged to home    Patient declined covid swab.    Carissa Corona MA    Administrations This Visit     benralizumab (FASENRA) injection 30 mg     Admin Date  2021 Action  Given Dose  30 mg Route  Subcutaneous Administered By  Carissa Corona MA

## 2021-05-17 NOTE — PROGRESS NOTES
General assessment for IV and SQ medications    1. Elevated temperature, fever, chills, productive cough or abnormal vital signs, night sweats, coughing up blood or sputum, no appetite or abnormal vital signs, SOB : NO    2. Open wounds or new incisions: NO    3. Recent hospitalization: NO    4.  Recent surgeries:  NO    5. Any upcoming surgeries or dental procedures?:NO    6. Any current or recent bouts of illness or infection? On any antibiotics? : NO    7. Any new, sudden or worsening abdominal pain :NO    8. Vaccination within 4 weeks? Patient or someone in the household is scheduled to receive vaccination? No live virus vaccines prior to or during treatment :NO    9. Any nervous system diseases [i.e. multiple sclerosis, Guillain-Searchlight, seizures, neurological  Changes]  Ask if on a biologic medication* : NO    10. Pregnant or breast feeding; or plans on pregnancy in the future: NO    11. Signs of worsening depression or suicidal ideations while taking benlysta:NO    12. New-onset medical symptoms: NO    13.  New cancer diagnosis or on chemotherapy or radiation NO    14.  Evaluate for any sign of active TB [Unexplained weight loss, Loss of appetite, Night sweats, Fever, Fatigue, Chills, Coughing for 3 weeks or longer, Hemoptysis (coughing up blood), Chest pain]: NO    **Note: If answered yes to any of the above, hold the infusion and contact ordering provider.

## 2021-06-10 NOTE — TELEPHONE ENCOUNTER
FUTURE VISIT INFORMATION      FUTURE VISIT INFORMATION:    Date: 7/12/21    Time: 10:20 AM    Location: [facility]  REFERRAL INFORMATION:    Referring provider:  Dr. Mara Combs    Referring providers clinic:  [facility]    Reason for visit/diagnosis: Eye Exam, wants glasses rx    RECORDS REQUESTED FROM:       Clinic name Comments Records Status Imaging Status   [facility] 6/6/21 - MyChart Referral from Dr. Combs  2/2/18 - EYE OV with Dr. Mally Brownlee

## 2021-06-16 NOTE — TELEPHONE ENCOUNTER
FUTURE VISIT INFORMATION      FUTURE VISIT INFORMATION:    Date: 7/20/21    Time: 10:20am    Location: [facility]  REFERRAL INFORMATION:    Referring provider:  Dr. Mara Combs    Referring providers clinic[facility]  [facility]    Reason for visit/diagnosis: Eye Exam, wants glasses rx     RECORDS REQUESTED FROM:         Clinic name Comments Records Status Imaging Status   [facility] 6/6/21 - MyChart Referral from Dr. Combs  2/2/18 - EYE OV with Dr. Mally Brownlee

## 2021-06-22 ENCOUNTER — OFFICE VISIT (OUTPATIENT)
Dept: ORTHOPEDICS | Facility: CLINIC | Age: 76
End: 2021-06-22
Payer: MEDICARE

## 2021-06-22 VITALS — BODY MASS INDEX: 24.8 KG/M2 | WEIGHT: 158 LBS | HEIGHT: 67 IN

## 2021-06-22 DIAGNOSIS — M65.341 TRIGGER RING FINGER OF RIGHT HAND: Primary | ICD-10-CM

## 2021-06-22 PROCEDURE — 99202 OFFICE O/P NEW SF 15 MIN: CPT | Mod: 25 | Performed by: FAMILY MEDICINE

## 2021-06-22 PROCEDURE — 20550 NJX 1 TENDON SHEATH/LIGAMENT: CPT | Mod: F8 | Performed by: FAMILY MEDICINE

## 2021-06-22 RX ORDER — LIDOCAINE HYDROCHLORIDE 10 MG/ML
2 INJECTION, SOLUTION EPIDURAL; INFILTRATION; INTRACAUDAL; PERINEURAL
Status: DISCONTINUED | OUTPATIENT
Start: 2021-06-22 | End: 2022-04-22

## 2021-06-22 RX ORDER — TRIAMCINOLONE ACETONIDE 40 MG/ML
20 INJECTION, SUSPENSION INTRA-ARTICULAR; INTRAMUSCULAR
Status: DISCONTINUED | OUTPATIENT
Start: 2021-06-22 | End: 2022-04-22

## 2021-06-22 RX ADMIN — TRIAMCINOLONE ACETONIDE 20 MG: 40 INJECTION, SUSPENSION INTRA-ARTICULAR; INTRAMUSCULAR at 14:43

## 2021-06-22 RX ADMIN — LIDOCAINE HYDROCHLORIDE 2 ML: 10 INJECTION, SOLUTION EPIDURAL; INFILTRATION; INTRACAUDAL; PERINEURAL at 14:43

## 2021-06-22 ASSESSMENT — MIFFLIN-ST. JEOR: SCORE: 1254.31

## 2021-06-22 NOTE — LETTER
6/22/2021      RE: Niurka Cisneros  [address]  [place] [address]       Sports Medicine Clinic Visit    PCP: Mara Combs    Niurka Cisneros is a 75 year old female who is seen  as self referral presenting with right ring finger trigger finger.  Intermittent active triggering for 6 to 8 months.  She has not had to self reduce it.  Retired pediatrician.    Injury: NA    Location of Pain: right ring finger  Duration of Pain: 6-8 months  Rating of Pain: 0/10  Pain is better with: NA  Pain is worse with: NA  Additional Features: Mostly issues with typing.  Treatment so far consists of: No Treatment tried to date  Prior History of related problems: None    LMP  (LMP Unknown)       PMH:  Past Medical History:   Diagnosis Date     Abdominal aortic aneurysm without rupture (H) 11/10/2015    [  ] repeat imaging due spring 2016 Descending aortic aneurysm.  Being monitored with serial angiogram      Aneurysm of right renal artery (H) 4/9/2019     Esophageal hypertension 1/25/2017     Essential hypertension 1/25/2017     Female bladder prolapse 11/10/2015     Hearing loss      Pulmonary nodules 1/25/2017     Seizures (H) 11/10/2015    Temporal seziure d/o.  Does not have LOC.  Has prodromal symptoms      Severe persistent asthma 11/10/2015    Since childhood.  Has been steroid dependent for many years. Has had cydney x2       H/o left hip replacement    Active problem list:  Patient Active Problem List   Diagnosis     Seizures (H)     Thoracic aortic aneurysm without rupture (H)     Moderate persistent asthma without complication     Prediabetes     Pulmonary nodules     Essential hypertension     Osteopenia of multiple sites     Aneurysm of right renal artery (H)     Severe persistent asthma without complication     Abnormal CT scan of head       FH:  Family History   Problem Relation Age of Onset     Dementia Mother      Heart Failure Mother      Hypertension Mother      Breast Cancer Mother        "  post menopausal     Asthma Sister         x2     Depression Sister      Cancer Father         prostate cancer     Hypertension Father      Prostate Cancer Father          of it at 82     Asthma Father         \"outgrew\" it     Schizophrenia Maternal Grandmother      Coronary Artery Disease Maternal Grandfather         he was diabetic and smoked     Coronary Artery Disease Sister         MI when being ventilated for asthma     Asthma Sister         both sisters with asthma, one severe     Other - See Comments Daughter         Enlarged Aorta       SH:  Social History     Socioeconomic History     Marital status:      Spouse name: Not on file     Number of children: Not on file     Years of education: 20     Highest education level: Not on file   Occupational History     Occupation: pediatrician   Social Needs     Financial resource strain: Not on file     Food insecurity     Worry: Not on file     Inability: Not on file     Transportation needs     Medical: Not on file     Non-medical: Not on file   Tobacco Use     Smoking status: Never Smoker     Smokeless tobacco: Never Used   Substance and Sexual Activity     Alcohol use: Yes     Alcohol/week: 0.0 standard drinks     Comment: 1 glass of wine with dinner     Drug use: No     Sexual activity: Not on file   Lifestyle     Physical activity     Days per week: Not on file     Minutes per session: Not on file     Stress: Not on file   Relationships     Social connections     Talks on phone: Not on file     Gets together: Not on file     Attends Bahai service: Not on file     Active member of club or organization: Not on file     Attends meetings of clubs or organizations: Not on file     Relationship status: Not on file     Intimate partner violence     Fear of current or ex partner: Not on file     Emotionally abused: Not on file     Physically abused: Not on file     Forced sexual activity: Not on file   Other Topics Concern     Parent/sibling w/ CABG, " MI or angioplasty before 65F 55M? 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       MEDS:  See EMR, reviewed  ALL:  See EMR, reviewed    REVIEW OF SYSTEMS:  CONSTITUTIONAL:NEGATIVE for fever, chills, change in weight  INTEGUMENTARY/SKIN: NEGATIVE for worrisome rashes, moles or lesions  EYES: NEGATIVE for vision changes or irritation  ENT/MOUTH: NEGATIVE for ear, mouth and throat problems  RESP:NEGATIVE for significant cough or SOB  BREAST: NEGATIVE for masses, tenderness or discharge  CV: NEGATIVE for chest pain, palpitations or peripheral edema  GI: NEGATIVE for nausea, abdominal pain, heartburn, or change in bowel habits  :NEGATIVE for frequency, dysuria, or hematuria  :NEGATIVE for frequency, dysuria, or hematuria  NEURO: NEGATIVE for weakness, dizziness or paresthesias  ENDOCRINE: NEGATIVE for temperature intolerance, skin/hair changes  HEME/ALLERGY/IMMUNE: NEGATIVE for bleeding problems  PSYCHIATRIC: NEGATIVE for changes in mood or affect      Objective she has active triggering of the right ring finger.  She can flex independently at the PIP DIP and MCP against resistance with full range of motion and strength.  No contracture in the palm.  Overlying skin is normal.  Sensation is normal.  Appropriate in conversation and affect.    Assessment right ring finger trigger finger    Plan: We discussed conservative cares including gentle massage at the A1 pulley, gentle stretches in extension, intermittent use of a figure of 8 splint for 3 to 4 days to allow the finger to rest (provided) cortisone injections, and trigger finger surgery.  She would like to try the use of the splint intermittently and also cortisone shot.  After informed consent about bleeding, infection, steroid flare, transient numbness from the anesthetic, and after prepping with surgical scrub she was injected with a combination of half a cc of Kenalog 40 and 2 cc of 1% lidocaine, with about a  cc of this injected, at the tender nodule at the A1 pulley at the base of the fourth finger of the right hand.  She tolerated this without difficulty, moved her finger through full range of motion, and follow-up as needed.  She knows the injection if helpful could be repeated twice a year and if she opts to see hand surgery she will notify me.    Hand / Upper Extremity Injection/Arthrocentesis: R ring A1    Date/Time: 2021 2:43 PM  Performed by: William Mcmillan MD  Authorized by: William Mcmillan MD     Needle Size:  25 G  Guidance: landmark    Condition: trigger finger    Location:  Ring finger    Site:  R ring A1  Medications:  20 mg triamcinolone 40 MG/ML; 2 mL lidocaine (PF) 1 %  Outcome:  Tolerated well, no immediate complications  Procedure discussed: discussed risks, benefits, and alternatives    Consent Given by:  Patient  Timeout: timeout called immediately prior to procedure    Prep: patient was prepped and draped in usual sterile fashion        [address] [id number] [phone]  Dept: [phone]  ______________________________________________________________________________    Patient: Niurka Cisneros   : 1945   MRN: [id number]   2021    INVASIVE PROCEDURE SAFETY CHECKLIST    Date: 2021   Procedure: Right ring finger trigger finger cortisone injection  Patient Name: Niurka Cisneros  MRN: [id number]  YOB: 1945    Action: Complete sections as appropriate. Any discrepancy results in a HARD COPY until resolved.     PRE PROCEDURE:  Patient ID verified with 2 identifiers (name and  or MRN): Yes  Procedure and site verified with patient/designee (when able): Yes  Accurate consent documentation in medical record: Yes  H&P (or appropriate assessment) documented in medical record: Yes  H&P must be up to 20 days prior to procedure and updates within 24 hours of  procedure as applicable: Yes  Relevant diagnostic and radiology test results appropriately labeled and displayed as applicable: Yes  Procedure site(s) marked with provider initials: Yes    TIMEOUT:  Time-Out performed immediately prior to starting procedure, including verbal and active participation of all team members addressing the following:Yes  * Correct patient identify  * Confirmed that the correct side and site are marked  * An accurate procedure consent form  * Agreement on the procedure to be done  * Correct patient position  * Relevant images and results are properly labeled and appropriately displayed  * The need to administer antibiotics or fluids for irrigation purposes during the procedure as applicable   * Safety precautions based on patient history or medication use    DURING PROCEDURE: Verification of correct person, site, and procedures any time the responsibility for care of the patient is transferred to another member of the care team.       The following medications were given:         Prior to injection, verified patient identity using patient's name and date of birth.  Due to injection administration, patient instructed to remain in clinic for 15 minutes  afterwards, and to report any adverse reaction to me immediately.    Tendon sheath injection was performed.   Medication Name: Kenalog NDC: [address]-3  Drug Amount Wasted:  Yes: 0.5 mg/ml   Vial/Syringe: Single dose vial  Expiration Date:  11/1/22    Medication Name: Lidocaine NDC: 55575-512-01    Scribed by Margo Allan ATC for Dr. Mcmillan on June 22, 2021 at 2:30 pm based on the provider's statements to me.     ENE Augustin MD

## 2021-06-22 NOTE — PROGRESS NOTES
Sports Medicine Clinic Visit    PCP: Mara Combs    Niurka Cisneros is a 75 year old female who is seen  as self referral presenting with right ring finger trigger finger.  Intermittent active triggering for 6 to 8 months.  She has not had to self reduce it.  Retired pediatrician.    Injury: NA    Location of Pain: right ring finger  Duration of Pain: 6-8 months  Rating of Pain: 0/10  Pain is better with: NA  Pain is worse with: NA  Additional Features: Mostly issues with typing.  Treatment so far consists of: No Treatment tried to date  Prior History of related problems: None    LMP  (LMP Unknown)       PMH:  Past Medical History:   Diagnosis Date     Abdominal aortic aneurysm without rupture (H) 11/10/2015    [  ] repeat imaging due spring 2016 Descending aortic aneurysm.  Being monitored with serial angiogram      Aneurysm of right renal artery (H) 4/9/2019     Esophageal hypertension 1/25/2017     Essential hypertension 1/25/2017     Female bladder prolapse 11/10/2015     Hearing loss      Pulmonary nodules 1/25/2017     Seizures (H) 11/10/2015    Temporal seziure d/o.  Does not have LOC.  Has prodromal symptoms      Severe persistent asthma 11/10/2015    Since childhood.  Has been steroid dependent for many years. Has had cydney x2       H/o left hip replacement    Active problem list:  Patient Active Problem List   Diagnosis     Seizures (H)     Thoracic aortic aneurysm without rupture (H)     Moderate persistent asthma without complication     Prediabetes     Pulmonary nodules     Essential hypertension     Osteopenia of multiple sites     Aneurysm of right renal artery (H)     Severe persistent asthma without complication     Abnormal CT scan of head       FH:  Family History   Problem Relation Age of Onset     Dementia Mother      Heart Failure Mother      Hypertension Mother      Breast Cancer Mother         post menopausal     Asthma Sister         x2     Depression Sister      Cancer Father  "        prostate cancer     Hypertension Father      Prostate Cancer Father          of it at 82     Asthma Father         \"outgrew\" it     Schizophrenia Maternal Grandmother      Coronary Artery Disease Maternal Grandfather         he was diabetic and smoked     Coronary Artery Disease Sister         MI when being ventilated for asthma     Asthma Sister         both sisters with asthma, one severe     Other - See Comments Daughter         Enlarged Aorta       SH:  Social History     Socioeconomic History     Marital status:      Spouse name: Not on file     Number of children: Not on file     Years of education: 20     Highest education level: Not on file   Occupational History     Occupation: pediatrician   Social Needs     Financial resource strain: Not on file     Food insecurity     Worry: Not on file     Inability: Not on file     Transportation needs     Medical: Not on file     Non-medical: Not on file   Tobacco Use     Smoking status: Never Smoker     Smokeless tobacco: Never Used   Substance and Sexual Activity     Alcohol use: Yes     Alcohol/week: 0.0 standard drinks     Comment: 1 glass of wine with dinner     Drug use: No     Sexual activity: Not on file   Lifestyle     Physical activity     Days per week: Not on file     Minutes per session: Not on file     Stress: Not on file   Relationships     Social connections     Talks on phone: Not on file     Gets together: Not on file     Attends Restoration service: Not on file     Active member of club or organization: Not on file     Attends meetings of clubs or organizations: Not on file     Relationship status: Not on file     Intimate partner violence     Fear of current or ex partner: Not on file     Emotionally abused: Not on file     Physically abused: Not on file     Forced sexual activity: Not on file   Other Topics Concern     Parent/sibling w/ CABG, MI or angioplasty before 65F 55M? 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       MEDS:  See EMR, reviewed  ALL:  See EMR, reviewed    REVIEW OF SYSTEMS:  CONSTITUTIONAL:NEGATIVE for fever, chills, change in weight  INTEGUMENTARY/SKIN: NEGATIVE for worrisome rashes, moles or lesions  EYES: NEGATIVE for vision changes or irritation  ENT/MOUTH: NEGATIVE for ear, mouth and throat problems  RESP:NEGATIVE for significant cough or SOB  BREAST: NEGATIVE for masses, tenderness or discharge  CV: NEGATIVE for chest pain, palpitations or peripheral edema  GI: NEGATIVE for nausea, abdominal pain, heartburn, or change in bowel habits  :NEGATIVE for frequency, dysuria, or hematuria  :NEGATIVE for frequency, dysuria, or hematuria  NEURO: NEGATIVE for weakness, dizziness or paresthesias  ENDOCRINE: NEGATIVE for temperature intolerance, skin/hair changes  HEME/ALLERGY/IMMUNE: NEGATIVE for bleeding problems  PSYCHIATRIC: NEGATIVE for changes in mood or affect      Objective she has active triggering of the right ring finger.  She can flex independently at the PIP DIP and MCP against resistance with full range of motion and strength.  No contracture in the palm.  Overlying skin is normal.  Sensation is normal.  Appropriate in conversation and affect.    Assessment right ring finger trigger finger    Plan: We discussed conservative cares including gentle massage at the A1 pulley, gentle stretches in extension, intermittent use of a figure of 8 splint for 3 to 4 days to allow the finger to rest (provided) cortisone injections, and trigger finger surgery.  She would like to try the use of the splint intermittently and also cortisone shot.  After informed consent about bleeding, infection, steroid flare, transient numbness from the anesthetic, and after prepping with surgical scrub she was injected with a combination of half a cc of Kenalog 40 and 2 cc of 1% lidocaine, with about a cc of this injected, at the tender nodule at the A1 pulley at the base of the fourth  finger of the right hand.  She tolerated this without difficulty, moved her finger through full range of motion, and follow-up as needed.  She knows the injection if helpful could be repeated twice a year and if she opts to see hand surgery she will notify me.    Hand / Upper Extremity Injection/Arthrocentesis: R ring A1    Date/Time: 2021 2:43 PM  Performed by: William Mcmillan MD  Authorized by: William Mcmillan MD     Needle Size:  25 G  Guidance: landmark    Condition: trigger finger    Location:  Ring finger    Site:  R ring A1  Medications:  20 mg triamcinolone 40 MG/ML; 2 mL lidocaine (PF) 1 %  Outcome:  Tolerated well, no immediate complications  Procedure discussed: discussed risks, benefits, and alternatives    Consent Given by:  Patient  Timeout: timeout called immediately prior to procedure    Prep: patient was prepped and draped in usual sterile fashion        [address] [address]  [phone]  Dept: [phone]  ______________________________________________________________________________    Patient: Niurka Cisneros   : 1945   MRN: [id number]   2021    INVASIVE PROCEDURE SAFETY CHECKLIST    Date: 2021   Procedure: Right ring finger trigger finger cortisone injection  Patient Name: Niurka Cisneros  MRN: [id number]  YOB: 1945    Action: Complete sections as appropriate. Any discrepancy results in a HARD COPY until resolved.     PRE PROCEDURE:  Patient ID verified with 2 identifiers (name and  or MRN): Yes  Procedure and site verified with patient/designee (when able): Yes  Accurate consent documentation in medical record: Yes  H&P (or appropriate assessment) documented in medical record: Yes  H&P must be up to 20 days prior to procedure and updates within 24 hours of procedure as applicable: Yes  Relevant diagnostic and radiology test results  appropriately labeled and displayed as applicable: Yes  Procedure site(s) marked with provider initials: Yes    TIMEOUT:  Time-Out performed immediately prior to starting procedure, including verbal and active participation of all team members addressing the following:Yes  * Correct patient identify  * Confirmed that the correct side and site are marked  * An accurate procedure consent form  * Agreement on the procedure to be done  * Correct patient position  * Relevant images and results are properly labeled and appropriately displayed  * The need to administer antibiotics or fluids for irrigation purposes during the procedure as applicable   * Safety precautions based on patient history or medication use    DURING PROCEDURE: Verification of correct person, site, and procedures any time the responsibility for care of the patient is transferred to another member of the care team.       The following medications were given:         Prior to injection, verified patient identity using patient's name and date of birth.  Due to injection administration, patient instructed to remain in clinic for 15 minutes  afterwards, and to report any adverse reaction to me immediately.    Tendon sheath injection was performed.   Medication Name: Kenalog NDC: [address]-2  Drug Amount Wasted:  Yes: 0.5 mg/ml   Vial/Syringe: Single dose vial  Expiration Date:  11/1/22    Medication Name: Lidocaine NDC: 47083-683-71    Scribed by Margo Allan ATC for Dr. Mcmillan on June 22, 2021 at 2:30 pm based on the provider's statements to me.     Margo Allan ATC

## 2021-06-22 NOTE — LETTER
6/22/2021      RE: Niurka Cisneros  [address]  [place] [address]       Sports Medicine Clinic Visit    PCP: Mara Combs    Niurka Cisneros is a 75 year old female who is seen  as self referral presenting with right ring finger trigger finger.  Intermittent active triggering for 6 to 8 months.  She has not had to self reduce it.  Retired pediatrician.    Injury: NA    Location of Pain: right ring finger  Duration of Pain: 6-8 months  Rating of Pain: 0/10  Pain is better with: NA  Pain is worse with: NA  Additional Features: Mostly issues with typing.  Treatment so far consists of: No Treatment tried to date  Prior History of related problems: None    LMP  (LMP Unknown)       PMH:  Past Medical History:   Diagnosis Date     Abdominal aortic aneurysm without rupture (H) 11/10/2015    [  ] repeat imaging due spring 2016 Descending aortic aneurysm.  Being monitored with serial angiogram      Aneurysm of right renal artery (H) 4/9/2019     Esophageal hypertension 1/25/2017     Essential hypertension 1/25/2017     Female bladder prolapse 11/10/2015     Hearing loss      Pulmonary nodules 1/25/2017     Seizures (H) 11/10/2015    Temporal seziure d/o.  Does not have LOC.  Has prodromal symptoms      Severe persistent asthma 11/10/2015    Since childhood.  Has been steroid dependent for many years. Has had cydney x2       H/o left hip replacement    Active problem list:  Patient Active Problem List   Diagnosis     Seizures (H)     Thoracic aortic aneurysm without rupture (H)     Moderate persistent asthma without complication     Prediabetes     Pulmonary nodules     Essential hypertension     Osteopenia of multiple sites     Aneurysm of right renal artery (H)     Severe persistent asthma without complication     Abnormal CT scan of head       FH:  Family History   Problem Relation Age of Onset     Dementia Mother      Heart Failure Mother      Hypertension Mother      Breast Cancer Mother          "post menopausal     Asthma Sister         x2     Depression Sister      Cancer Father         prostate cancer     Hypertension Father      Prostate Cancer Father          of it at 82     Asthma Father         \"outgrew\" it     Schizophrenia Maternal Grandmother      Coronary Artery Disease Maternal Grandfather         he was diabetic and smoked     Coronary Artery Disease Sister         MI when being ventilated for asthma     Asthma Sister         both sisters with asthma, one severe     Other - See Comments Daughter         Enlarged Aorta       SH:  Social History     Socioeconomic History     Marital status:      Spouse name: Not on file     Number of children: Not on file     Years of education: 20     Highest education level: Not on file   Occupational History     Occupation: pediatrician   Social Needs     Financial resource strain: Not on file     Food insecurity     Worry: Not on file     Inability: Not on file     Transportation needs     Medical: Not on file     Non-medical: Not on file   Tobacco Use     Smoking status: Never Smoker     Smokeless tobacco: Never Used   Substance and Sexual Activity     Alcohol use: Yes     Alcohol/week: 0.0 standard drinks     Comment: 1 glass of wine with dinner     Drug use: No     Sexual activity: Not on file   Lifestyle     Physical activity     Days per week: Not on file     Minutes per session: Not on file     Stress: Not on file   Relationships     Social connections     Talks on phone: Not on file     Gets together: Not on file     Attends Tenriism service: Not on file     Active member of club or organization: Not on file     Attends meetings of clubs or organizations: Not on file     Relationship status: Not on file     Intimate partner violence     Fear of current or ex partner: Not on file     Emotionally abused: Not on file     Physically abused: Not on file     Forced sexual activity: Not on file   Other Topics Concern     Parent/sibling w/ CABG, MI " or angioplasty before 65F 55M? 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       MEDS:  See EMR, reviewed  ALL:  See EMR, reviewed    REVIEW OF SYSTEMS:  CONSTITUTIONAL:NEGATIVE for fever, chills, change in weight  INTEGUMENTARY/SKIN: NEGATIVE for worrisome rashes, moles or lesions  EYES: NEGATIVE for vision changes or irritation  ENT/MOUTH: NEGATIVE for ear, mouth and throat problems  RESP:NEGATIVE for significant cough or SOB  BREAST: NEGATIVE for masses, tenderness or discharge  CV: NEGATIVE for chest pain, palpitations or peripheral edema  GI: NEGATIVE for nausea, abdominal pain, heartburn, or change in bowel habits  :NEGATIVE for frequency, dysuria, or hematuria  :NEGATIVE for frequency, dysuria, or hematuria  NEURO: NEGATIVE for weakness, dizziness or paresthesias  ENDOCRINE: NEGATIVE for temperature intolerance, skin/hair changes  HEME/ALLERGY/IMMUNE: NEGATIVE for bleeding problems  PSYCHIATRIC: NEGATIVE for changes in mood or affect      Objective she has active triggering of the right ring finger.  She can flex independently at the PIP DIP and MCP against resistance with full range of motion and strength.  No contracture in the palm.  Overlying skin is normal.  Sensation is normal.  Appropriate in conversation and affect.    Assessment right ring finger trigger finger    Plan: We discussed conservative cares including gentle massage at the A1 pulley, gentle stretches in extension, intermittent use of a figure of 8 splint for 3 to 4 days to allow the finger to rest (provided) cortisone injections, and trigger finger surgery.  She would like to try the use of the splint intermittently and also cortisone shot.  After informed consent about bleeding, infection, steroid flare, transient numbness from the anesthetic, and after prepping with surgical scrub she was injected with a combination of half a cc of Kenalog 40 and 2 cc of 1% lidocaine, with about a cc  of this injected, at the tender nodule at the A1 pulley at the base of the fourth finger of the right hand.  She tolerated this without difficulty, moved her finger through full range of motion, and follow-up as needed.  She knows the injection if helpful could be repeated twice a year and if she opts to see hand surgery she will notify me.    Hand / Upper Extremity Injection/Arthrocentesis: R ring A1    Date/Time: 2021 2:43 PM  Performed by: William Mcmillan MD  Authorized by: William Mcmillan MD     Needle Size:  25 G  Guidance: landmark    Condition: trigger finger    Location:  Ring finger    Site:  R ring A1  Medications:  20 mg triamcinolone 40 MG/ML; 2 mL lidocaine (PF) 1 %  Outcome:  Tolerated well, no immediate complications  Procedure discussed: discussed risks, benefits, and alternatives    Consent Given by:  Patient  Timeout: timeout called immediately prior to procedure    Prep: patient was prepped and draped in usual sterile fashion        [address] [address] [phone]  Dept: [phone]  ______________________________________________________________________________    Patient: Niurka Cisneros   : 1945   MRN: [id number]   2021    INVASIVE PROCEDURE SAFETY CHECKLIST    Date: 2021   Procedure: Right ring finger trigger finger cortisone injection  Patient Name: Niurka Cisneros  MRN: [id number]  YOB: 1945    Action: Complete sections as appropriate. Any discrepancy results in a HARD COPY until resolved.     PRE PROCEDURE:  Patient ID verified with 2 identifiers (name and  or MRN): Yes  Procedure and site verified with patient/designee (when able): Yes  Accurate consent documentation in medical record: Yes  H&P (or appropriate assessment) documented in medical record: Yes  H&P must be up to 20 days prior to procedure and updates within 24 hours of  procedure as applicable: Yes  Relevant diagnostic and radiology test results appropriately labeled and displayed as applicable: Yes  Procedure site(s) marked with provider initials: Yes    TIMEOUT:  Time-Out performed immediately prior to starting procedure, including verbal and active participation of all team members addressing the following:Yes  * Correct patient identify  * Confirmed that the correct side and site are marked  * An accurate procedure consent form  * Agreement on the procedure to be done  * Correct patient position  * Relevant images and results are properly labeled and appropriately displayed  * The need to administer antibiotics or fluids for irrigation purposes during the procedure as applicable   * Safety precautions based on patient history or medication use    DURING PROCEDURE: Verification of correct person, site, and procedures any time the responsibility for care of the patient is transferred to another member of the care team.       The following medications were given:         Prior to injection, verified patient identity using patient's name and date of birth.  Due to injection administration, patient instructed to remain in clinic for 15 minutes  afterwards, and to report any adverse reaction to me immediately.    Tendon sheath injection was performed.   Medication Name: Kenalog NDC: [address]-8  Drug Amount Wasted:  Yes: 0.5 mg/ml   Vial/Syringe: Single dose vial  Expiration Date:  11/1/22    Medication Name: Lidocaine NDC: 54949-989-69    Scribed by Margo Allan ATC for Dr. Mcmillan on June 22, 2021 at 2:30 pm based on the provider's statements to me.     ENE Augustin MD

## 2021-07-12 ENCOUNTER — PRE VISIT (OUTPATIENT)
Dept: OPHTHALMOLOGY | Facility: CLINIC | Age: 76
End: 2021-07-12

## 2021-07-20 ENCOUNTER — PRE VISIT (OUTPATIENT)
Dept: OPHTHALMOLOGY | Facility: CLINIC | Age: 76
End: 2021-07-20

## 2021-07-20 ENCOUNTER — INFUSION THERAPY VISIT (OUTPATIENT)
Dept: INFUSION THERAPY | Facility: CLINIC | Age: 76
End: 2021-07-20
Payer: MEDICARE

## 2021-07-20 ENCOUNTER — OFFICE VISIT (OUTPATIENT)
Dept: OPHTHALMOLOGY | Facility: CLINIC | Age: 76
End: 2021-07-20
Attending: INTERNAL MEDICINE
Payer: MEDICARE

## 2021-07-20 VITALS
TEMPERATURE: 98.3 F | OXYGEN SATURATION: 95 % | DIASTOLIC BLOOD PRESSURE: 76 MMHG | SYSTOLIC BLOOD PRESSURE: 125 MMHG | HEART RATE: 74 BPM

## 2021-07-20 VITALS — HEIGHT: 67 IN | WEIGHT: 157 LBS | BODY MASS INDEX: 24.64 KG/M2

## 2021-07-20 DIAGNOSIS — J45.50 SEVERE PERSISTENT ASTHMA WITHOUT COMPLICATION (H): Primary | ICD-10-CM

## 2021-07-20 DIAGNOSIS — Z01.00 EYE EXAM, ROUTINE: ICD-10-CM

## 2021-07-20 DIAGNOSIS — H52.13 MYOPIC ASTIGMATISM OF BOTH EYES: ICD-10-CM

## 2021-07-20 DIAGNOSIS — Z96.1 PSEUDOPHAKIA, BOTH EYES: Primary | ICD-10-CM

## 2021-07-20 DIAGNOSIS — H52.4 PRESBYOPIA OF BOTH EYES: ICD-10-CM

## 2021-07-20 DIAGNOSIS — H04.123 DRY EYES, BILATERAL: ICD-10-CM

## 2021-07-20 DIAGNOSIS — H52.203 MYOPIC ASTIGMATISM OF BOTH EYES: ICD-10-CM

## 2021-07-20 PROCEDURE — 92015 DETERMINE REFRACTIVE STATE: CPT | Mod: GY | Performed by: OPHTHALMOLOGY

## 2021-07-20 PROCEDURE — 96372 THER/PROPH/DIAG INJ SC/IM: CPT

## 2021-07-20 PROCEDURE — 92004 COMPRE OPH EXAM NEW PT 1/>: CPT | Performed by: OPHTHALMOLOGY

## 2021-07-20 PROCEDURE — 250N000011 HC RX IP 250 OP 636

## 2021-07-20 RX ADMIN — BENRALIZUMAB 30 MG: 30 INJECTION, SOLUTION SUBCUTANEOUS at 08:43

## 2021-07-20 ASSESSMENT — REFRACTION_WEARINGRX
OD_AXIS: 165
OD_CYLINDER: +1.00
OS_SPHERE: -0.75
OD_SPHERE: PLANO
SPECS_TYPE: PAL
OS_ADD: +2.00
OS_CYLINDER: +1.00
OD_ADD: +2.00
OS_AXIS: 170

## 2021-07-20 ASSESSMENT — VISUAL ACUITY
CORRECTION_TYPE: GLASSES
OD_CC: J1
OS_CC: 20/40
OS_CC: J1
OS_CC+: -2
OD_CC+: +2
METHOD: SNELLEN - LINEAR
OD_PH_CC+: -2
OD_CC: 20/40
OD_PH_CC: 20/30

## 2021-07-20 ASSESSMENT — TONOMETRY
IOP_METHOD: TONOPEN
OD_IOP_MMHG: 17
OS_IOP_MMHG: 13

## 2021-07-20 ASSESSMENT — REFRACTION_MANIFEST
OD_ADD: +2.50
OS_AXIS: 005
OD_SPHERE: -0.75
OS_ADD: +2.50
OS_CYLINDER: +0.50
OS_SPHERE: -0.75
OD_AXIS: 165
OD_CYLINDER: +1.00

## 2021-07-20 ASSESSMENT — CONF VISUAL FIELD
OS_NORMAL: 1
OD_NORMAL: 1
METHOD: COUNTING FINGERS

## 2021-07-20 ASSESSMENT — MIFFLIN-ST. JEOR: SCORE: 1249.78

## 2021-07-20 ASSESSMENT — SLIT LAMP EXAM - LIDS
COMMENTS: MGD 1+
COMMENTS: MGD 1+

## 2021-07-20 ASSESSMENT — CUP TO DISC RATIO
OD_RATIO: 0.35
OS_RATIO: 0.3

## 2021-07-20 ASSESSMENT — EXTERNAL EXAM - LEFT EYE: OS_EXAM: NORMAL

## 2021-07-20 ASSESSMENT — PAIN SCALES - GENERAL: PAINLEVEL: NO PAIN (0)

## 2021-07-20 ASSESSMENT — EXTERNAL EXAM - RIGHT EYE: OD_EXAM: NORMAL

## 2021-07-20 NOTE — PROGRESS NOTES
HPI:  Niurka Cisneros is a 75 year old female who presents today for comprehensive eye exam.  She reports she has had borderline high sugars in the past but is not considered diabetic.  Patient reports she has noted a progressive decrease in her near vision.  When she reads things seem to start out clear then progressively get blurry after about an hour.  Denies irritation, eye pain, or tearing.    POHx: Pseudophakia both eyes, status-post YAG capsulotomy both eyes     Past medical history:   Past Medical History:   Diagnosis Date     Abdominal aortic aneurysm without rupture (H) 11/10/2015    [  ] repeat imaging due spring 2016 Descending aortic aneurysm.  Being monitored with serial angiogram      Aneurysm of right renal artery (H) 4/9/2019     Esophageal hypertension 1/25/2017     Essential hypertension 1/25/2017     Female bladder prolapse 11/10/2015     Hearing loss      Pulmonary nodules 1/25/2017     Seizures (H) 11/10/2015    Temporal seziure d/o.  Does not have LOC.  Has prodromal symptoms      Severe persistent asthma 11/10/2015    Since childhood.  Has been steroid dependent for many years. Has had cydney x2        Current Eye Medications: none  All:  quinolones      Assessment & Plan     (Z96.1) Pseudophakia, both eyes - Both Eyes  (primary encounter diagnosis)  (Z01.00) Eye exam, routine - Both Eyes  Comment: status-post YAG capsulotomy  Plan: follow    (H52.203,  H52.13) Myopic astigmatism of both eyes - Both Eyes  (H52.4) Presbyopia of both eyes - Both Eyes  Comment: small change, good visual acuity   Plan:      Manifest refraction done and prescription for glasses given     (H04.123) Dry eyes, bilateral - Both Eyes  Comment: symptomatic, no cornea signs  Plan: artificial tear drops four times a day as needed, recommend before reading and during breaks    Disposition:  Return in about 18 months (around 1/20/2023) for Comprehensive Exam.    ----------------------------------------------------------------------------------------------------    Complete documentation of historical and exam elements from today's encounter can be found in the full encounter summary report (not reduplicated in this progress note). I personally obtained the chief complaint(s) and history of present illness.  I confirmed and edited as necessary the review of systems, past medical/surgical history, family history, social history, and examination findings as documented by others.  I examined the patient myself, and I personally reviewed the relevant tests, images, and reports as documented above. I formulated and edited as necessary the assessment and plan and discussed the findings and management plan with the patient and family.     Yue Akins MD

## 2021-07-20 NOTE — LETTER
2021         RE: Niurka Cisneros  [address]  [place] [address]        Dear Colleague,    Thank you for referring your patient, Niurka Cisneros, to the [facility]. Please see a copy of my visit note below.    General assessment for IV and SQ medications    1. Elevated temperature, fever, chills,  or abnormal vital signs, night sweats, coughing up blood or sputum, no appetite or abnormal vital signs : NO had a cold but improving no fevers. Has asthma so has some shortness of breath/ baseline cough. Negative for COVID    2. Open wounds or new incisions: NO    3. Recent hospitalization: NO    4.  Recent surgeries:  NO    5. Any upcoming surgeries or dental procedures?:NO    6. Any current or recent bouts of illness or infection? On any antibiotics? : NO    7. Any new, sudden or worsening abdominal pain :NO    8. Vaccination within 4 weeks? Patient or someone in the household is scheduled to receive vaccination? No live virus vaccines prior to or during treatment :NO    9. Any nervous system diseases [i.e. multiple sclerosis, Guillain-[place], seizures, neurological  Changes]  Ask if on a biologic medication* : NO    10. Pregnant or breast feeding; or plans on pregnancy in the future: NO    11. Signs of worsening depression or suicidal ideations while taking benlysta:NO    12. New-onset medical symptoms: NO    13.  New cancer diagnosis or on chemotherapy or radiation NO    14.  Evaluate for any sign of active TB [Unexplained weight loss, Loss of appetite, Night sweats, Fever, Fatigue, Chills, Coughing for 3 weeks or longer, Hemoptysis (coughing up blood), Chest pain]: NO            **Note: If answered yes to any of the above, hold the infusion and contact ordering provider.          Patient presents to the [facility] for Fasenra injection.  Order written by Dr. Vega was completed today. Name and  verified with patient. See MAR for medication details.  Medication was divided into 1 syringes by pharmacy and given in the following sites back side of left upper arm. Patient tolerated injection well and was monitored for 30 minutes after administration. Patient was discharged to home. Covid swab declined today. Patient to return back in 8 weeks.    HUONG Cary LPN    Administrations This Visit     benralizumab (FASENRA) injection 30 mg     Admin Date  07/20/2021 Action  Given Dose  30 mg Route  Subcutaneous Administered By  Javed Cary LPN                      Again, thank you for allowing me to participate in the care of your patient.        Sincerely,        [facility] [place]

## 2021-07-20 NOTE — PROGRESS NOTES
Patient presents to the [facility] for Fasenra injection.  Order written by Dr. Vega was completed today. Name and  verified with patient. See MAR for medication details. Medication was divided into 1 syringes by pharmacy and given in the following sites back side of left upper arm. Patient tolerated injection well and was monitored for 30 minutes after administration. Patient was discharged to home. Covid swab declined today. Patient to return back in 8 weeks.    HUONG Cary LPN    Administrations This Visit     benralizumab (FASENRA) injection 30 mg     Admin Date  2021 Action  Given Dose  30 mg Route  Subcutaneous Administered By  Javed Cary LPN

## 2021-07-20 NOTE — PROGRESS NOTES
General assessment for IV and SQ medications    1. Elevated temperature, fever, chills,  or abnormal vital signs, night sweats, coughing up blood or sputum, no appetite or abnormal vital signs : NO had a cold but improving no fevers. Has asthma so has some shortness of breath/ baseline cough. Negative for COVID    2. Open wounds or new incisions: NO    3. Recent hospitalization: NO    4.  Recent surgeries:  NO    5. Any upcoming surgeries or dental procedures?:NO    6. Any current or recent bouts of illness or infection? On any antibiotics? : NO    7. Any new, sudden or worsening abdominal pain :NO    8. Vaccination within 4 weeks? Patient or someone in the household is scheduled to receive vaccination? No live virus vaccines prior to or during treatment :NO    9. Any nervous system diseases [i.e. multiple sclerosis, Guillain-[place], seizures, neurological  Changes]  Ask if on a biologic medication* : NO    10. Pregnant or breast feeding; or plans on pregnancy in the future: NO    11. Signs of worsening depression or suicidal ideations while taking benlysta:NO    12. New-onset medical symptoms: NO    13.  New cancer diagnosis or on chemotherapy or radiation NO    14.  Evaluate for any sign of active TB [Unexplained weight loss, Loss of appetite, Night sweats, Fever, Fatigue, Chills, Coughing for 3 weeks or longer, Hemoptysis (coughing up blood), Chest pain]: NO            **Note: If answered yes to any of the above, hold the infusion and contact ordering provider.

## 2021-07-20 NOTE — NURSING NOTE
Chief Complaints and History of Present Illnesses   Patient presents with     COMPREHENSIVE EYE EXAM     PMD recommended Annual exam.     Chief Complaint(s) and History of Present Illness(es)     COMPREHENSIVE EYE EXAM     Laterality: both eyes    Associated symptoms: Negative for dryness, eye pain, redness and tearing    Treatments tried: no treatments    Pain scale: 0/10    Comments: PMD recommended Annual exam.              Comments     Referred by Dr Combs. Over the past year reading not quite as clear. Notices after reading an hour vision gets blurry. Denies any dryness or tear issues noticed.     AJAY Wilson COT 10:29 AM July 20, 2021

## 2021-08-01 ENCOUNTER — MYC MEDICAL ADVICE (OUTPATIENT)
Dept: CARDIOLOGY | Facility: CLINIC | Age: 76
End: 2021-08-01

## 2021-08-02 NOTE — TELEPHONE ENCOUNTER
Will route to Maddie Turner regarding patient not receiving genetic report.       SIXTO Lujan August 2, 2021 9:35 AM

## 2021-08-09 DIAGNOSIS — J45.50 SEVERE PERSISTENT ASTHMA, UNSPECIFIED WHETHER COMPLICATED (H): ICD-10-CM

## 2021-08-09 RX ORDER — ALBUTEROL SULFATE 90 UG/1
2 AEROSOL, METERED RESPIRATORY (INHALATION) EVERY 6 HOURS PRN
Qty: 54 G | Refills: 3 | Status: SHIPPED | OUTPATIENT
Start: 2021-11-02 | End: 2021-11-15

## 2021-09-12 ENCOUNTER — MYC MEDICAL ADVICE (OUTPATIENT)
Dept: CARDIOLOGY | Facility: CLINIC | Age: 76
End: 2021-09-12

## 2021-09-12 DIAGNOSIS — I71.20 THORACIC AORTIC ANEURYSM WITHOUT RUPTURE (H): Primary | ICD-10-CM

## 2021-09-14 ENCOUNTER — ANCILLARY PROCEDURE (OUTPATIENT)
Dept: CT IMAGING | Facility: CLINIC | Age: 76
End: 2021-09-14
Attending: INTERNAL MEDICINE
Payer: MEDICARE

## 2021-09-14 ENCOUNTER — INFUSION THERAPY VISIT (OUTPATIENT)
Dept: INFUSION THERAPY | Facility: CLINIC | Age: 76
End: 2021-09-14
Attending: OPHTHALMOLOGY
Payer: MEDICARE

## 2021-09-14 VITALS
SYSTOLIC BLOOD PRESSURE: 139 MMHG | TEMPERATURE: 98.1 F | DIASTOLIC BLOOD PRESSURE: 87 MMHG | HEART RATE: 68 BPM | OXYGEN SATURATION: 97 %

## 2021-09-14 DIAGNOSIS — I71.20 THORACIC AORTIC ANEURYSM WITHOUT RUPTURE (H): ICD-10-CM

## 2021-09-14 DIAGNOSIS — J45.50 SEVERE PERSISTENT ASTHMA WITHOUT COMPLICATION (H): Primary | ICD-10-CM

## 2021-09-14 LAB
CREAT BLD-MCNC: 0.9 MG/DL (ref 0.5–1)
GFR SERPL CREATININE-BSD FRML MDRD: >60 ML/MIN/1.73M2

## 2021-09-14 PROCEDURE — 74174 CTA ABD&PLVS W/CONTRAST: CPT | Mod: MG | Performed by: RADIOLOGY

## 2021-09-14 PROCEDURE — 71275 CT ANGIOGRAPHY CHEST: CPT | Mod: MG | Performed by: RADIOLOGY

## 2021-09-14 PROCEDURE — G1004 CDSM NDSC: HCPCS | Mod: GC | Performed by: RADIOLOGY

## 2021-09-14 PROCEDURE — 96372 THER/PROPH/DIAG INJ SC/IM: CPT

## 2021-09-14 PROCEDURE — 250N000011 HC RX IP 250 OP 636

## 2021-09-14 RX ORDER — IOPAMIDOL 755 MG/ML
125 INJECTION, SOLUTION INTRAVASCULAR ONCE
Status: COMPLETED | OUTPATIENT
Start: 2021-09-14 | End: 2021-09-14

## 2021-09-14 RX ADMIN — IOPAMIDOL 125 ML: 755 INJECTION, SOLUTION INTRAVASCULAR at 12:07

## 2021-09-14 RX ADMIN — BENRALIZUMAB 30 MG: 30 INJECTION, SOLUTION SUBCUTANEOUS at 10:46

## 2021-09-14 ASSESSMENT — PAIN SCALES - GENERAL: PAINLEVEL: NO PAIN (0)

## 2021-09-14 NOTE — LETTER
2021         RE: Niurka Cisneros  [address]  [place] [address]        Dear Colleague,    Thank you for referring your patient, Niurka Cisneros, to the [facility]. Please see a copy of my visit note below.    General assessment for IV and SQ medications    1. Elevated temperature, fever, chills, productive cough or abnormal vital signs, night sweats, coughing up blood or sputum, no appetite or abnormal vital signs, SOB : NO    2. Open wounds or new incisions: NO    3. Recent hospitalization: NO    4.  Recent surgeries:  NO    5. Any upcoming surgeries or dental procedures?:NO    6. Any current or recent bouts of illness or infection? On any antibiotics? : NO    7. Any new, sudden or worsening abdominal pain :NO    8. Vaccination within 4 weeks? Patient or someone in the household is scheduled to receive vaccination? No live virus vaccines prior to or during treatment :NO    9. Any nervous system diseases [i.e. multiple sclerosis, Guillain-[place], seizures, neurological  Changes]  Ask if on a biologic medication* : NO    10. Pregnant or breast feeding; or plans on pregnancy in the future: NO    11. Signs of worsening depression or suicidal ideations while taking benlysta:NO    12. New-onset medical symptoms: NO    13.  New cancer diagnosis or on chemotherapy or radiation NO    14.  Evaluate for any sign of active TB [Unexplained weight loss, Loss of appetite, Night sweats, Fever, Fatigue, Chills, Coughing for 3 weeks or longer, Hemoptysis (coughing up blood), Chest pain]: NO    **Note: If answered yes to any of the above, hold the infusion and contact ordering provider.      Patient presents to the [facility] for Fasenra.  Order written by Dr. Vega  was completed today. Name and  verified with patient. See MAR for medication details. Medication was divided into 1 syringes by pharmacy and given in the following sites back side of upper left arm.    Patient was monitored for 30 minutes. Patient tolerated injection well and was discharged to home.  Patient is to return in 56 days.    Regina Lees RN              Patient presents to the [facility] for Fasenra injection.  Order written by Dr. Vega was completed today. Name and  verified with patient. See MAR for medication details. Medication was divided into 1 syringes by pharmacy and given in the following sites back side of left upper arm. Patient tolerated injection well and was monitored for 30 minutes after administration. Patient was discharged to home. Patient to return back in 8 weeks.    HUONG Cary LPN    Administrations This Visit     benralizumab (FASENRA) injection 30 mg     Admin Date  2021 Action  Given Dose  30 mg Route  Subcutaneous Administered By  Javed Cary LPN                    Again, thank you for allowing me to participate in the care of your patient.        Sincerely,        [facility] [place]

## 2021-09-14 NOTE — PROGRESS NOTES
Patient presents to the [facility] for Fasenra injection.  Order written by Dr. Vega was completed today. Name and  verified with patient. See MAR for medication details. Medication was divided into 1 syringes by pharmacy and given in the following sites back side of left upper arm. Patient tolerated injection well and was monitored for 30 minutes after administration. Patient was discharged to home. Patient to return back in 8 weeks.    HUONG Cary LPN    Administrations This Visit     benralizumab (FASENRA) injection 30 mg     Admin Date  2021 Action  Given Dose  30 mg Route  Subcutaneous Administered By  Javed Cary LPN

## 2021-09-14 NOTE — PROGRESS NOTES
General assessment for IV and SQ medications    1. Elevated temperature, fever, chills, productive cough or abnormal vital signs, night sweats, coughing up blood or sputum, no appetite or abnormal vital signs, SOB : NO    2. Open wounds or new incisions: NO    3. Recent hospitalization: NO    4.  Recent surgeries:  NO    5. Any upcoming surgeries or dental procedures?:NO    6. Any current or recent bouts of illness or infection? On any antibiotics? : NO    7. Any new, sudden or worsening abdominal pain :NO    8. Vaccination within 4 weeks? Patient or someone in the household is scheduled to receive vaccination? No live virus vaccines prior to or during treatment :NO    9. Any nervous system diseases [i.e. multiple sclerosis, Guillain-[place], seizures, neurological  Changes]  Ask if on a biologic medication* : NO    10. Pregnant or breast feeding; or plans on pregnancy in the future: NO    11. Signs of worsening depression or suicidal ideations while taking benlysta:NO    12. New-onset medical symptoms: NO    13.  New cancer diagnosis or on chemotherapy or radiation NO    14.  Evaluate for any sign of active TB [Unexplained weight loss, Loss of appetite, Night sweats, Fever, Fatigue, Chills, Coughing for 3 weeks or longer, Hemoptysis (coughing up blood), Chest pain]: NO    **Note: If answered yes to any of the above, hold the infusion and contact ordering provider.      Patient presents to the [facility] for Fasenra.  Order written by Dr. Vega  was completed today. Name and  verified with patient. See MAR for medication details. Medication was divided into 1 syringes by pharmacy and given in the following sites back side of upper left arm.   Patient was monitored for 30 minutes. Patient tolerated injection well and was discharged to home.  Patient is to return in 56 days.    Regina Lees RN

## 2021-09-16 ENCOUNTER — CARE COORDINATION (OUTPATIENT)
Dept: CARDIOLOGY | Facility: CLINIC | Age: 76
End: 2021-09-16

## 2021-09-16 NOTE — PROGRESS NOTES
CTA Chest/Abdomen/Pelvis 9/16/21:  Impression:  1. Dilated ascending thoracic aorta measuring 4.6 cm using centerline  measurements, previously measuring 4.5 cm.  2. Unchanged right renal artery aneurysm measuring 1.8 cm.        Last OV 1/22/21 w/ Dr. Wang:  ASSESSMENT/PLAN:      Niurka is a 75 year old with ascending aortic aneurysm 4.4 cm and saccular aneurysm of renal artery.       Differential included fibromuscular dysplasia, syndromic TAAD with associated primary arteriopathy (MFS, LDS, vEDS), or other type of familial TAAD. It was helpful today to have son on the visit because his findings of mitral regurgitation, aortic aneurysm raised concerns for a possible syndromic (MFS or Marfanoid) TAAD. All family members described flat feet and tall stature. However MFS and other well described syndromic TAAD was excluded with Roopa's genetic testing.      We also discussed genetic testing and counseling for non syndromic FTAAD, where the variable penetrance and expression of various mutations associated with FTAAD make a definitive diagnosis difficult. TGFBR2 (~5%), ACTA2 (~14%) and MYH11 (~1%) are known mutations that comprise up to 20% of positively tested cases. There is an 80% chance that no identifiable FTAAD mutation will be discovered in testing. Presumably given both children have aortic aneurysm there is autosomal dominance pattern in their aortopathy. This has implications for children.      Interestingly, the following genetic profile was discovered for Roopa.      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  A variant of unknown significance means that there is a genetic change in which we do not have enough information to determine if it is disease causing or not. Labs review published data, functional studies, presences in population databases, similarity to normal sequence, and computer prediction models.    The TNXB gene is expressed  in musculoskeletal, cardiac, and dermal tissues and is known to be associated with autosomal recessive (AR) classic-like Magdaleno Danlos Syndrome (EDS). This particular variant occurs in a position that is well conserved (meaning that it is not used to changes) and creates an amino acid with highly dissimilar properties.  Computer models predict this change will be tolerated.  TNXB haploinsufficiency has been identified in women with incomplete penetrance and is characterized by hypermobile EDS and chronic musculoskeletal pain.   Although suspicion is raisied, there is not enough current information to be certain if this variant alone is the cause of Roopa's aneurysm. Reviewed AR inheritance associated with this gene, meaning that two mutations are necessary to cause disease.  Since two mutations are required and Roopa only has one, this is unlikely to be the sole cause for her symptoms.  In addition, her children have a 50% chance of inheriting this variant but again it seems as though that would not be enough to cause full blown disease. At this time we do not recommend genetic testing in other family members because we cannot interpret the results and make predictions.     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     Plan:     1. Renal artery aneurysm:  will continue to monitor. Not at a point needing repair at this time and stable in size on surveillance imaging. Do not suspect it is altering any renal blood flow. We discussed CTA every few years with ultrasound in between. Will also monitor kidney function.     2. CT angiogram chest/abdomen/pelvis q6 months      3. I have encouraged son to obtain CMR in [place], to evaluate for LV  trabeculations and LVNC cardiomyopathy. If positive, I will screen Roopa and Katlyn for this. I have set him up with Dr. Moore in [place].     4. Continue current medications, unable to be on losartan due to side effects. SBP goal < 120/80 mmHg, HR < 60 bpm. On hydrochlorothiazide triampterene. Metoprolol 12.5 mg twice day. Transitioned to long acting. Watch for COPD and asthma exacerbation.      5. Follow up in 6 months      SIXTO Lujan September 16, 2021 10:25 AM

## 2021-09-17 NOTE — PROGRESS NOTES
Brandee Wang MD Cowling, Elizabeth, RN 1 hour ago (12:27 PM)     CF    Stable in size overall so next available is fine with me. You can reassure her that 0.1 cm change is usually not considered significant. Happy to see her and any family members whenever I am open!     Dr. Wang    Message text      Results and Dr. Wang's comments released to patient via [employer].

## 2021-10-04 ENCOUNTER — HEALTH MAINTENANCE LETTER (OUTPATIENT)
Age: 76
End: 2021-10-04

## 2021-10-11 DIAGNOSIS — J45.50 SEVERE PERSISTENT ASTHMA, UNSPECIFIED WHETHER COMPLICATED (H): ICD-10-CM

## 2021-11-09 ENCOUNTER — INFUSION THERAPY VISIT (OUTPATIENT)
Dept: INFUSION THERAPY | Facility: CLINIC | Age: 76
End: 2021-11-09
Attending: OPHTHALMOLOGY
Payer: MEDICARE

## 2021-11-09 VITALS
SYSTOLIC BLOOD PRESSURE: 118 MMHG | OXYGEN SATURATION: 96 % | HEART RATE: 77 BPM | TEMPERATURE: 97.5 F | DIASTOLIC BLOOD PRESSURE: 78 MMHG

## 2021-11-09 DIAGNOSIS — J45.909 ASTHMA: Primary | ICD-10-CM

## 2021-11-09 DIAGNOSIS — J45.50 SEVERE PERSISTENT ASTHMA WITHOUT COMPLICATION (H): Primary | ICD-10-CM

## 2021-11-09 LAB
DLCOUNC-%PRED-PRE: 92 %
DLCOUNC-PRE: 19.54 ML/MIN/MMHG
DLCOUNC-PRED: 21.19 ML/MIN/MMHG
ERV-%PRED-PRE: 28 %
ERV-PRE: 0.21 L
ERV-PRED: 0.74 L
EXPTIME-PRE: 7.46 SEC
FEF2575-%PRED-PRE: 40 %
FEF2575-PRE: 0.75 L/SEC
FEF2575-PRED: 1.85 L/SEC
FEFMAX-%PRED-PRE: 80 %
FEFMAX-PRE: 4.66 L/SEC
FEFMAX-PRED: 5.8 L/SEC
FEV1-%PRED-PRE: 65 %
FEV1-PRE: 1.52 L
FEV1FEV6-PRE: 61 %
FEV1FEV6-PRED: 79 %
FEV1FVC-PRE: 59 %
FEV1FVC-PRED: 77 %
FEV1SVC-PRE: 63 %
FEV1SVC-PRED: 69 %
FIFMAX-PRE: 3.94 L/SEC
FVC-%PRED-PRE: 84 %
FVC-PRE: 2.57 L
FVC-PRED: 3.02 L
IC-%PRED-PRE: 84 %
IC-PRE: 2.21 L
IC-PRED: 2.62 L
VA-%PRED-PRE: 83 %
VA-PRE: 4.42 L
VC-%PRED-PRE: 72 %
VC-PRE: 2.43 L
VC-PRED: 3.36 L

## 2021-11-09 PROCEDURE — 94375 RESPIRATORY FLOW VOLUME LOOP: CPT | Performed by: INTERNAL MEDICINE

## 2021-11-09 PROCEDURE — 96372 THER/PROPH/DIAG INJ SC/IM: CPT

## 2021-11-09 PROCEDURE — 250N000011 HC RX IP 250 OP 636

## 2021-11-09 PROCEDURE — 94729 DIFFUSING CAPACITY: CPT | Performed by: INTERNAL MEDICINE

## 2021-11-09 RX ADMIN — BENRALIZUMAB 30 MG: 30 INJECTION, SOLUTION SUBCUTANEOUS at 08:36

## 2021-11-09 ASSESSMENT — PAIN SCALES - GENERAL: PAINLEVEL: NO PAIN (0)

## 2021-11-09 NOTE — LETTER
2021         RE: Niurka Cisneros  [address]  [place] [address]        Dear Colleague,    Thank you for referring your patient, Niurka Cisneros, to the [facility]. Please see a copy of my visit note below.    General assessment for IV and SQ medications    1. Elevated temperature, fever, chills, productive cough or abnormal vital signs, night sweats, coughing up blood or sputum, no appetite or abnormal vital signs, SOB : NO    2. Open wounds or new incisions: NO    3. Recent hospitalization: NO    4.  Recent surgeries:  NO    5. Any upcoming surgeries or dental procedures?:NO    6. Any current or recent bouts of illness or infection? On any antibiotics? : NO    7. Any new, sudden or worsening abdominal pain :NO    8. Vaccination within 4 weeks? Patient or someone in the household is scheduled to receive vaccination? No live virus vaccines prior to or during treatment :NO    9. Any nervous system diseases [i.e. multiple sclerosis, Guillain-[place], seizures, neurological  Changes]  Ask if on a biologic medication* : NO    10. Pregnant or breast feeding; or plans on pregnancy in the future: NO    11. Signs of worsening depression or suicidal ideations while taking benlysta:NO    12. New-onset medical symptoms: NO    13.  New cancer diagnosis or on chemotherapy or radiation NO    14.  Evaluate for any sign of active TB [Unexplained weight loss, Loss of appetite, Night sweats, Fever, Fatigue, Chills, Coughing for 3 weeks or longer, Hemoptysis (coughing up blood), Chest pain]: NO    Patient presents to the [facility] for Fasenra injection.  Order written by   was completed today. Name and  verified with patient. See MAR for medication details. Medication was divided into 1 syringes by pharmacy and given in the following sites back side of left upper arm. Patient tolerated injection well and was monitored for 30 minutes after administration.  Patient was discharged to home. Patient to return back in 8 weeks. Declined covid swab today.    HUONG Cary LPN    Administrations This Visit     benralizumab (FASENRA) injection 30 mg     Admin Date  11/09/2021 Action  Given Dose  30 mg Route  Subcutaneous Administered By  Javed Cary LPN                    Again, thank you for allowing me to participate in the care of your patient.        Sincerely,        [facility]

## 2021-11-09 NOTE — PROGRESS NOTES
General assessment for IV and SQ medications    1. Elevated temperature, fever, chills, productive cough or abnormal vital signs, night sweats, coughing up blood or sputum, no appetite or abnormal vital signs, SOB : NO    2. Open wounds or new incisions: NO    3. Recent hospitalization: NO    4.  Recent surgeries:  NO    5. Any upcoming surgeries or dental procedures?:NO    6. Any current or recent bouts of illness or infection? On any antibiotics? : NO    7. Any new, sudden or worsening abdominal pain :NO    8. Vaccination within 4 weeks? Patient or someone in the household is scheduled to receive vaccination? No live virus vaccines prior to or during treatment :NO    9. Any nervous system diseases [i.e. multiple sclerosis, Guillain-[place], seizures, neurological  Changes]  Ask if on a biologic medication* : NO    10. Pregnant or breast feeding; or plans on pregnancy in the future: NO    11. Signs of worsening depression or suicidal ideations while taking benlysta:NO    12. New-onset medical symptoms: NO    13.  New cancer diagnosis or on chemotherapy or radiation NO    14.  Evaluate for any sign of active TB [Unexplained weight loss, Loss of appetite, Night sweats, Fever, Fatigue, Chills, Coughing for 3 weeks or longer, Hemoptysis (coughing up blood), Chest pain]: NO

## 2021-11-09 NOTE — PROGRESS NOTES
Patient presents to the [facility] for Fasenra injection.  Order written by   was completed today. Name and  verified with patient. See MAR for medication details. Medication was divided into 1 syringes by pharmacy and given in the following sites back side of left upper arm. Patient tolerated injection well and was monitored for 30 minutes after administration. Patient was discharged to home. Patient to return back in 8 weeks. Declined covid swab today.    HUONG Cary LPN    Administrations This Visit     benralizumab (FASENRA) injection 30 mg     Admin Date  2021 Action  Given Dose  30 mg Route  Subcutaneous Administered By  Javed Cary LPN

## 2021-11-15 ENCOUNTER — OFFICE VISIT (OUTPATIENT)
Dept: PULMONOLOGY | Facility: CLINIC | Age: 76
End: 2021-11-15
Payer: MEDICARE

## 2021-11-15 VITALS — SYSTOLIC BLOOD PRESSURE: 124 MMHG | HEART RATE: 78 BPM | OXYGEN SATURATION: 96 % | DIASTOLIC BLOOD PRESSURE: 82 MMHG

## 2021-11-15 DIAGNOSIS — J45.50 SEVERE PERSISTENT ASTHMA, UNSPECIFIED WHETHER COMPLICATED (H): ICD-10-CM

## 2021-11-15 PROCEDURE — 99214 OFFICE O/P EST MOD 30 MIN: CPT

## 2021-11-15 PROCEDURE — G0463 HOSPITAL OUTPT CLINIC VISIT: HCPCS

## 2021-11-15 RX ORDER — ALBUTEROL SULFATE 90 UG/1
2 AEROSOL, METERED RESPIRATORY (INHALATION) EVERY 6 HOURS PRN
Qty: 54 G | Refills: 3 | Status: SHIPPED | OUTPATIENT
Start: 2021-11-15 | End: 2023-02-27

## 2021-11-15 RX ORDER — MONTELUKAST SODIUM 10 MG/1
10 TABLET ORAL AT BEDTIME
Qty: 90 TABLET | Refills: 3 | Status: SHIPPED | OUTPATIENT
Start: 2021-11-15 | End: 2023-09-11

## 2021-11-15 NOTE — NURSING NOTE
Chief Complaint   Patient presents with     General Visit     follow up     Vitals were taken and medications were reconciled.     MICHELLE Cruz

## 2021-11-15 NOTE — PROGRESS NOTES
[facility]  Pulmonary Medicine  Visit Clinic Note  November 15, 2021         ASSESSMENT & PLAN       Severe persistent asthma with eosinophilia: Over the last year, she has had good symptom control.  I will continue all of her current medications.  Her airflow obstruction is not completely reversed, so there is some element of chronic obstructive pulmonary disease as well.  However most of her inhaler therapy is all and towards asthma.  She should continue Wixela, albuterol, and Fasenra.  She can continue theophylline as this has been a longtime medication for her.    RTC in 1 year       Rodney Vega MD          Today's visit note:     Chief Complaint: Niurka Cisneros is a 76 year old year old female who is being seen for asthma/copd    HISTORY OF PRESENT ILLNESS:    Her asthma symptoms have been under good control for the most part over the last year.  There is one time in March when she got a cold and took about 3 days of prednisone.  Other than that, she has not required any other prednisone, antibiotics, emergency department visits or hospitalizations.  She continues to use Wixela twice a day.  She takes albuterol on a scheduled basis prior to each Wixela dose.  She thinks she might take an as needed dose of albuterol every other day or so.  She is participating in a severe asthma research project in [facility].         Past Medical and Surgical History:     Past Medical History:   Diagnosis Date     Abdominal aortic aneurysm without rupture (H) 11/10/2015    [  ] repeat imaging due spring 2016 Descending aortic aneurysm.  Being monitored with serial angiogram      Aneurysm of right renal artery (H) 4/9/2019     Esophageal hypertension 1/25/2017     Essential hypertension 1/25/2017     Female bladder prolapse 11/10/2015     Hearing loss      Pulmonary nodules 1/25/2017     Seizures (H) 11/10/2015    Temporal seziure d/o.  Does not have LOC.  Has prodromal symptoms      Severe  "persistent asthma 11/10/2015    Since childhood.  Has been steroid dependent for many years. Has had cydney x2       Past Surgical History:   Procedure Laterality Date     CATARACT IOL, RT/LT Bilateral      COLONOSCOPY N/A 2019    Procedure: COLONOSCOPY;  Surgeon: Haim Burgos MD;  Location: [facility] CATARACT SURGICAL PACKAGE       HYSTERECTOMY      fibroids     JOINT REPLACEMENT Left      NISSEN FUNDOPLICATION      x2           Family History:     Family History   Problem Relation Age of Onset     Dementia Mother      Heart Failure Mother      Hypertension Mother      Breast Cancer Mother         post menopausal     Asthma Sister         x2     Depression Sister      Cancer Father         prostate cancer     Hypertension Father      Prostate Cancer Father          of it at 82     Asthma Father         \"outgrew\" it     Schizophrenia Maternal Grandmother      Coronary Artery Disease Maternal Grandfather         he was diabetic and smoked     Coronary Artery Disease Sister         MI when being ventilated for asthma     Asthma Sister         both sisters with asthma, one severe     Other - See Comments Daughter         Enlarged Aorta              Social History:     Social History     Socioeconomic History     Marital status:      Spouse name: Not on file     Number of children: Not on file     Years of education: 20     Highest education level: Not on file   Occupational History     Occupation: pediatrician   Tobacco Use     Smoking status: Never Smoker     Smokeless tobacco: Never Used   Substance and Sexual Activity     Alcohol use: Yes     Alcohol/week: 0.0 standard drinks     Comment: 1 glass of wine with dinner     Drug use: No     Sexual activity: Not on file   Other Topics Concern     Parent/sibling w/ CABG, MI or angioplasty before 65F 55M? 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     Social Determinants of Health     Financial " Resource Strain: Not on file   Food Insecurity: Not on file   Transportation Needs: Not on file   Physical Activity: Not on file   Stress: Not on file   Social Connections: Not on file   Intimate Partner Violence: Not on file   Housing Stability: Not on file            Medications:     Current Outpatient Medications   Medication     albuterol (PROAIR HFA/PROVENTIL HFA/VENTOLIN HFA) 108 (90 Base) MCG/ACT inhaler     CALCIUM-VITAMIN D PO     carBAMazepine (TEGRETOL XR) 200 MG 12 hr tablet     estradiol (ESTRING) 2 MG vaginal ring     fluticasone-salmeterol (WIXELA INHUB) 500-50 MCG/DOSE inhaler     magnesium 250 MG tablet     metoprolol succinate ER (TOPROL-XL) 50 MG 24 hr tablet     montelukast (SINGULAIR) 10 MG tablet     NAPROXEN PO     potassium chloride ER (KLOR-CON M) 20 MEQ CR tablet     pravastatin (PRAVACHOL) 20 MG tablet     SHINGRIX injection     theophylline (DENIA-24) 400 MG 24 hr capsule     triamterene-HCTZ (MAXZIDE) 75-50 MG tablet     Current Facility-Administered Medications   Medication     lidocaine (PF) (XYLOCAINE) 1 % injection 2 mL     triamcinolone (KENALOG-40) injection 20 mg            Review of Systems:       A complete review of systems was otherwise negative except as noted in the HPI.      PHYSICAL EXAM:  /82   Pulse 78   LMP  (LMP Unknown)   SpO2 96%      General: Pleasant, no apparent distress  Eyes: Anicteric  Ears: Hearing grossly normal  Neck: supple  Respiratory: Clear to auscultation bilaterally.  No accessory muscle use.  Normal breathing pattern  Musculoskeletal: Chronic fracture of the left collarbone  Skin: No obvious rashes.  No peripheral edema.  Extremities: No cyanosis or clubbing  Neuro: Normal mentation. Normal speech.  Psych:Normal affect           Data:   All laboratory and imaging data reviewed.      Absolute eosinophils in May 2016 were 500    PFT:       PFT Interpretation:  Moderate airflow obstruction.  Valid Maneuver

## 2022-01-04 ENCOUNTER — INFUSION THERAPY VISIT (OUTPATIENT)
Dept: INFUSION THERAPY | Facility: CLINIC | Age: 77
End: 2022-01-04
Payer: MEDICARE

## 2022-01-04 VITALS
HEART RATE: 66 BPM | DIASTOLIC BLOOD PRESSURE: 79 MMHG | TEMPERATURE: 97.6 F | SYSTOLIC BLOOD PRESSURE: 130 MMHG | RESPIRATION RATE: 16 BRPM

## 2022-01-04 DIAGNOSIS — J45.50 SEVERE PERSISTENT ASTHMA WITHOUT COMPLICATION (H): Primary | ICD-10-CM

## 2022-01-04 DIAGNOSIS — Z20.822 ENCOUNTER FOR LABORATORY TESTING FOR COVID-19 VIRUS: ICD-10-CM

## 2022-01-04 LAB — SARS-COV-2 RNA RESP QL NAA+PROBE: NEGATIVE

## 2022-01-04 PROCEDURE — U0003 INFECTIOUS AGENT DETECTION BY NUCLEIC ACID (DNA OR RNA); SEVERE ACUTE RESPIRATORY SYNDROME CORONAVIRUS 2 (SARS-COV-2) (CORONAVIRUS DISEASE [COVID-19]), AMPLIFIED PROBE TECHNIQUE, MAKING USE OF HIGH THROUGHPUT TECHNOLOGIES AS DESCRIBED BY CMS-2020-01-R: HCPCS

## 2022-01-04 PROCEDURE — 96372 THER/PROPH/DIAG INJ SC/IM: CPT

## 2022-01-04 PROCEDURE — 250N000011 HC RX IP 250 OP 636

## 2022-01-04 RX ADMIN — BENRALIZUMAB 30 MG: 30 INJECTION, SOLUTION SUBCUTANEOUS at 08:42

## 2022-01-04 NOTE — LETTER
2022         RE: Niurka Cisneros  [address]  [place] [address]        Dear Colleague,    Thank you for referring your patient, Niurka Cisneros, to the [facility]. Please see a copy of my visit note below.    Patient presents to the [facility] for Fasenra injection.  Order written by   was completed today. Name and  verified with patient. See MAR for medication details. Medication was divided into 1 syringes by pharmacy and given in the following sites back side of left upper arm. Patient tolerated injection well and was monitored for 30 minutes after administration. Patient was discharged to home. Patient to return back in 8 weeks. Patient requested covid swab today.    Leela Chan RN

## 2022-01-04 NOTE — PROGRESS NOTES
History & Physical     SUBJECTIVE:      History of Present Illness:  Patient is a 49 y.o. female presents with symptomatic cholelithiasis for years increasing in pain recently with bloating.          Chief Complaint   Patient presents with    Cholelithiasis    Consult              Review of patient's allergies indicates:   Allergen Reactions    Amlodipine Swelling                Current Outpatient Medications   Medication Sig Dispense Refill    albuterol 90 mcg/actuation inhaler Inhale 2 puffs into the lungs every 6 (six) hours as needed for Wheezing or Shortness of Breath. Rescue 18 g 0    aspirin (ECOTRIN) 81 MG EC tablet Take 1 tablet (81 mg total) by mouth once daily. 90 tablet 3    atorvastatin (LIPITOR) 80 MG tablet Take 1 tablet (80 mg total) by mouth once daily. 90 tablet 3    azelastine (ASTELIN) 137 mcg (0.1 %) nasal spray 2 sprays (274 mcg total) by Nasal route 2 (two) times daily. 30 mL 3    cilostazol (PLETAL) 50 MG Tab Take 1 tablet (50 mg total) by mouth 2 (two) times daily. 180 tablet 2    furosemide (LASIX) 40 MG tablet Take 1 tablet (40 mg total) by mouth once daily. 90 tablet 3    levocetirizine (XYZAL) 5 MG tablet Take 1 tablet (5 mg total) by mouth every evening. 90 tablet 3    losartan (COZAAR) 100 MG tablet Take 1 tablet (100 mg total) by mouth once daily. 90 tablet 3    metoclopramide HCl (REGLAN) 10 MG tablet Take 1 tablet (10 mg total) by mouth every 6 (six) hours as needed. 30 tablet 0    metoprolol tartrate (LOPRESSOR) 100 MG tablet Take 1 tablet (100 mg total) by mouth 2 (two) times daily. 180 tablet 3    montelukast (SINGULAIR) 10 mg tablet Take 1 tablet (10 mg total) by mouth nightly. 90 tablet 3    nitroGLYCERIN (NITROSTAT) 0.4 MG SL tablet Place 1 tablet (0.4 mg total) under the tongue every 5 (five) minutes as needed for Chest pain. 30 tablet 3    pantoprazole (PROTONIX) 40 MG tablet TAKE 1 TABLET(40 MG) BY MOUTH TWICE DAILY 180 tablet 0    paroxetine (PAXIL) 20 MG  Patient presents to the [facility] for Fasenra injection.  Order written by   was completed today. Name and  verified with patient. See MAR for medication details. Medication was divided into 1 syringes by pharmacy and given in the following sites back side of left upper arm. Patient tolerated injection well and was monitored for 30 minutes after administration. Patient was discharged to home. Patient to return back in 8 weeks. Patient requested covid swab today.    Leela Chan RN     "tablet Take 1 tablet (20 mg total) by mouth once daily. 30 tablet 2      No current facility-administered medications for this visit.               Past Medical History:   Diagnosis Date    Anticoagulant long-term use       off since 2-5-16    Arthritis      Coronary artery disease 2013     History MI, 4 stents (2 heart, 2 Rt leg)    Hypertension      Vaginal delivery       x3            Past Surgical History:   Procedure Laterality Date    BREAST BIOPSY   2016    CARDIAC SURGERY         Coronary stents, Leg stents    TUBAL LIGATION        ULTRASOUND GUIDED BREAST BX Right 2/12/2016     Performed by [facility] at [facility] OR            Family History   Problem Relation Age of Onset    Breast cancer Mother      Heart attacks under age 50 Brother 35    Colon cancer Maternal Aunt        Social History            Tobacco Use    Smoking status: Current Every Day Smoker       Packs/day: 0.30       Years: 20.00       Pack years: 6.00       Types: Vaping with nicotine, Cigarettes    Smokeless tobacco: Never Used   Substance Use Topics    Alcohol use: Yes       Comment: 0ccasionally    Drug use: No         Review of Systems:  Review of Systems   Constitutional: Negative.  Negative for fever.   HENT: Negative.    Eyes: Negative.    Respiratory: Negative.    Cardiovascular: Negative.    Gastrointestinal: Positive for abdominal pain, constipation, nausea and vomiting. Negative for diarrhea.   Endocrine: Negative.    Genitourinary: Negative for dysuria, frequency and hematuria.   Musculoskeletal: Negative.  Negative for myalgias.   Skin: Negative.    Allergic/Immunologic: Negative.    Neurological: Negative.  Negative for headaches.   Hematological: Negative.    Psychiatric/Behavioral: Negative.    All other systems reviewed and are negative.        OBJECTIVE:      Vital Signs (Most Recent)  Pulse: 81 (07/15/19 1528)  BP: (!) 168/94 (07/15/19 1528)  5' 2" (1.575 m)  81.6 kg (179 lb 14.3 oz) "      Physical Exam:  Physical Exam   Constitutional: She is oriented to person, place, and time. She appears well-developed and well-nourished.   HENT:   Head: Normocephalic and atraumatic.   Right Ear: External ear normal.   Left Ear: External ear normal.   Nose: Nose normal.   Mouth/Throat: Oropharynx is clear and moist.   Eyes: Pupils are equal, round, and reactive to light. Conjunctivae and EOM are normal.   Neck: Normal range of motion. Neck supple.   Cardiovascular: Normal rate, regular rhythm, normal heart sounds and intact distal pulses.   Pulmonary/Chest: Effort normal and breath sounds normal.   Abdominal: Soft. Bowel sounds are normal.   Musculoskeletal: Normal range of motion.   Neurological: She is alert and oriented to person, place, and time. She has normal reflexes.   Skin: Skin is warm and dry.   Psychiatric: She has a normal mood and affect. Her behavior is normal. Thought content normal.   Vitals reviewed.        Laboratory  none     Diagnostic Results:  CT scan with large stone     ASSESSMENT/PLAN:      Symptomatic cholelithiasis     PLAN:Plan      To the OR for lap darien with the risks and possible complications explained and she agrees to proceed

## 2022-01-11 ENCOUNTER — OFFICE VISIT (OUTPATIENT)
Dept: CARDIOLOGY | Facility: CLINIC | Age: 77
End: 2022-01-11
Payer: MEDICARE

## 2022-01-11 VITALS
DIASTOLIC BLOOD PRESSURE: 80 MMHG | WEIGHT: 164 LBS | HEIGHT: 67 IN | SYSTOLIC BLOOD PRESSURE: 147 MMHG | HEART RATE: 87 BPM | BODY MASS INDEX: 25.74 KG/M2

## 2022-01-11 DIAGNOSIS — I71.21 ASCENDING AORTIC ANEURYSM (H): ICD-10-CM

## 2022-01-11 DIAGNOSIS — I10 BENIGN ESSENTIAL HYPERTENSION: ICD-10-CM

## 2022-01-11 DIAGNOSIS — I71.20 THORACIC AORTIC ANEURYSM WITHOUT RUPTURE (H): ICD-10-CM

## 2022-01-11 PROCEDURE — 99214 OFFICE O/P EST MOD 30 MIN: CPT | Performed by: INTERNAL MEDICINE

## 2022-01-11 RX ORDER — METOPROLOL SUCCINATE 50 MG/1
75 TABLET, EXTENDED RELEASE ORAL DAILY
Qty: 90 TABLET | Refills: 3 | Status: SHIPPED | OUTPATIENT
Start: 2022-01-11 | End: 2022-01-17

## 2022-01-11 ASSESSMENT — MIFFLIN-ST. JEOR: SCORE: 1276.53

## 2022-01-11 NOTE — PATIENT INSTRUCTIONS
1. CT Angiogram in 12 months  2. Echocardiogram in 12 months  3. Follow up with Dr. Wang in one year

## 2022-01-11 NOTE — PROGRESS NOTES
"      Vascular Cardiology Consultation Follow Up      HPI: This is a 76 year old female here for follow up visit for thoracic aortic aneurysm. Has a PMH of bladder prolapse, HTN, seizures, asthma and a thoracic aneurysm (descending) measuring 4.4 cm.     Prior History:      Reviewed her history in detail: grandmother with aneurysm, at 89 years old, possible rupture and inoperable, leading to death. She has had 2 kids normal vaginal delivery who are healthy in their 40s. She has had 1 miscarriage. H/o bladder prolapse. Mild hypermobility, chronic back pain from scoliosis. Denies abnormal wound healing or bruising/bleeding, hernias, translucent skin, dental crowding, palatal deformities or chest wall deformities.      Thoracic aneurysm had been picked up 5 years prior to visit and she had no scanning since so we obtained echo imaging which documented stability at 4.4 cm. Had not yet had her CTA C/A/P to confirm size.      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 She reports h/o HTN for 10 years, controlled, on 2 drug regimen. Had been on losartan and cannot take beta blocker due to asthma. She recently had to go off of losartan due to side effects. Could also not tolerate ACEI.      Since last visit, her daughter has been screened and has a mild aneurysm. She is 74 year olds and is seeing me along side her mother on today's virtual visit. Son is also on virtual call, from [facility] and had an echocardiogram screen that demonstrated moderate mitral regurgitation, prominent LV trabeculations and mild aortic dilation. He has not yet had a cardiac MRI. All children have hypermobile joints and tall stature. Son is 6'2\".      Niurka denies chest pain, back pain, fevers, chills, ns, LE edema, orthopnea, PND. SBP has been well controlled in the 130s-140s. HR in the 60s. We are reviewing today her genetic testing results.     Interval: son has a " cardiomyopathy with EF 38% and had CMR which showed dilated heart. Aorta was normal.         ASSESSMENT/PLAN:      Niurka is a 76 year old with ascending aortic aneurysm 4.4 cm and saccular aneurysm of renal artery.       Differential included fibromuscular dysplasia, syndromic TAAD with associated primary arteriopathy (MFS, LDS, vEDS), or other type of familial TAAD. It was helpful today to have son on the visit because his findings of mitral regurgitation, aortic aneurysm raised concerns for a possible syndromic (MFS or Marfanoid) TAAD. All family members described flat feet and tall stature. However MFS and other well described syndromic TAAD was excluded with [facility]'s genetic testing.      We also discussed genetic testing and counseling for non syndromic FTAAD, where the variable penetrance and expression of various mutations associated with FTAAD make a definitive diagnosis difficult. TGFBR2 (~5%), ACTA2 (~14%) and MYH11 (~1%) are known mutations that comprise up to 20% of positively tested cases. There is an 80% chance that no identifiable FTAAD mutation will be discovered in testing. Presumably given both children have aortic aneurysm there is autosomal dominance pattern in their aortopathy. This has implications for children.      Interestingly, the following genetic profile was discovered for Roopa.      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  A variant of unknown significance means that there is a genetic change in which we do not have enough information to determine if it is disease causing or not. Labs review published data, functional studies, presences in population databases, similarity to normal sequence, and computer prediction models. The TNXB gene is expressed in musculoskeletal, cardiac, and dermal tissues and is known to be associated with autosomal recessive (AR) classic-like Magdaleno Danlos Syndrome (EDS). This particular  variant occurs in a position that is well conserved (meaning that it is not used to changes) and creates an amino acid with highly dissimilar properties.  Computer models predict this change will be tolerated.  TNXB haploinsufficiency has been identified in women with incomplete penetrance and is characterized by hypermobile EDS and chronic musculoskeletal pain. Although suspicion is raisied, there is not enough current information to be certain if this variant alone is the cause of Roopa's aneurysm. Reviewed AR inheritance associated with this gene, meaning that two mutations are necessary to cause disease.  Since two mutations are required and Roopa only has one, this is unlikely to be the sole cause for her symptoms.  In addition, her children have a 50% chance of inheriting this variant but again it seems as though that would not be enough to cause full blown disease. At this time we do not recommend genetic testing in other family members because we cannot interpret the results and make predictions.     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     Plan:     1. Renal artery aneurysm:  will continue to monitor. Not at a point needing repair at this time and stable in size on surveillance imaging. Do not suspect it is altering any renal blood flow. We discussed CTA every few years with ultrasound in between. Will also monitor kidney function.     2. CT angiogram chest/abdomen/pelvis in 12 months with echocardiogram      3. I have encouraged son to obtain CMR in [place], to evaluate for LV trabeculations and LVNC cardiomyopathy. If positive, I will screen Roopa and Katlyn for this. I have recommended Dr. Moore in [place].     4.  Continue current medications, unable to be on losartan due to side effects. SBP goal < 120/80 mmHg, HR < 60 bpm. On hydrochlorothiazide triampterene. Metoprolol 12.5 mg twice day. Transitioned to long acting. Watch for COPD and asthma exacerbation.      5. Follow up in 12 months     Brandee Wang MD MSc  M formerly [facility]       PAST MEDICAL HISTORY  Past Medical History:   Diagnosis Date     Abdominal aortic aneurysm without rupture (H) 11/10/2015    [  ] repeat imaging due spring 2016 Descending aortic aneurysm.  Being monitored with serial angiogram      Aneurysm of right renal artery (H) 4/9/2019     Esophageal hypertension 1/25/2017     Essential hypertension 1/25/2017     Female bladder prolapse 11/10/2015     Hearing loss      Pulmonary nodules 1/25/2017     Seizures (H) 11/10/2015    Temporal seziure d/o.  Does not have LOC.  Has prodromal symptoms      Severe persistent asthma 11/10/2015    Since childhood.  Has been steroid dependent for many years. Has had cydney x2         CURRENT MEDICATIONS  Current Outpatient Medications   Medication Sig Dispense Refill     albuterol (PROAIR HFA/PROVENTIL HFA/VENTOLIN HFA) 108 (90 Base) MCG/ACT inhaler Inhale 2 puffs into the lungs every 6 hours as needed for shortness of breath / dyspnea or wheezing 54 g 3     CALCIUM-VITAMIN D PO Take 1 tablet by mouth daily       carBAMazepine (TEGRETOL XR) 200 MG 12 hr tablet Take 1 tablet (200 mg) by mouth daily as needed (seizure) 30 tablet 1     estradiol (ESTRING) 2 MG vaginal ring Place 1 each vaginally every 3 months 1 each 3     fluticasone-salmeterol (WIXELA INHUB) 500-50 MCG/DOSE inhaler Inhale 1 puff into the lungs every 12 hours 180 each 3     magnesium 250 MG tablet Take 1 tablet by mouth daily       metoprolol succinate ER (TOPROL-XL) 50 MG 24 hr tablet Take 1 tablet (50 mg) by mouth daily 90 tablet 3     montelukast (SINGULAIR) 10 MG tablet Take 1 tablet (10 mg) by mouth At Bedtime 90 tablet 3     NAPROXEN PO  "Take 220 mg by mouth as needed        potassium chloride ER (KLOR-CON M) 20 MEQ CR tablet Take 1 tablet (20 mEq) by mouth daily 90 tablet 3     pravastatin (PRAVACHOL) 20 MG tablet Take 1 tablet (20 mg) by mouth daily 90 tablet 3     SHINGRIX injection        theophylline (DENIA-24) 400 MG 24 hr capsule Take 1 capsule (400 mg) by mouth daily 90 capsule 3     triamterene-HCTZ (MAXZIDE) 75-50 MG tablet Take 1 tablet by mouth daily 90 tablet 3       PAST SURGICAL HISTORY:  Past Surgical History:   Procedure Laterality Date     CATARACT IOL, RT/LT Bilateral      COLONOSCOPY N/A 2019    Procedure: COLONOSCOPY;  Surgeon: Haim Burgos MD;  Location:  GI     H CATARACT SURGICAL PACKAGE       HYSTERECTOMY      fibroids     JOINT REPLACEMENT Left      NISSEN FUNDOPLICATION      x2       ALLERGIES     Allergies   Allergen Reactions     Quinolones        FAMILY HISTORY  Family History   Problem Relation Age of Onset     Dementia Mother      Heart Failure Mother      Hypertension Mother      Breast Cancer Mother         post menopausal     Asthma Sister         x2     Depression Sister      Cancer Father         prostate cancer     Hypertension Father      Prostate Cancer Father          of it at 82     Asthma Father         \"outgrew\" it     Schizophrenia Maternal Grandmother      Coronary Artery Disease Maternal Grandfather         he was diabetic and smoked     Coronary Artery Disease Sister         MI when being ventilated for asthma     Asthma Sister         both sisters with asthma, one severe     Other - See Comments Daughter         Enlarged Aorta     SOCIAL HISTORY  Social History     Socioeconomic History     Marital status:      Spouse name: Not on file     Number of children: Not on file     Years of education: 20     Highest education level: Not on file   Occupational History     Occupation: pediatrician   Tobacco Use     Smoking status: Never Smoker     Smokeless tobacco: Never Used " "  Substance and Sexual Activity     Alcohol use: Yes     Alcohol/week: 0.0 standard drinks     Comment: 1 glass of wine with dinner     Drug use: No     Sexual activity: Not on file   Other Topics Concern     Parent/sibling w/ CABG, MI or angioplasty before 65F 55M? 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     Social Determinants of Health     Financial Resource Strain: Not on file   Food Insecurity: Not on file   Transportation Needs: Not on file   Physical Activity: Not on file   Stress: Not on file   Social Connections: Not on file   Intimate Partner Violence: Not on file   Housing Stability: Not on file       EXAM:  BP (!) 147/80   Pulse 87   Ht 1.702 m (5' 7\")   Wt 74.4 kg (164 lb)   LMP  (LMP Unknown)   BMI 25.69 kg/m    In general, the patient is a pleasant female in no apparent distress.    HEENT: NC/AT.  PERRLA.  EOMI.  Sclerae white, not injected  Neck: No adenopathy.  No thyromegaly. Carotids +2/2 bilaterally without bruits.  No jugular venous distension.   Heart: RRR. Normal S1, S2 splits physiologically. No murmur, rub, click, or gallop. T  Lungs: CTA.  No ronchi, wheezes, rales.  No dullness to percussion.   Abdomen: Soft, nontender, nondistended.  Extremities: No clubbing, cyanosis, or edema.   Vascular: No bruits    Labs:  LIPID RESULTS:  Lab Results   Component Value Date    CHOL 182 02/12/2021    HDL 63 02/12/2021    LDL 96 02/12/2021    TRIG 115 02/12/2021    CHOLHDLRATIO 3.0 11/10/2015    NHDL 119 02/12/2021       LIVER ENZYME RESULTS:  No results found for: AST, ALT    CBC RESULTS:  Lab Results   Component Value Date    WBC 6.4 05/16/2016    RBC 4.56 05/16/2016    HGB 12.7 05/16/2016    HCT 38.0 05/16/2016    MCV 83 05/16/2016    MCH 27.9 05/16/2016    MCHC 33.4 05/16/2016    RDW 13.5 05/16/2016     05/16/2016       BMP RESULTS:  Lab Results   Component Value Date     02/12/2021    POTASSIUM 3.7 02/12/2021    CHLORIDE 105 " 02/12/2021    CO2 31 02/12/2021    ANIONGAP 5 02/12/2021    GLC 97 02/12/2021    BUN 14 02/12/2021    CR 0.9 09/14/2021    CR 0.76 02/12/2021    GFRESTIMATED >60 09/14/2021    GFRESTIMATED 78 02/12/2021    GFRESTBLACK >90 02/12/2021    ZACH 9.5 02/12/2021        A1C RESULTS:  Lab Results   Component Value Date    A1C 5.4 02/12/2021

## 2022-01-11 NOTE — LETTER
1/11/2022    Mara Combs MD  [address]  [facility] [address]    RE: Niurka JOE Cisneros       Dear Colleague,     I had the pleasure of seeing Niurka A Blayne in the [facility].        Vascular Cardiology Consultation Follow Up      HPI: This is a 76 year old female here for follow up visit for thoracic aortic aneurysm. Has a PMH of bladder prolapse, HTN, seizures, asthma and a thoracic aneurysm (descending) measuring 4.4 cm.     Prior History:      Reviewed her history in detail: grandmother with aneurysm, at 89 years old, possible rupture and inoperable, leading to death. She has had 2 kids normal vaginal delivery who are healthy in their 40s. She has had 1 miscarriage. H/o bladder prolapse. Mild hypermobility, chronic back pain from scoliosis. Denies abnormal wound healing or bruising/bleeding, hernias, translucent skin, dental crowding, palatal deformities or chest wall deformities.      Thoracic aneurysm had been picked up 5 years prior to visit and she had no scanning since so we obtained echo imaging which documented stability at 4.4 cm. Had not yet had her CTA C/A/P to confirm size.      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 She reports h/o HTN for 10 years, controlled, on 2 drug regimen. Had been on losartan and cannot take beta blocker due to asthma. She recently had to go off of losartan due to side effects. Could also not tolerate ACEI.      Since last visit, her daughter has been screened and has a mild aneurysm. She is 74 year olds and is seeing me along side her mother on today's virtual visit. Son is also on virtual call, from [facility] and had an echocardiogram screen that demonstrated moderate mitral regurgitation, prominent LV trabeculations and mild aortic dilation. He has not yet had a cardiac MRI. All children have hypermobile joints and tall stature. Son is  "6'2\".      Niurka denies chest pain, back pain, fevers, chills, ns, LE edema, orthopnea, PND. SBP has been well controlled in the 130s-140s. HR in the 60s. We are reviewing today her genetic testing results.     Interval: son has a cardiomyopathy with EF 38% and had CMR which showed dilated heart. Aorta was normal.         ASSESSMENT/PLAN:      Niurka is a 76 year old with ascending aortic aneurysm 4.4 cm and saccular aneurysm of renal artery.       Differential included fibromuscular dysplasia, syndromic TAAD with associated primary arteriopathy (MFS, LDS, vEDS), or other type of familial TAAD. It was helpful today to have son on the visit because his findings of mitral regurgitation, aortic aneurysm raised concerns for a possible syndromic (MFS or Marfanoid) TAAD. All family members described flat feet and tall stature. However MFS and other well described syndromic TAAD was excluded with Roopa's genetic testing.      We also discussed genetic testing and counseling for non syndromic FTAAD, where the variable penetrance and expression of various mutations associated with FTAAD make a definitive diagnosis difficult. TGFBR2 (~5%), ACTA2 (~14%) and MYH11 (~1%) are known mutations that comprise up to 20% of positively tested cases. There is an 80% chance that no identifiable FTAAD mutation will be discovered in testing. Presumably given both children have aortic aneurysm there is autosomal dominance pattern in their aortopathy. This has implications for children.      Interestingly, the following genetic profile was discovered for Roopa.      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  A variant of unknown significance means that there is a genetic change in which we do not have enough information to determine if it is disease causing or not. Labs review published data, functional studies, presences in population databases, similarity to normal " sequence, and computer prediction models. The TNXB gene is expressed in musculoskeletal, cardiac, and dermal tissues and is known to be associated with autosomal recessive (AR) classic-like Magdaleno Danlos Syndrome (EDS). This particular variant occurs in a position that is well conserved (meaning that it is not used to changes) and creates an amino acid with highly dissimilar properties.  Computer models predict this change will be tolerated.  TNXB haploinsufficiency has been identified in women with incomplete penetrance and is characterized by hypermobile EDS and chronic musculoskeletal pain. Although suspicion is raisied, there is not enough current information to be certain if this variant alone is the cause of Roopa's aneurysm. Reviewed AR inheritance associated with this gene, meaning that two mutations are necessary to cause disease.  Since two mutations are required and Roopa only has one, this is unlikely to be the sole cause for her symptoms.  In addition, her children have a 50% chance of inheriting this variant but again it seems as though that would not be enough to cause full blown disease. At this time we do not recommend genetic testing in other family members because we cannot interpret the results and make predictions.     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     Plan:     1. Renal artery aneurysm:  will continue to monitor. Not at a point needing repair at this time and stable in size on surveillance imaging. Do not suspect it is altering any renal blood flow. We discussed CTA every few years with ultrasound in between. Will also monitor kidney function.     2. CT angiogram chest/abdomen/pelvis in 12 months with  echocardiogram      3. I have encouraged son to obtain CMR in [place], to evaluate for LV trabeculations and LVNC cardiomyopathy. If positive, I will screen Roopa and Katlyn for this. I have recommended Dr. Moore in [place].     4. Continue current medications, unable to be on losartan due to side effects. SBP goal < 120/80 mmHg, HR < 60 bpm. On hydrochlorothiazide triampterene. Metoprolol 12.5 mg twice day. Transitioned to long acting. Watch for COPD and asthma exacerbation.      5. Follow up in 12 months     Brandee Wang MD MSc  [facility]       PAST MEDICAL HISTORY  Past Medical History:   Diagnosis Date     Abdominal aortic aneurysm without rupture (H) 11/10/2015    [  ] repeat imaging due spring 2016 Descending aortic aneurysm.  Being monitored with serial angiogram      Aneurysm of right renal artery (H) 4/9/2019     Esophageal hypertension 1/25/2017     Essential hypertension 1/25/2017     Female bladder prolapse 11/10/2015     Hearing loss      Pulmonary nodules 1/25/2017     Seizures (H) 11/10/2015    Temporal seziure d/o.  Does not have LOC.  Has prodromal symptoms      Severe persistent asthma 11/10/2015    Since childhood.  Has been steroid dependent for many years. Has had cydney x2         CURRENT MEDICATIONS  Current Outpatient Medications   Medication Sig Dispense Refill     albuterol (PROAIR HFA/PROVENTIL HFA/VENTOLIN HFA) 108 (90 Base) MCG/ACT inhaler Inhale 2 puffs into the lungs every 6 hours as needed for shortness of breath / dyspnea or wheezing 54 g 3     CALCIUM-VITAMIN D PO Take 1 tablet by mouth daily       carBAMazepine (TEGRETOL XR) 200 MG 12 hr tablet Take 1 tablet (200 mg) by mouth daily as needed (seizure) 30 tablet 1     estradiol (ESTRING) 2 MG vaginal ring Place 1 each vaginally every 3 months 1 each 3     fluticasone-salmeterol (WIXELA INHUB) 500-50 MCG/DOSE inhaler Inhale 1 puff into the lungs every 12 hours 180 each 3     magnesium 250 MG tablet Take 1 tablet  "by mouth daily       metoprolol succinate ER (TOPROL-XL) 50 MG 24 hr tablet Take 1 tablet (50 mg) by mouth daily 90 tablet 3     montelukast (SINGULAIR) 10 MG tablet Take 1 tablet (10 mg) by mouth At Bedtime 90 tablet 3     NAPROXEN PO Take 220 mg by mouth as needed        potassium chloride ER (KLOR-CON M) 20 MEQ CR tablet Take 1 tablet (20 mEq) by mouth daily 90 tablet 3     pravastatin (PRAVACHOL) 20 MG tablet Take 1 tablet (20 mg) by mouth daily 90 tablet 3     SHINGRIX injection        theophylline (DENIA-24) 400 MG 24 hr capsule Take 1 capsule (400 mg) by mouth daily 90 capsule 3     triamterene-HCTZ (MAXZIDE) 75-50 MG tablet Take 1 tablet by mouth daily 90 tablet 3       PAST SURGICAL HISTORY:  Past Surgical History:   Procedure Laterality Date     CATARACT IOL, RT/LT Bilateral      COLONOSCOPY N/A 2019    Procedure: COLONOSCOPY;  Surgeon: Haim Burgos MD;  Location: [facility] CATARACT SURGICAL PACKAGE       HYSTERECTOMY      fibroids     JOINT REPLACEMENT Left      NISSEN FUNDOPLICATION      x2       ALLERGIES     Allergies   Allergen Reactions     Quinolones        FAMILY HISTORY  Family History   Problem Relation Age of Onset     Dementia Mother      Heart Failure Mother      Hypertension Mother      Breast Cancer Mother         post menopausal     Asthma Sister         x2     Depression Sister      Cancer Father         prostate cancer     Hypertension Father      Prostate Cancer Father          of it at 82     Asthma Father         \"outgrew\" it     Schizophrenia Maternal Grandmother      Coronary Artery Disease Maternal Grandfather         he was diabetic and smoked     Coronary Artery Disease Sister         MI when being ventilated for asthma     Asthma Sister         both sisters with asthma, one severe     Other - See Comments Daughter         Enlarged Aorta     SOCIAL HISTORY  Social History     Socioeconomic History     Marital status:      Spouse name: Not on file     " "Number of children: Not on file     Years of education: 20     Highest education level: Not on file   Occupational History     Occupation: pediatrician   Tobacco Use     Smoking status: Never Smoker     Smokeless tobacco: Never Used   Substance and Sexual Activity     Alcohol use: Yes     Alcohol/week: 0.0 standard drinks     Comment: 1 glass of wine with dinner     Drug use: No     Sexual activity: Not on file   Other Topics Concern     Parent/sibling w/ CABG, MI or angioplasty before 65F 55M? 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     Social Determinants of Health     Financial Resource Strain: Not on file   Food Insecurity: Not on file   Transportation Needs: Not on file   Physical Activity: Not on file   Stress: Not on file   Social Connections: Not on file   Intimate Partner Violence: Not on file   Housing Stability: Not on file       EXAM:  BP (!) 147/80   Pulse 87   Ht 1.702 m (5' 7\")   Wt 74.4 kg (164 lb)   LMP  (LMP Unknown)   BMI 25.69 kg/m    In general, the patient is a pleasant female in no apparent distress.    HEENT: NC/AT.  PERRLA.  EOMI.  Sclerae white, not injected  Neck: No adenopathy.  No thyromegaly. Carotids +2/2 bilaterally without bruits.  No jugular venous distension.   Heart: RRR. Normal S1, S2 splits physiologically. No murmur, rub, click, or gallop. T  Lungs: CTA.  No ronchi, wheezes, rales.  No dullness to percussion.   Abdomen: Soft, nontender, nondistended.  Extremities: No clubbing, cyanosis, or edema.   Vascular: No bruits    Labs:  LIPID RESULTS:  Lab Results   Component Value Date    CHOL 182 02/12/2021    HDL 63 02/12/2021    LDL 96 02/12/2021    TRIG 115 02/12/2021    CHOLHDLRATIO 3.0 11/10/2015    NHDL 119 02/12/2021       LIVER ENZYME RESULTS:  No results found for: AST, ALT    CBC RESULTS:  Lab Results   Component Value Date    WBC 6.4 05/16/2016    RBC 4.56 05/16/2016    HGB 12.7 05/16/2016    HCT 38.0 05/16/2016    " MCV 83 05/16/2016    MCH 27.9 05/16/2016    MCHC 33.4 05/16/2016    RDW 13.5 05/16/2016     05/16/2016       BMP RESULTS:  Lab Results   Component Value Date     02/12/2021    POTASSIUM 3.7 02/12/2021    CHLORIDE 105 02/12/2021    CO2 31 02/12/2021    ANIONGAP 5 02/12/2021    GLC 97 02/12/2021    BUN 14 02/12/2021    CR 0.9 09/14/2021    CR 0.76 02/12/2021    GFRESTIMATED >60 09/14/2021    GFRESTIMATED 78 02/12/2021    GFRESTBLACK >90 02/12/2021    ZACH 9.5 02/12/2021        A1C RESULTS:  Lab Results   Component Value Date    A1C 5.4 02/12/2021           Brandee Wang MD   [facility]

## 2022-01-16 ENCOUNTER — MYC MEDICAL ADVICE (OUTPATIENT)
Dept: CARDIOLOGY | Facility: CLINIC | Age: 77
End: 2022-01-16
Payer: MEDICARE

## 2022-01-16 DIAGNOSIS — I10 BENIGN ESSENTIAL HYPERTENSION: ICD-10-CM

## 2022-01-17 RX ORDER — METOPROLOL SUCCINATE 25 MG/1
25 TABLET, EXTENDED RELEASE ORAL DAILY
Qty: 90 TABLET | Refills: 3 | Status: SHIPPED | OUTPATIENT
Start: 2022-01-17 | End: 2022-11-15

## 2022-01-17 RX ORDER — METOPROLOL SUCCINATE 50 MG/1
50 TABLET, EXTENDED RELEASE ORAL DAILY
Qty: 1 TABLET | Refills: 0 | COMMUNITY
Start: 2022-01-17 | End: 2022-04-06

## 2022-02-06 DIAGNOSIS — I72.2 ANEURYSM OF RIGHT RENAL ARTERY (H): ICD-10-CM

## 2022-02-06 DIAGNOSIS — I71.21 ASCENDING AORTIC ANEURYSM (H): ICD-10-CM

## 2022-02-06 DIAGNOSIS — I10 BENIGN ESSENTIAL HYPERTENSION: ICD-10-CM

## 2022-02-06 DIAGNOSIS — N81.10 BLADDER PROLAPSE, FEMALE, ACQUIRED: ICD-10-CM

## 2022-02-06 DIAGNOSIS — E78.5 HYPERLIPIDEMIA LDL GOAL <100: ICD-10-CM

## 2022-02-08 RX ORDER — METOPROLOL SUCCINATE 50 MG/1
TABLET, EXTENDED RELEASE ORAL
Qty: 90 TABLET | Refills: 3 | OUTPATIENT
Start: 2022-02-08

## 2022-02-08 RX ORDER — PRAVASTATIN SODIUM 20 MG
20 TABLET ORAL DAILY
Qty: 90 TABLET | Refills: 0 | Status: SHIPPED | OUTPATIENT
Start: 2022-02-08 | End: 2022-04-06

## 2022-02-08 RX ORDER — TRIAMTERENE AND HYDROCHLOROTHIAZIDE 75; 50 MG/1; MG/1
1 TABLET ORAL DAILY
Qty: 90 TABLET | Refills: 0 | Status: SHIPPED | OUTPATIENT
Start: 2022-02-08 | End: 2022-04-06

## 2022-02-08 RX ORDER — POTASSIUM CHLORIDE 1500 MG/1
20 TABLET, EXTENDED RELEASE ORAL DAILY
Qty: 90 TABLET | Refills: 0 | Status: SHIPPED | OUTPATIENT
Start: 2022-02-08 | End: 2022-04-06

## 2022-02-08 NOTE — TELEPHONE ENCOUNTER
ESTRING VAG RING 2MG      Last Written Prescription Date:  2/12/21  Last Fill Quantity: 1 each,   # refills: 3  Last Office Visit : 2/12/21  Future Office visit:  None scheduled    Routing refill request to provider for review/approval because:  Blood pressure out of range   BP Readings from Last 3 Encounters:   01/11/22 (!) 147/80   01/04/22 130/79   11/15/21 124/82     Overdue for mammogram      TRIAMT/HCTZ  TAB 75-50MG      Last Written Prescription Date:  2/12/21  Last Fill Quantity: 90,   # refills: 3  Last Office Visit : 2/12/21  Future Office visit:  None scheduled    Routing refill request to provider for review/approval because:  Blood pressure out of range   BP Readings from Last 3 Encounters:   01/11/22 (!) 147/80   01/04/22 130/79   11/15/21 124/82     90 day refill of all meds except ESTRING VAG RING 2MG  Per protocol, routed to clinic for follow up and clinic scheduling  Metoprolol refill addressed by cardiology

## 2022-02-08 NOTE — TELEPHONE ENCOUNTER
LVM w/ pcc number to callback and schedule annual clinic visit w/ pcp for medication refills per provider request.

## 2022-02-09 RX ORDER — ESTRADIOL 2 MG/1
1 RING VAGINAL
Qty: 1 EACH | Refills: 3 | Status: SHIPPED | OUTPATIENT
Start: 2022-02-09 | End: 2023-06-13

## 2022-02-09 NOTE — TELEPHONE ENCOUNTER
[facility] Call Center    Phone Message    May a detailed message be left on voicemail: yes     Reason for Call: Other: Patient scheduled for soonest apt time 04/22/2022. Patient is needing meds before visit time. Writer offered to add patient to waitlist and she declined since she will be in [place] all of March.     Action Taken: Message routed to:  [facility]    Travel Screening: Not Applicable

## 2022-03-01 ENCOUNTER — INFUSION THERAPY VISIT (OUTPATIENT)
Dept: INFUSION THERAPY | Facility: CLINIC | Age: 77
End: 2022-03-01
Payer: MEDICARE

## 2022-03-01 VITALS
OXYGEN SATURATION: 96 % | HEART RATE: 80 BPM | DIASTOLIC BLOOD PRESSURE: 76 MMHG | SYSTOLIC BLOOD PRESSURE: 117 MMHG | TEMPERATURE: 98.2 F

## 2022-03-01 DIAGNOSIS — J45.50 SEVERE PERSISTENT ASTHMA WITHOUT COMPLICATION (H): Primary | ICD-10-CM

## 2022-03-01 PROCEDURE — 96372 THER/PROPH/DIAG INJ SC/IM: CPT

## 2022-03-01 PROCEDURE — 250N000011 HC RX IP 250 OP 636

## 2022-03-01 RX ADMIN — BENRALIZUMAB 30 MG: 30 INJECTION, SOLUTION SUBCUTANEOUS at 08:37

## 2022-03-01 ASSESSMENT — PAIN SCALES - GENERAL: PAINLEVEL: NO PAIN (0)

## 2022-03-01 NOTE — PROGRESS NOTES
Patient presents to [facility] for Fasenra injection.  Order written by Dr. Vega was completed today. Name and  verified with patient. See MAR for medication details. Medication was divided into 1 syringes by pharmacy and given in the following sites back side of left upper arm. Patient tolerated injection well and was monitored after administration. Patient was discharged to home. Patient to return back in 8 weeks.     HUONG Cary LPN    Administrations This Visit     benralizumab (FASENRA) injection 30 mg     Admin Date  2022 Action  Given Dose  30 mg Route  Subcutaneous Administered By  Javed Cary LPN

## 2022-03-01 NOTE — LETTER
3/1/2022         RE: Niurka Cisneros  [address]  [place] [address]        Dear Colleague,    Thank you for referring your patient, Niurka Cisneros, to the [facility]. Please see a copy of my visit note below.    General assessment for IV and SQ medications    1. Elevated temperature, fever, chills, productive cough or abnormal vital signs, night sweats, coughing up blood or sputum, no appetite or abnormal vital signs, SOB : NO    2. Open wounds or new incisions: NO    3. Recent hospitalization: NO    4.  Recent surgeries:  NO    5. Any upcoming surgeries or dental procedures?:NO    6. Any current or recent bouts of illness or infection? On any antibiotics? : NO    7. Any new, sudden or worsening abdominal pain :NO    8. Vaccination within 4 weeks? Patient or someone in the household is scheduled to receive vaccination? No live virus vaccines prior to or during treatment :NO    9. Any nervous system diseases [i.e. multiple sclerosis, Guillain-[place], seizures, neurological  Changes]  Ask if on a biologic medication* : NO    10. Pregnant or breast feeding; or plans on pregnancy in the future: NO    11. Signs of worsening depression or suicidal ideations while taking benlysta:NO    12. New-onset medical symptoms: NO    13.  New cancer diagnosis or on chemotherapy or radiation NO    14.  Evaluate for any sign of active TB [Unexplained weight loss, Loss of appetite, Night sweats, Fever, Fatigue, Chills, Coughing for 3 weeks or longer, Hemoptysis (coughing up blood), Chest pain]: NO          **Note: If answered yes to any of the above, hold the infusion and contact ordering provider.          Patient presents to [facility] for Fasenra injection.  Order written by Dr. Vega was completed today. Name and  verified with patient. See MAR for medication details. Medication was divided into 1 syringes by pharmacy and given in the following sites back side of left  upper arm. Patient tolerated injection well and was monitored after administration. Patient was discharged to home. Patient to return back in 8 weeks.     HUONG Cary LPN    Administrations This Visit     benralizumab (FASENRA) injection 30 mg     Admin Date  03/01/2022 Action  Given Dose  30 mg Route  Subcutaneous Administered By  Javed Cary LPN                      Again, thank you for allowing me to participate in the care of your patient.        Sincerely,        [facility] [place]

## 2022-03-01 NOTE — PROGRESS NOTES
General assessment for IV and SQ medications    1. Elevated temperature, fever, chills, productive cough or abnormal vital signs, night sweats, coughing up blood or sputum, no appetite or abnormal vital signs, SOB : NO    2. Open wounds or new incisions: NO    3. Recent hospitalization: NO    4.  Recent surgeries:  NO    5. Any upcoming surgeries or dental procedures?:NO    6. Any current or recent bouts of illness or infection? On any antibiotics? : NO    7. Any new, sudden or worsening abdominal pain :NO    8. Vaccination within 4 weeks? Patient or someone in the household is scheduled to receive vaccination? No live virus vaccines prior to or during treatment :NO    9. Any nervous system diseases [i.e. multiple sclerosis, Guillain-[place], seizures, neurological  Changes]  Ask if on a biologic medication* : NO    10. Pregnant or breast feeding; or plans on pregnancy in the future: NO    11. Signs of worsening depression or suicidal ideations while taking benlysta:NO    12. New-onset medical symptoms: NO    13.  New cancer diagnosis or on chemotherapy or radiation NO    14.  Evaluate for any sign of active TB [Unexplained weight loss, Loss of appetite, Night sweats, Fever, Fatigue, Chills, Coughing for 3 weeks or longer, Hemoptysis (coughing up blood), Chest pain]: NO          **Note: If answered yes to any of the above, hold the infusion and contact ordering provider.

## 2022-03-20 ENCOUNTER — HEALTH MAINTENANCE LETTER (OUTPATIENT)
Age: 77
End: 2022-03-20

## 2022-04-04 DIAGNOSIS — E78.5 HYPERLIPIDEMIA LDL GOAL <100: ICD-10-CM

## 2022-04-04 DIAGNOSIS — I71.21 ASCENDING AORTIC ANEURYSM (H): ICD-10-CM

## 2022-04-04 DIAGNOSIS — I10 BENIGN ESSENTIAL HYPERTENSION: ICD-10-CM

## 2022-04-04 DIAGNOSIS — I72.2 ANEURYSM OF RIGHT RENAL ARTERY (H): ICD-10-CM

## 2022-04-06 RX ORDER — POTASSIUM CHLORIDE 1500 MG/1
20 TABLET, EXTENDED RELEASE ORAL DAILY
Qty: 90 TABLET | Refills: 0 | Status: SHIPPED | OUTPATIENT
Start: 2022-04-06 | End: 2022-04-22

## 2022-04-06 RX ORDER — TRIAMTERENE AND HYDROCHLOROTHIAZIDE 75; 50 MG/1; MG/1
1 TABLET ORAL DAILY
Qty: 90 TABLET | Refills: 0 | Status: SHIPPED | OUTPATIENT
Start: 2022-04-06 | End: 2022-04-22

## 2022-04-06 RX ORDER — PRAVASTATIN SODIUM 20 MG
20 TABLET ORAL DAILY
Qty: 90 TABLET | Refills: 0 | Status: SHIPPED | OUTPATIENT
Start: 2022-04-06 | End: 2022-04-22

## 2022-04-06 NOTE — TELEPHONE ENCOUNTER
METOPROL SUC TAB 50MG ER      Historical entry  Last Office Visit : 2/12/21  Future Office visit:  4/22/22    Routing refill request to provider for review/approval because:  Medication is reported/historical    Overdue for LDL, BMET related to refills for   PRAVASTATIN  TAB 20MG, KLOR-CON M20 TAB 20MEQ ER, TRIAMT/HCTZ  TAB 75-50MG  Lab Test 02/12/21  0949   LDL 96     Lab Test 02/12/21  0949   POTASSIUM 3.7     Lab Test 02/12/21  0949      Nothing more recent in care everywhere nor media  No future orders in queue    90 day refills of these 3 provided per protocol, routed to clinic

## 2022-04-07 RX ORDER — METOPROLOL SUCCINATE 50 MG/1
50 TABLET, EXTENDED RELEASE ORAL DAILY
Qty: 90 TABLET | Refills: 0 | Status: SHIPPED | OUTPATIENT
Start: 2022-04-07 | End: 2022-06-14

## 2022-04-18 ASSESSMENT — ENCOUNTER SYMPTOMS
COUGH: 1
WHEEZING: 1
SHORTNESS OF BREATH: 0
SPUTUM PRODUCTION: 1
STIFFNESS: 0
MYALGIAS: 0
MUSCLE CRAMPS: 0
HEMOPTYSIS: 0
POSTURAL DYSPNEA: 0
MUSCLE WEAKNESS: 0
BACK PAIN: 1
DYSPNEA ON EXERTION: 1
ARTHRALGIAS: 0
JOINT SWELLING: 0
SNORES LOUDLY: 1
NECK PAIN: 0
COUGH DISTURBING SLEEP: 0

## 2022-04-22 ENCOUNTER — LAB (OUTPATIENT)
Dept: LAB | Facility: CLINIC | Age: 77
End: 2022-04-22
Payer: MEDICARE

## 2022-04-22 ENCOUNTER — OFFICE VISIT (OUTPATIENT)
Dept: INTERNAL MEDICINE | Facility: CLINIC | Age: 77
End: 2022-04-22
Payer: MEDICARE

## 2022-04-22 ENCOUNTER — OFFICE VISIT (OUTPATIENT)
Dept: AUDIOLOGY | Facility: CLINIC | Age: 77
End: 2022-04-22
Payer: MEDICARE

## 2022-04-22 VITALS
HEART RATE: 74 BPM | HEIGHT: 67 IN | RESPIRATION RATE: 16 BRPM | DIASTOLIC BLOOD PRESSURE: 79 MMHG | WEIGHT: 165 LBS | SYSTOLIC BLOOD PRESSURE: 117 MMHG | OXYGEN SATURATION: 97 % | BODY MASS INDEX: 25.9 KG/M2

## 2022-04-22 DIAGNOSIS — E78.5 HYPERLIPIDEMIA LDL GOAL <100: ICD-10-CM

## 2022-04-22 DIAGNOSIS — I10 BENIGN ESSENTIAL HYPERTENSION: ICD-10-CM

## 2022-04-22 DIAGNOSIS — R56.9 SEIZURES (H): ICD-10-CM

## 2022-04-22 DIAGNOSIS — H90.3 SENSORINEURAL HEARING LOSS (SNHL), BILATERAL: Primary | ICD-10-CM

## 2022-04-22 DIAGNOSIS — Z12.31 ENCOUNTER FOR SCREENING MAMMOGRAM FOR BREAST CANCER: ICD-10-CM

## 2022-04-22 DIAGNOSIS — I72.2 ANEURYSM OF RIGHT RENAL ARTERY (H): ICD-10-CM

## 2022-04-22 DIAGNOSIS — Z00.00 ENCOUNTER FOR MEDICARE ANNUAL WELLNESS EXAM: ICD-10-CM

## 2022-04-22 DIAGNOSIS — R73.03 PREDIABETES: ICD-10-CM

## 2022-04-22 DIAGNOSIS — I71.21 ASCENDING AORTIC ANEURYSM (H): ICD-10-CM

## 2022-04-22 DIAGNOSIS — R73.03 PREDIABETES: Primary | ICD-10-CM

## 2022-04-22 LAB
ANION GAP SERPL CALCULATED.3IONS-SCNC: 3 MMOL/L (ref 3–14)
BUN SERPL-MCNC: 21 MG/DL (ref 7–30)
CALCIUM SERPL-MCNC: 10 MG/DL (ref 8.5–10.1)
CHLORIDE BLD-SCNC: 103 MMOL/L (ref 94–109)
CHOLEST SERPL-MCNC: 166 MG/DL
CO2 SERPL-SCNC: 32 MMOL/L (ref 20–32)
CREAT SERPL-MCNC: 0.95 MG/DL (ref 0.52–1.04)
FASTING STATUS PATIENT QL REPORTED: YES
GFR SERPL CREATININE-BSD FRML MDRD: 63 ML/MIN/1.73M2
GLUCOSE BLD-MCNC: 114 MG/DL (ref 70–99)
HBA1C MFR BLD: 5.6 % (ref 0–5.6)
HDLC SERPL-MCNC: 53 MG/DL
LDLC SERPL CALC-MCNC: 84 MG/DL
NONHDLC SERPL-MCNC: 113 MG/DL
POTASSIUM BLD-SCNC: 3.1 MMOL/L (ref 3.4–5.3)
SODIUM SERPL-SCNC: 138 MMOL/L (ref 133–144)
TRIGL SERPL-MCNC: 147 MG/DL

## 2022-04-22 PROCEDURE — 80061 LIPID PANEL: CPT | Performed by: PATHOLOGY

## 2022-04-22 PROCEDURE — G0439 PPPS, SUBSEQ VISIT: HCPCS | Performed by: INTERNAL MEDICINE

## 2022-04-22 PROCEDURE — 80048 BASIC METABOLIC PNL TOTAL CA: CPT | Performed by: PATHOLOGY

## 2022-04-22 PROCEDURE — 83036 HEMOGLOBIN GLYCOSYLATED A1C: CPT | Performed by: PATHOLOGY

## 2022-04-22 PROCEDURE — 36415 COLL VENOUS BLD VENIPUNCTURE: CPT | Performed by: PATHOLOGY

## 2022-04-22 RX ORDER — TRIAMTERENE AND HYDROCHLOROTHIAZIDE 75; 50 MG/1; MG/1
1 TABLET ORAL DAILY
Qty: 90 TABLET | Refills: 3 | Status: SHIPPED | OUTPATIENT
Start: 2022-04-22 | End: 2022-04-25

## 2022-04-22 RX ORDER — POTASSIUM CHLORIDE 1500 MG/1
20 TABLET, EXTENDED RELEASE ORAL DAILY
Qty: 90 TABLET | Refills: 3 | Status: SHIPPED | OUTPATIENT
Start: 2022-04-22 | End: 2022-04-25

## 2022-04-22 RX ORDER — PRAVASTATIN SODIUM 20 MG
20 TABLET ORAL DAILY
Qty: 90 TABLET | Refills: 3 | Status: SHIPPED | OUTPATIENT
Start: 2022-04-22 | End: 2022-04-25

## 2022-04-22 ASSESSMENT — ENCOUNTER SYMPTOMS
BREAST MASS: 0
NECK MASS: 0
SLEEP DISTURBANCES DUE TO BREATHING: 0
NAIL CHANGES: 0
EXERCISE INTOLERANCE: 0
SWOLLEN GLANDS: 0
MUSCLE WEAKNESS: 0
SNORES LOUDLY: 1
HEADACHES: 0
SINUS PAIN: 0
WEIGHT GAIN: 0
TROUBLE SWALLOWING: 0
SPEECH CHANGE: 0
VOMITING: 0
DECREASED CONCENTRATION: 0
NIGHT SWEATS: 0
RECTAL PAIN: 0
BACK PAIN: 1
WEAKNESS: 0
SHORTNESS OF BREATH: 0
ALTERED TEMPERATURE REGULATION: 0
EYE REDNESS: 0
CONSTIPATION: 0
EYE WATERING: 0
CLAUDICATION: 0
COUGH DISTURBING SLEEP: 0
POSTURAL DYSPNEA: 0
FLANK PAIN: 0
EYE PAIN: 0
PALPITATIONS: 0
BLOATING: 0
ARTHRALGIAS: 0
LEG SWELLING: 0
RECTAL BLEEDING: 0
LOSS OF CONSCIOUSNESS: 0
DIZZINESS: 0
POLYPHAGIA: 0
ABDOMINAL PAIN: 0
DYSPNEA ON EXERTION: 1
SKIN CHANGES: 0
TINGLING: 0
BREAST PAIN: 0
POLYDIPSIA: 0
MYALGIAS: 0
WEIGHT LOSS: 0
HOARSE VOICE: 0
SEIZURES: 0
HEMATURIA: 0
HEMOPTYSIS: 0
DISTURBANCES IN COORDINATION: 0
COUGH: 1
SPUTUM PRODUCTION: 1
TASTE DISTURBANCE: 0
LEG PAIN: 0
DEPRESSION: 0
HOT FLASHES: 0
EXTREMITY NUMBNESS: 0
ORTHOPNEA: 0
SYNCOPE: 0
STIFFNESS: 0
HYPERTENSION: 0
DYSURIA: 0
HALLUCINATIONS: 0
MUSCLE CRAMPS: 0
WHEEZING: 1
TREMORS: 0
FEVER: 0
BRUISES/BLEEDS EASILY: 0
TACHYCARDIA: 0
DIFFICULTY URINATING: 0
NUMBNESS: 0
DOUBLE VISION: 0
FATIGUE: 0
DECREASED LIBIDO: 0
PARALYSIS: 0
JOINT SWELLING: 0
HYPOTENSION: 0
NERVOUS/ANXIOUS: 0
SORE THROAT: 0
NAUSEA: 0
JAUNDICE: 0
INSOMNIA: 0
PANIC: 0
MEMORY LOSS: 0
HEARTBURN: 0
NECK PAIN: 0
CHILLS: 0
INCREASED ENERGY: 0
EYE IRRITATION: 0
LIGHT-HEADEDNESS: 0
DECREASED APPETITE: 0
BLOOD IN STOOL: 0
BOWEL INCONTINENCE: 0
SMELL DISTURBANCE: 0
SINUS CONGESTION: 0
POOR WOUND HEALING: 0
DIARRHEA: 0

## 2022-04-22 ASSESSMENT — PAIN SCALES - GENERAL: PAINLEVEL: NO PAIN (0)

## 2022-04-22 ASSESSMENT — ASTHMA QUESTIONNAIRES: ACT_TOTALSCORE: 19

## 2022-04-22 NOTE — PROGRESS NOTES
Medicare Wellness Health Risk Assessment Questionnaire  Dr. Adore Butler updated 2019    What is your marital status?      Who lives in your household?  spouse    Does your home have loose rugs in the hallway:     No    Does your home have grab bars in the bathroom:    Yes     Does your home have handrails on the stairs?  Yes     Does your home have poorly lit areas?    No    How many times have you fallen in the last year?  0    How many times have you been to the Emergency Room in the last year?  0    How many times have you been hospitalized in the last year?  0    Do you feel threatened or controlled by a partner, ex-partner or anyone in your life?   No    Has anyone hurt you physically, for example by pushing, hitting, slapping or kicking you   or forcing you to have sex?   No    Are you sexually active?    Yes    If yes, with men, women, or both?   Men      If yes, do you more than one current partner?   No    If yes, are you using condoms?        Have you had any sexually transmitted infections in the last year?   No    Do you have any sexual concerns?    No    Do you need help with the phone, transportation, shopping, preparing meals, housework, laundry, medications or managing money?   No    Have you noticed any hearing difficulties?   Yes     Do you wear hearing aids?   Yes     Have you seen a hearing professional such as an audiologist in the last 1 year?   No    Do you have vision difficulty?    Yes     Do you wear glasses or contacts?   Yes     Have you seen an eye doctor in the last 1 year?   No    Women: What year did you stop having periods (approximate age)?  53    Women: Any vaginal bleeding in the last year?    No    Women: Have you ever had an abnormal Pap smear?    No    How many servings of fruits and vegetables do you eat a day?  5-6    How often do you exercise in a week?  5-7    What kind of exercise do you do?  walk    Do you wear a seatbelt when driving or riding in a vehicle?      Yes    Do you use tobacco?    No    Do you use e-cigarettes?        Do you use any other drugs?   No         Do you drink alcohol?   Yes     If you drink alcohol, how many drinks per week?  5    Over the last 2 weeks, how often have you been bothered by feeling down, depressed, or hopeless?     Not at all (0)     Over the last 2 weeks, how often have you had little interest or pleasure in doing things?     Not at all (0)     Have you completed an Advance Directives document?  Yes     If yes, have you given a copy to the clinic?   Yes     Do you need information on Advance Directives?   No

## 2022-04-22 NOTE — NURSING NOTE
Niurka Cisneros is a 76 year old female patient that presents today in clinic for the following:    Chief Complaint   Patient presents with     Physical     Patient comes for annual physical.      The patient's allergies and medications were reviewed as noted. A set of vitals were recorded as noted without incident. The patient does not have any other questions for the provider.    Jhonatan Baez, EMT at 7:45 AM on 4/22/2022

## 2022-04-22 NOTE — PROGRESS NOTES
Niurka Cisneros is a 76 year old year old female who presents for Annual Wellness Visit.    Are you in the first 12 months of your Medicare coverage?  No    Healthy Habits:    See patient responses    Problems taking medications regularly No    Medication side effects: No    Have you had an eye exam in the past two years? yes    Do you have sleep apnea, excessive snoring or daytime drowsiness?no      1) Repeat 3 items (Leader, Season, Table)    2) Clock draw: NORMAL  3) 3 item recall: Recalls 3 objects  Results: 3 items recalled: COGNITIVE IMPAIRMENT LESS LIKELY    Mini-CogTM Copyright S Giovany. Licensed by the author for use in [employer]; reprinted with permission (robert@[facility]). All rights reserved.      Has been reading more has trouble focusing her eyes.  Will improve after an hour  At night noticing aches and pain  Has not gotten COVID.  Lungs are stable    Blood pressure is well controlled.  No lightheadedness or dissiness    Weight has been stable.  Does at least 30 minuets of walking per day    The patient does not drink >3 drinks per day nor >7 drinks per week.      Today's PHQ-2 Score:    PHQ-2 Score:     PHQ-2 ( 1999 Pfizer) 4/22/2022 11/15/2021   Q1: Little interest or pleasure in doing things 0 0   Q2: Feeling down, depressed or hopeless 0 0   PHQ-2 Score 0 0   PHQ-2 Total Score (12-17 Years)- Positive if 3 or more points; Administer PHQ-A if positive - -   Q1: Little interest or pleasure in doing things - -   Q2: Feeling down, depressed or hopeless - -   PHQ-2 Score - -       Do you have a Health Care Directive?: Yes, advance care planning is on file.    Current providers sharing in care for this patient include:   Patient Care Team: Dr Wang, Dr Adorno      Hearing impairment: Yes, has audology appointment today    Ability to successfully perform activities of daily living: Yes, no assistance needed     Fall risk:   Fallen 2 or more times in the past year?: No  Any fall with injury in  the past year?: No    Home safety:  none identified    All Histories, problem list, medication list, allergies and medical/social/surgical histories reviewed and updated in [facility] as appropriate.  HM was reviewed and updated     ROS:  Review of Systems     Constitutional:  Negative for fever, chills, weight loss, weight gain, fatigue, decreased appetite, night sweats, recent stressors, height gain, height loss, post-operative complications, incisional pain, hallucinations, increased energy, hyperactivity and confused.   HENT:  Negative for ear pain, hearing loss, tinnitus, nosebleeds, trouble swallowing, hoarse voice, mouth sores, sore throat, ear discharge, tooth pain, gum tenderness, taste disturbance, smell disturbance, hearing aid, bleeding gums, dry mouth, sinus pain, sinus congestion and neck mass.    Eyes:  Negative for double vision, pain, redness, eye pain, decreased vision, eye watering, eye bulging, eye dryness, flashing lights, spots, floaters, strabismus, tunnel vision, jaundice and eye irritation.   Respiratory:   Positive for cough, sputum production, wheezing, snores loudly and dyspnea on exertion. Negative for hemoptysis, shortness of breath, sleep disturbances due to breathing, cough disturbing sleep and postural dyspnea.    Cardiovascular:  Positive for dyspnea on exertion. Negative for chest pain, palpitations, orthopnea, claudication, leg swelling, fingers/toes turn blue, hypertension, hypotension, syncope, history of heart murmur, chest pain on exertion, chest pain at rest, pacemaker, few scattered varicosities, leg pain, sleep disturbances due to breathing, tachycardia, light-headedness, exercise intolerance and edema.   Gastrointestinal:  Negative for heartburn, nausea, vomiting, abdominal pain, diarrhea, constipation, blood in stool, melena, rectal pain, bloating, hemorrhoids, bowel incontinence, jaundice, rectal bleeding, coffee ground emesis and change in stool.   Genitourinary:  Negative  "for bladder incontinence, dysuria, urgency, hematuria, flank pain, vaginal discharge, difficulty urinating, genital sores, dyspareunia, decreased libido, nocturia, voiding less frequently, arousal difficulty, abnormal vaginal bleeding, excessive menstruation, menstrual changes, hot flashes, vaginal dryness and postmenopausal bleeding.   Musculoskeletal:  Positive for back pain and bone pain. Negative for myalgias, joint swelling, arthralgias, stiffness, muscle cramps, neck pain, muscle weakness and fracture.   Skin:  Negative for nail changes, itching, poor wound healing, rash, hair changes, skin changes, acne, warts, poor wound healing, scarring, flaky skin, Raynaud's phenomenon, sensitivity to sunlight and skin thickening.   Neurological:  Negative for dizziness, tingling, tremors, speech change, seizures, loss of consciousness, weakness, light-headedness, numbness, headaches, disturbances in coordination, extremity numbness, memory loss, difficulty walking and paralysis.   Endo/Heme:  Negative for anemia, swollen glands and bruises/bleeds easily.   Psychiatric/Behavioral:  Negative for depression, hallucinations, memory loss, decreased concentration, mood swings and panic attacks.    Breast:  Negative for breast discharge, breast mass, breast pain and nipple retraction.   Endocrine:  Negative for altered temperature regulation, polyphagia, polydipsia, unwanted hair growth and change in facial hair.        OBJECTIVE:                                                            /79 (BP Location: Right arm, Patient Position: Sitting, Cuff Size: Adult Regular)   Pulse 74   Resp 16   Ht 1.702 m (5' 7\")   Wt 74.8 kg (165 lb)   LMP  (LMP Unknown)   SpO2 97%   BMI 25.84 kg/m   Body mass index is 25.84 kg/m .  General:  Pleasant, alert, no acute distress  Eyes:  Pupils 2-3 mm, sclera white, EOM's full.    Ears:  TM's with soft cerumen  Throat/Mouth:  Wearing mask  Neck:  Full AROM, supple, thyroid smooth, " symmetric, not enlarge, no nodules  Lungs:  Clear to auscultation throughout, no wheezes, rhonchi or rales.  C/V:  Regular rate and rhythm, no murmurs, rubs or gallops.  No JVD, no carotid bruits.  No peripheral edema.    Abdomen:  Not distended.  Bowel sounds active.  No tenderness, no hepatosplenomegaly or masses.  No CVA tenderness or masses.  Lymph:  No cervical,  lymph nodes.  Neuro:   Face symmetric. No tremor.  Gait steady.   M/S:  No joint swelling.  Skin: Normal moisture, good skin turgor.   Psych:  Broad range affect.  Not psychomotor slowed.  No signs of anxiety or agitation.    ASSESSMENT / PLAN:                                                              COUNSELING:  Reviewed preventive health counseling, as reflected in patient instructions  Special attention given to:       Regular exercise    Benign essential hypertension  Ascending aortic aneurysm (H)  Aneurysm of right renal artery (H)  BP at goal without adverse SE.  Continue yearly follow-up with Dr. Wang  - potassium chloride ER (KLOR-CON) 20 MEQ CR tablet  Dispense: 90 tablet; Refill: 3  - triamterene-HCTZ (MAXZIDE) 75-50 MG tablet  Dispense: 90 tablet; Refill: 3  - Basic metabolic panel    Prediabetes  - Hemoglobin A1c  - Lipid panel reflex to direct LDL Fasting    Hyperlipidemia LDL goal <100  - pravastatin (PRAVACHOL) 20 MG tablet  Dispense: 90 tablet; Refill: 3  - Lipid panel reflex to direct LDL Fasting    Seizures (H)  carbemazepine prn (uses very rarely)    Encounter for screening mammogram for breast cancer  - Mammogram, routine screening    Encounter for Medicare annual wellness exam      Reviewed patients plan of care and provided an AVS. This Care Plan has been established and reviewed with the Patient.    Mara Combs MD   [facility]

## 2022-04-22 NOTE — PATIENT INSTRUCTIONS
Patient Education   Personalized Prevention Plan  You are due for the preventive services outlined below.  Your care team is available to assist you in scheduling these services.  If you have already completed any of these items, please share that information with your care team to update in your medical record.  Health Maintenance Due   Topic Date Due    Asthma Action Plan - yearly  01/23/2021    Mammogram  12/27/2021    A1C Lab  02/12/2022    Basic Metabolic Panel  02/12/2022    Cholesterol Lab  02/12/2022        Thank you for visiting the Primary Care Center today at the [facility]! The following is some information about our clinic:     Primary Care Center Frequently-Asked Questions    (1) How do I schedule appointments at the [facility]?     Primary Care--to schedule or make changes to an existing appointment, please call our primary care line at [phone].    Labs--to schedule a lab appointment at the [facility] you can use [email or web address] or call [phone]. If you have a [place] location that is closer to home, you can reach out to that location for scheduling options.     Imaging--if you need to schedule a CT, X-ray, MRI, ultrasound, or other imaging study you can call [phone] to schedule at the [facility] or any other [facility] imaging location.     Referrals--if a referral to another specialty was ordered you can expect a phone call from their scheduling team. If you have not heard from them in a week, please call us or send us a [email or web address] message to check the status or get a scheduling number. Please note that this only applies to internal [facility] referrals. If the referral is external you would need to contact their office for scheduling.     (2) I have a question about my visit, who do I contact?     You can call us at the primary care line at [phone] to ask questions about your visit. You can also send a secure  message through [email or web address], which is reviewed by clinic staff. Please note that [email or web address] messages have a twenty-four to forty-eight business hour turnaround time and should not be used for urgent concerns.    (3) How will I get the results of my tests?    If you are signed up for [email or web address] all tests will be released to you within twenty-four hours of resulting. Please allow three to five days for your doctor to review your results and place a note interpreting the results. If you do not have [employer] you will receive your results through mail seven to ten business days following the return of the tests. Please note that if there should be any urgent or concerning results that your doctor or their registered nurse will reach out to you the same day as the tests come back. If you have follow up questions about your results or would like to discuss the results in detail please schedule a follow up with your provider either in person or virtually.     (4) How do I get refills of my prescriptions?     You should always first contact your pharmacy for refills of your medications. If submitting a refill request on [email or web address], please be sure to submit the request only once--repeat requests can cause delays in refill. If you are requesting a NEW medication or a medication related to new symptoms you will need to schedule an appointment with a provider prior to approval. Please note: Routine medication refills have up to one to three business day turnaround whereas controlled substances refills have up to five to seven business day turnaround.    (5) I have new symptoms, what do I do?     If you are having an immediate medical emergency, you should dial 911 for assistance.   For anything urgent that needs to be seen within a few hours to one day you should visit a local urgent care for assistance.  For non-urgent symptoms that need to be seen within a few days to a week you can schedule with an available provider in primary care by  going to [employer] or calling [phone].   If you are not sure how serious your symptoms are or you would like to receive medical advice you can always call [phone] to speak with a triage nurse.

## 2022-04-22 NOTE — PROGRESS NOTES
Niurka Cisneros received an ear wash in the Primary Care Center today at the request of Dr Combs. The patient's ears were assessed for cerumen. After this, an ear wash was performed in which a moderate amount of impacted cerumen was removed from both ear/s; specifically, 2000 mL of H2O/trace H2O2 were used to lavage the patient's left ear and 1500 mL of H2O/trace H2O2 were used to lavage the patient's right ear. After the ear wash, the tympanic membrane was visible. The ear wash was provided today under the supervision of Terry Layne MD, who was present if help was needed.    Jhonatan Baez, EMT at 9:13 AM on 4/22/2022

## 2022-04-22 NOTE — PROGRESS NOTES
AUDIOLOGY REPORT    SUBJECTIVE:  Niurka Cisneros is a 73 year old female who was seen in the Audiology Clinic at the [facility] and [facility] for a hearing aid check The patient has a bilateral normal to severe sensorineural hearing loss for which she wears binaural ReSound hearing aids fit at an outside clinic. The patient reports that both devices seem weak with the left device issue more severe      OBJECTIVE:  The hearing aids were deep-cleaned and assessed and the right device is louder and more clear after the cleaning, with the patient confirming good volume and sound quality. The left device is still weak after the cleaning.     ASSESSMENT:   Binaural hearing aid cleaning completed with the left device still weak. The patient would like to consider new technology rather than sending the left hearing aid for repair at this time.    PLAN:  The patient will schedule for a hearing aid consultation to be educated on upgraded options to help aid in communication. Please call this clinic with any questions regarding today s appointment.    Gucci Cai.  Licensed Audiologist  [place] #[id number]

## 2022-04-26 ENCOUNTER — INFUSION THERAPY VISIT (OUTPATIENT)
Dept: INFUSION THERAPY | Facility: CLINIC | Age: 77
End: 2022-04-26
Payer: MEDICARE

## 2022-04-26 VITALS
OXYGEN SATURATION: 97 % | HEART RATE: 70 BPM | DIASTOLIC BLOOD PRESSURE: 79 MMHG | TEMPERATURE: 98.1 F | SYSTOLIC BLOOD PRESSURE: 119 MMHG

## 2022-04-26 DIAGNOSIS — J45.50 SEVERE PERSISTENT ASTHMA WITHOUT COMPLICATION (H): Primary | ICD-10-CM

## 2022-04-26 PROCEDURE — 250N000011 HC RX IP 250 OP 636

## 2022-04-26 PROCEDURE — 96372 THER/PROPH/DIAG INJ SC/IM: CPT

## 2022-04-26 RX ADMIN — BENRALIZUMAB 30 MG: 30 INJECTION, SOLUTION SUBCUTANEOUS at 08:32

## 2022-04-26 ASSESSMENT — PAIN SCALES - GENERAL: PAINLEVEL: NO PAIN (0)

## 2022-04-26 NOTE — PROGRESS NOTES
General assessment for IV and SQ medications    1. Elevated temperature, fever, chills, productive cough or abnormal vital signs, night sweats, coughing up blood or sputum, no appetite or abnormal vital signs, SOB : NO    2. Open wounds or new incisions: NO    3. Recent hospitalization: NO    4.  Recent surgeries:  NO    5. Any upcoming surgeries or dental procedures?:NO    6. Any current or recent bouts of illness or infection? On any antibiotics? : NO    7. Any new, sudden or worsening abdominal pain :NO    8. Vaccination within 4 weeks? Patient or someone in the household is scheduled to receive vaccination? No live virus vaccines prior to or during treatment :NO    9. Any nervous system diseases [i.e. multiple sclerosis, Guillain-Tenafly, seizures, neurological  Changes]  Ask if on a biologic medication* : NO    10. Pregnant or breast feeding; or plans on pregnancy in the future: NO    11. Signs of worsening depression or suicidal ideations while taking benlysta:NO    12. New-onset medical symptoms: NO    13.  New cancer diagnosis or on chemotherapy or radiation NO    14.  Evaluate for any sign of active TB [Unexplained weight loss, Loss of appetite, Night sweats, Fever, Fatigue, Chills, Coughing for 3 weeks or longer, Hemoptysis (coughing up blood), Chest pain]: NO      **Note: If answered yes to any of the above, hold the infusion and contact ordering provider.

## 2022-04-26 NOTE — PROGRESS NOTES
Patient presents to the [facility] for Fasenra injection.  Order written by Dr. Vega was completed today. Name and  verified with patient. See MAR for medication details. Medication was divided into 1 syringes by pharmacy and given in the following sites back side of left upper arm. Patient tolerated injection well and was monitored for 30 minutes after administration. Patient was discharged to home. Patient to return back in 8 weeks.     HUONG Cary LPN    Administrations This Visit     benralizumab (FASENRA) injection 30 mg     Admin Date  2022 Action  Given Dose  30 mg Route  Subcutaneous Administered By  Javed Cary LPN

## 2022-04-26 NOTE — LETTER
2022         RE: Niurka Cisneros  [address]  [place] [address]        Dear Colleague,    Thank you for referring your patient, Niurka Cisneros, to the [facility]. Please see a copy of my visit note below.    General assessment for IV and SQ medications    1. Elevated temperature, fever, chills, productive cough or abnormal vital signs, night sweats, coughing up blood or sputum, no appetite or abnormal vital signs, SOB : NO    2. Open wounds or new incisions: NO    3. Recent hospitalization: NO    4.  Recent surgeries:  NO    5. Any upcoming surgeries or dental procedures?:NO    6. Any current or recent bouts of illness or infection? On any antibiotics? : NO    7. Any new, sudden or worsening abdominal pain :NO    8. Vaccination within 4 weeks? Patient or someone in the household is scheduled to receive vaccination? No live virus vaccines prior to or during treatment :NO    9. Any nervous system diseases [i.e. multiple sclerosis, Guillain-Fillmore, seizures, neurological  Changes]  Ask if on a biologic medication* : NO    10. Pregnant or breast feeding; or plans on pregnancy in the future: NO    11. Signs of worsening depression or suicidal ideations while taking benlysta:NO    12. New-onset medical symptoms: NO    13.  New cancer diagnosis or on chemotherapy or radiation NO    14.  Evaluate for any sign of active TB [Unexplained weight loss, Loss of appetite, Night sweats, Fever, Fatigue, Chills, Coughing for 3 weeks or longer, Hemoptysis (coughing up blood), Chest pain]: NO      **Note: If answered yes to any of the above, hold the infusion and contact ordering provider.      [facility] the [facility] Fasenra injection.  Order written by Dr. Vega was completed today. Name and  verified with patient. See MAR for medication details. Medication was divided into 1 syringes by pharmacy and given in the following sites back side of left  upper arm. Patient tolerated injection well and was monitored for 30 minutes after administration. Patient was discharged to home. Patient to return back in 8 weeks.     HUONG Cary LPN    Administrations This Visit     benralizumab (FASENRA) injection 30 mg     Admin Date  04/26/2022 Action  Given Dose  30 mg Route  Subcutaneous Administered By  Javed Cary LPN                    Again, thank you for allowing me to participate in the care of your patient.      Sincerely,    [facility]

## 2022-05-25 ENCOUNTER — TELEPHONE (OUTPATIENT)
Dept: CARDIOLOGY | Facility: CLINIC | Age: 77
End: 2022-05-25
Payer: MEDICARE

## 2022-05-25 PROCEDURE — 99207 PR NO CHARGE NURSE ONLY: CPT

## 2022-05-25 NOTE — TELEPHONE ENCOUNTER
"  Oban Study Pre-Visit Call      Study description:   Multiconditions PPG Study. The purpose of this research study is to collect data related to health for the development of mobile technologies. This data will include physiological signal recordings from medical devices and data collection software on Apple Watches (\"study watches\"). This study is not to provide any treatment nor assess safety and effectiveness, but rather to collect information for research and  purposes.     Niurka DIAZ Cisneros a 76 year old female, was called today to discuss participation in the Oban study. The following was reviewed with the patient.       You agree to comply with COVID precautionary measures required by local public health ordinances, workplace health and safety protocols, and/or the Study Team. Such measure may include, for example, wearing a mask, complying with social distancing guidelines, and/ or providing evidence of negative COVID-19 test result Yes       You are fully vaccinated per the most recent CDC guidelines Yes      You do not have any of the following symptoms: fever or chills; difficulty breathing; sustained loss of taste smell, or appetite. Yes      You do not have any of the following unexplained symptoms: fatigue or nausea; whole body muscle aches; new or unexpected headache; sore throat; congestion or runny nose; diarrhea or vomiting Yes      You have not had close contact with someone who is suspected or confirmed to have active COVID-19 in the last 14 days.Yes      Reminders    Please come 10 minutes early for your scheduled appointment time.    Bring your vaccination card with you to your scheduled appointment.     No smoking 2 hours prior to your appointment time.    Wear loose shirt.    Eat before you arrive.       Pauline Norris RN       "

## 2022-05-26 ENCOUNTER — ALLIED HEALTH/NURSE VISIT (OUTPATIENT)
Dept: CARDIOLOGY | Facility: CLINIC | Age: 77
End: 2022-05-26

## 2022-05-26 ENCOUNTER — OFFICE VISIT (OUTPATIENT)
Dept: CARDIOLOGY | Facility: CLINIC | Age: 77
End: 2022-05-26
Payer: MEDICARE

## 2022-05-26 VITALS
SYSTOLIC BLOOD PRESSURE: 129 MMHG | HEIGHT: 67 IN | DIASTOLIC BLOOD PRESSURE: 72 MMHG | BODY MASS INDEX: 26.02 KG/M2 | OXYGEN SATURATION: 96 % | RESPIRATION RATE: 16 BRPM | HEART RATE: 67 BPM | TEMPERATURE: 97.7 F | WEIGHT: 165.79 LBS

## 2022-05-26 DIAGNOSIS — Z00.6 EXAMINATION OF PARTICIPANT OR CONTROL IN CLINICAL RESEARCH: Primary | ICD-10-CM

## 2022-05-26 DIAGNOSIS — J44.9 CHRONIC OBSTRUCTIVE PULMONARY DISEASE, UNSPECIFIED COPD TYPE (H): Primary | ICD-10-CM

## 2022-05-26 PROCEDURE — 99207 PR NO CHARGE NURSE ONLY: CPT

## 2022-05-26 PROCEDURE — 99207 PR NO CHARGE-RESEARCH SERVICE: CPT

## 2022-05-26 NOTE — PROGRESS NOTES
"   Oban Study Consent On-Site Visit      Study description:   Multiconditions PPG Study. The purpose of this research study is to collect data related to health for the development of mobile technologies. This data will include physiological signal recordings from medical devices and data collection software on Apple Watches (\"study watches\"). This study is not to provide any treatment nor assess safety and effectiveness, but rather to collect information for research and  purposes.     Niurka Cisneros a 76 year old female, was onsite today to discuss participation in the Oban study.   The consent form was reviewed with the patient.     The review of the study included:    Study Purpose     COVID-19 Criteria     On-Site Study Participation    Participant Responsibilities      Study Data and Devices    Benefits and Risks of Participation    Compensation and Costs of Participation    Voluntary Participation    Confidentiality     Injury and Legal Rights    The subject was queried in regards to her willingness to continue and her questions were answered to her satisfaction.     The patient has given her agreement to volunteer to participate in the above noted study.     The In-Lab consent form and HIPPA form version 28-Apr-2022 was signed 26-MAY-2022 onsite in the Clinic Research Unit.     A copy of the Oban consent will be placed in subject's medical record.  A copy of the consent form was given to the subject today.    Study data is directly entered into Epic per protocol.     No study procedures were done prior to Niurka Cisneros providing informed consent.       SHANNA Meadows       "

## 2022-05-26 NOTE — PROGRESS NOTES
"      Oban Study In-Lab Note      Study description: Multiconditions PPG Study. The purpose of this research study is to collect data related to health for the development of mobile technologies. This data will include physiological signal recordings from medical devices and data collection software on Apple Watches (\"study watches\"). This study is not to provide any treatment nor assess safety and effectiveness, but rather to collect information for research and  purposes.    Subject ID:  [id number]       SCREENING        Niurka Cisneros   1945          76 year old  female    Time Subject Sat: 8:46     Past Medical History:   Diagnosis Date     Abdominal aortic aneurysm without rupture (H) 11/10/2015    [  ] repeat imaging due spring 2016 Descending aortic aneurysm.  Being monitored with serial angiogram      Aneurysm of right renal artery (H) 4/9/2019     Esophageal hypertension 1/25/2017     Essential hypertension 1/25/2017     Female bladder prolapse 11/10/2015     Hearing loss      Pulmonary nodules 1/25/2017     Seizures (H) 11/10/2015    Temporal seziure d/o.  Does not have LOC.  Has prodromal symptoms      Severe persistent asthma 11/10/2015    Since childhood.  Has been steroid dependent for many years. Has had cydney x2     COPD                                                                                                                                          11/15/2021    Current Outpatient Medications:      albuterol (PROAIR HFA/PROVENTIL HFA/VENTOLIN HFA) 108 (90 Base) MCG/ACT inhaler, Inhale 2 puffs into the lungs every 6 hours as needed for shortness of breath / dyspnea or wheezing, Disp: 54 g, Rfl: 3     CALCIUM-VITAMIN D PO, Take 1 tablet by mouth daily, Disp: , Rfl:      carBAMazepine (TEGRETOL XR) 200 MG 12 hr tablet, Take 1 tablet (200 mg) by mouth daily as needed (seizure), Disp: 30 tablet, Rfl: 1     estradiol (ESTRING) 2 MG vaginal ring, Place 1 each vaginally every 3 " "months For additional refills, please schedule annual appointment at [phone], Disp: 1 each, Rfl: 3     fluticasone-salmeterol (WIXELA INHUB) 500-50 MCG/DOSE inhaler, Inhale 1 puff into the lungs every 12 hours, Disp: 180 each, Rfl: 3     magnesium 250 MG tablet, Take 1 tablet by mouth daily, Disp: , Rfl:      metoprolol succinate ER (TOPROL-XL) 25 MG 24 hr tablet, Take 1 tablet (25 mg) by mouth daily To be taken with 50mg tablet for total of 75mg daily, Disp: 90 tablet, Rfl: 3     metoprolol succinate ER (TOPROL-XL) 50 MG 24 hr tablet, Take 1 tablet (50 mg) by mouth daily To be taken with 525mg tablet for total of 75mg daily, Disp: 90 tablet, Rfl: 0     montelukast (SINGULAIR) 10 MG tablet, Take 1 tablet (10 mg) by mouth At Bedtime, Disp: 90 tablet, Rfl: 3     NAPROXEN PO, Take 220 mg by mouth as needed , Disp: , Rfl:      potassium chloride ER (KLOR-CON) 20 MEQ CR tablet, Take 1 tablet (20 mEq) by mouth daily, Disp: 90 tablet, Rfl: 3     pravastatin (PRAVACHOL) 20 MG tablet, Take 1 tablet (20 mg) by mouth daily, Disp: 90 tablet, Rfl: 3     theophylline (DENIA-24) 400 MG 24 hr capsule, Take 1 capsule (400 mg) by mouth daily, Disp: 90 capsule, Rfl: 3     triamterene-HCTZ (MAXZIDE) 75-50 MG tablet, Take 1 tablet by mouth daily, Disp: 90 tablet, Rfl: 3    Allergies   Allergen Reactions     Quinolones         Past Surgical History:   Procedure Laterality Date     CATARACT IOL, RT/LT Bilateral      COLONOSCOPY N/A 2/14/2019     H CATARACT SURGICAL PACKAGE       HYSTERECTOMY       JOINT REPLACEMENT Left      NISSEN FUNDOPLICATION          Child-Bearing Potential?: No    Race: White  Race (Secondary): N/A    : No    Ethnicity: Non-/     Vitals:  /72 (BP Location: Left arm, Patient Position: Sitting, Cuff Size: Adult Regular)   Pulse 67   Temp 97.7  F (36.5  C)   Resp 16   Ht 1.702 m (5' 7\")   Wt 75.2 kg (165 lb 12.6 oz)   LMP  (LMP Unknown)   SpO2 96%   BMI 25.97 kg/m   "     Sponsor Expected Values   Blood Pressure: SBP: ; DBP: 40-90  Pulse:  bpm  Temp: 35.5-37.5  C  Respiration: 10-23  Ht: in cm  Wt: in kg  SpO2%: %  BMI: Rounded to nearest whole number    Repeated Measurements: (enter as needed)  none     Respiratory Conditions: COPD    Spirometer Test Results (FEV%): 65    Condition Severity: Moderate - Stage 2 (FEV 50-79%)      Sleep Conditions:  Sleep Apnea Diagnosis: No  Use of CPAP at Night: No    Oxygen Therapy: No      Minutes of Exercise per Week: >60  Type of Activity: Both      Measurements & Preferences:  Dominant Hand: Right   Preferred Watch Hand: Left    Volunteer-Reported Acosta Scale: 3  Staff-Recorded Acosta Scale: 3    Hairiness Level: A: Thin Hair, Low Density     Wrist Circumference:  Left: 151 mm       Right: 158 mm          ECG:  ECG not applicable as this subject is in the Respiratory Cohort.         STUDY PROCEDURE DATA     Spectrometer Values:            Left:   L*: 63.76    A*: 8.82   B*: 17.79      Right: L*: 57.54    A*: 9.53   B*: 15.41                 Environmental Conditions:   Temperature: 24  C  Barometric Pressure: 29.86 in (from weather.com)   Humidity: 28 %    Lux Level (Near Wrist):  Left: 460.2  Right: 554.3  Pre-Procedure Temperatures:  Left Wrist Skin: 34.7  C  Right Wrist Skin: 34.9  C  Finger Nellcor #1: 28.1  C  Finger Nellcor #2: 30.7  C    Study Date: 05/26/22  Study Time (Macbook Picture 1): 9:51:47     Device IDs  Left Watch ID (Size): [id number] (40)  Right Watch ID (Size): [id number] (40)   Band Size  Left: S/M    Right: S/M  Secure Setting Notch  Left: 4   Right: 4  Watch Enclosure: Aluminum   ([id number] and [id number] are Titanium-delete if not applicable)  Nox ID: [id number]     Sync Box ID: [id number]     Nellcor #1 ID: [id number]  Nellcor #1 Location: Right Index Finger    Nellcor #2 ID: [id number]  Nellcor #2 Location: Right Ring Finger    Subject Transgressions: (time stamp and identify all extraneous subject movements,  coughs, etc)         Time Macbook based Picture 1: 9:43:50  Time Watch Left Based Picture 1: 9:43:49   Time Watch Right Based Picture 1: 9:43:49     Time Nellcor #1 Based Picture 2: 9:51:47   Time Nellcor #2 Based Picture 2: 9:51:47   Time Macbook Based Picture 2: 9:51:47     POST-PROCEDURE      Temperatures:  Left Wrist Skin (post): 34.4  C   Right Wrist Skin (post): 34.6  C  Finger Nellcor #1 (post): 29.7  C Finger Nellcor #2 (post): 29.5  C  Subject Performed Secure-1 Measurement? Yes  Moisturizing Cream/Lotion applied at wrist? No  Apple Watch Band Tightness During In-Lab Study: Snug but comfortable  Additional Comments (Device/participant issues): none     26-MAY-2022  Garima Tillman EP

## 2022-05-26 NOTE — PROGRESS NOTES
"Oban Study Physical Exam      Medical History Reviewed? Yes    Physical Examination  For abnormal findings, please evaluate if the finding is Clinically Significant (by 'CS') or Not Clinically Significant (by 'NCS')  General Appearance   Normal  Head and Neck   Abnormal; NCS wears bilateral hearing aids; wears glasses  Lungs     Normal  Cardiovascular   Normal  Abdomen    Normal  Musculoskeletal/Extremities  Normal   Lymph Nodes    Normal  Skin     Normal  Neurological    Normal    Tremor (If present document)  Absent    Vitals:  /72 (BP Location: Left arm, Patient Position: Sitting, Cuff Size: Adult Regular)   Pulse 67   Temp 97.7  F (36.5  C)   Resp 16   Ht 1.702 m (5' 7\")   Wt 75.2 kg (165 lb 12.6 oz)   LMP  (LMP Unknown)   SpO2 96%   BMI 25.97 kg/m          COVID: No symptoms, chills, shortness of breath, or difficulty breathing, muscle or body aches, headache, loss of taste or smell, sore throat, runny nose, congestion, nausea, vomiting or diarrhea according to the [place] Department of Health and Human Services based on the CARES Act.     COVID Vaccinations:   Immunization History   Administered Date(s) Administered     COVID-19,PF,Pfizer (12+ Yrs) 02/19/2021, 03/12/2021, 10/03/2021     COVID-19,PF,Pfizer 12+ Yrs (2022 and After) 04/04/2022     HEPA 11/10/2008, 05/16/2014     Influenza (H1N1) 10/06/2017     Influenza (High Dose) 3 valent vaccine 10/26/2018     Influenza (IIV3) PF 09/22/2009, 10/18/2011, 09/12/2013, 09/04/2014, 10/13/2015     Pneumo Conj 13-V (2010&after) 09/04/2014     Pneumococcal 23 valent 04/25/2013     TDAP Vaccine (Boostrix) 09/07/2016     Zoster vaccine, live 10/01/2007       Smoking History  Are you currently smoking or vaping? No  How Many Years Have You Smoked or Vaped? Never  Packs or E-Cigs Per Day: 0     Electrocardiogram   ECG not applicable as subject is in the respiratory cohort.     Respiratory Conditions:   COPD    Spirometer Test Results (FEV%): 65  Condition " Severity: Moderate - Stage 2 (FEV 50-79%)      Nadya Weaver PA-C

## 2022-05-26 NOTE — PROGRESS NOTES
Rima Study End Note    Study Description:   Multiconditions PPG Sub-Study. The purpose of this research study is to collect data related to health for the development of mobile technologies. This data will include physiological signal recordings from medical devices and smart watch data collection software. This study is not to provide any treatment. This study will only collect information for research and  purposes.     Adverse Events & Con Med Assessment Performed?   [x]   Did the Subject Complete the Study? Yes    If no, Termination Reason: N/A    Study Termination/Completion Date: 26-MAY-2022    SHANNA Meadows

## 2022-05-26 NOTE — PROGRESS NOTES
Rima Inclusion/Exclusion Criteria:     Study Name: Rima  : Maged Ham MD    Protocol version: 3.0 19-JAN-2022     Criteria #  Inclusion Criterita (ALL MUST BE YES)  YES/NO/N/A   1   Male and female subjects at least 18 years old at the time of the screening visit.  Yes   2   Wrist circumference and 120mm-245mm (inclusive).  Yes   3   Ability to understand and provide written informed consent.  Yes   4   Willing and able to comply with study procedures, activities, and duration as described in the ICF. Yes   5  Documentation provided demonstrating COVID-19 up to date vaccinated status as per the current CDC guidelines.  Yes   6   Didn't smoke at least 2 hours before screening (or study procedures).  NA   7   Neither subject, nor any individuals living with subject, have had new development in the following within the last 14 days prior to study screening:        a. Have failed to comply with any country, state, and local travel restrictions.         b. Have had any unexpected flu-like symptoms (such as fever, chills, cough, shortness of breath, diarrhea, sore throat, runny nose, or trouble breathing).        c. Have had any contact with people confirmed COVID-19.         d. Have been confirmed to have COVID-19 and have not subsequently received a negative COVID-19 test result.    Yes   8   If Cohort 1 (In-Lab Cardiac Conditions):         a. Indication of a rhythm disorder (dated up to 5 years ago) as outlined in Table A (see Protocol page 9), and be present at the time of screening.     NA   9   If Cohort 2 (In-Lab Respiratory Conditions):          a. Prior diagnosis of one of the following conditions, within 5 years: 1) Moderate (GOLD Stage 2) COPD, 2) Severe or Very Severe (GOLD Stage 3 or 4) COPD, 3) Idiopathic pulmonary fibrosis.          b. Record of spirometry FEV% result (within 5 years) are available.    Yes   10   If Cohort 3 (At-Home respiratory Conditions):         a. Prior  diagnosis of one of the following conditions, within 5 years: 1) Moderate (GOLD Stage 2) COPD, 2) Severe or Very Severe (GOLD Stage 3 or 4) COPD, 3) Idiopathic pulmonary fibrosis.          b. Record of spirometry FEV% result (within 5 years) are available.          c. Willing and able to use a study provided iPhone and navigate study Gabi flow.          D. Stable WIFI at home and are able to connect it to study iPhones   NA       Criteria # Exclusion Criteria (ALL MUST BE NO) YES/NO/N/A   1   Individuals with severe contact allergies to standard adhesives, or other materials found in pulse oximetry sensors, ECG electrodes, respiration monitor electrodes, wearables device bands and watch surfaces.  No   2   Individuals that do not have at least 2 intact fingers (excluding thumb, *pinky will be excluded only for cohort 1 and cohort 2) on non-preferred hand to wear a watch.  No   3   Open wound(s) or active infections on wrists at study watch wear locations or where the ECG electrodes may be placed.  No   4   Physical disability that prevents safe and adequate testing.  No   5   Individuals with a pacemaker or an automated implantable cardioverter-defibrillator (AICD).  No   6   Individuals with physical scars, tattoos, or other skin markings on wrists where sensors or finger sensor are to be worn.  No   7   Individuals with clinically significant hand tremors, as judged by a Study Investigator.  No   8   Pregnant women.     Yes   9   Subjects with any medical history, physical exam, vital sign or any other study procedure finding/assessment that in the opinion of the Investigator could compromise subject safety during study participation or interfere with the study integrity and/or the accurate assessment of the study objectives.  No   10   Presence of skin conditions or disease at the fingers of SpO2% application sites that could interfere with SpO2% sensor placement or the accuracy of measurement. Such conditions  include, but are not limited to: extensive scarring, skin lesions, redness, infection or edema at target measurement sites.   No   11   Presence of long fingernails that interfere with the placement of the SpO2% sensor or nail polish at the fingers of SpO2% application sites.  No   12   Medical history or physical assessment finding that makes the subject inappropriate for participation, according to the investigator.  No     Patient does fulfill study inclusion criteria and no exclusion criteria are found.     Maged Ham MD    26-MAY-2022    Garima Tillman EP

## 2022-06-11 DIAGNOSIS — I10 BENIGN ESSENTIAL HYPERTENSION: ICD-10-CM

## 2022-06-14 RX ORDER — METOPROLOL SUCCINATE 50 MG/1
50 TABLET, EXTENDED RELEASE ORAL DAILY
Qty: 90 TABLET | Refills: 2 | Status: SHIPPED | OUTPATIENT
Start: 2022-06-14 | End: 2023-02-14

## 2022-06-15 NOTE — TELEPHONE ENCOUNTER
Last Clinic Visit: 12/20/19 with recommended 1 year follow up, [place] 1/8/21  Per lab note on 12/27/19:  Patient Result Comments   Viewed by Niurka Cisneros on 1/9/2020  1:51 PM   Written by Mara Combs MD on 12/27/2019 11:37 AM   Your labs are stable, except your potassium is a little lower despite increasing the losartan.  I would recommend restarting potassium 20 meq daily.  Please let me know if you need a new prescription   Please let me know if you have any questions or concerns.   -BLM   
Yes

## 2022-06-16 ENCOUNTER — TELEPHONE (OUTPATIENT)
Dept: INTERNAL MEDICINE | Facility: CLINIC | Age: 77
End: 2022-06-16

## 2022-06-16 ENCOUNTER — OFFICE VISIT (OUTPATIENT)
Dept: AUDIOLOGY | Facility: CLINIC | Age: 77
End: 2022-06-16
Payer: COMMERCIAL

## 2022-06-16 DIAGNOSIS — H90.3 SENSORINEURAL HEARING LOSS (SNHL), BILATERAL: Primary | ICD-10-CM

## 2022-06-16 DIAGNOSIS — H91.93 BILATERAL HEARING LOSS: Primary | ICD-10-CM

## 2022-06-16 DIAGNOSIS — H91.93 BILATERAL HEARING LOSS: ICD-10-CM

## 2022-06-16 PROCEDURE — 92591 PR HEARING AID EXAM BINAURAL: CPT | Performed by: AUDIOLOGIST

## 2022-06-16 NOTE — TELEPHONE ENCOUNTER
[facility] Call Center    Phone Message    May a detailed message be left on voicemail: no    Reason for Call: Order(s): Other:   Reason for requested: Hearing test   Date needed: 6/16/22  Provider name: Dr. Combs      Action Taken: Message routed to:  [facility] ([facility]

## 2022-06-16 NOTE — TELEPHONE ENCOUNTER
Patient is scheduled to see Audiology today for hearing evaluation for hearing aid.    Audiology Referral for audiogram and hearing aid pended for provider to approve and sign.    RE: bilateral hearing loss

## 2022-06-16 NOTE — PROGRESS NOTES
AUDIOLOGY REPORT    SUBJECTIVE: Niurka Cisneros is a 76 year old female was seen in the Audiology Clinic at  [facility] on 6/16/22 for an audiologic evaluation, referred by Self and to discuss concerns with hearing and functional communication difficulties. The patient was not accompanied by anyone. Niurka has been seen previously on 4/8/19, and results revealed an asymmetric bilateral sensorineural hearing loss. It was recommended at that time that patient follow up with ENT for asymmetry, but it does not appear that she did. The patient currently utilizes binaural ReSound Linx 3D 9 -in-the-ear hearing aids fit at an outside clinic. She was seen on 4/22/22 for a hearing aid check and the left hearing aid was found to be weak. Patient wished to consider new technology instead of sending the device in for an out of warranty repair. Niurka notes difficulty with communication in a variety of listening situations, particularly in background noise and restaurants.      OBJECTIVE: Discussed that unfortunately Medicare requires an order for hearing tests. Patient was notified that without an order, she may be responsible for the cost of the hearing test. A message was sent to patient's primary care provider prior to the appointment, but a response was not received yet. Patient requested her primary care provider be contacted via telephone. Provider contacted office, but was informed primary care provider was not in today. A message was sent to her nurse/nursing pool to see if an order could be obtained prior to the end of the appointment. It was decided that patient will continue hearing aid consultation today. If order is received, hearing test will be completed today. If order is not received, patient will return for hearing test prior to the fitting of the new devices. Patient expressed understanding and agreement with this plan.     Patient is a hearing aid  candidate. Patient would like to move forward with a hearing aid evaluation today. Therefore, the patient was presented with different options for amplification to help aid in communication. Discussed styles, levels of technology and monaural vs. binaural fitting. Discussed disposable vs rechargeable batteries and connectivity options. Niurka reports she previously tried an in-the-ear hearing aid, but did not like the sound quality of her own voice. She would prefer to keep the -in-the-ear style, but is open to other manufacturers. The patient utilizes an iPhone. She listens to books on tape, but does not use the [employer] arian currently. She is interested in connecting to her laptop via bluetooth. She would also prefer to keep disposable batteries as she is without electricity at times such as hiking. 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     The hearing aid(s) mutually chosen were:  Binaural: Phonak Audeo   COLOR: P1  BATTERY SIZE: 312  EARMOLD/TIPS: medium closed  CANAL/ LENGTH: 1M (Note that 1P receivers were also order in case hearing has shifted and a stronger  is needed for fitting, frequency lowering may also be activated)  ACCESSORY: Partner Ja (patient is considering TV connector as well, but would purchase out of pocket)    Otoscopy revealed ears are clear of cerumen bilaterally.    ASSESSMENT: Hearing evaluation was not completed today per patient request as no order had been received. Reviewed purchase information and warranty information with patient. The 45 day trial period was explained to patient. The patient was given a copy of the [place] Department of Health consumer brochure on purchasing hearing instruments. Patient risk factors have been provided to the patient in writing prior to the sale of the hearing aid per FDA regulation. The risk factors are also available in the User Instructional Booklet to be  presented on the day of the hearing aid fitting. Hearing aid(s) ordered. Hearing aid evaluation completed.    PLAN: Niurka is scheduled to return in 2-3 weeks for a hearing aid fitting and programming. Purchase agreement will be completed on that date. Prior to this, patient will be seen for a hearing evaluation to ensure hearing aids are programmed properly. Please contact this clinic with any questions or concerns.      Gucci Burroughs. CCC-A  Licensed Audiologist   [place] #[id number]

## 2022-06-17 ENCOUNTER — TELEPHONE (OUTPATIENT)
Dept: AUDIOLOGY | Facility: CLINIC | Age: 77
End: 2022-06-17
Payer: MEDICARE

## 2022-06-17 NOTE — TELEPHONE ENCOUNTER
LVM letting patient know order for hearing test was received. Reminded patient of appointment on July 15th at 7:30 am for hearing test with hearing aid fitting after at 8:00. Provided call back number for questions.     Gucci Burroughs. CCC-A  Licensed Audiologist   [place] #[id number]

## 2022-06-21 ENCOUNTER — INFUSION THERAPY VISIT (OUTPATIENT)
Dept: INFUSION THERAPY | Facility: CLINIC | Age: 77
End: 2022-06-21
Payer: MEDICARE

## 2022-06-21 VITALS
OXYGEN SATURATION: 96 % | TEMPERATURE: 98.1 F | HEART RATE: 71 BPM | SYSTOLIC BLOOD PRESSURE: 127 MMHG | DIASTOLIC BLOOD PRESSURE: 75 MMHG

## 2022-06-21 DIAGNOSIS — J45.50 SEVERE PERSISTENT ASTHMA WITHOUT COMPLICATION (H): Primary | ICD-10-CM

## 2022-06-21 PROCEDURE — 96372 THER/PROPH/DIAG INJ SC/IM: CPT

## 2022-06-21 PROCEDURE — 250N000011 HC RX IP 250 OP 636

## 2022-06-21 RX ADMIN — BENRALIZUMAB 30 MG: 30 INJECTION, SOLUTION SUBCUTANEOUS at 08:20

## 2022-06-21 ASSESSMENT — PAIN SCALES - GENERAL: PAINLEVEL: NO PAIN (0)

## 2022-06-21 NOTE — LETTER
2022         RE: Niurka Cisneros  [address]  [place] [address]        Dear Colleague,    Thank you for referring your patient, Niurka Cisneros, to the [facility]. Please see a copy of my visit note below.    General assessment for IV and SQ medications    1. Elevated temperature, fever, chills, productive cough or abnormal vital signs, night sweats, coughing up blood or sputum, no appetite or abnormal vital signs, SOB : NO    2. Open wounds or new incisions: NO    3. Recent hospitalization: NO    4.  Recent surgeries:  NO    5. Any upcoming surgeries or dental procedures?:NO    6. Any current or recent bouts of illness or infection? On any antibiotics? : NO    7. Any new, sudden or worsening abdominal pain :NO    8. Vaccination within 4 weeks? Patient or someone in the household is scheduled to receive vaccination? No live virus vaccines prior to or during treatment :NO    9. Any nervous system diseases [i.e. multiple sclerosis, Guillain-Varna, seizures, neurological  Changes]  Ask if on a biologic medication* : NO    10. Pregnant or breast feeding; or plans on pregnancy in the future: NO    11. Signs of worsening depression or suicidal ideations while taking benlysta:NO    12. New-onset medical symptoms: NO    13.  New cancer diagnosis or on chemotherapy or radiation NO    14.  Evaluate for any sign of active TB [Unexplained weight loss, Loss of appetite, Night sweats, Fever, Fatigue, Chills, Coughing for 3 weeks or longer, Hemoptysis (coughing up blood), Chest pain]: NO    **Note: If answered yes to any of the above, hold the infusion and contact ordering provider.          Patient presents to the [facility] for Fasenra injection.  Order written by Dr. Vega was completed today. Name and  verified with patient. See MAR for medication details. Medication was divided into 1 syringes by pharmacy and given in the following sites back side of left  upper arm. Patient tolerated injection well and was monitored for 30 minutes after administration. Patient was discharged to home. Patient to return back in 8 weeks.      HUONG Cary LPN    Administrations This Visit     benralizumab (FASENRA) injection 30 mg     Admin Date  06/21/2022 Action  Given Dose  30 mg Route  Subcutaneous Administered By  Javed Cary LPN                    Again, thank you for allowing me to participate in the care of your patient.        Sincerely,        [facility]

## 2022-06-21 NOTE — PROGRESS NOTES
Patient presents to the [facility] for Fasenra injection.  Order written by Dr. Vega was completed today. Name and  verified with patient. See MAR for medication details. Medication was divided into 1 syringes by pharmacy and given in the following sites back side of left upper arm. Patient tolerated injection well and was monitored for 30 minutes after administration. Patient was discharged to home. Patient to return back in 8 weeks.      HUONG Cary LPN    Administrations This Visit     benralizumab (FASENRA) injection 30 mg     Admin Date  2022 Action  Given Dose  30 mg Route  Subcutaneous Administered By  Javed Cary LPN

## 2022-06-21 NOTE — PROGRESS NOTES
General assessment for IV and SQ medications    1. Elevated temperature, fever, chills, productive cough or abnormal vital signs, night sweats, coughing up blood or sputum, no appetite or abnormal vital signs, SOB : NO    2. Open wounds or new incisions: NO    3. Recent hospitalization: NO    4.  Recent surgeries:  NO    5. Any upcoming surgeries or dental procedures?:NO    6. Any current or recent bouts of illness or infection? On any antibiotics? : NO    7. Any new, sudden or worsening abdominal pain :NO    8. Vaccination within 4 weeks? Patient or someone in the household is scheduled to receive vaccination? No live virus vaccines prior to or during treatment :NO    9. Any nervous system diseases [i.e. multiple sclerosis, Guillain-Paris, seizures, neurological  Changes]  Ask if on a biologic medication* : NO    10. Pregnant or breast feeding; or plans on pregnancy in the future: NO    11. Signs of worsening depression or suicidal ideations while taking benlysta:NO    12. New-onset medical symptoms: NO    13.  New cancer diagnosis or on chemotherapy or radiation NO    14.  Evaluate for any sign of active TB [Unexplained weight loss, Loss of appetite, Night sweats, Fever, Fatigue, Chills, Coughing for 3 weeks or longer, Hemoptysis (coughing up blood), Chest pain]: NO    **Note: If answered yes to any of the above, hold the infusion and contact ordering provider.

## 2022-07-15 ENCOUNTER — OFFICE VISIT (OUTPATIENT)
Dept: AUDIOLOGY | Facility: CLINIC | Age: 77
End: 2022-07-15

## 2022-07-15 ENCOUNTER — OFFICE VISIT (OUTPATIENT)
Dept: AUDIOLOGY | Facility: CLINIC | Age: 77
End: 2022-07-15
Payer: MEDICARE

## 2022-07-15 ENCOUNTER — TELEPHONE (OUTPATIENT)
Dept: AUDIOLOGY | Facility: CLINIC | Age: 77
End: 2022-07-15

## 2022-07-15 ENCOUNTER — TRANSFERRED RECORDS (OUTPATIENT)
Dept: AUDIOLOGY | Facility: CLINIC | Age: 77
End: 2022-07-15

## 2022-07-15 DIAGNOSIS — H90.3 SNHL (SENSORY-NEURAL HEARING LOSS), ASYMMETRICAL: ICD-10-CM

## 2022-07-15 DIAGNOSIS — H90.3 SNHL (SENSORY-NEURAL HEARING LOSS), ASYMMETRICAL: Primary | ICD-10-CM

## 2022-07-15 DIAGNOSIS — H90.3 SENSORINEURAL HEARING LOSS (SNHL), BILATERAL: Primary | ICD-10-CM

## 2022-07-15 PROCEDURE — 92550 TYMPANOMETRY & REFLEX THRESH: CPT | Mod: 52 | Performed by: AUDIOLOGIST

## 2022-07-15 PROCEDURE — V5261 HEARING AID, DIGIT, BIN, BTE: HCPCS | Mod: GY | Performed by: AUDIOLOGIST

## 2022-07-15 PROCEDURE — 92557 COMPREHENSIVE HEARING TEST: CPT | Mod: GY | Performed by: AUDIOLOGIST

## 2022-07-15 PROCEDURE — V5011 HEARING AID FITTING/CHECKING: HCPCS | Mod: GY | Performed by: AUDIOLOGIST

## 2022-07-15 PROCEDURE — V5160 DISPENSING FEE BINAURAL: HCPCS | Mod: GY | Performed by: AUDIOLOGIST

## 2022-07-15 PROCEDURE — V5020 CONFORMITY EVALUATION: HCPCS | Mod: GY | Performed by: AUDIOLOGIST

## 2022-07-15 NOTE — TELEPHONE ENCOUNTER
[facility] Call Center    Phone Message    May a detailed message be left on voicemail: yes     Reason for Call: Other: Pt was seen in clinic today by Melody Torres for her new hearing aids and pt is already having a problem with the left hearing aid. She says that there is a constant static sound coming from that one. Please call pt to discuss.     Action Taken: Message routed to:  [facility] ([facility]): ent    Travel Screening: Not Applicable

## 2022-07-15 NOTE — PROGRESS NOTES
AUDIOLOGY REPORT    SUBJECTIVE: Niurka Cisneros is a 76 year old female who was seen in the Audiology Clinic at the [facility] for a fitting of binaural Phonak Audeo  -in-the-ear hearing aids. Previous results have revealed a bilateral asymmetric sensorineural hearing loss. The patient was seen for an updated hearing test directly prior to the fitting of the hearing aids. She is a long time hearing aid user and previously utilized ReSound hearing aids.     OBJECTIVE: Time was spent discussing results of patient hearing test this morning. Discussed that hearing had worsened slightly bilaterally. Also discussed that asymmetry is still present. It does not appear that patient followed up with ENT as previously recommended. Again discussed that it is recommended that patient follow up with ENT due to asymmetry in hearing. She reports that she has had imaging and numerous neurology appointments before and does not wish to follow up with ENT at this time.     The hearing aid conformity evaluation was completed.The hearing aids were placed and they provided a good fit. Real-ear-probe-microphone measurements were completed on the [employer] system and were a good match to NAL-NL1 target with soft sounds audible, moderate sounds comfortable, and loud sounds below discomfort. UCLs are verified through maximum power output measures and demonstrate appropriate limiting of loud inputs. Niurka was oriented to proper hearing aid use, care, cleaning (no water, dry brush), batteries (size: 312, insertion/removal, toxicity, low-battery signal), aid insertion/removal, user booklet, warranty information, storage cases, and other hearing aid details. The patient confirmed understanding of hearing aid use and care, and showed proper insertion of hearing aid and batteries while in the office today. Niurka reported the volume was a little loud and her own voice was an echo.  "Overall gain was reduced by 3 dB bilaterally. She also reports that the left is louder than the right. Overall gain was reduced an additional 3 dB. Following this she reported good volume and noted the hearing aids sounded balance. Domes were changed to medium open and no feedback was noted, occlusion was slightly improved. Occlusion compensation was set for medium with some improvement noted. Overall gain for low frequencies was reduced additional 3 dB and patient reported improvement. She will allow time for her brain to adjust to the new sound. Following these changes Niurka reported good volume and sound quality today.   Hearing aids were programmed as follows:  Program 1:All Around   Push button is currently active for phone calls    EAR(S) FIT: Bilateral  HEARING AID MODEL NAME: Lockboxeo   HEARING AID STYLE: -in-the-ear behind-the-ear  EARMOLDS/TIP: Medium open domes   SERIAL NUMBERS: Right: [id number] Left: [id number]  WARRANTY END DATE: 9/13/2025  ACCESSORY: PartnerMic (SN:[id number])    The hearing aids were successfully connected to patient's Partner Ja. Proper use was demonstrated. The hearing aids were successfully connected to the patient's iPhone and to the [employer] arian. They were shown how to utilize the bluetooth to stream audio and phone calls and the arian was demonstrated. The patient was given additional resources regarding connectivity and bluetooth support to reference if needed.    Patient was counseled regarding using a mask and wearing hearing aids. It was discussed that the patient should be careful when removing the mask and always check the ears to be sure the hearing aids are still in place following mask removal.  It is recommended that the patient strongly consider the use of an \"ear saver\" which anchors the mask behind the neck if using a mask that typically goes behind the ears, or use a mask that ties behind the head. The patient expressed " understanding.    ASSESSMENT: Binaural Phonak [employer]  -in-the-ear hearing aid(s) were fit today. Verification measures were performed. Niurka signed the Hearing Aid Purchase Agreement and was given a copy, as well as details on her hearing aids. Patient was counseled that exact out of pocket amounts cannot be determined for hearing aid claims being sent to insurance. Any insurance coverage information presented to the patient is an estimate only, and is not a guarantee of payment. Patient has been advised to check with their own insurance.    PLAN:Niurka will return for follow-up in 2-3 weeks for a hearing aid review appointment. Please call this clinic with questions regarding today s appointment.    Gucci Burroughs. CCC-A  Licensed Audiologist   [place] #[id number]

## 2022-07-15 NOTE — TELEPHONE ENCOUNTER
Returned call to patient as requested. Discussed that left ear is worse than right and therefore turned up louder. Likely, the static noise is circuit noise which is normal. Patient is in agreement to try and get used to it. She would like to return for next available appointment next week to have adjustments if it is still bothersome. She will cancel appointment if it is not needed.     Gucci Burroughs. CCC-A  Licensed Audiologist   [place] #[id number]

## 2022-07-15 NOTE — PROGRESS NOTES
AUDIOLOGY REPORT     SUMMARY: Audiology visit completed. See audiogram for results.       RECOMMENDATIONS: Follow-up with ENT.     Aspen Burroughs CCC-A  Licensed Audiologist   [place] #[id number]

## 2022-07-18 ENCOUNTER — OFFICE VISIT (OUTPATIENT)
Dept: AUDIOLOGY | Facility: CLINIC | Age: 77
End: 2022-07-18
Payer: MEDICARE

## 2022-07-18 DIAGNOSIS — H90.3 SENSORINEURAL HEARING LOSS, BILATERAL: Primary | ICD-10-CM

## 2022-07-18 PROCEDURE — 99207 PR ASSESSMENT FOR HEARING AID: CPT | Performed by: AUDIOLOGIST

## 2022-07-18 NOTE — PROGRESS NOTES
"AUDIOLOGY REPORT    SUBJECTIVE: Niurka \"Roopa\" MARINA Cisneros is a 76 year old female who was seen in the Audiology Clinic at [facility] on 7/18/2022 for a follow-up check regarding the fitting of new hearing aids. Previous results have revealed normal hearing sloping to moderately severe sensorineural hearing loss for the right ear and normal hearing sloping to severe sensorineural hearing loss for the left ear. The patient has been seen previously in this clinic and was fit with bilateral Phonak Audeo P90 - 312 hearing aids on 7/15/2022. Roopa reports a static sound from the left hearing aid and that soft sounds seem too loud.     OBJECTIVE: Based on patient report, the following changes were made: Low level gain was reduced by 5 dB for the left hearing aid. Gain from 9048-0952 Hz was reduced by 8 dB, SoundRecover 2 was turned off, and Soft Noise Reduction was strengthened for both hearing aids. In the sound coyle, Roopa reported good volume and sound quality. She wished to try these changes out in the lobby. She reported still hearing a static sound in the lobby; however, we were standing below a large fan. She was willing to try these changes out for the next few weeks.    ASSESSMENT: A follow-up appointment for hearing aid fitting was completed today. Changes made as outlined above. No charge visit, as the hearing aids are in warranty.    PLAN: Roopa will return for her hearing aid review appointment as scheduled on 8/5/2022. Please call this clinic with any questions regarding today s appointment.      Aspen Red, [facility]-A  Licensed Audiologist  [place] #[id number]      "

## 2022-07-21 ENCOUNTER — TELEPHONE (OUTPATIENT)
Dept: AUDIOLOGY | Facility: CLINIC | Age: 77
End: 2022-07-21

## 2022-07-21 NOTE — TELEPHONE ENCOUNTER
Walk-in hearing aid services on 7/21/22: The patient came to the clinic hoping to have adjustments made to her recently fit hearing aids.  It was explained to her that she would need an appointment with an audiologist to make adjustments.  She's currently scheduled to see Melody Torres on 8/5.  I offered to replace the  on her left hearing aid, as continued static sound is her main complaint.  No static could be heard during a listening check of the hearing aid but the  was replaced under warranty anyway.  The dome was changed from a medium closed dome to a medium open dome.  The patient was advised to try the hearing aid with the new  and see if she notices any difference.  She will follow up with her audiologist on 8/5 for possible adjustments.  
98.6

## 2022-07-25 ENCOUNTER — TELEPHONE (OUTPATIENT)
Dept: CARDIOLOGY | Facility: CLINIC | Age: 77
End: 2022-07-25

## 2022-07-25 NOTE — TELEPHONE ENCOUNTER
"  Oban Study Pre-Visit Call      Study description:   Multiconditions PPG Study. The purpose of this research study is to collect data related to health for the development of mobile technologies. This data will include physiological signal recordings from medical devices and data collection software on Apple Watches (\"study watches\"). This study is not to provide any treatment nor assess safety and effectiveness, but rather to collect information for research and  purposes.     Niurka DIAZ Cisneros a 76 year old female, was called today to discuss participation in the Oban study. The following was reviewed with the patient.       You agree to comply with COVID precautionary measures required by local public health ordinances, workplace health and safety protocols, and/or the Study Team. Such measure may include, for example, wearing a mask, complying with social distancing guidelines, and/ or providing evidence of negative COVID-19 test result Yes       You are fully vaccinated per the most recent CDC guidelines Yes      You do not have any of the following symptoms: fever or chills; difficulty breathing; sustained loss of taste smell, or appetite. Yes-will do rapid covid swab      You do not have any of the following unexplained symptoms: fatigue or nausea; whole body muscle aches; new or unexpected headache; sore throat; congestion or runny nose; diarrhea or vomiting Yes      You have not had close contact with someone who is suspected or confirmed to have active COVID-19 in the last 14 days.Yes      Reminders    Please come 10 minutes early for your scheduled appointment time.    Bring your vaccination card with you to your scheduled appointment.     No smoking 2 hours prior to your appointment time.    Wear loose shirt.    Eat before you arrive.       Pauline Norris RN       "

## 2022-07-26 ENCOUNTER — ALLIED HEALTH/NURSE VISIT (OUTPATIENT)
Dept: CARDIOLOGY | Facility: CLINIC | Age: 77
End: 2022-07-26
Payer: MEDICARE

## 2022-07-26 ENCOUNTER — OFFICE VISIT (OUTPATIENT)
Dept: CARDIOLOGY | Facility: CLINIC | Age: 77
End: 2022-07-26

## 2022-07-26 VITALS
SYSTOLIC BLOOD PRESSURE: 105 MMHG | HEART RATE: 75 BPM | OXYGEN SATURATION: 96 % | WEIGHT: 165.79 LBS | TEMPERATURE: 98 F | DIASTOLIC BLOOD PRESSURE: 66 MMHG | RESPIRATION RATE: 16 BRPM | BODY MASS INDEX: 26.02 KG/M2 | HEIGHT: 67 IN

## 2022-07-26 DIAGNOSIS — Z00.6 EXAMINATION OF PARTICIPANT OR CONTROL IN CLINICAL RESEARCH: Primary | ICD-10-CM

## 2022-07-26 DIAGNOSIS — J44.9 CHRONIC OBSTRUCTIVE PULMONARY DISEASE, UNSPECIFIED COPD TYPE (H): Primary | ICD-10-CM

## 2022-07-26 PROCEDURE — 99207 PR NO CHARGE NURSE ONLY: CPT

## 2022-07-26 PROCEDURE — 99207 PR NO CHARGE-RESEARCH SERVICE: CPT

## 2022-07-26 NOTE — PROGRESS NOTES
Oban Study At-Home Note      Study description: Multiconditions PPG Sub-Study. The purpose of this research study is to collect data related to health for the development of mobile technologies. This data will include physiological signal recordings from medical devices and smart watch data collection software. This study is not to provide any treatment. This study will only collect information for research and  purposes.     Subject ID:  [id number]       SCREENING     Demographic Info  Niurka Cisneros   1945          76 year old  female    Past Medical History:   Diagnosis Date    COPD      Abdominal aortic aneurysm without rupture (H) 11/10/2015    [  ] repeat imaging due spring 2016 Descending aortic aneurysm.  Being monitored with serial angiogram      Aneurysm of right renal artery (H) 4/9/2019     Esophageal hypertension 1/25/2017     Essential hypertension 1/25/2017     Female bladder prolapse 11/10/2015     Hearing loss      Pulmonary nodules 1/25/2017     Seizures (H) 11/10/2015    Temporal seziure d/o.  Does not have LOC.  Has prodromal symptoms      Severe persistent asthma 11/10/2015    Since childhood.  Has been steroid dependent for many years. Has had cydney x2         Current Outpatient Medications:      albuterol (PROAIR HFA/PROVENTIL HFA/VENTOLIN HFA) 108 (90 Base) MCG/ACT inhaler, Inhale 2 puffs into the lungs every 6 hours as needed for shortness of breath / dyspnea or wheezing, Disp: 54 g, Rfl: 3     CALCIUM-VITAMIN D PO, Take 1 tablet by mouth daily, Disp: , Rfl:      carBAMazepine (TEGRETOL XR) 200 MG 12 hr tablet, Take 1 tablet (200 mg) by mouth daily as needed (seizure), Disp: 30 tablet, Rfl: 1     estradiol (ESTRING) 2 MG vaginal ring, Place 1 each vaginally every 3 months For additional refills, please schedule annual appointment at [phone], Disp: 1 each, Rfl: 3     fluticasone-salmeterol (WIXELA INHUB) 500-50 MCG/DOSE inhaler, Inhale 1 puff into the lungs  "every 12 hours, Disp: 180 each, Rfl: 3     magnesium 250 MG tablet, Take 1 tablet by mouth daily, Disp: , Rfl:      metoprolol succinate ER (TOPROL XL) 50 MG 24 hr tablet, Take 1 tablet (50 mg) by mouth daily With 25 mg tab for total of 75 mg Daily, Disp: 90 tablet, Rfl: 2     metoprolol succinate ER (TOPROL-XL) 25 MG 24 hr tablet, Take 1 tablet (25 mg) by mouth daily To be taken with 50mg tablet for total of 75mg daily, Disp: 90 tablet, Rfl: 3     montelukast (SINGULAIR) 10 MG tablet, Take 1 tablet (10 mg) by mouth At Bedtime, Disp: 90 tablet, Rfl: 3     NAPROXEN PO, Take 220 mg by mouth as needed , Disp: , Rfl:      potassium chloride ER (KLOR-CON) 20 MEQ CR tablet, Take 1 tablet (20 mEq) by mouth daily, Disp: 90 tablet, Rfl: 3     pravastatin (PRAVACHOL) 20 MG tablet, Take 1 tablet (20 mg) by mouth daily, Disp: 90 tablet, Rfl: 3     theophylline (DENIA-24) 400 MG 24 hr capsule, Take 1 capsule (400 mg) by mouth daily, Disp: 90 capsule, Rfl: 3     triamterene-HCTZ (MAXZIDE) 75-50 MG tablet, Take 1 tablet by mouth daily, Disp: 90 tablet, Rfl: 3    Allergies   Allergen Reactions     Quinolones         Past Surgical History:   Procedure Laterality Date     CATARACT IOL, RT/LT Bilateral      COLONOSCOPY N/A 2/14/2019     H CATARACT SURGICAL PACKAGE       HYSTERECTOMY       JOINT REPLACEMENT Left      NISSEN FUNDOPLICATION              Child-Bearing Potential?: No    Race: White  Race (Secondary): N/A    : No    Ethnicity: Non-/     Vitals:  Time Subject Sat: 0825   /66 (BP Location: Right arm, Patient Position: Chair, Cuff Size: Adult Regular)   Pulse 75   Temp 98  F (36.7  C) (Oral)   Resp 16   Ht 1.702 m (5' 7.01\")   Wt 75.2 kg (165 lb 12.6 oz)   LMP  (LMP Unknown)   SpO2 96%   BMI 25.96 kg/m         Sponsor Expected Values   Blood Pressure: SBP: ; DBP: 40-90  Pulse:  bpm  Temp: 35.5-37.5  C  Respiration: 10-23  Ht: in cm  Wt: in kg  SpO2%: %  BMI: " Rounded to nearest whole number      Screening Info:  Respiratory Conditions: COPD    Spirometer Test Results (FEV%): 65    Condition Severity: Moderate - Stage 2 (FEV 50-79%)    Sleep Conditions:  Sleep Apnea Diagnosis: No  Use of CPAP at Night: No  Stop Breathing or Gasping/Gurgling Sounds at Night: No  Snoring at Night: Yes    SPO2%  ? 95% during 3 minutes? No    Oxygen Therapy: No    Minutes of Exercise per Week: >60  Type of Activity: Both      Measurements & Preferences:  Dominant Hand: Right   Preferred Watch Hand: Left    Volunteer-Reported Acosta Scale: 3  Staff-Recorded Acosta Scale: 3    Hairiness Level: A: Thin Hair, Low Density     Wrist Circumference:  Left: 151 mm       Right: 158 mm    Spectrometer Values:            Left:   L*: 63.76    A*: 8.82   B*: 17.79      Right: L*: 57.54    A*: 9.53   B*: 15.41         STUDY PROCEDURE DATA      Study Date: 07/26/22  Study Time (Education Start Time): 09:30:00   Device IDs:  Day Watch ID 1: [id number]    Night Watch ID 2: [id number]    Watch Enclosure 1: Aluminum      Watch Enclosure 2: Aluminum   Watch Size 1: 44 mm  Watch Size 2: 44 mm  Nonin ID 1: [id number]   Nonin ID 2: [id number]   Lifestyle:  Moisturizing Cream/Lotion applied at wrist? No  Additional Comments (Device/participant issues): N/A     26-JUL-2022  Mitul Guidry

## 2022-07-26 NOTE — PROGRESS NOTES
"Oban Study Physical Exam    A rapid Covid-19 test was performed on site and was negative prior to study enrollment.       Medical History Reviewed? Yes    Physical Examination  For abnormal findings, please evaluate if the finding is Clinically Significant (by 'CS') or Not Clinically Significant (by 'NCS')  General Appearance   Normal  Head and Neck   Abnormal; NCS wears glasses and bilateral hearing aids  Lungs     Normal  Cardiovascular   Normal  Abdomen    Normal  Musculoskeletal/Extremities  Normal   Lymph Nodes    Normal  Skin     Normal  Neurological    Normal    Tremor (If present document)  Absent    Vitals:  /66 (BP Location: Right arm, Patient Position: Chair, Cuff Size: Adult Regular)   Pulse 75   Temp 98  F (36.7  C) (Oral)   Resp 16   Ht 1.702 m (5' 7.01\")   Wt 75.2 kg (165 lb 12.6 oz)   LMP  (LMP Unknown)   SpO2 96%   BMI 25.96 kg/m    BSA 1.89 m          COVID: No symptoms, chills, shortness of breath, or difficulty breathing, muscle or body aches, headache, loss of taste or smell, sore throat, runny nose, congestion, nausea, vomiting or diarrhea according to the [place] Department of Health and Human Services based on the CARES Act.     COVID Vaccinations:   Immunization History   Administered Date(s) Administered     COVID-19,PF,Pfizer (12+ Yrs) 02/19/2021, 03/12/2021, 10/03/2021     COVID-19,PF,Pfizer 12+ Yrs (2022 and After) 04/04/2022     HEPA 11/10/2008, 05/16/2014     Influenza (H1N1) 10/06/2017     Influenza (High Dose) 3 valent vaccine 10/26/2018     Influenza (IIV3) PF 09/22/2009, 10/18/2011, 09/12/2013, 09/04/2014, 10/13/2015     Pneumo Conj 13-V (2010&after) 09/04/2014     Pneumococcal 23 valent 04/25/2013     TDAP Vaccine (Boostrix) 09/07/2016     Zoster vaccine, live 10/01/2007     Smoking History  Are you currently smoking or vaping? No  How Many Years Have You Smoked or Vaped? Never  Packs or E-Cigs Per Day: 0     Electrocardiogram   ECG not applicable as subject is in the respiratory " cohort.     Respiratory Conditions:   COPD    Spirometer Test Results (FEV%): 65  Condition Severity: Moderate - Stage 2 (FEV 50-79%)      Nadya Weaver PA-C

## 2022-07-26 NOTE — PROGRESS NOTES
At-Home Rima Inclusion/Exclusion Criteria:     Study Name: Rima  : Maged Ham MD    Protocol version: 4.0 - [id number]    Criteria #  Inclusion Criterita (ALL MUST BE YES)  YES/NO/N/A   1   Male and female subjects at least 18 years old at the time of the screening visit.  Yes   2   Wrist circumference and 120mm-245mm (inclusive).  Yes   3   Ability to understand and provide written informed consent.  Yes   4   Willing and able to comply with study procedures, activities, and duration as described in the ICF.   Yes   5  If not vaccinated and up to date with COVID -19 vaccinations, willing to take a rapid AG test, or PCR test, and produce a negative result within 3 days of the study visit(s).   Yes   6   Didn't smoke at least 2 hours before screening (or study procedures).  Yes   7   Neither subject, nor any individuals living with subject, have had new development in the following within the last 14 days prior to study screening:        a. Have failed to comply with any country, state, and local travel restrictions.         b. Have had any unexpected flu-like symptoms (such as fever, chills, cough, shortness of breath, diarrhea, sore throat, runny nose, or trouble breathing).        c. Have had any contact with people confirmed COVID-19.         d. Have been confirmed to have COVID-19 and have not subsequently received a negative COVID-19 test result.    Yes   8   If Cohort 1 (In-Lab Cardiac Conditions):         a. Indication of a rhythm disorder (dated up to 5 years ago) as outlined in Table A (see Protocol page 9), and be present at the time of screening.     NA   9   If Cohort 2 (In-Lab Respiratory Conditions):          a. Prior diagnosis of one of the following conditions, within 5 years: 1) Moderate (GOLD Stage 2) COPD, 2) Severe or Very Severe (GOLD Stage 3 or 4) COPD, 3) Idiopathic pulmonary fibrosis.          b. Record of spirometry FEV% result (within 5 years) are available.    NA    10   If Cohort 3 (At-Home respiratory Conditions):         a. Prior diagnosis of one of the following conditions, within 5 years: 1) Moderate (GOLD Stage 2) COPD, 2) Severe or Very Severe (GOLD Stage 3 or 4) COPD, 3) Idiopathic pulmonary fibrosis.          b. Record of spirometry FEV% result (within 5 years) are available.          c. Willing and able to use a study provided iPhone and navigate study Gabi flow.          d. Stable WIFI at home and are able to connect it to study iPhones     Yes       Criteria # Exclusion Criteria (ALL MUST BE NO) YES/NO/N/A   1   Individuals with severe contact allergies to standard adhesives, or other materials found in pulse oximetry sensors, ECG electrodes, respiration monitor electrodes, wearables device bands and watch surfaces.    No   2   Individuals that do not have at least 2 intact fingers (excluding thumb, *pinky will be excluded only for cohort 1 and cohort 2) on non-preferred hand to wear a watch.    No   3   Open wound(s) or active infections on wrists at study watch wear locations or where the ECG electrodes may be placed.    No   4   Physical disability that prevents safe and adequate testing.  No   5   Individuals with a pacemaker or an automated implantable cardioverter-defibrillator (AICD).    No   6   Individuals with physical scars, tattoos, or other skin markings on wrists where sensors or finger sensor are to be worn.    No   7   Individuals with clinically significant hand tremors, as judged by a Study Investigator.    No   8   Pregnant women.     No   9   Subjects with any medical history, physical exam, vital sign or any other study procedure finding/assessment that in the opinion of the Investigator could compromise subject safety during study participation or interfere with the study integrity and/or the accurate assessment of the study objectives.    No   10   Presence of skin conditions or disease at the fingers of SpO2% application sites that could  interfere with SpO2% sensor placement or the accuracy of measurement. Such conditions include, but are not limited to: extensive scarring, skin lesions, redness, infection or edema at target measurement sites.     No   11   Presence of long fingernails that interfere with the placement of the SpO2% sensor or nail polish at the fingers of SpO2% application sites.    No   12   Medical history or physical assessment finding that makes the subject inappropriate for participation, according to the investigator.    No     Patient does fulfill study inclusion criteria and no exclusion criteria are found.     Maged Ham MD    26-JUL-2022    Mitul Guidry

## 2022-07-26 NOTE — PROGRESS NOTES
Oban Study At-Home Consent      Study description:   Multiconditions PPG Sub-Study. The purpose of this research study is to collect data related to health for the development of mobile technologies. This data will include physiological signal recordings from medical devices and smart watch data collection software. This study is not to provide any treatment. This study will only collect information for research and  purposes.     Niurka Cisneros a 76 year old female, was onsite today to discuss participation in the At-Home portion of the Oban study.   The consent form was reviewed with the patient.     The review of the study included:    Study Purpose     COVID-19 Criteria     At-Home Study Participation    Participant Responsibilities      Study Data and Devices    Benefits and Risks of Participation    Compensation and Costs of Participation    Voluntary Participation    Confidentiality     Injury and Legal Rights    The subject was queried in regards to her willingness to continue and her questions were answered to her satisfaction.     The patient has given her agreement to volunteer to participate in the above noted study.     The At-Home consent form and HIPPA form version 17 Jun 2022 was signed 26-JUL-2022 onsite in the Clinic Research Unit.     A copy of the At-Home Oban consent will be placed in subject's medical record.  A copy of the consent form was given to the subject today.    Study data is directly entered into Epic per protocol.     No study procedures were done prior to Niurka Cisneros providing informed consent.       Mitul Guidry

## 2022-07-27 ENCOUNTER — VIRTUAL VISIT (OUTPATIENT)
Dept: CARDIOLOGY | Facility: CLINIC | Age: 77
End: 2022-07-27
Payer: MEDICARE

## 2022-07-27 DIAGNOSIS — Z00.6 EXAMINATION OF PARTICIPANT OR CONTROL IN CLINICAL RESEARCH: Primary | ICD-10-CM

## 2022-07-27 PROCEDURE — 99207 PR NO CHARGE NURSE ONLY: CPT

## 2022-08-01 ENCOUNTER — ALLIED HEALTH/NURSE VISIT (OUTPATIENT)
Dept: CARDIOLOGY | Facility: CLINIC | Age: 77
End: 2022-08-01
Payer: MEDICARE

## 2022-08-01 DIAGNOSIS — Z00.6 EXAMINATION OF PARTICIPANT OR CONTROL IN CLINICAL RESEARCH: Primary | ICD-10-CM

## 2022-08-01 PROCEDURE — 99207 PR NO CHARGE NURSE ONLY: CPT

## 2022-08-01 NOTE — PROGRESS NOTES
Oban Study At-Home On-Site Visit    Study description:   Multiconditions PPG Sub-Study. The purpose of this research study is to collect data related to health for the development of mobile technologies. This data will include physiological signal recordings from medical devices and smart watch data collection software. This study is not to provide any treatment. This study will only collect information for research and  purposes.       Subject Number: [id number]    Study Day: Day 5    Niurka Cisneros a 76 year old female, was onsite today to return their first Nonin device and receive their third Nonin devices, per study protocol. With the new device, the subject was reminded to continue following the subject instructions. All questions were answered to their satisfaction.     Nonin ID 3: [id number]     Adverse Events & Con Med Assessment Performed?   [x]     1- AUG-2022    Tre Farris

## 2022-08-02 ENCOUNTER — VIRTUAL VISIT (OUTPATIENT)
Dept: CARDIOLOGY | Facility: CLINIC | Age: 77
End: 2022-08-02
Payer: MEDICARE

## 2022-08-02 DIAGNOSIS — Z00.6 EXAMINATION OF PARTICIPANT OR CONTROL IN CLINICAL RESEARCH: Primary | ICD-10-CM

## 2022-08-02 PROCEDURE — 99207 PR NO CHARGE NURSE ONLY: CPT

## 2022-08-02 NOTE — PROGRESS NOTES
"Oban Study At-Home Phone Visit    Study description:   Multiconditions PPG Sub-Study. The purpose of this research study is to collect data related to health for the development of mobile technologies. This data will include physiological signal recordings from medical devices and smart watch data collection software. This study is not to provide any treatment. This study will only collect information for research and  purposes.       Subject Number: [id number]    Study Day: Day 8     Oban study subject was called today to;     Confirm subjects are following the subject instructions     Address any technical difficulties     Answer any questions    Reminders Checklist:   [x]  Unlock the watches: Passcode - [id number]    -When placing them on the  or on themselves   [x]  Visually confirm Wi-Fi connection and charging setup  [x]  Unlock phones   -Unlock before placing on   -Should NOT show lock icon  [x]  Check Flubber arian for incomplete surveys   -Morning surveys: End of Night task on NIGHT phone    -Evening surveys: End of Day task on DAY phone  [x]  Take devices off before showering/getting them wet  [x]  Make sure to dismiss any update notifications. -Tap \"Not Now\"  [x]  Good Nonin wear    -While resting/relaxing, not while typing doing anything hand intensive   [x]  Remind them of next appointment with us     Adverse Events & Con Med Assessment Performed?   [x]  (If yes, please generate adverse event report for PI to cosign)      2-AUG-2022    Tre Farris    "

## 2022-08-05 ENCOUNTER — OFFICE VISIT (OUTPATIENT)
Dept: AUDIOLOGY | Facility: CLINIC | Age: 77
End: 2022-08-05
Payer: MEDICARE

## 2022-08-05 ENCOUNTER — VIRTUAL VISIT (OUTPATIENT)
Dept: CARDIOLOGY | Facility: CLINIC | Age: 77
End: 2022-08-05
Payer: MEDICARE

## 2022-08-05 DIAGNOSIS — Z00.6 RESEARCH SUBJECT: Primary | ICD-10-CM

## 2022-08-05 DIAGNOSIS — H90.3 SENSORINEURAL HEARING LOSS, BILATERAL: Primary | ICD-10-CM

## 2022-08-05 PROCEDURE — 99207 PR NO CHARGE NURSE ONLY: CPT

## 2022-08-05 PROCEDURE — 99207 PR ASSESSMENT FOR HEARING AID: CPT | Performed by: AUDIOLOGIST

## 2022-08-05 NOTE — PROGRESS NOTES
"Oban Study At-Home Phone Visit    Study description:   Multiconditions PPG Sub-Study. The purpose of this research study is to collect data related to health for the development of mobile technologies. This data will include physiological signal recordings from medical devices and smart watch data collection software. This study is not to provide any treatment. This study will only collect information for research and  purposes.       Subject Number: [id number]    Study Day: Day 11    Oban study subject was called today to;     Confirm subjects are following the subject instructions     Address any technical difficulties     Answer any questions    Reminders Checklist:   [x]  Unlock the watches: Passcode - [id number]    -When placing them on the  or on themselves   [x]  Visually confirm Wi-Fi connection and charging setup  [x]  Unlock phones   -Unlock before placing on   -Should NOT show lock icon  [x]  Check Flubber arian for incomplete surveys   -Morning surveys: End of Night task on NIGHT phone    -Evening surveys: End of Day task on DAY phone  [x]  Take devices off before showering/getting them wet  [x]  Make sure to dismiss any update notifications. -Tap \"Not Now\"  [x]  Good Nonin wear    -While resting/relaxing, not while typing doing anything hand intensive   [x]  Remind them of next appointment with us     Adverse Events & Con Med Assessment Performed?   [x] none (If yes, please generate adverse event report for PI to cosign)      5-AUG-2022    Pauline Norris RN    "

## 2022-08-05 NOTE — PROGRESS NOTES
AUDIOLOGY REPORT    SUBJECTIVE: Niurka Cisneros is a 76 year old female who was seen in the Audiology Clinic at the [facility] on 8/5/2022  for a follow-up check regarding the fitting of new hearing aids. Previous results have revealed normal hearing sloping to moderately severe sensorineural hearing loss for the right ear and normal hearing sloping to severe sensorineural hearing loss for the left ear. The patient has been seen previously in this clinic and was fit with bilateral Phonak Audeo P90 - 312 hearing aids on 7/15/2022. Roopa still reports a static sound from the left hearing aid, it is usually only absent in quiet environments.      OBJECTIVE: Based on patient report, the following changes were made; MPO was increased by 3 dB, low frequency sounds were decreased by 3 dB and soft input sounds were decreased by 3 dB. Roopa than walked into the lobby and noted static was still present. A listening check was completed while in the lobby. The overhead fan noise was noted as well as some circuit noise. The  was then changed from an M to an S and programming was updated. Roopa again returned to the lobby. She reports the static has decreased. It is still present, but much more tolerable. She would like to try these changes over the weekend. If she cannot acclimate to them, we discussed returning the Phonak devices and exchanging them for the comparable ReSound devices as she has been happy with her Resound hearing aids in the past.      Reviewed 45 day trial period, care, cleaning (no water, dry brush), batteries (size rechargeable) insertion/removal, toxicity, low-battery signal), aid insertion/removal, volume adjustment (if applicable), user booklet, warranty information, storage cases, and other hearing aid details.     No charge visit today (in warranty hearing aid check).      ASSESSMENT: A follow-up appointment for hearing aid fitting was completed today.  Changes to hearing aid was completed as outlined above.     PLAN: Niurka will contact the clinic on Monday with a decision regarding the hearing aids. If she decides to return the devices, the equivalent Resound hearing aids will be ordered and she will return for a fitting of the new devices. Please call this clinic with any questions regarding today s appointment.    Gucci Burroughs. CCC-A  Licensed Audiologist   [place] #[id number]

## 2022-08-10 ENCOUNTER — ALLIED HEALTH/NURSE VISIT (OUTPATIENT)
Dept: CARDIOLOGY | Facility: CLINIC | Age: 77
End: 2022-08-10
Payer: MEDICARE

## 2022-08-10 DIAGNOSIS — Z00.6 EXAMINATION OF PARTICIPANT OR CONTROL IN CLINICAL RESEARCH: Primary | ICD-10-CM

## 2022-08-10 PROCEDURE — 99207 PR NO CHARGE NURSE ONLY: CPT

## 2022-08-10 NOTE — PROGRESS NOTES
Rima At-Home Study End Note    Study Description:   Multiconditions PPG Sub-Study. The purpose of this research study is to collect data related to health for the development of mobile technologies. This data will include physiological signal recordings from medical devices and smart watch data collection software. This study is not to provide any treatment. This study will only collect information for research and  purposes.     Adverse Events & Con Med Assessment Performed?   [x]      Did the Subject Complete the At-Home Session? Yes    If no, Termination Reason: N/A    Study Termination/Completion Date: 10-AUG-2022    Subject returned devices today and this completes this study for the subject.    Rupert Hicks

## 2022-08-16 ENCOUNTER — INFUSION THERAPY VISIT (OUTPATIENT)
Dept: INFUSION THERAPY | Facility: CLINIC | Age: 77
End: 2022-08-16
Payer: MEDICARE

## 2022-08-16 VITALS
TEMPERATURE: 97.8 F | SYSTOLIC BLOOD PRESSURE: 114 MMHG | HEART RATE: 74 BPM | DIASTOLIC BLOOD PRESSURE: 69 MMHG | OXYGEN SATURATION: 97 %

## 2022-08-16 DIAGNOSIS — J45.50 SEVERE PERSISTENT ASTHMA WITHOUT COMPLICATION (H): Primary | ICD-10-CM

## 2022-08-16 PROCEDURE — 250N000011 HC RX IP 250 OP 636

## 2022-08-16 PROCEDURE — 96372 THER/PROPH/DIAG INJ SC/IM: CPT

## 2022-08-16 RX ADMIN — BENRALIZUMAB 30 MG: 30 INJECTION, SOLUTION SUBCUTANEOUS at 08:20

## 2022-08-16 ASSESSMENT — PAIN SCALES - GENERAL: PAINLEVEL: NO PAIN (0)

## 2022-08-16 NOTE — PROGRESS NOTES
Patient presents to the [facility] for Fasenra injection.  Order written by Dr. Vega was completed today. Name and  verified with patient. See MAR for medication details. Medication was divided into 1 syringes by pharmacy and given in the following sites back side of left upper arm. Patient tolerated injection well and was monitored for 30 minutes after administration. Patient was discharged to home. Patient to return back in 8 weeks.    HUONG Cary LPN    Administrations This Visit     benralizumab (FASENRA) injection 30 mg     Admin Date  2022 Action  Given Dose  30 mg Route  Subcutaneous Administered By  Javed Cary LPN

## 2022-08-16 NOTE — PROGRESS NOTES
~~~ NOTE: If the patient answers yes to any of the questions below, hold the infusion and contact ordering provider or on-call provider.    1.  Have you recently had an elevated temperature, fever, chills, productive cough, coughing for 3 weeks or longer or hemoptysis, abnormal vital signs, night sweats,  chest pain or have you noticed a decrease in your appetite, unexplained weight loss or fatigue? No  2. Do you have any open wounds or new incisions? (exclude for patients with hidradenitis suppurativa) No  3. Do you have any recent or upcoming hospitalizations, surgeries or dental procedures? No  4. Do you currently have or recently have had any signs of illness or infection or are you on any antibiotics? No  5. Have you or anyone in your household received a live vaccination in the past 4 weeks? Please note:  No live vaccines while on biologic/chemotherapy until 6 months after the last treatment.  Patient can receive the flu vaccine (shot only) and the pneumovax.  It is optimal for the patient to get these vaccines mid cycle, but they can be given at any time as long as it is not on the day of the infusion (exclude for patients receiving omalizumab or IV antibiotics e.g. ertapenem). No  6. Have you recently been diagnosed with any new nervous system diseases (ie. Multiple sclerosis, Guillain [place], seizures, neurological changes) or cancer diagnosis? No  7. Are you pregnant or breast feeding or do you have plans of pregnancy in the future? No  8. Have there been any other new onset medical symptoms? No

## 2022-08-16 NOTE — LETTER
2022         RE: Niurka Cisneros  [address]  [place] [address]        Dear Colleague,    Thank you for referring your patient, Niurka Cisneros, to the [facility]. Please see a copy of my visit note below.    ~~~ NOTE: If the patient answers yes to any of the questions below, hold the infusion and contact ordering provider or on-call provider.    1.  Have you recently had an elevated temperature, fever, chills, productive cough, coughing for 3 weeks or longer or hemoptysis, abnormal vital signs, night sweats,  chest pain or have you noticed a decrease in your appetite, unexplained weight loss or fatigue? No  2. Do you have any open wounds or new incisions? (exclude for patients with hidradenitis suppurativa) No  3. Do you have any recent or upcoming hospitalizations, surgeries or dental procedures? No  4. Do you currently have or recently have had any signs of illness or infection or are you on any antibiotics? No  5. Have you or anyone in your household received a live vaccination in the past 4 weeks? Please note:  No live vaccines while on biologic/chemotherapy until 6 months after the last treatment.  Patient can receive the flu vaccine (shot only) and the pneumovax.  It is optimal for the patient to get these vaccines mid cycle, but they can be given at any time as long as it is not on the day of the infusion (exclude for patients receiving omalizumab or IV antibiotics e.g. ertapenem). No  6. Have you recently been diagnosed with any new nervous system diseases (ie. Multiple sclerosis, Guillain [place], seizures, neurological changes) or cancer diagnosis? No  7. Are you pregnant or breast feeding or do you have plans of pregnancy in the future? No  8. Have there been any other new onset medical symptoms? No      Patient presents to the [facility] for Fasenra injection.  Order written by Dr. Vega was completed today. Name and  verified with  patient. See MAR for medication details. Medication was divided into 1 syringes by pharmacy and given in the following sites back side of left upper arm. Patient tolerated injection well and was monitored for 30 minutes after administration. Patient was discharged to home. Patient to return back in 8 weeks.    HUONG Cary LPN    Administrations This Visit     benralizumab (FASENRA) injection 30 mg     Admin Date  08/16/2022 Action  Given Dose  30 mg Route  Subcutaneous Administered By  Javed Cary LPN                      Again, thank you for allowing me to participate in the care of your patient.        Sincerely,        [facility]

## 2022-08-19 NOTE — TELEPHONE ENCOUNTER
[employer] message received from patient regarding Metoprolol and also twtrland message sent last week. Will route to Dr. Wang for review.         Last OV 1/22/21 w/ Dr. Wang:  Plan:      1. CT angiogram chest/abdomen/pelvis  2. Echocardiogram yearly  3. Genetic testing referral for full TAAD panel  4. Daughter to repeat echocardiogram  5. Son to obtain cardiac MRI and mRA   6. Continue current medications, unable to be on losartan due to side effects. SBP goal < 120/80 mmHg, HR < 60 bpm. hydrochlorothiazide triampterene. Metoprolol 12.5 mg twice day. Can transition to long acting. Watch for COPD and asthma exacerbation.   7. Ct mon ct   7. No heavy lifting > 40 lbs   8. Follow up in 3 months with matti Lujan RN February 1, 2021 2:06 PM      Pt presented to lab today without orders- he stated Dr. Ozuna ordered labs- he does not have written orders  Confirmed just PSA with [facility]- Will order PSA for him- sign if you agree

## 2022-09-11 ENCOUNTER — HEALTH MAINTENANCE LETTER (OUTPATIENT)
Age: 77
End: 2022-09-11

## 2022-10-03 ENCOUNTER — OFFICE VISIT (OUTPATIENT)
Dept: AUDIOLOGY | Facility: CLINIC | Age: 77
End: 2022-10-03
Payer: MEDICARE

## 2022-10-03 DIAGNOSIS — H90.3 SENSORINEURAL HEARING LOSS, BILATERAL: Primary | ICD-10-CM

## 2022-10-03 PROCEDURE — 99207 PR ASSESSMENT FOR HEARING AID: CPT | Performed by: AUDIOLOGIST

## 2022-10-03 NOTE — PROGRESS NOTES
AUDIOLOGY REPORT    SUBJECTIVE: Niurka Cisneros is a 77 year old female who was seen in the Audiology Clinic at the [facility] and [facility] for a fitting of binaural ReSound One 9 -in-the-ear hearing aids. Previous results have revealed normal hearing sloping to moderately severe sensorineural hearing loss for the right ear and normal hearing sloping to severe sensorineural hearing loss for the left ear. The patient has been seen previously in this clinic and was fit with bilateral Phonak Audeo P90 - 312 hearing aids on 7/15/2022. However, she was not happy with these devices and elected to return them. It was decided to try a ReSound hearing aid as she has previously liked the ReSound devices she's worn in the past.     OBJECTIVE: Patient returned Phonak hearing aid. The hearing aid conformity evaluation was completed.The hearing aids were placed and they provided a good fit. Real-ear-probe-microphone measurements were completed on the MobileDataforce system and were a good match to NAL-NL1 target with soft sounds audible, moderate sounds comfortable, and loud sounds below discomfort. UCLs are verified through maximum power output measures and demonstrate appropriate limiting of loud inputs. Niurka was oriented to proper hearing aid use, care, cleaning (no water, dry brush), batteries (size:312, insertion/removal, toxicity, low-battery signal), aid insertion/removal, user booklet, warranty information, storage cases, and other hearing aid details. The patient confirmed understanding of hearing aid use and care, and showed proper insertion of hearing aid and batteries while in the office today.Niurka reported good volume and sound quality today.   Hearing aids were programmed as follows:  Program 1:All Around  Program 2: UltraZoom/Speech in Noise  Push button is activated for volume change (short press) and program change (long press)    EAR(S) FIT: Bilateral  HEARING AID MODEL  "NAME: ReSound One 9 (961)  HEARING AID STYLE: -in-the-ear behind-the-ear  EARMOLDS/TIP: Medium open   SERIAL NUMBERS: Right: [id number] Left: [id number]  WARRANTY END DATE: 9/9/2025  ACCESSORY: Multi Ja (SN:[id number])    The hearing aids were successfully connected to the patient's iPhone and to the ReSound Smart 3D arian. They were shown how to utilize the bluetooth to stream audio and phone calls and the arian was demonstrated. The patient was given additional resources regarding connectivity and bluetooth support to reference if needed. Hearing aids were also connected to the Multi Fuelzee. Proper use was demonstrated.     Patient was counseled regarding using a mask and wearing hearing aids. It was discussed that the patient should be careful when removing the mask and always check the ears to be sure the hearing aids are still in place following mask removal.  It is recommended that the patient strongly consider the use of an \"ear saver\" which anchors the mask behind the neck if using a mask that typically goes behind the ears, or use a mask that ties behind the head. The patient expressed understanding.    Note that patient was previously billed on 7/15/22 for Phonak devices and this is considered an even exchanged. Therefore, today's appointment is at no charge.     ASSESSMENT: Binaural ReSound One 9 -in-the-ear hearing aid(s) were fit today. Verification measures were performed. Niurka signed the Hearing Aid Purchase Agreement and was given a copy, as well as details on her hearing aids. Patient was counseled that exact out of pocket amounts cannot be determined for hearing aid claims being sent to insurance. Any insurance coverage information presented to the patient is an estimate only, and is not a guarantee of payment. Patient has been advised to check with their own insurance.    PLAN:Niurka will return for follow-up in 2-3 weeks for a hearing aid review appointment. Please call this " clinic with questions regarding today s appointment.    Gucci Burroughs. CCC-A  Licensed Audiologist   [place] #[id number]

## 2022-10-03 NOTE — ADDENDUM NOTE
Addended by: ROBIN OAKLEY on: 12/27/2019 11:38 AM     Modules accepted: Orders     Dapsone Counseling: I discussed with the patient the risks of dapsone including but not limited to hemolytic anemia, agranulocytosis, rashes, methemoglobinemia, kidney failure, peripheral neuropathy, headaches, GI upset, and liver toxicity.  Patients who start dapsone require monitoring including baseline LFTs and weekly CBCs for the first month, then every month thereafter.  The patient verbalized understanding of the proper use and possible adverse effects of dapsone.  All of the patient's questions and concerns were addressed.

## 2022-10-06 ENCOUNTER — LAB (OUTPATIENT)
Dept: LAB | Facility: CLINIC | Age: 77
End: 2022-10-06
Attending: FAMILY MEDICINE
Payer: MEDICARE

## 2022-10-06 DIAGNOSIS — Z20.822 CLOSE EXPOSURE TO 2019 NOVEL CORONAVIRUS: ICD-10-CM

## 2022-10-06 PROCEDURE — U0003 INFECTIOUS AGENT DETECTION BY NUCLEIC ACID (DNA OR RNA); SEVERE ACUTE RESPIRATORY SYNDROME CORONAVIRUS 2 (SARS-COV-2) (CORONAVIRUS DISEASE [COVID-19]), AMPLIFIED PROBE TECHNIQUE, MAKING USE OF HIGH THROUGHPUT TECHNOLOGIES AS DESCRIBED BY CMS-2020-01-R: HCPCS

## 2022-10-06 PROCEDURE — U0005 INFEC AGEN DETEC AMPLI PROBE: HCPCS

## 2022-10-07 LAB — SARS-COV-2 RNA RESP QL NAA+PROBE: NEGATIVE

## 2022-10-11 ENCOUNTER — INFUSION THERAPY VISIT (OUTPATIENT)
Dept: INFUSION THERAPY | Facility: CLINIC | Age: 77
End: 2022-10-11
Payer: MEDICARE

## 2022-10-11 VITALS
HEART RATE: 63 BPM | OXYGEN SATURATION: 96 % | DIASTOLIC BLOOD PRESSURE: 61 MMHG | SYSTOLIC BLOOD PRESSURE: 138 MMHG | TEMPERATURE: 97.4 F

## 2022-10-11 DIAGNOSIS — J45.50 SEVERE PERSISTENT ASTHMA WITHOUT COMPLICATION (H): Primary | ICD-10-CM

## 2022-10-11 PROCEDURE — 96372 THER/PROPH/DIAG INJ SC/IM: CPT

## 2022-10-11 PROCEDURE — 250N000011 HC RX IP 250 OP 636

## 2022-10-11 RX ADMIN — BENRALIZUMAB 30 MG: 30 INJECTION, SOLUTION SUBCUTANEOUS at 08:37

## 2022-10-11 ASSESSMENT — PAIN SCALES - GENERAL: PAINLEVEL: NO PAIN (0)

## 2022-10-11 NOTE — PROGRESS NOTES
~~~ NOTE: If the patient answers yes to any of the questions below, hold the infusion and contact ordering provider or on-call provider.    1.  Have you recently had an elevated temperature, fever, chills, productive cough, coughing for 3 weeks or longer or hemoptysis, abnormal vital signs, night sweats,  chest pain or have you noticed a decrease in your appetite, unexplained weight loss or fatigue? No  2. Do you have any open wounds or new incisions? (exclude for patients with hidradenitis suppurativa) No  3. Do you have any recent or upcoming hospitalizations, surgeries or dental procedures? No  4. Do you currently have or recently have had any signs of illness or infection or are you on any antibiotics? No  5. Have you or anyone in your household received a live vaccination in the past 4 weeks? Please note:  No live vaccines while on biologic/chemotherapy until 6 months after the last treatment.  Patient can receive the flu vaccine (shot only) and the pneumovax.  It is optimal for the patient to get these vaccines mid cycle, but they can be given at any time as long as it is not on the day of the infusion (exclude for patients receiving omalizumab or IV antibiotics e.g. ertapenem). No  6. Have you recently been diagnosed with any new nervous system diseases (ie. Multiple sclerosis, Guillain [place], seizures, neurological changes) or cancer diagnosis? No  7. Are you pregnant or breast feeding or do you have plans of pregnancy in the future? No  8. Have there been any other new onset medical symptoms? No

## 2022-10-11 NOTE — PROGRESS NOTES
Patient presents to the [facility] for Fasenra injection.  Order written by Dr. Vega was completed today. Name and  verified with patient. See MAR for medication details. Medication was divided into 1 syringes by pharmacy and given in the following site back side of left upper arm. Patient tolerated injection well and was monitored for 30 minutes after administration. Patient was discharged to home. Patient to return back in 8 weeks.      HUONG Cary LPN    Administrations This Visit     benralizumab (FASENRA) injection 30 mg     Admin Date  10/11/2022 Action  Given Dose  30 mg Route  Subcutaneous Administered By  Javed Cary LPN

## 2022-10-24 ENCOUNTER — OFFICE VISIT (OUTPATIENT)
Dept: AUDIOLOGY | Facility: CLINIC | Age: 77
End: 2022-10-24
Payer: MEDICARE

## 2022-10-24 DIAGNOSIS — H90.3 SENSORINEURAL HEARING LOSS, BILATERAL: Primary | ICD-10-CM

## 2022-10-24 PROCEDURE — 99207 PR ASSESSMENT FOR HEARING AID: CPT | Performed by: AUDIOLOGIST

## 2022-10-24 NOTE — PROGRESS NOTES
AUDIOLOGY REPORT    SUBJECTIVE:Niurka Cisneros is a 77 year old female who was seen in the Audiology Clinic at the [facility] and [facility] on 10/24/2022  for a follow-up check regarding the fitting of new hearing aids. Previous results have revealed normal hearing sloping to moderately severe sensorineural hearing loss for the right ear and normal hearing sloping to severe sensorineural hearing loss for the left ear. The patient has been seen previously in this clinic and was fit with bilateral Phonak Audeo P90 - 312 hearing aids on 7/15/2022. However, she was not happy with these devices and elected to return them. She was fit with binaural ReSound One 9 -in-the-ear hearing aids on 10/3/22. Niurka reports that things are going much better with these hearing aids. She notes that her own voice and other voices, usually higher pitched, sound a little tinny. She would also like to review the Connect Clip.     OBJECTIVE: Based on patient report, the following changes were made; ReSound SmartFit guide was used for patient's complaints of tinny sound quality with women's voices and applied twice (increase low and mid frequency gain). Following this she reported improvement. She noted her own voice sounded more like it had previously. Simulated conversation between a man and woman were played on the Verifit and patient reported it was comfortable. We reviewed how to use Connect Clip and patient demonstrated proper use.     Reviewed 45 day trial period, care, cleaning (no water, dry brush), batteries (size 312) insertion/removal, toxicity, low-battery signal), aid insertion/removal, volume adjustment (if applicable), user booklet, warranty information, storage cases, and other hearing aid details. We discussed that last week ReSound came out with a new model. Patient was given the option to return current devices and get new ones. However, she would like to keep her current  devices as they are working well.     No charge visit today (in warranty hearing aid check).     ASSESSMENT: A follow-up appointment for hearing aid fitting was completed today. Changes to hearing aid was completed as outlined above.     PLAN:Niurka will return for follow-up as needed, or at least every 9-12 months for cleaning and assessment of hearing aid.  Please call this clinic with any questions regarding today s appointment.    Gucci Burroughs. CCC-A  Licensed Audiologist   [place] #[id number]

## 2022-11-11 DIAGNOSIS — J45.50 SEVERE PERSISTENT ASTHMA, UNSPECIFIED WHETHER COMPLICATED (H): ICD-10-CM

## 2022-11-11 RX ORDER — FLUTICASONE PROPIONATE AND SALMETEROL 500; 50 UG/1; UG/1
1 POWDER RESPIRATORY (INHALATION) 2 TIMES DAILY
Qty: 60 EACH | Refills: 3 | Status: SHIPPED | OUTPATIENT
Start: 2022-11-11 | End: 2023-01-09

## 2022-11-15 ENCOUNTER — TELEPHONE (OUTPATIENT)
Dept: PULMONOLOGY | Facility: CLINIC | Age: 77
End: 2022-11-15

## 2022-12-06 ENCOUNTER — INFUSION THERAPY VISIT (OUTPATIENT)
Dept: INFUSION THERAPY | Facility: CLINIC | Age: 77
End: 2022-12-06
Payer: MEDICARE

## 2022-12-06 VITALS
OXYGEN SATURATION: 95 % | HEART RATE: 91 BPM | RESPIRATION RATE: 16 BRPM | TEMPERATURE: 98.3 F | DIASTOLIC BLOOD PRESSURE: 81 MMHG | SYSTOLIC BLOOD PRESSURE: 156 MMHG

## 2022-12-06 DIAGNOSIS — J45.50 SEVERE PERSISTENT ASTHMA WITHOUT COMPLICATION (H): Primary | ICD-10-CM

## 2022-12-06 PROCEDURE — 96372 THER/PROPH/DIAG INJ SC/IM: CPT

## 2022-12-06 PROCEDURE — 250N000011 HC RX IP 250 OP 636

## 2022-12-06 RX ADMIN — BENRALIZUMAB 30 MG: 30 INJECTION, SOLUTION SUBCUTANEOUS at 15:33

## 2022-12-06 ASSESSMENT — PAIN SCALES - GENERAL: PAINLEVEL: NO PAIN (0)

## 2022-12-06 NOTE — LETTER
Date:December 8, 2022      Provider requested that no letter be sent. Do not send.       [facility]

## 2022-12-06 NOTE — PROGRESS NOTES
Phone assessment prior to ordering IV medications    Prior to Infusion of IV medications or any of these as listed:    1. Elevated temperature, fever, chills, productive cough or abnormal vital signs, night sweats, coughing up blood or sputum, no appetite or abnormal vital signs, SOB : NO    2. Open wounds or new incisions: NO    3. Recent hospitalization: NO    4.  Recent surgeries:  NO    5. Any upcoming surgeries or dental procedures?:NO    6. Any current or recent bouts of illness or infection? On any antibiotics? : NO    7. Vaccination within 4 weeks? Patient or someone in the household is scheduled to receive vaccination? No live virus vaccines prior to or during treatment :NO    8. Any nervous system diseases [i.e. multiple sclerosis, Guillain-[place], seizures, neurological  Changes]  Ask if on a biologic medication* : NO      9. New-onset medical symptoms: LALITA Gutierrez RN 12/06/22           **Note: If answered yes to any of the above, hold the infusion and contact ordering provider.

## 2022-12-06 NOTE — PROGRESS NOTES
Patient presents to the [facility] for Fasenra injection.  Order written by Dr. Vega was completed today. Name and  verified with patient. See MAR for medication details. Medication was divided into 1 syringes by pharmacy and given in the following sites back side of left upper arm. Patient tolerated injection well and was discharged to home. Patient to return back in 8 weeks.    HUONG Cary LPN      Administrations This Visit     benralizumab (FASENRA) injection 30 mg     Admin Date  2022 Action  Given Dose  30 mg Route  Subcutaneous Administered By  Javed Cary LPN

## 2022-12-06 NOTE — LETTER
2022         RE: Niurka Cisneros  [address]  [place] [address]        Dear Colleague,    Thank you for referring your patient, Niurka Cisneros, to the [facility]. Please see a copy of my visit note below.    Phone assessment prior to ordering IV medications    Prior to Infusion of IV medications or any of these as listed:    1. Elevated temperature, fever, chills, productive cough or abnormal vital signs, night sweats, coughing up blood or sputum, no appetite or abnormal vital signs, SOB : NO    2. Open wounds or new incisions: NO    3. Recent hospitalization: NO    4.  Recent surgeries:  NO    5. Any upcoming surgeries or dental procedures?:NO    6. Any current or recent bouts of illness or infection? On any antibiotics? : NO    7. Vaccination within 4 weeks? Patient or someone in the household is scheduled to receive vaccination? No live virus vaccines prior to or during treatment :NO    8. Any nervous system diseases [i.e. multiple sclerosis, Guillain-[place], seizures, neurological  Changes]  Ask if on a biologic medication* : NO      9. New-onset medical symptoms: NO      Minoo Gutierrez RN 22           **Note: If answered yes to any of the above, hold the infusion and contact ordering provider.          Patient presents to the [facility] for Fasenra injection.  Order written by Dr. Vega was completed today. Name and  verified with patient. See MAR for medication details. Medication was divided into 1 syringes by pharmacy and given in the following sites back side of left upper arm. Patient tolerated injection well and was discharged to home. Patient to return back in 8 weeks.    HUONG Cary LPN      Administrations This Visit     benralizumab (FASENRA) injection 30 mg     Admin Date  2022 Action  Given Dose  30 mg Route  Subcutaneous Administered By  Javed Cary LPN                    Again, thank you for allowing me to  participate in the care of your patient.        Sincerely,        No name on file

## 2023-01-08 ASSESSMENT — ENCOUNTER SYMPTOMS
DYSPNEA ON EXERTION: 1
MUSCLE CRAMPS: 0
SHORTNESS OF BREATH: 1
COUGH DISTURBING SLEEP: 1
BACK PAIN: 1
POSTURAL DYSPNEA: 0
SWOLLEN GLANDS: 0
WHEEZING: 1
SNORES LOUDLY: 0
BRUISES/BLEEDS EASILY: 0
COUGH: 1
SPUTUM PRODUCTION: 1
ARTHRALGIAS: 1
STIFFNESS: 1
JOINT SWELLING: 0
MYALGIAS: 0
MUSCLE WEAKNESS: 0
HEMOPTYSIS: 0
NECK PAIN: 0

## 2023-01-09 ENCOUNTER — OFFICE VISIT (OUTPATIENT)
Dept: PULMONOLOGY | Facility: CLINIC | Age: 78
End: 2023-01-09
Payer: MEDICARE

## 2023-01-09 VITALS
HEART RATE: 76 BPM | DIASTOLIC BLOOD PRESSURE: 85 MMHG | SYSTOLIC BLOOD PRESSURE: 148 MMHG | OXYGEN SATURATION: 96 % | HEIGHT: 67 IN | RESPIRATION RATE: 17 BRPM | BODY MASS INDEX: 24.64 KG/M2 | WEIGHT: 157 LBS

## 2023-01-09 DIAGNOSIS — J45.50 SEVERE PERSISTENT ASTHMA, UNSPECIFIED WHETHER COMPLICATED (H): Primary | ICD-10-CM

## 2023-01-09 PROCEDURE — G0463 HOSPITAL OUTPT CLINIC VISIT: HCPCS

## 2023-01-09 PROCEDURE — 99214 OFFICE O/P EST MOD 30 MIN: CPT

## 2023-01-09 RX ORDER — PREDNISONE 20 MG/1
40 TABLET ORAL DAILY
Qty: 10 TABLET | Refills: 0 | Status: SHIPPED | OUTPATIENT
Start: 2023-01-09 | End: 2024-01-08

## 2023-01-09 ASSESSMENT — PAIN SCALES - GENERAL: PAINLEVEL: NO PAIN (0)

## 2023-01-09 NOTE — PROGRESS NOTES
[facility]  Pulmonary Medicine  Visit Clinic Note  January 9, 2022         ASSESSMENT & PLAN       Severe persistent asthma with eosinophilia: 1 exacerbation in the setting of a viral infection just last month.  She is recovering from that.  She notes a subtle decline in her breathing over the year.  At this point, I am uncertain whether this is actually related to her lungs or some other factor.  Her lungs are clear to auscultation.  We decided to try treating her with 5 days of prednisone to see if that makes her breathing better or not.  This would support asthma.  She will get back to me after this treatment to let me know.  If it is not better, we could pursue pulmonary function testing with spirometry pre and postbronchodilator, lung volumes and diffusion capacity.  If that is unchanged, then I think her new shortness of breath is unrelated to her asthma, and we would have to look for extrapulmonary causes of shortness of breath.    RTC in 1 year       Rodney Vega MD     I spent 33 minutes on the date of the encounter reviewing the medical record, performing a history and physical exam, and discussing causes of shortness of breath.         Today's visit note:     Chief Complaint: Niurka Cisneros is a 77 year old year old female who is being seen for asthma/copd    HISTORY OF PRESENT ILLNESS:    Overall, she has had a good year from a respiratory standpoint.  No exacerbations with the exception of just this last December when she took 3 days of prednisone for a viral illness.  She does think that her breathing may have slightly declined compared to the year prior, but it has not been too significant.  For example, she finds her self a little more short of breath when she walks up stairs in her condominium.  Now she takes the elevator more frequently.  The cold bothers her as well, but that has always bothered her.  She still tries to remain pretty active.  She does 10 to 15 minutes of  "Pilates a day    She continues all of her same asthma medications.  She is on Wixela, theophylline, Singulair, Fasenra, and albuterol as needed.         Past Medical and Surgical History:     Past Medical History:   Diagnosis Date     Abdominal aortic aneurysm without rupture 11/10/2015    [  ] repeat imaging due spring 2016 Descending aortic aneurysm.  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  Does not have LOC.  Has prodromal symptoms      Severe persistent asthma 11/10/2015    Since childhood.  Has been steroid dependent for many years. Has had cydney x2       Past Surgical History:   Procedure Laterality Date     CATARACT IOL, RT/LT Bilateral      COLONOSCOPY N/A 2019    Procedure: COLONOSCOPY;  Surgeon: Haim Burgos MD;  Location:  GI     H CATARACT SURGICAL PACKAGE       HYSTERECTOMY      fibroids     JOINT REPLACEMENT Left      NISSEN FUNDOPLICATION      x2           Family History:     Family History   Problem Relation Age of Onset     Dementia Mother      Heart Failure Mother      Hypertension Mother      Breast Cancer Mother         post menopausal     Asthma Sister         x2     Depression Sister      Cancer Father         prostate cancer     Hypertension Father      Prostate Cancer Father          of it at 82     Asthma Father         \"outgrew\" it     Schizophrenia Maternal Grandmother      Coronary Artery Disease Maternal Grandfather         he was diabetic and smoked     Coronary Artery Disease Sister         MI when being ventilated for asthma     Asthma Sister         both sisters with asthma, one severe     Other - See Comments Daughter         Enlarged Aorta              Social History:     Social History "     Socioeconomic History     Marital status:      Spouse name: Not on file     Number of children: Not on file     Years of education: 20     Highest education level: Not on file   Occupational History     Occupation: pediatrician   Tobacco Use     Smoking status: Never     Smokeless tobacco: Never   Substance and Sexual Activity     Alcohol use: Yes     Alcohol/week: 0.0 standard drinks     Comment: 1 glass of wine with dinner     Drug use: No     Sexual activity: Not on file   Other Topics Concern     Parent/sibling w/ CABG, MI or angioplasty before 65F 55M? 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     Social Determinants of Health     Financial Resource Strain: Not on file   Food Insecurity: Not on file   Transportation Needs: Not on file   Physical Activity: Not on file   Stress: Not on file   Social Connections: Not on file   Intimate Partner Violence: Not on file   Housing Stability: Not on file            Medications:     Current Outpatient Medications   Medication     albuterol (PROAIR HFA/PROVENTIL HFA/VENTOLIN HFA) 108 (90 Base) MCG/ACT inhaler     CALCIUM-VITAMIN D PO     carBAMazepine (TEGRETOL XR) 200 MG 12 hr tablet     estradiol (ESTRING) 2 MG vaginal ring     fluticasone-salmeterol (WIXELA INHUB) 500-50 MCG/DOSE inhaler     magnesium 250 MG tablet     metoprolol succinate ER (TOPROL XL) 25 MG 24 hr tablet     metoprolol succinate ER (TOPROL XL) 50 MG 24 hr tablet     montelukast (SINGULAIR) 10 MG tablet     NAPROXEN PO     potassium chloride ER (KLOR-CON) 20 MEQ CR tablet     pravastatin (PRAVACHOL) 20 MG tablet     theophylline (DENIA-24) 400 MG 24 hr capsule     triamterene-HCTZ (MAXZIDE) 75-50 MG tablet     No current facility-administered medications for this visit.            Review of Systems:       Answers for HPI/ROS submitted by the patient on 1/8/2023  General Symptoms: No  Skin Symptoms: No  HENT Symptoms: No  EYE SYMPTOMS:  "No  HEART SYMPTOMS: No  LUNG SYMPTOMS: Yes  INTESTINAL SYMPTOMS: No  URINARY SYMPTOMS: No  GYNECOLOGIC SYMPTOMS: No  BREAST SYMPTOMS: No  SKELETAL SYMPTOMS: Yes  BLOOD SYMPTOMS: Yes  NERVOUS SYSTEM SYMPTOMS: No  MENTAL HEALTH SYMPTOMS: No  Cough: Yes  Sputum or phlegm: Yes  Coughing up blood: No  Difficulty breating or shortness of breath: Yes  Snoring: No  Wheezing: Yes  Difficulty breathing on exertion: Yes  Nighttime Cough: Yes  Difficulty breathing when lying flat: No  Back pain: Yes  Muscle aches: No  Neck pain: No  Swollen joints: No  Joint pain: Yes  Bone pain: No  Muscle cramps: No  Muscle weakness: No  Joint stiffness: Yes  Bone fracture: No  Anemia: Yes  Swollen glands: No  Easy bleeding or bruising: No  Edema or swelling: No          PHYSICAL EXAM:  BP (!) 148/85   Pulse 76   Resp 17   Ht 1.702 m (5' 7\")   Wt 71.2 kg (157 lb)   LMP  (LMP Unknown)   SpO2 96%   BMI 24.59 kg/m       General: Pleasant, no apparent distress  Eyes: Anicteric  Ears: Hearing grossly normal  Neck: supple  Respiratory: Clear to auscultation bilaterally.  No accessory muscle use.  Normal breathing pattern  Skin: No obvious rashes.  No peripheral edema.  Extremities: No cyanosis or clubbing  Neuro: Normal mentation. Normal speech.  Psych:Normal affect           Data:   All laboratory and imaging data reviewed.      Absolute eosinophils in May 2016 were 500    PFT:       PFT Interpretation:  Moderate airflow obstruction.  Valid Maneuver          "

## 2023-01-09 NOTE — NURSING NOTE
Chief Complaint   Patient presents with     RECHECK     Return 1 year follow up Asthma    Medications reviewed and vital signs taken.   Maxx Loera CMA

## 2023-01-09 NOTE — LETTER
1/9/2023         RE: Niurka Cisneros  [address]  [place] [address]        Dear Colleague,    Thank you for referring your patient, Niurka Cisneros, to the [facility]G [facility]ENCE [facility] Issue. Please see a copy of my visit note below.      [facility]  Pulmonary Medicine  Visit Clinic Note  January 9, 2022         ASSESSMENT & PLAN       Severe persistent asthma with eosinophilia: 1 exacerbation in the setting of a viral infection just last month.  She is recovering from that.  She notes a subtle decline in her breathing over the year.  At this point, I am uncertain whether this is actually related to her lungs or some other factor.  Her lungs are clear to auscultation.  We decided to try treating her with 5 days of prednisone to see if that makes her breathing better or not.  This would support asthma.  She will get back to me after this treatment to let me know.  If it is not better, we could pursue pulmonary function testing with spirometry pre and postbronchodilator, lung volumes and diffusion capacity.  If that is unchanged, then I think her new shortness of breath is unrelated to her asthma, and we would have to look for extrapulmonary causes of shortness of breath.    RTC in 1 year       Rodney Vega MD     I spent 33 minutes on the date of the encounter reviewing the medical record, performing a history and physical exam, and discussing causes of shortness of breath.         Today's visit note:     Chief Complaint: Niurka Cisneros is a 77 year old year old female who is being seen for asthma/copd    HISTORY OF PRESENT ILLNESS:    Overall, she has had a good year from a respiratory standpoint.  No exacerbations with the exception of just this last December when she took 3 days of prednisone for a viral illness.  She does think that her breathing may have slightly declined compared to the year prior, but it has not been too  "significant.  For example, she finds her self a little more short of breath when she walks up stairs in her condominium.  Now she takes the elevator more frequently.  The cold bothers her as well, but that has always bothered her.  She still tries to remain pretty active.  She does 10 to 15 minutes of Pilates a day    She continues all of her same asthma medications.  She is on Wixela, theophylline, Singulair, Fasenra, and albuterol as needed.         Past Medical and Surgical History:     Past Medical History:   Diagnosis Date     Abdominal aortic aneurysm without rupture 11/10/2015    [  ] repeat imaging due spring 2016 Descending aortic aneurysm.  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  Does not have LOC.  Has prodromal symptoms      Severe persistent asthma 11/10/2015    Since childhood.  Has been steroid dependent for many years. Has had cydney x2       Past Surgical History:   Procedure Laterality Date     CATARACT IOL, RT/LT Bilateral      COLONOSCOPY N/A 2019    Procedure: COLONOSCOPY;  Surgeon: Haim Burgos MD;  [facility]     H CATARACT SURGICAL PACKAGE       HYSTERECTOMY      fibroids     JOINT REPLACEMENT Left      NISSEN FUNDOPLICATION      x2           Family History:     Family History   Problem Relation Age of Onset     Dementia Mother      Heart Failure Mother      Hypertension Mother      Breast Cancer Mother         post menopausal     Asthma Sister         x2     Depression Sister      Cancer Father         prostate cancer     Hypertension Father      Prostate Cancer Father          of it at 82     Asthma Father         \"outgrew\" it     Schizophrenia Maternal Grandmother      Coronary Artery Disease Maternal " Grandfather         he was diabetic and smoked     Coronary Artery Disease Sister         MI when being ventilated for asthma     Asthma Sister         both sisters with asthma, one severe     Other - See Comments Daughter         Enlarged Aorta              Social History:     Social History     Socioeconomic History     Marital status:      Spouse name: Not on file     Number of children: Not on file     Years of education: 20     Highest education level: Not on file   Occupational History     Occupation: pediatrician   Tobacco Use     Smoking status: Never     Smokeless tobacco: Never   Substance and Sexual Activity     Alcohol use: Yes     Alcohol/week: 0.0 standard drinks     Comment: 1 glass of wine with dinner     Drug use: No     Sexual activity: Not on file   Other Topics Concern     Parent/sibling w/ CABG, MI or angioplasty before 65F 55M? 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     Social Determinants of Health     Financial Resource Strain: Not on file   Food Insecurity: Not on file   Transportation Needs: Not on file   Physical Activity: Not on file   Stress: Not on file   Social Connections: Not on file   Intimate Partner Violence: Not on file   Housing Stability: Not on file            Medications:     Current Outpatient Medications   Medication     albuterol (PROAIR HFA/PROVENTIL HFA/VENTOLIN HFA) 108 (90 Base) MCG/ACT inhaler     CALCIUM-VITAMIN D PO     carBAMazepine (TEGRETOL XR) 200 MG 12 hr tablet     estradiol (ESTRING) 2 MG vaginal ring     fluticasone-salmeterol (WIXELA INHUB) 500-50 MCG/DOSE inhaler     magnesium 250 MG tablet     metoprolol succinate ER (TOPROL XL) 25 MG 24 hr tablet     metoprolol succinate ER (TOPROL XL) 50 MG 24 hr tablet     montelukast (SINGULAIR) 10 MG tablet     NAPROXEN PO     potassium chloride ER (KLOR-CON) 20 MEQ CR tablet     pravastatin (PRAVACHOL) 20 MG tablet     theophylline (DENIA-24) 400 MG 24 hr  "capsule     triamterene-HCTZ (MAXZIDE) 75-50 MG tablet     No current facility-administered medications for this visit.            Review of Systems:       Answers for HPI/ROS submitted by the patient on 1/8/2023  General Symptoms: No  Skin Symptoms: No  HENT Symptoms: No  EYE SYMPTOMS: No  HEART SYMPTOMS: No  LUNG SYMPTOMS: Yes  INTESTINAL SYMPTOMS: No  URINARY SYMPTOMS: No  GYNECOLOGIC SYMPTOMS: No  BREAST SYMPTOMS: No  SKELETAL SYMPTOMS: Yes  BLOOD SYMPTOMS: Yes  NERVOUS SYSTEM SYMPTOMS: No  MENTAL HEALTH SYMPTOMS: No  Cough: Yes  Sputum or phlegm: Yes  Coughing up blood: No  Difficulty breating or shortness of breath: Yes  Snoring: No  Wheezing: Yes  Difficulty breathing on exertion: Yes  Nighttime Cough: Yes  Difficulty breathing when lying flat: No  Back pain: Yes  Muscle aches: No  Neck pain: No  Swollen joints: No  Joint pain: Yes  Bone pain: No  Muscle cramps: No  Muscle weakness: No  Joint stiffness: Yes  Bone fracture: No  Anemia: Yes  Swollen glands: No  Easy bleeding or bruising: No  Edema or swelling: No          PHYSICAL EXAM:  BP (!) 148/85   Pulse 76   Resp 17   Ht 1.702 m (5' 7\")   Wt 71.2 kg (157 lb)   LMP  (LMP Unknown)   SpO2 96%   BMI 24.59 kg/m       General: Pleasant, no apparent distress  Eyes: Anicteric  Ears: Hearing grossly normal  Neck: supple  Respiratory: Clear to auscultation bilaterally.  No accessory muscle use.  Normal breathing pattern  Skin: No obvious rashes.  No peripheral edema.  Extremities: No cyanosis or clubbing  Neuro: Normal mentation. Normal speech.  Psych:Normal affect           Data:   All laboratory and imaging data reviewed.      Absolute eosinophils in May 2016 were 500    PFT:       PFT Interpretation:  Moderate airflow obstruction.  Valid Maneuver      Again, thank you for allowing me to participate in the care of your patient.      Sincerely,    Jose De Jesus Vgea MD    "

## 2023-01-11 ENCOUNTER — ANCILLARY PROCEDURE (OUTPATIENT)
Dept: CT IMAGING | Facility: CLINIC | Age: 78
End: 2023-01-11
Attending: INTERNAL MEDICINE
Payer: MEDICARE

## 2023-01-11 ENCOUNTER — TELEPHONE (OUTPATIENT)
Dept: CARDIOLOGY | Facility: CLINIC | Age: 78
End: 2023-01-11

## 2023-01-11 ENCOUNTER — ANCILLARY PROCEDURE (OUTPATIENT)
Dept: CARDIOLOGY | Facility: CLINIC | Age: 78
End: 2023-01-11
Attending: INTERNAL MEDICINE
Payer: MEDICARE

## 2023-01-11 DIAGNOSIS — I71.21 ASCENDING AORTIC ANEURYSM (H): ICD-10-CM

## 2023-01-11 DIAGNOSIS — I71.20 THORACIC AORTIC ANEURYSM WITHOUT RUPTURE (H): ICD-10-CM

## 2023-01-11 LAB
CREAT BLD-MCNC: 1 MG/DL (ref 0.5–1)
GFR SERPL CREATININE-BSD FRML MDRD: 58 ML/MIN/1.73M2
LVEF ECHO: NORMAL

## 2023-01-11 PROCEDURE — G1004 CDSM NDSC: HCPCS | Performed by: RADIOLOGY

## 2023-01-11 PROCEDURE — 93306 TTE W/DOPPLER COMPLETE: CPT | Performed by: INTERNAL MEDICINE

## 2023-01-11 PROCEDURE — 74174 CTA ABD&PLVS W/CONTRAST: CPT | Mod: MG | Performed by: RADIOLOGY

## 2023-01-11 PROCEDURE — 82565 ASSAY OF CREATININE: CPT | Performed by: PATHOLOGY

## 2023-01-11 PROCEDURE — 71275 CT ANGIOGRAPHY CHEST: CPT | Mod: MG | Performed by: RADIOLOGY

## 2023-01-11 RX ORDER — IOPAMIDOL 755 MG/ML
125 INJECTION, SOLUTION INTRAVASCULAR ONCE
Status: COMPLETED | OUTPATIENT
Start: 2023-01-11 | End: 2023-01-11

## 2023-01-11 RX ADMIN — IOPAMIDOL 125 ML: 755 INJECTION, SOLUTION INTRAVASCULAR at 07:23

## 2023-01-11 NOTE — TELEPHONE ENCOUNTER
CTA chest/abdomen/pelvis done 1/11/23      IMPRESSION:  1. Ascending thoracic aorta measures 43 mm in diameter.     2. 14 x 18 x 14 mm distal right renal artery aneurysm, not  significantly changed.     YARELI FELTON MD       Echo 1/11/23   Interpretation Summary  Global and regional left ventricular function is normal with an EF of 55-60%.  Global right ventricular function is normal.  Moderate to severely dilated asscending aorta measuring 4.7 cm (indexed at 2.6  cm/m2).  No significant valvular abnormality.  The inferior vena cava was normal in size with preserved respiratory  variability.  No pericardial effusion is present.  This study was compared with the study from 1/18/21 .  No significant changes noted (Aorta measured 4.7 cm on prior study).    Will route to Dr. Wang to review.

## 2023-01-17 NOTE — TELEPHONE ENCOUNTER
Good news, everything looks stable! Hope you had a good holiday.     Best,   Dr. Wang         Pt updated via [employer]

## 2023-01-18 ENCOUNTER — MYC MEDICAL ADVICE (OUTPATIENT)
Dept: PULMONOLOGY | Facility: CLINIC | Age: 78
End: 2023-01-18
Payer: MEDICARE

## 2023-01-18 DIAGNOSIS — J45.50 SEVERE PERSISTENT ASTHMA, UNSPECIFIED WHETHER COMPLICATED (H): Primary | ICD-10-CM

## 2023-02-01 ENCOUNTER — INFUSION THERAPY VISIT (OUTPATIENT)
Dept: INFUSION THERAPY | Facility: CLINIC | Age: 78
End: 2023-02-01
Payer: MEDICARE

## 2023-02-01 VITALS
RESPIRATION RATE: 16 BRPM | OXYGEN SATURATION: 96 % | DIASTOLIC BLOOD PRESSURE: 78 MMHG | TEMPERATURE: 97.6 F | HEART RATE: 72 BPM | SYSTOLIC BLOOD PRESSURE: 122 MMHG

## 2023-02-01 DIAGNOSIS — J45.50 SEVERE PERSISTENT ASTHMA WITHOUT COMPLICATION (H): Primary | ICD-10-CM

## 2023-02-01 PROCEDURE — 250N000011 HC RX IP 250 OP 636

## 2023-02-01 PROCEDURE — 96372 THER/PROPH/DIAG INJ SC/IM: CPT

## 2023-02-01 RX ADMIN — BENRALIZUMAB 30 MG: 30 INJECTION, SOLUTION SUBCUTANEOUS at 16:30

## 2023-02-01 ASSESSMENT — PAIN SCALES - GENERAL: PAINLEVEL: NO PAIN (0)

## 2023-02-01 NOTE — LETTER
Date:February 2, 2023      Provider requested that no letter be sent. Do not send.       [facility]

## 2023-02-01 NOTE — LETTER
2/1/2023         RE: Niurka Cisneros  [address]  [place] [address]        Dear Colleague,    Thank you for referring your patient, Niurka Cisneros, to the [facility]. Please see a copy of my visit note below.    Infusion Nursing Note:  Niurka Cisneros presents today for   Chief Complaint   Patient presents with     Imm/Inj     benralizumab (FASENRA)       Patient seen by provider today: No   present during visit today: Not Applicable.    Note:   -Orders from Jose De Jesus Vega MD completed. Frequency: every 8 weeks.  -30mg Fasenra given subcutaneously into back of Rt arm.  -Pt observed x30min post-injection per protocol.    Intravenous Access:  No Intravenous access/labs at this visit.    Treatment Conditions:  Biological Infusion Checklist:  ~~~ NOTE: If the patient answers yes to any of the questions below, hold the infusion and contact ordering provider or on-call provider.    1. Have you recently had an elevated temperature, fever, chills, productive cough, coughing for 3 weeks or longer or hemoptysis, abnormal vital signs, night sweats,  chest pain or have you noticed a decrease in your appetite, unexplained weight loss or fatigue? No  2. Do you have any open wounds or new incisions? No  3. Do you have any recent or upcoming hospitalizations, surgeries or dental procedures? No  4. Do you currently have or recently have had any signs of illness or infection or are you on any antibiotics? No  5. Have you had any new, sudden or worsening abdominal pain? No  6. Have you or anyone in your household received a live vaccination in the past 4 weeks? Please note:  No live vaccines while on biologic/chemotherapy until 6 months after the last treatment.  Patient can receive the flu vaccine (shot only) and the pneumovax.  It is optimal for the patient to get these vaccines mid cycle, but they can be given at any time as long as it is not on  the day of the infusion. No  7. Have you recently been diagnosed with any new nervous system diseases (ie. Multiple sclerosis, Guillain [place], seizures, neurological changes) or cancer diagnosis? No  8. Are you on any form of radiation or chemotherapy? No  9. Are you pregnant or breast feeding or do you have plans of pregnancy in the future? N/A  10. Have you been having any signs of worsening depression or suicidal ideations?  (benlysta only) N/A  11. Have there been any other new onset medical symptoms? No    Post Infusion Assessment:  Patient tolerated injection without incident.  Site patent and intact, free from redness, edema or discomfort.     Discharge Plan:   Discharge instructions reviewed with: Patient.  Patient and/or family verbalized understanding of discharge instructions and all questions answered.  AVS to patient via [employer].  Scheduling will contact pt for next appointment.   Patient discharged in stable condition accompanied by: self.  Departure Mode: Ambulatory.      Leela Acuna RN    /74 (BP Location: Left arm, Patient Position: Sitting, Cuff Size: Adult Regular)   Pulse 84   Temp 97.6  F (36.4  C)   Resp 20   LMP  (LMP Unknown)   SpO2 96%     Administrations This Visit     benralizumab (FASENRA) injection 30 mg     Admin Date  02/01/2023 Action  Given Dose  30 mg Route  Subcutaneous Administered By  Leela Acuna RN                                Again, thank you for allowing me to participate in the care of your patient.        Sincerely,        Specialty Infusion Nurse

## 2023-02-01 NOTE — PATIENT INSTRUCTIONS
Dear Niurka Cisneros    Thank you for choosing [facility] ([facility]) for your Fasenra injection.  The following information is a summary of your appointment as well as important reminders.      EDUCATION POST BIOLOGICAL/CHEMOTHERAPY INFUSION  Call the triage nurse at your clinic or seek medical attention if you have chills and/or temperature greater than or equal to 100.5, uncontrolled nausea/vomiting, diarrhea, constipation, dizziness, shortness of breath, chest pain, heart palpitations, weakness or any other new or concerning symptoms, questions or concerns.  You can not have any live virus vaccines prior to or during treatment or up to 6 months post infusion.  If you have an upcoming surgery, medical procedure or dental procedure during treatment, this should be discussed with your ordering physician and your surgeon/dentist.  If you are having any concerning symptom, if you are unsure if you should get your next infusion or wish to speak to a provider before your next infusion, please call your care coordinator or triage nurse at your clinic to notify them so we can adequately serve you.     We look forward to seeing you at your next appointment here at [facility] ([facility]).  Please don t hesitate to call us at [phone] to reschedule any of your appointments or to speak with one of the [facility] registered nurses.  It was a pleasure taking care of you today.    Sincerely,    [facility]  [facility]  [address] [place] [address]  [id number]  Phone:  [phone]

## 2023-02-01 NOTE — PROGRESS NOTES
Infusion Nursing Note:  Niurka Cisneros presents today for   Chief Complaint   Patient presents with     Imm/Inj     benralizumab (FASENRA)       Patient seen by provider today: No   present during visit today: Not Applicable.    Note:   -Orders from Jose De Jesus Vega MD completed. Frequency: every 8 weeks.  -30mg Fasenra given subcutaneously into back of Rt arm.  -Pt observed x30min post-injection per protocol.    Intravenous Access:  No Intravenous access/labs at this visit.    Treatment Conditions:  Biological Infusion Checklist:  ~~~ NOTE: If the patient answers yes to any of the questions below, hold the infusion and contact ordering provider or on-call provider.    1. Have you recently had an elevated temperature, fever, chills, productive cough, coughing for 3 weeks or longer or hemoptysis, abnormal vital signs, night sweats,  chest pain or have you noticed a decrease in your appetite, unexplained weight loss or fatigue? No  2. Do you have any open wounds or new incisions? No  3. Do you have any recent or upcoming hospitalizations, surgeries or dental procedures? No  4. Do you currently have or recently have had any signs of illness or infection or are you on any antibiotics? No  5. Have you had any new, sudden or worsening abdominal pain? No  6. Have you or anyone in your household received a live vaccination in the past 4 weeks? Please note:  No live vaccines while on biologic/chemotherapy until 6 months after the last treatment.  Patient can receive the flu vaccine (shot only) and the pneumovax.  It is optimal for the patient to get these vaccines mid cycle, but they can be given at any time as long as it is not on the day of the infusion. No  7. Have you recently been diagnosed with any new nervous system diseases (ie. Multiple sclerosis, Guillain [place], seizures, neurological changes) or cancer diagnosis? No  8. Are you on any form of radiation or chemotherapy? No  9. Are you pregnant or  breast feeding or do you have plans of pregnancy in the future? N/A  10. Have you been having any signs of worsening depression or suicidal ideations?  (benlysta only) N/A  11. Have there been any other new onset medical symptoms? No    Post Infusion Assessment:  Patient tolerated injection without incident.  Site patent and intact, free from redness, edema or discomfort.     Discharge Plan:   Discharge instructions reviewed with: Patient.  Patient and/or family verbalized understanding of discharge instructions and all questions answered.  AVS to patient via [employer].  Scheduling will contact pt for next appointment.   Patient discharged in stable condition accompanied by: self.  Departure Mode: Ambulatory.      Leela cAuna RN    /74 (BP Location: Left arm, Patient Position: Sitting, Cuff Size: Adult Regular)   Pulse 84   Temp 97.6  F (36.4  C)   Resp 20   LMP  (LMP Unknown)   SpO2 96%     Administrations This Visit     benralizumab (FASENRA) injection 30 mg     Admin Date  02/01/2023 Action  Given Dose  30 mg Route  Subcutaneous Administered By  Leela Acuna, SIXTO

## 2023-02-12 DIAGNOSIS — I10 BENIGN ESSENTIAL HYPERTENSION: ICD-10-CM

## 2023-02-14 DIAGNOSIS — I10 BENIGN ESSENTIAL HYPERTENSION: ICD-10-CM

## 2023-02-14 RX ORDER — METOPROLOL SUCCINATE 50 MG/1
50 TABLET, EXTENDED RELEASE ORAL DAILY
Qty: 90 TABLET | Refills: 0 | Status: SHIPPED | OUTPATIENT
Start: 2023-02-14 | End: 2023-02-27

## 2023-02-14 RX ORDER — METOPROLOL SUCCINATE 50 MG/1
TABLET, EXTENDED RELEASE ORAL
Qty: 90 TABLET | Refills: 2 | OUTPATIENT
Start: 2023-02-14

## 2023-02-27 ENCOUNTER — MYC REFILL (OUTPATIENT)
Dept: CARDIOLOGY | Facility: CLINIC | Age: 78
End: 2023-02-27
Payer: MEDICARE

## 2023-02-27 DIAGNOSIS — I10 BENIGN ESSENTIAL HYPERTENSION: ICD-10-CM

## 2023-02-27 RX ORDER — METOPROLOL SUCCINATE 25 MG/1
TABLET, EXTENDED RELEASE ORAL
Qty: 90 TABLET | Refills: 3 | Status: SHIPPED | OUTPATIENT
Start: 2023-02-27 | End: 2023-11-15

## 2023-02-27 RX ORDER — METOPROLOL SUCCINATE 50 MG/1
50 TABLET, EXTENDED RELEASE ORAL DAILY
Qty: 90 TABLET | Refills: 3 | Status: SHIPPED | OUTPATIENT
Start: 2023-02-27 | End: 2024-07-07

## 2023-02-28 ENCOUNTER — MYC MEDICAL ADVICE (OUTPATIENT)
Dept: PULMONOLOGY | Facility: CLINIC | Age: 78
End: 2023-02-28
Payer: MEDICARE

## 2023-02-28 DIAGNOSIS — J45.50 SEVERE PERSISTENT ASTHMA, UNSPECIFIED WHETHER COMPLICATED (H): Primary | ICD-10-CM

## 2023-03-19 DIAGNOSIS — I10 BENIGN ESSENTIAL HYPERTENSION: ICD-10-CM

## 2023-03-19 DIAGNOSIS — I71.21 ASCENDING AORTIC ANEURYSM (H): ICD-10-CM

## 2023-03-19 DIAGNOSIS — I72.2 ANEURYSM OF RIGHT RENAL ARTERY (H): ICD-10-CM

## 2023-03-22 RX ORDER — TRIAMTERENE AND HYDROCHLOROTHIAZIDE 75; 50 MG/1; MG/1
1 TABLET ORAL DAILY
Qty: 90 TABLET | Refills: 0 | Status: SHIPPED | OUTPATIENT
Start: 2023-03-22 | End: 2023-06-13

## 2023-03-22 RX ORDER — POTASSIUM CHLORIDE 1500 MG/1
20 TABLET, EXTENDED RELEASE ORAL DAILY
Qty: 90 TABLET | Refills: 0 | Status: SHIPPED | OUTPATIENT
Start: 2023-03-22 | End: 2024-01-18

## 2023-03-22 NOTE — TELEPHONE ENCOUNTER
potassium chloride ER (KLOR-CON) 20 MEQ CR tablet      Last Written Prescription Date:  4/25/2022  Last Fill Quantity: 90,   # refills: 3    triamterene-HCTZ (MAXZIDE) 75-50 MG tablet      Last Written Prescription Date:  4/25/2022  Last Fill Quantity: 90,   # refills: 3    Last Office Visit : 4/22/2022  Future Office visit:  none    Routing refill requests to provider for review/approval because:  Failed medication protocols: abnormal lab  - Potassium (L)      Potassium   Date Value Ref Range Status   04/22/2022 3.1 (L) 3.4 - 5.3 mmol/L Final   02/12/2021 3.7 3.4 - 5.3 mmol/L Final

## 2023-03-27 ENCOUNTER — MYC MEDICAL ADVICE (OUTPATIENT)
Dept: PULMONOLOGY | Facility: CLINIC | Age: 78
End: 2023-03-27
Payer: MEDICARE

## 2023-03-27 DIAGNOSIS — J45.50 SEVERE PERSISTENT ASTHMA, UNSPECIFIED WHETHER COMPLICATED (H): ICD-10-CM

## 2023-03-28 ENCOUNTER — OFFICE VISIT (OUTPATIENT)
Dept: OPHTHALMOLOGY | Facility: CLINIC | Age: 78
End: 2023-03-28
Payer: MEDICARE

## 2023-03-28 DIAGNOSIS — H52.203 MYOPIC ASTIGMATISM OF BOTH EYES: ICD-10-CM

## 2023-03-28 DIAGNOSIS — H52.4 PRESBYOPIA OF BOTH EYES: ICD-10-CM

## 2023-03-28 DIAGNOSIS — H01.00A BLEPHARITIS OF UPPER AND LOWER EYELIDS OF BOTH EYES, UNSPECIFIED TYPE: ICD-10-CM

## 2023-03-28 DIAGNOSIS — H52.13 MYOPIC ASTIGMATISM OF BOTH EYES: ICD-10-CM

## 2023-03-28 DIAGNOSIS — H01.00B BLEPHARITIS OF UPPER AND LOWER EYELIDS OF BOTH EYES, UNSPECIFIED TYPE: ICD-10-CM

## 2023-03-28 DIAGNOSIS — Z01.00 EYE EXAM, ROUTINE: Primary | ICD-10-CM

## 2023-03-28 DIAGNOSIS — Z96.1 PSEUDOPHAKIA, BOTH EYES: ICD-10-CM

## 2023-03-28 PROCEDURE — 92014 COMPRE OPH EXAM EST PT 1/>: CPT | Performed by: OPHTHALMOLOGY

## 2023-03-28 PROCEDURE — 92015 DETERMINE REFRACTIVE STATE: CPT | Mod: GY | Performed by: OPHTHALMOLOGY

## 2023-03-28 RX ORDER — FLUTICASONE PROPIONATE AND SALMETEROL 500; 50 UG/1; UG/1
1 POWDER RESPIRATORY (INHALATION) 2 TIMES DAILY
Qty: 180 EACH | Refills: 3 | Status: SHIPPED | OUTPATIENT
Start: 2023-03-28 | End: 2023-09-11

## 2023-03-28 RX ORDER — FLUTICASONE PROPIONATE AND SALMETEROL 500; 50 UG/1; UG/1
1 POWDER RESPIRATORY (INHALATION) EVERY 12 HOURS
Status: CANCELLED | OUTPATIENT
Start: 2023-03-28

## 2023-03-28 RX ORDER — ALBUTEROL SULFATE 90 UG/1
2 AEROSOL, METERED RESPIRATORY (INHALATION) EVERY 6 HOURS PRN
Qty: 54 G | Refills: 3 | Status: CANCELLED | OUTPATIENT
Start: 2023-03-28

## 2023-03-28 ASSESSMENT — VISUAL ACUITY
OS_CC: 20/20
METHOD: SNELLEN - LINEAR
OD_CC+: -2
OS_CC+: -2
CORRECTION_TYPE: GLASSES
OD_CC: 20/20

## 2023-03-28 ASSESSMENT — REFRACTION_WEARINGRX
OD_CYLINDER: +1.00
SPECS_TYPE: BIFOCAL
OD_ADD: +2.50
OD_SPHERE: -0.75
OD_AXIS: 165
OS_AXIS: 005
OS_CYLINDER: +0.50
OS_SPHERE: -0.75
OS_ADD: +2.50

## 2023-03-28 ASSESSMENT — REFRACTION_MANIFEST
OD_SPHERE: -0.75
OD_AXIS: 165
OS_SPHERE: -0.75
OS_CYLINDER: +0.50
OS_ADD: +3.00
OS_AXIS: 005
OD_CYLINDER: +1.00
OD_ADD: +3.00

## 2023-03-28 ASSESSMENT — TONOMETRY
OS_IOP_MMHG: 11
IOP_METHOD: ICARE
OD_IOP_MMHG: 10

## 2023-03-28 ASSESSMENT — CONF VISUAL FIELD
OS_INFERIOR_TEMPORAL_RESTRICTION: 0
OS_SUPERIOR_TEMPORAL_RESTRICTION: 0
OS_SUPERIOR_NASAL_RESTRICTION: 0
OD_INFERIOR_NASAL_RESTRICTION: 0
OD_NORMAL: 1
OD_INFERIOR_TEMPORAL_RESTRICTION: 0
METHOD: COUNTING FINGERS
OD_SUPERIOR_NASAL_RESTRICTION: 0
OS_NORMAL: 1
OD_SUPERIOR_TEMPORAL_RESTRICTION: 0
OS_INFERIOR_NASAL_RESTRICTION: 0

## 2023-03-28 ASSESSMENT — CUP TO DISC RATIO
OD_RATIO: 0.35
OS_RATIO: 0.3

## 2023-03-28 ASSESSMENT — EXTERNAL EXAM - LEFT EYE: OS_EXAM: NORMAL

## 2023-03-28 ASSESSMENT — EXTERNAL EXAM - RIGHT EYE: OD_EXAM: NORMAL

## 2023-03-28 NOTE — TELEPHONE ENCOUNTER
"albuterol  Last Written Prescription Date:  2/27/23  Last Fill Quantity: 54g,  # refills: 3   DO NOT NEED THIS REFILL SINCE IT WAS JUST REFILLED WITH REFILLS ATTACHED.    fluticasone-salmeterol (WIXELA INHUB)  Last Written Prescription Date:  11/15/21  Last Fill Quantity: 180,  # refills: 3  ONLY NEED A REFILL OF THIS MEDICATION.     Last office visit: 1/9/2023  Future Office Visit:      Requested Prescriptions   Pending Prescriptions Disp Refills     albuterol (PROAIR HFA/PROVENTIL HFA/VENTOLIN HFA) 108 (90 Base) MCG/ACT inhaler 54 g 3     Sig: Inhale 2 puffs into the lungs every 6 hours as needed for shortness of breath or wheezing       Asthma Maintenance Inhalers - Anticholinergics Failed - 3/28/2023  8:05 AM        Failed - Asthma control assessment score within normal limits in last 6 months     Please review ACT score.           Passed - Patient is age 12 years or older        Passed - Medication is active on med list        Passed - Recent (6 mo) or future (30 days) visit within the authorizing provider's specialty     Patient had office visit in the last 6 months or has a visit in the next 30 days with authorizing provider or within the authorizing provider's specialty.  See \"Patient Info\" tab in inbasket, or \"Choose Columns\" in Meds & Orders section of the refill encounter.           Short-Acting Beta Agonist Inhalers Protocol  Failed - 3/28/2023  8:05 AM        Failed - Asthma control assessment score within normal limits in last 6 months     Please review ACT score.           Passed - Patient is age 12 or older        Passed - Medication is active on med list        Passed - Recent (6 mo) or future (30 days) visit within the authorizing provider's specialty     Patient had office visit in the last 6 months or has a visit in the next 30 days with authorizing provider or within the authorizing provider's specialty.  See \"Patient Info\" tab in inbasket, or \"Choose Columns\" in Meds & Orders section of the " "refill encounter.               fluticasone-salmeterol (ADVAIR) 500-50 MCG/ACT inhaler       Sig: Inhale 1 puff into the lungs every 12 hours       Inhaled Steroids Protocol Failed - 3/28/2023  8:05 AM        Failed - Asthma control assessment score within normal limits in last 6 months     Please review ACT score.           Passed - Patient is age 12 or older        Passed - Medication is active on med list        Passed - Recent (6 mo) or future (30 days) visit within the authorizing provider's specialty     Patient had office visit in the last 6 months or has a visit in the next 30 days with authorizing provider or within the authorizing provider's specialty.  See \"Patient Info\" tab in inbasket, or \"Choose Columns\" in Meds & Orders section of the refill encounter.           Long-Acting Beta Agonist Inhalers Protocol  Failed - 3/28/2023  8:05 AM        Failed - Asthma control assessment score within normal limits in last 6 months     Please review ACT score.           Passed - Patient is age 12 or older        Passed - Medication is active on med list        Passed - Recent (6 mo) or future (30 days) visit within the authorizing provider's specialty     Patient had office visit in the last 6 months or has a visit in the next 30 days with authorizing provider or within the authorizing provider's specialty.  See \"Patient Info\" tab in inbasket, or \"Choose Columns\" in Meds & Orders section of the refill encounter.               Routing refill request to provider for review/approval because:  It's been over 1 year since last refill.    Cheryl Watkins RN on 3/28/2023 at 9:10 AM            Cheryl Watkins RN on 3/28/2023 at 8:58 AM    "

## 2023-03-28 NOTE — PROGRESS NOTES
HPI:  Niurka Cisneros is a 77 year old female who presents today for comprehensive eye exam.  She reports that she is not considered diabetic.    When she reads things seem to start out clear then progressively get blurry after about an hour.  Artificial tear drops didn't help.  No irritation, eye pain, or tearing.    POHx: Pseudophakia both eyes, status-post YAG capsulotomy both eyes     Past medical history:   Past Medical History:   Diagnosis Date     Abdominal aortic aneurysm without rupture 11/10/2015    [  ] repeat imaging due spring 2016 Descending aortic aneurysm.  Being monitored with serial angiogram      Aneurysm of right renal artery (H) 4/9/2019     Esophageal hypertension 1/25/2017     Essential hypertension 1/25/2017     Female bladder prolapse 11/10/2015     Hearing loss      Pulmonary nodules 1/25/2017     Seizures (H) 11/10/2015    Temporal seziure d/o.  Does not have LOC.  Has prodromal symptoms      Severe persistent asthma 11/10/2015    Since childhood.  Has been steroid dependent for many years. Has had cydney x2        Current Eye Medications: none  All:  quinolones      Assessment & Plan     1. Eye exam, routine - Both Eyes    2. Pseudophakia, both eyes - Both Eyes    3. Myopic astigmatism of both eyes - Both Eyes    4. Presbyopia of both eyes - Both Eyes    5. Blepharitis of upper and lower eyelids of both eyes, unspecified type         Clear media status-post cataract surgery and YAG capsulotomy.   Manifest refraction done and prescription for glasses given, minimal change, can continue current glasses or fill new glasses as needed.  Symptomatic dry eye/blepharitis, no cornea signs.  Warm compresses and ocusoft lid scrubs at bedtime.  Artificial tear drops four times a day as needed, recommend before reading and during breaks from focused work.    Disposition:  Return in about 2 years (around 3/28/2025) for Comprehensive Exam.    ----------------------------------------------------------------------------------------------------    Complete documentation of historical and exam elements from today's encounter can be found in the full encounter summary report (not reduplicated in this progress note). I personally obtained the chief complaint(s) and history of present illness.  I confirmed and edited as necessary the review of systems, past medical/surgical history, family history, social history, and examination findings as documented by others.  I examined the patient myself, and I personally reviewed the relevant tests, images, and reports as documented above. I formulated and edited as necessary the assessment and plan and discussed the findings and management plan with the patient and family.     Yue Akins MD

## 2023-03-28 NOTE — NURSING NOTE
Chief Complaints and History of Present Illnesses   Patient presents with     Annual Eye Exam     Chief Complaint(s) and History of Present Illness(es)     Annual Eye Exam            Laterality: both eyes    Associated symptoms: Negative for dryness, eye pain, flashes and floaters    Treatments tried: no treatments    Pain scale: 0/10          Comments    Patient states both eyes get really blurry after an hour of reading. Patient states she tried AT but they did no help. Patient tried the tears in 2021 after her eye exam for about a month and then gave up, has not used drops since.   Val Villegas, UNIQUE March 28, 2023 12:55 PM

## 2023-03-29 ENCOUNTER — INFUSION THERAPY VISIT (OUTPATIENT)
Dept: INFUSION THERAPY | Facility: CLINIC | Age: 78
End: 2023-03-29
Payer: MEDICARE

## 2023-03-29 VITALS
HEART RATE: 83 BPM | OXYGEN SATURATION: 96 % | TEMPERATURE: 97.6 F | RESPIRATION RATE: 16 BRPM | SYSTOLIC BLOOD PRESSURE: 122 MMHG | DIASTOLIC BLOOD PRESSURE: 77 MMHG

## 2023-03-29 DIAGNOSIS — J45.50 SEVERE PERSISTENT ASTHMA WITHOUT COMPLICATION (H): Primary | ICD-10-CM

## 2023-03-29 PROCEDURE — 250N000011 HC RX IP 250 OP 636

## 2023-03-29 PROCEDURE — 96372 THER/PROPH/DIAG INJ SC/IM: CPT

## 2023-03-29 RX ADMIN — BENRALIZUMAB 30 MG: 30 INJECTION, SOLUTION SUBCUTANEOUS at 09:19

## 2023-03-29 ASSESSMENT — PAIN SCALES - GENERAL: PAINLEVEL: NO PAIN (0)

## 2023-03-29 NOTE — PROGRESS NOTES
Patient presents to the [facility] for Fasenra.  Order written by Jose De Jesus Vega MD was completed today. Name and  verified with patient. See MAR for medication details. Medication was divided into 1 syringes by pharmacy and given in the following sites Right side of upper arm.     Patient tolerated injection well.   Patient was discharged to home.  Patient was monitored 30 mins.   Patient to return back every 3 months.        Administrations This Visit     benralizumab (FASENRA) injection 30 mg     Admin Date  2023 Action  $Given Dose  30 mg Route  Subcutaneous Administered By  Lavern Parada, HOWARD Gross MA

## 2023-03-29 NOTE — PROGRESS NOTES
Administrations This Visit     benralizumab (FASENRA) injection 30 mg     Admin Date  03/29/2023 Action  $Given Dose  30 mg Route  Subcutaneous Administered By  Lavern Parada MA              ~~ NOTE: If the patient answers yes to any of the questions below, hold the infusion and contact ordering provider or on-call provider.    1.  Have you recently had an elevated temperature, fever, chills, productive cough, coughing for 3 weeks or longer or hemoptysis, abnormal vital signs, night sweats,  chest pain or have you noticed a decrease in your appetite, unexplained weight loss or fatigue? No  2. Do you have any open wounds or new incisions? (exclude for patients with hidradenitis suppurativa) No  3. Do you have any recent or upcoming hospitalizations, surgeries or dental procedures? No  4. Do you currently have or recently have had any signs of illness or infection or are you on any antibiotics? No  5. Have you or anyone in your household received a live vaccination in the past 4 weeks? Please note:  No live vaccines while on biologic/chemotherapy until 6 months after the last treatment.  Patient can receive the flu vaccine (shot only) and the pneumovax.  It is optimal for the patient to get these vaccines mid cycle, but they can be given at any time as long as it is not on the day of the infusion (exclude for patients receiving omalizumab or IV antibiotics e.g. ertapenem). No  6. Have you recently been diagnosed with any new nervous system diseases (ie. Multiple sclerosis, Guillain [place], seizures, neurological changes) or cancer diagnosis? No  7. Are you pregnant or breast feeding or do you have plans of pregnancy in the future? No  8. Have there been any other new onset medical symptoms? No

## 2023-04-18 ENCOUNTER — ANCILLARY PROCEDURE (OUTPATIENT)
Dept: MAMMOGRAPHY | Facility: CLINIC | Age: 78
End: 2023-04-18
Attending: INTERNAL MEDICINE
Payer: MEDICARE

## 2023-04-18 DIAGNOSIS — Z12.31 VISIT FOR SCREENING MAMMOGRAM: ICD-10-CM

## 2023-04-18 PROCEDURE — 77067 SCR MAMMO BI INCL CAD: CPT | Performed by: STUDENT IN AN ORGANIZED HEALTH CARE EDUCATION/TRAINING PROGRAM

## 2023-04-18 PROCEDURE — 77063 BREAST TOMOSYNTHESIS BI: CPT | Performed by: STUDENT IN AN ORGANIZED HEALTH CARE EDUCATION/TRAINING PROGRAM

## 2023-04-30 ENCOUNTER — HEALTH MAINTENANCE LETTER (OUTPATIENT)
Age: 78
End: 2023-04-30

## 2023-05-25 ENCOUNTER — INFUSION THERAPY VISIT (OUTPATIENT)
Dept: INFUSION THERAPY | Facility: CLINIC | Age: 78
End: 2023-05-25
Payer: MEDICARE

## 2023-05-25 VITALS
DIASTOLIC BLOOD PRESSURE: 62 MMHG | HEART RATE: 71 BPM | TEMPERATURE: 97.6 F | SYSTOLIC BLOOD PRESSURE: 97 MMHG | OXYGEN SATURATION: 98 %

## 2023-05-25 DIAGNOSIS — J45.50 SEVERE PERSISTENT ASTHMA WITHOUT COMPLICATION (H): Primary | ICD-10-CM

## 2023-05-25 PROCEDURE — 96372 THER/PROPH/DIAG INJ SC/IM: CPT

## 2023-05-25 PROCEDURE — 250N000011 HC RX IP 250 OP 636

## 2023-05-25 RX ADMIN — BENRALIZUMAB 30 MG: 30 INJECTION, SOLUTION SUBCUTANEOUS at 09:39

## 2023-05-25 NOTE — PATIENT INSTRUCTIONS
Dear Niurka Cisneros    Thank you for choosing [facility] ([facility]) for your injection.  The following information is a summary of our appointment as well as important reminders.      EDUCATION POST BIOLOGICAL/CHEMOTHERAPY INFUSION  Call the triage nurse at your clinic or seek medical attention if you have chills and/or temperature greater than or equal to 100.5, uncontrolled nausea/vomiting, diarrhea, constipation, dizziness, shortness of breath, chest pain, heart palpitations, weakness or any other new or concerning symptoms, questions or concerns.  You can not have any live virus vaccines prior to or during treatment or up to 6 months post infusion.  If you have an upcoming surgery, medical procedure or dental procedure during treatment, this should be discussed with your ordering physician and your surgeon/dentist.  If you are having any concerning symptom, if you are unsure if you should get your next infusion or wish to speak to a provider before your next infusion, please call your care coordinator or triage nurse at your clinic to notify them so we can adequately serve you.     We look forward in seeing you on your next appointment here at [facility] ([facility]).  Please don t hesitate to call us at [phone] to reschedule any of your appointments or to speak with one of the [facility] registered nurses.  It was a pleasure taking care of you today.    Sincerely,    [facility]  [facility]  [address] [place] [address]  [address]  Phone:  [phone]

## 2023-05-25 NOTE — PROGRESS NOTES
Infusion Nursing Note:  Niurka Cisneros presents today for Fasenra injection.    Patient seen by provider today: No   present during visit today: Not Applicable.    Note: Fasenra injection given Subcutaneous on left upper arm, back. Observed for 30 minutes post injection.    Administrations This Visit     benralizumab (FASENRA) injection 30 mg     Admin Date  05/25/2023 Action  $Given Dose  30 mg Route  Subcutaneous Administered By  Юлия Zamora RN                  Intravenous Access:  N/A.    Treatment Conditions:  Biological Infusion Checklist:  ~~~ NOTE: If the patient answers yes to any of the questions below, hold the infusion and contact ordering provider or on-call provider.    1. Have you recently had an elevated temperature, fever, chills, productive cough, coughing for 3 weeks or longer or hemoptysis, abnormal vital signs, night sweats,  chest pain or have you noticed a decrease in your appetite, unexplained weight loss or fatigue? No  2. Do you have any open wounds or new incisions? No  3. Do you have any recent or upcoming hospitalizations, surgeries or dental procedures? No  4. Do you currently have or recently have had any signs of illness or infection or are you on any antibiotics? No  5. Have you had any new, sudden or worsening abdominal pain? No  6. Have you or anyone in your household received a live vaccination in the past 4 weeks? Please note:  No live vaccines while on biologic/chemotherapy until 6 months after the last treatment.  Patient can receive the flu vaccine (shot only) and the pneumovax.  It is optimal for the patient to get these vaccines mid cycle, but they can be given at any time as long as it is not on the day of the infusion. No  7. Have you recently been diagnosed with any new nervous system diseases (ie. Multiple sclerosis, Guillain [place], seizures, neurological changes) or cancer diagnosis? No  8. Are you on any form of radiation or chemotherapy? No  9. Are you  pregnant or breast feeding or do you have plans of pregnancy in the future? No  10. Have you been having any signs of worsening depression or suicidal ideations?  (benlysta only) No  11. Have there been any other new onset medical symptoms? No        Post Infusion Assessment:  Patient tolerated injection without incident.  Site patent and intact, free from redness, edema or discomfort.  Biologic Infusion Post Education: Call the triage nurse at your clinic or seek medical attention if you have chills and/or temperature greater than or equal to 100.5, uncontrolled nausea/vomiting, diarrhea, constipation, dizziness, shortness of breath, chest pain, heart palpitations, weakness or any other new or concerning symptoms, questions or concerns.  You cannot have any live virus vaccines prior to or during treatment or up to 6 months post infusion.  If you have an upcoming surgery, medical procedure or dental procedure during treatment, this should be discussed with your ordering physician and your surgeon/dentist.  If you are having any concerning symptom, if you are unsure if you should get your next infusion or wish to speak to a provider before your next infusion, please call your care coordinator or triage nurse at your clinic to notify them so we can adequately serve you.       Discharge Plan:   Discharge instructions reviewed with: Patient.  Patient and/or family verbalized understanding of discharge instructions and all questions answered.  AVS to patient via [employer].  Patient will return after 8 weeks for next appointment. Staff messaged  to book appointment.  Patient discharged in stable condition accompanied by: self.  Departure Mode: Ambulatory.    BP 97/62 (BP Location: Left arm, Patient Position: Sitting, Cuff Size: Adult Regular)   Pulse 71   Temp 97.6  F (36.4  C) (Oral)   LMP  (LMP Unknown)   SpO2 98%      Юлия Zamora, RN

## 2023-06-03 ENCOUNTER — HEALTH MAINTENANCE LETTER (OUTPATIENT)
Age: 78
End: 2023-06-03

## 2023-06-13 ENCOUNTER — TELEPHONE (OUTPATIENT)
Dept: INTERNAL MEDICINE | Facility: CLINIC | Age: 78
End: 2023-06-13
Payer: MEDICARE

## 2023-06-13 NOTE — TELEPHONE ENCOUNTER
----- Message from Funmilayo Hook RN sent at 6/13/2023 10:25 AM CDT -----  Regarding: appt due annual Lauryn  Please call to schedule. Last 4/22/22, appt due     Thanks    I do not need any follow up on the scheduling of this appointment unless the patient will no longer be receiving care in our clinic.

## 2023-06-14 ENCOUNTER — TELEPHONE (OUTPATIENT)
Facility: CLINIC | Age: 78
End: 2023-06-14
Payer: MEDICARE

## 2023-06-16 ENCOUNTER — TELEPHONE (OUTPATIENT)
Dept: INTERNAL MEDICINE | Facility: CLINIC | Age: 78
End: 2023-06-16
Payer: MEDICARE

## 2023-06-30 ENCOUNTER — LAB (OUTPATIENT)
Dept: LAB | Facility: CLINIC | Age: 78
End: 2023-06-30
Payer: MEDICARE

## 2023-06-30 ENCOUNTER — OFFICE VISIT (OUTPATIENT)
Dept: INTERNAL MEDICINE | Facility: CLINIC | Age: 78
End: 2023-06-30
Payer: MEDICARE

## 2023-06-30 VITALS
HEIGHT: 67 IN | BODY MASS INDEX: 25.6 KG/M2 | SYSTOLIC BLOOD PRESSURE: 128 MMHG | WEIGHT: 163.1 LBS | DIASTOLIC BLOOD PRESSURE: 78 MMHG | OXYGEN SATURATION: 97 % | HEART RATE: 69 BPM

## 2023-06-30 DIAGNOSIS — M25.512 LEFT SHOULDER PAIN, UNSPECIFIED CHRONICITY: ICD-10-CM

## 2023-06-30 DIAGNOSIS — E78.5 HYPERLIPIDEMIA LDL GOAL <100: ICD-10-CM

## 2023-06-30 DIAGNOSIS — M79.10 MUSCLE PAIN: ICD-10-CM

## 2023-06-30 DIAGNOSIS — M85.89 OSTEOPENIA OF MULTIPLE SITES: ICD-10-CM

## 2023-06-30 DIAGNOSIS — R53.83 OTHER FATIGUE: ICD-10-CM

## 2023-06-30 DIAGNOSIS — E83.52 HYPERCALCEMIA: ICD-10-CM

## 2023-06-30 DIAGNOSIS — R73.03 PREDIABETES: ICD-10-CM

## 2023-06-30 DIAGNOSIS — M25.512 LEFT SHOULDER PAIN, UNSPECIFIED CHRONICITY: Primary | ICD-10-CM

## 2023-06-30 DIAGNOSIS — Z86.2 HISTORY OF ANEMIA: ICD-10-CM

## 2023-06-30 LAB
ALBUMIN SERPL BCG-MCNC: 4.5 G/DL (ref 3.5–5.2)
ALP SERPL-CCNC: 62 U/L (ref 35–104)
ALT SERPL W P-5'-P-CCNC: 17 U/L (ref 0–50)
ANION GAP SERPL CALCULATED.3IONS-SCNC: 9 MMOL/L (ref 7–15)
AST SERPL W P-5'-P-CCNC: 23 U/L (ref 0–45)
BILIRUB SERPL-MCNC: 0.4 MG/DL
BUN SERPL-MCNC: 24.9 MG/DL (ref 8–23)
CALCIUM SERPL-MCNC: 10.5 MG/DL (ref 8.8–10.2)
CHLORIDE SERPL-SCNC: 100 MMOL/L (ref 98–107)
CHOLEST SERPL-MCNC: 182 MG/DL
CK SERPL-CCNC: 79 U/L (ref 26–192)
CREAT SERPL-MCNC: 1.04 MG/DL (ref 0.51–0.95)
CRP SERPL-MCNC: <3 MG/L
DEPRECATED HCO3 PLAS-SCNC: 29 MMOL/L (ref 22–29)
ERYTHROCYTE [DISTWIDTH] IN BLOOD BY AUTOMATED COUNT: 14.1 % (ref 10–15)
FERRITIN SERPL-MCNC: 52 NG/ML (ref 11–328)
GFR SERPL CREATININE-BSD FRML MDRD: 56 ML/MIN/1.73M2
GLUCOSE SERPL-MCNC: 104 MG/DL (ref 70–99)
HBA1C MFR BLD: 5.8 %
HCT VFR BLD AUTO: 40.3 % (ref 35–47)
HDLC SERPL-MCNC: 49 MG/DL
HGB BLD-MCNC: 13.1 G/DL (ref 11.7–15.7)
LDLC SERPL CALC-MCNC: 97 MG/DL
MCH RBC QN AUTO: 26.6 PG (ref 26.5–33)
MCHC RBC AUTO-ENTMCNC: 32.5 G/DL (ref 31.5–36.5)
MCV RBC AUTO: 82 FL (ref 78–100)
NONHDLC SERPL-MCNC: 133 MG/DL
PLATELET # BLD AUTO: 245 10E3/UL (ref 150–450)
POTASSIUM SERPL-SCNC: 3.8 MMOL/L (ref 3.4–5.3)
PROT SERPL-MCNC: 7.3 G/DL (ref 6.4–8.3)
RBC # BLD AUTO: 4.92 10E6/UL (ref 3.8–5.2)
SODIUM SERPL-SCNC: 138 MMOL/L (ref 136–145)
TRIGL SERPL-MCNC: 180 MG/DL
TSH SERPL DL<=0.005 MIU/L-ACNC: 3.41 UIU/ML (ref 0.3–4.2)
WBC # BLD AUTO: 6.3 10E3/UL (ref 4–11)

## 2023-06-30 PROCEDURE — 85027 COMPLETE CBC AUTOMATED: CPT | Performed by: PATHOLOGY

## 2023-06-30 PROCEDURE — 82550 ASSAY OF CK (CPK): CPT | Performed by: PATHOLOGY

## 2023-06-30 PROCEDURE — 80061 LIPID PANEL: CPT | Performed by: PATHOLOGY

## 2023-06-30 PROCEDURE — 80053 COMPREHEN METABOLIC PANEL: CPT | Performed by: PATHOLOGY

## 2023-06-30 PROCEDURE — 99000 SPECIMEN HANDLING OFFICE-LAB: CPT | Performed by: PATHOLOGY

## 2023-06-30 PROCEDURE — 83036 HEMOGLOBIN GLYCOSYLATED A1C: CPT | Performed by: INTERNAL MEDICINE

## 2023-06-30 PROCEDURE — 36415 COLL VENOUS BLD VENIPUNCTURE: CPT | Performed by: PATHOLOGY

## 2023-06-30 PROCEDURE — 84443 ASSAY THYROID STIM HORMONE: CPT | Performed by: PATHOLOGY

## 2023-06-30 PROCEDURE — 86140 C-REACTIVE PROTEIN: CPT | Performed by: PATHOLOGY

## 2023-06-30 PROCEDURE — 82728 ASSAY OF FERRITIN: CPT | Performed by: PATHOLOGY

## 2023-06-30 PROCEDURE — 82306 VITAMIN D 25 HYDROXY: CPT | Performed by: INTERNAL MEDICINE

## 2023-06-30 PROCEDURE — 99214 OFFICE O/P EST MOD 30 MIN: CPT | Performed by: INTERNAL MEDICINE

## 2023-06-30 NOTE — NURSING NOTE
"Niurka Cisneros is a 77 year old female patient that presents today in clinic for the following:    Chief Complaint   Patient presents with     Pain     Pt here for joint pain in shoulder since end of April and muscle pain and fatigue starting primarily in June.     The patient's allergies and medications were reviewed as noted. A set of vitals were recorded as noted without incident: /78 (BP Location: Right arm, Patient Position: Sitting, Cuff Size: Adult Regular)   Pulse 69   Ht 1.702 m (5' 7.01\")   Wt 74 kg (163 lb 1.6 oz)   LMP  (LMP Unknown)   SpO2 97%   BMI 25.54 kg/m  . The patient does not have any other questions for the provider.    Roz Cody, EMT at 8:29 AM on 6/30/2023  "
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
## 2023-07-01 LAB — DEPRECATED CALCIDIOL+CALCIFEROL SERPL-MC: 48 UG/L (ref 20–75)

## 2023-07-03 ENCOUNTER — NURSE TRIAGE (OUTPATIENT)
Dept: NURSING | Facility: CLINIC | Age: 78
End: 2023-07-03

## 2023-07-03 ENCOUNTER — THERAPY VISIT (OUTPATIENT)
Dept: PHYSICAL THERAPY | Facility: CLINIC | Age: 78
End: 2023-07-03
Payer: MEDICARE

## 2023-07-03 DIAGNOSIS — M25.512 LEFT SHOULDER PAIN, UNSPECIFIED CHRONICITY: ICD-10-CM

## 2023-07-03 PROCEDURE — 97161 PT EVAL LOW COMPLEX 20 MIN: CPT | Mod: GP

## 2023-07-03 PROCEDURE — 97110 THERAPEUTIC EXERCISES: CPT | Mod: GP

## 2023-07-03 NOTE — PROGRESS NOTES
PHYSICAL THERAPY EVALUATION  Type of Visit: Evaluation    See electronic medical record for Abuse and Falls Screening details.    Subjective      Presenting condition or subjective complaint: shoulder pain     Pain since end of April, no MARLEEN. Pain has been constant since April, hasnt gotten better.     Pain is top of the shoulder.     Worse: movement - lifting out in front or to the side    Better: Not moving it    Pt has tried ice and meds, nothing seems to help.     Pt enjoys walking, biking, pt used to do regular exercise but now only goes to one class a week d/t pain.     Date of onset: 05/03/23    Relevant medical history: Asthma; COPD; Concussions; Hearing problems; High blood pressure; Osteoarthritis   Dates & types of surgery: ankle repair, rotator cuff repair, hip replacement, hysterectomy, foot surgery, cydney fundoplication, catarracts, all done between 5222-3597    Prior diagnostic imaging/testing results:       Prior therapy history for the same diagnosis, illness or injury:          Living Environment  Social support: With a significant other or spouse   Type of home: Apartment/condo   Stairs to enter the home: No       Ramp: No   Stairs inside the home: No       Help at home: None  Equipment owned:       Employment: No    Hobbies/Interests: biking, reading, dog    Patient goals for therapy: move my arm painlessly     Objective   SHOULDER EVALUATION  PAIN: Pain Level with Use: 6/10    POSTURE: Standing Posture: Rounded shoulders, Forward head, Thoracic kyphosis increased    ROM:   (Degrees) Left AROM Right AROM    Shoulder Flexion 97** 140   Shoulder Extension     Shoulder Abduction 90** 150   Shoulder Adduction     Shoulder Internal Rotation T10* T7   Shoulder External Rotation 50* 70   Shoulder Horizontal Abduction     Shoulder Horizontal Adduction     Shoulder Flexion ER     Shoulder Flexion IR     Elbow Extension     Elbow Flexion       Cervical AROM: stiff but symmetrical, no shoulder  reproduction    STRENGTH:   Pain: - none + mild ++ moderate +++ severe  Strength Scale: 0-5/5 Left Right   Shoulder Flexion 4- 5   Shoulder Extension 5 5   Shoulder Abduction 3+ 5   Shoulder Adduction 5 5   Shoulder Internal Rotation 4- 5   Shoulder External Rotation 4- 5   Shoulder Horizontal Abduction 5 5   Shoulder Horizontal Adduction 5 5   Elbow Flexion 5 5   Elbow Extension 5 5     FLEXIBILITY: Decreased upper trap L, Decreased levator L, Decreased upper trap R, Decreased levator R  SPECIAL TESTS:    Left Right   Impingement     Neer's Negative  Negative    Hawkin's-Doug Negative  Negative    Coracoid Impingement Negative  Negative    Internal impingement Negative  Negative    Labral     Anterior Slide Negative  Negative    Housatonic's Negative  Negative    Crank Negative  Negative    Instability     Apprehension (anterior) Negative  Negative    Relocation (anterior) Negative  Negative    Anterior Load & Shift Negative  Negative    Posterior Load & Shift Negative  Negative    Posterior instability (with 90 degrees flex) Negative  Negative    Multi-Directional Instability      Sulcus Negative  Negative    Biceps      Speed's Negative  Negative    Rotator Cuff Tear     Drop Arm Positive Negative    Belly Press Negative  Negative    Lift off  Positive Negative      PALPATION:   + Tenderness At Location Left Right   Clavicle - -   Sternoclavicular - -   Acromioclavicular - -   Biceps - -   Triceps - -   Supraspinatus + -   Infraspinatus + -   Teres minor + -   Subscapularis - -   Deltoid - -   Levator + -   Rhomboids - -   Upper trap + -   Incisional - -   Bicipital groove - -       Assessment & Plan   CLINICAL IMPRESSIONS   Medical Diagnosis:      Treatment Diagnosis: L shoulder pain   Impression/Assessment: Patient is a 77 year old female with L shoulder complaints.  The following significant findings have been identified: Pain, Decreased ROM/flexibility, Decreased joint mobility and Decreased strength. These  impairments interfere with their ability to perform self care tasks, recreational activities, household chores, household mobility and community mobility as compared to previous level of function.     Clinical Decision Making (Complexity):   Clinical Presentation: Stable/Uncomplicated  Clinical Presentation Rationale: based on medical and personal factors listed in PT evaluation  Clinical Decision Making (Complexity): Low complexity    PLAN OF CARE  Treatment Interventions:  Interventions: Manual Therapy, Neuromuscular Re-education, Therapeutic Activity, Therapeutic Exercise    Long Term Goals     PT Goal 1  Goal Identifier: pt will perform shoulder abduction x10 in session with no pain and full ROM  Rationale: to maximize safety and independence with performance of ADLs and functional tasks;to maximize safety and independence within the home;to maximize safety and independence within the community  Target Date: 08/28/23      Frequency of Treatment: 1x/week  Duration of Treatment: 6 weeks      Education Assessment:   Learner/Method: Patient;No Barriers to Learning  Education Comments: educated on diagnosis, prognosis, expectations of therapy, and importance of movement    Risks and benefits of evaluation/treatment have been explained.   Patient/Family/caregiver agrees with Plan of Care.     Evaluation Time:     PT Eval, Low Complexity Minutes (31000): 20    Signing Clinician: LAWSON GOLDMAN, PT      [facility]                                                                                   OUTPATIENT PHYSICAL THERAPY      PLAN OF TREATMENT FOR OUTPATIENT REHABILITATION   Patient's Last Name, First Name, BETSY CisnerosNiurka  A YOB: 1945   Provider's Name   [facility]   Medical Record No.  [id number]     Onset Date: 05/03/23  Start of Care Date: 07/03/23     Medical Diagnosis:         PT Treatment Diagnosis:  L shoulder pain Plan of  Treatment  Frequency/Duration: 1x/week/ 6 weeks    Certification date from 07/03/23 to 09/30/23         See note for plan of treatment details and functional goals     LAWSON GOLDMAN, PT                         I CERTIFY THE NEED FOR THESE SERVICES FURNISHED UNDER        THIS PLAN OF TREATMENT AND WHILE UNDER MY CARE     (Physician attestation of this document indicates review and certification of the therapy plan).                  Referring Provider:  Mara Combs      Initial Assessment  See Epic Evaluation- Start of Care Date: 07/03/23

## 2023-07-03 NOTE — TELEPHONE ENCOUNTER
Patient returning  Call to # provided on a text message from [facility]  [employer] ; states it does not include a   Call back window of time to call   Advised to call back during regular business hours   Understands and will comply   Concepción Salvador RN  [facility]       Reason for Disposition    [1] Caller requesting NON-URGENT health information AND [2] PCP's office is the best resource    Protocols used: INFORMATION ONLY CALL - NO TRIAGE-A-

## 2023-07-05 ENCOUNTER — LAB (OUTPATIENT)
Dept: LAB | Facility: CLINIC | Age: 78
End: 2023-07-05
Payer: MEDICARE

## 2023-07-05 DIAGNOSIS — E83.52 HYPERCALCEMIA: ICD-10-CM

## 2023-07-05 DIAGNOSIS — M79.10 MUSCLE PAIN: ICD-10-CM

## 2023-07-05 DIAGNOSIS — R53.83 OTHER FATIGUE: ICD-10-CM

## 2023-07-05 LAB
ANION GAP SERPL CALCULATED.3IONS-SCNC: 12 MMOL/L (ref 7–15)
BUN SERPL-MCNC: 25 MG/DL (ref 8–23)
CA-I BLD-MCNC: 4.9 MG/DL (ref 4.4–5.2)
CALCIUM SERPL-MCNC: 10.3 MG/DL (ref 8.8–10.2)
CHLORIDE SERPL-SCNC: 100 MMOL/L (ref 98–107)
CREAT SERPL-MCNC: 0.94 MG/DL (ref 0.51–0.95)
DEPRECATED HCO3 PLAS-SCNC: 25 MMOL/L (ref 22–29)
GFR SERPL CREATININE-BSD FRML MDRD: 63 ML/MIN/1.73M2
GLUCOSE SERPL-MCNC: 101 MG/DL (ref 70–99)
POTASSIUM SERPL-SCNC: 3.6 MMOL/L (ref 3.4–5.3)
PTH-INTACT SERPL-MCNC: 19 PG/ML (ref 15–65)
SODIUM SERPL-SCNC: 137 MMOL/L (ref 136–145)

## 2023-07-05 PROCEDURE — 80048 BASIC METABOLIC PNL TOTAL CA: CPT

## 2023-07-05 PROCEDURE — 36415 COLL VENOUS BLD VENIPUNCTURE: CPT

## 2023-07-05 PROCEDURE — 83970 ASSAY OF PARATHORMONE: CPT

## 2023-07-05 PROCEDURE — 82330 ASSAY OF CALCIUM: CPT

## 2023-07-12 ENCOUNTER — VIRTUAL VISIT (OUTPATIENT)
Dept: CARDIOLOGY | Facility: CLINIC | Age: 78
End: 2023-07-12
Payer: MEDICARE

## 2023-07-12 VITALS — WEIGHT: 160 LBS | BODY MASS INDEX: 25.11 KG/M2 | HEIGHT: 67 IN

## 2023-07-12 DIAGNOSIS — I71.20 THORACIC AORTIC ANEURYSM WITHOUT RUPTURE, UNSPECIFIED PART (H): Primary | ICD-10-CM

## 2023-07-12 PROCEDURE — 99214 OFFICE O/P EST MOD 30 MIN: CPT | Mod: VID | Performed by: INTERNAL MEDICINE

## 2023-07-12 NOTE — PROGRESS NOTES
Vascular Cardiology       Roopa is a 77 year old who is being evaluated via a billable video visit.      How would you like to obtain your AVS? [employer]  If the video visit is dropped, the invitation should be resent by: Text to cell phone: [phone]  Will anyone else be joining your video visit? No         PROVIDER NOTES     This is a 77 year old female here for follow up visit for thoracic aortic aneurysm. Has a PMH of bladder prolapse, HTN, seizures, asthma and a thoracic ascending aneurysm measuring 4.4 cm.     Prior History:      Reviewed her history in detail: grandmother with aneurysm, at 89 years old, possible rupture and inoperable, leading to death. She has had 2 kids normal vaginal delivery who are healthy in their 40s. She has had 1 miscarriage. H/o bladder prolapse. Mild hypermobility, chronic back pain from scoliosis. Denies abnormal wound healing or bruising/bleeding, hernias, translucent skin, dental crowding, palatal deformities or chest wall deformities.      Thoracic aneurysm had been picked up 5 years prior to visit and she had no scanning since so we obtained echo imaging which documented stability at 4.4 cm. Had not yet had her CTA C/A/P to confirm size.      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 She reports h/o HTN for 10 years, controlled, on 2 drug regimen. Had been on losartan and cannot take beta blocker due to asthma. She recently had to go off of losartan due to side effects. Could also not tolerate ACEI.      Since last visit, her daughter has been screened and has a mild aneurysm. Son I have also seen on virtual call, from [facility] and had an echocardiogram screen that demonstrated moderate mitral regurgitation, prominent LV trabeculations and mild aortic dilation. He had EF that was low at 38% and has recovered mildly since then. All children have hypermobile joints and tall stature. Son is  "6'2\".      Niurka denies chest pain, back pain, fevers, chills, ns, LE edema, orthopnea, PND. SBP has been well controlled in the 130s-140s. HR in the 60s.        ASSESSMENT/PLAN:      Niurka is a 77 year old with ascending aortic aneurysm 4.4 cm and saccular aneurysm of renal artery.     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  A variant of unknown significance means that there is a genetic change in which we do not have enough information to determine if it is disease causing or not. Labs review published data, functional studies, presences in population databases, similarity to normal sequence, and computer prediction models. The TNXB gene is expressed in musculoskeletal, cardiac, and dermal tissues and is known to be associated with autosomal recessive (AR) classic-like Magdaleno Danlos Syndrome (EDS). This particular variant occurs in a position that is well conserved (meaning that it is not used to changes) and creates an amino acid with highly dissimilar properties.  Computer models predict this change will be tolerated.  TNXB haploinsufficiency has been identified in women with incomplete penetrance and is characterized by hypermobile EDS and chronic musculoskeletal pain. Although suspicion is raisied, there is not enough current information to be certain if this variant alone is the cause of Roopa's aneurysm. Reviewed AR inheritance associated with this gene, meaning that two mutations are necessary to cause disease.  Since two mutations are required and Roopa only has one, this is unlikely to be the sole cause for her symptoms.  In addition, her children have a 50% chance of inheriting this variant but again it seems as though that would not be enough to cause full blown disease. At this time we do not recommend genetic testing in other family " members because we cannot interpret the results and make predictions.     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     Plan:     1. Renal artery aneurysm:  will continue to monitor. Not at a point needing repair at this time and stable in size on surveillance imaging. Do not suspect it is altering any renal blood flow. We discussed CTA every few years with ultrasound in between. Will also monitor kidney function.     2. CT angiogram chest/abdomen/pelvis in 6 months with echocardiogram      3. Son has dilated CMY, quitting etoh. If positive, I will screen Roopa and Katlyn for this.      4. Continue current medications, unable to be on losartan due to side effects. SBP goal < 120/80 mmHg, HR < 60 bpm. On hydrochlorothiazide triampterene. Metoprolol 12.5 mg twice day. Transitioned to long acting. Watch for COPD and asthma exacerbation.      5. Follow up in 6 months     Brandee Wang MD MSc  [facility]        Video-Visit Details    Type of service:  Video Visit   Video Time 1:30 - 200 PM  Originating Location (pt. Location): Home    Distant Location (provider location): [facility]  Platform used for Video Visit: [employer]

## 2023-07-12 NOTE — LETTER
7/12/2023    Mara Combs MD  [address]  [place] [address]    RE: Niurka Cisneros       Dear Colleague,     I had the pleasure of seeing Niurka Cisneros in the [facility].           Vascular Cardiology       Roopa is a 77 year old who is being evaluated via a billable video visit.      How would you like to obtain your AVS? MyChart  If the video visit is dropped, the invitation should be resent by: Text to cell phone: [phone]  Will anyone else be joining your video visit? No         PROVIDER NOTES     This is a 77 year old female here for follow up visit for thoracic aortic aneurysm. Has a PMH of bladder prolapse, HTN, seizures, asthma and a thoracic ascending aneurysm measuring 4.4 cm.     Prior History:      Reviewed her history in detail: grandmother with aneurysm, at 89 years old, possible rupture and inoperable, leading to death. She has had 2 kids normal vaginal delivery who are healthy in their 40s. She has had 1 miscarriage. H/o bladder prolapse. Mild hypermobility, chronic back pain from scoliosis. Denies abnormal wound healing or bruising/bleeding, hernias, translucent skin, dental crowding, palatal deformities or chest wall deformities.      Thoracic aneurysm had been picked up 5 years prior to visit and she had no scanning since so we obtained echo imaging which documented stability at 4.4 cm. Had not yet had her CTA C/A/P to confirm size.      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 She reports h/o HTN for 10 years, controlled, on 2 drug regimen. Had been on losartan and cannot take beta blocker due to asthma. She recently had to go off of losartan due to side effects. Could also not tolerate ACEI.      Since last visit, her daughter has been screened and has a mild aneurysm. Son I have also seen on virtual call, from [facility] and had an echocardiogram screen that  "demonstrated moderate mitral regurgitation, prominent LV trabeculations and mild aortic dilation. He had EF that was low at 38% and has recovered mildly since then. All children have hypermobile joints and tall stature. Son is 6'2\".      Niurka denies chest pain, back pain, fevers, chills, ns, LE edema, orthopnea, PND. SBP has been well controlled in the 130s-140s. HR in the 60s.        ASSESSMENT/PLAN:      Niurka is a 77 year old with ascending aortic aneurysm 4.4 cm and saccular aneurysm of renal artery.     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  A variant of unknown significance means that there is a genetic change in which we do not have enough information to determine if it is disease causing or not. Labs review published data, functional studies, presences in population databases, similarity to normal sequence, and computer prediction models. The TNXB gene is expressed in musculoskeletal, cardiac, and dermal tissues and is known to be associated with autosomal recessive (AR) classic-like Magdaleno Danlos Syndrome (EDS). This particular variant occurs in a position that is well conserved (meaning that it is not used to changes) and creates an amino acid with highly dissimilar properties.  Computer models predict this change will be tolerated.  TNXB haploinsufficiency has been identified in women with incomplete penetrance and is characterized by hypermobile EDS and chronic musculoskeletal pain. Although suspicion is raisied, there is not enough current information to be certain if this variant alone is the cause of Roopa's aneurysm. Reviewed AR inheritance associated with this gene, meaning that two mutations are necessary to cause disease.  Since two mutations are required and Roopa only has one, this is unlikely to be the sole cause for her " symptoms.  In addition, her children have a 50% chance of inheriting this variant but again it seems as though that would not be enough to cause full blown disease. At this time we do not recommend genetic testing in other family members because we cannot interpret the results and make predictions.     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     Plan:     1. Renal artery aneurysm:  will continue to monitor. Not at a point needing repair at this time and stable in size on surveillance imaging. Do not suspect it is altering any renal blood flow. We discussed CTA every few years with ultrasound in between. Will also monitor kidney function.     2. CT angiogram chest/abdomen/pelvis in 6 months with echocardiogram      3. Son has dilated CMY, quitting etoh. If positive, I will screen Roopa and Katlyn for this.      4. Continue current medications, unable to be on losartan due to side effects. SBP goal < 120/80 mmHg, HR < 60 bpm. On hydrochlorothiazide triampterene. Metoprolol 12.5 mg twice day. Transitioned to long acting. Watch for COPD and asthma exacerbation.      5. Follow up in 6 months     Brandee Wang MD [facility]        Video-Visit Details    Type of service:  Video Visit   Video Time 1:30 - 200 PM  Originating Location (pt. Location): Home    Distant Location (provider location): [facility]  Platform used for Video Visit: [employer]      Thank you for allowing me to participate in the care of your patient.      Sincerely,     Brandee Wang MD     [facility]  cc:   Brandee Wang MD  [address] [place] [id number]

## 2023-07-20 ENCOUNTER — INFUSION THERAPY VISIT (OUTPATIENT)
Dept: INFUSION THERAPY | Facility: CLINIC | Age: 78
End: 2023-07-20
Payer: MEDICARE

## 2023-07-20 VITALS
OXYGEN SATURATION: 96 % | SYSTOLIC BLOOD PRESSURE: 148 MMHG | TEMPERATURE: 97.8 F | HEART RATE: 71 BPM | DIASTOLIC BLOOD PRESSURE: 84 MMHG

## 2023-07-20 DIAGNOSIS — J45.50 SEVERE PERSISTENT ASTHMA WITHOUT COMPLICATION (H): Primary | ICD-10-CM

## 2023-07-20 PROCEDURE — 96372 THER/PROPH/DIAG INJ SC/IM: CPT

## 2023-07-20 PROCEDURE — 250N000011 HC RX IP 250 OP 636: Mod: JZ

## 2023-07-20 RX ADMIN — BENRALIZUMAB 30 MG: 30 INJECTION, SOLUTION SUBCUTANEOUS at 09:40

## 2023-07-20 ASSESSMENT — PAIN SCALES - GENERAL: PAINLEVEL: MILD PAIN (3)

## 2023-07-20 NOTE — PROGRESS NOTES
Infusion: benralizumab (Fasenra) ~~~ NOTE: If the patient answers yes to any of the questions below, hold the infusion and contact ordering provider or on-call provider.    Have you recently had an elevated temperature, fever, chills, productive cough, coughing for 3 weeks or longer or hemoptysis, abnormal vital signs, night sweats,  chest pain or have you noticed a decrease in your appetite, unexplained weight loss or fatigue? No  Do you currently have any signs of illness or infection or are you on any antibiotics? No  Have you or anyone in your household received a live vaccination in the past 4 weeks? Please note:  No live vaccines while on biologic/chemotherapy until 6 months after the last treatment.  Patient can receive the flu vaccine (shot only). It is optimal for the patient to get these vaccines mid cycle, but they can be given at any time as long as it is not on the day of the infusion. No  Have you recently been diagnosed with any new nervous system diseases (ie. Multiple sclerosis, Guillain Industry, seizures, neurological changes) or cancer diagnosis? No  Are you pregnant or breast feeding or do you have plans of pregnancy in the future? NA  Have there been any other new onset medical symptoms? No     Margo Sweet RN

## 2023-07-20 NOTE — PROGRESS NOTES
Patient presents to the [facility] for Fasenra injection.  Order written by Dr. Vega was completed today. Name and  verified with patient. See MAR for medication details. Medication was divided into 1 syringes by pharmacy and given in the following sites back side of right upper arm. Patient tolerated injection well and was monitored for 30 minutes after administration. Patient was discharged to home. Patient to return back in 8 weeks. Charge nurse notified about pain 3/10.      HUONG Cary LPN    Administrations This Visit     benralizumab (FASENRA) injection 30 mg     Admin Date  2023 Action  $Given Dose  30 mg Route  Subcutaneous Administered By  Javed Cary LPN

## 2023-08-04 ENCOUNTER — THERAPY VISIT (OUTPATIENT)
Dept: PHYSICAL THERAPY | Facility: CLINIC | Age: 78
End: 2023-08-04
Payer: MEDICARE

## 2023-08-04 DIAGNOSIS — M25.512 LEFT SHOULDER PAIN, UNSPECIFIED CHRONICITY: Primary | ICD-10-CM

## 2023-08-04 PROCEDURE — 97110 THERAPEUTIC EXERCISES: CPT | Mod: 59

## 2023-08-04 PROCEDURE — 97530 THERAPEUTIC ACTIVITIES: CPT | Mod: GP

## 2023-08-07 DIAGNOSIS — E78.5 HYPERLIPIDEMIA LDL GOAL <100: ICD-10-CM

## 2023-08-11 RX ORDER — PRAVASTATIN SODIUM 20 MG
20 TABLET ORAL DAILY
Qty: 90 TABLET | Refills: 2 | Status: SHIPPED | OUTPATIENT
Start: 2023-08-11 | End: 2024-04-12

## 2023-08-11 NOTE — TELEPHONE ENCOUNTER
PRAVASTATIN  TAB 20MG        Office Visit    6/30/2023  [facility] Mara Kirk MD  Internal Medicine

## 2023-08-22 ENCOUNTER — THERAPY VISIT (OUTPATIENT)
Dept: PHYSICAL THERAPY | Facility: CLINIC | Age: 78
End: 2023-08-22
Payer: MEDICARE

## 2023-08-22 DIAGNOSIS — M25.512 LEFT SHOULDER PAIN, UNSPECIFIED CHRONICITY: Primary | ICD-10-CM

## 2023-08-22 PROCEDURE — 97110 THERAPEUTIC EXERCISES: CPT | Mod: GP

## 2023-08-22 PROCEDURE — 97140 MANUAL THERAPY 1/> REGIONS: CPT | Mod: GP

## 2023-09-08 ENCOUNTER — THERAPY VISIT (OUTPATIENT)
Dept: PHYSICAL THERAPY | Facility: CLINIC | Age: 78
End: 2023-09-08
Payer: MEDICARE

## 2023-09-08 DIAGNOSIS — M25.512 LEFT SHOULDER PAIN, UNSPECIFIED CHRONICITY: Primary | ICD-10-CM

## 2023-09-08 PROCEDURE — 97110 THERAPEUTIC EXERCISES: CPT | Mod: GP

## 2023-09-08 PROCEDURE — 97140 MANUAL THERAPY 1/> REGIONS: CPT | Mod: GP

## 2023-09-09 ENCOUNTER — MYC MEDICAL ADVICE (OUTPATIENT)
Dept: PULMONOLOGY | Facility: CLINIC | Age: 78
End: 2023-09-09
Payer: MEDICARE

## 2023-09-09 DIAGNOSIS — J45.50 SEVERE PERSISTENT ASTHMA, UNSPECIFIED WHETHER COMPLICATED (H): ICD-10-CM

## 2023-09-11 RX ORDER — MONTELUKAST SODIUM 10 MG/1
10 TABLET ORAL AT BEDTIME
Qty: 90 TABLET | Refills: 3 | Status: SHIPPED | OUTPATIENT
Start: 2023-09-11 | End: 2024-01-08

## 2023-09-11 RX ORDER — FLUTICASONE PROPIONATE AND SALMETEROL 500; 50 UG/1; UG/1
1 POWDER RESPIRATORY (INHALATION) 2 TIMES DAILY
Qty: 180 EACH | Refills: 3 | Status: SHIPPED | OUTPATIENT
Start: 2023-09-11 | End: 2024-01-08

## 2023-09-11 NOTE — TELEPHONE ENCOUNTER
Medication:   montelukast (SINGULAIR) 10 MG tablet 90 tablet 3 11/15/2021  No   Sig - Route: Take 1 tablet (10 mg) by mouth At Bedtime - Oral       Date last written: 11/15/21  Dispensed amount: 90  Refills: 3    Medication:   fluticasone-salmeterol (ADVAIR) 500-50 MCG/ACT inhaler 180 each 3 3/28/2023  --   Sig - Route: Inhale 1 puff into the lungs 2 times daily - Inhalation     Date last written: 3/28/23  Dispensed amount: 180  Refills: 3    [facility] AT PORTAL TO REGISTERED [facility] SITES     Pt's last office visit: 1/9/23  Next scheduled office visit: 1/8/24

## 2023-09-14 ENCOUNTER — INFUSION THERAPY VISIT (OUTPATIENT)
Dept: INFUSION THERAPY | Facility: CLINIC | Age: 78
End: 2023-09-14
Payer: MEDICARE

## 2023-09-14 VITALS
OXYGEN SATURATION: 98 % | SYSTOLIC BLOOD PRESSURE: 138 MMHG | TEMPERATURE: 97.8 F | RESPIRATION RATE: 16 BRPM | HEART RATE: 73 BPM | DIASTOLIC BLOOD PRESSURE: 80 MMHG

## 2023-09-14 DIAGNOSIS — J45.50 SEVERE PERSISTENT ASTHMA WITHOUT COMPLICATION (H): Primary | ICD-10-CM

## 2023-09-14 PROCEDURE — 250N000011 HC RX IP 250 OP 636: Mod: JZ

## 2023-09-14 PROCEDURE — 96372 THER/PROPH/DIAG INJ SC/IM: CPT

## 2023-09-14 RX ADMIN — BENRALIZUMAB 30 MG: 30 INJECTION, SOLUTION SUBCUTANEOUS at 09:33

## 2023-09-14 ASSESSMENT — PAIN SCALES - GENERAL: PAINLEVEL: NO PAIN (0)

## 2023-09-14 NOTE — PROGRESS NOTES
Infusion Nursing Note:  Niurka Cisneros presents today for Fasenra.    Patient seen by provider today: No   present during visit today: Not Applicable.    Note: N/A.    Intravenous Access:  No Intravenous access/labs at this visit.    Treatment Conditions:  Infusion: benralizumab (Fasenra) ~~~ NOTE: If the patient answers yes to any of the questions below, hold the infusion and contact ordering provider or on-call provider.    Have you recently had an elevated temperature, fever, chills, productive cough, coughing for 3 weeks or longer or hemoptysis, abnormal vital signs, night sweats,  chest pain or have you noticed a decrease in your appetite, unexplained weight loss or fatigue? No  Do you currently have any signs of illness or infection or are you on any antibiotics? No  Have you or anyone in your household received a live vaccination in the past 4 weeks? Please note:  No live vaccines while on biologic/chemotherapy until 6 months after the last treatment.  Patient can receive the flu vaccine (shot only). It is optimal for the patient to get these vaccines mid cycle, but they can be given at any time as long as it is not on the day of the infusion. No  Have you recently been diagnosed with any new nervous system diseases (ie. Multiple sclerosis, Guillain Likely, seizures, neurological changes) or cancer diagnosis? No  Are you pregnant or breast feeding or do you have plans of pregnancy in the future? NA  Have there been any other new onset medical symptoms? No    Post Infusion Assessment:  Patient tolerated injection without incident.  Site patent and intact, free from redness, edema or discomfort.       Discharge Plan:   Patient and/or family verbalized understanding of discharge instructions and all questions answered.  Patient discharged in stable condition accompanied by: self.    Administrations This Visit       benralizumab (FASENRA) injection 30 mg       Admin Date  09/14/2023 Action  $Given  Dose  30 mg Route  Subcutaneous Administered By  Margo Sweet RN Alyssa Sakhitab-Kerestes, RN

## 2023-09-14 NOTE — PATIENT INSTRUCTIONS
Dear Niurka Cisneros    Thank you for choosing [facility] ([facility]) for your infusion.  The following information is a summary of our appointment as well as important reminders.      We look forward in seeing you on your next appointment here at [facility] ([facility]).  Please don t hesitate to call us at [phone] to reschedule any of your appointments or to speak with one of the [facility] registered nurses.  It was a pleasure taking care of you today.    Sincerely,    [facility]  [facility]  [address] [place] [address]  [address]  Phone:  [phone]

## 2023-09-19 ENCOUNTER — THERAPY VISIT (OUTPATIENT)
Dept: PHYSICAL THERAPY | Facility: CLINIC | Age: 78
End: 2023-09-19
Payer: MEDICARE

## 2023-09-19 DIAGNOSIS — M25.512 LEFT SHOULDER PAIN, UNSPECIFIED CHRONICITY: Primary | ICD-10-CM

## 2023-09-19 PROCEDURE — 97140 MANUAL THERAPY 1/> REGIONS: CPT | Mod: GP | Performed by: PHYSICAL THERAPIST

## 2023-09-19 PROCEDURE — 97110 THERAPEUTIC EXERCISES: CPT | Mod: GP | Performed by: PHYSICAL THERAPIST

## 2023-10-17 ENCOUNTER — THERAPY VISIT (OUTPATIENT)
Dept: PHYSICAL THERAPY | Facility: CLINIC | Age: 78
End: 2023-10-17
Payer: MEDICARE

## 2023-10-17 DIAGNOSIS — M25.512 LEFT SHOULDER PAIN, UNSPECIFIED CHRONICITY: Primary | ICD-10-CM

## 2023-10-17 PROCEDURE — 97110 THERAPEUTIC EXERCISES: CPT | Mod: GP

## 2023-10-17 PROCEDURE — 97140 MANUAL THERAPY 1/> REGIONS: CPT | Mod: GP

## 2023-10-17 NOTE — PROGRESS NOTES
10/17/23 0500   Appointment Info   Signing clinician's name / credentials Margo George DPT   Total/Authorized Visits 6   Visits Used 6   Medical Diagnosis adhesive capsulitis   PT Tx Diagnosis L shoulder pain   Quick Adds Certification   Progress Note/Certification   Start of Care Date 07/03/23   Onset of illness/injury or Date of Surgery 05/03/23   Therapy Frequency 1x/mo   Predicted Duration 12 weeks   Certification date from 10/01/23   Certification date to 12/24/23   Progress Note Due Date 12/16/23   Progress Note Completed Date 07/03/23   PT Goal 1   Goal Identifier pt will perform shoulder abduction x10 in session with no pain and full ROM   Rationale to maximize safety and independence with performance of ADLs and functional tasks;to maximize safety and independence within the home;to maximize safety and independence within the community   Goal Progress abduction limited to about 110 after warming up with pain at end range. date extended.   Target Date 12/24/23   Subjective Report   Subjective Report Feels her shoulder is getting better. She still has a little trouble getting her jacket on and off and then some difficulty with an overhead exercise at the gym. Some pain and stiffness with these things. Continues to work on PT exercises and her separate exercise classess 3x/week.   Objective Measures   Objective Measures Objective Measure 1   Objective Measure 1   Objective Measure shoulder AROM LEFT   Details flex 120, abd 95, ER 42 mild pain end range. Right side flexion 135, abduction 155, ER 65. End of session L flexion 122, abd 115.   Treatment Interventions (PT)   Interventions Therapeutic Procedure/Exercise;Manual Therapy   Therapeutic Procedure/Exercise   Therapeutic Procedures: strength, endurance, ROM, flexibillity minutes (46668) 15   PTRx Ther Proc 1 Pulley Shoulder Flexion   PTRx Ther Proc 1 - Details 10x for warm up   PTRx Ther Proc 2 Pulley Shoulder Abduction   PTRx Ther Proc 2 - Details  10x for warm up   PTRx Ther Proc 3 Supine Active Shoulder Flexion   PTRx Ther Proc 3 - Details 10x for AROM w/o gravity patient has improved ROM near equal to R   PTRx Ther Proc 4 Four Corner Stretch Flexion   PTRx Ther Proc 4 - Details Verbally reviewed in clinic. Continue HEP   PTRx Ther Proc 5 Wand Shoulder Internal Rotation   PTRx Ther Proc 5 - Details VC to keep arm straight and pull across back   PTRx Ther Proc 6 Shoulder Scaption Full Can   PTRx Ther Proc 6 - Details Verbally reviewed in clinic. Continue HEP   PTRx Ther Proc 7 Shoulder Scapular Retraction with Tubing   PTRx Ther Proc 7 - Details Verbally reviewed in clinic. Continue HEP   Patient Response/Progress continuing w/ HEP IND   Manual Therapy   Manual Therapy: Mobilization, MFR, MLD, friction massage minutes (70602) 10   Manual Therapy 1 GH mobs   Manual Therapy 1 - Details inferior, posterior, and distraction in supine grade III-IV   Skilled Intervention to increase capsular stretch, tissue extensibility, and improve ROM   Patient Response/Progress improved ROM after   Manual Therapy 2 MFR   Manual Therapy 2 - Details infra, UT x 4 min   Manual Therapy Manual Therapy 2   Education   Learner/Method Patient;No Barriers to Learning   Plan   Updates to plan of care supine AROM flexion   Plan for next session continue ROM/mobs, strength progression within range without compensations   Total Session Time   Timed Code Treatment Minutes 25   Total Treatment Time (sum of timed and untimed services) 25         [facility]                                                                                   OUTPATIENT PHYSICAL THERAPY    PLAN OF TREATMENT FOR OUTPATIENT REHABILITATION   Patient's Last Name, First Name, Niurka Sloan YOB: 1945   Provider's Name   [facility]   Medical Record No.  [id number]     Onset Date: 05/03/23  Start of Care Date: 07/03/23     Medical  Diagnosis:  adhesive capsulitis      PT Treatment Diagnosis:  L shoulder pain Plan of Treatment  Frequency/Duration: 1x/mo/ 12 weeks    Certification date from 10/01/23 to 12/24/23         See note for plan of treatment details and functional goals     Margo George, PT                         I CERTIFY THE NEED FOR THESE SERVICES FURNISHED UNDER        THIS PLAN OF TREATMENT AND WHILE UNDER MY CARE     (Physician attestation of this document indicates review and certification of the therapy plan).                Referring Provider:  Mara Combs      Initial Assessment  See Epic Evaluation- Start of Care Date: 07/03/23

## 2023-10-27 ENCOUNTER — OFFICE VISIT (OUTPATIENT)
Dept: DERMATOLOGY | Facility: CLINIC | Age: 78
End: 2023-10-27
Payer: MEDICARE

## 2023-10-27 DIAGNOSIS — Z80.8 FAMILY HISTORY OF MELANOMA: ICD-10-CM

## 2023-10-27 DIAGNOSIS — D22.9 MULTIPLE BENIGN MELANOCYTIC NEVI: Primary | ICD-10-CM

## 2023-10-27 DIAGNOSIS — L81.4 SOLAR LENTIGO: ICD-10-CM

## 2023-10-27 DIAGNOSIS — D69.2 SENILE PURPURA (H): ICD-10-CM

## 2023-10-27 DIAGNOSIS — L57.0 ACTINIC KERATOSIS: ICD-10-CM

## 2023-10-27 DIAGNOSIS — D18.01 CHERRY ANGIOMA: ICD-10-CM

## 2023-10-27 DIAGNOSIS — L82.1 SEBORRHEIC KERATOSES: ICD-10-CM

## 2023-10-27 PROCEDURE — 99203 OFFICE O/P NEW LOW 30 MIN: CPT | Mod: GC | Performed by: DERMATOLOGY

## 2023-10-27 ASSESSMENT — PAIN SCALES - GENERAL: PAINLEVEL: NO PAIN (0)

## 2023-10-27 NOTE — PATIENT INSTRUCTIONS
We would like you to return for treatment of the Actinic Keratosis (AK) on the nose. There is a very low chance of it converting into a skin cancer but we would like you     Checking for Skin Cancer  You can find cancer early by checking your skin each month. There are 3 kinds of skin cancer. They are melanoma, basal cell carcinoma, and squamous cell carcinoma. Doing monthly skin checks is the best way to find new marks or skin changes. Follow the instructions below for checking your skin.     The ABCDEs of checking moles for melanoma  Check your moles or growths for signs of melanoma using ABCDE:  Asymmetry: the sides of the mole or growth don t match  Border: the edges are ragged, notched, or blurred  Color: the color within the mole or growth varies  Diameter: the mole or growth is larger than 6 mm (size of a pencil eraser)  Evolving: the size, shape, or color of the mole or growth is changing       Checking for other types of skin cancer  Basal cell carcinoma or squamous cell carcinoma have symptoms such as:  A spot or mole that looks different from all other marks on your skin  Changes in how an area feels, such as itching, tenderness, or pain  Changes in the skin's surface, such as oozing, bleeding, or scaliness  A sore that does not heal  New swelling or redness beyond the border of a mole     Who s at risk?  Anyone can get skin cancer. But you are at greater risk if you have:  Fair skin, light-colored hair, or light-colored eyes  Many moles or abnormal moles on your skin  A history of sunburns from sunlight or tanning beds  A family history of skin cancer  A history of exposure to radiation or chemicals  A weakened immune system  If you have had skin cancer in the past, you are at risk for recurring skin cancer.     How to check your skin  Do your monthly skin checkups in front of a full-length mirror. Check all parts of your body, including your:  Head (ears, face, neck, and scalp)  Torso (front, back, and  sides)  Arms (tops, undersides, upper, and lower armpits)  Hands (palms, backs, and fingers, including under the nails)  Buttocks and genitals  Legs (front, back, and sides)  Feet (tops, soles, toes, including under the nails, and between toes)  If you have a lot of moles, take digital photos of them each month. Make sure to take photos both up close and from a distance. These can help you see if any moles change over time.  Most skin changes are not cancer. But if you see any changes in your skin, call your doctor right away. Only he or she can diagnose a problem. If you have skin cancer, seeing your doctor can be the first step toward getting the treatment that could save your life.     Use sunscreen of SPF 30 or greater. Apply liberally.  Relaxing in the sun may feel good. But it isn t good for your skin. In fact, the sun s harmful rays are the major cause of skin cancer. This is a serious disease that can be life-threatening. People of all ages, races, and backgrounds are at risk.  Skin cancer is the most common cancer in the [place] But in most cases, it can be prevented.     Your role in prevention  You can act today to help prevent skin cancer. Start by avoiding the sun s UV (ultraviolet) rays. And don t use tanning beds or lamps. They are no safer than the sun. Taking these steps can help keep you from getting skin cancer. It can also help prevent wrinkles and other aging effects caused by the sun. Make sure your children also follow these safeguards. Now is the time to start taking steps to prevent skin cancer.     When you are outdoors  Protect your skin when you go out during the day. Take safety steps whenever you go out to eat, run errands by car or on foot, or do any outdoor activity. There isn t just one easy way to protect your skin. It s best to follow all of these steps:  Wear tightly woven clothing that covers your skin. Put on a wide-brimmed hat to protect your face, ears, and scalp.  Watch the  "clock. Try to stay out of the sun between 10 a.m. and 4 p.m. That's when the sun's rays are strongest.  Head for the shade or create your own. Use an umbrella when sitting or strolling.  Know that the sun s rays can reflect off sand, water, and snow. This can harm your skin. Take extra care when you are near reflective surfaces.  Keep in mind that even when the weather is hazy or cloudy, your skin can be exposed to strong UV rays.  Shield your skin with sunscreen. Also use sunscreen on your children s skin. Keep babies younger than 6 months old out of the sun.     Tips for using sunscreen  To help prevent skin cancer, choose the right sunscreen and use it correctly. Try these tips:  Choose a sunscreen that has an SPF (sun protection factor) of at least 30. Also choose a sunscreen labeled \"broad spectrum.  This will protect you from both UVA and UVB (ultraviolet A and B) rays.  If one brand irritates your skin, try another, such as one without fragrance.  Use a water-resistant sunscreen if you swim or sweat.  Use at least 1 ounce of sunscreen to cover exposed areas. This is enough to fill a shot glass. You might need to adjust the amount depending on your body size.  Put the sunscreen on dry skin about 15 minutes before going outdoors. This gives it time to soak in.  Reapply sunscreen every 2 hours. If you re active, do this more often.  Cover any sun-exposed skin, from your face to your feet. Don t forget your scalp, ears, and lips.  Know that while sunscreen helps protect you, it isn t enough. Sunscreens extend the length of time you can be outdoors before your skin starts to get red. But they don't give you total protection. Using sunscreen doesn't mean you can stay out in the sun for an unlimited time. Your skin cells are still being damaged. You should also wear protective clothing. And try to stay out of the sun as much as you can, especially from 10 a.m. to 4 p.m.    "

## 2023-10-27 NOTE — NURSING NOTE
Dermatology Rooming Note    Niurka Cisneros's goals for this visit include:   Chief Complaint   Patient presents with    Skin Check     Here today for a skin check. No concerns.      Sanjana Robles RN

## 2023-10-27 NOTE — LETTER
10/27/2023       RE: Niurka Cisneros  [address]  [place] [address]     Dear Colleague,    Thank you for referring your patient, Niurka Cisneros, to the [facility] [place] [facility]. Please see a copy of my visit note below.    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  # Fhx melanoma (daughter).    Social Hx: Retired Pediatrician  ____________________________________________    Assessment & Plan:     #AK  -Nasal Bridge, right lower cheek  Patient would like to defer treatment. She is going to a reunion (medical school) in [place] a week from today and does not want treatment.   Photo obtained today of location.  - return for cryo when checking below lesion to monitor, notify us sooner if symptoms/concerns/changes    #Lesion to Monitor  Left arm pink papule with some grey hyperkeratotic areas of thickening on the lateral border under dermoscopy. Ddx; BLK less likely BCC  Photo obtained today. (See below)  - recheck next visit    # Multiple benign nevi.   # Fhx melanoma.  - Monitor for ABCDEs of melanoma   - Continue sun protection - recommend SPF 30 or higher with frequent application   - Return sooner if noticing changing or symptomatic lesions      # Benign lesions - SKs, cherry angiomas, lentigenes, senile purpura.  - No treatment required         Procedures Performed:   None    Follow-up: 3-4 months recheck left forearm, treat AK on nose, sooner if concerns.     Staff and Resident:     Earle Patel DO, MS  PGY-2  Dermatology  [facility]  Oct 27, 2023 9:02 AM    ____________________________________________    CC: Skin Check (Here today for a skin check. No concerns. )    HPI:  Ms. Niurka Cisneros  is a(n) 78 year old female who presents today as a new patient for [facility]. Patient has a family hx of skin cancer. Referred by Mara Combs MD in [facility].    Today, patient has a lesion on the nose that is scaly. Daughter was recently diagnosed with melanoma and is receiving an excision and SNL biopsy. Mother with NMSC history. Burned as a kid but wears sunscreen and protects and stays out of the some.     She is going to a reunion (medical school) in [place] a week from today and does not want treatment.   Social History: Retired pediatrician    Patient is otherwise feeling well, without additional skin concerns.     Labs Reviewed:  N/A    Physical Exam:  Vitals: LMP  (LMP Unknown)   SKIN: Total skin excluding the undergarment areas was performed. The exam included the head/face, neck, both arms, chest, back, abdomen, both legs, digits and/or nails.   - There are dome shaped bright red papules on the trunk and extremities .   - Multiple regular brown pigmented macules and papules are identified on the trunk and extremities.   - Scattered brown macules on sun exposed areas.  - Waxy stuck on papules and plaques on trunk and extremities.    - There is an erythematous macule with overyling adherent scale on the nasal bridge and lower cheek.  - areas of purpura on the dorsal hands and forearms.   - papule on the left arm pink with slight gray coloring and hyperkeratotic areas.        - No other lesions of concern on areas examined.     Medications:  Current Outpatient Medications   Medication    albuterol (PROAIR HFA/PROVENTIL HFA/VENTOLIN HFA) 108 (90 Base) MCG/ACT inhaler    benralizumab (FASENRA) 30 MG/ML SOSY injection    carBAMazepine (TEGRETOL XR) 200 MG 12 hr tablet    ESTRING 2 MG vaginal ring    fluticasone-salmeterol (ADVAIR) 500-50 MCG/ACT inhaler    magnesium 250 MG tablet    metoprolol succinate ER (TOPROL XL) 25 MG 24 hr tablet    metoprolol succinate ER (TOPROL XL) 50 MG 24 hr  tablet    montelukast (SINGULAIR) 10 MG tablet    NAPROXEN PO    potassium chloride ER (KLOR-CON) 20 MEQ CR tablet    pravastatin (PRAVACHOL) 20 MG tablet    predniSONE (DELTASONE) 20 MG tablet    theophylline (DENIA-24) 400 MG 24 hr capsule    triamterene-HCTZ (MAXZIDE) 75-50 MG tablet    beclomethasone HFA (QVAR REDIHALER) 80 MCG/ACT inhaler     No current facility-administered medications for this visit.      Past Medical History:   Patient Active Problem List   Diagnosis    Seizures (H)    Thoracic aortic aneurysm without rupture (H24)    Moderate persistent asthma without complication    Prediabetes    Pulmonary nodules    Essential hypertension    Osteopenia of multiple sites    Aneurysm of right renal artery (H24)    Severe persistent asthma without complication (H28)    Abnormal CT scan of head    Left shoulder pain, unspecified chronicity     Past Medical History:   Diagnosis Date    Abdominal aortic aneurysm without rupture (H24) 11/10/2015    [  ] repeat imaging due spring 2016 Descending aortic aneurysm.  Being monitored with serial angiogram     Aneurysm of right renal artery (H24) 4/9/2019    Esophageal hypertension 1/25/2017    Essential hypertension 1/25/2017    Female bladder prolapse 11/10/2015    Hearing loss     Pulmonary nodules 1/25/2017    Seizures (H) 11/10/2015    Temporal seziure d/o.  Does not have LOC.  Has prodromal symptoms     Severe persistent asthma (H28) 11/10/2015    Since childhood.  Has been steroid dependent for many years. Has had cydney x2         CC No referring provider defined for this encounter. on close of this encounter.      Laura Bai MD

## 2023-10-27 NOTE — PROGRESS NOTES
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  # Fhx melanoma (daughter).    Social Hx: Retired Pediatrician  ____________________________________________    Assessment & Plan:     #AK  -Nasal Bridge, right lower cheek  Patient would like to defer treatment. She is going to a reunion (medical school) in [place] a week from today and does not want treatment.   Photo obtained today of location.  - return for cryo when checking below lesion to monitor, notify us sooner if symptoms/concerns/changes    #Lesion to Monitor  Left arm pink papule with some grey hyperkeratotic areas of thickening on the lateral border under dermoscopy. Ddx; BLK less likely BCC  Photo obtained today. (See below)  - recheck next visit    # Multiple benign nevi.   # Fhx melanoma.  - Monitor for ABCDEs of melanoma   - Continue sun protection - recommend SPF 30 or higher with frequent application   - Return sooner if noticing changing or symptomatic lesions      # Benign lesions - SKs, cherry angiomas, lentigenes, senile purpura.  - No treatment required         Procedures Performed:   None    Follow-up: 3-4 months recheck left forearm, treat AK on nose, sooner if concerns.     Staff and Resident:     Earle Patel DO, MS  PGY-2  Dermatology  [facility]  Oct 27, 2023 9:02 AM    Staff Physician Comments:   I saw and evaluated the patient with the resident and I agree with the assessment and plan.  I was present for the examination.    Laura Bai MD    Department of Dermatology  [facility]: Phone: [phone], Fax: [phone]  11/6/2023       ____________________________________________    CC: Skin Check (Here today for a skin check.  No concerns. )    HPI:  Ms. Niurka Cisneros is a(n) 78 year old female who presents today as a new patient for [facility]. Patient has a family hx of skin cancer. Referred by Mara Combs MD in [facility].    Today, patient has a lesion on the nose that is scaly. Daughter was recently diagnosed with melanoma and is receiving an excision and SNL biopsy. Mother with NMSC history. Burned as a kid but wears sunscreen and protects and stays out of the some.     She is going to a reunion (medical school) in [place] a week from today and does not want treatment.   Social History: Retired pediatrician    Patient is otherwise feeling well, without additional skin concerns.     Labs Reviewed:  N/A    Physical Exam:  Vitals: LMP  (LMP Unknown)   SKIN: Total skin excluding the undergarment areas was performed. The exam included the head/face, neck, both arms, chest, back, abdomen, both legs, digits and/or nails.   - There are dome shaped bright red papules on the trunk and extremities .   - Multiple regular brown pigmented macules and papules are identified on the trunk and extremities.   - Scattered brown macules on sun exposed areas.  - Waxy stuck on papules and plaques on trunk and extremities.    - There is an erythematous macule with overyling adherent scale on the nasal bridge and lower cheek.  - areas of purpura on the dorsal hands and forearms.   - papule on the left arm pink with slight gray coloring and hyperkeratotic areas.        - No other lesions of concern on areas examined.     Medications:  Current Outpatient Medications   Medication    albuterol (PROAIR HFA/PROVENTIL HFA/VENTOLIN HFA) 108 (90 Base) MCG/ACT inhaler    benralizumab (FASENRA) 30 MG/ML SOSY injection    carBAMazepine (TEGRETOL XR) 200 MG 12 hr tablet    ESTRING 2 MG vaginal ring    fluticasone-salmeterol (ADVAIR) 500-50 MCG/ACT inhaler    magnesium 250 MG tablet    metoprolol succinate ER (TOPROL XL) 25 MG 24 hr tablet     metoprolol succinate ER (TOPROL XL) 50 MG 24 hr tablet    montelukast (SINGULAIR) 10 MG tablet    NAPROXEN PO    potassium chloride ER (KLOR-CON) 20 MEQ CR tablet    pravastatin (PRAVACHOL) 20 MG tablet    predniSONE (DELTASONE) 20 MG tablet    theophylline (DENIA-24) 400 MG 24 hr capsule    triamterene-HCTZ (MAXZIDE) 75-50 MG tablet    beclomethasone HFA (QVAR REDIHALER) 80 MCG/ACT inhaler     No current facility-administered medications for this visit.      Past Medical History:   Patient Active Problem List   Diagnosis    Seizures (H)    Thoracic aortic aneurysm without rupture (H24)    Moderate persistent asthma without complication    Prediabetes    Pulmonary nodules    Essential hypertension    Osteopenia of multiple sites    Aneurysm of right renal artery (H24)    Severe persistent asthma without complication (H28)    Abnormal CT scan of head    Left shoulder pain, unspecified chronicity     Past Medical History:   Diagnosis Date    Abdominal aortic aneurysm without rupture (H24) 11/10/2015    [  ] repeat imaging due spring 2016 Descending aortic aneurysm.  Being monitored with serial angiogram     Aneurysm of right renal artery (H24) 4/9/2019    Esophageal hypertension 1/25/2017    Essential hypertension 1/25/2017    Female bladder prolapse 11/10/2015    Hearing loss     Pulmonary nodules 1/25/2017    Seizures (H) 11/10/2015    Temporal seziure d/o.  Does not have LOC.  Has prodromal symptoms     Severe persistent asthma (H28) 11/10/2015    Since childhood.  Has been steroid dependent for many years. Has had cydney x2         CC No referring provider defined for this encounter. on close of this encounter.

## 2023-11-09 ENCOUNTER — INFUSION THERAPY VISIT (OUTPATIENT)
Dept: INFUSION THERAPY | Facility: CLINIC | Age: 78
End: 2023-11-09
Payer: MEDICARE

## 2023-11-09 VITALS
DIASTOLIC BLOOD PRESSURE: 85 MMHG | OXYGEN SATURATION: 97 % | TEMPERATURE: 98.1 F | HEART RATE: 68 BPM | SYSTOLIC BLOOD PRESSURE: 129 MMHG

## 2023-11-09 DIAGNOSIS — J45.50 SEVERE PERSISTENT ASTHMA WITHOUT COMPLICATION (H): Primary | ICD-10-CM

## 2023-11-09 PROCEDURE — 96372 THER/PROPH/DIAG INJ SC/IM: CPT

## 2023-11-09 PROCEDURE — 250N000011 HC RX IP 250 OP 636: Mod: JZ

## 2023-11-09 RX ADMIN — BENRALIZUMAB 30 MG: 30 INJECTION, SOLUTION SUBCUTANEOUS at 08:47

## 2023-11-09 ASSESSMENT — PAIN SCALES - GENERAL: PAINLEVEL: NO PAIN (0)

## 2023-11-09 NOTE — PROGRESS NOTES
Pt is here for a fasenra injection  Pt denies any s/s of infection at this time.    Pepe released for LPN to give.

## 2023-11-09 NOTE — PROGRESS NOTES
Patient presents to the [facility] for Fasenra injection.  Order written by Dr. Jose De Jesus Vega  was completed today. Name and  verified with patient. See MAR for medication details. Medication was divided into 1 syringe by pharmacy and given in the following site: backside of right upper arm. Patient tolerated injection well and was monitored for 30 minutes after administration, then discharged to home. Patient to return back in 8 weeks.    JOANNE Alves/HUONG Cary LPN    Administrations This Visit       benralizumab (FASENRA) injection 30 mg       Admin Date  2023 Action  $Given Dose  30 mg Route  Subcutaneous Documented By  Candy Alves LPN

## 2023-11-10 DIAGNOSIS — I71.21 ASCENDING AORTIC ANEURYSM (H): ICD-10-CM

## 2023-11-10 DIAGNOSIS — I10 BENIGN ESSENTIAL HYPERTENSION: ICD-10-CM

## 2023-11-10 DIAGNOSIS — I72.2 ANEURYSM OF RIGHT RENAL ARTERY (H): ICD-10-CM

## 2023-11-15 RX ORDER — TRIAMTERENE AND HYDROCHLOROTHIAZIDE 75; 50 MG/1; MG/1
1 TABLET ORAL DAILY
Qty: 90 TABLET | Refills: 1 | Status: SHIPPED | OUTPATIENT
Start: 2023-11-15 | End: 2024-04-12

## 2023-11-15 RX ORDER — METOPROLOL SUCCINATE 25 MG/1
TABLET, EXTENDED RELEASE ORAL
Qty: 90 TABLET | Refills: 1 | Status: SHIPPED | OUTPATIENT
Start: 2023-11-15 | End: 2024-06-27

## 2023-11-15 NOTE — TELEPHONE ENCOUNTER
triamterene-HCTZ (MAXZIDE) 75-50 MG tablet 90 tablet 0 6/13/2023  Last Office Visit : 6/30/2023   Future Office visit:  0      Warnings Override History for triamterene-HCTZ (MAXZIDE) 75-50 MG tablet [[id number]]      Overridden by Funmilayo Hook RN on Jun 13, 2023 10:29 AM   Drug-Drug   1. TRIAMTERENE / POTASSIUM PREPARATIONS [Level: Major] [Reason: Tolerated medication/side effects in past]   Other Orders: potassium chloride ER (KLOR-CON) 20 MEQ CR tablet

## 2023-11-26 DIAGNOSIS — N81.10 BLADDER PROLAPSE, FEMALE, ACQUIRED: ICD-10-CM

## 2023-11-29 DIAGNOSIS — J45.50 SEVERE PERSISTENT ASTHMA, UNSPECIFIED WHETHER COMPLICATED (H): ICD-10-CM

## 2023-11-29 RX ORDER — ESTRADIOL 2 MG/1
1 SYSTEM VAGINAL
Qty: 1 EACH | Refills: 1 | Status: SHIPPED | OUTPATIENT
Start: 2023-11-29 | End: 2024-04-12

## 2023-11-29 RX ORDER — ALBUTEROL SULFATE 90 UG/1
2 AEROSOL, METERED RESPIRATORY (INHALATION) EVERY 6 HOURS PRN
Qty: 54 G | Refills: 3 | Status: SHIPPED | OUTPATIENT
Start: 2023-11-29 | End: 2024-01-08

## 2023-11-29 NOTE — TELEPHONE ENCOUNTER
ESTRING 2 MG vaginal ring 1 each 1 6/13/2023  Last Office Visit : 6/30/2023  [facility] [place]  Future Office visit:  0      BP Readings from Last 3 Encounters:   11/09/23 129/85   09/14/23 138/80   07/20/23 (!) 148/84

## 2023-11-29 NOTE — TELEPHONE ENCOUNTER
Health Call Center    Phone Message    May a detailed message be left on voicemail: yes     Reason for Call: Medication Refill Request    Has the patient contacted the pharmacy for the refill? Yes     Name of medication being requested: theophylline (DENIA-24) 400 MG 24 hr capsule    Provider who prescribed the medication: Dr. Vega    Pharmacy: [facility] [phone] option 2  Ref #: [id number]    Date medication is needed: ASAP    Remind patient there is a 72-business hour turn around time on refill requests- yes    Action Taken: Message routed to:  [facility] Adult Pulm

## 2023-12-10 ENCOUNTER — MYC MEDICAL ADVICE (OUTPATIENT)
Dept: PULMONOLOGY | Facility: CLINIC | Age: 78
End: 2023-12-10
Payer: MEDICARE

## 2023-12-19 ENCOUNTER — THERAPY VISIT (OUTPATIENT)
Dept: PHYSICAL THERAPY | Facility: CLINIC | Age: 78
End: 2023-12-19
Payer: MEDICARE

## 2023-12-19 DIAGNOSIS — M25.512 LEFT SHOULDER PAIN, UNSPECIFIED CHRONICITY: Primary | ICD-10-CM

## 2023-12-19 PROCEDURE — 97110 THERAPEUTIC EXERCISES: CPT | Mod: GP | Performed by: PHYSICAL THERAPIST

## 2023-12-19 NOTE — PROGRESS NOTES
DISCHARGE  Reason for Discharge: Patient has met all goals.    Equipment Issued: HEP    Discharge Plan: Patient to continue home program.    Referring Provider:  Mara Combs       12/19/23 0500   Appointment Info   Signing clinician's name / credentials Sun Hardy DPT, OCS   Total/Authorized Visits 6   Visits Used 7   Medical Diagnosis adhesive capsulitis   PT Tx Diagnosis L shoulder pain   Quick Adds Certification   Progress Note/Certification   Start of Care Date 07/03/23   Onset of illness/injury or Date of Surgery 05/03/23   Therapy Frequency 1x/mo   Predicted Duration 12 weeks   Certification date from 10/01/23   Certification date to 12/24/23   Progress Note Due Date 12/16/23   Progress Note Completed Date 10/17/23   PT Goal 1   Goal Identifier pt will perform shoulder abduction x10 in session with no pain and full ROM   Rationale to maximize safety and independence with performance of ADLs and functional tasks;to maximize safety and independence within the home;to maximize safety and independence within the community   Goal Progress abduction limited to about 110 after warming up with pain at end range. date extended.   Target Date 12/24/23   Subjective Report   Subjective Report Roopa states that her shoulder is doing better. She has mild pain putting a jacket on, and trying one warm up exercise above her head at the gym. Otherwise, her shoulder has been pain free. She notices some end range stiffness still. She feels ready to discharge frrom PT with use of HEP   Objective Measures   Objective Measures Objective Measure 1   Objective Measure 1   Objective Measure shoulder AROM LEFT   Details flex 130, abd 120 ER 55   Treatment Interventions (PT)   Interventions Therapeutic Procedure/Exercise;Manual Therapy   Therapeutic Procedure/Exercise   Therapeutic Procedures: strength, endurance, ROM, flexibillity minutes ([id number]) 23   Ther Proc 1 Review of HEP   Ther Proc 1 - Details Education on  Spoke with the patient via phone. Notified the patient of below lab result and orders. Patient verbalized understanding and stated that she has been taking it that way since talking with PCP on 12/28.  Patient stated that she will come in on Monday to have frequency, increasing load, signs of irritation   PTRx Ther Proc 1 low row   PTRx Ther Proc 1 - Details GTB. VC and TC to bring scapulae down and back   PTRx Ther Proc 6 Shoulder Scaption Full Can   PTRx Ther Proc 6 - Details Education to progress to 3-4#   Skilled Intervention Assessment of strength and functional deficits to determine exercise prescription; tactile and verbal cuing to assist with proper performance; education in loading principles and expected response   Patient Response/Progress continuing w/ HEP IND   Manual Therapy   Manual Therapy Manual Therapy 2   Manual Therapy 1 GH mobs   Manual Therapy 1 - Details inferior, posterior, and distraction in supine grade III-IV   Manual Therapy 2 MFR   Manual Therapy 2 - Details infra, UT x 4 min   Skilled Intervention to increase capsular stretch, tissue extensibility, and improve ROM   Patient Response/Progress improved ROM after   Education   Learner/Method Patient;No Barriers to Learning   Plan   Updates to plan of care supine AROM flexion   Plan for next session continue ROM/mobs, strength progression within range without compensations   Total Session Time   Timed Code Treatment Minutes 23   Total Treatment Time (sum of timed and untimed services) 23

## 2023-12-20 ENCOUNTER — MYC MEDICAL ADVICE (OUTPATIENT)
Dept: INTERNAL MEDICINE | Facility: CLINIC | Age: 78
End: 2023-12-20
Payer: MEDICARE

## 2023-12-27 ENCOUNTER — ANCILLARY PROCEDURE (OUTPATIENT)
Dept: GENERAL RADIOLOGY | Facility: CLINIC | Age: 78
End: 2023-12-27
Attending: INTERNAL MEDICINE
Payer: MEDICARE

## 2023-12-27 ENCOUNTER — OFFICE VISIT (OUTPATIENT)
Dept: INTERNAL MEDICINE | Facility: CLINIC | Age: 78
End: 2023-12-27
Payer: MEDICARE

## 2023-12-27 VITALS
SYSTOLIC BLOOD PRESSURE: 119 MMHG | HEART RATE: 84 BPM | BODY MASS INDEX: 26.23 KG/M2 | HEIGHT: 67 IN | DIASTOLIC BLOOD PRESSURE: 72 MMHG | WEIGHT: 167.1 LBS | OXYGEN SATURATION: 98 %

## 2023-12-27 DIAGNOSIS — M25.551 HIP PAIN, RIGHT: Primary | ICD-10-CM

## 2023-12-27 DIAGNOSIS — M25.551 HIP PAIN, RIGHT: ICD-10-CM

## 2023-12-27 PROCEDURE — 73502 X-RAY EXAM HIP UNI 2-3 VIEWS: CPT | Mod: RT | Performed by: RADIOLOGY

## 2023-12-27 PROCEDURE — 99214 OFFICE O/P EST MOD 30 MIN: CPT | Performed by: INTERNAL MEDICINE

## 2023-12-27 ASSESSMENT — ASTHMA QUESTIONNAIRES
QUESTION_1 LAST FOUR WEEKS HOW MUCH OF THE TIME DID YOUR ASTHMA KEEP YOU FROM GETTING AS MUCH DONE AT WORK, SCHOOL OR AT HOME: A LITTLE OF THE TIME
QUESTION_4 LAST FOUR WEEKS HOW OFTEN HAVE YOU USED YOUR RESCUE INHALER OR NEBULIZER MEDICATION (SUCH AS ALBUTEROL): ONE OR TWO TIMES PER DAY
QUESTION_3 LAST FOUR WEEKS HOW OFTEN DID YOUR ASTHMA SYMPTOMS (WHEEZING, COUGHING, SHORTNESS OF BREATH, CHEST TIGHTNESS OR PAIN) WAKE YOU UP AT NIGHT OR EARLIER THAN USUAL IN THE MORNING: NOT AT ALL
ACT_TOTALSCORE: 18
QUESTION_5 LAST FOUR WEEKS HOW WOULD YOU RATE YOUR ASTHMA CONTROL: WELL CONTROLLED
QUESTION_2 LAST FOUR WEEKS HOW OFTEN HAVE YOU HAD SHORTNESS OF BREATH: THREE TO SIX TIMES A WEEK
ACT_TOTALSCORE: 18

## 2023-12-27 NOTE — PROGRESS NOTES
"Niurka Cisneros is a pleasant 78 year old year old female coming in today for right hip pain.     Patient had left hip replacement a number of years ago, and reports her right hip pain is similar to her left prior to replacement. Reports right SI and hip joint pain that has been worsening over the last three months with radiation to above her knee, exacerbated by ambulation. Endorsed rare inguinal/medial aspect pain with stairs. She has needed increasing doses of Advil for adequate management, denies history of Voltaren use. We discussed piriformis muscle, bursitis, and arthritis as possible etiologies. Plain film and PT referral today, future referral to orthopedics depending on results.     /72 (BP Location: Right arm, Patient Position: Sitting, Cuff Size: Adult Regular)   Pulse 84   Ht 1.702 m (5' 7.01\")   Wt 75.8 kg (167 lb 1.6 oz)   LMP  (LMP Unknown)   SpO2 98%   BMI 26.17 kg/m      PHYSICAL EXAM    Constitutional: no distress, comfortable, pleasant   Musculoskeletal: Lateral aspect right hip tenderness with external rotation   Skin: no concerning lesions, no jaundice   Neurological:  normal gait, no tremor   Psychological: appropriate mood   Lymphatic: no pedal edema      ASSESSMENT & PLAN    Roopa was seen today for consult on right hip pain    Diagnoses and all orders for this visit:    Hip pain, right  -     Physical Therapy Referral; Future  -     XR Hip Right 2-3 Views; Future    Result addendum: 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 Ingrid Mansfield- -based on data collection and the provider's statements to me.     Provider Disclosure:  I agree with above History, Review of Systems, Physical exam and Plan.  I have reviewed the content of the documentation and have edited it as needed. I have personally " performed the services documented here and the documentation accurately represents those services and the decisions I have made.      Electronically signed by:  Dr. Ingrid Mansfield

## 2023-12-27 NOTE — PROGRESS NOTES
Roopa is a 78 year old that presents in clinic today for the following:     Chief Complaint   Patient presents with    Consult     Right hip pain, seems to spread to knee and bother back            12/27/2023    12:22 PM   Additional Questions   Roomed by [facility] EMT       Screenings from encounters over the past 10 days    No data recorded       Aurelia Herrera MA at 12:27 PM on 12/27/2023

## 2023-12-29 DIAGNOSIS — M25.551 HIP PAIN, RIGHT: Primary | ICD-10-CM

## 2024-01-08 ENCOUNTER — OFFICE VISIT (OUTPATIENT)
Dept: PULMONOLOGY | Facility: CLINIC | Age: 79
End: 2024-01-08
Payer: MEDICARE

## 2024-01-08 ENCOUNTER — MYC MEDICAL ADVICE (OUTPATIENT)
Dept: PULMONOLOGY | Facility: CLINIC | Age: 79
End: 2024-01-08

## 2024-01-08 VITALS
RESPIRATION RATE: 17 BRPM | BODY MASS INDEX: 25.11 KG/M2 | SYSTOLIC BLOOD PRESSURE: 128 MMHG | OXYGEN SATURATION: 97 % | HEIGHT: 67 IN | WEIGHT: 160 LBS | DIASTOLIC BLOOD PRESSURE: 78 MMHG | HEART RATE: 85 BPM

## 2024-01-08 DIAGNOSIS — J45.51 SEVERE PERSISTENT ASTHMA WITH EXACERBATION (H): Primary | ICD-10-CM

## 2024-01-08 DIAGNOSIS — J45.50 SEVERE PERSISTENT ASTHMA, UNSPECIFIED WHETHER COMPLICATED (H): ICD-10-CM

## 2024-01-08 PROCEDURE — G0463 HOSPITAL OUTPT CLINIC VISIT: HCPCS

## 2024-01-08 PROCEDURE — 99214 OFFICE O/P EST MOD 30 MIN: CPT

## 2024-01-08 RX ORDER — AZITHROMYCIN 250 MG/1
TABLET, FILM COATED ORAL
Qty: 6 TABLET | Refills: 0 | Status: SHIPPED | OUTPATIENT
Start: 2024-01-08 | End: 2024-01-13

## 2024-01-08 RX ORDER — ALBUTEROL SULFATE 90 UG/1
2 AEROSOL, METERED RESPIRATORY (INHALATION) EVERY 6 HOURS PRN
Qty: 54 G | Refills: 3 | Status: SHIPPED | OUTPATIENT
Start: 2024-01-08 | End: 2024-01-09

## 2024-01-08 RX ORDER — MONTELUKAST SODIUM 10 MG/1
10 TABLET ORAL AT BEDTIME
Qty: 90 TABLET | Refills: 3 | Status: SHIPPED | OUTPATIENT
Start: 2024-01-08 | End: 2024-01-09

## 2024-01-08 RX ORDER — FLUTICASONE PROPIONATE AND SALMETEROL 500; 50 UG/1; UG/1
1 POWDER RESPIRATORY (INHALATION) 2 TIMES DAILY
Qty: 180 EACH | Refills: 3 | Status: SHIPPED | OUTPATIENT
Start: 2024-01-08 | End: 2024-01-09

## 2024-01-08 RX ORDER — PREDNISONE 20 MG/1
20 TABLET ORAL DAILY
Qty: 10 TABLET | Refills: 0 | Status: SHIPPED | OUTPATIENT
Start: 2024-01-08

## 2024-01-08 ASSESSMENT — ACTIVITIES OF DAILY LIVING (ADL)
STANDING FOR 15 MINUTES: SLIGHT DIFFICULTY
HOS_ADL_ITEM_SCORE_TOTAL: 46
WALKING_15_MINUTES_OR_GREATER: SLIGHT DIFFICULTY
LIGHT_TO_MODERATE_WORK: SLIGHT DIFFICULTY
HOW_WOULD_YOU_RATE_YOUR_CURRENT_LEVEL_OF_FUNCTION?: ABNORMAL
GETTING_INTO_AND_OUT_OF_AN_AVERAGE_CAR: SLIGHT DIFFICULTY
SITTING FOR 15 MINUTES: NO DIFFICULTY AT ALL
STEPPING UP AND DOWN CURBS: SLIGHT DIFFICULTY
HOS_ADL_HIGHEST_POTENTIAL_SCORE: 64
WALKING_15_MINUTES_OR_GREATER: SLIGHT DIFFICULTY
JUMPING: EXTREME DIFFICULTY
ABILITY_TO_PARTICIPATE_IN_YOUR_DESIRED_SPORT_AS_LONG_AS_YOU_WOULD_LIKE: MODERATE DIFFICULTY
LOW_IMPACT_ACTIVITIES_LIKE_FAST_WALKING: MODERATE DIFFICULTY
HOW_WOULD_YOU_RATE_YOUR_CURRENT_LEVEL_OF_FUNCTION_DURING_YOUR_SPORTS_RELATED_ACTIVITIES_FROM_0_TO_100_WITH_100_BEING_YOUR_LEVEL_OF_FUNCTION_PRIOR_TO_YOUR_HIP_PROBLEM_AND_0_BEING_THE_INABILITY_TO_PERFORM_ANY_OF_YOUR_USUAL_DAILY_ACTIVITIES?: 50
ADL_SCORE(%): 0
SITTING_FOR_15_MINUTES: NO DIFFICULTY AT ALL
PUTTING ON SOCKS AND SHOES: SLIGHT DIFFICULTY
DEEP SQUATTING: NO DIFFICULTY AT ALL
ADL_COUNT: 17
ROLLING_OVER_IN_BED: SLIGHT DIFFICULTY
TWISTING/PIVOTING_ON_INVOLVED_LEG: NO DIFFICULTY AT ALL
PUTTING_ON_SOCKS_AND_SHOES: SLIGHT DIFFICULTY
SPORTS_COUNT: 9
WALKING_UP_STEEP_HILLS: MODERATE DIFFICULTY
HEAVY_WORK: MODERATE DIFFICULTY
ROLLING OVER IN BED: SLIGHT DIFFICULTY
GOING_UP_1_FLIGHT_OF_STAIRS: MODERATE DIFFICULTY
STEPPING_UP_AND_DOWN_CURBS: SLIGHT DIFFICULTY
ADL_HIGHEST_POTENTIAL_SCORE: 68
RECREATIONAL ACTIVITIES: SLIGHT DIFFICULTY
ABILITY_TO_PERFORM_ACTIVITY_WITH_YOUR_NORMAL_TECHNIQUE: SLIGHT DIFFICULTY
GOING UP 1 FLIGHT OF STAIRS: MODERATE DIFFICULTY
GETTING INTO AND OUT OF AN AVERAGE CAR: SLIGHT DIFFICULTY
ADL_TOTAL_ITEM_SCORE: 0
TWISTING/PIVOTING ON INVOLVED LEG: NO DIFFICULTY AT ALL
DEEP_SQUATTING: NO DIFFICULTY AT ALL
HOS_ADL_SCORE(%): 71.88
STARTING_AND_STOPPING_QUICKLY: SLIGHT DIFFICULTY
WALKING_DOWN_STEEP_HILLS: SLIGHT DIFFICULTY
GOING_DOWN_1_FLIGHT_OF_STAIRS: SLIGHT DIFFICULTY
RUNNING_ONE_MILE: UNABLE TO DO
LIGHT_TO_MODERATE_WORK: SLIGHT DIFFICULTY
WALKING_UP_STEEP_HILLS: MODERATE DIFFICULTY
SPORTS_HIGHEST_POTENTIAL_SCORE: 36
WALKING_APPROXIMATELY_10_MINUTES: SLIGHT DIFFICULTY
RECREATIONAL_ACTIVITIES: SLIGHT DIFFICULTY
PLEASE_INDICATE_YOR_PRIMARY_REASON_FOR_REFERRAL_TO_THERAPY:: HIP
WALKING_INITIALLY: MODERATE DIFFICULTY
SWINGING_OBJECTS_LIKE_A_GOLF_CLUB: NO DIFFICULTY AT ALL
LANDING: NO DIFFICULTY AT ALL
HEAVY_WORK: MODERATE DIFFICULTY
STANDING_FOR_15_MINUTES: SLIGHT DIFFICULTY
SPORTS_TOTAL_ITEM_SCORE: 0
SPORTS_SCORE(%): 0
WALKING_INITIALLY: MODERATE DIFFICULTY
GOING DOWN 1 FLIGHT OF STAIRS: SLIGHT DIFFICULTY
WALKING_FOR_APPROXIMATELY_10_MINUTES: SLIGHT DIFFICULTY
WALKING_DOWN_STEEP_HILLS: SLIGHT DIFFICULTY

## 2024-01-08 ASSESSMENT — PAIN SCALES - GENERAL: PAINLEVEL: NO PAIN (0)

## 2024-01-08 NOTE — NURSING NOTE
Chief Complaint   Patient presents with    RECHECK     Return visit     Medications reviewed and vital signs taken.   Maxx Loera CMA

## 2024-01-08 NOTE — PROGRESS NOTES
[facility]  Pulmonary Medicine  Visit Clinic Note  January 8, 2023         ASSESSMENT & PLAN       Severe persistent asthma  Acute bronchitis / asthma exacerbation  Eosinophilia  Moderate COPD    Overall, over the last year she has had stable symptoms with the exception of the last 1 to 2 weeks where she is had increase in purulent sputum consistent with some acute bronchitis.  This has been persistent, and has not resolved on its own.  I will treat her with 5 days of azithromycin.  I also did give her 20 mg of prednisone to have on hand in case the azithromycin does not fully take away her symptoms.  I refilled all of her medications for 1 year.  I we will also refill her Fasenra.    RTC in 1 year       Rodney Vega MD     I spent 33 minutes on the date of the encounter reviewing the medical record, performing a history and physical exam, and discussing causes of shortness of breath.         Today's visit note:     Chief Complaint: Niurka Cisneros is a 78 year old year old female who is being seen for asthma/copd    HISTORY OF PRESENT ILLNESS:    Overall, she has had a good year from a respiratory standpoint.  I saw her 1 year ago, and at that time she was endorsing some increasing shortness of breath.  We tried some prednisone for a little bit.  We also tried adding Incruse Ellipta.  Finally, I tried adding Qvar for about 2 months.  Nothing then really made a major difference in her breathing, but her breathing eventually did count of improve on its own, and she enjoyed most of the year with stable respiratory health.  She is on Wixela 500 mcg twice a day, Singulair in the summer months when her hayfever is under control, Fasenra every 8 weeks.  She has not had an asthma exacerbation throughout the whole year.  However unfortunately in the last 1 to 2 weeks, she thinks she has come down with some type of chest cold.  She is having increased cough with sputum production.  It is yellow in  "color.  It is keeping her up at night.  She does not endorse any worsening shortness of breath or wheeze or chest tightness.  Denies any fevers.         Past Medical and Surgical History:     Past Medical History:   Diagnosis Date    Abdominal aortic aneurysm without rupture (H24) 11/10/2015    [  ] repeat imaging due spring 2016 Descending aortic aneurysm.  Being monitored with serial angiogram     Aneurysm of right renal artery (H24) 2019    Arthritis 1998    COPD (chronic obstructive pulmonary disease) (H) 11/10/2015    Not sure of this diagnosis.  May be a consequence of asthma.    Esophageal hypertension 2017    Essential hypertension 2017    Female bladder prolapse 11/10/2015    Hearing loss     Pulmonary nodules 2017    Seizures (H) 11/10/2015    Temporal seziure d/o.  Does not have LOC.  Has prodromal symptoms     Severe persistent asthma (H28) 11/10/2015    Since childhood.  Has been steroid dependent for many years. Has had cydney x2       Past Surgical History:   Procedure Laterality Date    APPENDECTOMY      CATARACT IOL, RT/LT Bilateral     COLONOSCOPY N/A 2019    Procedure: COLONOSCOPY;  Surgeon: Haim Burgos MD;  Location:  GI    GI SURGERY      nissn x 2    GYN SURGERY      H CATARACT SURGICAL PACKAGE      HYSTERECTOMY      fibroids    JOINT REPLACEMENT Left     NISSEN FUNDOPLICATION      x2    SOFT TISSUE SURGERY             Family History:     Family History   Problem Relation Age of Onset    Dementia Mother     Heart Failure Mother     Hypertension Mother     Breast Cancer Mother         post menopausal    Diabetes Mother     Cancer Father         prostate cancer    Hypertension Father     Prostate Cancer Father          of it at 82    Asthma Father         \"outgrew\" it    Schizophrenia Maternal Grandmother     Coronary Artery Disease Maternal Grandmother          of aortic aneurysm at 89    Mental Illness Maternal Grandmother     Coronary " Artery Disease Maternal Grandfather         he was diabetic and smoked    Asthma Sister         x2    Depression Sister     Coronary Artery Disease Sister         MI when being ventilated for asthma    Asthma Sister         both sisters with asthma, one severe    Depression Sister     Other - See Comments Daughter         Enlarged Aorta    Melanoma Daughter     Breast Cancer Maternal Aunt     Coronary Artery Disease Daughter         Mildly dilated thoracic aorta    Hypertension Son               Social History:     Social History     Socioeconomic History    Marital status:      Spouse name: Not on file    Number of children: Not on file    Years of education: 20    Highest education level: Not on file   Occupational History    Occupation: pediatrician   Tobacco Use    Smoking status: Never    Smokeless tobacco: Never   Substance and Sexual Activity    Alcohol use: Not Currently     Comment: less than a drink a day    Drug use: No    Sexual activity: Yes     Partners: Male     Birth control/protection: Post-menopausal, Male Surgical, Female Surgical   Other Topics Concern    Parent/sibling w/ CABG, MI or angioplasty before 65F 55M? 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     Social Determinants of Health     Financial Resource Strain: Low Risk  (12/21/2023)    Financial Resource Strain     Within the past 12 months, have you or your family members you live with been unable to get utilities (heat, electricity) when it was really needed?: No   Food Insecurity: Low Risk  (12/21/2023)    Food Insecurity     Within the past 12 months, did you worry that your food would run out before you got money to buy more?: No     Within the past 12 months, did the food you bought just not last and you didn t have money to get more?: No   Transportation Needs: Low Risk  (12/21/2023)    Transportation  "Needs     Within the past 12 months, has lack of transportation kept you from medical appointments, getting your medicines, non-medical meetings or appointments, work, or from getting things that you need?: No   Physical Activity: Not on file   Stress: Not on file   Social Connections: Not on file   Interpersonal Safety: Low Risk  (12/27/2023)    Interpersonal Safety     Do you feel physically and emotionally safe where you currently live?: Yes     Within the past 12 months, have you been hit, slapped, kicked or otherwise physically hurt by someone?: No     Within the past 12 months, have you been humiliated or emotionally abused in other ways by your partner or ex-partner?: No   Housing Stability: Low Risk  (12/21/2023)    Housing Stability     Do you have housing? : Yes     Are you worried about losing your housing?: No            Medications:     Current Outpatient Medications   Medication    albuterol (PROAIR HFA/PROVENTIL HFA/VENTOLIN HFA) 108 (90 Base) MCG/ACT inhaler    benralizumab (FASENRA) 30 MG/ML SOSY injection    carBAMazepine (TEGRETOL XR) 200 MG 12 hr tablet    diclofenac (VOLTAREN) 1 % topical gel    estradiol (ESTRING) 7.5 MCG/24HR vaginal ring    fluticasone-salmeterol (ADVAIR) 500-50 MCG/ACT inhaler    magnesium 250 MG tablet    metoprolol succinate ER (TOPROL XL) 25 MG 24 hr tablet    metoprolol succinate ER (TOPROL XL) 50 MG 24 hr tablet    montelukast (SINGULAIR) 10 MG tablet    NAPROXEN PO    potassium chloride ER (KLOR-CON) 20 MEQ CR tablet    pravastatin (PRAVACHOL) 20 MG tablet    predniSONE (DELTASONE) 20 MG tablet    theophylline (DENIA-24) 400 MG 24 hr capsule    triamterene-HCTZ (MAXZIDE) 75-50 MG tablet     No current facility-administered medications for this visit.            Review of Systems:     All other review of systems are negative        PHYSICAL EXAM:  /78   Pulse 85   Resp 17   Ht 1.702 m (5' 7\")   Wt 72.6 kg (160 lb)   LMP  (LMP Unknown)   SpO2 97%   BMI 25.06 " kg/m       General: Pleasant, no apparent distress  Eyes: Anicteric  Ears: Hearing grossly normal  Neck: supple  Respiratory: Mild expiratory wheezing in the left lower lobe.  Otherwise clear.  No accessory muscle use.  Coarse cough on exam.  Skin: No obvious rashes.  No peripheral edema.  Extremities: No cyanosis or clubbing  Neuro: Normal mentation. Normal speech.  Psych:Normal affect           Data:   All laboratory and imaging data reviewed.      Absolute eosinophils in May 2016 were 500    PFT:       PFT Interpretation:  Moderate airflow obstruction.  Valid Maneuver

## 2024-01-08 NOTE — LETTER
1/8/2024         RE: Niurka Cisneros  [address]  [place] [address]        Dear Colleague,    Thank you for referring your patient, Niurka Cisneros, to the [facility] [place]. Please see a copy of my visit note below.      [facility]  Pulmonary Medicine  Visit Clinic Note  January 8, 2023         ASSESSMENT & PLAN       Severe persistent asthma  Acute bronchitis / asthma exacerbation  Eosinophilia  Moderate COPD    Overall, over the last year she has had stable symptoms with the exception of the last 1 to 2 weeks where she is had increase in purulent sputum consistent with some acute bronchitis.  This has been persistent, and has not resolved on its own.  I will treat her with 5 days of azithromycin.  I also did give her 20 mg of prednisone to have on hand in case the azithromycin does not fully take away her symptoms.  I refilled all of her medications for 1 year.  I we will also refill her Fasenra.    RTC in 1 year       Rodney Vega MD     I spent 33 minutes on the date of the encounter reviewing the medical record, performing a history and physical exam, and discussing causes of shortness of breath.         Today's visit note:     Chief Complaint: Niurka Cisneros is a 78 year old year old female who is being seen for asthma/copd    HISTORY OF PRESENT ILLNESS:    Overall, she has had a good year from a respiratory standpoint.  I saw her 1 year ago, and at that time she was endorsing some increasing shortness of breath.  We tried some prednisone for a little bit.  We also tried adding Incruse Ellipta.  Finally, I tried adding Qvar for about 2 months.  Nothing then really made a major difference in her breathing, but her breathing eventually did count of improve on its own, and she enjoyed most of the year with stable respiratory health.  She is on Wixela 500 mcg twice a day, Singulair in the summer months when her  hayfever is under control, Fasenra every 8 weeks.  She has not had an asthma exacerbation throughout the whole year.  However unfortunately in the last 1 to 2 weeks, she thinks she has come down with some type of chest cold.  She is having increased cough with sputum production.  It is yellow in color.  It is keeping her up at night.  She does not endorse any worsening shortness of breath or wheeze or chest tightness.  Denies any fevers.         Past Medical and Surgical History:     Past Medical History:   Diagnosis Date    Abdominal aortic aneurysm without rupture (H24) 11/10/2015    [  ] repeat imaging due spring 2016 Descending aortic aneurysm.  Being monitored with serial angiogram     Aneurysm of right renal artery (H24) 04/09/2019    Arthritis 1998    COPD (chronic obstructive pulmonary disease) (H) 11/10/2015    Not sure of this diagnosis.  May be a consequence of asthma.    Esophageal hypertension 01/25/2017    Essential hypertension 01/25/2017    Female bladder prolapse 11/10/2015    Hearing loss     Pulmonary nodules 01/25/2017    Seizures (H) 11/10/2015    Temporal seziure d/o.  Does not have LOC.  Has prodromal symptoms     Severe persistent asthma (H28) 11/10/2015    Since childhood.  Has been steroid dependent for many years. Has had cydney x2       Past Surgical History:   Procedure Laterality Date    APPENDECTOMY  1998    CATARACT IOL, RT/LT Bilateral     COLONOSCOPY N/A 02/14/2019    Procedure: COLONOSCOPY;  Surgeon: Haim Burgos MD;  Location:  GI    GI SURGERY      nissn x 2    GYN SURGERY  2001    H CATARACT SURGICAL PACKAGE      HYSTERECTOMY      fibroids    JOINT REPLACEMENT Left     NISSEN FUNDOPLICATION      x2    SOFT TISSUE SURGERY             Family History:     Family History   Problem Relation Age of Onset    Dementia Mother     Heart Failure Mother     Hypertension Mother     Breast Cancer Mother         post menopausal    Diabetes Mother     Cancer Father         prostate  "cancer    Hypertension Father     Prostate Cancer Father          of it at 82    Asthma Father         \"outgrew\" it    Schizophrenia Maternal Grandmother     Coronary Artery Disease Maternal Grandmother          of aortic aneurysm at 89    Mental Illness Maternal Grandmother     Coronary Artery Disease Maternal Grandfather         he was diabetic and smoked    Asthma Sister         x2    Depression Sister     Coronary Artery Disease Sister         MI when being ventilated for asthma    Asthma Sister         both sisters with asthma, one severe    Depression Sister     Other - See Comments Daughter         Enlarged Aorta    Melanoma Daughter     Breast Cancer Maternal Aunt     Coronary Artery Disease Daughter         Mildly dilated thoracic aorta    Hypertension Son               Social History:     Social History     Socioeconomic History    Marital status:      Spouse name: Not on file    Number of children: Not on file    Years of education: 20    Highest education level: Not on file   Occupational History    Occupation: pediatrician   Tobacco Use    Smoking status: Never    Smokeless tobacco: Never   Substance and Sexual Activity    Alcohol use: Not Currently     Comment: less than a drink a day    Drug use: No    Sexual activity: Yes     Partners: Male     Birth control/protection: Post-menopausal, Male Surgical, Female Surgical   Other Topics Concern    Parent/sibling w/ CABG, MI or angioplasty before 65F 55M? 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     Social Determinants of Health     Financial Resource Strain: Low Risk  (2023)    Financial Resource Strain     Within the past 12 months, have you or your family members you live with been unable to get utilities (heat, electricity) when it was really needed?: No   Food Insecurity: Low Risk  (2023)    Food " Insecurity     Within the past 12 months, did you worry that your food would run out before you got money to buy more?: No     Within the past 12 months, did the food you bought just not last and you didn t have money to get more?: No   Transportation Needs: Low Risk  (12/21/2023)    Transportation Needs     Within the past 12 months, has lack of transportation kept you from medical appointments, getting your medicines, non-medical meetings or appointments, work, or from getting things that you need?: No   Physical Activity: Not on file   Stress: Not on file   Social Connections: Not on file   Interpersonal Safety: Low Risk  (12/27/2023)    Interpersonal Safety     Do you feel physically and emotionally safe where you currently live?: Yes     Within the past 12 months, have you been hit, slapped, kicked or otherwise physically hurt by someone?: No     Within the past 12 months, have you been humiliated or emotionally abused in other ways by your partner or ex-partner?: No   Housing Stability: Low Risk  (12/21/2023)    Housing Stability     Do you have housing? : Yes     Are you worried about losing your housing?: No            Medications:     Current Outpatient Medications   Medication    albuterol (PROAIR HFA/PROVENTIL HFA/VENTOLIN HFA) 108 (90 Base) MCG/ACT inhaler    benralizumab (FASENRA) 30 MG/ML SOSY injection    carBAMazepine (TEGRETOL XR) 200 MG 12 hr tablet    diclofenac (VOLTAREN) 1 % topical gel    estradiol (ESTRING) 7.5 MCG/24HR vaginal ring    fluticasone-salmeterol (ADVAIR) 500-50 MCG/ACT inhaler    magnesium 250 MG tablet    metoprolol succinate ER (TOPROL XL) 25 MG 24 hr tablet    metoprolol succinate ER (TOPROL XL) 50 MG 24 hr tablet    montelukast (SINGULAIR) 10 MG tablet    NAPROXEN PO    potassium chloride ER (KLOR-CON) 20 MEQ CR tablet    pravastatin (PRAVACHOL) 20 MG tablet    predniSONE (DELTASONE) 20 MG tablet    theophylline (DENIA-24) 400 MG 24 hr capsule    triamterene-HCTZ (MAXZIDE)  "75-50 MG tablet     No current facility-administered medications for this visit.            Review of Systems:     All other review of systems are negative        PHYSICAL EXAM:  /78   Pulse 85   Resp 17   Ht 1.702 m (5' 7\")   Wt 72.6 kg (160 lb)   LMP  (LMP Unknown)   SpO2 97%   BMI 25.06 kg/m       General: Pleasant, no apparent distress  Eyes: Anicteric  Ears: Hearing grossly normal  Neck: supple  Respiratory: Mild expiratory wheezing in the left lower lobe.  Otherwise clear.  No accessory muscle use.  Coarse cough on exam.  Skin: No obvious rashes.  No peripheral edema.  Extremities: No cyanosis or clubbing  Neuro: Normal mentation. Normal speech.  Psych:Normal affect           Data:   All laboratory and imaging data reviewed.      Absolute eosinophils in May 2016 were 500    PFT:       PFT Interpretation:  Moderate airflow obstruction.  Valid Maneuver            Again, thank you for allowing me to participate in the care of your patient.        Sincerely,        oJse De Jesus Vega MD  "

## 2024-01-09 RX ORDER — ALBUTEROL SULFATE 90 UG/1
2 AEROSOL, METERED RESPIRATORY (INHALATION) EVERY 6 HOURS PRN
Qty: 54 G | Refills: 3 | Status: SHIPPED | OUTPATIENT
Start: 2024-01-09

## 2024-01-09 RX ORDER — FLUTICASONE PROPIONATE AND SALMETEROL 500; 50 UG/1; UG/1
1 POWDER RESPIRATORY (INHALATION) 2 TIMES DAILY
Qty: 180 EACH | Refills: 3 | Status: SHIPPED | OUTPATIENT
Start: 2024-01-09

## 2024-01-09 RX ORDER — MONTELUKAST SODIUM 10 MG/1
10 TABLET ORAL AT BEDTIME
Qty: 90 TABLET | Refills: 3 | Status: SHIPPED | OUTPATIENT
Start: 2024-01-09

## 2024-01-09 NOTE — TELEPHONE ENCOUNTER
Pulmonary Rx refills sent to mail order pharmacy ([facility]) per pt request.

## 2024-01-10 ENCOUNTER — THERAPY VISIT (OUTPATIENT)
Dept: PHYSICAL THERAPY | Facility: CLINIC | Age: 79
End: 2024-01-10
Attending: INTERNAL MEDICINE
Payer: MEDICARE

## 2024-01-10 DIAGNOSIS — M25.551 HIP PAIN, RIGHT: ICD-10-CM

## 2024-01-10 PROCEDURE — 97110 THERAPEUTIC EXERCISES: CPT | Mod: GP | Performed by: PHYSICAL THERAPIST

## 2024-01-10 PROCEDURE — 97161 PT EVAL LOW COMPLEX 20 MIN: CPT | Mod: GP | Performed by: PHYSICAL THERAPIST

## 2024-01-10 PROCEDURE — 97140 MANUAL THERAPY 1/> REGIONS: CPT | Mod: GP | Performed by: PHYSICAL THERAPIST

## 2024-01-10 NOTE — PROGRESS NOTES
PHYSICAL THERAPY EVALUATION  Type of Visit: Evaluation    See electronic medical record for Abuse and Falls Screening details.    Subjective       Presenting condition or subjective complaint: Hip pain  Date of onset: 23    Relevant medical history: Asthma; COPD; Concussions; Hearing problems; High blood pressure; History of fractures; Menopause; Osteoarthritis; Seizures   Dates & types of surgery: Abd    Prior diagnostic imaging/testing results: X-ray     Prior therapy history for the same diagnosis, illness or injury: No      Prior Level of Function  Transfers: Independent  Ambulation: Independent  ADL: Independent  IADL: Finances, Housekeeping, Laundry, Meal preparation    Living Environment  Social support: With a significant other or spouse   Type of home: Apartment/condo   Stairs to enter the home: No       Ramp: No   Stairs inside the home: No       Help at home: None  Equipment owned:       Employment: No    Hobbies/Interests: Biking    Patient goals for therapy: Sleep and go upstairs    Pain assessment: Location: R lateral hip, R SIJ, pain along R ITB into R knee/Ratin-3-5/10     Objective   HIP EVALUATION  PAIN: Pain is Exacerbated By: first steps after sitting, sleeping, stairs  Pain is Relieved By: NSAIDs and rest    POSTURE: Standing Posture: Lordosis decreased  GAIT:   Weightbearing Status: WBAT  Assistive Device(s): None  Gait Deviations: Antalgic  Trendelenberg R    ROM:   (Degrees) Left PROM  Right PROM   Hip Flexion 120 120 + tightness   Hip Extension 10 10   Hip Internal Rotation 30 30, no pain    Hip External Rotation 35 25, + pain lateral Right hip   Pain:   End feel:     PELVIC/SI SCREEN: Sacroiliac Provocation Test: - thigh thrust, - distraction  STRENGTH:   Pain: - none + mild ++ moderate +++ severe  Strength Scale: 0-5/5 Left Right   Hip Flexion 5 5-, + (mild)   Hip Extension 5- 4   Hip Abduction 4+ 4, ++ (mod)   Hip Adduction 5 5   Hip Internal Rotation 5 5   Hip External Rotation  4 4   Knee Flexion 5- 5-   Knee Extension 5 5     LE FLEXIBILITY:  Increased glute med tightness  SPECIAL TESTS:    Left Right   ELVIN Negative  Positive   FADIR/Labrum/GABE Negative  Positive   Femoral Nerve Negative  Negative    Rebecca's Negative  Negative    Piriformis Negative  Negative    Quadrant Testing Negative  Negative    SLR Negative  Negative    Slump Negative  Negative    Stork with Extension Negative  Negative    Valerio Negative  Positive     FUNCTIONAL TESTS: Double Leg Squat: Anterior knee translation, Knee valgus, Hip internal rotation, and Improper use of glutes/hips  SLS: Increased pain when standing on R, contralateral hip drop   PALPATION:  TTP along R glute med  JOINT MOBILITY: increased stiffness inferior and lateral     Assessment & Plan   CLINICAL IMPRESSIONS  Medical Diagnosis: hip pain, right    Treatment Diagnosis: right hip pain   Impression/Assessment: Patient is a 78 year old female with right hip complaints.  The following significant findings have been identified: Pain, Decreased ROM/flexibility, Decreased joint mobility, Decreased strength, Impaired balance, and Decreased proprioception. These impairments interfere with their ability to perform self care tasks, work tasks, recreational activities, and household chores as compared to previous level of function.     Clinical Decision Making (Complexity):  Clinical Presentation: Stable/Uncomplicated  Clinical Presentation Rationale: based on medical and personal factors listed in PT evaluation  Clinical Decision Making (Complexity): Low complexity    PLAN OF CARE  Treatment Interventions:  Modalities: Cryotherapy, Dry Needling  Interventions: Manual Therapy, Neuromuscular Re-education, Therapeutic Activity, Therapeutic Exercise, Self-Care/Home Management    Long Term Goals     PT Goal 1  Goal Identifier: Stairs  Goal Description: Patient will ascend/descend stairs reciprocally with use of railing and 1/10 pain  Rationale: to maximize  safety and independence with performance of ADLs and functional tasks;to maximize safety and independence within the community;to maximize safety and independence with self cares  Target Date: 04/03/24      Frequency of Treatment: 1x per week 4 weeks, 2x peer month 2 months  Duration of Treatment: 84 days    Recommended Referrals to Other Professionals:  none  Education Assessment:   Learner/Method: Patient;No Barriers to Learning    Risks and benefits of evaluation/treatment have been explained.   Patient/Family/caregiver agrees with Plan of Care.     Evaluation Time:     PT Eval, Low Complexity Minutes (31558): 20     Signing Clinician: Sun Hardy, PT      [facility]                                                                                   OUTPATIENT PHYSICAL THERAPY      PLAN OF TREATMENT FOR OUTPATIENT REHABILITATION   Patient's Last Name, First Name, Niurka Sloan YOB: 1945   Provider's Name   [facility]   Medical Record No.  [id number]     Onset Date: 12/27/23  Start of Care Date: 01/10/24     Medical Diagnosis:  hip pain, right      PT Treatment Diagnosis:  right hip pain Plan of Treatment  Frequency/Duration: 1x per week 4 weeks, 2x peer month 2 months/ 84 days    Certification date from 01/10/24 to 04/03/24         See note for plan of treatment details and functional goals     Sun Hardy, PT                         I CERTIFY THE NEED FOR THESE SERVICES FURNISHED UNDER        THIS PLAN OF TREATMENT AND WHILE UNDER MY CARE     (Physician attestation of this document indicates review and certification of the therapy plan).              Referring Provider:  Ingrid Mansfield    Initial Assessment  See Epic Evaluation- Start of Care Date: 01/10/24

## 2024-01-12 ENCOUNTER — ANCILLARY PROCEDURE (OUTPATIENT)
Dept: CARDIOLOGY | Facility: CLINIC | Age: 79
End: 2024-01-12
Attending: INTERNAL MEDICINE
Payer: MEDICARE

## 2024-01-12 ENCOUNTER — ANCILLARY PROCEDURE (OUTPATIENT)
Dept: CT IMAGING | Facility: CLINIC | Age: 79
End: 2024-01-12
Attending: INTERNAL MEDICINE
Payer: MEDICARE

## 2024-01-12 DIAGNOSIS — I71.20 THORACIC AORTIC ANEURYSM WITHOUT RUPTURE, UNSPECIFIED PART (H): ICD-10-CM

## 2024-01-12 LAB
CREAT BLD-MCNC: 1 MG/DL (ref 0.5–1)
EGFRCR SERPLBLD CKD-EPI 2021: 58 ML/MIN/1.73M2
LVEF ECHO: NORMAL

## 2024-01-12 PROCEDURE — 74174 CTA ABD&PLVS W/CONTRAST: CPT | Mod: MG | Performed by: RADIOLOGY

## 2024-01-12 PROCEDURE — 93306 TTE W/DOPPLER COMPLETE: CPT | Performed by: INTERNAL MEDICINE

## 2024-01-12 PROCEDURE — G1010 CDSM STANSON: HCPCS | Performed by: RADIOLOGY

## 2024-01-12 PROCEDURE — 82565 ASSAY OF CREATININE: CPT | Performed by: PATHOLOGY

## 2024-01-12 PROCEDURE — 71275 CT ANGIOGRAPHY CHEST: CPT | Mod: MG | Performed by: RADIOLOGY

## 2024-01-12 RX ORDER — IOPAMIDOL 755 MG/ML
110 INJECTION, SOLUTION INTRAVASCULAR ONCE
Status: COMPLETED | OUTPATIENT
Start: 2024-01-12 | End: 2024-01-12

## 2024-01-12 RX ADMIN — IOPAMIDOL 110 ML: 755 INJECTION, SOLUTION INTRAVASCULAR at 15:32

## 2024-01-12 NOTE — DISCHARGE INSTRUCTIONS

## 2024-01-12 NOTE — TELEPHONE ENCOUNTER
DIAGNOSIS: -Hip pain for 3 mos     APPOINTMENT DATE: 1.15.24   NOTES STATUS DETAILS   OFFICE NOTE from referring provider Internal 12.27.23  Alexandre Mansfield  IM   OFFICE NOTE from other specialist Internal 1.10.24  Antony  PT   MEDICATION LIST Internal    XRAYS (IMAGES & REPORTS) Internal 12.27.23  XR Hip Right

## 2024-01-15 ENCOUNTER — OFFICE VISIT (OUTPATIENT)
Dept: ORTHOPEDICS | Facility: CLINIC | Age: 79
End: 2024-01-15
Payer: MEDICARE

## 2024-01-15 ENCOUNTER — PRE VISIT (OUTPATIENT)
Dept: ORTHOPEDICS | Facility: CLINIC | Age: 79
End: 2024-01-15

## 2024-01-15 DIAGNOSIS — M67.951 TENDINOPATHY OF RIGHT GLUTEUS MEDIUS: Primary | ICD-10-CM

## 2024-01-15 DIAGNOSIS — M25.551 HIP PAIN, RIGHT: ICD-10-CM

## 2024-01-15 DIAGNOSIS — M16.11 PRIMARY OSTEOARTHRITIS OF RIGHT HIP: ICD-10-CM

## 2024-01-15 PROCEDURE — 99213 OFFICE O/P EST LOW 20 MIN: CPT | Performed by: FAMILY MEDICINE

## 2024-01-15 NOTE — LETTER
1/15/2024      RE: Niurka Cisneros  [address]  [place] [address]     Dear Colleague,    Thank you for referring your patient, Niurka Cisneros, to the [facility] [place]. Please see a copy of my visit note below.    Sports Medicine Clinic Visit    PCP: Mara Combs    Niurka Cisneros is a 78 year old female who is seen  in consultation at the request of . No ref. provider found presenting with right hip lateral pain.  Patient denies groin pain.  No radicular discomfort in the extremity.  Recently seems to be improved by physical therapy.    Injury: No MARLEEN     Location of Pain: right hip; lateral   Duration of Pain: 4-5 week(s)  Rating of Pain: 4/10  Pain is better with: Physical therapy   Pain is worse with: going from sitting to standing and going up stairs   Additional Features:   Treatment so far consists of: Physical therapy   Prior History of related problems: None     LMP  (LMP Unknown)      Right hip pain.  X-ray right hip 12/27/2023 consistent with moderate right hip DJD, slightly increased compared to x-ray of the right hip in 2020.  Previous left-sided hip replacement, [facility] 2005.    Retired pediatrician.     History of hypertension, seizure disorder, renal artery aneurysm, prediabetes.    Medicines include Tegretol, diclofenac gel, metoprolol, theophylline, Maxide        Imaging study below reviewed by me:  EXAM: XR HIP RIGHT 2-3 VIEWS  12/27/2023 12:54 PM       HISTORY: Hip pain, right     COMPARISON: 10/22/2020     FINDINGS: AP and frog-leg lateral right hip views were obtained.     No acute osseous abnormality. Moderate hip degenerative change. Right  SI joint degenerative change. Lumbar spondylosis. Soft tissues  unremarkable.                                                                       IMPRESSION: Moderate right hip osteoarthrosis.     RONEN POTTS MD (Joe)       PMH:  Past Medical History:  "  Diagnosis Date    Abdominal aortic aneurysm without rupture (H24) 11/10/2015    [  ] repeat imaging due spring 2016 Descending aortic aneurysm.  Being monitored with serial angiogram     Aneurysm of right renal artery (H24) 2019    Arthritis 1998    COPD (chronic obstructive pulmonary disease) (H) 11/10/2015    Not sure of this diagnosis.  May be a consequence of asthma.    Esophageal hypertension 2017    Essential hypertension 2017    Female bladder prolapse 11/10/2015    Hearing loss     Pulmonary nodules 2017    Seizures (H) 11/10/2015    Temporal seziure d/o.  Does not have LOC.  Has prodromal symptoms     Severe persistent asthma (H28) 11/10/2015    Since childhood.  Has been steroid dependent for many years. Has had cydney x2         Active problem list:  Patient Active Problem List   Diagnosis    Seizures (H)    Thoracic aortic aneurysm without rupture (H24)    Moderate persistent asthma without complication    Prediabetes    Pulmonary nodules    Essential hypertension    Osteopenia of multiple sites    Aneurysm of right renal artery (H24)    Severe persistent asthma without complication (H28)    Abnormal CT scan of head    Left shoulder pain, unspecified chronicity    Hip pain, right       FH:  Family History   Problem Relation Age of Onset    Dementia Mother     Heart Failure Mother     Hypertension Mother     Breast Cancer Mother         post menopausal    Diabetes Mother     Cancer Father         prostate cancer    Hypertension Father     Prostate Cancer Father          of it at 82    Asthma Father         \"outgrew\" it    Schizophrenia Maternal Grandmother     Coronary Artery Disease Maternal Grandmother          of aortic aneurysm at 89    Mental Illness Maternal Grandmother     Coronary Artery Disease Maternal Grandfather         he was diabetic and smoked    Asthma Sister         x2    Depression Sister     Coronary Artery Disease Sister         MI when being " ventilated for asthma    Asthma Sister         both sisters with asthma, one severe    Depression Sister     Other - See Comments Daughter         Enlarged Aorta    Melanoma Daughter     Breast Cancer Maternal Aunt     Coronary Artery Disease Daughter         Mildly dilated thoracic aorta    Hypertension Son        SH:  Social History     Socioeconomic History    Marital status:      Spouse name: Not on file    Number of children: Not on file    Years of education: 20    Highest education level: Not on file   Occupational History    Occupation: pediatrician   Tobacco Use    Smoking status: Never    Smokeless tobacco: Never   Substance and Sexual Activity    Alcohol use: Not Currently     Comment: less than a drink a day    Drug use: No    Sexual activity: Yes     Partners: Male     Birth control/protection: Post-menopausal, Male Surgical, Female Surgical   Other Topics Concern    Parent/sibling w/ CABG, MI or angioplasty before 65F 55M? 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     Social Determinants of Health     Financial Resource Strain: Low Risk  (12/21/2023)    Financial Resource Strain     Within the past 12 months, have you or your family members you live with been unable to get utilities (heat, electricity) when it was really needed?: No   Food Insecurity: Low Risk  (12/21/2023)    Food Insecurity     Within the past 12 months, did you worry that your food would run out before you got money to buy more?: No     Within the past 12 months, did the food you bought just not last and you didn t have money to get more?: No   Transportation Needs: Low Risk  (12/21/2023)    Transportation Needs     Within the past 12 months, has lack of transportation kept you from medical appointments, getting your medicines, non-medical meetings or appointments, work, or from getting things that you  need?: No   Physical Activity: Not on file   Stress: Not on file   Social Connections: Not on file   Interpersonal Safety: Low Risk  (12/27/2023)    Interpersonal Safety     Do you feel physically and emotionally safe where you currently live?: Yes     Within the past 12 months, have you been hit, slapped, kicked or otherwise physically hurt by someone?: No     Within the past 12 months, have you been humiliated or emotionally abused in other ways by your partner or ex-partner?: No   Housing Stability: Low Risk  (12/21/2023)    Housing Stability     Do you have housing? : Yes     Are you worried about losing your housing?: No       MEDS:  See EMR, reviewed  ALL:  See EMR, reviewed    REVIEW OF SYSTEMS:  CONSTITUTIONAL:NEGATIVE for fever, chills, change in weight  INTEGUMENTARY/SKIN: NEGATIVE for worrisome rashes, moles or lesions  EYES: NEGATIVE for vision changes or irritation  ENT/MOUTH: NEGATIVE for ear, mouth and throat problems  RESP:NEGATIVE for significant cough or SOB  BREAST: NEGATIVE for masses, tenderness or discharge  CV: NEGATIVE for chest pain, palpitations or peripheral edema  GI: NEGATIVE for nausea, abdominal pain, heartburn, or change in bowel habits  :NEGATIVE for frequency, dysuria, or hematuria  :NEGATIVE for frequency, dysuria, or hematuria  NEURO: NEGATIVE for weakness, dizziness or paresthesias  ENDOCRINE: NEGATIVE for temperature intolerance, skin/hair changes  HEME/ALLERGY/IMMUNE: NEGATIVE for bleeding problems  PSYCHIATRIC: NEGATIVE for changes in mood or affect      Objective: She points to the lateral hip as her area of focal discomfort.  She has adequate range of motion of the right hip to internal rotation external rotation and abduction without signs of impingement pain.  She is tender over the greater trochanter and confirms that this is her area of discomfort.  Nontender over the distal IT band.  Strength is intact at hip, knee, ankle and foot.  Sensation is normal distally.   Overlying skin is normal.  Appropriate conversation and affect.    Assessment: Right-sided hip gluteus medius tendinitis.  History of right-sided hip moderate DJD.  History of previous left hip replacement.    P: We discussed that by clinical exam and her story, her discomfort seems to be coming from the gluteus medius tendon at the lateral hip.  She believes this is the case as well.  She is seeing improvements with physical therapy and would like to continue.  We discussed ways to maximize the ergonomics of the seated position.  She has an upcoming trip to [place] this coming March.  She is considering returning to sports medicine prior to that for a trochanteric bursal injection, if needed.  She will follow-up as needed.      Again, thank you for allowing me to participate in the care of your patient.      Sincerely,    William Mcmillan MD

## 2024-01-15 NOTE — PROGRESS NOTES
Sports Medicine Clinic Visit    PCP: Mara Combs    Niurka Cisneros is a 78 year old female who is seen  in consultation at the request of Dr. Hernandez ref. provider found presenting with right hip lateral pain.  Patient denies groin pain.  No radicular discomfort in the extremity.  Recently seems to be improved by physical therapy.    Injury: No MARLEEN     Location of Pain: right hip; lateral   Duration of Pain: 4-5 week(s)  Rating of Pain: 4/10  Pain is better with: Physical therapy   Pain is worse with: going from sitting to standing and going up stairs   Additional Features:   Treatment so far consists of: Physical therapy   Prior History of related problems: None     LMP  (LMP Unknown)      Right hip pain.  X-ray right hip 12/27/2023 consistent with moderate right hip DJD, slightly increased compared to x-ray of the right hip in 2020.  Previous left-sided hip replacement, [facility] 2005.    Retired pediatrician.     History of hypertension, seizure disorder, renal artery aneurysm, prediabetes.    Medicines include Tegretol, diclofenac gel, metoprolol, theophylline, Maxide        Imaging study below reviewed by me:  EXAM: XR HIP RIGHT 2-3 VIEWS  12/27/2023 12:54 PM       HISTORY: Hip pain, right     COMPARISON: 10/22/2020     FINDINGS: AP and frog-leg lateral right hip views were obtained.     No acute osseous abnormality. Moderate hip degenerative change. Right  SI joint degenerative change. Lumbar spondylosis. Soft tissues  unremarkable.                                                                       IMPRESSION: Moderate right hip osteoarthrosis.     RONEN POTTS MD (Joe)       PMH:  Past Medical History:   Diagnosis Date    Abdominal aortic aneurysm without rupture (H24) 11/10/2015    [  ] repeat imaging due spring 2016 Descending aortic aneurysm.  Being monitored with serial angiogram     Aneurysm of right renal artery (H24) 04/09/2019    Arthritis 1998    COPD (chronic  "obstructive pulmonary disease) (H) 11/10/2015    Not sure of this diagnosis.  May be a consequence of asthma.    Esophageal hypertension 2017    Essential hypertension 2017    Female bladder prolapse 11/10/2015    Hearing loss     Pulmonary nodules 2017    Seizures (H) 11/10/2015    Temporal seziure d/o.  Does not have LOC.  Has prodromal symptoms     Severe persistent asthma (H28) 11/10/2015    Since childhood.  Has been steroid dependent for many years. Has had cydney x2         Active problem list:  Patient Active Problem List   Diagnosis    Seizures (H)    Thoracic aortic aneurysm without rupture (H24)    Moderate persistent asthma without complication    Prediabetes    Pulmonary nodules    Essential hypertension    Osteopenia of multiple sites    Aneurysm of right renal artery (H24)    Severe persistent asthma without complication (H28)    Abnormal CT scan of head    Left shoulder pain, unspecified chronicity    Hip pain, right       FH:  Family History   Problem Relation Age of Onset    Dementia Mother     Heart Failure Mother     Hypertension Mother     Breast Cancer Mother         post menopausal    Diabetes Mother     Cancer Father         prostate cancer    Hypertension Father     Prostate Cancer Father          of it at 82    Asthma Father         \"outgrew\" it    Schizophrenia Maternal Grandmother     Coronary Artery Disease Maternal Grandmother          of aortic aneurysm at 89    Mental Illness Maternal Grandmother     Coronary Artery Disease Maternal Grandfather         he was diabetic and smoked    Asthma Sister         x2    Depression Sister     Coronary Artery Disease Sister         MI when being ventilated for asthma    Asthma Sister         both sisters with asthma, one severe    Depression Sister     Other - See Comments Daughter         Enlarged Aorta    Melanoma Daughter     Breast Cancer Maternal Aunt     Coronary Artery Disease Daughter         Mildly dilated " thoracic aorta    Hypertension Son        SH:  Social History     Socioeconomic History    Marital status:      Spouse name: Not on file    Number of children: Not on file    Years of education: 20    Highest education level: Not on file   Occupational History    Occupation: pediatrician   Tobacco Use    Smoking status: Never    Smokeless tobacco: Never   Substance and Sexual Activity    Alcohol use: Not Currently     Comment: less than a drink a day    Drug use: No    Sexual activity: Yes     Partners: Male     Birth control/protection: Post-menopausal, Male Surgical, Female Surgical   Other Topics Concern    Parent/sibling w/ CABG, MI or angioplasty before 65F 55M? 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     Social Determinants of Health     Financial Resource Strain: Low Risk  (12/21/2023)    Financial Resource Strain     Within the past 12 months, have you or your family members you live with been unable to get utilities (heat, electricity) when it was really needed?: No   Food Insecurity: Low Risk  (12/21/2023)    Food Insecurity     Within the past 12 months, did you worry that your food would run out before you got money to buy more?: No     Within the past 12 months, did the food you bought just not last and you didn t have money to get more?: No   Transportation Needs: Low Risk  (12/21/2023)    Transportation Needs     Within the past 12 months, has lack of transportation kept you from medical appointments, getting your medicines, non-medical meetings or appointments, work, or from getting things that you need?: No   Physical Activity: Not on file   Stress: Not on file   Social Connections: Not on file   Interpersonal Safety: Low Risk  (12/27/2023)    Interpersonal Safety     Do you feel physically and emotionally safe where you currently live?: Yes     Within the past 12  months, have you been hit, slapped, kicked or otherwise physically hurt by someone?: No     Within the past 12 months, have you been humiliated or emotionally abused in other ways by your partner or ex-partner?: No   Housing Stability: Low Risk  (12/21/2023)    Housing Stability     Do you have housing? : Yes     Are you worried about losing your housing?: No       MEDS:  See EMR, reviewed  ALL:  See EMR, reviewed    REVIEW OF SYSTEMS:  CONSTITUTIONAL:NEGATIVE for fever, chills, change in weight  INTEGUMENTARY/SKIN: NEGATIVE for worrisome rashes, moles or lesions  EYES: NEGATIVE for vision changes or irritation  ENT/MOUTH: NEGATIVE for ear, mouth and throat problems  RESP:NEGATIVE for significant cough or SOB  BREAST: NEGATIVE for masses, tenderness or discharge  CV: NEGATIVE for chest pain, palpitations or peripheral edema  GI: NEGATIVE for nausea, abdominal pain, heartburn, or change in bowel habits  :NEGATIVE for frequency, dysuria, or hematuria  :NEGATIVE for frequency, dysuria, or hematuria  NEURO: NEGATIVE for weakness, dizziness or paresthesias  ENDOCRINE: NEGATIVE for temperature intolerance, skin/hair changes  HEME/ALLERGY/IMMUNE: NEGATIVE for bleeding problems  PSYCHIATRIC: NEGATIVE for changes in mood or affect      Objective: She points to the lateral hip as her area of focal discomfort.  She has adequate range of motion of the right hip to internal rotation external rotation and abduction without signs of impingement pain.  She is tender over the greater trochanter and confirms that this is her area of discomfort.  Nontender over the distal IT band.  Strength is intact at hip, knee, ankle and foot.  Sensation is normal distally.  Overlying skin is normal.  Appropriate conversation and affect.    Assessment: Right-sided hip gluteus medius tendinitis.  History of right-sided hip moderate DJD.  History of previous left hip replacement.    P: We discussed that by clinical exam and her story, her  discomfort seems to be coming from the gluteus medius tendon at the lateral hip.  She believes this is the case as well.  She is seeing improvements with physical therapy and would like to continue.  We discussed ways to maximize the ergonomics of the seated position.  She has an upcoming trip to [place] this coming March.  She is considering returning to sports medicine prior to that for a trochanteric bursal injection, if needed.  She will follow-up as needed.

## 2024-01-18 ENCOUNTER — INFUSION THERAPY VISIT (OUTPATIENT)
Dept: INFUSION THERAPY | Facility: CLINIC | Age: 79
End: 2024-01-18
Payer: MEDICARE

## 2024-01-18 ENCOUNTER — MYC MEDICAL ADVICE (OUTPATIENT)
Dept: INTERNAL MEDICINE | Facility: CLINIC | Age: 79
End: 2024-01-18

## 2024-01-18 VITALS
DIASTOLIC BLOOD PRESSURE: 90 MMHG | OXYGEN SATURATION: 97 % | HEART RATE: 61 BPM | TEMPERATURE: 97.7 F | SYSTOLIC BLOOD PRESSURE: 130 MMHG

## 2024-01-18 DIAGNOSIS — I10 BENIGN ESSENTIAL HYPERTENSION: ICD-10-CM

## 2024-01-18 DIAGNOSIS — J45.50 SEVERE PERSISTENT ASTHMA WITHOUT COMPLICATION (H): Primary | ICD-10-CM

## 2024-01-18 PROCEDURE — 96372 THER/PROPH/DIAG INJ SC/IM: CPT

## 2024-01-18 PROCEDURE — 250N000011 HC RX IP 250 OP 636: Mod: JZ

## 2024-01-18 RX ORDER — POTASSIUM CHLORIDE 1500 MG/1
20 TABLET, EXTENDED RELEASE ORAL DAILY
Qty: 90 TABLET | Refills: 0 | Status: SHIPPED | OUTPATIENT
Start: 2024-01-18 | End: 2024-04-12

## 2024-01-18 RX ADMIN — BENRALIZUMAB 30 MG: 30 INJECTION, SOLUTION SUBCUTANEOUS at 09:35

## 2024-01-18 ASSESSMENT — PAIN SCALES - GENERAL: PAINLEVEL: NO PAIN (0)

## 2024-01-18 NOTE — PROGRESS NOTES
Patient presents to the [facility] for Faserna injection. Order written by Jose De Jesus Vega MD was completed today. Name and  verified with patient. See MAR for medication details. Medication was divided into 1 syringe by pharmacy and given in the following site: right upper quadrant of the abdomen. Patient then observed for 30 minutes. Injection tolerated well and patient was discharged to home. Patient to return in 8 weeks.    Administrations This Visit       benralizumab (FASENRA) injection 30 mg       Admin Date  2024 Action  $Given Dose  30 mg Route  Subcutaneous Documented By  Candy Alves

## 2024-01-18 NOTE — PROGRESS NOTES
Treatment Conditions:  Biological Infusion Checklist:  ~~~ NOTE: If the patient answers yes to any of the questions below, hold the infusion and contact ordering provider or on-call provider.    Have you recently had an elevated temperature, fever, chills, productive cough, coughing for 3 weeks or longer or hemoptysis,  abnormal vital signs, night sweats,  chest pain or have you noticed a decrease in your appetite, unexplained weight loss or fatigue? No  Do you have any open wounds or new incisions? No  Do you have any upcoming hospitalizations or surgeries? Does not include esophagogastroduodenoscopy, colonoscopy, endoscopic retrograde cholangiopancreatography (ERCP), endoscopic ultrasound (EUS), dental procedures or joint aspiration/steroid injections No  Do you currently have any signs of illness or infection or are you on any antibiotics? No  Have you had any new, sudden or worsening abdominal pain? No  Have you or anyone in your household received a live vaccination in the past 4 weeks? Please note: No live vaccines while on biologic/chemotherapy until 6 months after the last treatment. Patient can receive the flu vaccine (shot only), pneumovax and the Covid vaccine. It is optimal for the patient to get these vaccines mid cycle, but they can be given at any time as long as it is not on the day of the infusion. No  Have you recently been diagnosed with any new nervous system diseases (ie. Multiple sclerosis, Guillain [place], seizures, neurological changes) or cancer diagnosis? Are you on any form of radiation or chemotherapy? No  Are you pregnant or breast feeding or do you have plans of pregnancy in the future? N/A  Have you been having any signs of worsening depression or suicidal ideations?  (benlysta only) N/A  Have there been any other new onset medical symptoms? No  Have you had any new blood clots? (IVIG only) N/A      Leela Acuna RN

## 2024-01-18 NOTE — TELEPHONE ENCOUNTER
potassium chloride ER (KLOR-CON) 20 MEQ CR tablet     Last Written Prescription Date:  3/22/23  Last Fill Quantity: 90,   # refills: 0  Last Office Visit : 12/27/23 Alexandre Combs June 30/23)  Future Office visit:  4/12/24 07/05/23       POTASSIUM 3.6     RF per protocol.  Overridden by Beryl Block, RN on Nov 15, 2023 7:55 AM   Drug-Drug   1. TRIAMTERENE / POTASSIUM PREPARATIONS [Level: Major] [Reason: Tolerated medication/side effects in past]   Other Orders: triamterene-HCTZ (MAXZIDE) 75-50 MG tablet

## 2024-01-26 ENCOUNTER — THERAPY VISIT (OUTPATIENT)
Dept: PHYSICAL THERAPY | Facility: CLINIC | Age: 79
End: 2024-01-26
Payer: MEDICARE

## 2024-01-26 DIAGNOSIS — M25.551 HIP PAIN, RIGHT: Primary | ICD-10-CM

## 2024-01-26 PROCEDURE — 97110 THERAPEUTIC EXERCISES: CPT | Mod: GP | Performed by: PHYSICAL THERAPIST

## 2024-01-26 PROCEDURE — 97140 MANUAL THERAPY 1/> REGIONS: CPT | Mod: GP | Performed by: PHYSICAL THERAPIST

## 2024-01-31 ENCOUNTER — TELEPHONE (OUTPATIENT)
Dept: INTERNAL MEDICINE | Facility: CLINIC | Age: 79
End: 2024-01-31
Payer: MEDICARE

## 2024-01-31 NOTE — TELEPHONE ENCOUNTER
[facility] Call Center    Phone Message    May a detailed message be left on voicemail: yes     Reason for Call: Other: Please look out for forms that needs to be sign by . Please call clinic back if any questions. Thank you!     Action Taken: Message routed to:  [facility] ([facility] P[facility]    Travel Screening: Not Applicable

## 2024-02-01 ENCOUNTER — DOCUMENTATION ONLY (OUTPATIENT)
Dept: INTERNAL MEDICINE | Facility: CLINIC | Age: 79
End: 2024-02-01
Payer: MEDICARE

## 2024-02-01 NOTE — TELEPHONE ENCOUNTER
Forms were received and placed in Dr. Combs's folder.    Lawanda Resendiz on 2/1/2024 at 7:44 AM

## 2024-02-01 NOTE — PROGRESS NOTES
Type of Form Received:     Form Received (Date) 2/1/24   Form Filled out Yes, faxed 2/9   Placed in provider folder Yes

## 2024-02-02 ENCOUNTER — THERAPY VISIT (OUTPATIENT)
Dept: PHYSICAL THERAPY | Facility: CLINIC | Age: 79
End: 2024-02-02
Payer: MEDICARE

## 2024-02-02 DIAGNOSIS — M25.551 HIP PAIN, RIGHT: Primary | ICD-10-CM

## 2024-02-02 PROCEDURE — 97140 MANUAL THERAPY 1/> REGIONS: CPT | Mod: GP | Performed by: PHYSICAL THERAPIST

## 2024-02-02 PROCEDURE — 97110 THERAPEUTIC EXERCISES: CPT | Mod: GP | Performed by: PHYSICAL THERAPIST

## 2024-02-02 ASSESSMENT — ACTIVITIES OF DAILY LIVING (ADL)
HOS_ADL_COUNT: 16
WALKING_INITIALLY: NO DIFFICULTY AT ALL
GOING_DOWN_1_FLIGHT_OF_STAIRS: NO DIFFICULTY AT ALL
DEEP_SQUATTING: NO DIFFICULTY AT ALL
GOING_UP_1_FLIGHT_OF_STAIRS: SLIGHT DIFFICULTY
HOS_ADL_SCORE(%): 93.75
RECREATIONAL_ACTIVITIES: NO DIFFICULTY AT ALL
HEAVY_WORK: SLIGHT DIFFICULTY
HOS_ADL_ITEM_SCORE_TOTAL: 60
STANDING_FOR_15_MINUTES: NO DIFFICULTY AT ALL
PUTTING_ON_SOCKS_AND_SHOES: NO DIFFICULTY AT ALL
WALKING_UP_STEEP_HILLS: NO DIFFICULTY AT ALL
ROLLING_OVER_IN_BED: NO DIFFICULTY AT ALL
STEPPING_UP_AND_DOWN_CURBS: NO DIFFICULTY AT ALL
WALKING_15_MINUTES_OR_GREATER: SLIGHT DIFFICULTY
LIGHT_TO_MODERATE_WORK: NO DIFFICULTY AT ALL
WALKING_DOWN_STEEP_HILLS: NO DIFFICULTY AT ALL
TWISTING/PIVOTING_ON_INVOLVED_LEG: NO DIFFICULTY AT ALL
SITTING_FOR_15_MINUTES: NO DIFFICULTY AT ALL
WALKING_APPROXIMATELY_10_MINUTES: NO DIFFICULTY AT ALL
GETTING_INTO_AND_OUT_OF_AN_AVERAGE_CAR: SLIGHT DIFFICULTY
HOS_ADL_HIGHEST_POTENTIAL_SCORE: 64

## 2024-02-02 NOTE — PROGRESS NOTES
DISCHARGE  Reason for Discharge: Patient has met all goals.    Equipment Issued: HEP    Discharge Plan: Patient to continue home program.    Referring Provider:  Ingrid ROJAS*       02/02/24 0500   Appointment Info   Signing clinician's name / credentials Sun Hardy, DPT, OCS   Total/Authorized Visits 6 per PT   Visits Used 3   Medical Diagnosis hip pain, right   PT Tx Diagnosis right hip pain   Quick Adds Certification   Progress Note/Certification   Start of Care Date 01/10/24   Onset of illness/injury or Date of Surgery 12/27/23   Therapy Frequency 1x per week 4 weeks, 2x peer month 2 months   Predicted Duration 84 days   Certification date from 01/10/24   Certification date to 04/03/24   PT Goal 1   Goal Identifier Stairs   Goal Description Patient will ascend/descend stairs reciprocally with use of railing and 1/10 pain   Rationale to maximize safety and independence with performance of ADLs and functional tasks;to maximize safety and independence within the community;to maximize safety and independence with self cares   Target Date 04/03/24   Date Met 02/02/24   Subjective Report   Subjective Report Roopa states that her hip is doing a lot better. She states that she is down to basically no pain. She feels ready to discharge today with use of HEP   Objective Measures   Objective Measures Objective Measure 1;Objective Measure 2   Objective Measure 1   Objective Measure Palpation   Details mild tenderness   Objective Measure 2   Objective Measure Innominate rotation   Details Posterior rotation on R   Treatment Interventions (PT)   Interventions Therapeutic Procedure/Exercise;Manual Therapy   Therapeutic Procedure/Exercise   Therapeutic Procedures: strength, endurance, ROM, flexibillity minutes (02951) 15   Ther Proc 1 Piriformis stretch   Ther Proc 1 - Details VC to feel stretch not strain, 2 x 30 seconds   PTRx Ther Proc 1 Roll outs   PTRx Ther Proc 1 - Details 2x10 with 5 second hold. VC to  go slow andc ontrolled   PTRx Ther Proc 2 Innominate rotation correction R posterior   PTRx Ther Proc 2 - Details VC to feel gentle activation   PTRx Ther Proc 3 Roll ins   PTRx Ther Proc 3 - Details VC to go slow and controlled   Skilled Intervention Assessment of strength and functional deficits to determine exercise prescription; tactile and verbal cuing to assist with proper performance; education in loading principles and expected response   Manual Therapy   Manual Therapy: Mobilization, MFR, MLD, friction massage minutes (70983) 10   Manual Therapy 1 MET posterior innominate   Manual Therapy 1 - Details 5x5 seconds   Skilled Intervention Identified precautions and contraindications to manual treatment; assessed mobility and pain; adjusted technique based on patient response.   Education   Learner/Method Patient;No Barriers to Learning

## 2024-02-16 ENCOUNTER — OFFICE VISIT (OUTPATIENT)
Dept: DERMATOLOGY | Facility: CLINIC | Age: 79
End: 2024-02-16
Payer: MEDICARE

## 2024-02-16 DIAGNOSIS — L57.0 ACTINIC KERATOSIS: Primary | ICD-10-CM

## 2024-02-16 DIAGNOSIS — L82.0 SEBORRHEIC KERATOSIS, INFLAMED: ICD-10-CM

## 2024-02-16 PROCEDURE — 17110 DESTRUCTION B9 LES UP TO 14: CPT | Mod: GC | Performed by: DERMATOLOGY

## 2024-02-16 PROCEDURE — 99212 OFFICE O/P EST SF 10 MIN: CPT | Mod: 25 | Performed by: DERMATOLOGY

## 2024-02-16 PROCEDURE — 17000 DESTRUCT PREMALG LESION: CPT | Mod: XS | Performed by: DERMATOLOGY

## 2024-02-16 ASSESSMENT — PAIN SCALES - GENERAL: PAINLEVEL: NO PAIN (0)

## 2024-02-16 NOTE — NURSING NOTE
Chief Complaint   Patient presents with    Skin Check     The patient presents for recheck of Aks on the nose and lesion on their left arm.      Kayleen Lozada LPN

## 2024-02-16 NOTE — LETTER
2/16/2024       RE: Niurka Cisneros  [address]  [place] [address]     Dear Colleague,    Thank you for referring your patient, Niurka Cisneros, to the [facility] [place] [facility]. Please see a copy of my visit note below.    [facility] Dermatology Note  Encounter Date: Feb 16, 2024  Office Visit     Dermatology Problem List:  Last FBSC performed on 10/27/23  1. FHx melanoma (daughter).  2. AK, nasal bridge   3. iSK, L lateral breast  - LN2     Social Hx: Retired Pediatrician    ____________________________________________    Assessment & Plan:  # Seborrheic keratosis, inflamed and irritated.   L upper arm and R chest x 2.  - Cryotherapy performed today (see procedure note(s) below).    #AK  Nasal bridge, non tender. Not treated at last visit due to upcoming event. Still present on exam today. Reviewed treatment options.   -  cryotherapy see proc note below    #Lesion to Monitor, photo obtained 10/27/23  Left arm pink papule with some grey hyperkeratotic areas of thickening on the lateral border under dermoscopy. Size stable. Ddx; BLK less likely BCC. Similar papules on R upper chest, also appear most consistent with BLK.   - no further treatment required; continue to monitor    Procedures Performed:   - Cryotherapy procedure note, location(s): nasal bridge and L lateral breast. After verbal consent and discussion of risks and benefits including, but not limited to, dyspigmentation/scar, blister, and pain, 2 lesion(s) was(were) treated with 1-2 mm freeze border for 1-2 cycles with liquid nitrogen. Post cryotherapy instructions were provided.    Follow-up: in 10/2024 for FBSE, sooner if concerns.     Staff and Scribe and Resident:     Essie RED, saw and staffed this patient with the attending. All recommendations, therapies and procedures were pre-staffed with the attending or administered with  "direct supervision.     Staff Physician Comments:   I saw and evaluated the patient with the resident and I agree with the assessment and plan.  I was present for the entire minor procedure and examination.    Laura Bai MD    Department of Dermatology  [facility]: Phone: [phone], Fax: [phone]  2/22/2024     ____________________________________________    CC: Skin Check (The patient presents for recheck of Aks on the nose and lesion on their left arm. )    HPI:  Ms. Niurka Cisneros is a(n) 78 year old female who presents today as a return patient for AK check.    Pt had AK identified at October visit on nasal bridge and R lower cheek. Not treated at that time due to upcoming event. Presenting today for recheck and treatment if necessary.    Has a \"wart\" on the Left lateral breast that is irritated and frequently catches on things.    Concerned about 2 spots on R chest.    Daughter who was diagnosed w melanoma is doing well. Says her melanoma was brown and misdiagnosed as an SK.     Patient is otherwise feeling well, without additional skin concerns.    Labs Reviewed:  N/A    Physical Exam:  Vitals: LMP  (LMP Unknown)   SKIN: Focused examination of face was performed.  - flat scaly pink papule on L upper arm, R chest x 2 w regular telangiectasias. No arborizing vessels.   - rough gritty pink papule on nasal bridge  - waxy verrucous stuck on papule on L lateral breast  - No other lesions of concern on areas examined.     Medications:  Current Outpatient Medications   Medication    albuterol (PROAIR HFA/PROVENTIL HFA/VENTOLIN HFA) 108 (90 Base) MCG/ACT inhaler    benralizumab (FASENRA) 30 MG/ML SOSY injection    carBAMazepine (TEGRETOL XR) 200 MG 12 hr tablet    diclofenac (VOLTAREN) 1 % topical gel    estradiol (ESTRING) 7.5 MCG/24HR vaginal ring    fluticasone-salmeterol (ADVAIR) 500-50 MCG/ACT inhaler    " magnesium 250 MG tablet    metoprolol succinate ER (TOPROL XL) 25 MG 24 hr tablet    metoprolol succinate ER (TOPROL XL) 50 MG 24 hr tablet    montelukast (SINGULAIR) 10 MG tablet    NAPROXEN PO    potassium chloride ER (KLOR-CON) 20 MEQ CR tablet    pravastatin (PRAVACHOL) 20 MG tablet    predniSONE (DELTASONE) 20 MG tablet    theophylline (DENIA-24) 400 MG 24 hr capsule    triamterene-HCTZ (MAXZIDE) 75-50 MG tablet     No current facility-administered medications for this visit.      Past Medical History:   Patient Active Problem List   Diagnosis    Seizures (H)    Thoracic aortic aneurysm without rupture (H24)    Moderate persistent asthma without complication    Prediabetes    Pulmonary nodules    Essential hypertension    Osteopenia of multiple sites    Aneurysm of right renal artery (H24)    Severe persistent asthma without complication (H28)    Abnormal CT scan of head    Left shoulder pain, unspecified chronicity     Past Medical History:   Diagnosis Date    Abdominal aortic aneurysm without rupture (H24) 11/10/2015    [  ] repeat imaging due spring 2016 Descending aortic aneurysm.  Being monitored with serial angiogram     Aneurysm of right renal artery (H24) 04/09/2019    Arthritis 1998    COPD (chronic obstructive pulmonary disease) (H) 11/10/2015    Not sure of this diagnosis.  May be a consequence of asthma.    Esophageal hypertension 01/25/2017    Essential hypertension 01/25/2017    Female bladder prolapse 11/10/2015    Hearing loss     Pulmonary nodules 01/25/2017    Seizures (H) 11/10/2015    Temporal seziure d/o.  Does not have LOC.  Has prodromal symptoms     Severe persistent asthma (H28) 11/10/2015    Since childhood.  Has been steroid dependent for many years. Has had cydney x2          CC No referring provider defined for this encounter. on close of this encounter.

## 2024-02-16 NOTE — PATIENT INSTRUCTIONS
Checking for Skin Cancer  You can help find cancer early by checking your skin each month. There are 3 main kinds of skin cancer: melanoma, basal cell carcinoma, and squamous cell carcinoma. Doing monthly skin checks is the best way to find new marks, sores, or skin changes. Follow these instructions for checking your skin.   The ABCDEs of checking moles for melanoma   Check your moles or growths for signs of melanoma using ABCDE:   Asymmetry: The sides of the mole or growth don t match.  Border: The edges are ragged, notched, or blurred.  Color: The color within the mole or growth varies. It could be black, brown, tan, white, or shades of red, gray, or blue.  Diameter: The mole or growth is larger than   inch or 6 mm (size of a pencil eraser).  Evolving: The size, shape, texture, or color of the mole or growth is changing.     ABCDE's of moles on light skin.        ABCDE's of moles on dark skin may be harder to identify.     Checking for other types of skin cancer  Basal cell carcinoma or squamous cell carcinoma cause symptoms like:     A spot or mole that looks different from all other marks on your skin  Changes in how an area feels, such as itching, tenderness, or pain  Changes in the skin's surface, such as oozing, bleeding, or scaliness  A sore that doesn't heal  New swelling, redness, or spread of color beyond the border of a mole    Who s at risk?  Anyone of any skin color can get skin cancer. But you're at greater risk if you have:   Fair skin that freckles easily and burns instead of tanning  Light-colored or red hair  Light-colored eyes  Many moles or abnormal moles on your skin  A long history of unprotected exposure to sunlight or tanning beds  A history of many blistering sunburns as a child or teen  A family history of skin cancer  Been exposed to radiation or chemicals  A weakened immune system  Been exposed to arsenic  If you've had skin cancer in the past, you're at high risk of having it again.    How to check your skin  Do your monthly skin checkups in front of a full-length mirror. Use a room with good lighting so it's easier to see. Use a hand mirror to look at hard-to-see places like your buttocks and back. You can also have a trusted friend or family member help you with these checks. Check every part of your body, including your:   Head (ears, face, neck, and scalp)  Torso (front, back, sides, and under breasts)  Arms (tops, undersides, and armpits)  Hands (palms, backs, and fingers, including under the nails)  Lower back, buttocks, and genitals  Legs (front, back, and sides)  Feet (tops, soles, toes, including under the nails, and between toes)  Watch for new spots on your skin or a spot that's changing in color, shape, size.   If you have a lot of moles, take digital photos of them each month. Make sure to take photos both up close and from a distance. These can help you see if any moles change over time.   Know your skin  Most skin changes aren't cancer. But if you see any changes in your skin, call your healthcare provider right away. Only they can tell you if a change is a problem. If you have skin cancer, seeing your provider can be the first step to getting the treatment that could save your life.   [employer] last reviewed this educational content on 10/1/2021    2206-1014 [employer]. All rights reserved. This information is not intended as a substitute for professional medical care. Always follow your healthcare professional's instructions.       Sun screens with Iron Oxide      Skinceuticals sunscreen, fusion, carried at the [facility] or can be mailed to you by [facility] by calling [facility]:Call [phone] and ask to have product shipped to you.     Supergoop 100% Mineral CC cream 50    [employer] Yenny Tinted Face Mineral sunscreen 60    ISDIN Eryfotona Ageless Ultralight Tinted Mineral     Cryotherapy    What is it?  Use of a  very cold liquid, such as liquid nitrogen, to freeze and destroy abnormal skin cells that need to be removed    What should I expect?  Tenderness and redness  A small blister that might grow and fill with dark purple blood. There may be crusting.  More than one treatment may be needed if the lesions do not go away.    How do I care for the treated area?  Gently wash the area with your hands when bathing.  Use a thin layer of Vaseline to help with healing. You may use a Band-Aid.   The area should heal within 7-10 days and may leave behind a pink or lighter color.   Do not use an antibiotic or Neosporin ointment.   You may take acetaminophen (Tylenol) for pain.     Call your doctor if you have:  Severe pain  Signs of infection (warmth, redness, cloudy yellow drainage, and or a bad smell)  Questions or concerns    Who should I call with questions?      [facility]: [phone]      [facility]: [phone]      For urgent needs outside of business hours call the [facility] at [phone] and ask for the dermatology resident on call

## 2024-02-16 NOTE — PROGRESS NOTES
[facility] Dermatology Note  Encounter Date: Feb 16, 2024  Office Visit     Dermatology Problem List:  Last FBSC performed on 10/27/23  1. FHx melanoma (daughter).  2. AK, nasal bridge   3. iSK, L lateral breast  - LN2     Social Hx: Retired Pediatrician    ____________________________________________    Assessment & Plan:  # Seborrheic keratosis, inflamed and irritated.   L upper arm and R chest x 2.  - Cryotherapy performed today (see procedure note(s) below).    #AK  Nasal bridge, non tender. Not treated at last visit due to upcoming event. Still present on exam today. Reviewed treatment options.   -  cryotherapy see proc note below    #Lesion to Monitor, photo obtained 10/27/23  Left arm pink papule with some grey hyperkeratotic areas of thickening on the lateral border under dermoscopy. Size stable. Ddx; BLK less likely BCC. Similar papules on R upper chest, also appear most consistent with BLK.   - no further treatment required; continue to monitor    Procedures Performed:   - Cryotherapy procedure note, location(s): nasal bridge and L lateral breast. After verbal consent and discussion of risks and benefits including, but not limited to, dyspigmentation/scar, blister, and pain, 2 lesion(s) was(were) treated with 1-2 mm freeze border for 1-2 cycles with liquid nitrogen. Post cryotherapy instructions were provided.    Follow-up: in 10/2024 for FBSE, sooner if concerns.     Staff and Scribe and Resident:     IEssie, saw and staffed this patient with the attending. All recommendations, therapies and procedures were pre-staffed with the attending or administered with direct supervision.     Staff Physician Comments:   I saw and evaluated the patient with the resident and I agree with the assessment and plan.  I was present for the entire minor procedure and examination.    Laura Bai MD    Department of Dermatology  [facility]  "[facility]: Phone: [phone], Fax: [phone]  2/22/2024     ____________________________________________    CC: Skin Check (The patient presents for recheck of Aks on the nose and lesion on their left arm. )    HPI:  Ms. Niurka Cisneros is a(n) 78 year old female who presents today as a return patient for AK check.    Pt had AK identified at October visit on nasal bridge and R lower cheek. Not treated at that time due to upcoming event. Presenting today for recheck and treatment if necessary.    Has a \"wart\" on the Left lateral breast that is irritated and frequently catches on things.    Concerned about 2 spots on R chest.    Daughter who was diagnosed w melanoma is doing well. Says her melanoma was brown and misdiagnosed as an SK.     Patient is otherwise feeling well, without additional skin concerns.    Labs Reviewed:  N/A    Physical Exam:  Vitals: LMP  (LMP Unknown)   SKIN: Focused examination of face was performed.  - flat scaly pink papule on L upper arm, R chest x 2 w regular telangiectasias. No arborizing vessels.   - rough gritty pink papule on nasal bridge  - waxy verrucous stuck on papule on L lateral breast  - No other lesions of concern on areas examined.     Medications:  Current Outpatient Medications   Medication    albuterol (PROAIR HFA/PROVENTIL HFA/VENTOLIN HFA) 108 (90 Base) MCG/ACT inhaler    benralizumab (FASENRA) 30 MG/ML SOSY injection    carBAMazepine (TEGRETOL XR) 200 MG 12 hr tablet    diclofenac (VOLTAREN) 1 % topical gel    estradiol (ESTRING) 7.5 MCG/24HR vaginal ring    fluticasone-salmeterol (ADVAIR) 500-50 MCG/ACT inhaler    magnesium 250 MG tablet    metoprolol succinate ER (TOPROL XL) 25 MG 24 hr tablet    metoprolol succinate ER (TOPROL XL) 50 MG 24 hr tablet    montelukast (SINGULAIR) 10 MG tablet    NAPROXEN PO    potassium chloride ER (KLOR-CON) 20 MEQ CR tablet    pravastatin (PRAVACHOL) 20 MG tablet    predniSONE (DELTASONE) 20 MG " tablet    theophylline (DENIA-24) 400 MG 24 hr capsule    triamterene-HCTZ (MAXZIDE) 75-50 MG tablet     No current facility-administered medications for this visit.      Past Medical History:   Patient Active Problem List   Diagnosis    Seizures (H)    Thoracic aortic aneurysm without rupture (H24)    Moderate persistent asthma without complication    Prediabetes    Pulmonary nodules    Essential hypertension    Osteopenia of multiple sites    Aneurysm of right renal artery (H24)    Severe persistent asthma without complication (H28)    Abnormal CT scan of head    Left shoulder pain, unspecified chronicity     Past Medical History:   Diagnosis Date    Abdominal aortic aneurysm without rupture (H24) 11/10/2015    [  ] repeat imaging due spring 2016 Descending aortic aneurysm.  Being monitored with serial angiogram     Aneurysm of right renal artery (H24) 04/09/2019    Arthritis 1998    COPD (chronic obstructive pulmonary disease) (H) 11/10/2015    Not sure of this diagnosis.  May be a consequence of asthma.    Esophageal hypertension 01/25/2017    Essential hypertension 01/25/2017    Female bladder prolapse 11/10/2015    Hearing loss     Pulmonary nodules 01/25/2017    Seizures (H) 11/10/2015    Temporal seziure d/o.  Does not have LOC.  Has prodromal symptoms     Severe persistent asthma (H28) 11/10/2015    Since childhood.  Has been steroid dependent for many years. Has had cydney x2          CC No referring provider defined for this encounter. on close of this encounter.

## 2024-02-26 ENCOUNTER — TELEPHONE (OUTPATIENT)
Dept: AUDIOLOGY | Facility: CLINIC | Age: 79
End: 2024-02-26
Payer: MEDICARE

## 2024-03-18 ENCOUNTER — TRANSFERRED RECORDS (OUTPATIENT)
Dept: HEALTH INFORMATION MANAGEMENT | Facility: CLINIC | Age: 79
End: 2024-03-18
Payer: MEDICARE

## 2024-04-04 ENCOUNTER — INFUSION THERAPY VISIT (OUTPATIENT)
Dept: INFUSION THERAPY | Facility: CLINIC | Age: 79
End: 2024-04-04
Payer: MEDICARE

## 2024-04-04 VITALS
DIASTOLIC BLOOD PRESSURE: 86 MMHG | HEART RATE: 81 BPM | TEMPERATURE: 98 F | OXYGEN SATURATION: 94 % | SYSTOLIC BLOOD PRESSURE: 143 MMHG

## 2024-04-04 DIAGNOSIS — J45.50 SEVERE PERSISTENT ASTHMA WITHOUT COMPLICATION (H): Primary | ICD-10-CM

## 2024-04-04 PROCEDURE — 250N000011 HC RX IP 250 OP 636: Mod: JZ

## 2024-04-04 PROCEDURE — 96372 THER/PROPH/DIAG INJ SC/IM: CPT

## 2024-04-04 RX ADMIN — BENRALIZUMAB 30 MG: 30 INJECTION, SOLUTION SUBCUTANEOUS at 08:49

## 2024-04-04 ASSESSMENT — PAIN SCALES - GENERAL: PAINLEVEL: NO PAIN (0)

## 2024-04-04 NOTE — NURSING NOTE
Patient presents to the [facility] for Fasenra injection.  Order written by Dr. Vega was completed today. Name and  verified with patient. See MAR for medication details. Medication was delivered in 1 syringe by pharmacy and given in the following sites backside of right upper arm. Patient tolerated injection well and was discharged to home. Patient presented with an elevated BP, SIXTO Valera was notified of this. Vitals rechecked at the end of visit and SIXTO Valera notified.    Administrations This Visit       benralizumab (FASENRA) injection 30 mg       Admin Date  2024 Action  $Given Dose  30 mg Route  Subcutaneous Documented By  Kay Edwards MA

## 2024-04-04 NOTE — PROGRESS NOTES
Treatment Conditions:  Biological Infusion Checklist:  ~~~ NOTE: If the patient answers yes to any of the questions below, hold the infusion and contact ordering provider or on-call provider.    Have you recently had an elevated temperature, fever, chills, productive cough, coughing for 3 weeks or longer or hemoptysis,  abnormal vital signs, night sweats,  chest pain or have you noticed a decrease in your appetite, unexplained weight loss or fatigue? No  Do you have any open wounds or new incisions? No  Do you have any upcoming hospitalizations or surgeries? Does not include esophagogastroduodenoscopy, colonoscopy, endoscopic retrograde cholangiopancreatography (ERCP), endoscopic ultrasound (EUS), dental procedures or joint aspiration/steroid injections No  Do you currently have any signs of illness or infection or are you on any antibiotics? No  Have you had any new, sudden or worsening abdominal pain? No  Have you or anyone in your household received a live vaccination in the past 4 weeks? Please note: No live vaccines while on biologic/chemotherapy until 6 months after the last treatment. Patient can receive the flu vaccine (shot only), pneumovax and the Covid vaccine. It is optimal for the patient to get these vaccines mid cycle, but they can be given at any time as long as it is not on the day of the infusion. No  Have you recently been diagnosed with any new nervous system diseases (ie. Multiple sclerosis, Guillain [place], seizures, neurological changes) or cancer diagnosis? Are you on any form of radiation or chemotherapy? No  Are you pregnant or breast feeding or do you have plans of pregnancy in the future? No  Have you been having any signs of worsening depression or suicidal ideations?  (benlysta only) n/a  Have there been any other new onset medical symptoms? No  Have you had any new blood clots? (IVIG only) n/a

## 2024-04-05 SDOH — HEALTH STABILITY: PHYSICAL HEALTH: ON AVERAGE, HOW MANY MINUTES DO YOU ENGAGE IN EXERCISE AT THIS LEVEL?: 50 MIN

## 2024-04-05 SDOH — HEALTH STABILITY: PHYSICAL HEALTH: ON AVERAGE, HOW MANY DAYS PER WEEK DO YOU ENGAGE IN MODERATE TO STRENUOUS EXERCISE (LIKE A BRISK WALK)?: 5 DAYS

## 2024-04-05 ASSESSMENT — ASTHMA QUESTIONNAIRES
QUESTION_3 LAST FOUR WEEKS HOW OFTEN DID YOUR ASTHMA SYMPTOMS (WHEEZING, COUGHING, SHORTNESS OF BREATH, CHEST TIGHTNESS OR PAIN) WAKE YOU UP AT NIGHT OR EARLIER THAN USUAL IN THE MORNING: NOT AT ALL
ACT_TOTALSCORE: 17
QUESTION_2 LAST FOUR WEEKS HOW OFTEN HAVE YOU HAD SHORTNESS OF BREATH: ONCE A DAY
ACT_TOTALSCORE: 17
QUESTION_4 LAST FOUR WEEKS HOW OFTEN HAVE YOU USED YOUR RESCUE INHALER OR NEBULIZER MEDICATION (SUCH AS ALBUTEROL): ONE OR TWO TIMES PER DAY
QUESTION_5 LAST FOUR WEEKS HOW WOULD YOU RATE YOUR ASTHMA CONTROL: WELL CONTROLLED
QUESTION_1 LAST FOUR WEEKS HOW MUCH OF THE TIME DID YOUR ASTHMA KEEP YOU FROM GETTING AS MUCH DONE AT WORK, SCHOOL OR AT HOME: A LITTLE OF THE TIME

## 2024-04-05 ASSESSMENT — SOCIAL DETERMINANTS OF HEALTH (SDOH): HOW OFTEN DO YOU GET TOGETHER WITH FRIENDS OR RELATIVES?: MORE THAN THREE TIMES A WEEK

## 2024-04-09 ENCOUNTER — OFFICE VISIT (OUTPATIENT)
Dept: CARDIOLOGY | Facility: CLINIC | Age: 79
End: 2024-04-09
Attending: INTERNAL MEDICINE
Payer: MEDICARE

## 2024-04-09 VITALS
OXYGEN SATURATION: 96 % | SYSTOLIC BLOOD PRESSURE: 121 MMHG | HEART RATE: 76 BPM | DIASTOLIC BLOOD PRESSURE: 69 MMHG | WEIGHT: 165.8 LBS | BODY MASS INDEX: 26.02 KG/M2 | RESPIRATION RATE: 18 BRPM | HEIGHT: 67 IN

## 2024-04-09 DIAGNOSIS — I70.1 RENAL ARTERY STENOSIS (H): Primary | ICD-10-CM

## 2024-04-09 DIAGNOSIS — I71.20 THORACIC AORTIC ANEURYSM WITHOUT RUPTURE, UNSPECIFIED PART (H): ICD-10-CM

## 2024-04-09 PROCEDURE — 99215 OFFICE O/P EST HI 40 MIN: CPT | Performed by: INTERNAL MEDICINE

## 2024-04-09 NOTE — PROGRESS NOTES
"         Vascular Cardiology Consultation      HPI:     This is a 78 year old female here for follow up visit for thoracic aortic aneurysm. Has a PMH of bladder prolapse, HTN, seizures, asthma and a thoracic ascending aneurysm measuring 4.3-4.4 cm, normal root size. We have also been following renal artery stenosis, renal artery aneurysm.      Prior History:      Reviewed her history in detail: grandmother with aneurysm, at 89 years old, possible rupture and inoperable, leading to death. She has had 2 kids normal vaginal delivery who are healthy in their 40s. She has had 1 miscarriage. H/o bladder prolapse. Mild hypermobility, chronic back pain from scoliosis. Denies abnormal wound healing or bruising/bleeding, hernias, translucent skin, dental crowding, palatal deformities or chest wall deformities.      Thoracic aneurysm had been picked up 5 years prior to visit and she had no scanning since so we obtained echo imaging which documented stability at 4.4 cm. This was confirmed by serial CTA.     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 There is < 50% stenosis as well. She reports h/o HTN for 10 years, controlled, on 2 drug regimen. Had been on losartan and cannot take beta blocker due to asthma. She recently had to go off of losartan due to side effects. Could also not tolerate ACEI.      Since last visit, her daughter has been screened and has a mild aneurysm. Son I have also seen on virtual call, from [facility] and had an echocardiogram screen that demonstrated moderate mitral regurgitation, prominent LV trabeculations and mild aortic dilation. He had EF that was low at 38% and has recovered mildly since then. All children have hypermobile joints and tall stature. Son is 6'2\". His son was seen by heart failure specialist.     Niurka denies chest pain, back pain, fevers, chills, ns, LE edema, orthopnea, PND. SBP has been well controlled in the 130s-140s. HR in the " 60s.     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        ASSESSMENT/PLAN:      Niurka is a 78 year old with ascending aortic aneurysm 4.4 cm and saccular aneurysm of renal artery and renal artery stenosis.     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  A variant of unknown significance means that there is a genetic change in which we do not have enough information to determine if it is disease causing or not. Labs review published data, functional studies, presences in population databases, similarity to normal sequence, and computer prediction models. The TNXB gene is expressed in musculoskeletal, cardiac, and dermal tissues and is known to be associated with autosomal recessive (AR) classic-like Magdaleno Danlos Syndrome (EDS). This particular variant occurs in a position that is well conserved (meaning that it is not used to changes) and creates an amino acid with highly dissimilar properties.  Computer models predict this change will be tolerated.  TNXB haploinsufficiency has been identified in women with incomplete penetrance and is characterized by hypermobile EDS and chronic musculoskeletal pain. Although suspicion is raisied, there is not enough current information to be certain if this variant alone is the cause of Roopa's aneurysm. Reviewed AR inheritance associated with this gene, meaning that two mutations are necessary to cause disease.  Since two mutations are required and Roopa only has one, this is unlikely to be the sole cause for her symptoms.  In addition, her children have a 50% chance of inheriting this variant but again it seems as though that would not be enough to cause full blown disease. At this time we do not recommend genetic testing in other family members because we cannot interpret the results and make predictions.     I suspect Roopa  has possibly the above VUS but could also have an unidentified genetic make up since two children are affected and show phenotypic features (son and daugther) which goes against purely the AR nature of her VUS that is not thought as much to be associated with vascular manifestations according to the above profile.     Plan:     1. Renal artery aneurysm:  will continue to monitor. Not at a point needing repair at this time and stable in size on surveillance imaging. Do not suspect it is altering any renal blood flow. We discussed CTA every few years with ultrasound in between. Will also monitor kidney function.     2. CT angiogram chest/abdomen/pelvis in 2 years, follow echo next year for aorta     3. Continue current medications, unable to be on losartan due to side effects. SBP goal < 120/80 mmHg, HR < 60 bpm. Only requiring toprol XL 75 mg daily now. Watch for COPD and asthma exacerbation.      4. Follow up in 12 months     Brandee Wang MD MSc  [facility]       high [facility]. Reviewed personally CT images.         PAST MEDICAL HISTORY  Past Medical History:   Diagnosis Date    Abdominal aortic aneurysm without rupture (H24) 11/10/2015    [  ] repeat imaging due spring 2016 Descending aortic aneurysm.  Being monitored with serial angiogram     Aneurysm of right renal artery (H24) 04/09/2019    Arthritis 1998    COPD (chronic obstructive pulmonary disease) (H) 11/10/2015    Not sure of this diagnosis.  May be a consequence of asthma.    Esophageal hypertension 01/25/2017    Essential hypertension 01/25/2017    Female bladder prolapse 11/10/2015    Hearing loss     Pulmonary nodules 01/25/2017    Seizures (H) 11/10/2015    Temporal seziure d/o.  Does not have LOC.  Has prodromal symptoms     Severe persistent asthma (H28) 11/10/2015    Since childhood.  Has been steroid dependent for many years. Has had cydney x2         CURRENT MEDICATIONS  Current Outpatient Medications   Medication Sig Dispense  Refill    albuterol (PROAIR HFA/PROVENTIL HFA/VENTOLIN HFA) 108 (90 Base) MCG/ACT inhaler Inhale 2 puffs into the lungs every 6 hours as needed for shortness of breath or wheezing 54 g 3    benralizumab (FASENRA) 30 MG/ML SOSY injection Inject 30 mg Subcutaneous once      carBAMazepine (TEGRETOL XR) 200 MG 12 hr tablet Take 1 tablet (200 mg) by mouth daily as needed (seizure) 30 tablet 1    diclofenac (VOLTAREN) 1 % topical gel Apply 4 g topically 4 times daily 350 g 3    estradiol (ESTRING) 7.5 MCG/24HR vaginal ring Place 1 each (1 Vaginal ring) vaginally every 3 months 1 each 1    fluticasone-salmeterol (ADVAIR) 500-50 MCG/ACT inhaler Inhale 1 puff into the lungs 2 times daily 180 each 3    magnesium 250 MG tablet Take 1 tablet by mouth daily      metoprolol succinate ER (TOPROL XL) 25 MG 24 hr tablet TAKE 1 TABLET DAILY WITH   50MG TABLET FOR TOTAL OF   75MG DAILY. 90 tablet 1    metoprolol succinate ER (TOPROL XL) 50 MG 24 hr tablet Take 1 tablet (50 mg) by mouth daily With 25 mg tab for total of 75 mg Daily 90 tablet 3    montelukast (SINGULAIR) 10 MG tablet Take 1 tablet (10 mg) by mouth at bedtime 90 tablet 3    NAPROXEN PO Take 220 mg by mouth as needed       potassium chloride ER (KLOR-CON) 20 MEQ CR tablet Take 1 tablet (20 mEq) by mouth daily 90 tablet 0    pravastatin (PRAVACHOL) 20 MG tablet Take 1 tablet (20 mg) by mouth daily 90 tablet 2    predniSONE (DELTASONE) 20 MG tablet Take 1 tablet (20 mg) by mouth daily 10 tablet 0    theophylline (DENIA-24) 400 MG 24 hr capsule Take 1 capsule (400 mg) by mouth daily 90 capsule 3    triamterene-HCTZ (MAXZIDE) 75-50 MG tablet Take 1 tablet by mouth daily 90 tablet 1       PAST SURGICAL HISTORY:  Past Surgical History:   Procedure Laterality Date    APPENDECTOMY  1998    CATARACT IOL, RT/LT Bilateral     COLONOSCOPY N/A 02/14/2019    Procedure: COLONOSCOPY;  Surgeon: Haim Burgos MD;  Location: [facility]    GI SURGERY      nissn x 2    GYN SURGERY  2001  "   H CATARACT SURGICAL PACKAGE      HYSTERECTOMY      fibroids    JOINT REPLACEMENT Left     NISSEN FUNDOPLICATION      x2    SOFT TISSUE SURGERY         ALLERGIES     Allergies   Allergen Reactions    Quinolones        FAMILY HISTORY  Family History   Problem Relation Age of Onset    Dementia Mother     Heart Failure Mother     Hypertension Mother     Breast Cancer Mother         post menopausal    Diabetes Mother     Cancer Father         prostate cancer    Hypertension Father     Prostate Cancer Father          of it at 82    Asthma Father         \"outgrew\" it    Schizophrenia Maternal Grandmother     Coronary Artery Disease Maternal Grandmother          of aortic aneurysm at 89    Mental Illness Maternal Grandmother     Coronary Artery Disease Maternal Grandfather         he was diabetic and smoked    Asthma Sister         x2    Depression Sister     Coronary Artery Disease Sister         MI when being ventilated for asthma    Asthma Sister         both sisters with asthma, one severe    Depression Sister     Other - See Comments Daughter         Enlarged Aorta    Melanoma Daughter     Breast Cancer Maternal Aunt     Coronary Artery Disease Daughter         Mildly dilated thoracic aorta    Hypertension Son          SOCIAL HISTORY  Social History     Socioeconomic History    Marital status:      Spouse name: Not on file    Number of children: Not on file    Years of education: 20    Highest education level: Not on file   Occupational History    Occupation: pediatrician   Tobacco Use    Smoking status: Never    Smokeless tobacco: Never   Substance and Sexual Activity    Alcohol use: Not Currently     Comment: less than a drink a day    Drug use: No    Sexual activity: Yes     Partners: Male     Birth control/protection: Post-menopausal, Male Surgical, Female Surgical   Other Topics Concern    Parent/sibling w/ CABG, MI or angioplasty before 65F 55M? 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     Social Determinants of Health     Financial Resource Strain: Low Risk  (4/5/2024)    Financial Resource Strain     Within the past 12 months, have you or your family members you live with been unable to get utilities (heat, electricity) when it was really needed?: No   Food Insecurity: Low Risk  (4/5/2024)    Food Insecurity     Within the past 12 months, did you worry that your food would run out before you got money to buy more?: No     Within the past 12 months, did the food you bought just not last and you didn t have money to get more?: No   Transportation Needs: Low Risk  (4/5/2024)    Transportation Needs     Within the past 12 months, has lack of transportation kept you from medical appointments, getting your medicines, non-medical meetings or appointments, work, or from getting things that you need?: No   Physical Activity: Sufficiently Active (4/5/2024)    Exercise Vital Sign     Days of Exercise per Week: 5 days     Minutes of Exercise per Session: 50 min   Stress: No Stress Concern Present (4/5/2024)    [employer] [place] [employer] - Occupational Stress Questionnaire     Feeling of Stress : Only a little   Social Connections: Unknown (4/5/2024)    Social Connection and Isolation Panel [NHANES]     Frequency of Communication with Friends and Family: Not on file     Frequency of Social Gatherings with Friends and Family: More than three times a week     Attends Church Services: Not on file     Active Member of Clubs or Organizations: Not on file     Attends Club or Organization Meetings: Not on file     Marital Status: Not on file   Interpersonal Safety: Low Risk  (12/27/2023)    Interpersonal Safety     Do you feel physically and emotionally safe where you currently live?: Yes     Within the past 12 months, have you been hit, slapped, kicked or otherwise physically  "hurt by someone?: No     Within the past 12 months, have you been humiliated or emotionally abused in other ways by your partner or ex-partner?: No   Housing Stability: Low Risk  (4/5/2024)    Housing Stability     Do you have housing? : Yes     Are you worried about losing your housing?: No       ROS:   Constitutional: No fever, chills, or sweats. No weight gain/loss   ENT: No visual disturbance, ear ache, epistaxis, sore throat  Allergies/Immunologic: Negative  Respiratory: No cough, hemoptysia  Cardiovascular: As per HPI  GI: No nausea, vomiting, hematemesis, melena, or hematochezia  : No urinary frequency, dysuria, or hematuria  Integument: Negative  Psychiatric: Negative  Neuro: Negative  Endocrinology: Negative   Musculoskeletal: Negative  Vascular: No walking impairment, claudication, ischemic rest pain or nonhealing wounds    EXAM:  /69 (BP Location: Right arm, Patient Position: Sitting, Cuff Size: Adult Regular)   Pulse 76   Resp 18   Ht 1.702 m (5' 7\")   Wt 75.2 kg (165 lb 12.8 oz)   LMP  (LMP Unknown)   SpO2 96%   BMI 25.97 kg/m    In general, the patient is a pleasant female in no apparent distress.    HEENT: NC/AT.  PERRLA.  EOMI.  Sclerae white, not injected.  Nares clear.  Pharynx without erythema or exudate.  Dentition intact.    Neck: No adenopathy.  No thyromegaly. Carotids +2/2 bilaterally without bruits.  No jugular venous distension.   Heart: RRR. Normal S1, S2 splits physiologically. No murmur, rub, click, or gallop. The PMI is in the 5th ICS in the midclavicular line. There is no heave.    Lungs: CTA.  No ronchi, wheezes, rales.  No dullness to percussion.   Abdomen: Soft, nontender, nondistended. No organomegaly. No AAA.  No bruits.   Extremities: No clubbing, cyanosis, or edema.  No wounds. No varicose veins signs of chronic venous insufficiency.   Vascular: No bruits are noted.    Labs:  LIPID RESULTS:  Lab Results   Component Value Date    CHOL 182 06/30/2023    CHOL 182 " 02/12/2021    HDL 49 (L) 06/30/2023    HDL 63 02/12/2021    LDL 97 06/30/2023    LDL 96 02/12/2021    TRIG 180 (H) 06/30/2023    TRIG 115 02/12/2021    CHOLHDLRATIO 3.0 11/10/2015    NHDL 133 (H) 06/30/2023    NHDL 119 02/12/2021       LIVER ENZYME RESULTS:  Lab Results   Component Value Date    AST 23 06/30/2023    ALT 17 06/30/2023       CBC RESULTS:  Lab Results   Component Value Date    WBC 6.3 06/30/2023    WBC 6.4 05/16/2016    RBC 4.92 06/30/2023    RBC 4.56 05/16/2016    HGB 13.1 06/30/2023    HGB 12.7 05/16/2016    HCT 40.3 06/30/2023    HCT 38.0 05/16/2016    MCV 82 06/30/2023    MCV 83 05/16/2016    MCH 26.6 06/30/2023    MCH 27.9 05/16/2016    MCHC 32.5 06/30/2023    MCHC 33.4 05/16/2016    RDW 14.1 06/30/2023    RDW 13.5 05/16/2016     06/30/2023     05/16/2016       BMP RESULTS:  Lab Results   Component Value Date     07/05/2023     02/12/2021    POTASSIUM 3.6 07/05/2023    POTASSIUM 3.1 (L) 04/22/2022    POTASSIUM 3.7 02/12/2021    CHLORIDE 100 07/05/2023    CHLORIDE 103 04/22/2022    CHLORIDE 105 02/12/2021    CO2 25 07/05/2023    CO2 32 04/22/2022    CO2 31 02/12/2021    ANIONGAP 12 07/05/2023    ANIONGAP 3 04/22/2022    ANIONGAP 5 02/12/2021     (H) 07/05/2023     (H) 04/22/2022    GLC 97 02/12/2021    BUN 25.0 (H) 07/05/2023    BUN 21 04/22/2022    BUN 14 02/12/2021    CR 1.0 01/12/2024    CR 0.94 07/05/2023    CR 0.76 02/12/2021    GFRESTIMATED 58 (L) 01/12/2024    GFRESTIMATED 78 02/12/2021    GFRESTBLACK >90 02/12/2021    ZACH 10.3 (H) 07/05/2023    ZACH 9.5 02/12/2021        A1C RESULTS:  Lab Results   Component Value Date    A1C 5.8 (H) 06/30/2023    A1C 5.4 02/12/2021

## 2024-04-09 NOTE — LETTER
4/9/2024    Mara Combs MD  [address]  [facility] [address]    RE: Niurka JOE Cisneros       Dear Colleague,     I had the pleasure of seeing Niurka DIAZ Blayne in the [facility].           Vascular Cardiology Consultation      HPI:     This is a 78 year old female here for follow up visit for thoracic aortic aneurysm. Has a PMH of bladder prolapse, HTN, seizures, asthma and a thoracic ascending aneurysm measuring 4.3-4.4 cm, normal root size. We have also been following renal artery stenosis, renal artery aneurysm.      Prior History:      Reviewed her history in detail: grandmother with aneurysm, at 89 years old, possible rupture and inoperable, leading to death. She has had 2 kids normal vaginal delivery who are healthy in their 40s. She has had 1 miscarriage. H/o bladder prolapse. Mild hypermobility, chronic back pain from scoliosis. Denies abnormal wound healing or bruising/bleeding, hernias, translucent skin, dental crowding, palatal deformities or chest wall deformities.      Thoracic aneurysm had been picked up 5 years prior to visit and she had no scanning since so we obtained echo imaging which documented stability at 4.4 cm. This was confirmed by serial CTA.     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 There is < 50% stenosis as well. She reports h/o HTN for 10 years, controlled, on 2 drug regimen. Had been on losartan and cannot take beta blocker due to asthma. She recently had to go off of losartan due to side effects. Could also not tolerate ACEI.      Since last visit, her daughter has been screened and has a mild aneurysm. Son I have also seen on virtual call, from [facility] and had an echocardiogram screen that demonstrated moderate mitral regurgitation, prominent LV trabeculations and mild aortic dilation. He had EF that was low at 38% and has recovered mildly since then. All children have hypermobile  "joints and tall stature. Son is 6'2\". His son was seen by heart failure specialist.     Niurka denies chest pain, back pain, fevers, chills, ns, LE edema, orthopnea, PND. SBP has been well controlled in the 130s-140s. HR in the 60s.     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        ASSESSMENT/PLAN:      Niurka is a 78 year old with ascending aortic aneurysm 4.4 cm and saccular aneurysm of renal artery and renal artery stenosis.     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  A variant of unknown significance means that there is a genetic change in which we do not have enough information to determine if it is disease causing or not. Labs review published data, functional studies, presences in population databases, similarity to normal sequence, and computer prediction models. The TNXB gene is expressed in musculoskeletal, cardiac, and dermal tissues and is known to be associated with autosomal recessive (AR) classic-like Magdaleno Danlos Syndrome (EDS). This particular variant occurs in a position that is well conserved (meaning that it is not used to changes) and creates an amino acid with highly dissimilar properties.  Computer models predict this change will be tolerated.  TNXB haploinsufficiency has been identified in women with incomplete penetrance and is characterized by hypermobile EDS and chronic musculoskeletal pain. Although suspicion is raisied, there is not enough current information to be certain if this variant alone is the cause of Roopa's aneurysm. Reviewed AR inheritance associated with this gene, meaning that two mutations are necessary to cause disease.  Since two mutations are required and Roopa only has one, this is unlikely to be the sole cause for her symptoms.  In addition, her children have a 50% chance of inheriting this variant but " again it seems as though that would not be enough to cause full blown disease. At this time we do not recommend genetic testing in other family members because we cannot interpret the results and make predictions.     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     Plan:     1. Renal artery aneurysm:  will continue to monitor. Not at a point needing repair at this time and stable in size on surveillance imaging. Do not suspect it is altering any renal blood flow. We discussed CTA every few years with ultrasound in between. Will also monitor kidney function.     2. CT angiogram chest/abdomen/pelvis in 2 years, follow echo next year for aorta     3. Continue current medications, unable to be on losartan due to side effects. SBP goal < 120/80 mmHg, HR < 60 bpm. Only requiring toprol XL 75 mg daily now. Watch for COPD and asthma exacerbation.      4. Follow up in 12 months     Brandee Wang MD MSc  [facility]       high complexity. Reviewed personally CT images.         PAST MEDICAL HISTORY  Past Medical History:   Diagnosis Date    Abdominal aortic aneurysm without rupture (H24) 11/10/2015    [  ] repeat imaging due spring 2016 Descending aortic aneurysm.  Being monitored with serial angiogram     Aneurysm of right renal artery (H24) 04/09/2019    Arthritis 1998    COPD (chronic obstructive pulmonary disease) (H) 11/10/2015    Not sure of this diagnosis.  May be a consequence of asthma.    Esophageal hypertension 01/25/2017    Essential hypertension 01/25/2017    Female bladder prolapse 11/10/2015    Hearing loss     Pulmonary nodules 01/25/2017    Seizures (H) 11/10/2015    Temporal seziure d/o.  Does not have LOC.  Has  prodromal symptoms     Severe persistent asthma (H28) 11/10/2015    Since childhood.  Has been steroid dependent for many years. Has had cydney x2         CURRENT MEDICATIONS  Current Outpatient Medications   Medication Sig Dispense Refill    albuterol (PROAIR HFA/PROVENTIL HFA/VENTOLIN HFA) 108 (90 Base) MCG/ACT inhaler Inhale 2 puffs into the lungs every 6 hours as needed for shortness of breath or wheezing 54 g 3    benralizumab (FASENRA) 30 MG/ML SOSY injection Inject 30 mg Subcutaneous once      carBAMazepine (TEGRETOL XR) 200 MG 12 hr tablet Take 1 tablet (200 mg) by mouth daily as needed (seizure) 30 tablet 1    diclofenac (VOLTAREN) 1 % topical gel Apply 4 g topically 4 times daily 350 g 3    estradiol (ESTRING) 7.5 MCG/24HR vaginal ring Place 1 each (1 Vaginal ring) vaginally every 3 months 1 each 1    fluticasone-salmeterol (ADVAIR) 500-50 MCG/ACT inhaler Inhale 1 puff into the lungs 2 times daily 180 each 3    magnesium 250 MG tablet Take 1 tablet by mouth daily      metoprolol succinate ER (TOPROL XL) 25 MG 24 hr tablet TAKE 1 TABLET DAILY WITH   50MG TABLET FOR TOTAL OF   75MG DAILY. 90 tablet 1    metoprolol succinate ER (TOPROL XL) 50 MG 24 hr tablet Take 1 tablet (50 mg) by mouth daily With 25 mg tab for total of 75 mg Daily 90 tablet 3    montelukast (SINGULAIR) 10 MG tablet Take 1 tablet (10 mg) by mouth at bedtime 90 tablet 3    NAPROXEN PO Take 220 mg by mouth as needed       potassium chloride ER (KLOR-CON) 20 MEQ CR tablet Take 1 tablet (20 mEq) by mouth daily 90 tablet 0    pravastatin (PRAVACHOL) 20 MG tablet Take 1 tablet (20 mg) by mouth daily 90 tablet 2    predniSONE (DELTASONE) 20 MG tablet Take 1 tablet (20 mg) by mouth daily 10 tablet 0    theophylline (DENIA-24) 400 MG 24 hr capsule Take 1 capsule (400 mg) by mouth daily 90 capsule 3    triamterene-HCTZ (MAXZIDE) 75-50 MG tablet Take 1 tablet by mouth daily 90 tablet 1       PAST SURGICAL HISTORY:  Past Surgical History:   Procedure  "Laterality Date    APPENDECTOMY      CATARACT IOL, RT/LT Bilateral     COLONOSCOPY N/A 2019    Procedure: COLONOSCOPY;  Surgeon: Haim Burgos MD;  [facility]: [facility]    GI SURGERY      nissn x 2    GYN SURGERY  2001    [facility] CATARACT SURGICAL PACKAGE      HYSTERECTOMY      fibroids    JOINT REPLACEMENT Left     NISSEN FUNDOPLICATION      x2    SOFT TISSUE SURGERY         ALLERGIES     Allergies   Allergen Reactions    Quinolones        FAMILY HISTORY  Family History   Problem Relation Age of Onset    Dementia Mother     Heart Failure Mother     Hypertension Mother     Breast Cancer Mother         post menopausal    Diabetes Mother     Cancer Father         prostate cancer    Hypertension Father     Prostate Cancer Father          of it at 82    Asthma Father         \"outgrew\" it    Schizophrenia Maternal Grandmother     Coronary Artery Disease Maternal Grandmother          of aortic aneurysm at 89    Mental Illness Maternal Grandmother     Coronary Artery Disease Maternal Grandfather         he was diabetic and smoked    Asthma Sister         x2    Depression Sister     Coronary Artery Disease Sister         MI when being ventilated for asthma    Asthma Sister         both sisters with asthma, one severe    Depression Sister     Other - See Comments Daughter         Enlarged Aorta    Melanoma Daughter     Breast Cancer Maternal Aunt     Coronary Artery Disease Daughter         Mildly dilated thoracic aorta    Hypertension Son          SOCIAL HISTORY  Social History     Socioeconomic History    Marital status:      Spouse name: Not on file    Number of children: Not on file    Years of education: 20    Highest education level: Not on file   Occupational History    Occupation: pediatrician   Tobacco Use    Smoking status: Never    Smokeless tobacco: Never   Substance and Sexual Activity    Alcohol use: Not Currently     Comment: less than a drink a day    Drug use: No    Sexual activity: " Yes     Partners: Male     Birth control/protection: Post-menopausal, Male Surgical, Female Surgical   Other Topics Concern    Parent/sibling w/ CABG, MI or angioplasty before 65F 55M? 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     Social Determinants of Health     Financial Resource Strain: Low Risk  (4/5/2024)    Financial Resource Strain     Within the past 12 months, have you or your family members you live with been unable to get utilities (heat, electricity) when it was really needed?: No   Food Insecurity: Low Risk  (4/5/2024)    Food Insecurity     Within the past 12 months, did you worry that your food would run out before you got money to buy more?: No     Within the past 12 months, did the food you bought just not last and you didn t have money to get more?: No   Transportation Needs: Low Risk  (4/5/2024)    Transportation Needs     Within the past 12 months, has lack of transportation kept you from medical appointments, getting your medicines, non-medical meetings or appointments, work, or from getting things that you need?: No   Physical Activity: Sufficiently Active (4/5/2024)    Exercise Vital Sign     Days of Exercise per Week: 5 days     Minutes of Exercise per Session: 50 min   Stress: No Stress Concern Present (4/5/2024)    [place] [employer] - Occupational Stress Questionnaire     Feeling of Stress : Only a little   Social Connections: Unknown (4/5/2024)    Social Connection and Isolation Panel [NHANES]     Frequency of Communication with Friends and Family: Not on file     Frequency of Social Gatherings with Friends and Family: More than three times a week     Attends Yazidism Services: Not on file     Active Member of Clubs or Organizations: Not on file     Attends Club or Organization Meetings: Not on file     Marital Status: Not on file  "  Interpersonal Safety: Low Risk  (12/27/2023)    Interpersonal Safety     Do you feel physically and emotionally safe where you currently live?: Yes     Within the past 12 months, have you been hit, slapped, kicked or otherwise physically hurt by someone?: No     Within the past 12 months, have you been humiliated or emotionally abused in other ways by your partner or ex-partner?: No   Housing Stability: Low Risk  (4/5/2024)    Housing Stability     Do you have housing? : Yes     Are you worried about losing your housing?: No       ROS:   Constitutional: No fever, chills, or sweats. No weight gain/loss   ENT: No visual disturbance, ear ache, epistaxis, sore throat  Allergies/Immunologic: Negative  Respiratory: No cough, hemoptysia  Cardiovascular: As per HPI  GI: No nausea, vomiting, hematemesis, melena, or hematochezia  : No urinary frequency, dysuria, or hematuria  Integument: Negative  Psychiatric: Negative  Neuro: Negative  Endocrinology: Negative   Musculoskeletal: Negative  Vascular: No walking impairment, claudication, ischemic rest pain or nonhealing wounds    EXAM:  /69 (BP Location: Right arm, Patient Position: Sitting, Cuff Size: Adult Regular)   Pulse 76   Resp 18   Ht 1.702 m (5' 7\")   Wt 75.2 kg (165 lb 12.8 oz)   LMP  (LMP Unknown)   SpO2 96%   BMI 25.97 kg/m    In general, the patient is a pleasant female in no apparent distress.    HEENT: NC/AT.  PERRLA.  EOMI.  Sclerae white, not injected.  Nares clear.  Pharynx without erythema or exudate.  Dentition intact.    Neck: No adenopathy.  No thyromegaly. Carotids +2/2 bilaterally without bruits.  No jugular venous distension.   Heart: RRR. Normal S1, S2 splits physiologically. No murmur, rub, click, or gallop. The PMI is in the 5th ICS in the midclavicular line. There is no heave.    Lungs: CTA.  No ronchi, wheezes, rales.  No dullness to percussion.   Abdomen: Soft, nontender, nondistended. No organomegaly. No AAA.  No bruits. "   Extremities: No clubbing, cyanosis, or edema.  No wounds. No varicose veins signs of chronic venous insufficiency.   Vascular: No bruits are noted.    Labs:  LIPID RESULTS:  Lab Results   Component Value Date    CHOL 182 06/30/2023    CHOL 182 02/12/2021    HDL 49 (L) 06/30/2023    HDL 63 02/12/2021    LDL 97 06/30/2023    LDL 96 02/12/2021    TRIG 180 (H) 06/30/2023    TRIG 115 02/12/2021    CHOLHDLRATIO 3.0 11/10/2015    NHDL 133 (H) 06/30/2023    NHDL 119 02/12/2021       LIVER ENZYME RESULTS:  Lab Results   Component Value Date    AST 23 06/30/2023    ALT 17 06/30/2023       CBC RESULTS:  Lab Results   Component Value Date    WBC 6.3 06/30/2023    WBC 6.4 05/16/2016    RBC 4.92 06/30/2023    RBC 4.56 05/16/2016    HGB 13.1 06/30/2023    HGB 12.7 05/16/2016    HCT 40.3 06/30/2023    HCT 38.0 05/16/2016    MCV 82 06/30/2023    MCV 83 05/16/2016    MCH 26.6 06/30/2023    MCH 27.9 05/16/2016    MCHC 32.5 06/30/2023    MCHC 33.4 05/16/2016    RDW 14.1 06/30/2023    RDW 13.5 05/16/2016     06/30/2023     05/16/2016       BMP RESULTS:  Lab Results   Component Value Date     07/05/2023     02/12/2021    POTASSIUM 3.6 07/05/2023    POTASSIUM 3.1 (L) 04/22/2022    POTASSIUM 3.7 02/12/2021    CHLORIDE 100 07/05/2023    CHLORIDE 103 04/22/2022    CHLORIDE 105 02/12/2021    CO2 25 07/05/2023    CO2 32 04/22/2022    CO2 31 02/12/2021    ANIONGAP 12 07/05/2023    ANIONGAP 3 04/22/2022    ANIONGAP 5 02/12/2021     (H) 07/05/2023     (H) 04/22/2022    GLC 97 02/12/2021    BUN 25.0 (H) 07/05/2023    BUN 21 04/22/2022    BUN 14 02/12/2021    CR 1.0 01/12/2024    CR 0.94 07/05/2023    CR 0.76 02/12/2021    GFRESTIMATED 58 (L) 01/12/2024    GFRESTIMATED 78 02/12/2021    GFRESTBLACK >90 02/12/2021    ZACH 10.3 (H) 07/05/2023    ZACH 9.5 02/12/2021        A1C RESULTS:  Lab Results   Component Value Date    A1C 5.8 (H) 06/30/2023    A1C 5.4 02/12/2021   Thank you for allowing me to participate in  the care of your patient.      Sincerely,     Brandee Wang MD     [facility]  cc:   Brandee Wang MD  [address] [place] [id number]

## 2024-04-12 ENCOUNTER — OFFICE VISIT (OUTPATIENT)
Dept: INTERNAL MEDICINE | Facility: CLINIC | Age: 79
End: 2024-04-12
Payer: MEDICARE

## 2024-04-12 VITALS
OXYGEN SATURATION: 96 % | HEIGHT: 67 IN | DIASTOLIC BLOOD PRESSURE: 80 MMHG | HEART RATE: 68 BPM | SYSTOLIC BLOOD PRESSURE: 128 MMHG | BODY MASS INDEX: 25.72 KG/M2 | WEIGHT: 163.9 LBS

## 2024-04-12 DIAGNOSIS — Z00.00 ENCOUNTER FOR MEDICARE ANNUAL WELLNESS EXAM: Primary | ICD-10-CM

## 2024-04-12 DIAGNOSIS — I71.21 ANEURYSM OF ASCENDING AORTA WITHOUT RUPTURE (H): ICD-10-CM

## 2024-04-12 DIAGNOSIS — I72.2 ANEURYSM OF RIGHT RENAL ARTERY (H): ICD-10-CM

## 2024-04-12 DIAGNOSIS — R56.9 SEIZURES (H): ICD-10-CM

## 2024-04-12 DIAGNOSIS — I10 BENIGN ESSENTIAL HYPERTENSION: ICD-10-CM

## 2024-04-12 DIAGNOSIS — E78.5 HYPERLIPIDEMIA LDL GOAL <100: ICD-10-CM

## 2024-04-12 DIAGNOSIS — N81.10 BLADDER PROLAPSE, FEMALE, ACQUIRED: ICD-10-CM

## 2024-04-12 PROCEDURE — G0439 PPPS, SUBSEQ VISIT: HCPCS | Performed by: INTERNAL MEDICINE

## 2024-04-12 RX ORDER — PRAVASTATIN SODIUM 20 MG
20 TABLET ORAL DAILY
Qty: 90 TABLET | Refills: 4 | Status: SHIPPED | OUTPATIENT
Start: 2024-04-12

## 2024-04-12 RX ORDER — POTASSIUM CHLORIDE 1500 MG/1
20 TABLET, EXTENDED RELEASE ORAL DAILY
Qty: 90 TABLET | Refills: 4 | Status: SHIPPED | OUTPATIENT
Start: 2024-04-12

## 2024-04-12 RX ORDER — TRIAMTERENE AND HYDROCHLOROTHIAZIDE 75; 50 MG/1; MG/1
1 TABLET ORAL DAILY
Qty: 90 TABLET | Refills: 4 | Status: SHIPPED | OUTPATIENT
Start: 2024-04-12

## 2024-04-12 RX ORDER — ESTRADIOL 2 MG/1
1 SYSTEM VAGINAL
Qty: 1 EACH | Refills: 4 | Status: SHIPPED | OUTPATIENT
Start: 2024-04-12

## 2024-04-12 NOTE — PROGRESS NOTES
"Preventive Care Visit  [facility]  Mara Combs MD, Internal Medicine  Apr 12, 2024      Assessment & Plan     Benign essential hypertension  At goal. Refill sent  - triamterene-HCTZ (MAXZIDE) 75-50 MG tablet  Dispense: 90 tablet; Refill: 4  - potassium chloride atnonietta ER (KLOR-CON M20) 20 MEQ CR tablet  Dispense: 90 tablet; Refill: 4    Ascending aortic aneurysm (H24)  Aneurysm of right renal artery (H24)  Follows with Dr. Wang  - triamterene-HCTZ (MAXZIDE) 75-50 MG tablet  Dispense: 90 tablet; Refill: 4    Bladder prolapse, female, acquired  - estradiol (ESTRING) 7.5 MCG/24HR vaginal ring  Dispense: 1 each; Refill: 4    Seizures (H)  Rare, uses carbemazepine prn    Hyperlipidemia LDL goal <100  - pravastatin (PRAVACHOL) 20 MG tablet  Dispense: 90 tablet; Refill: 4    Encounter for Medicare annual wellness exam  Had labs done as part of a research study.  She will send in results        BMI  Estimated body mass index is 25.66 kg/m  as calculated from the following:    Height as of this encounter: 1.702 m (5' 7.01\").    Weight as of this encounter: 74.3 kg (163 lb 14.4 oz).       Counseling  Appropriate preventive services were discussed with this patient, including applicable screening as appropriate for fall prevention, nutrition, physical activity, Tobacco-use cessation, weight loss and cognition.  Checklist reviewing preventive services available has been given to the patient.  Reviewed patient's diet, addressing concerns and/or questions.   She is at risk for psychosocial distress and has been provided with information to reduce risk.   The patient was provided with written information regarding signs of hearing loss.       Return in about 1 year (around 4/12/2025).    Evelin Blas is a 78 year old, presenting for the following:  Physical and Refill Request        4/12/2024     6:42 AM   Additional Questions   Roomed by Roz Cody   Accompanied by " N/a       Health Care Directive  Patient has a Health Care Directive on file  Advance care planning document is on file and is current.    HPI  Having some SI joint pain  Respiratory status is good.  No recent illness  She had labs done as part of a research study and all were normal              4/5/2024   General Health   How would you rate your overall physical health? Good   Feel stress (tense, anxious, or unable to sleep) Only a little   (!) STRESS CONCERN      4/5/2024   Nutrition   Diet: Regular (no restrictions)         4/5/2024   Exercise   Days per week of moderate/strenous exercise 5 days   Average minutes spent exercising at this level 50 min         4/5/2024   Social Factors   Frequency of gathering with friends or relatives More than three times a week   Worry food won't last until get money to buy more No   Food not last or not have enough money for food? No   Do you have housing?  Yes   Are you worried about losing your housing? No   Lack of transportation? No   Unable to get utilities (heat,electricity)? No         4/5/2024   Fall Risk   Fallen 2 or more times in the past year? No   Trouble with walking or balance? No          4/5/2024   Activities of Daily Living- Home Safety   Needs help with the following daily activites None of the above   Safety concerns in the home None of the above         4/5/2024   Dental   Dentist two times every year? Yes         4/5/2024   Hearing Screening   Hearing concerns? (!) I NEED TO ASK PEOPLE TO SPEAK UP OR REPEAT THEMSELVES.    (!) IT'S HARD TO FOLLOW A CONVERSATION IN A NOISY RESTAURANT OR CROWDED ROOM.    (!) TROUBLE FOLLOWING DIALOGUE IN THE THEATHER.    (!) TROUBLE UNDERSTANDING SOFT OR WHISPERED SPEECH.    Has audiology appointment shceulded         4/5/2024   Driving Risk Screening   Patient/family members have concerns about driving No         4/5/2024   General Alertness/Fatigue Screening   Have you been more tired than usual lately? No         4/5/2024    Urinary Incontinence Screening   Bothered by leaking urine in past 6 months No         4/5/2024   TB Screening   Were you born outside of the [place]? No       Today's PHQ-2 Score:       4/12/2024     6:49 AM   PHQ-2 ( 1999 Pf[employer])   Q1: Little interest or pleasure in doing things 0   Q2: Feeling down, depressed or hopeless 0   PHQ-2 Score 0         4/5/2024   Substance Use   Alcohol more than 3/day or more than 7/wk No   Do you have a current opioid prescription? No   How severe/bad is pain from 1 to 10? 2/10   Do you use any other substances recreationally? No     Social History     Tobacco Use    Smoking status: Never    Smokeless tobacco: Never   Substance Use Topics    Alcohol use: Not Currently     Comment: less than a drink a day    Drug use: Never           4/18/2023   LAST FHS-7 RESULTS   1st degree relative breast or ovarian cancer Yes   Any relative bilateral breast cancer No   Any male have breast cancer No   Any ONE woman have BOTH breast AND ovarian cancer No   Any woman with breast cancer before 50yrs No   2 or more relatives with breast AND/OR ovarian cancer Yes- breast cancer (not ovarian)   2 or more relatives with breast AND/OR bowel cancer No            ASCVD Risk   The ASCVD Risk score (Lizett SALAZAR, et al., 2019) failed to calculate for the following reasons:    The patient has a prior MI or stroke diagnosis            Reviewed and updated as needed this visit by Provider   Tobacco  Allergies  Meds  Problems  Med Hx  Surg Hx  Fam Hx              Current providers sharing in care for this patient include:  Patient Care Team:  Mara Combs MD as PCP - General (Internal Medicine)  Mara Combs MD as MD (Internal Medicine)  Stephania Juarez MD as MD (Pulmonary Disease)  Carmen Goodman MD as MD (Urology)  Mara Combs MD as Referring Physician (Internal Medicine)  Jose De Jesus Vega MD as MD (Internal Medicine)  Gary  "Jose De Jesus CRUZ MD as Assigned Pulmonology Provider  Mara Combs MD as Assigned PCP  Yue Akins MD as MD (Ophthalmology)  Brandee Wang MD as Assigned Heart and Vascular Provider  Cindy Singh PA-C as Physician Assistant (Dermatology)  Laura Bai MD as Assigned Surgical Provider  William Mcmillan MD as Assigned Musculoskeletal Provider  Melody Torres AuD as Audiologist (Audiology)    The following health maintenance items are reviewed in Epic and correct as of today:  Health Maintenance   Topic Date Due    ASTHMA ACTION PLAN  01/23/2021    DEXA  12/27/2021    MAMMO SCREENING  04/18/2024    COLORECTAL CANCER SCREENING  04/03/2026 (Originally 1945)    A1C  06/30/2024    LIPID  06/30/2024    BMP  07/05/2024    ASTHMA CONTROL TEST  10/12/2024    SKIN CANCER SCREENING  10/27/2024    MEDICARE ANNUAL WELLNESS VISIT  04/12/2025    ANNUAL REVIEW OF HM ORDERS  04/12/2025    FALL RISK ASSESSMENT  04/12/2025    GLUCOSE  07/05/2026    ADVANCE CARE PLANNING  04/12/2029    DTAP/TDAP/TD IMMUNIZATION (4 - Td or Tdap) 09/23/2031    HEPATITIS C SCREENING  Completed    PHQ-2 (once per calendar year)  Completed    INFLUENZA VACCINE  Completed    Pneumococcal Vaccine: 65+ Years  Completed    ZOSTER IMMUNIZATION  Completed    RSV VACCINE (Pregnancy & 60+)  Completed    COVID-19 Vaccine  Completed    IPV IMMUNIZATION  Aged Out    HPV IMMUNIZATION  Aged Out    MENINGITIS IMMUNIZATION  Aged Out    RSV MONOCLONAL ANTIBODY  Aged Out            Objective    Exam  /80 (BP Location: Right arm, Patient Position: Sitting, Cuff Size: Adult Regular)   Pulse 68   Ht 1.702 m (5' 7.01\")   Wt 74.3 kg (163 lb 14.4 oz)   LMP  (LMP Unknown)   SpO2 96%   BMI 25.66 kg/m     Estimated body mass index is 25.66 kg/m  as calculated from the following:    Height as of this encounter: 1.702 m (5' 7.01\").    Weight as of this encounter: 74.3 kg (163 lb 14.4 oz).    Physical Exam  GENERAL: " alert and no distress  NECK: no adenopathy, no asymmetry, masses, or scars  RESP: lungs clear to auscultation - no rales, rhonchi or wheezes  CV: regular rate and rhythm, normal S1 S2, no S3 or S4, no murmur, click or rub, no peripheral edema  ABDOMEN: soft, nontender, no hepatosplenomegaly, no masses and bowel sounds normal  MS: no gross musculoskeletal defects noted, no edema        4/12/2024   Mini Cog   Clock Draw Score 2 Normal   3 Item Recall 3 objects recalled   Mini Cog Total Score 5              Signed Electronically by: Mara Combs MD

## 2024-04-12 NOTE — PATIENT INSTRUCTIONS
Preventive Care Advice   This is general advice given by our system to help you stay healthy. However, your care team may have specific advice just for you. Please talk to your care team about your preventive care needs.  Nutrition  Eat 5 or more servings of fruits and vegetables each day.  Try wheat bread, brown rice and whole grain pasta (instead of white bread, rice, and pasta).  Get enough calcium and vitamin D. Check the label on foods and aim for 100% of the RDA (recommended daily allowance).  Lifestyle  Exercise at least 150 minutes each week   (30 minutes a day, 5 days a week).  Do muscle strengthening activities 2 days a week. These help control your weight and prevent disease.  No smoking.  Wear sunscreen to prevent skin cancer.  Have a dental exam and cleaning every 6 months.  Yearly exams  See your health care team every year to talk about:  Any changes in your health.  Any medicines your care team has prescribed.  Preventive care, family planning, and ways to prevent chronic diseases.  Shots (vaccines)   HPV shots (up to age 26), if you've never had them before.  Hepatitis B shots (up to age 59), if you've never had them before.  COVID-19 shot: Get this shot when it's due.  Flu shot: Get a flu shot every year.  Tetanus shot: Get a tetanus shot every 10 years.  Pneumococcal, hepatitis A, and RSV shots: Ask your care team if you need these based on your risk.  Shingles shot (for age 50 and up).  General health tests  Diabetes screening:  Starting at age 35, Get screened for diabetes at least every 3 years.  If you are younger than age 35, ask your care team if you should be screened for diabetes.  Cholesterol test: At age 39, start having a cholesterol test every 5 years, or more often if advised.  Bone density scan (DEXA): At age 50, ask your care team if you should have this scan for osteoporosis (brittle bones).  Hepatitis C: Get tested at least once in your life.  STIs (sexually transmitted  infections)  Before age 24: Ask your care team if you should be screened for STIs.  After age 24: Get screened for STIs if you're at risk. You are at risk for STIs (including HIV) if:  You are sexually active with more than one person.  You don't use condoms every time.  You or a partner was diagnosed with a sexually transmitted infection.  If you are at risk for HIV, ask about PrEP medicine to prevent HIV.  Get tested for HIV at least once in your life, whether you are at risk for HIV or not.  Cancer screening tests  Cervical cancer screening: If you have a cervix, begin getting regular cervical cancer screening tests at age 21. Most people who have regular screenings with normal results can stop after age 65. Talk about this with your provider.  Breast cancer scan (mammogram): If you've ever had breasts, begin having regular mammograms starting at age 40. This is a scan to check for breast cancer.  Colon cancer screening: It is important to start screening for colon cancer at age 45.  Have a colonoscopy test every 10 years (or more often if you're at risk) Or, ask your provider about stool tests like a FIT test every year or Cologuard test every 3 years.  To learn more about your testing options, visit: https://[email or web address] [email or web address].pdf.  For help making a decision, visit: https://bit.ly/xc04807.  Prostate cancer screening test: If you have a prostate and are age 55 to 69, ask your provider if you would benefit from a yearly prostate cancer screening test.  Lung cancer screening: If you are a current or former smoker age 50 to 80, ask your care team if ongoing lung cancer screenings are right for you.  For informational purposes only. Not to replace the advice of your health care provider. Copyright   2023 [employer]. All rights reserved. Clinically reviewed by the [employer] [facility] Transitions Program. [employer] [id number] - REV 01/24.    Hearing Loss: Care Instructions  Overview     Hearing loss is a  sudden or slow decrease in how well you hear. It can range from slight to profound. Permanent hearing loss can occur with aging. It also can happen when you are exposed long-term to loud noise. Examples include listening to loud music, riding motorcycles, or being around other loud machines.  Hearing loss can affect your work and home life. It can make you feel lonely or depressed. You may feel that you have lost your independence. But hearing aids and other devices can help you hear better and feel connected to others.  Follow-up care is a key part of your treatment and safety. Be sure to make and go to all appointments, and call your doctor if you are having problems. It's also a good idea to know your test results and keep a list of the medicines you take.  How can you care for yourself at home?  Avoid loud noises whenever possible. This helps keep your hearing from getting worse.  Always wear hearing protection around loud noises.  Wear a hearing aid as directed.  A professional can help you pick a hearing aid that will work best for you.  You can also get hearing aids over the counter for mild to moderate hearing loss.  Have hearing tests as your doctor suggests. They can show whether your hearing has changed. Your hearing aid may need to be adjusted.  Use other devices as needed. These may include:  Telephone amplifiers and hearing aids that can connect to a television, stereo, radio, or microphone.  Devices that use lights or vibrations. These alert you to the doorbell, a ringing telephone, or a baby monitor.  Television closed-captioning. This shows the words at the bottom of the screen. Most new TVs can do this.  TTY (text telephone). This lets you type messages back and forth on the telephone instead of talking or listening. These devices are also called TDD. When messages are typed on the keyboard, they are sent over the phone line to a receiving TTY. The message is shown on a monitor.  Use text  "messaging, social media, and email if it is hard for you to communicate by telephone.  Try to learn a listening technique called speechreading. It is not lipreading. You pay attention to people's gestures, expressions, posture, and tone of voice. These clues can help you understand what a person is saying. Face the person you are talking to, and have them face you. Make sure the lighting is good. You need to see the other person's face clearly.  Think about counseling if you need help to adjust to your hearing loss.  When should you call for help?  Watch closely for changes in your health, and be sure to contact your doctor if:    You think your hearing is getting worse.     You have new symptoms, such as dizziness or nausea.   Where can you learn more?  Go to https://www.On The Bill.net/patiented  Enter R798 in the search box to learn more about \"Hearing Loss: Care Instructions.\"  Current as of: September 27, 2023               Content Version: 14.0    9121-8577 [employer].   Care instructions adapted under license by your healthcare professional. If you have questions about a medical condition or this instruction, always ask your healthcare professional. [employer] disclaims any warranty or liability for your use of this information.      Learning About Stress  What is stress?     Stress is your body's response to a hard situation. Your body can have a physical, emotional, or mental response. Stress is a fact of life for most people, and it affects everyone differently. What causes stress for you may not be stressful for someone else.  A lot of things can cause stress. You may feel stress when you go on a job interview, take a test, or run a race. This kind of short-term stress is normal and even useful. It can help you if you need to work hard or react quickly. For example, stress can help you finish an important job on time.  Long-term stress is caused by ongoing stressful situations " or events. Examples of long-term stress include long-term health problems, ongoing problems at work, or conflicts in your family. Long-term stress can harm your health.  How does stress affect your health?  When you are stressed, your body responds as though you are in danger. It makes hormones that speed up your heart, make you breathe faster, and give you a burst of energy. This is called the fight-or-flight stress response. If the stress is over quickly, your body goes back to normal and no harm is done.  But if stress happens too often or lasts too long, it can have bad effects. Long-term stress can make you more likely to get sick, and it can make symptoms of some diseases worse. If you tense up when you are stressed, you may develop neck, shoulder, or low back pain. Stress is linked to high blood pressure and heart disease.  Stress also harms your emotional health. It can make you paez, tense, or depressed. Your relationships may suffer, and you may not do well at work or school.  What can you do to manage stress?  You can try these things to help manage stress:   Do something active. Exercise or activity can help reduce stress. Walking is a great way to get started. Even everyday activities such as housecleaning or yard work can help.  Try yoga or neida chi. These techniques combine exercise and meditation. You may need some training at first to learn them.  Do something you enjoy. For example, listen to music or go to a movie. Practice your hobby or do volunteer work.  Meditate. This can help you relax, because you are not worrying about what happened before or what may happen in the future.  Do guided imagery. Imagine yourself in any setting that helps you feel calm. You can use online videos, books, or a teacher to guide you.  Do breathing exercises. For example:  From a standing position, bend forward from the waist with your knees slightly bent. Let your arms dangle close to the floor.  Breathe in slowly  "and deeply as you return to a standing position. Roll up slowly and lift your head last.  Hold your breath for just a few seconds in the standing position.  Breathe out slowly and bend forward from the waist.  Let your feelings out. Talk, laugh, cry, and express anger when you need to. Talking with supportive friends or family, a counselor, or a rena leader about your feelings is a healthy way to relieve stress. Avoid discussing your feelings with people who make you feel worse.  Write. It may help to write about things that are bothering you. This helps you find out how much stress you feel and what is causing it. When you know this, you can find better ways to cope.  What can you do to prevent stress?  You might try some of these things to help prevent stress:  Manage your time. This helps you find time to do the things you want and need to do.  Get enough sleep. Your body recovers from the stresses of the day while you are sleeping.  Get support. Your family, friends, and community can make a difference in how you experience stress.  Limit your news feed. Avoid or limit time on social media or news that may make you feel stressed.  Do something active. Exercise or activity can help reduce stress. Walking is a great way to get started.  Where can you learn more?  Go to https://www.ViewsIQ.net/patiented  Enter N032 in the search box to learn more about \"Learning About Stress.\"  Current as of: October 24, 2023               Content Version: 14.0    4323-5935 [employer].   Care instructions adapted under license by your healthcare professional. If you have questions about a medical condition or this instruction, always ask your healthcare professional. [employer] disclaims any warranty or liability for your use of this information.      "

## 2024-04-29 ENCOUNTER — OFFICE VISIT (OUTPATIENT)
Dept: ORTHOPEDICS | Facility: CLINIC | Age: 79
End: 2024-04-29
Payer: MEDICARE

## 2024-04-29 DIAGNOSIS — M46.1 ARTHRITIS OF RIGHT SACROILIAC JOINT (H): ICD-10-CM

## 2024-04-29 DIAGNOSIS — M46.1 SACROILIITIS, NOT ELSEWHERE CLASSIFIED (H): Primary | ICD-10-CM

## 2024-04-29 DIAGNOSIS — M41.9 SCOLIOSIS OF LUMBAR SPINE, UNSPECIFIED SCOLIOSIS TYPE: ICD-10-CM

## 2024-04-29 DIAGNOSIS — M16.11 PRIMARY OSTEOARTHRITIS OF RIGHT HIP: ICD-10-CM

## 2024-04-29 PROCEDURE — 99213 OFFICE O/P EST LOW 20 MIN: CPT | Performed by: FAMILY MEDICINE

## 2024-04-29 NOTE — LETTER
4/29/2024      RE: Niurka Cisneros  [address]  [place] [address]     Dear Colleague,    Thank you for referring your patient, Niurka Cisneros, to the [facility] [place]. Please see a copy of my visit note below.    Patient notes right-sided buttock discomfort that is worse with positioning her self in bed, and worse with the first movements out of a chair.  She is used Aleve for it consistently over the last 4 weeks but her doctors cautioned her that she needs to avoid Aleve because of renal insufficiency.  Tylenol has not been helpful.  She denies radicular discomfort in the right leg.  No numbness or tingling.    She is aware of the history of lumbar scoliosis from visits with an orthopedist 15 years ago.  There was a time where she had multiple facet injections in the lumbar spine that were not helpful, again about 15 years ago.      Patient was seen 1/15/2024 with right-sided hip discomfort, suspected gluteus medius tendinitis, which has since resolved with physical therapy.    X-ray of the right hip and pelvis 12/27/2023 consistent with mild right SI joint DJD    CT scan of the chest abdomen and pelvis 1/12/2024 consistent with prominent thoracolumbar rotatory scoliosis with severe multilevel degenerative disc and joint changes involving the lumbar spine.    X-ray right hip 12/27/2023 consistent with moderate right hip DJD, slightly increased compared to x-ray of the right hip in 2020.  Previous left-sided hip replacement, [facility] 2005.     Retired pediatrician.      History of hypertension, seizure disorder, renal artery aneurysm, prediabetes.     Medicines include Tegretol, diclofenac gel, metoprolol, theophylline, Maxide          PMH:  Past Medical History:   Diagnosis Date     Abdominal aortic aneurysm without rupture (H24) 11/10/2015    [  ] repeat imaging due spring 2016 Descending aortic aneurysm.  Being monitored with serial angiogram   "    Aneurysm of right renal artery (H24) 2019     Arthritis 1998     COPD (chronic obstructive pulmonary disease) (H) 11/10/2015    Not sure of this diagnosis.  May be a consequence of asthma.     Esophageal hypertension 2017     Essential hypertension 2017     Female bladder prolapse 11/10/2015     Hearing loss      Heart disease 2005     Pulmonary nodules 2017     Seizures (H) 11/10/2015    Temporal seziure d/o.  Does not have LOC.  Has prodromal symptoms      Severe persistent asthma (H28) 11/10/2015    Since childhood.  Has been steroid dependent for many years. Has had cydney x2         Active problem list:  Patient Active Problem List   Diagnosis     Seizures (H)     Thoracic aortic aneurysm without rupture (H24)     Moderate persistent asthma without complication     Prediabetes     Pulmonary nodules     Essential hypertension     Osteopenia of multiple sites     Aneurysm of right renal artery (H24)     Severe persistent asthma without complication (H28)     Abnormal CT scan of head     Left shoulder pain, unspecified chronicity       FH:  Family History   Problem Relation Age of Onset     Dementia Mother      Heart Failure Mother      Hypertension Mother      Breast Cancer Mother         post menopausal     Diabetes Mother      Cancer Father         prostate cancer     Hypertension Father      Prostate Cancer Father          of it at 82     Asthma Father         \"outgrew\" it     Schizophrenia Maternal Grandmother      Coronary Artery Disease Maternal Grandmother          of aortic aneurysm at 89     Mental Illness Maternal Grandmother      Coronary Artery Disease Maternal Grandfather         he was diabetic and smoked     Asthma Sister         x2     Depression Sister      Coronary Artery Disease Sister         MI when being ventilated for asthma     Asthma Sister         both sisters with asthma, one severe     Depression Sister      Other - See Comments Daughter         " Enlarged Aorta     Melanoma Daughter      Breast Cancer Maternal Aunt      Coronary Artery Disease Daughter         Mildly dilated thoracic aorta     Other Cancer Daughter         Melanoma     Hypertension Son      Diabetes Son         Grandson. Type 1     Coronary Artery Disease Cousin         Dilated aorta       SH:  Social History     Socioeconomic History     Marital status:      Spouse name: Not on file     Number of children: Not on file     Years of education: 20     Highest education level: Not on file   Occupational History     Occupation: pediatrician   Tobacco Use     Smoking status: Never     Smokeless tobacco: Never   Substance and Sexual Activity     Alcohol use: Not Currently     Comment: less than a drink a day     Drug use: Never     Sexual activity: Yes     Partners: Male     Birth control/protection: Post-menopausal, Male Surgical   Other Topics Concern     Parent/sibling w/ CABG, MI or angioplasty before 65F 55M? Yes   Social History Narrative    Retired Pediatrician, moved to [facility] from [place], [place].     Social Determinants of Health     Financial Resource Strain: Low Risk  (4/5/2024)    Financial Resource Strain      Within the past 12 months, have you or your family members you live with been unable to get utilities (heat, electricity) when it was really needed?: No   Food Insecurity: Low Risk  (4/5/2024)    Food Insecurity      Within the past 12 months, did you worry that your food would run out before you got money to buy more?: No      Within the past 12 months, did the food you bought just not last and you didn t have money to get more?: No   Transportation Needs: Low Risk  (4/5/2024)    Transportation Needs      Within the past 12 months, has lack of transportation kept you from medical appointments, getting your medicines, non-medical meetings or appointments, work, or from getting things that you need?: No   Physical Activity: Sufficiently Active (4/5/2024)    Exercise  Vital Sign      Days of Exercise per Week: 5 days      Minutes of Exercise per Session: 50 min   Stress: No Stress Concern Present (4/5/2024)    [employer] [place] [employer] - Occupational Stress Questionnaire      Feeling of Stress : Only a little   Social Connections: Unknown (4/5/2024)    Social Connection and Isolation Panel [NHANES]      Frequency of Communication with Friends and Family: Not on file      Frequency of Social Gatherings with Friends and Family: More than three times a week      Attends Latter-day Services: Not on file      Active Member of Clubs or Organizations: Not on file      Attends Club or Organization Meetings: Not on file      Marital Status: Not on file   Interpersonal Safety: Low Risk  (4/12/2024)    Interpersonal Safety      Do you feel physically and emotionally safe where you currently live?: Yes      Within the past 12 months, have you been hit, slapped, kicked or otherwise physically hurt by someone?: No      Within the past 12 months, have you been humiliated or emotionally abused in other ways by your partner or ex-partner?: No   Housing Stability: Low Risk  (4/5/2024)    Housing Stability      Do you have housing? : Yes      Are you worried about losing your housing?: No       MEDS:  See EMR, reviewed  ALL:  See EMR, reviewed    REVIEW OF SYSTEMS:  CONSTITUTIONAL:NEGATIVE for fever, chills, change in weight  INTEGUMENTARY/SKIN: NEGATIVE for worrisome rashes, moles or lesions  EYES: NEGATIVE for vision changes or irritation  ENT/MOUTH: NEGATIVE for ear, mouth and throat problems  RESP:NEGATIVE for significant cough or SOB  BREAST: NEGATIVE for masses, tenderness or discharge  CV: NEGATIVE for chest pain, palpitations or peripheral edema  GI: NEGATIVE for nausea, abdominal pain, heartburn, or change in bowel habits  :NEGATIVE for frequency, dysuria, or hematuria  :NEGATIVE for frequency, dysuria, or hematuria  NEURO: NEGATIVE for weakness, dizziness or  paresthesias  ENDOCRINE: NEGATIVE for temperature intolerance, skin/hair changes  HEME/ALLERGY/IMMUNE: NEGATIVE for bleeding problems  PSYCHIATRIC: NEGATIVE for changes in mood or affect        Objective: She is nontender over the lumbar spine.  She is tender over the right SI joint.  Nontender of the left SI joint.  ELVIN test is negative.  Straight leg raise is negative.  5 out of 5 strength bilaterally at hip, knee, ankle and foot.  Sensation is normal distally.  Appropriate conversation and affect.    We reviewed the previous x-rays of her SI joints that suggested mild SI joint DJD and a CT scan of her abdomen that showed significant thoracolumbar scoliosis with multilevel degenerative changes.    Assessment: right SI joint discomfort with right SI joint arthritis on x-ray.  Moderate right hip DJD.  History of left hip replacement.  Thoracolumbar scoliosis with multilevel degenerative disc and joint disease.    Plan: She would like to try an empiric SI joint injection, as she feels that the joint pain is localized to the upper buttock on the right, and consistent with the localization of the SI joint.  The nature of the procedure was explained and will be done in radiology.  Phone call follow-up after the injection to see if it was helpful.  If it was helpful she would like to continue with SI joint physical therapy and would  need an additional PT referral.  We discussed additional options such as pool therapy for her low back.                            Again, thank you for allowing me to participate in the care of your patient.      Sincerely,    William Mcmillan MD

## 2024-04-29 NOTE — PROGRESS NOTES
Patient notes right-sided buttock discomfort that is worse with positioning her self in bed, and worse with the first movements out of a chair.  She is used Aleve for it consistently over the last 4 weeks but her doctors cautioned her that she needs to avoid Aleve because of renal insufficiency.  Tylenol has not been helpful.  She denies radicular discomfort in the right leg.  No numbness or tingling.    She is aware of the history of lumbar scoliosis from visits with an orthopedist 15 years ago.  There was a time where she had multiple facet injections in the lumbar spine that were not helpful, again about 15 years ago.      Patient was seen 1/15/2024 with right-sided hip discomfort, suspected gluteus medius tendinitis, which has since resolved with physical therapy.    X-ray of the right hip and pelvis 12/27/2023 consistent with mild right SI joint DJD    CT scan of the chest abdomen and pelvis 1/12/2024 consistent with prominent thoracolumbar rotatory scoliosis with severe multilevel degenerative disc and joint changes involving the lumbar spine.    X-ray right hip 12/27/2023 consistent with moderate right hip DJD, slightly increased compared to x-ray of the right hip in 2020.  Previous left-sided hip replacement, [facility] 2005.     Retired pediatrician.      History of hypertension, seizure disorder, renal artery aneurysm, prediabetes.     Medicines include Tegretol, diclofenac gel, metoprolol, theophylline, Maxide          PMH:  Past Medical History:   Diagnosis Date    Abdominal aortic aneurysm without rupture (H24) 11/10/2015    [  ] repeat imaging due spring 2016 Descending aortic aneurysm.  Being monitored with serial angiogram     Aneurysm of right renal artery (H24) 04/09/2019    Arthritis 1998    COPD (chronic obstructive pulmonary disease) (H) 11/10/2015    Not sure of this diagnosis.  May be a consequence of asthma.    Esophageal hypertension 01/25/2017    Essential hypertension 01/25/2017  "   Female bladder prolapse 11/10/2015    Hearing loss     Heart disease 2005    Pulmonary nodules 2017    Seizures (H) 11/10/2015    Temporal seziure d/o.  Does not have LOC.  Has prodromal symptoms     Severe persistent asthma (H28) 11/10/2015    Since childhood.  Has been steroid dependent for many years. Has had cydney x2         Active problem list:  Patient Active Problem List   Diagnosis    Seizures (H)    Thoracic aortic aneurysm without rupture (H24)    Moderate persistent asthma without complication    Prediabetes    Pulmonary nodules    Essential hypertension    Osteopenia of multiple sites    Aneurysm of right renal artery (H24)    Severe persistent asthma without complication (H28)    Abnormal CT scan of head    Left shoulder pain, unspecified chronicity       FH:  Family History   Problem Relation Age of Onset    Dementia Mother     Heart Failure Mother     Hypertension Mother     Breast Cancer Mother         post menopausal    Diabetes Mother     Cancer Father         prostate cancer    Hypertension Father     Prostate Cancer Father          of it at 82    Asthma Father         \"outgrew\" it    Schizophrenia Maternal Grandmother     Coronary Artery Disease Maternal Grandmother          of aortic aneurysm at 89    Mental Illness Maternal Grandmother     Coronary Artery Disease Maternal Grandfather         he was diabetic and smoked    Asthma Sister         x2    Depression Sister     Coronary Artery Disease Sister         MI when being ventilated for asthma    Asthma Sister         both sisters with asthma, one severe    Depression Sister     Other - See Comments Daughter         Enlarged Aorta    Melanoma Daughter     Breast Cancer Maternal Aunt     Coronary Artery Disease Daughter         Mildly dilated thoracic aorta    Other Cancer Daughter         Melanoma    Hypertension Son     Diabetes Son         Grandson. Type 1    Coronary Artery Disease Cousin         Dilated aorta "       SH:  Social History     Socioeconomic History    Marital status:      Spouse name: Not on file    Number of children: Not on file    Years of education: 20    Highest education level: Not on file   Occupational History    Occupation: pediatrician   Tobacco Use    Smoking status: Never    Smokeless tobacco: Never   Substance and Sexual Activity    Alcohol use: Not Currently     Comment: less than a drink a day    Drug use: Never    Sexual activity: Yes     Partners: Male     Birth control/protection: Post-menopausal, Male Surgical   Other Topics Concern    Parent/sibling w/ CABG, MI or angioplasty before 65F 55M? Yes   Social History Narrative    Retired Pediatrician, moved to [facility] from [place], [place].     Social Determinants of Health     Financial Resource Strain: Low Risk  (4/5/2024)    Financial Resource Strain     Within the past 12 months, have you or your family members you live with been unable to get utilities (heat, electricity) when it was really needed?: No   Food Insecurity: Low Risk  (4/5/2024)    Food Insecurity     Within the past 12 months, did you worry that your food would run out before you got money to buy more?: No     Within the past 12 months, did the food you bought just not last and you didn t have money to get more?: No   Transportation Needs: Low Risk  (4/5/2024)    Transportation Needs     Within the past 12 months, has lack of transportation kept you from medical appointments, getting your medicines, non-medical meetings or appointments, work, or from getting things that you need?: No   Physical Activity: Sufficiently Active (4/5/2024)    Exercise Vital Sign     Days of Exercise per Week: 5 days     Minutes of Exercise per Session: 50 min   Stress: No Stress Concern Present (4/5/2024)    [employer] - Occupational Stress Questionnaire     Feeling of Stress : Only a little   Social Connections: Unknown (4/5/2024)    Social Connection and  Isolation Panel [NHANES]     Frequency of Communication with Friends and Family: Not on file     Frequency of Social Gatherings with Friends and Family: More than three times a week     Attends [employer] Services: Not on file     Active Member of Clubs or Organizations: Not on file     Attends Club or Organization Meetings: Not on file     Marital Status: Not on file   Interpersonal Safety: Low Risk  (4/12/2024)    Interpersonal Safety     Do you feel physically and emotionally safe where you currently live?: Yes     Within the past 12 months, have you been hit, slapped, kicked or otherwise physically hurt by someone?: No     Within the past 12 months, have you been humiliated or emotionally abused in other ways by your partner or ex-partner?: No   Housing Stability: Low Risk  (4/5/2024)    Housing Stability     Do you have housing? : Yes     Are you worried about losing your housing?: No       MEDS:  See EMR, reviewed  ALL:  See EMR, reviewed    REVIEW OF SYSTEMS:  CONSTITUTIONAL:NEGATIVE for fever, chills, change in weight  INTEGUMENTARY/SKIN: NEGATIVE for worrisome rashes, moles or lesions  EYES: NEGATIVE for vision changes or irritation  ENT/MOUTH: NEGATIVE for ear, mouth and throat problems  RESP:NEGATIVE for significant cough or SOB  BREAST: NEGATIVE for masses, tenderness or discharge  CV: NEGATIVE for chest pain, palpitations or peripheral edema  GI: NEGATIVE for nausea, abdominal pain, heartburn, or change in bowel habits  :NEGATIVE for frequency, dysuria, or hematuria  :NEGATIVE for frequency, dysuria, or hematuria  NEURO: NEGATIVE for weakness, dizziness or paresthesias  ENDOCRINE: NEGATIVE for temperature intolerance, skin/hair changes  HEME/ALLERGY/IMMUNE: NEGATIVE for bleeding problems  PSYCHIATRIC: NEGATIVE for changes in mood or affect        Objective: She is nontender over the lumbar spine.  She is tender over the right SI joint.  Nontender of the left SI joint.  ELVIN test is negative.   Straight leg raise is negative.  5 out of 5 strength bilaterally at hip, knee, ankle and foot.  Sensation is normal distally.  Appropriate conversation and affect.    We reviewed the previous x-rays of her SI joints that suggested mild SI joint DJD and a CT scan of her abdomen that showed significant thoracolumbar scoliosis with multilevel degenerative changes.    Assessment: right SI joint discomfort with right SI joint arthritis on x-ray.  Moderate right hip DJD.  History of left hip replacement.  Thoracolumbar scoliosis with multilevel degenerative disc and joint disease.    Plan: She would like to try an empiric SI joint injection, as she feels that the joint pain is localized to the upper buttock on the right, and consistent with the localization of the SI joint.  The nature of the procedure was explained and will be done in radiology.  Phone call follow-up after the injection to see if it was helpful.  If it was helpful she would like to continue with SI joint physical therapy and would  need an additional PT referral.  We discussed additional options such as pool therapy for her low back.

## 2024-05-02 ENCOUNTER — HOSPITAL ENCOUNTER (OUTPATIENT)
Dept: GENERAL RADIOLOGY | Facility: CLINIC | Age: 79
Discharge: HOME | End: 2024-05-02
Attending: FAMILY MEDICINE | Admitting: FAMILY MEDICINE
Payer: MEDICARE

## 2024-05-02 ENCOUNTER — HOSPITAL ENCOUNTER (OUTPATIENT)
Dept: CT IMAGING | Facility: CLINIC | Age: 79
Discharge: HOME | End: 2024-05-02
Attending: FAMILY MEDICINE | Admitting: FAMILY MEDICINE
Payer: MEDICARE

## 2024-05-02 ENCOUNTER — HOSPITAL ENCOUNTER (OUTPATIENT)
Facility: CLINIC | Age: 79
Discharge: HOME | End: 2024-05-02
Attending: FAMILY MEDICINE | Admitting: FAMILY MEDICINE
Payer: MEDICARE

## 2024-05-02 DIAGNOSIS — M46.1 SACROILIITIS, NOT ELSEWHERE CLASSIFIED (H): ICD-10-CM

## 2024-05-02 PROCEDURE — 27096 INJECT SACROILIAC JOINT: CPT | Mod: RT | Performed by: RADIOLOGY

## 2024-05-02 PROCEDURE — 272N000145 CT SACROILIAC THERAPEUTIC JOINT INJECTION

## 2024-05-02 PROCEDURE — 250N000009 HC RX 250: Performed by: FAMILY MEDICINE

## 2024-05-02 PROCEDURE — 250N000011 HC RX IP 250 OP 636: Performed by: FAMILY MEDICINE

## 2024-05-02 RX ORDER — BUPIVACAINE HYDROCHLORIDE 2.5 MG/ML
10 INJECTION, SOLUTION EPIDURAL; INFILTRATION; INTRACAUDAL ONCE
Status: COMPLETED | OUTPATIENT
Start: 2024-05-02 | End: 2024-05-02

## 2024-05-02 RX ORDER — TRIAMCINOLONE ACETONIDE 40 MG/ML
40 INJECTION, SUSPENSION INTRA-ARTICULAR; INTRAMUSCULAR ONCE
Status: COMPLETED | OUTPATIENT
Start: 2024-05-02 | End: 2024-05-02

## 2024-05-02 RX ORDER — LIDOCAINE HYDROCHLORIDE 10 MG/ML
5 INJECTION, SOLUTION EPIDURAL; INFILTRATION; INTRACAUDAL; PERINEURAL ONCE
Status: COMPLETED | OUTPATIENT
Start: 2024-05-02 | End: 2024-05-02

## 2024-05-02 RX ADMIN — LIDOCAINE HYDROCHLORIDE 5 ML: 10 INJECTION, SOLUTION EPIDURAL; INFILTRATION; INTRACAUDAL; PERINEURAL at 08:33

## 2024-05-02 RX ADMIN — BUPIVACAINE HYDROCHLORIDE 1 ML: 2.5 INJECTION, SOLUTION EPIDURAL; INFILTRATION; INTRACAUDAL; PERINEURAL at 08:32

## 2024-05-02 RX ADMIN — TRIAMCINOLONE ACETONIDE 40 MG: 40 INJECTION, SUSPENSION INTRA-ARTICULAR; INTRAMUSCULAR at 08:33

## 2024-05-02 ASSESSMENT — ACTIVITIES OF DAILY LIVING (ADL)
ADLS_ACUITY_SCORE: 35

## 2024-05-13 ENCOUNTER — THERAPY VISIT (OUTPATIENT)
Dept: PHYSICAL THERAPY | Facility: CLINIC | Age: 79
End: 2024-05-13
Payer: MEDICARE

## 2024-05-13 DIAGNOSIS — M25.551 HIP PAIN, RIGHT: Primary | ICD-10-CM

## 2024-05-13 PROCEDURE — 97110 THERAPEUTIC EXERCISES: CPT | Mod: GP | Performed by: PHYSICAL THERAPIST

## 2024-05-13 NOTE — PROGRESS NOTES
[facility]                                                                                   OUTPATIENT PHYSICAL THERAPY    PLAN OF TREATMENT FOR OUTPATIENT REHABILITATION   Patient's Last Name, First Name, Niurka Sloan YOB: 1945   Provider's Name   [facility]   Medical Record No.  [id number]     Onset Date: 12/27/23  Start of Care Date: 01/10/24     Medical Diagnosis:  hip pain, right      PT Treatment Diagnosis:  right hip pain Plan of Treatment  Frequency/Duration: 2x/month/ 12 weeks    Certification date from 04/04/24 to 07/01/24         See note for plan of treatment details and functional goals     Sabino Casitllo PT                         I CERTIFY THE NEED FOR THESE SERVICES FURNISHED UNDER        THIS PLAN OF TREATMENT AND WHILE UNDER MY CARE     (Physician attestation of this document indicates review and certification of the therapy plan).              Referring Provider:  Ingrid Mansfield    Initial Assessment  See Epic Evaluation- Start of Care Date: 01/10/24            PLAN  Continue therapy per current plan of care.    Beginning/End Dates of Progress Note Reporting Period:  02/02/24 to 05/13/2024    Referring Provider:  Ingrid Schroeder B*       05/13/24 0500   Appointment Info   Signing clinician's name / credentials Sabino Castillo DPT   Total/Authorized Visits E+T   Visits Used 4   Medical Diagnosis hip pain, right   PT Tx Diagnosis right hip pain   Quick Adds Certification   Progress Note/Certification   Start of Care Date 01/10/24   Onset of illness/injury or Date of Surgery 12/27/23   Therapy Frequency 2x/month   Predicted Duration 12 weeks   Certification date from 04/04/24   Certification date to 07/01/24   Progress Note Completed Date 02/02/24   PT Goal 1   Goal Identifier Stairs   Goal Description Patient will ascend/descend stairs reciprocally with use of railing and 1/10 pain    Rationale to maximize safety and independence with performance of ADLs and functional tasks;to maximize safety and independence within the community;to maximize safety and independence with self cares   Target Date 07/01/24   Subjective Report   Subjective Report Patient reports getting injection a couple of weeks ago which helped reduce hip and SI joint pain. Patient wanting to learn strengthening exercises which can also help with pain symptoms. Most pain lately is located in right low back and SI joint. Also can have pain in right lateral hip and thigh. Pain worse in the morning. Going up stairs is still difficult and tends to put more weight on left LE when standing.   Objective Measures   Objective Measures Objective Measure 1;Objective Measure 2   Objective Measure 1   Details gait: right knee valgus, short step and stride length, trendelenburg on right; lumbar AROM FL = min loss, EXT = mod loss; right hip AROM WNL - pain at end range IR and ER; weak proximal hip musculature   Treatment Interventions (PT)   Interventions Therapeutic Procedure/Exercise;Manual Therapy   Therapeutic Procedure/Exercise   Therapeutic Procedures: strength, endurance, ROM, flexibility minutes (17067) 28   Ther Proc 1 reviewed current HEP   Ther Proc 1 - Details patient will continue as tolerated   PTRx Ther Proc 1 Bridges with Theraband   PTRx Ther Proc 1 - Details 10x blue (can use green band at home)   PTRx Ther Proc 2 Clamshell with Theraband   PTRx Ther Proc 2 - Details 10xB blue   PTRx Ther Proc 3 Sidelying Hip Abduction at Wall in Neutral and 45 degrees   PTRx Ther Proc 3 - Details reviewed   Skilled Intervention Assessment of strength and functional deficits to determine exercise prescription; tactile and verbal cuing to assist with proper performance; education in loading principles and expected response   Therapeutic Procedures Ther Proc 2   Manual Therapy   Manual Therapy 1 MET posterior innominate   Manual Therapy 1 -  Details 5x5 seconds   Skilled Intervention Identified precautions and contraindications to manual treatment; assessed mobility and pain; adjusted technique based on patient response.   Education   Learner/Method Patient;No Barriers to Learning   Total Session Time   Timed Code Treatment Minutes 28   Total Treatment Time (sum of timed and untimed services) 28

## 2024-05-16 ENCOUNTER — VIRTUAL VISIT (OUTPATIENT)
Dept: ORTHOPEDICS | Facility: CLINIC | Age: 79
End: 2024-05-16
Payer: MEDICARE

## 2024-05-16 DIAGNOSIS — M46.1 SACROILIITIS, NOT ELSEWHERE CLASSIFIED (H): Primary | ICD-10-CM

## 2024-05-16 DIAGNOSIS — M16.11 PRIMARY OSTEOARTHRITIS OF RIGHT HIP: ICD-10-CM

## 2024-05-16 PROCEDURE — 99441 PR PHYSICIAN TELEPHONE EVALUATION 5-10 MIN: CPT | Mod: 95 | Performed by: FAMILY MEDICINE

## 2024-05-16 NOTE — LETTER
5/16/2024       RE: Niurka Cisneros  [address]  [place] [address]     Dear Colleague,    Thank you for referring your patient, Niurka Cisneros, to the [facility] [place] at [facility]. Please see a copy of my visit note below.    May 16, 2024: Niurka iCsneros is a 78 year old female who is seen in f/u up for SI injection follow up.    Phone Follow-up right-sided x-ray guided SI joint injection 5/2/2024 in radiology.        Phone start: 9:50  Phone stop: 10:00 am      S: Patient indicates that the SI joint injection was helpful.  She noted improvement in discomfort with the anesthetic and she continues to note pain relief from the injection.  She is seeing physical therapy and plans on continuing for multiple visits.  We discussed the option of an intra-articular hip injection under ultrasound in the future for her moderate hip DJD if necessary.  But at this time it seems that the SI joint injection was helpful.  She will follow-up as needed.      O:  GENERAL: healthy, alert, without distress  RESP: No audible wheeze, cough.  No increased work of breathing.    NEURO:  Mentation and speech appropriate for age.  PSYCH: mentation appears normal, concentration appears appropriate, judgement and insight intact.  MSK:        Patient notes right-sided buttock discomfort that is worse with positioning her self in bed, and worse with the first movements out of a chair.  She is used Aleve for it consistently over the last 4 weeks but her doctors cautioned her that she needs to avoid Aleve because of renal insufficiency.  Tylenol has not been helpful.  She denies radicular discomfort in the right leg.  No numbness or tingling.     She is aware of the history of lumbar scoliosis from visits with an orthopedist 15 years ago.  There was a time where she had multiple facet injections in the lumbar spine that were not helpful, again  about 15 years ago.        Patient was seen 1/15/2024 with right-sided hip discomfort, suspected gluteus medius tendinitis, which has since resolved with physical therapy.      X-ray of the right hip and pelvis 12/27/2023 consistent with mild right SI joint DJD     CT scan of the chest abdomen and pelvis 1/12/2024 consistent with prominent thoracolumbar rotatory scoliosis with severe multilevel degenerative disc and joint changes involving the lumbar spine.     X-ray right hip 12/27/2023 consistent with moderate right hip DJD, slightly increased compared to x-ray of the right hip in 2020.  Previous left-sided hip replacement, [facility] 2005.     Retired pediatrician.      History of hypertension, seizure disorder, renal artery aneurysm, prediabetes.     Medicines include Tegretol, diclofenac gel, metoprolol, theophylline, Maxide        PMH:  Past Medical History:   Diagnosis Date    Abdominal aortic aneurysm without rupture (H24) 11/10/2015    [  ] repeat imaging due spring 2016 Descending aortic aneurysm.  Being monitored with serial angiogram     Aneurysm of right renal artery (H24) 04/09/2019    Arthritis 1998    COPD (chronic obstructive pulmonary disease) (H) 11/10/2015    Not sure of this diagnosis.  May be a consequence of asthma.    Esophageal hypertension 01/25/2017    Essential hypertension 01/25/2017    Female bladder prolapse 11/10/2015    Hearing loss     Heart disease 2005    Pulmonary nodules 01/25/2017    Seizures (H) 11/10/2015    Temporal seziure d/o.  Does not have LOC.  Has prodromal symptoms     Severe persistent asthma (H28) 11/10/2015    Since childhood.  Has been steroid dependent for many years. Has had cydney x2         Active problem list:  Patient Active Problem List   Diagnosis    Seizures (H)    Thoracic aortic aneurysm without rupture (H24)    Moderate persistent asthma without complication    Prediabetes    Pulmonary nodules    Essential hypertension    Osteopenia of  "multiple sites    Aneurysm of right renal artery (H24)    Severe persistent asthma without complication (H28)    Abnormal CT scan of head    Left shoulder pain, unspecified chronicity    Hip pain, right       FH:  Family History   Problem Relation Age of Onset    Dementia Mother     Heart Failure Mother     Hypertension Mother     Breast Cancer Mother         post menopausal    Diabetes Mother     Cancer Father         prostate cancer    Hypertension Father     Prostate Cancer Father          of it at 82    Asthma Father         \"outgrew\" it    Schizophrenia Maternal Grandmother     Coronary Artery Disease Maternal Grandmother          of aortic aneurysm at 89    Mental Illness Maternal Grandmother     Coronary Artery Disease Maternal Grandfather         he was diabetic and smoked    Asthma Sister         x2    Depression Sister     Coronary Artery Disease Sister         MI when being ventilated for asthma    Asthma Sister         both sisters with asthma, one severe    Depression Sister     Other - See Comments Daughter         Enlarged Aorta    Melanoma Daughter     Breast Cancer Maternal Aunt     Coronary Artery Disease Daughter         Mildly dilated thoracic aorta    Other Cancer Daughter         Melanoma    Hypertension Son     Diabetes Son         Grandson. Type 1    Coronary Artery Disease Cousin         Dilated aorta       SH:  Social History     Socioeconomic History    Marital status:      Spouse name: Not on file    Number of children: Not on file    Years of education: 20    Highest education level: Not on file   Occupational History    Occupation: pediatrician   Tobacco Use    Smoking status: Never    Smokeless tobacco: Never   Substance and Sexual Activity    Alcohol use: Not Currently     Comment: less than a drink a day    Drug use: Never    Sexual activity: Yes     Partners: Male     Birth control/protection: Post-menopausal, Male Surgical   Other Topics Concern    Parent/sibling w/ " CABG, MI or angioplasty before 65F 55M? 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     Social Determinants of Health     Financial Resource Strain: Low Risk  (4/5/2024)    Financial Resource Strain     Within the past 12 months, have you or your family members you live with been unable to get utilities (heat, electricity) when it was really needed?: No   Food Insecurity: Low Risk  (4/5/2024)    Food Insecurity     Within the past 12 months, did you worry that your food would run out before you got money to buy more?: No     Within the past 12 months, did the food you bought just not last and you didn t have money to get more?: No   Transportation Needs: Low Risk  (4/5/2024)    Transportation Needs     Within the past 12 months, has lack of transportation kept you from medical appointments, getting your medicines, non-medical meetings or appointments, work, or from getting things that you need?: No   Physical Activity: Sufficiently Active (4/5/2024)    Exercise Vital Sign     Days of Exercise per Week: 5 days     Minutes of Exercise per Session: 50 min   Stress: No Stress Concern Present (4/5/2024)    [employer] - Occupational Stress Questionnaire     Feeling of Stress : Only a little   Social Connections: Unknown (4/5/2024)    Social Connection and Isolation Panel [NHANES]     Frequency of Communication with Friends and Family: Not on file     Frequency of Social Gatherings with Friends and Family: More than three times a week     Attends [employer] Services: Not on file     Active Member of Clubs or Organizations: Not on file     Attends Club or Organization Meetings: Not on file     Marital Status: Not on file   Interpersonal Safety: Low Risk  (4/12/2024)    Interpersonal Safety     Do you feel physically and emotionally safe where you currently live?: Yes     Within the past 12 months, have you been hit, slapped, kicked or otherwise  physically hurt by someone?: No     Within the past 12 months, have you been humiliated or emotionally abused in other ways by your partner or ex-partner?: No   Housing Stability: Low Risk  (4/5/2024)    Housing Stability     Do you have housing? : Yes     Are you worried about losing your housing?: No       MEDS:  See EMR, reviewed  ALL:  See EMR, reviewed    REVIEW OF SYSTEMS:  CONSTITUTIONAL:NEGATIVE for fever, chills, change in weight  INTEGUMENTARY/SKIN: NEGATIVE for worrisome rashes, moles or lesions  EYES: NEGATIVE for vision changes or irritation  ENT/MOUTH: NEGATIVE for ear, mouth and throat problems  RESP:NEGATIVE for significant cough or SOB  BREAST: NEGATIVE for masses, tenderness or discharge  CV: NEGATIVE for chest pain, palpitations or peripheral edema  GI: NEGATIVE for nausea, abdominal pain, heartburn, or change in bowel habits  :NEGATIVE for frequency, dysuria, or hematuria  :NEGATIVE for frequency, dysuria, or hematuria  NEURO: NEGATIVE for weakness, dizziness or paresthesias  ENDOCRINE: NEGATIVE for temperature intolerance, skin/hair changes  HEME/ALLERGY/IMMUNE: NEGATIVE for bleeding problems  PSYCHIATRIC: NEGATIVE for changes in mood or affect                        Again, thank you for allowing me to participate in the care of your patient.      Sincerely,    William Mcmillan MD

## 2024-05-16 NOTE — PROGRESS NOTES
May 16, 2024: Niurka Cisneros is a 78 year old female who is seen in f/u up for SI injection follow up.    Phone Follow-up right-sided x-ray guided SI joint injection 5/2/2024 in radiology.        Phone start: 9:50  Phone stop: 10:00 am      S: Patient indicates that the SI joint injection was helpful.  She noted improvement in discomfort with the anesthetic and she continues to note pain relief from the injection.  She is seeing physical therapy and plans on continuing for multiple visits.  We discussed the option of an intra-articular hip injection under ultrasound in the future for her moderate hip DJD if necessary.  But at this time it seems that the SI joint injection was helpful.  She will follow-up as needed.      O:  GENERAL: healthy, alert, without distress  RESP: No audible wheeze, cough.  No increased work of breathing.    NEURO:  Mentation and speech appropriate for age.  PSYCH: mentation appears normal, concentration appears appropriate, judgement and insight intact.  MSK:        Patient notes right-sided buttock discomfort that is worse with positioning her self in bed, and worse with the first movements out of a chair.  She is used Aleve for it consistently over the last 4 weeks but her doctors cautioned her that she needs to avoid Aleve because of renal insufficiency.  Tylenol has not been helpful.  She denies radicular discomfort in the right leg.  No numbness or tingling.     She is aware of the history of lumbar scoliosis from visits with an orthopedist 15 years ago.  There was a time where she had multiple facet injections in the lumbar spine that were not helpful, again about 15 years ago.        Patient was seen 1/15/2024 with right-sided hip discomfort, suspected gluteus medius tendinitis, which has since resolved with physical therapy.      X-ray of the right hip and pelvis 12/27/2023 consistent with mild right SI joint DJD     CT scan of the chest abdomen and pelvis 1/12/2024 consistent  with prominent thoracolumbar rotatory scoliosis with severe multilevel degenerative disc and joint changes involving the lumbar spine.     X-ray right hip 12/27/2023 consistent with moderate right hip DJD, slightly increased compared to x-ray of the right hip in 2020.  Previous left-sided hip replacement, [facility] 2005.     Retired pediatrician.      History of hypertension, seizure disorder, renal artery aneurysm, prediabetes.     Medicines include Tegretol, diclofenac gel, metoprolol, theophylline, Maxide        PMH:  Past Medical History:   Diagnosis Date    Abdominal aortic aneurysm without rupture (H24) 11/10/2015    [  ] repeat imaging due spring 2016 Descending aortic aneurysm.  Being monitored with serial angiogram     Aneurysm of right renal artery (H24) 04/09/2019    Arthritis 1998    COPD (chronic obstructive pulmonary disease) (H) 11/10/2015    Not sure of this diagnosis.  May be a consequence of asthma.    Esophageal hypertension 01/25/2017    Essential hypertension 01/25/2017    Female bladder prolapse 11/10/2015    Hearing loss     Heart disease 2005    Pulmonary nodules 01/25/2017    Seizures (H) 11/10/2015    Temporal seziure d/o.  Does not have LOC.  Has prodromal symptoms     Severe persistent asthma (H28) 11/10/2015    Since childhood.  Has been steroid dependent for many years. Has had cydney x2         Active problem list:  Patient Active Problem List   Diagnosis    Seizures (H)    Thoracic aortic aneurysm without rupture (H24)    Moderate persistent asthma without complication    Prediabetes    Pulmonary nodules    Essential hypertension    Osteopenia of multiple sites    Aneurysm of right renal artery (H24)    Severe persistent asthma without complication (H28)    Abnormal CT scan of head    Left shoulder pain, unspecified chronicity    Hip pain, right       FH:  Family History   Problem Relation Age of Onset    Dementia Mother     Heart Failure Mother     Hypertension Mother      "Breast Cancer Mother         post menopausal    Diabetes Mother     Cancer Father         prostate cancer    Hypertension Father     Prostate Cancer Father          of it at 82    Asthma Father         \"outgrew\" it    Schizophrenia Maternal Grandmother     Coronary Artery Disease Maternal Grandmother          of aortic aneurysm at 89    Mental Illness Maternal Grandmother     Coronary Artery Disease Maternal Grandfather         he was diabetic and smoked    Asthma Sister         x2    Depression Sister     Coronary Artery Disease Sister         MI when being ventilated for asthma    Asthma Sister         both sisters with asthma, one severe    Depression Sister     Other - See Comments Daughter         Enlarged Aorta    Melanoma Daughter     Breast Cancer Maternal Aunt     Coronary Artery Disease Daughter         Mildly dilated thoracic aorta    Other Cancer Daughter         Melanoma    Hypertension Son     Diabetes Son         Grandson. Type 1    Coronary Artery Disease Cousin         Dilated aorta       SH:  Social History     Socioeconomic History    Marital status:      Spouse name: Not on file    Number of children: Not on file    Years of education: 20    Highest education level: Not on file   Occupational History    Occupation: pediatrician   Tobacco Use    Smoking status: Never    Smokeless tobacco: Never   Substance and Sexual Activity    Alcohol use: Not Currently     Comment: less than a drink a day    Drug use: Never    Sexual activity: Yes     Partners: Male     Birth control/protection: Post-menopausal, Male Surgical   Other Topics Concern    Parent/sibling w/ CABG, MI or angioplasty before 65F 55M? Yes   Social History Narrative    Retired Pediatrician, moved to [facility] from [place], [place].     Social Determinants of Health     Financial Resource Strain: Low Risk  (2024)    Financial Resource Strain     Within the past 12 months, have you or your family members you live with been " unable to get utilities (heat, electricity) when it was really needed?: No   Food Insecurity: Low Risk  (4/5/2024)    Food Insecurity     Within the past 12 months, did you worry that your food would run out before you got money to buy more?: No     Within the past 12 months, did the food you bought just not last and you didn t have money to get more?: No   Transportation Needs: Low Risk  (4/5/2024)    Transportation Needs     Within the past 12 months, has lack of transportation kept you from medical appointments, getting your medicines, non-medical meetings or appointments, work, or from getting things that you need?: No   Physical Activity: Sufficiently Active (4/5/2024)    Exercise Vital Sign     Days of Exercise per Week: 5 days     Minutes of Exercise per Session: 50 min   Stress: No Stress Concern Present (4/5/2024)    [employer] [place] [employer] - Occupational Stress Questionnaire     Feeling of Stress : Only a little   Social Connections: Unknown (4/5/2024)    Social Connection and Isolation Panel [NHANES]     Frequency of Communication with Friends and Family: Not on file     Frequency of Social Gatherings with Friends and Family: More than three times a week     Attends Buddhist Services: Not on file     Active Member of Clubs or Organizations: Not on file     Attends Club or Organization Meetings: Not on file     Marital Status: Not on file   Interpersonal Safety: Low Risk  (4/12/2024)    Interpersonal Safety     Do you feel physically and emotionally safe where you currently live?: Yes     Within the past 12 months, have you been hit, slapped, kicked or otherwise physically hurt by someone?: No     Within the past 12 months, have you been humiliated or emotionally abused in other ways by your partner or ex-partner?: No   Housing Stability: Low Risk  (4/5/2024)    Housing Stability     Do you have housing? : Yes     Are you worried about losing your housing?: No       MEDS:  See EMR,  reviewed  ALL:  See EMR, reviewed    REVIEW OF SYSTEMS:  CONSTITUTIONAL:NEGATIVE for fever, chills, change in weight  INTEGUMENTARY/SKIN: NEGATIVE for worrisome rashes, moles or lesions  EYES: NEGATIVE for vision changes or irritation  ENT/MOUTH: NEGATIVE for ear, mouth and throat problems  RESP:NEGATIVE for significant cough or SOB  BREAST: NEGATIVE for masses, tenderness or discharge  CV: NEGATIVE for chest pain, palpitations or peripheral edema  GI: NEGATIVE for nausea, abdominal pain, heartburn, or change in bowel habits  :NEGATIVE for frequency, dysuria, or hematuria  :NEGATIVE for frequency, dysuria, or hematuria  NEURO: NEGATIVE for weakness, dizziness or paresthesias  ENDOCRINE: NEGATIVE for temperature intolerance, skin/hair changes  HEME/ALLERGY/IMMUNE: NEGATIVE for bleeding problems  PSYCHIATRIC: NEGATIVE for changes in mood or affect

## 2024-05-21 LAB — LAB SCANNED RESULT: NORMAL

## 2024-05-31 ENCOUNTER — INFUSION THERAPY VISIT (OUTPATIENT)
Dept: INFUSION THERAPY | Facility: CLINIC | Age: 79
End: 2024-05-31
Payer: MEDICARE

## 2024-05-31 VITALS
SYSTOLIC BLOOD PRESSURE: 113 MMHG | DIASTOLIC BLOOD PRESSURE: 70 MMHG | HEART RATE: 76 BPM | TEMPERATURE: 97.5 F | OXYGEN SATURATION: 97 %

## 2024-05-31 DIAGNOSIS — J45.50 SEVERE PERSISTENT ASTHMA WITHOUT COMPLICATION (H): Primary | ICD-10-CM

## 2024-05-31 PROCEDURE — 250N000011 HC RX IP 250 OP 636: Mod: JZ

## 2024-05-31 PROCEDURE — 96372 THER/PROPH/DIAG INJ SC/IM: CPT

## 2024-05-31 RX ADMIN — BENRALIZUMAB 30 MG: 30 INJECTION, SOLUTION SUBCUTANEOUS at 09:46

## 2024-05-31 ASSESSMENT — PAIN SCALES - GENERAL: PAINLEVEL: NO PAIN (0)

## 2024-05-31 NOTE — PROGRESS NOTES
Infusion: benralizumab (Fasenra) ~~~ NOTE: If the patient answers yes to any of the questions below, hold the infusion and contact ordering provider or on-call provider.    Have you recently had an elevated temperature, fever, chills, productive cough, coughing for 3 weeks or longer or hemoptysis, abnormal vital signs, night sweats,  chest pain or have you noticed a decrease in your appetite, unexplained weight loss or fatigue? No  Do you currently have any signs of illness or infection or are you on any antibiotics? No  Have you or anyone in your household received a live vaccination in the past 4 weeks? Please note:  No live vaccines while on biologic/chemotherapy until 6 months after the last treatment.  Patient can receive the flu vaccine (shot only). It is optimal for the patient to get these vaccines mid cycle, but they can be given at any time as long as it is not on the day of the infusion. No  Have you recently been diagnosed with any new nervous system diseases (ie. Multiple sclerosis, Guillain [place], seizures, neurological changes) or cancer diagnosis? No  Are you pregnant or breast feeding or do you have plans of pregnancy in the future? NA  Have there been any other new onset medical symptoms? No    Margo Sweet RN

## 2024-05-31 NOTE — PROGRESS NOTES
Patient presents to the [facility] for Fasenra injection.  Order written by Dr. Vega was completed today. Name and  verified with patient. See MAR for medication details. Medication was divided into 1 syringe by pharmacy and given in the following site back side of left upper arm. Patient tolerated injection well and was monitored for 30 minutes after administration. Patient was discharged to home. Patient to return back in 8 weeks.    HUONG Cary LPN    Administrations This Visit       benralizumab (FASENRA) injection 30 mg       Admin Date  2024 Action  $Given Dose  30 mg Route  Subcutaneous Documented By  Javed Cary LPN

## 2024-06-03 ENCOUNTER — THERAPY VISIT (OUTPATIENT)
Dept: PHYSICAL THERAPY | Facility: CLINIC | Age: 79
End: 2024-06-03
Payer: MEDICARE

## 2024-06-03 DIAGNOSIS — M25.551 HIP PAIN, RIGHT: Primary | ICD-10-CM

## 2024-06-03 PROCEDURE — 97110 THERAPEUTIC EXERCISES: CPT | Mod: GP | Performed by: PHYSICAL THERAPIST

## 2024-06-26 DIAGNOSIS — I10 BENIGN ESSENTIAL HYPERTENSION: ICD-10-CM

## 2024-06-27 RX ORDER — METOPROLOL SUCCINATE 25 MG/1
TABLET, EXTENDED RELEASE ORAL
Qty: 90 TABLET | Refills: 3 | Status: SHIPPED | OUTPATIENT
Start: 2024-06-27 | End: 2024-07-07

## 2024-06-27 NOTE — TELEPHONE ENCOUNTER
[facility] Cardiology Refill Guideline reviewed.  Medication meets criteria for refill.    Soumya Osorio RN

## 2024-07-07 ENCOUNTER — MYC REFILL (OUTPATIENT)
Dept: CARDIOLOGY | Facility: CLINIC | Age: 79
End: 2024-07-07
Payer: MEDICARE

## 2024-07-07 DIAGNOSIS — I10 BENIGN ESSENTIAL HYPERTENSION: ICD-10-CM

## 2024-07-08 RX ORDER — METOPROLOL SUCCINATE 25 MG/1
TABLET, EXTENDED RELEASE ORAL
Qty: 90 TABLET | Refills: 3 | Status: SHIPPED | OUTPATIENT
Start: 2024-07-08

## 2024-07-08 RX ORDER — METOPROLOL SUCCINATE 50 MG/1
50 TABLET, EXTENDED RELEASE ORAL DAILY
Qty: 90 TABLET | Refills: 3 | Status: SHIPPED | OUTPATIENT
Start: 2024-07-08

## 2024-07-13 ENCOUNTER — HEALTH MAINTENANCE LETTER (OUTPATIENT)
Age: 79
End: 2024-07-13

## 2024-07-22 ENCOUNTER — OFFICE VISIT (OUTPATIENT)
Dept: AUDIOLOGY | Facility: CLINIC | Age: 79
End: 2024-07-22
Payer: COMMERCIAL

## 2024-07-22 ENCOUNTER — TELEPHONE (OUTPATIENT)
Facility: CLINIC | Age: 79
End: 2024-07-22

## 2024-07-22 DIAGNOSIS — H90.3 SENSORINEURAL HEARING LOSS, BILATERAL: Primary | ICD-10-CM

## 2024-07-22 PROCEDURE — 99207 PR ASSESSMENT FOR HEARING AID: CPT | Performed by: AUDIOLOGIST

## 2024-07-22 NOTE — TELEPHONE ENCOUNTER
RN spoke with patient and informed that insurance coverage is good for 1 year and not does need approval for each  appointment. Patient verbalized understanding    -SIXTO Rand

## 2024-07-22 NOTE — PROGRESS NOTES
AUDIOLOGY REPORT    SUBJECTIVE:  Niurka Cisneros is a 78 year old female who was seen in the Audiology Clinic at the [facility] for audiologic evaluation, referred by Self. They were not accompanied today by anyone. The patient has been seen previously in this clinic on 7/15/22 for assessment and results indicated normal hearing sloping to moderately severe sensorineural hearing loss for the right ear and normal hearing sloping to severe sensorineural hearing loss for the left ear. The patient has been seen previously in this clinic and was fit with bilateral Phonak Audeo P90 - 312 hearing aids on 7/15/2022. However, she was not happy with these devices and elected to return them. She was fit with binaural ReSound One 9 -in-the-ear hearing aids on 10/3/22. Today patient reports hearing may have worsened bilaterally. She reports that tinnitus is still present, but stable. Niurka denies aural fullness, ear pain, drainage, dizziness, or ear surgeries.  he patient notes difficulty with communication in a variety of listening situations.     OBJECTIVE:  Abuse Screening:  Do you feel unsafe at home or work/school? No  Do you feel threatened by someone? No  Does anyone try to keep you from having contact with others, or doing things outside of your home? No  Physical signs of abuse present? No     Fall Risk Screen:  1. Have you fallen two or more times in the past year? No  2. Have you fallen and had an injury in the past year? No    Otoscopic exam indicates right ear occluded with cerumen, left ear clear. Attempted to removed via two different lighted curettes without incident. Some cerumen was successfully removed, however there was additional cerumen in canal near tympanic membrane that could not be removed safely. Provider spoke with ENT so see if patient could be seen for ear cleaning, but unfortunately, no one was available. A hearing test was not completed  today, however hearing aid check was still completed.       Hearing aids were cleaned and checked. Wax filters were found to be plugged bilaterally. Hearing aids were deep cleaned; microphone ports were vacuumed, wax filters, and domes were changed. Following cleaning, listening check was good bilaterally and improved volume was noted. No charge for hearing aid check was patient's hearing aids are under warranty.     Based on patient report, it was decided to complete ear cleaning and hearing test. If changes are noted then patient will return for hearing aid reprogramming. Patient expressed understanding and agreement with this plan.     ASSESSMENT: Today's evaluation was not completed due to right cerumen impaction.  A hearing aid cleaning and listening check was completed today. Programming changes were not made as patient will have ear cleaning and hearing test first.     PLAN:  It is recommended that the patient follow up with ENT for ear cleaning. Following this she will return for hearing evaluation and hearing aid check.  Patient was assisted with scheduling these today. Please call this clinic with questions regarding these results or recommendations.      Aspen Burroughs CCC-A  Licensed Audiologist   [place] #[id number]

## 2024-07-22 NOTE — TELEPHONE ENCOUNTER
Health Call Center    Phone Message    May a detailed message be left on voicemail: yes     Reason for Call: Other: .     Patient states she has an upcoming infusion appt (SI FASENRA) on 07/25/2024. Patient states they have not received insurance approval for the appt. Patient states she was told it should have been done at the time when the appt was made. Patient is wanting to get a call back to further discuss if she is needing to still attend or not. Please advise.     Action Taken: Message routed to:  [facility]    Travel Screening: Not Applicable     Date of Service:

## 2024-07-22 NOTE — PROGRESS NOTES
ENT Consultation    Niurka Cisneros is a 78 year old female who is seen in consultation at the request of self.    She has been seen and treated in [place] and [place] in   the past. Last documented visit was back in 2009. She was cleared for amplification and recommended to follow up in 2 years.     History of Present Illness - Niurka Cisneros is a 78 year old female presents for evaluation of ear cleaning.  who presents to me today with hearing loss in both ears. She wears bilateral hearing aids and was seen by the audiologist yesterday. The exam was unable to be completed due to cerumen impaction. The patient denies otorrhea and otalgia.     Past Medical History -   Past Medical History:   Diagnosis Date    Abdominal aortic aneurysm without rupture (H24) 11/10/2015    [  ] repeat imaging due spring 2016 Descending aortic aneurysm.  Being monitored with serial angiogram     Aneurysm of right renal artery (H24) 04/09/2019    Arthritis 1998    COPD (chronic obstructive pulmonary disease) (H) 11/10/2015    Not sure of this diagnosis.  May be a consequence of asthma.    Esophageal hypertension 01/25/2017    Essential hypertension 01/25/2017    Female bladder prolapse 11/10/2015    Hearing loss     Heart disease 2005    Pulmonary nodules 01/25/2017    Seizures (H) 11/10/2015    Temporal seziure d/o.  Does not have LOC.  Has prodromal symptoms     Severe persistent asthma (H28) 11/10/2015    Since childhood.  Has been steroid dependent for many years. Has had cydney x2         Current Medications -   Current Outpatient Medications:     albuterol (PROAIR HFA/PROVENTIL HFA/VENTOLIN HFA) 108 (90 Base) MCG/ACT inhaler, Inhale 2 puffs into the lungs every 6 hours as needed for shortness of breath or wheezing, Disp: 54 g, Rfl: 3    benralizumab (FASENRA) 30 MG/ML SOSY injection, Inject 30 mg Subcutaneous once, Disp: , Rfl:     carBAMazepine (TEGRETOL XR) 200 MG 12 hr tablet, Take 1 tablet (200 mg) by mouth daily as  needed (seizure), Disp: 30 tablet, Rfl: 1    diclofenac (VOLTAREN) 1 % topical gel, Apply 4 g topically 4 times daily, Disp: 350 g, Rfl: 3    estradiol (ESTRING) 7.5 MCG/24HR vaginal ring, Place 1 each (1 Vaginal ring) vaginally every 3 months, Disp: 1 each, Rfl: 4    fluticasone-salmeterol (ADVAIR) 500-50 MCG/ACT inhaler, Inhale 1 puff into the lungs 2 times daily, Disp: 180 each, Rfl: 3    magnesium 250 MG tablet, Take 1 tablet by mouth daily, Disp: , Rfl:     metoprolol succinate ER (TOPROL XL) 25 MG 24 hr tablet, TAKE 1 TABLET DAILY WITH   50MG TABLET FOR TOTAL OF   75MG DAILY., Disp: 90 tablet, Rfl: 3    metoprolol succinate ER (TOPROL XL) 50 MG 24 hr tablet, Take 1 tablet (50 mg) by mouth daily With 25 mg tab for total of 75 mg Daily, Disp: 90 tablet, Rfl: 3    montelukast (SINGULAIR) 10 MG tablet, Take 1 tablet (10 mg) by mouth at bedtime, Disp: 90 tablet, Rfl: 3    NAPROXEN PO, Take 220 mg by mouth as needed , Disp: , Rfl:     potassium chloride antonietta ER (KLOR-CON M20) 20 MEQ CR tablet, Take 1 tablet (20 mEq) by mouth daily, Disp: 90 tablet, Rfl: 4    pravastatin (PRAVACHOL) 20 MG tablet, Take 1 tablet (20 mg) by mouth daily, Disp: 90 tablet, Rfl: 4    predniSONE (DELTASONE) 20 MG tablet, Take 1 tablet (20 mg) by mouth daily, Disp: 10 tablet, Rfl: 0    theophylline (DENIA-24) 400 MG 24 hr capsule, Take 1 capsule (400 mg) by mouth daily, Disp: 90 capsule, Rfl: 3    triamterene-HCTZ (MAXZIDE) 75-50 MG tablet, Take 1 tablet by mouth daily, Disp: 90 tablet, Rfl: 4    Allergies -   Allergies   Allergen Reactions    Quinolones        Social History -   Social History     Socioeconomic History    Marital status:     Years of education: 20   Occupational History    Occupation: pediatrician   Tobacco Use    Smoking status: Never    Smokeless tobacco: Never   Substance and Sexual Activity    Alcohol use: Not Currently     Comment: less than a drink a day    Drug use: Never    Sexual activity: Yes     Partners:  Male     Birth control/protection: Post-menopausal, Male Surgical   Other Topics Concern    Parent/sibling w/ CABG, MI or angioplasty before 65F 55M? 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     Social Determinants of Health     Financial Resource Strain: Low Risk  (4/5/2024)    Financial Resource Strain     Within the past 12 months, have you or your family members you live with been unable to get utilities (heat, electricity) when it was really needed?: No   Food Insecurity: Low Risk  (4/5/2024)    Food Insecurity     Within the past 12 months, did you worry that your food would run out before you got money to buy more?: No     Within the past 12 months, did the food you bought just not last and you didn t have money to get more?: No   Transportation Needs: Low Risk  (4/5/2024)    Transportation Needs     Within the past 12 months, has lack of transportation kept you from medical appointments, getting your medicines, non-medical meetings or appointments, work, or from getting things that you need?: No   Physical Activity: Sufficiently Active (4/5/2024)    Exercise Vital Sign     Days of Exercise per Week: 5 days     Minutes of Exercise per Session: 50 min   Stress: No Stress Concern Present (4/5/2024)    [employer] [place] [employer] - Occupational Stress Questionnaire     Feeling of Stress : Only a little   Social Connections: Unknown (4/5/2024)    Social Connection and Isolation Panel [NHANES]     Frequency of Social Gatherings with Friends and Family: More than three times a week   Interpersonal Safety: Low Risk  (4/12/2024)    Interpersonal Safety     Do you feel physically and emotionally safe where you currently live?: Yes     Within the past 12 months, have you been hit, slapped, kicked or otherwise physically hurt by someone?: No     Within the past 12 months, have you been humiliated or emotionally abused in other ways by your partner or  "ex-partner?: No   Housing Stability: Low Risk  (2024)    Housing Stability     Do you have housing? : Yes     Are you worried about losing your housing?: No       Family History -   Family History   Problem Relation Age of Onset    Dementia Mother     Heart Failure Mother     Hypertension Mother     Breast Cancer Mother         post menopausal    Diabetes Mother     Cancer Father         prostate cancer    Hypertension Father     Prostate Cancer Father          of it at 82    Asthma Father         \"outgrew\" it    Schizophrenia Maternal Grandmother     Coronary Artery Disease Maternal Grandmother          of aortic aneurysm at 89    Mental Illness Maternal Grandmother     Coronary Artery Disease Maternal Grandfather         he was diabetic and smoked    Asthma Sister         x2    Depression Sister     Coronary Artery Disease Sister         MI when being ventilated for asthma    Asthma Sister         both sisters with asthma, one severe    Depression Sister     Other - See Comments Daughter         Enlarged Aorta    Melanoma Daughter     Breast Cancer Maternal Aunt     Coronary Artery Disease Daughter         Mildly dilated thoracic aorta    Other Cancer Daughter         Melanoma    Hypertension Son     Diabetes Son         Grandson. Type 1    Coronary Artery Disease Cousin         Dilated aorta       Review of Systems - As per HPI and PMHx, otherwise review of system review of the head and neck negative. Otherwise 10+ review systems negative.    Physical Exam  /72   Temp 97.8  F (36.6  C) (Temporal)   Ht 1.702 m (5' 7\")   Wt 75.4 kg (166 lb 3.2 oz)   LMP  (LMP Unknown)   BMI 26.03 kg/m    BMI: Body mass index is 26.03 kg/m .    General - The patient is well nourished and well developed, and appears to have good nutritional status.  Alert and oriented to person and place, answers questions and cooperates with examination appropriately.    SKIN - No suspicious lesions or rashes.  Respiration - " No respiratory distress.  Head and Face - Normocephalic and atraumatic, with no gross asymmetry noted of the contour of the facial features.  The facial nerve is intact, with strong symmetric movements.    Voice and Breathing - The patient was breathing comfortably without the use of accessory muscles. The patients voice was clear and strong, and had appropriate pitch and quality.    Ears - Bilateral cerumen impaction, which was removed.  Bilateral pinna and EACs with normal appearing overlying skin. Tympanic membrane intact with good mobility on pneumatic otoscopy bilaterally. Bony landmarks of the ossicular chain are normal. The tympanic membranes are normal in appearance. No retraction, perforation, or masses.  No fluid or purulence was seen in the external canal or the middle ear.     Eyes - Extraocular movements intact.  Sclera were not icteric or injected, conjunctiva were pink and moist.    Neuro - Nonfocal neuro exam is normal, CN 2 through 12 intact, normal gait and muscle tone.      Performed in clinic today:  Ears - On examination of the ears, I found that both ears were impacted with cerumen.  , I positioned the patient in the examination chair in a semi-supine position. I used the magnified speculum /binocular surgical microscope to perform cerumen removal.  On the right side, I began by using a cerumen loop to gently lift the edges of the cerumen mass away from the walls of the external canal.  Once I did this, I was able to use alligator and # 7 suction to  pull/suction away fragments of wax and debris. I removed all the wax and debris.  The tympanic membrane was intact, no sign of perforation or middle ear effusion., and On the left side once again using the magnified speculum/ binocular surgical microscope to perform cerumen removal.  I began by using a cerumen loop to gently lift the edges of the cerumen mass away from the walls of the external canal.  Once I did this, I was able to use alligator  and suction # 7 to pull/suction away fragments of wax and debris. I removed all the wax and debri. The tympanic membrane was intact, no sign of perforation or middle ear effusion.    A/P - Niurka Cisneros is a 78 year old female who presents to me today for ear cleaning.  We discussed using debrox to prevent cerumen build up. Then schedule an ear cleaning in 4-6 months prior to next audiogram.    DAPHNE Fonseca [facility]  Otolaryngology  [facility]

## 2024-07-23 ENCOUNTER — OFFICE VISIT (OUTPATIENT)
Dept: OTOLARYNGOLOGY | Facility: CLINIC | Age: 79
End: 2024-07-23
Payer: MEDICARE

## 2024-07-23 VITALS
TEMPERATURE: 97.8 F | DIASTOLIC BLOOD PRESSURE: 72 MMHG | HEIGHT: 67 IN | BODY MASS INDEX: 26.09 KG/M2 | SYSTOLIC BLOOD PRESSURE: 122 MMHG | WEIGHT: 166.2 LBS

## 2024-07-23 DIAGNOSIS — H61.23 BILATERAL IMPACTED CERUMEN: ICD-10-CM

## 2024-07-23 DIAGNOSIS — H90.3 SENSORINEURAL HEARING LOSS (SNHL) OF BOTH EARS: Primary | ICD-10-CM

## 2024-07-23 PROCEDURE — 69210 REMOVE IMPACTED EAR WAX UNI: CPT

## 2024-07-23 NOTE — LETTER
7/23/2024      Niurka Cisneros  [address]  [place] [address]      Dear Colleague,    Thank you for referring your patient, Niurka Cisneros, to the [facility]. Please see a copy of my visit note below.    ENT Consultation    Niurka Cisneros is a 78 year old female who is seen in consultation at the request of self.    She has been seen and treated in [place] and [place] in   the past. Last documented visit was back in 2009. She was cleared for amplification and recommended to follow up in 2 years.     History of Present Illness - Niurka Cisneros is a 78 year old female presents for evaluation of ear cleaning.  who presents to me today with hearing loss in both ears. She wears bilateral hearing aids and was seen by the audiologist yesterday. The exam was unable to be completed due to cerumen impaction. The patient denies otorrhea and otalgia.     Past Medical History -   Past Medical History:   Diagnosis Date     Abdominal aortic aneurysm without rupture (H24) 11/10/2015    [  ] repeat imaging due spring 2016 Descending aortic aneurysm.  Being monitored with serial angiogram      Aneurysm of right renal artery (H24) 04/09/2019     Arthritis 1998     COPD (chronic obstructive pulmonary disease) (H) 11/10/2015    Not sure of this diagnosis.  May be a consequence of asthma.     Esophageal hypertension 01/25/2017     Essential hypertension 01/25/2017     Female bladder prolapse 11/10/2015     Hearing loss      Heart disease 2005     Pulmonary nodules 01/25/2017     Seizures (H) 11/10/2015    Temporal seziure d/o.  Does not have LOC.  Has prodromal symptoms      Severe persistent asthma (H28) 11/10/2015    Since childhood.  Has been steroid dependent for many years. Has had cydney x2         Current Medications -   Current Outpatient Medications:      albuterol (PROAIR HFA/PROVENTIL HFA/VENTOLIN HFA) 108 (90 Base) MCG/ACT inhaler, Inhale 2 puffs into the lungs every 6 hours  as needed for shortness of breath or wheezing, Disp: 54 g, Rfl: 3     benralizumab (FASENRA) 30 MG/ML SOSY injection, Inject 30 mg Subcutaneous once, Disp: , Rfl:      carBAMazepine (TEGRETOL XR) 200 MG 12 hr tablet, Take 1 tablet (200 mg) by mouth daily as needed (seizure), Disp: 30 tablet, Rfl: 1     diclofenac (VOLTAREN) 1 % topical gel, Apply 4 g topically 4 times daily, Disp: 350 g, Rfl: 3     estradiol (ESTRING) 7.5 MCG/24HR vaginal ring, Place 1 each (1 Vaginal ring) vaginally every 3 months, Disp: 1 each, Rfl: 4     fluticasone-salmeterol (ADVAIR) 500-50 MCG/ACT inhaler, Inhale 1 puff into the lungs 2 times daily, Disp: 180 each, Rfl: 3     magnesium 250 MG tablet, Take 1 tablet by mouth daily, Disp: , Rfl:      metoprolol succinate ER (TOPROL XL) 25 MG 24 hr tablet, TAKE 1 TABLET DAILY WITH   50MG TABLET FOR TOTAL OF   75MG DAILY., Disp: 90 tablet, Rfl: 3     metoprolol succinate ER (TOPROL XL) 50 MG 24 hr tablet, Take 1 tablet (50 mg) by mouth daily With 25 mg tab for total of 75 mg Daily, Disp: 90 tablet, Rfl: 3     montelukast (SINGULAIR) 10 MG tablet, Take 1 tablet (10 mg) by mouth at bedtime, Disp: 90 tablet, Rfl: 3     NAPROXEN PO, Take 220 mg by mouth as needed , Disp: , Rfl:      potassium chloride antonietta ER (KLOR-CON M20) 20 MEQ CR tablet, Take 1 tablet (20 mEq) by mouth daily, Disp: 90 tablet, Rfl: 4     pravastatin (PRAVACHOL) 20 MG tablet, Take 1 tablet (20 mg) by mouth daily, Disp: 90 tablet, Rfl: 4     predniSONE (DELTASONE) 20 MG tablet, Take 1 tablet (20 mg) by mouth daily, Disp: 10 tablet, Rfl: 0     theophylline (DENIA-24) 400 MG 24 hr capsule, Take 1 capsule (400 mg) by mouth daily, Disp: 90 capsule, Rfl: 3     triamterene-HCTZ (MAXZIDE) 75-50 MG tablet, Take 1 tablet by mouth daily, Disp: 90 tablet, Rfl: 4    Allergies -   Allergies   Allergen Reactions     Quinolones        Social History -   Social History     Socioeconomic History     Marital status:      Years of education: 20    Occupational History     Occupation: pediatrician   Tobacco Use     Smoking status: Never     Smokeless tobacco: Never   Substance and Sexual Activity     Alcohol use: Not Currently     Comment: less than a drink a day     Drug use: Never     Sexual activity: Yes     Partners: Male     Birth control/protection: Post-menopausal, Male Surgical   Other Topics Concern     Parent/sibling w/ CABG, MI or angioplasty before 65F 55M? 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     Social Determinants of Health     Financial Resource Strain: Low Risk  (4/5/2024)    Financial Resource Strain      Within the past 12 months, have you or your family members you live with been unable to get utilities (heat, electricity) when it was really needed?: No   Food Insecurity: Low Risk  (4/5/2024)    Food Insecurity      Within the past 12 months, did you worry that your food would run out before you got money to buy more?: No      Within the past 12 months, did the food you bought just not last and you didn t have money to get more?: No   Transportation Needs: Low Risk  (4/5/2024)    Transportation Needs      Within the past 12 months, has lack of transportation kept you from medical appointments, getting your medicines, non-medical meetings or appointments, work, or from getting things that you need?: No   Physical Activity: Sufficiently Active (4/5/2024)    Exercise Vital Sign      Days of Exercise per Week: 5 days      Minutes of Exercise per Session: 50 min   Stress: No Stress Concern Present (4/5/2024)    [employer] [place] [employer] - Occupational Stress Questionnaire      Feeling of Stress : Only a little   Social Connections: Unknown (4/5/2024)    Social Connection and Isolation Panel [NHANES]      Frequency of Social Gatherings with Friends and Family: More than three times a week   Interpersonal Safety: Low Risk  (4/12/2024)    Interpersonal Safety      Do you  "feel physically and emotionally safe where you currently live?: Yes      Within the past 12 months, have you been hit, slapped, kicked or otherwise physically hurt by someone?: No      Within the past 12 months, have you been humiliated or emotionally abused in other ways by your partner or ex-partner?: No   Housing Stability: Low Risk  (2024)    Housing Stability      Do you have housing? : Yes      Are you worried about losing your housing?: No       Family History -   Family History   Problem Relation Age of Onset     Dementia Mother      Heart Failure Mother      Hypertension Mother      Breast Cancer Mother         post menopausal     Diabetes Mother      Cancer Father         prostate cancer     Hypertension Father      Prostate Cancer Father          of it at 82     Asthma Father         \"outgrew\" it     Schizophrenia Maternal Grandmother      Coronary Artery Disease Maternal Grandmother          of aortic aneurysm at 89     Mental Illness Maternal Grandmother      Coronary Artery Disease Maternal Grandfather         he was diabetic and smoked     Asthma Sister         x2     Depression Sister      Coronary Artery Disease Sister         MI when being ventilated for asthma     Asthma Sister         both sisters with asthma, one severe     Depression Sister      Other - See Comments Daughter         Enlarged Aorta     Melanoma Daughter      Breast Cancer Maternal Aunt      Coronary Artery Disease Daughter         Mildly dilated thoracic aorta     Other Cancer Daughter         Melanoma     Hypertension Son      Diabetes Son         Grandson. Type 1     Coronary Artery Disease Cousin         Dilated aorta       Review of Systems - As per HPI and PMHx, otherwise review of system review of the head and neck negative. Otherwise 10+ review systems negative.    Physical Exam  /72   Temp 97.8  F (36.6  C) (Temporal)   Ht 1.702 m (5' 7\")   Wt 75.4 kg (166 lb 3.2 oz)   LMP  (LMP Unknown)   BMI " 26.03 kg/m    BMI: Body mass index is 26.03 kg/m .    General - The patient is well nourished and well developed, and appears to have good nutritional status.  Alert and oriented to person and place, answers questions and cooperates with examination appropriately.    SKIN - No suspicious lesions or rashes.  Respiration - No respiratory distress.  Head and Face - Normocephalic and atraumatic, with no gross asymmetry noted of the contour of the facial features.  The facial nerve is intact, with strong symmetric movements.    Voice and Breathing - The patient was breathing comfortably without the use of accessory muscles. The patients voice was clear and strong, and had appropriate pitch and quality.    Ears - Bilateral cerumen impaction, which was removed.  Bilateral pinna and EACs with normal appearing overlying skin. Tympanic membrane intact with good mobility on pneumatic otoscopy bilaterally. Bony landmarks of the ossicular chain are normal. The tympanic membranes are normal in appearance. No retraction, perforation, or masses.  No fluid or purulence was seen in the external canal or the middle ear.     Eyes - Extraocular movements intact.  Sclera were not icteric or injected, conjunctiva were pink and moist.    Neuro - Nonfocal neuro exam is normal, CN 2 through 12 intact, normal gait and muscle tone.      Performed in clinic today:  Ears - On examination of the ears, I found that both ears were impacted with cerumen.  , I positioned the patient in the examination chair in a semi-supine position. I used the magnified speculum /binocular surgical microscope to perform cerumen removal.  On the right side, I began by using a cerumen loop to gently lift the edges of the cerumen mass away from the walls of the external canal.  Once I did this, I was able to use alligator and # 7 suction to  pull/suction away fragments of wax and debris. I removed all the wax and debris.  The tympanic membrane was intact, no sign of  perforation or middle ear effusion., and On the left side once again using the magnified speculum/ binocular surgical microscope to perform cerumen removal.  I began by using a cerumen loop to gently lift the edges of the cerumen mass away from the walls of the external canal.  Once I did this, I was able to use alligator and suction # 7 to pull/suction away fragments of wax and debris. I removed all the wax and debri. The tympanic membrane was intact, no sign of perforation or middle ear effusion.    A/P - Niurka Cisneros is a 78 year old female who presents to me today for ear cleaning.  We discussed using debrox to prevent cerumen build up. Then schedule an ear cleaning in 4-6 months prior to next audiogram.    DAPHNE Fonseca CNP  Otolaryngology  [facility]        Again, thank you for allowing me to participate in the care of your patient.        Sincerely,        DAPHNE Fonseca CNP

## 2024-07-25 ENCOUNTER — INFUSION THERAPY VISIT (OUTPATIENT)
Dept: INFUSION THERAPY | Facility: CLINIC | Age: 79
End: 2024-07-25
Payer: MEDICARE

## 2024-07-25 ENCOUNTER — LAB (OUTPATIENT)
Dept: LAB | Facility: CLINIC | Age: 79
End: 2024-07-25
Payer: COMMERCIAL

## 2024-07-25 VITALS
OXYGEN SATURATION: 97 % | DIASTOLIC BLOOD PRESSURE: 82 MMHG | SYSTOLIC BLOOD PRESSURE: 132 MMHG | RESPIRATION RATE: 16 BRPM | TEMPERATURE: 97.7 F | HEART RATE: 67 BPM

## 2024-07-25 DIAGNOSIS — J45.50 SEVERE PERSISTENT ASTHMA WITHOUT COMPLICATION (H): Primary | ICD-10-CM

## 2024-07-25 DIAGNOSIS — R73.03 PREDIABETES: ICD-10-CM

## 2024-07-25 LAB — HBA1C MFR BLD: 5.8 %

## 2024-07-25 PROCEDURE — 250N000011 HC RX IP 250 OP 636

## 2024-07-25 PROCEDURE — 83036 HEMOGLOBIN GLYCOSYLATED A1C: CPT | Performed by: INTERNAL MEDICINE

## 2024-07-25 PROCEDURE — 99000 SPECIMEN HANDLING OFFICE-LAB: CPT | Performed by: PATHOLOGY

## 2024-07-25 PROCEDURE — 36415 COLL VENOUS BLD VENIPUNCTURE: CPT | Performed by: PATHOLOGY

## 2024-07-25 PROCEDURE — 96372 THER/PROPH/DIAG INJ SC/IM: CPT

## 2024-07-25 RX ADMIN — BENRALIZUMAB 30 MG: 30 INJECTION, SOLUTION SUBCUTANEOUS at 09:30

## 2024-07-25 ASSESSMENT — PAIN SCALES - GENERAL: PAINLEVEL: NO PAIN (0)

## 2024-07-25 NOTE — PROGRESS NOTES
Patient presents to the [facility] for Fasenra injection.  Order written by Dr. Vega was completed today. Name and  verified with patient. See MAR for medication details. Medication was provided in one syringe by pharmacy and given in the following sites backside of upper left arm. Patient tolerated injection well and was observed for 30 minutes. Patient was discharged to home. Patient to return for next appt in 8 weeks.    Administrations This Visit       benralizumab (FASENRA) injection 30 mg       Admin Date  2024 Action  $Given Dose  30 mg Route  Subcutaneous Documented By  Kay Edwards MA

## 2024-07-25 NOTE — PROGRESS NOTES
Nursing Note:    Niurka DIAZ Cisneros presents today for fasenra.   Patient seen by provider today: No   present during visit today: Not Applicable.     Note: N/A.     Treatment Conditions:Patient receives every 56 days, last dose 5/31/24, patient due for dose  I released the medication and delegated administration to the supportive staff team member. Please see MAR and administration note for additional details.     Leela Chan RN

## 2024-08-05 ENCOUNTER — OFFICE VISIT (OUTPATIENT)
Dept: AUDIOLOGY | Facility: CLINIC | Age: 79
End: 2024-08-05
Payer: MEDICARE

## 2024-08-05 DIAGNOSIS — H90.3 SENSORINEURAL HEARING LOSS, BILATERAL: Primary | ICD-10-CM

## 2024-08-05 PROCEDURE — 92567 TYMPANOMETRY: CPT | Performed by: AUDIOLOGIST

## 2024-08-05 PROCEDURE — 92557 COMPREHENSIVE HEARING TEST: CPT | Performed by: AUDIOLOGIST

## 2024-08-05 NOTE — PROGRESS NOTES
AUDIOLOGY REPORT    SUBJECTIVE:  Niurka Cisneros is a 78 year old female who was seen in the Audiology Clinic at the [facility] for audiologic evaluation, referred by self. hey were not accompanied today by anyone. The patient has been seen previously in this clinic on 7/15/22 for assessment and results indicated normal hearing sloping to moderately severe sensorineural hearing loss for the right ear and normal hearing sloping to severe sensorineural hearing loss for the left ear. The patient has been seen previously in this clinic and was fit with bilateral Phonak Audeo P90 - 312 hearing aids on 7/15/2022. However, she was not happy with these devices and elected to return them. She was fit with binaural ReSound One 9 -in-the-ear hearing aids on 10/3/22. The patient was seen for a hearing evaluation on 7/22/24 and found to have bilateral cerumen impactions so testing was not completed. She saw ENT for an ear cleaning on 7/23/24. The patient reports hearing may have worsened bilaterally. She reports that tinnitus is still present, but stable. Denies aural fullness, ear pain, drainage, dizziness, or ear surgeries. The patient notes difficulty with communication in a variety of listening situations.     OBJECTIVE:  Otoscopic exam indicates ears are clear of cerumen bilaterally     Pure Tone Thresholds assessed using conventional audiometry with good  reliability from 250-8000 Hz bilaterally using insert earphones headphones (patient has collapsing canals bilaterally).     RIGHT:  normal sloping to severe sensorineural hearing loss    LEFT:    mild sloping to severe sensorineural hearing loss    Tympanogram:    RIGHT: normal eardrum mobility    LEFT:   normal eardrum mobility    Reflexes (reported by stimulus ear):  RIGHT: Ipsilateral was not tested (could not maintain seal)  RIGHT: Contralateral was not tested (could not maintain seal)  LEFT:   Ipsilateral was  not tested (could not maintain seal)  LEFT:   Contralateral was not tested (could not maintain seal)      Speech Reception Threshold:    RIGHT: 35 dB HL    LEFT:   40 dB HL  Word Recognition Score:     RIGHT: 92% at 90 dB HL using NU-6 recorded word list.    LEFT: 76% at 90 dB HL using NU-6 recorded word list.    LEFT: 96% at 95 dB HL using NU-6 recorded word list.    Hearing aids were cleaned and checked. Hearing aids were deep cleaned; microphone ports were vacuumed, wax filters, and domes were changed. Following cleaning, listening check was good bilaterally. Based on patient report and stable hearing thresholds, no programming changes were made today.     No charge visit today for hearing aid services (in warranty hearing aid check).    ASSESSMENT: Today's evaluation indicates a bilateral sensorineural hearing loss with left ear poorer. Compared to patient's previous audiogram dated 7/15/2022, hearing thresholds and word recognition scores are stable bilaterally. Today s results were discussed with the patient in detail. A hearing aid check was completed today. Changes to hearing aid was completed as outlined above.     PLAN:  Patient was counseled regarding hearing loss and impact on communication. Patient continues to be a good candidate for amplification at this time.  It is recommended that the patient recheck hearing if changes are noted or concerns arise.  Niurka will return for follow up hearing aid services as needed, or at least every 9-12 months for cleaning and assessment of hearing aid.  Please call this clinic with questions regarding these results or recommendations.        Aspen Burroughs CCC-A  Licensed Audiologist   [place] #[id number]

## 2024-08-07 ENCOUNTER — ANCILLARY PROCEDURE (OUTPATIENT)
Dept: MAMMOGRAPHY | Facility: CLINIC | Age: 79
End: 2024-08-07
Attending: INTERNAL MEDICINE
Payer: MEDICARE

## 2024-08-07 DIAGNOSIS — Z12.31 VISIT FOR SCREENING MAMMOGRAM: ICD-10-CM

## 2024-08-07 PROCEDURE — 77067 SCR MAMMO BI INCL CAD: CPT | Mod: GC | Performed by: RADIOLOGY

## 2024-08-07 PROCEDURE — 77063 BREAST TOMOSYNTHESIS BI: CPT | Mod: GC | Performed by: RADIOLOGY

## 2024-09-07 ENCOUNTER — MYC MEDICAL ADVICE (OUTPATIENT)
Dept: ORTHOPEDICS | Facility: CLINIC | Age: 79
End: 2024-09-07
Payer: MEDICARE

## 2024-09-07 DIAGNOSIS — M46.1 SACROILIITIS, NOT ELSEWHERE CLASSIFIED (H): Primary | ICD-10-CM

## 2024-09-14 NOTE — TELEPHONE ENCOUNTER
DIAGNOSIS: Trigger fingers on right hand, Mara Combs MD, no images, no injections, Medicare    APPOINTMENT DATE: 9/23/24   NOTES STATUS DETAILS   OFFICE NOTE from referring provider Internal 4/12/24 - Mara Combs MD - Internal Med    MEDICATION LIST Internal

## 2024-09-17 ENCOUNTER — HOSPITAL ENCOUNTER (OUTPATIENT)
Facility: CLINIC | Age: 79
Discharge: HOME | End: 2024-09-17
Attending: FAMILY MEDICINE | Admitting: FAMILY MEDICINE
Payer: MEDICARE

## 2024-09-17 ENCOUNTER — HOSPITAL ENCOUNTER (OUTPATIENT)
Dept: CT IMAGING | Facility: CLINIC | Age: 79
Discharge: HOME | End: 2024-09-17
Attending: FAMILY MEDICINE
Payer: MEDICARE

## 2024-09-17 DIAGNOSIS — M46.1 SACROILIITIS, NOT ELSEWHERE CLASSIFIED (H): ICD-10-CM

## 2024-09-17 PROCEDURE — 27096 INJECT SACROILIAC JOINT: CPT | Mod: RT | Performed by: PHYSICIAN ASSISTANT

## 2024-09-17 PROCEDURE — 27096 INJECT SACROILIAC JOINT: CPT

## 2024-09-17 PROCEDURE — 250N000009 HC RX 250: Performed by: FAMILY MEDICINE

## 2024-09-17 PROCEDURE — 250N000011 HC RX IP 250 OP 636: Performed by: FAMILY MEDICINE

## 2024-09-17 RX ORDER — LIDOCAINE HYDROCHLORIDE 10 MG/ML
5 INJECTION, SOLUTION EPIDURAL; INFILTRATION; INTRACAUDAL; PERINEURAL ONCE
Status: COMPLETED | OUTPATIENT
Start: 2024-09-17 | End: 2024-09-17

## 2024-09-17 RX ORDER — TRIAMCINOLONE ACETONIDE 40 MG/ML
40 INJECTION, SUSPENSION INTRA-ARTICULAR; INTRAMUSCULAR ONCE
Status: COMPLETED | OUTPATIENT
Start: 2024-09-17 | End: 2024-09-17

## 2024-09-17 RX ORDER — BUPIVACAINE HYDROCHLORIDE 2.5 MG/ML
10 INJECTION, SOLUTION INFILTRATION; PERINEURAL ONCE
Status: COMPLETED | OUTPATIENT
Start: 2024-09-17 | End: 2024-09-17

## 2024-09-17 RX ADMIN — TRIAMCINOLONE ACETONIDE 40 MG: 40 INJECTION, SUSPENSION INTRA-ARTICULAR; INTRAMUSCULAR at 10:36

## 2024-09-17 RX ADMIN — BUPIVACAINE HYDROCHLORIDE 1 ML: 2.5 INJECTION, SOLUTION EPIDURAL; INFILTRATION; INTRACAUDAL; PERINEURAL at 10:35

## 2024-09-17 RX ADMIN — LIDOCAINE HYDROCHLORIDE 5 ML: 10 INJECTION, SOLUTION EPIDURAL; INFILTRATION; INTRACAUDAL; PERINEURAL at 10:36

## 2024-09-17 ASSESSMENT — ACTIVITIES OF DAILY LIVING (ADL)
ADLS_ACUITY_SCORE: 35

## 2024-09-17 NOTE — PROCEDURES
[facility]    Procedure: IR Procedure Note    Date/Time: 9/17/2024 11:03 AM    Performed by: Florian Farah PA-C  Authorized by: Florian Farah PA-C  IR Fellow Physician:    Pre Procedure Diagnosis: Pain  Post Procedure Diagnosis: Same    UNIVERSAL PROTOCOL   Site Marked: Yes  Prior Images Obtained and Reviewed:  Yes  Required items: Required blood products, implants, devices and special equipment available    Patient identity confirmed:  Hospital-assigned identification number (Per CT tech.)  NA - No sedation, light sedation, or local anesthesia  Confirmation Checklist:  Procedure was appropriate and matched the consent or emergent situation  Time out: Immediately prior to the procedure a time out was called    Universal Protocol: the Joint Commission Universal Protocol was followed    Preparation: Patient was prepped and draped in usual sterile fashion    ESBL (mL):  1     ANESTHESIA    Anesthesia:  Local infiltration  Local Anesthetic:  Lidocaine 1% without epinephrine  Anesthetic Total (mL):  8.5      SEDATION    Patient Sedated: No    Findings: CT-guided right therapeutic SI joint injection. 2.5 ml of mixed 0.25% Bupivacaine / 40 mg Kenalog injected into the right SI joint space.    Specimens: none    Procedural Complications: None    Condition: Stable    Plan: Follow up per primary team.      PROCEDURE    Length of time physician/provider present for 1:1 monitoring during sedation:  0 min

## 2024-09-19 ENCOUNTER — INFUSION THERAPY VISIT (OUTPATIENT)
Dept: INFUSION THERAPY | Facility: CLINIC | Age: 79
End: 2024-09-19
Payer: MEDICARE

## 2024-09-19 VITALS
SYSTOLIC BLOOD PRESSURE: 115 MMHG | RESPIRATION RATE: 16 BRPM | OXYGEN SATURATION: 96 % | HEART RATE: 65 BPM | DIASTOLIC BLOOD PRESSURE: 69 MMHG | TEMPERATURE: 97.8 F

## 2024-09-19 DIAGNOSIS — J45.50 SEVERE PERSISTENT ASTHMA WITHOUT COMPLICATION (H): Primary | ICD-10-CM

## 2024-09-19 PROCEDURE — 96372 THER/PROPH/DIAG INJ SC/IM: CPT

## 2024-09-19 PROCEDURE — 250N000011 HC RX IP 250 OP 636

## 2024-09-19 RX ADMIN — BENRALIZUMAB 30 MG: 30 INJECTION, SOLUTION SUBCUTANEOUS at 09:30

## 2024-09-19 ASSESSMENT — PAIN SCALES - GENERAL: PAINLEVEL: MILD PAIN (2)

## 2024-09-19 NOTE — PROGRESS NOTES
Patient presents to the [facility] for Fasebra injection.  Order written by Dr. Vega was completed today. Name and  verified with patient. See MAR for medication details. Medication was provided in one syringe by pharmacy and given in the following sites backside of upper left arm. Patient tolerated injection well and was observed for 30 minutes. Patient was discharged to home. Patient to return in eight weeks. Message sent to scheduling.    Administrations This Visit       benralizumab (FASENRA) injection 30 mg       Admin Date  2024 Action  $Given Dose  30 mg Route  Subcutaneous Documented By  Kay Edwards MA

## 2024-09-19 NOTE — PROGRESS NOTES
Nursing Note:    Niurka Cisneros presents today for   Chief Complaint   Patient presents with    Imm/Inj     Fasenra injection      Note:   -Orders from Jose De Jesus Vega MD completed. Frequency: every 8 weeks.     Treatment Conditions:  Biological Infusion Checklist:  ~~~ NOTE: If the patient answers yes to any of the questions below, hold the infusion and contact ordering provider or on-call provider.    Have you recently had an elevated temperature, fever, chills, productive cough, coughing for 3 weeks or longer or hemoptysis,  abnormal vital signs, night sweats,  chest pain or have you noticed a decrease in your appetite, unexplained weight loss or fatigue? No  Do you have any open wounds or new incisions? No  Do you have any upcoming hospitalizations or surgeries? Does not include esophagogastroduodenoscopy, colonoscopy, endoscopic retrograde cholangiopancreatography (ERCP), endoscopic ultrasound (EUS), dental procedures or joint aspiration/steroid injections No  Do you currently have any signs of illness or infection or are you on any antibiotics? No  Have you had any new, sudden or worsening abdominal pain? No  Have you or anyone in your household received a live vaccination in the past 4 weeks? Please note: No live vaccines while on biologic/chemotherapy until 6 months after the last treatment. Patient can receive the flu vaccine (shot only), pneumovax and the Covid vaccine. It is optimal for the patient to get these vaccines mid cycle, but they can be given at any time as long as it is not on the day of the infusion. No  Have you recently been diagnosed with any new nervous system diseases (ie. Multiple sclerosis, Guillain [place], seizures, neurological changes) or cancer diagnosis? Are you on any form of radiation or chemotherapy? No  Are you pregnant or breast feeding or do you have plans of pregnancy in the future? N/A  Have you been having any signs of worsening depression or suicidal ideations?   (benlysta only) N/A  Have there been any other new onset medical symptoms? No  Have you had any new blood clots? (IVIG only) N/A    I released the medication and delegated administration to Kay Edwards MA. Please see MAR and administration note for additional details.       Leela Acuna RN

## 2024-09-22 ASSESSMENT — ACTIVITIES OF DAILY LIVING (ADL)
ADLS_ACUITY_SCORE: 35

## 2024-09-23 ENCOUNTER — OFFICE VISIT (OUTPATIENT)
Dept: ORTHOPEDICS | Facility: CLINIC | Age: 79
End: 2024-09-23
Payer: MEDICARE

## 2024-09-23 ENCOUNTER — PRE VISIT (OUTPATIENT)
Dept: ORTHOPEDICS | Facility: CLINIC | Age: 79
End: 2024-09-23

## 2024-09-23 DIAGNOSIS — M66.242 NONTRAUMATIC RUPTURE OF SAGITTAL BAND OF EXTENSOR TENDON OF LEFT UPPER EXTREMITY: Primary | ICD-10-CM

## 2024-09-23 DIAGNOSIS — M66.241 NONTRAUMATIC RUPTURE OF SAGITTAL BAND OF EXTENSOR TENDON OF RIGHT UPPER EXTREMITY: ICD-10-CM

## 2024-09-23 PROCEDURE — 99203 OFFICE O/P NEW LOW 30 MIN: CPT | Performed by: PHYSICIAN ASSISTANT

## 2024-09-23 NOTE — LETTER
9/23/2024      Niurka Cisneros  [address]  [place] [address]      Dear Colleague,    Thank you for referring your patient, Niurka Cisneros, to the [facility] [place]. Please see a copy of my visit note below.    Hand Surgery History & Physical    REFERRING PHYSICIAN: Referred Self   PRIMARY CARE PHYSICIAN: Mara Combs     Chief Complaint:   Consult (Trigger finger of the right middle, ring and small finger. )    History of Present Illness:     Niurka Cisneros is a 79 year old right -hand dominant female who presents for evaluation of snapping of the bilateral middle, ring, small fingers, right worse than left.  Patient reports that she started to develop this about a year ago in her right hand, and has recently started to note symptoms in her left hand as well..  She notes that she was seen by sports medicine last year sometime at which time she was given splints for trigger fingers.  She states it did not help.  She has most difficulty straightening her fingers when doing activities such as typing.    Patient denies history of inflammatory arthropathy, she does note she has been worked up for genetic collagen disorder.  She notes she was diagnosed with a aortic aneurysm and underwent subsequent genetic testing.  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    Past Medical History:     Past Medical History:   Diagnosis Date     Abdominal aortic aneurysm without rupture (H24) 11/10/2015    [  ] repeat imaging due spring 2016 Descending aortic aneurysm.  Being monitored with serial angiogram      Aneurysm of right renal artery (H24) 04/09/2019     Arthritis 1998     COPD (chronic obstructive pulmonary disease) (H) 11/10/2015    Not sure of this diagnosis.  May be a consequence of asthma.     Esophageal hypertension 01/25/2017     Essential hypertension 01/25/2017     Female bladder prolapse 11/10/2015     Hearing loss      Heart disease 2005  "    Pulmonary nodules 2017     Seizures (H) 11/10/2015    Temporal seziure d/o.  Does not have LOC.  Has prodromal symptoms      Severe persistent asthma (H28) 11/10/2015    Since childhood.  Has been steroid dependent for many years. Has had cydney x2         Past Surgical History:     Past Surgical History:   Procedure Laterality Date     APPENDECTOMY       CATARACT IOL, RT/LT Bilateral      COLONOSCOPY N/A 2019    Procedure: COLONOSCOPY;  Surgeon: Haim Burgos MD;  Location:  GI     GI SURGERY      nissn x 2     GYN SURGERY       H CATARACT SURGICAL PACKAGE       HYSTERECTOMY      fibroids     JOINT REPLACEMENT Left      NISSEN FUNDOPLICATION      x2     ORTHOPEDIC SURGERY       SOFT TISSUE SURGERY         Social History:     Social History     Tobacco Use     Smoking status: Never     Smokeless tobacco: Never   Substance Use Topics     Alcohol use: Not Currently     Comment: less than a drink a day       Family History:     Family History   Problem Relation Age of Onset     Dementia Mother      Heart Failure Mother      Hypertension Mother      Breast Cancer Mother         post menopausal     Diabetes Mother      Cancer Father         prostate cancer     Hypertension Father      Prostate Cancer Father          of it at 82     Asthma Father         \"outgrew\" it     Schizophrenia Maternal Grandmother      Coronary Artery Disease Maternal Grandmother          of aortic aneurysm at 89     Mental Illness Maternal Grandmother      Coronary Artery Disease Maternal Grandfather         he was diabetic and smoked     Asthma Sister         x2     Depression Sister      Coronary Artery Disease Sister         MI when being ventilated for asthma     Asthma Sister         both sisters with asthma, one severe     Depression Sister      Other - See Comments Daughter         Enlarged Aorta     Melanoma Daughter      Breast Cancer Maternal Aunt      Coronary Artery Disease Daughter         " Mildly dilated thoracic aorta     Other Cancer Daughter         Melanoma     Hypertension Son      Diabetes Son         Grandson. Type 1     Coronary Artery Disease Cousin         Dilated aorta       Allergies:     Allergies   Allergen Reactions     Quinolones        Medications:     Current Outpatient Medications   Medication Sig Dispense Refill     albuterol (PROAIR HFA/PROVENTIL HFA/VENTOLIN HFA) 108 (90 Base) MCG/ACT inhaler Inhale 2 puffs into the lungs every 6 hours as needed for shortness of breath or wheezing 54 g 3     benralizumab (FASENRA) 30 MG/ML SOSY injection Inject 30 mg Subcutaneous once       carBAMazepine (TEGRETOL XR) 200 MG 12 hr tablet Take 1 tablet (200 mg) by mouth daily as needed (seizure) 30 tablet 1     diclofenac (VOLTAREN) 1 % topical gel Apply 4 g topically 4 times daily 350 g 3     estradiol (ESTRING) 7.5 MCG/24HR vaginal ring Place 1 each (1 Vaginal ring) vaginally every 3 months 1 each 4     fluticasone-salmeterol (ADVAIR) 500-50 MCG/ACT inhaler Inhale 1 puff into the lungs 2 times daily 180 each 3     magnesium 250 MG tablet Take 1 tablet by mouth daily       metoprolol succinate ER (TOPROL XL) 25 MG 24 hr tablet TAKE 1 TABLET DAILY WITH   50MG TABLET FOR TOTAL OF   75MG DAILY. 90 tablet 3     metoprolol succinate ER (TOPROL XL) 50 MG 24 hr tablet Take 1 tablet (50 mg) by mouth daily With 25 mg tab for total of 75 mg Daily 90 tablet 3     montelukast (SINGULAIR) 10 MG tablet Take 1 tablet (10 mg) by mouth at bedtime 90 tablet 3     NAPROXEN PO Take 220 mg by mouth as needed        potassium chloride antonietta ER (KLOR-CON M20) 20 MEQ CR tablet Take 1 tablet (20 mEq) by mouth daily 90 tablet 4     pravastatin (PRAVACHOL) 20 MG tablet Take 1 tablet (20 mg) by mouth daily 90 tablet 4     predniSONE (DELTASONE) 20 MG tablet Take 1 tablet (20 mg) by mouth daily (Patient not taking: Reported on 7/23/2024) 10 tablet 0     theophylline (DENIA-24) 400 MG 24 hr capsule Take 1 capsule (400 mg) by  mouth daily 90 capsule 3     triamterene-HCTZ (MAXZIDE) 75-50 MG tablet Take 1 tablet by mouth daily 90 tablet 4     No current facility-administered medications for this visit.        Review of Systems:     A 10 point ROS was performed and reviewed. Specific responses to these questions are noted at the end of the document.    Physical Exam:   Musculoskeletal: A focused physical examination of the bilateral hands reveals:   Inspection: No edema or ecchymosis.  Arthritic changes to the bilateral hands.  Synovitis diffusely throughout the MCPs.  Mild ulnar drift of the right middle, ring, small fingers.  Palpation: Palpable ulnar subluxation of the extensor tendons to the bilateral middle, ring, small fingers with snapping and extension.  Perching of the extensor tendon of the bilateral index fingers ulnarly, no lenny subluxation.  Neurovascular- intact light touch sensation and motor to distribution of the median, ulnar and radial nerves. Brisk capillary refill to the distal fingers. 2+ radial pulse.   Range of Motion: Will to make a full composite fist.  Able to fully extend all fingers.    Imaging:   None.     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  No prior history of inflammatory arthritis.    Plan: Discussed diagnosis, etiology.  Discussed treatment options including conservative management with trial of splinting with relative motion splinting versus surgical intervention.  Patient agreeable to trial of hand therapy and splinting.  Referral placed.  Recommended full-time use for 6 weeks, then can transition to nighttime use.  Follow-up in 8 weeks for recheck.      Maddie Estes PA-C   Orthopedic Surgery  9/23/2024 7:57 AM      Again, thank you for allowing me to participate in the care of your patient.        Sincerely,        Maddie Estes,  CHARAN

## 2024-09-23 NOTE — NURSING NOTE
Reason For Visit:   Chief Complaint   Patient presents with    Consult     Trigger finger of the right middle, ring and small finger.        Primary MD: Mara Combs  Ref. MD: Elmo    Age: 79 year old    ?  No      LMP  (LMP Unknown)       Pain Assessment  Patient Currently in Pain: No    Hand Dominance Evaluation  Hand Dominance: Right          QuickDASH Assessment      9/22/2024     7:54 PM   QuickDASH Main   1. Open a tight or new jar Moderate difficulty   2. Do heavy household chores (e.g., wash walls, floors) Mild difficulty   3. Carry a shopping bag or briefcase No difficulty   4. Wash your back No difficulty   5. Use a knife to cut food No difficulty   6. Recreational activities in which you take some force or impact through your arm, shoulder or hand (e.g., golf, hammering, tennis, etc.) Mild difficulty   7. During the past week, to what extent has your arm, shoulder or hand problem interfered with your normal social activities with family, friends, neighbours or groups Not at all   8. During the past week, were you limited in your work or other regular daily activities as a result of your arm, shoulder or hand problem Moderately limited   9. Arm, shoulder or hand pain None   10.Tingling (pins and needles) in your arm,shoulder or hand None   11. During the past week, how much difficulty have you had sleeping because of the pain in your arm, shoulder or hand No difficulty   Quickdash Ability Score 13.64          Current Outpatient Medications   Medication Sig Dispense Refill    albuterol (PROAIR HFA/PROVENTIL HFA/VENTOLIN HFA) 108 (90 Base) MCG/ACT inhaler Inhale 2 puffs into the lungs every 6 hours as needed for shortness of breath or wheezing 54 g 3    benralizumab (FASENRA) 30 MG/ML SOSY injection Inject 30 mg Subcutaneous once      carBAMazepine (TEGRETOL XR) 200 MG 12 hr tablet Take 1 tablet (200 mg) by mouth daily as needed (seizure) 30 tablet 1    diclofenac (VOLTAREN) 1 % topical  gel Apply 4 g topically 4 times daily 350 g 3    estradiol (ESTRING) 7.5 MCG/24HR vaginal ring Place 1 each (1 Vaginal ring) vaginally every 3 months 1 each 4    fluticasone-salmeterol (ADVAIR) 500-50 MCG/ACT inhaler Inhale 1 puff into the lungs 2 times daily 180 each 3    magnesium 250 MG tablet Take 1 tablet by mouth daily      metoprolol succinate ER (TOPROL XL) 25 MG 24 hr tablet TAKE 1 TABLET DAILY WITH   50MG TABLET FOR TOTAL OF   75MG DAILY. 90 tablet 3    metoprolol succinate ER (TOPROL XL) 50 MG 24 hr tablet Take 1 tablet (50 mg) by mouth daily With 25 mg tab for total of 75 mg Daily 90 tablet 3    montelukast (SINGULAIR) 10 MG tablet Take 1 tablet (10 mg) by mouth at bedtime 90 tablet 3    NAPROXEN PO Take 220 mg by mouth as needed       potassium chloride antonietta ER (KLOR-CON M20) 20 MEQ CR tablet Take 1 tablet (20 mEq) by mouth daily 90 tablet 4    pravastatin (PRAVACHOL) 20 MG tablet Take 1 tablet (20 mg) by mouth daily 90 tablet 4    predniSONE (DELTASONE) 20 MG tablet Take 1 tablet (20 mg) by mouth daily (Patient not taking: Reported on 7/23/2024) 10 tablet 0    theophylline (DENIA-24) 400 MG 24 hr capsule Take 1 capsule (400 mg) by mouth daily 90 capsule 3    triamterene-HCTZ (MAXZIDE) 75-50 MG tablet Take 1 tablet by mouth daily 90 tablet 4       Allergies   Allergen Reactions    Quinolones        Tyra Langford, ATC

## 2024-09-23 NOTE — PROGRESS NOTES
Hand Surgery History & Physical    REFERRING PHYSICIAN: Referred Self   PRIMARY CARE PHYSICIAN: Mara Combs     Chief Complaint:   Consult (Trigger finger of the right middle, ring and small finger. )    History of Present Illness:     Niurka Cisneros is a 79 year old right -hand dominant female who presents for evaluation of snapping of the bilateral middle, ring, small fingers, right worse than left.  Patient reports that she started to develop this about a year ago in her right hand, and has recently started to note symptoms in her left hand as well..  She notes that she was seen by sports medicine last year sometime at which time she was given splints for trigger fingers.  She states it did not help.  She has most difficulty straightening her fingers when doing activities such as typing.    Patient denies history of inflammatory arthropathy, she does note she has been worked up for genetic collagen disorder.  She notes she was diagnosed with a aortic aneurysm and underwent subsequent genetic testing.  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    Past Medical History:     Past Medical History:   Diagnosis Date    Abdominal aortic aneurysm without rupture (H24) 11/10/2015    [  ] repeat imaging due spring 2016 Descending aortic aneurysm.  Being monitored with serial angiogram     Aneurysm of right renal artery (H24) 04/09/2019    Arthritis 1998    COPD (chronic obstructive pulmonary disease) (H) 11/10/2015    Not sure of this diagnosis.  May be a consequence of asthma.    Esophageal hypertension 01/25/2017    Essential hypertension 01/25/2017    Female bladder prolapse 11/10/2015    Hearing loss     Heart disease 2005    Pulmonary nodules 01/25/2017    Seizures (H) 11/10/2015    Temporal seziure d/o.  Does not have LOC.  Has prodromal symptoms     Severe persistent asthma (H28) 11/10/2015    Since childhood.  Has been steroid dependent for many years. Has had cydney x2    "      Past Surgical History:     Past Surgical History:   Procedure Laterality Date    APPENDECTOMY      CATARACT IOL, RT/LT Bilateral     COLONOSCOPY N/A 2019    Procedure: COLONOSCOPY;  Surgeon: Haim Burgos MD;  Location:  GI    GI SURGERY      nissn x 2    GYN SURGERY  2001    H CATARACT SURGICAL PACKAGE      HYSTERECTOMY      fibroids    JOINT REPLACEMENT Left     NISSEN FUNDOPLICATION      x2    ORTHOPEDIC SURGERY      SOFT TISSUE SURGERY         Social History:     Social History     Tobacco Use    Smoking status: Never    Smokeless tobacco: Never   Substance Use Topics    Alcohol use: Not Currently     Comment: less than a drink a day       Family History:     Family History   Problem Relation Age of Onset    Dementia Mother     Heart Failure Mother     Hypertension Mother     Breast Cancer Mother         post menopausal    Diabetes Mother     Cancer Father         prostate cancer    Hypertension Father     Prostate Cancer Father          of it at 82    Asthma Father         \"outgrew\" it    Schizophrenia Maternal Grandmother     Coronary Artery Disease Maternal Grandmother          of aortic aneurysm at 89    Mental Illness Maternal Grandmother     Coronary Artery Disease Maternal Grandfather         he was diabetic and smoked    Asthma Sister         x2    Depression Sister     Coronary Artery Disease Sister         MI when being ventilated for asthma    Asthma Sister         both sisters with asthma, one severe    Depression Sister     Other - See Comments Daughter         Enlarged Aorta    Melanoma Daughter     Breast Cancer Maternal Aunt     Coronary Artery Disease Daughter         Mildly dilated thoracic aorta    Other Cancer Daughter         Melanoma    Hypertension Son     Diabetes Son         Grandson. Type 1    Coronary Artery Disease Cousin         Dilated aorta       Allergies:     Allergies   Allergen Reactions    Quinolones        Medications:     Current " Outpatient Medications   Medication Sig Dispense Refill    albuterol (PROAIR HFA/PROVENTIL HFA/VENTOLIN HFA) 108 (90 Base) MCG/ACT inhaler Inhale 2 puffs into the lungs every 6 hours as needed for shortness of breath or wheezing 54 g 3    benralizumab (FASENRA) 30 MG/ML SOSY injection Inject 30 mg Subcutaneous once      carBAMazepine (TEGRETOL XR) 200 MG 12 hr tablet Take 1 tablet (200 mg) by mouth daily as needed (seizure) 30 tablet 1    diclofenac (VOLTAREN) 1 % topical gel Apply 4 g topically 4 times daily 350 g 3    estradiol (ESTRING) 7.5 MCG/24HR vaginal ring Place 1 each (1 Vaginal ring) vaginally every 3 months 1 each 4    fluticasone-salmeterol (ADVAIR) 500-50 MCG/ACT inhaler Inhale 1 puff into the lungs 2 times daily 180 each 3    magnesium 250 MG tablet Take 1 tablet by mouth daily      metoprolol succinate ER (TOPROL XL) 25 MG 24 hr tablet TAKE 1 TABLET DAILY WITH   50MG TABLET FOR TOTAL OF   75MG DAILY. 90 tablet 3    metoprolol succinate ER (TOPROL XL) 50 MG 24 hr tablet Take 1 tablet (50 mg) by mouth daily With 25 mg tab for total of 75 mg Daily 90 tablet 3    montelukast (SINGULAIR) 10 MG tablet Take 1 tablet (10 mg) by mouth at bedtime 90 tablet 3    NAPROXEN PO Take 220 mg by mouth as needed       potassium chloride antonietta ER (KLOR-CON M20) 20 MEQ CR tablet Take 1 tablet (20 mEq) by mouth daily 90 tablet 4    pravastatin (PRAVACHOL) 20 MG tablet Take 1 tablet (20 mg) by mouth daily 90 tablet 4    predniSONE (DELTASONE) 20 MG tablet Take 1 tablet (20 mg) by mouth daily (Patient not taking: Reported on 7/23/2024) 10 tablet 0    theophylline (DENIA-24) 400 MG 24 hr capsule Take 1 capsule (400 mg) by mouth daily 90 capsule 3    triamterene-HCTZ (MAXZIDE) 75-50 MG tablet Take 1 tablet by mouth daily 90 tablet 4     No current facility-administered medications for this visit.        Review of Systems:     A 10 point ROS was performed and reviewed. Specific responses to these questions are noted at the end  of the document.    Physical Exam:   Musculoskeletal: A focused physical examination of the bilateral hands reveals:   Inspection: No edema or ecchymosis.  Arthritic changes to the bilateral hands.  Synovitis diffusely throughout the MCPs.  Mild ulnar drift of the right middle, ring, small fingers.  Palpation: Palpable ulnar subluxation of the extensor tendons to the bilateral middle, ring, small fingers with snapping and extension.  Perching of the extensor tendon of the bilateral index fingers ulnarly, no lenny subluxation.  Neurovascular- intact light touch sensation and motor to distribution of the median, ulnar and radial nerves. Brisk capillary refill to the distal fingers. 2+ radial pulse.   Range of Motion: Will to make a full composite fist.  Able to fully extend all fingers.    Imaging:   None.     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  No prior history of inflammatory arthritis.    Plan: Discussed diagnosis, etiology.  Discussed treatment options including conservative management with trial of splinting with relative motion splinting versus surgical intervention.  Patient agreeable to trial of hand therapy and splinting.  Referral placed.  Recommended full-time use for 6 weeks, then can transition to nighttime use.  Follow-up in 8 weeks for recheck.      Maddie Estes PA-C   Orthopedic Surgery  9/23/2024 7:57 AM

## 2024-10-02 PROBLEM — M25.551 HIP PAIN, RIGHT: Status: RESOLVED | Noted: 2024-01-10 | Resolved: 2024-10-02

## 2024-10-02 NOTE — PROGRESS NOTES
Discharge Note    Progress reporting period is from initial evaluation date (please see noted date below) to Ronan 3, 2024.  Linked Episodes   Type: Episode: Status: Noted: Resolved: Last update: Updated by:   PHYSICAL THERAPY Right hip pain 1/10/2024 Active 1/10/2024  6/3/2024  1:56 PM Sabino Castillo PT      Comments:       Roopa has not returned for physical therapy follow up and current status is unknown.  Please see information below for last relevant information on current status.  Patient seen for   visits.    SUBJECTIVE  Subjective changes noted by patient:     .  Current pain level is  .     Previous pain level was   .   Changes in function:  Yes (See Goal flowsheet attached for changes in current functional level)  Adverse reaction to treatment or activity: None    OBJECTIVE  Changes noted in objective findings:       ASSESSMENT/PLAN      Updated problem list and treatment plan:   Pain - HEP  Decreased ROM/flexibility - HEP  Decreased function - HEP  Decreased strength - HEP  STG/LTGs have been met or progress has been made towards goals:  Yes, please see goal flowsheet for most current information  Assessment of Progress: current status is unknown.    Last current status:     Self Management Plans:  HEP  I have re-evaluated this patient and find that the nature, scope, duration and intensity of the therapy is appropriate for the medical condition of the patient.  Valerio continues to require the following intervention to meet STG and LTG's:  HEP.    Recommendations:  Discharge with current home program.  Patient to follow up with MD as needed.    Please refer to the daily flowsheet for treatment today, total treatment time and time spent performing 1:1 timed codes.

## 2024-10-03 NOTE — PROGRESS NOTES
OCCUPATIONAL THERAPY EVALUATION  Type of Visit: Evaluation              Subjective      Presenting condition or subjective complaint: fingers on right dont open easily  Date of onset: 09/23/24    Relevant medical history: Arthritis; Asthma; COPD; Concussions   Past Medical History:   Diagnosis Date    Abdominal aortic aneurysm without rupture (H) 11/10/2015    [  ] repeat imaging due spring 2016 Descending aortic aneurysm.  Being monitored with serial angiogram     Aneurysm of right renal artery (H) 04/09/2019    Arthritis 1998    COPD (chronic obstructive pulmonary disease) (H) 11/10/2015    Not sure of this diagnosis.  May be a consequence of asthma.    Esophageal hypertension 01/25/2017    Essential hypertension 01/25/2017    Female bladder prolapse 11/10/2015    Hearing loss     Heart disease 2005    Pulmonary nodules 01/25/2017    Seizures (H) 11/10/2015    Temporal seziure d/o.  Does not have LOC.  Has prodromal symptoms     Severe persistent asthma (H) 11/10/2015    Since childhood.  Has been steroid dependent for many years. Has had cydney x2        Dates & types of surgery: see record  Past Surgical History:   Procedure Laterality Date    APPENDECTOMY  1998    CATARACT IOL, RT/LT Bilateral     COLONOSCOPY N/A 02/14/2019    Procedure: COLONOSCOPY;  Surgeon: Haim Burgos MD;  Location:  GI    GI SURGERY      nissn x 2    GYN SURGERY  2001    H CATARACT SURGICAL PACKAGE      HYSTERECTOMY      fibroids    JOINT REPLACEMENT Left     NISSEN FUNDOPLICATION      x2    ORTHOPEDIC SURGERY      SOFT TISSUE SURGERY        Prior diagnostic imaging/testing results:       Prior therapy history for the same diagnosis, illness or injury:        Prior Level of Function  Ind with ADL, IADLs, driving    Living Environment  Social support: With a significant other or spouse   Type of home: Apartment/condo   Stairs to enter the home:         Ramp: Yes   Stairs inside the home: No       Help at home:    Equipment  owned:       Employment: No    Hobbies/Interests:      Patient goals for therapy: better        Objective   ADDITIONAL HISTORY:  Right hand dominant  Patient reports symptoms of stiffness/loss of motion and weakness/loss of strength  Transportation: drives  Currently Retired    Functional Outcome Measure:       10/4/2024     7:08 AM   Upper Extremity Functional Index (  1996 PW Machelle)   a.  Any of your usual work, housework or school activities 3-A Little bit of Difficulty   b.  Your usual hobbies, recreational or sporting activities 3-A Little bit of Difficulty   c.  Lifting a bag of groceries to waist level 4-No Difficulty   d.  Placing an object onto, or removing it from an overhead shelf 3-A Little bit of Difficulty   e.  Washing your hair or scalp 4-No Difficulty   f.   Pushing up on your hands (e.g., from bathtub or chair) 4-No Difficulty   g.  Preparing food (e.g., peeling, cutting) 4-No Difficulty   h.  Driving 4-No Difficulty   I.   Vacuuming, sweeping, or raking 4-No Difficulty   j.   Dressing 4-No Difficulty   k.  Doing up buttons 4-No Difficulty   l.   Using tools or appliances 3-A Little bit of Difficulty   m. Opening doors 4-No Difficulty   n.  Cleaning 4-No Difficulty   o.  Tying or lacing shoes 4-No Difficulty   p.  Sleeping 4-No Difficulty   q.  Laundering clothes. (e.g., washing, ironing, folding) 4-No Difficulty   r.   Opening a jar 3-A Little bit of Difficulty   s.  Throwing a ball 3-A Little bit of Difficulty   t.   Carrying a small suitcase with your affected limb  4-No Difficulty   Column Totals: /80 74        PAIN:  Patient reports on minimal pain aside from infrequent achiness    POSTURE: Normal     EDEMA: None     SENSATION: WNL throughout all nerve distributions; per patient report     ROM: Able to make complete fist and fully extend all digits. Some mild DIP flexion deficits with R digits 3&4. R 4th digit consistently catches with extension, patient needs to manually assist with  extension to re-centralize tendon. Occasional catching/subluxing of 3rd, 5th on R and 3-5 on L.     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  The following significant findings have been identified: Impaired activity tolerance, Impaired ROM, and Impaired strength.  These identified deficits interfere with their ability to perform recreational activities, household chores, and meal planning and preparation as compared to previous level of function.     Clinical Decision Making (Complexity):  Assessment of Occupational Performance: 1-3 Performance Deficits  Occupational Performance Limitations: home establishment and management, meal preparation and cleanup, and leisure activities  Clinical Decision Making (Complexity): Low complexity    PLAN OF CARE  Treatment Interventions:  Orthotic Fabrication:  Static, Finger based, and Hand based  Self Care:  Self Care Tasks and Ergonomic Considerations    Long Term Goals   OT Goal 1  Goal Identifier: Splint  Goal Description: Roopa will be able to open a jar with no recurrence of tendon sublaxation through use of Relative Motion  Orthosis  Goal Progress: Goal established      Frequency of Treatment: 1x/week, tapering  Duration of Treatment: 6 weeks     Recommended Referrals to Other Professionals:  NA  Education Assessment:       Risks and benefits of evaluation/treatment have been explained.   Patient/Family/caregiver agrees with Plan of Care.     Evaluation Time:    OT Eval, Low Complexity Minutes (19493): 15       Signing Clinician: JESSICA LAWSON [facility]                                                                                   OUTPATIENT OCCUPATIONAL THERAPY      PLAN OF TREATMENT FOR OUTPATIENT REHABILITATION   Patient's Last Name, First Name, Niurka Sloan YOB: 1945   Provider's Name   [facility]   Medical Record No.  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              Referring Provider:  Maddie Estes PA-C    Initial Assessment  See Epic Evaluation- 10/04/24

## 2024-10-04 ENCOUNTER — THERAPY VISIT (OUTPATIENT)
Dept: OCCUPATIONAL THERAPY | Facility: CLINIC | Age: 79
End: 2024-10-04
Payer: MEDICARE

## 2024-10-04 DIAGNOSIS — M66.242 NONTRAUMATIC RUPTURE OF SAGITTAL BAND OF EXTENSOR TENDON OF LEFT UPPER EXTREMITY: Primary | ICD-10-CM

## 2024-10-04 DIAGNOSIS — M66.241 NONTRAUMATIC RUPTURE OF SAGITTAL BAND OF EXTENSOR TENDON OF RIGHT UPPER EXTREMITY: ICD-10-CM

## 2024-10-04 DIAGNOSIS — M66.242 NONTRAUMATIC RUPTURE OF SAGITTAL BAND OF EXTENSOR TENDON OF LEFT UPPER EXTREMITY: ICD-10-CM

## 2024-10-04 PROCEDURE — 97165 OT EVAL LOW COMPLEX 30 MIN: CPT | Mod: GO | Performed by: SPECIALIST/TECHNOLOGIST

## 2024-10-04 PROCEDURE — 97760 ORTHOTIC MGMT&TRAING 1ST ENC: CPT | Mod: GO | Performed by: SPECIALIST/TECHNOLOGIST

## 2024-10-09 ENCOUNTER — LAB (OUTPATIENT)
Dept: LAB | Facility: CLINIC | Age: 79
End: 2024-10-09
Payer: MEDICARE

## 2024-10-09 DIAGNOSIS — M66.242 NONTRAUMATIC RUPTURE OF SAGITTAL BAND OF EXTENSOR TENDON OF LEFT UPPER EXTREMITY: ICD-10-CM

## 2024-10-09 LAB
ERYTHROCYTE [DISTWIDTH] IN BLOOD BY AUTOMATED COUNT: 15 % (ref 10–15)
ERYTHROCYTE [SEDIMENTATION RATE] IN BLOOD BY WESTERGREN METHOD: 10 MM/HR (ref 0–30)
HCT VFR BLD AUTO: 36.6 % (ref 35–47)
HGB BLD-MCNC: 11.9 G/DL (ref 11.7–15.7)
MCH RBC QN AUTO: 26.7 PG (ref 26.5–33)
MCHC RBC AUTO-ENTMCNC: 32.5 G/DL (ref 31.5–36.5)
MCV RBC AUTO: 82 FL (ref 78–100)
PLATELET # BLD AUTO: 238 10E3/UL (ref 150–450)
RBC # BLD AUTO: 4.45 10E6/UL (ref 3.8–5.2)
WBC # BLD AUTO: 6.5 10E3/UL (ref 4–11)

## 2024-10-09 PROCEDURE — 36415 COLL VENOUS BLD VENIPUNCTURE: CPT

## 2024-10-09 PROCEDURE — 85027 COMPLETE CBC AUTOMATED: CPT

## 2024-10-09 PROCEDURE — 86431 RHEUMATOID FACTOR QUANT: CPT

## 2024-10-09 PROCEDURE — 86038 ANTINUCLEAR ANTIBODIES: CPT

## 2024-10-09 PROCEDURE — 86200 CCP ANTIBODY: CPT

## 2024-10-09 PROCEDURE — 85652 RBC SED RATE AUTOMATED: CPT

## 2024-10-09 PROCEDURE — 86140 C-REACTIVE PROTEIN: CPT

## 2024-10-10 LAB
ANA SER QL IF: NEGATIVE
CCP AB SER IA-ACNC: 0.7 U/ML
CRP SERPL-MCNC: <3 MG/L
RHEUMATOID FACT SERPL-ACNC: 16 IU/ML

## 2024-10-10 NOTE — PROGRESS NOTES
Hand Surgery    REFERRING PHYSICIAN: Referred Self   PRIMARY CARE PHYSICIAN: Mara Combs    Diagnosis: Bilateral middle/ring/small extensor tendon subluxation  Treatment: hand therapy     Chief Complaint:   No chief complaint on file.    History of Present Illness:     MAURICE Estes 9/23/2024: Niurka Cisneros is a 79 year old right -hand dominant female who presents for evaluation of snapping of the bilateral middle, ring, small fingers, right worse than left.  Patient reports that she started to develop this about a year ago in her right hand, and has recently started to note symptoms in her left hand as well.  She notes that she was seen by sports medicine last year sometime at which time she was given splints for trigger fingers.  She states it did not help.  She has most difficulty straightening her fingers when doing activities such as typing.    Patient denies history of inflammatory arthropathy, she does note she has been worked up for genetic collagen disorder.  She notes she was diagnosed with a aortic aneurysm and underwent subsequent genetic testing.  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    10/16/2024: She reports that hand therapy was unable to make her relative motion splint due to multiple digit involvement.  She does not have pain in the hand but notes that on the right side she is unable to actively extend the middle/ring/small fingers from a flexed position.  On the left side she has snapping, but is still able to actively extend.    Past Medical History:     Past Medical History:   Diagnosis Date    Abdominal aortic aneurysm without rupture (H) 11/10/2015    [  ] repeat imaging due spring 2016 Descending aortic aneurysm.  Being monitored with serial angiogram     Aneurysm of right renal artery (H) 04/09/2019    Arthritis 1998    COPD (chronic obstructive pulmonary disease) (H) 11/10/2015    Not sure of this diagnosis.  May be a consequence of asthma.     Esophageal hypertension 01/25/2017    Essential hypertension 01/25/2017    Female bladder prolapse 11/10/2015    Hearing loss     Heart disease 2005    Pulmonary nodules 01/25/2017    Seizures (H) 11/10/2015    Temporal seziure d/o.  Does not have LOC.  Has prodromal symptoms     Severe persistent asthma (H) 11/10/2015    Since childhood.  Has been steroid dependent for many years. Has had cydney x2       Patient Active Problem List   Diagnosis    Seizures (H)    Thoracic aortic aneurysm without rupture (H)    Moderate persistent asthma without complication    Prediabetes    Pulmonary nodules    Essential hypertension    Osteopenia of multiple sites    Aneurysm of right renal artery (H)    Severe persistent asthma without complication (H)    Abnormal CT scan of head    Left shoulder pain, unspecified chronicity         Physical Exam:   Musculoskeletal: A focused physical examination of the bilateral hands reveals:   Inspection: No edema or ecchymosis.  Arthritic changes to the bilateral hands.  Synovitis diffusely throughout the MCPs.  Mild ulnar drift of the right middle, ring, small fingers.  Palpation: Palpable ulnar subluxation of the extensor tendons to the bilateral middle, ring, small fingers with snapping and extension. Perching of the extensor tendon of the bilateral index fingers ulnarly, no lenny subluxation.  Neurovascular- intact light touch sensation and motor to distribution of the median, ulnar and radial nerves. Brisk capillary refill to the distal fingers. 2+ radial pulse.   Range of Motion: Will to make a full composite fist.  Able to fully extend all fingers.    Imaging:   XR (3 views) bilateral hand obtained 10/16/2024 shows: Polyarticular arthritis.  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  No prior history of inflammatory arthritis, but her rheumatoid factor is elevated.    Plan: We discussed clinical findings, diagnosis, and treatment plan.  Fortunately the patient does not have pain currently.  Because a relative motion orthosis could not be fabricated, I recommend a resting splint with the MCPs in extension to try to centralize the extensor tendons.  She does not have significant MCP arthritis of the joints that are most affected with extensor tendon subluxation, so I do not recommend arthroplasty at this time.  We discussed extensor tendon centralization along with the risks/benefits/recovery.  The patient would like to avoid surgery at this time and would like to begin with the splint.  Surgery may be indicated if splinting fails to improve her symptoms.    -Hand therapy for MCP extension splint  -Rheumatology referral placed for elevated rheumatoid factor and multiple extensor tendon subluxations  -Return to clinic in 6 weeks, no radiographs needed at that time    JERRY THOMAS MD

## 2024-10-15 DIAGNOSIS — M79.641 BILATERAL HAND PAIN: Primary | ICD-10-CM

## 2024-10-15 DIAGNOSIS — M79.642 BILATERAL HAND PAIN: Primary | ICD-10-CM

## 2024-10-16 ENCOUNTER — OFFICE VISIT (OUTPATIENT)
Dept: ORTHOPEDICS | Facility: CLINIC | Age: 79
End: 2024-10-16
Payer: MEDICARE

## 2024-10-16 ENCOUNTER — ANCILLARY PROCEDURE (OUTPATIENT)
Dept: GENERAL RADIOLOGY | Facility: CLINIC | Age: 79
End: 2024-10-16
Attending: STUDENT IN AN ORGANIZED HEALTH CARE EDUCATION/TRAINING PROGRAM
Payer: MEDICARE

## 2024-10-16 DIAGNOSIS — M79.641 RIGHT HAND PAIN: ICD-10-CM

## 2024-10-16 DIAGNOSIS — M79.641 BILATERAL HAND PAIN: ICD-10-CM

## 2024-10-16 DIAGNOSIS — M66.241 NONTRAUMATIC RUPTURE OF SAGITTAL BAND OF EXTENSOR TENDON OF RIGHT UPPER EXTREMITY: Primary | ICD-10-CM

## 2024-10-16 DIAGNOSIS — M79.642 BILATERAL HAND PAIN: ICD-10-CM

## 2024-10-16 PROCEDURE — 99213 OFFICE O/P EST LOW 20 MIN: CPT | Performed by: STUDENT IN AN ORGANIZED HEALTH CARE EDUCATION/TRAINING PROGRAM

## 2024-10-16 PROCEDURE — 73130 X-RAY EXAM OF HAND: CPT | Mod: LT | Performed by: RADIOLOGY

## 2024-10-16 NOTE — LETTER
10/16/2024      Niurka Cisneros  [address]  [place] [address]      Dear Colleague,    Thank you for referring your patient, Niurka Cisneros, to the [facility] [place]. Please see a copy of my visit note below.    Hand Surgery    REFERRING PHYSICIAN: Referred Self   PRIMARY CARE PHYSICIAN: Mara Combs    Diagnosis: Bilateral middle/ring/small extensor tendon subluxation  Treatment: hand therapy     Chief Complaint:   No chief complaint on file.    History of Present Illness:     MAURICE Estes 9/23/2024: Niurka Cisneros is a 79 year old right -hand dominant female who presents for evaluation of snapping of the bilateral middle, ring, small fingers, right worse than left.  Patient reports that she started to develop this about a year ago in her right hand, and has recently started to note symptoms in her left hand as well.  She notes that she was seen by sports medicine last year sometime at which time she was given splints for trigger fingers.  She states it did not help.  She has most difficulty straightening her fingers when doing activities such as typing.    Patient denies history of inflammatory arthropathy, she does note she has been worked up for genetic collagen disorder.  She notes she was diagnosed with a aortic aneurysm and underwent subsequent genetic testing.  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    10/16/2024: She reports that hand therapy was unable to make her relative motion splint due to multiple digit involvement.  She does not have pain in the hand but notes that on the right side she is unable to actively extend the middle/ring/small fingers from a flexed position.  On the left side she has snapping, but is still able to actively extend.    Past Medical History:     Past Medical History:   Diagnosis Date     Abdominal aortic aneurysm without rupture (H) 11/10/2015    [  ] repeat imaging due spring 2016 Descending  aortic aneurysm.  Being monitored with serial angiogram      Aneurysm of right renal artery (H) 04/09/2019     Arthritis 1998     COPD (chronic obstructive pulmonary disease) (H) 11/10/2015    Not sure of this diagnosis.  May be a consequence of asthma.     Esophageal hypertension 01/25/2017     Essential hypertension 01/25/2017     Female bladder prolapse 11/10/2015     Hearing loss      Heart disease 2005     Pulmonary nodules 01/25/2017     Seizures (H) 11/10/2015    Temporal seziure d/o.  Does not have LOC.  Has prodromal symptoms      Severe persistent asthma (H) 11/10/2015    Since childhood.  Has been steroid dependent for many years. Has had cydney x2       Patient Active Problem List   Diagnosis     Seizures (H)     Thoracic aortic aneurysm without rupture (H)     Moderate persistent asthma without complication     Prediabetes     Pulmonary nodules     Essential hypertension     Osteopenia of multiple sites     Aneurysm of right renal artery (H)     Severe persistent asthma without complication (H)     Abnormal CT scan of head     Left shoulder pain, unspecified chronicity         Physical Exam:   Musculoskeletal: A focused physical examination of the bilateral hands reveals:   Inspection: No edema or ecchymosis.  Arthritic changes to the bilateral hands.  Synovitis diffusely throughout the MCPs.  Mild ulnar drift of the right middle, ring, small fingers.  Palpation: Palpable ulnar subluxation of the extensor tendons to the bilateral middle, ring, small fingers with snapping and extension. Perching of the extensor tendon of the bilateral index fingers ulnarly, no lenny subluxation.  Neurovascular- intact light touch sensation and motor to distribution of the median, ulnar and radial nerves. Brisk capillary refill to the distal fingers. 2+ radial pulse.   Range of Motion: Will to make a full composite fist.  Able to fully extend all fingers.    Imaging:   XR (3 views) bilateral hand obtained 10/16/2024  shows: Polyarticular arthritis.  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  No prior history of inflammatory arthritis, but her rheumatoid factor is elevated.    Plan: We discussed clinical findings, diagnosis, and treatment plan.  Fortunately the patient does not have pain currently.  Because a relative motion orthosis could not be fabricated, I recommend a resting splint with the MCPs in extension to try to centralize the extensor tendons.  She does not have significant MCP arthritis of the joints that are most affected with extensor tendon subluxation, so I do not recommend arthroplasty at this time.  We discussed extensor tendon centralization along with the risks/benefits/recovery.  The patient would like to avoid surgery at this time and would like to begin with the splint.  Surgery may be indicated if splinting fails to improve her symptoms.    -Hand therapy for MCP extension splint  -Rheumatology referral placed for elevated rheumatoid factor and multiple extensor tendon subluxations  -Return to clinic in 6 weeks, no radiographs needed at that time    JERRY THOMAS MD      Again, thank you for allowing me to participate in the care of your patient.        Sincerely,        JERRY THOMAS MD

## 2024-10-16 NOTE — NURSING NOTE
Reason For Visit:   Chief Complaint   Patient presents with    RECHECK     Follow-up bilateral extensor tendon subluxation of the right middle, ring and small finger       Primary MD: Mara Combs  Ref. MD: Elmo    Age: 79 year old    ?  No      LMP  (LMP Unknown)       Pain Assessment  Patient Currently in Pain: No    Hand Dominance Evaluation  Hand Dominance: Right          QuickDASH Assessment      10/15/2024     4:42 PM   QuickDASH Main   1. Open a tight or new jar Moderate difficulty   2. Do heavy household chores (e.g., wash walls, floors) Mild difficulty   3. Carry a shopping bag or briefcase No difficulty   4. Wash your back No difficulty   5. Use a knife to cut food No difficulty   6. Recreational activities in which you take some force or impact through your arm, shoulder or hand (e.g., golf, hammering, tennis, etc.) Mild difficulty   7. During the past week, to what extent has your arm, shoulder or hand problem interfered with your normal social activities with family, friends, neighbours or groups Slightly   8. During the past week, were you limited in your work or other regular daily activities as a result of your arm, shoulder or hand problem Not limited at all   9. Arm, shoulder or hand pain None   10.Tingling (pins and needles) in your arm,shoulder or hand None   11. During the past week, how much difficulty have you had sleeping because of the pain in your arm, shoulder or hand No difficulty   Quickdash Ability Score 11.36          Current Outpatient Medications   Medication Sig Dispense Refill    albuterol (PROAIR HFA/PROVENTIL HFA/VENTOLIN HFA) 108 (90 Base) MCG/ACT inhaler Inhale 2 puffs into the lungs every 6 hours as needed for shortness of breath or wheezing 54 g 3    benralizumab (FASENRA) 30 MG/ML SOSY injection Inject 30 mg Subcutaneous once      carBAMazepine (TEGRETOL XR) 200 MG 12 hr tablet Take 1 tablet (200 mg) by mouth daily as needed (seizure) 30 tablet 1     diclofenac (VOLTAREN) 1 % topical gel Apply 4 g topically 4 times daily 350 g 3    estradiol (ESTRING) 7.5 MCG/24HR vaginal ring Place 1 each (1 Vaginal ring) vaginally every 3 months 1 each 4    fluticasone-salmeterol (ADVAIR) 500-50 MCG/ACT inhaler Inhale 1 puff into the lungs 2 times daily 180 each 3    magnesium 250 MG tablet Take 1 tablet by mouth daily      metoprolol succinate ER (TOPROL XL) 25 MG 24 hr tablet TAKE 1 TABLET DAILY WITH   50MG TABLET FOR TOTAL OF   75MG DAILY. 90 tablet 3    metoprolol succinate ER (TOPROL XL) 50 MG 24 hr tablet Take 1 tablet (50 mg) by mouth daily With 25 mg tab for total of 75 mg Daily 90 tablet 3    montelukast (SINGULAIR) 10 MG tablet Take 1 tablet (10 mg) by mouth at bedtime 90 tablet 3    NAPROXEN PO Take 220 mg by mouth as needed       potassium chloride antonietta ER (KLOR-CON M20) 20 MEQ CR tablet Take 1 tablet (20 mEq) by mouth daily 90 tablet 4    pravastatin (PRAVACHOL) 20 MG tablet Take 1 tablet (20 mg) by mouth daily 90 tablet 4    predniSONE (DELTASONE) 20 MG tablet Take 1 tablet (20 mg) by mouth daily (Patient not taking: Reported on 7/23/2024) 10 tablet 0    theophylline (DENIA-24) 400 MG 24 hr capsule Take 1 capsule (400 mg) by mouth daily 90 capsule 3    triamterene-HCTZ (MAXZIDE) 75-50 MG tablet Take 1 tablet by mouth daily 90 tablet 4       Allergies   Allergen Reactions    Quinolones        Tyra Langford, ATC

## 2024-10-21 ENCOUNTER — THERAPY VISIT (OUTPATIENT)
Dept: OCCUPATIONAL THERAPY | Facility: CLINIC | Age: 79
End: 2024-10-21
Payer: MEDICARE

## 2024-10-21 DIAGNOSIS — M79.641 PAIN IN BOTH HANDS: Primary | ICD-10-CM

## 2024-10-21 DIAGNOSIS — M79.642 PAIN IN BOTH HANDS: Primary | ICD-10-CM

## 2024-10-21 PROCEDURE — 97760 ORTHOTIC MGMT&TRAING 1ST ENC: CPT | Mod: GO | Performed by: OCCUPATIONAL THERAPIST

## 2024-11-20 RX ORDER — ALBUTEROL SULFATE 0.83 MG/ML
2.5 SOLUTION RESPIRATORY (INHALATION)
OUTPATIENT
Start: 2024-11-20

## 2024-11-20 RX ORDER — MEPERIDINE HYDROCHLORIDE 25 MG/ML
25 INJECTION INTRAMUSCULAR; INTRAVENOUS; SUBCUTANEOUS
OUTPATIENT
Start: 2024-11-20

## 2024-11-20 RX ORDER — METHYLPREDNISOLONE SODIUM SUCCINATE 40 MG/ML
40 INJECTION INTRAMUSCULAR; INTRAVENOUS
Start: 2024-11-20

## 2024-11-20 RX ORDER — ALBUTEROL SULFATE 90 UG/1
1-2 INHALANT RESPIRATORY (INHALATION)
Start: 2024-11-20

## 2024-11-20 RX ORDER — DIPHENHYDRAMINE HYDROCHLORIDE 50 MG/ML
50 INJECTION INTRAMUSCULAR; INTRAVENOUS
Start: 2024-11-20

## 2024-11-20 RX ORDER — DIPHENHYDRAMINE HYDROCHLORIDE 50 MG/ML
25 INJECTION INTRAMUSCULAR; INTRAVENOUS
Start: 2024-11-20

## 2024-11-20 RX ORDER — EPINEPHRINE 1 MG/ML
0.3 INJECTION, SOLUTION, CONCENTRATE INTRAVENOUS EVERY 5 MIN PRN
OUTPATIENT
Start: 2024-11-20

## 2024-11-20 NOTE — PROGRESS NOTES
Hand Surgery    REFERRING PHYSICIAN: Referred Self   PRIMARY CARE PHYSICIAN: Mara Combs    Diagnosis: Bilateral middle/ring/small extensor tendon subluxation  Treatment: hand therapy     Chief Complaint:   No chief complaint on file.    History of Present Illness:     MAURICE Estes 9/23/2024: Niurka Cisneros is a 79 year old right -hand dominant female who presents for evaluation of snapping of the bilateral middle, ring, small fingers, right worse than left.  Patient reports that she started to develop this about a year ago in her right hand, and has recently started to note symptoms in her left hand as well.  She notes that she was seen by sports medicine last year sometime at which time she was given splints for trigger fingers.  She states it did not help.  She has most difficulty straightening her fingers when doing activities such as typing.    Patient denies history of inflammatory arthropathy, she does note she has been worked up for genetic collagen disorder.  She notes she was diagnosed with a aortic aneurysm and underwent subsequent genetic testing.  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    10/16/2024: She reports that hand therapy was unable to make her relative motion splint due to multiple digit involvement.  She does not have pain in the hand but notes that on the right side she is unable to actively extend the middle/ring/small fingers from a flexed position.  On the left side she has snapping, but is still able to actively extend.    11/27/2024: No change in her symptoms with the splint. She has not seen rheumatology. No pain in the hand but still has difficulty with motion of the right middle/ring/small fingers primarily    Past Medical History:     Past Medical History:   Diagnosis Date    Abdominal aortic aneurysm without rupture (H) 11/10/2015    [  ] repeat imaging due spring 2016 Descending aortic aneurysm.  Being monitored with serial angiogram      Aneurysm of right renal artery (H) 04/09/2019    Arthritis 1998    COPD (chronic obstructive pulmonary disease) (H) 11/10/2015    Not sure of this diagnosis.  May be a consequence of asthma.    Esophageal hypertension 01/25/2017    Essential hypertension 01/25/2017    Female bladder prolapse 11/10/2015    Hearing loss     Heart disease 2005    Pulmonary nodules 01/25/2017    Seizures (H) 11/10/2015    Temporal seziure d/o.  Does not have LOC.  Has prodromal symptoms     Severe persistent asthma (H) 11/10/2015    Since childhood.  Has been steroid dependent for many years. Has had cydney x2       Patient Active Problem List   Diagnosis    Seizures (H)    Thoracic aortic aneurysm without rupture (H)    Moderate persistent asthma without complication    Prediabetes    Pulmonary nodules    Essential hypertension    Osteopenia of multiple sites    Aneurysm of right renal artery (H)    Severe persistent asthma without complication (H)    Abnormal CT scan of head    Left shoulder pain, unspecified chronicity    Pain in both hands         Physical Exam:   Musculoskeletal: A focused physical examination of the bilateral hands reveals:   No edema or ecchymosis.  Arthritic changes to the bilateral hands.    Synovitis diffusely throughout the MCPs.    Mild ulnar drift of the right middle, ring, small fingers. Right worse then left.  Palpable ulnar subluxation of the extensor tendons to the bilateral middle, ring, small fingers with snapping and extension.   Perching of the extensor tendon of the bilateral index fingers ulnarly, no lenny subluxation.  Synovitis right index MCP.  Neurovascular- intact light touch sensation and motor to distribution of the median, ulnar and radial nerves.   Brisk capillary refill to the distal fingers.  Range of Motion: Able to make a full composite fist.  Able to fully extend all fingers, but difficulty with right MF/RF/SF    Imaging:   XR (3 views) bilateral hand obtained 10/16/2024 shows:  Polyarticular arthritis.  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  No prior history of inflammatory arthritis, but her rheumatoid factor is elevated.    Plan: We discussed clinical findings, diagnosis, and treatment plan.  Fortunately the patient does not have pain currently.  Because a relative motion orthosis could not be fabricated, I recommend a resting splint with the MCPs in extension to try to centralize the extensor tendons.  She does not have significant MCP arthritis of the joints that are most affected with extensor tendon subluxation, so I do not recommend arthroplasty at this time.  We discussed extensor tendon centralization along with the risks/benefits/recovery.  The patient would like to avoid surgery at this time and would like to try splint adjustments.  Surgery may be indicated if splinting fails to improve her symptoms.    -Continue with MCP extension splint, discussed adjustments with hand therapy to avoid ulnar deviation  -Rheumatology referral previously placed for elevated rheumatoid factor and multiple extensor tendon subluxations. Patient has not yet scheduled  -Return to clinic in 4-6 weeks for symptom check    JERRY MD MARTHA

## 2024-11-24 ENCOUNTER — HEALTH MAINTENANCE LETTER (OUTPATIENT)
Age: 79
End: 2024-11-24

## 2024-11-25 ENCOUNTER — INFUSION THERAPY VISIT (OUTPATIENT)
Dept: INFUSION THERAPY | Facility: CLINIC | Age: 79
End: 2024-11-25
Payer: MEDICARE

## 2024-11-25 VITALS
TEMPERATURE: 97.5 F | SYSTOLIC BLOOD PRESSURE: 124 MMHG | OXYGEN SATURATION: 97 % | RESPIRATION RATE: 18 BRPM | DIASTOLIC BLOOD PRESSURE: 70 MMHG | HEART RATE: 66 BPM

## 2024-11-25 DIAGNOSIS — J45.50 SEVERE PERSISTENT ASTHMA WITHOUT COMPLICATION (H): Primary | ICD-10-CM

## 2024-11-25 PROCEDURE — 96372 THER/PROPH/DIAG INJ SC/IM: CPT

## 2024-11-25 PROCEDURE — 250N000011 HC RX IP 250 OP 636: Mod: JZ

## 2024-11-25 RX ORDER — ALBUTEROL SULFATE 90 UG/1
1-2 INHALANT RESPIRATORY (INHALATION)
Start: 2025-01-10

## 2024-11-25 RX ORDER — DIPHENHYDRAMINE HYDROCHLORIDE 50 MG/ML
25 INJECTION INTRAMUSCULAR; INTRAVENOUS
Start: 2025-01-10

## 2024-11-25 RX ORDER — METHYLPREDNISOLONE SODIUM SUCCINATE 40 MG/ML
40 INJECTION INTRAMUSCULAR; INTRAVENOUS
Start: 2025-01-10

## 2024-11-25 RX ORDER — ALBUTEROL SULFATE 0.83 MG/ML
2.5 SOLUTION RESPIRATORY (INHALATION)
OUTPATIENT
Start: 2025-01-10

## 2024-11-25 RX ORDER — DIPHENHYDRAMINE HYDROCHLORIDE 50 MG/ML
50 INJECTION INTRAMUSCULAR; INTRAVENOUS
Start: 2025-01-10

## 2024-11-25 RX ORDER — EPINEPHRINE 1 MG/ML
0.3 INJECTION, SOLUTION INTRAMUSCULAR; SUBCUTANEOUS EVERY 5 MIN PRN
OUTPATIENT
Start: 2025-01-10

## 2024-11-25 RX ORDER — MEPERIDINE HYDROCHLORIDE 25 MG/ML
25 INJECTION INTRAMUSCULAR; INTRAVENOUS; SUBCUTANEOUS
OUTPATIENT
Start: 2025-01-10

## 2024-11-25 RX ADMIN — BENRALIZUMAB 30 MG: 30 INJECTION, SOLUTION SUBCUTANEOUS at 09:38

## 2024-11-25 ASSESSMENT — PAIN SCALES - GENERAL: PAINLEVEL_OUTOF10: NO PAIN (0)

## 2024-11-25 NOTE — PROGRESS NOTES
Nursing Note:    Niurka Cisneros presents today for Fasenra.   Patient seen by provider today: No   present during visit today: Not Applicable.     Note: Ok to proceed.     Treatment Conditions:  Biological Infusion Checklist:  ~~~ NOTE: If the patient answers yes to any of the questions below, hold the infusion and contact ordering provider or on-call provider.    Have you recently had an elevated temperature, fever, chills, productive cough, coughing for 3 weeks or longer or hemoptysis,  abnormal vital signs, night sweats,  chest pain or have you noticed a decrease in your appetite, unexplained weight loss or fatigue? No  Do you have any open wounds or new incisions? No  Do you have any upcoming hospitalizations or surgeries? Does not include esophagogastroduodenoscopy, colonoscopy, endoscopic retrograde cholangiopancreatography (ERCP), endoscopic ultrasound (EUS), dental procedures or joint aspiration/steroid injections No  Do you currently have any signs of illness or infection or are you on any antibiotics? No  Have you had any new, sudden or worsening abdominal pain? No  Have you or anyone in your household received a live vaccination in the past 4 weeks? Please note: No live vaccines while on biologic/chemotherapy until 6 months after the last treatment. Patient can receive the flu vaccine (shot only), pneumovax and the Covid vaccine. It is optimal for the patient to get these vaccines mid cycle, but they can be given at any time as long as it is not on the day of the infusion. No  Have you recently been diagnosed with any new nervous system diseases (ie. Multiple sclerosis, Guillain [place], seizures, neurological changes) or cancer diagnosis? Are you on any form of radiation or chemotherapy? No  Are you pregnant or breast feeding or do you have plans of pregnancy in the future? No  Have you been having any signs of worsening depression or suicidal ideations?  (benlysta only) No  Have there been  any other new onset medical symptoms? No  Have you had any new blood clots? (IVIG only) No  I released the medication and delegated administration to the supportive staff team member. Please see MAR and administration note for additional details.     Maddie Barber RN

## 2024-11-25 NOTE — PROGRESS NOTES
Patient presents to the [facility] for Fasenra injectio.  Order written by Dr. Vega was completed today. Name and  verified with patient. See MAR for medication details. Medication was provided in one syringe by pharmacy and given in the following sites RAJENDRA. Patient tolerated injection well and was observed for 30 minutes. Patient was discharged to home. Patient to return for next appt in eight weeks. Message sent to scheduling.  Administrations This Visit       benralizumab (FASENRA) injection 30 mg       Admin Date  2024 Action  $Given Dose  30 mg Route  Subcutaneous Documented By  Kay Edwards MA

## 2024-11-26 ENCOUNTER — TELEPHONE (OUTPATIENT)
Dept: PULMONOLOGY | Facility: CLINIC | Age: 79
End: 2024-11-26
Payer: MEDICARE

## 2024-11-26 DIAGNOSIS — J45.50 SEVERE PERSISTENT ASTHMA WITHOUT COMPLICATION (H): Primary | ICD-10-CM

## 2024-11-27 ENCOUNTER — THERAPY VISIT (OUTPATIENT)
Dept: OCCUPATIONAL THERAPY | Facility: CLINIC | Age: 79
End: 2024-11-27
Payer: COMMERCIAL

## 2024-11-27 ENCOUNTER — OFFICE VISIT (OUTPATIENT)
Dept: ORTHOPEDICS | Facility: CLINIC | Age: 79
End: 2024-11-27
Payer: COMMERCIAL

## 2024-11-27 DIAGNOSIS — M79.641 PAIN IN BOTH HANDS: Primary | ICD-10-CM

## 2024-11-27 DIAGNOSIS — M66.241 NONTRAUMATIC RUPTURE OF SAGITTAL BAND OF EXTENSOR TENDON OF RIGHT UPPER EXTREMITY: Primary | ICD-10-CM

## 2024-11-27 DIAGNOSIS — M79.642 PAIN IN BOTH HANDS: Primary | ICD-10-CM

## 2024-11-27 PROCEDURE — 97763 ORTHC/PROSTC MGMT SBSQ ENC: CPT | Mod: GO | Performed by: OCCUPATIONAL THERAPIST

## 2024-11-27 NOTE — LETTER
11/27/2024      Niurka Cisneros  [address]  [place] [address]      Dear Colleague,    Thank you for referring your patient, Niurka Cisneros, to the [facility] [place]. Please see a copy of my visit note below.    Hand Surgery    REFERRING PHYSICIAN: Referred Self   PRIMARY CARE PHYSICIAN: Mara Combs    Diagnosis: Bilateral middle/ring/small extensor tendon subluxation  Treatment: hand therapy     Chief Complaint:   No chief complaint on file.    History of Present Illness:     MAURICE Estes 9/23/2024: Niurka Cisneros is a 79 year old right -hand dominant female who presents for evaluation of snapping of the bilateral middle, ring, small fingers, right worse than left.  Patient reports that she started to develop this about a year ago in her right hand, and has recently started to note symptoms in her left hand as well.  She notes that she was seen by sports medicine last year sometime at which time she was given splints for trigger fingers.  She states it did not help.  She has most difficulty straightening her fingers when doing activities such as typing.    Patient denies history of inflammatory arthropathy, she does note she has been worked up for genetic collagen disorder.  She notes she was diagnosed with a aortic aneurysm and underwent subsequent genetic testing.  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    10/16/2024: She reports that hand therapy was unable to make her relative motion splint due to multiple digit involvement.  She does not have pain in the hand but notes that on the right side she is unable to actively extend the middle/ring/small fingers from a flexed position.  On the left side she has snapping, but is still able to actively extend.    11/27/2024: No change in her symptoms with the splint. She has not seen rheumatology. No pain in the hand but still has difficulty with motion of the right middle/ring/small  fingers primarily    Past Medical History:     Past Medical History:   Diagnosis Date     Abdominal aortic aneurysm without rupture (H) 11/10/2015    [  ] repeat imaging due spring 2016 Descending aortic aneurysm.  Being monitored with serial angiogram      Aneurysm of right renal artery (H) 04/09/2019     Arthritis 1998     COPD (chronic obstructive pulmonary disease) (H) 11/10/2015    Not sure of this diagnosis.  May be a consequence of asthma.     Esophageal hypertension 01/25/2017     Essential hypertension 01/25/2017     Female bladder prolapse 11/10/2015     Hearing loss      Heart disease 2005     Pulmonary nodules 01/25/2017     Seizures (H) 11/10/2015    Temporal seziure d/o.  Does not have LOC.  Has prodromal symptoms      Severe persistent asthma (H) 11/10/2015    Since childhood.  Has been steroid dependent for many years. Has had cydney x2       Patient Active Problem List   Diagnosis     Seizures (H)     Thoracic aortic aneurysm without rupture (H)     Moderate persistent asthma without complication     Prediabetes     Pulmonary nodules     Essential hypertension     Osteopenia of multiple sites     Aneurysm of right renal artery (H)     Severe persistent asthma without complication (H)     Abnormal CT scan of head     Left shoulder pain, unspecified chronicity     Pain in both hands         Physical Exam:   Musculoskeletal: A focused physical examination of the bilateral hands reveals:   No edema or ecchymosis.  Arthritic changes to the bilateral hands.    Synovitis diffusely throughout the MCPs.    Mild ulnar drift of the right middle, ring, small fingers. Right worse then left.  Palpable ulnar subluxation of the extensor tendons to the bilateral middle, ring, small fingers with snapping and extension.   Perching of the extensor tendon of the bilateral index fingers ulnarly, no lenny subluxation.  Synovitis right index MCP.  Neurovascular- intact light touch sensation and motor to distribution of the  median, ulnar and radial nerves.   Brisk capillary refill to the distal fingers.  Range of Motion: Able to make a full composite fist.  Able to fully extend all fingers, but difficulty with right MF/RF/SF    Imaging:   XR (3 views) bilateral hand obtained 10/16/2024 shows: Polyarticular arthritis.  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  No prior history of inflammatory arthritis, but her rheumatoid factor is elevated.    Plan: We discussed clinical findings, diagnosis, and treatment plan.  Fortunately the patient does not have pain currently.  Because a relative motion orthosis could not be fabricated, I recommend a resting splint with the MCPs in extension to try to centralize the extensor tendons.  She does not have significant MCP arthritis of the joints that are most affected with extensor tendon subluxation, so I do not recommend arthroplasty at this time.  We discussed extensor tendon centralization along with the risks/benefits/recovery.  The patient would like to avoid surgery at this time and would like to try splint adjustments.  Surgery may be indicated if splinting fails to improve her symptoms.    -Continue with MCP extension splint, discussed adjustments with hand therapy to avoid ulnar deviation  -Rheumatology referral previously placed for elevated rheumatoid factor and multiple extensor tendon subluxations. Patient has not yet scheduled  -Return to clinic in 4-6 weeks for symptom check    JERRY THOMAS MD      Again, thank you for allowing me to participate in the care of your patient.        Sincerely,        JERRY THOMAS MD

## 2024-11-27 NOTE — NURSING NOTE
Reason For Visit:   Chief Complaint   Patient presents with    RECHECK     Follow-up bilateral extensor tendon subluxation of the right middle, ring and small finger           Primary MD: Mara Combs  Ref. MD: Elmo    Age: 79 year old    ?  No      LMP  (LMP Unknown)       Pain Assessment  Patient Currently in Pain: No    Hand Dominance Evaluation  Hand Dominance: Right          QuickDASH Assessment      11/22/2024    10:32 AM   QuickDASH Main   1. Open a tight or new jar Mild difficulty   2. Do heavy household chores (e.g., wash walls, floors) Mild difficulty   3. Carry a shopping bag or briefcase No difficulty   4. Wash your back No difficulty   5. Use a knife to cut food No difficulty   6. Recreational activities in which you take some force or impact through your arm, shoulder or hand (e.g., golf, hammering, tennis, etc.) Mild difficulty   7. During the past week, to what extent has your arm, shoulder or hand problem interfered with your normal social activities with family, friends, neighbours or groups Slightly   8. During the past week, were you limited in your work or other regular daily activities as a result of your arm, shoulder or hand problem Slightly limited   9. Arm, shoulder or hand pain None   10.Tingling (pins and needles) in your arm,shoulder or hand None   11. During the past week, how much difficulty have you had sleeping because of the pain in your arm, shoulder or hand No difficulty   Quickdash Ability Score 11.36          Current Outpatient Medications   Medication Sig Dispense Refill    albuterol (PROAIR HFA/PROVENTIL HFA/VENTOLIN HFA) 108 (90 Base) MCG/ACT inhaler Inhale 2 puffs into the lungs every 6 hours as needed for shortness of breath or wheezing 54 g 3    benralizumab (FASENRA) 30 MG/ML SOSY injection Inject 30 mg Subcutaneous once      carBAMazepine (TEGRETOL XR) 200 MG 12 hr tablet Take 1 tablet (200 mg) by mouth daily as needed (seizure) 30 tablet 1     diclofenac (VOLTAREN) 1 % topical gel Apply 4 g topically 4 times daily 350 g 3    estradiol (ESTRING) 7.5 MCG/24HR vaginal ring Place 1 each (1 Vaginal ring) vaginally every 3 months 1 each 4    fluticasone-salmeterol (ADVAIR) 500-50 MCG/ACT inhaler Inhale 1 puff into the lungs 2 times daily 180 each 3    magnesium 250 MG tablet Take 1 tablet by mouth daily      metoprolol succinate ER (TOPROL XL) 25 MG 24 hr tablet TAKE 1 TABLET DAILY WITH   50MG TABLET FOR TOTAL OF   75MG DAILY. 90 tablet 3    metoprolol succinate ER (TOPROL XL) 50 MG 24 hr tablet Take 1 tablet (50 mg) by mouth daily With 25 mg tab for total of 75 mg Daily 90 tablet 3    montelukast (SINGULAIR) 10 MG tablet Take 1 tablet (10 mg) by mouth at bedtime 90 tablet 3    NAPROXEN PO Take 220 mg by mouth as needed       potassium chloride antonietta ER (KLOR-CON M20) 20 MEQ CR tablet Take 1 tablet (20 mEq) by mouth daily 90 tablet 4    pravastatin (PRAVACHOL) 20 MG tablet Take 1 tablet (20 mg) by mouth daily 90 tablet 4    predniSONE (DELTASONE) 20 MG tablet Take 1 tablet (20 mg) by mouth daily (Patient not taking: Reported on 7/23/2024) 10 tablet 0    theophylline (DENIA-24) 400 MG 24 hr capsule Take 1 capsule (400 mg) by mouth daily 90 capsule 3    triamterene-HCTZ (MAXZIDE) 75-50 MG tablet Take 1 tablet by mouth daily 90 tablet 4       Allergies   Allergen Reactions    Quinolones        Tyra Langford, ATC

## 2024-11-29 NOTE — PROGRESS NOTES
[facility] Dermatology Note  Encounter Date: Dec 4, 2024  Office Visit     Dermatology Problem List:  Last FBSC performed on 12/04/24    1. FHx melanoma (daughter).  2. AK, nasal bridge   - s/p cryo 02/16/2024  3. iSK, L lateral breast  - s/p cryo 02/16/2024     Social Hx: Retired Pediatrician    # Lesion to monitor   - R upper chest, flesh color stuck on thin papule measuring 1 cm, dermoscopy with cerebriform architecture.suspect SK photo obtained 12/04/2024    ____________________________________________    Assessment & Plan:    #Lesion to Monitor, photo obtained 10/27/23  - Left arm. Resolved.    # Lesion to monitor   - R upper chest, flesh color stuck on thin papule measuring 1 cm, dermoscopy with cerebriform architecture. photo obtained 12/04/2024    # AK, L infraorbital cheek, L central cheek  - given subtle appearence, will monitor     # Multiple benign nevi.   - Monitor for ABCDEs of melanoma   - Continue sun protection - recommend SPF 30 or higher with frequent application   - Return sooner if noticing changing or symptomatic lesions    # Benign lesions - SKs, cherry angiomas, lentigenes.  - No treatment required     Procedures Performed:     Follow-up: in 1 year for FBSE, sooner if concerns.     Staff and Scribe:     Scribe Disclosure:   I, Karen Rodríguez, am serving as a scribe; to document services personally performed by Laura Bia MD -based on data collection and the provider's statements to me.     Provider Disclosure:   The documentation recorded by the scribe accurately reflects the services I personally performed and the decisions made by me.    Laura Bai MD    Department of Dermatology  [facility]: Phone: [phone], Fax: [phone]  12/4/2024         ____________________________________________    CC: Skin Check (Here today for a skin check. No concerns.  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    The patient reports that she has no specific spots of concern today. She denies having any areas on her skin that are growing, changing, bleeding, or painful. Overall, she reports her life and health are going well.     Patient is otherwise feeling well, without additional skin concerns.    Labs Reviewed:  N/A    Physical exam:  Vitals: LMP  (LMP Unknown)   GEN: This is a well developed, well-nourished female in no acute distress, in a pleasant mood.    SKIN: Total skin excluding the undergarment areas was performed. The exam included the head/face, neck, both arms, chest, back, abdomen, both legs, digits and/or nails.   - 2 very subtle gritty pink macules, L infraorbital cheek, L central cheek  - R upper chest, flesh color stuck on thin papule measuring 1 cm, dermoscopy with cerebriform architecture   - There are dome shaped bright red papules on the trunk and extremities .   - Multiple regular brown pigmented macules and papules are identified on the trunk and extremities. .   - Scattered brown macules on sun exposed areas.  - Waxy stuck on papules and plaques on trunk and extremities.    - No other lesions of concern on areas examined.       Medications:  Current Outpatient Medications   Medication Sig Dispense Refill    albuterol (PROAIR HFA/PROVENTIL HFA/VENTOLIN HFA) 108 (90 Base) MCG/ACT inhaler Inhale 2 puffs into the lungs every 6 hours as needed for shortness of breath or wheezing. 54 g 5    benralizumab (FASENRA) 30 MG/ML SOSY injection Inject 30 mg Subcutaneous once      carBAMazepine (TEGRETOL XR) 200 MG 12 hr tablet Take 1 tablet (200 mg) by mouth daily as needed (seizure) 30 tablet 1    diclofenac (VOLTAREN) 1 % topical gel Apply 4 g topically 4 times daily 350 g 3    estradiol (ESTRING) 7.5 MCG/24HR vaginal ring Place 1 each (1 Vaginal ring) vaginally every 3 months 1 each 4     fluticasone-salmeterol (ADVAIR) 500-50 MCG/ACT inhaler Inhale 1 puff into the lungs 2 times daily. 180 each 0    magnesium 250 MG tablet Take 1 tablet by mouth daily      metoprolol succinate ER (TOPROL XL) 25 MG 24 hr tablet TAKE 1 TABLET DAILY WITH   50MG TABLET FOR TOTAL OF   75MG DAILY. 90 tablet 3    metoprolol succinate ER (TOPROL XL) 50 MG 24 hr tablet Take 1 tablet (50 mg) by mouth daily With 25 mg tab for total of 75 mg Daily 90 tablet 3    montelukast (SINGULAIR) 10 MG tablet Take 1 tablet (10 mg) by mouth at bedtime 90 tablet 3    NAPROXEN PO Take 220 mg by mouth as needed       potassium chloride antonietta ER (KLOR-CON M20) 20 MEQ CR tablet Take 1 tablet (20 mEq) by mouth daily 90 tablet 4    pravastatin (PRAVACHOL) 20 MG tablet Take 1 tablet (20 mg) by mouth daily 90 tablet 4    theophylline (DENIA-24) 400 MG 24 hr capsule Take 1 capsule (400 mg) by mouth daily. 90 capsule 3    triamterene-HCTZ (MAXZIDE) 75-50 MG tablet Take 1 tablet by mouth daily 90 tablet 4     No current facility-administered medications for this visit.      Past Medical History:   Patient Active Problem List   Diagnosis    Seizures (H)    Thoracic aortic aneurysm without rupture (H)    Moderate persistent asthma without complication    Prediabetes    Pulmonary nodules    Essential hypertension    Osteopenia of multiple sites    Aneurysm of right renal artery (H)    Severe persistent asthma without complication (H)    Abnormal CT scan of head    Left shoulder pain, unspecified chronicity    Pain in both hands     Past Medical History:   Diagnosis Date    Abdominal aortic aneurysm without rupture (H) 11/10/2015    [  ] repeat imaging due spring 2016 Descending aortic aneurysm.  Being monitored with serial angiogram     Aneurysm of right renal artery (H) 04/09/2019    Arthritis 1998    COPD (chronic obstructive pulmonary disease) (H) 11/10/2015    Not sure of this diagnosis.  May be a consequence of asthma.    Esophageal hypertension  01/25/2017    Essential hypertension 01/25/2017    Female bladder prolapse 11/10/2015    Hearing loss     Heart disease 2005    Pulmonary nodules 01/25/2017    Seizures (H) 11/10/2015    Temporal seziure d/o.  Does not have LOC.  Has prodromal symptoms     Severe persistent asthma (H) 11/10/2015    Since childhood.  Has been steroid dependent for many years. Has had cydney x2          CC No referring provider defined for this encounter. on close of this encounter.

## 2024-12-02 DIAGNOSIS — J45.50 SEVERE PERSISTENT ASTHMA, UNSPECIFIED WHETHER COMPLICATED (H): ICD-10-CM

## 2024-12-03 DIAGNOSIS — J45.50 SEVERE PERSISTENT ASTHMA, UNSPECIFIED WHETHER COMPLICATED (H): ICD-10-CM

## 2024-12-03 RX ORDER — ALBUTEROL SULFATE 90 UG/1
2 INHALANT RESPIRATORY (INHALATION) EVERY 6 HOURS PRN
Qty: 54 G | Refills: 5 | Status: SHIPPED | OUTPATIENT
Start: 2024-12-03

## 2024-12-03 RX ORDER — FLUTICASONE PROPIONATE AND SALMETEROL 500; 50 UG/1; UG/1
1 POWDER RESPIRATORY (INHALATION) 2 TIMES DAILY
Qty: 180 EACH | Refills: 0 | Status: SHIPPED | OUTPATIENT
Start: 2024-12-03

## 2024-12-04 ENCOUNTER — OFFICE VISIT (OUTPATIENT)
Dept: DERMATOLOGY | Facility: CLINIC | Age: 79
End: 2024-12-04
Attending: DERMATOLOGY
Payer: COMMERCIAL

## 2024-12-04 DIAGNOSIS — D18.01 CHERRY ANGIOMA: ICD-10-CM

## 2024-12-04 DIAGNOSIS — L82.1 SEBORRHEIC KERATOSES: ICD-10-CM

## 2024-12-04 DIAGNOSIS — Z12.83 SKIN CANCER SCREENING: ICD-10-CM

## 2024-12-04 DIAGNOSIS — L81.4 SOLAR LENTIGO: ICD-10-CM

## 2024-12-04 DIAGNOSIS — Z80.8 FAMILY HISTORY OF MELANOMA: Primary | ICD-10-CM

## 2024-12-04 DIAGNOSIS — D22.9 MULTIPLE BENIGN NEVI: ICD-10-CM

## 2024-12-04 DIAGNOSIS — L57.0 ACTINIC KERATOSES: ICD-10-CM

## 2024-12-04 PROCEDURE — 99213 OFFICE O/P EST LOW 20 MIN: CPT | Performed by: DERMATOLOGY

## 2024-12-04 ASSESSMENT — PAIN SCALES - GENERAL: PAINLEVEL_OUTOF10: NO PAIN (0)

## 2024-12-04 NOTE — PATIENT INSTRUCTIONS

## 2024-12-04 NOTE — LETTER
12/4/2024       RE: Niurka Cisneros  [address]  [place] [address]     Dear Colleague,    Thank you for referring your patient, Niurka Cisneros, to the [facility] [place] at [facility]. Please see a copy of my visit note below.    [facility] Dermatology Note  Encounter Date: Dec 4, 2024  Office Visit     Dermatology Problem List:  Last FBSC performed on 12/04/24    1. FHx melanoma (daughter).  2. AK, nasal bridge   - s/p cryo 02/16/2024  3. iSK, L lateral breast  - s/p cryo 02/16/2024     Social Hx: Retired Pediatrician    # Lesion to monitor   - R upper chest, flesh color stuck on thin papule measuring 1 cm, dermoscopy with cerebriform architecture.suspect SK photo obtained 12/04/2024    ____________________________________________    Assessment & Plan:    #Lesion to Monitor, photo obtained 10/27/23  - Left arm. Resolved.    # Lesion to monitor   - R upper chest, flesh color stuck on thin papule measuring 1 cm, dermoscopy with cerebriform architecture. photo obtained 12/04/2024    # AK, L infraorbital cheek, L central cheek  - given subtle appearence, will monitor     # Multiple benign nevi.   - Monitor for ABCDEs of melanoma   - Continue sun protection - recommend SPF 30 or higher with frequent application   - Return sooner if noticing changing or symptomatic lesions    # Benign lesions - SKs, cherry angiomas, lentigenes.  - No treatment required     Procedures Performed:     Follow-up: in 1 year for FBSE, sooner if concerns.     Staff and Scribe:     Scribe Disclosure:   I, Karen Rodríguez, am serving as a scribe; to document services personally performed by Laura Bai MD -based on data collection and the provider's statements to me.     Provider Disclosure:   The documentation recorded by the scribe accurately reflects the services I personally performed and the decisions made by  me.    Laura Bai MD    Department of Dermatology  [facility]: Phone: [phone], Fax: [phone]  12/4/2024         ____________________________________________    CC: Skin Check (Here today for a skin check. No concerns. 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    The patient reports that she has no specific spots of concern today. She denies having any areas on her skin that are growing, changing, bleeding, or painful. Overall, she reports her life and health are going well.     Patient is otherwise feeling well, without additional skin concerns.    Labs Reviewed:  N/A    Physical exam:  Vitals: LMP  (LMP Unknown)   GEN: This is a well developed, well-nourished female in no acute distress, in a pleasant mood.    SKIN: Total skin excluding the undergarment areas was performed. The exam included the head/face, neck, both arms, chest, back, abdomen, both legs, digits and/or nails.   - 2 very subtle gritty pink macules, L infraorbital cheek, L central cheek  - R upper chest, flesh color stuck on thin papule measuring 1 cm, dermoscopy with cerebriform architecture   - There are dome shaped bright red papules on the trunk and extremities .   - Multiple regular brown pigmented macules and papules are identified on the trunk and extremities. .   - Scattered brown macules on sun exposed areas.  - Waxy stuck on papules and plaques on trunk and extremities.    - No other lesions of concern on areas examined.       Medications:  Current Outpatient Medications   Medication Sig Dispense Refill     albuterol (PROAIR HFA/PROVENTIL HFA/VENTOLIN HFA) 108 (90 Base) MCG/ACT inhaler Inhale 2 puffs into the lungs every 6 hours as needed for shortness of breath or wheezing. 54 g 5     benralizumab (FASENRA) 30 MG/ML SOSY injection Inject 30 mg Subcutaneous once        carBAMazepine (TEGRETOL XR) 200 MG 12 hr tablet Take 1 tablet (200 mg) by mouth daily as needed (seizure) 30 tablet 1     diclofenac (VOLTAREN) 1 % topical gel Apply 4 g topically 4 times daily 350 g 3     estradiol (ESTRING) 7.5 MCG/24HR vaginal ring Place 1 each (1 Vaginal ring) vaginally every 3 months 1 each 4     fluticasone-salmeterol (ADVAIR) 500-50 MCG/ACT inhaler Inhale 1 puff into the lungs 2 times daily. 180 each 0     magnesium 250 MG tablet Take 1 tablet by mouth daily       metoprolol succinate ER (TOPROL XL) 25 MG 24 hr tablet TAKE 1 TABLET DAILY WITH   50MG TABLET FOR TOTAL OF   75MG DAILY. 90 tablet 3     metoprolol succinate ER (TOPROL XL) 50 MG 24 hr tablet Take 1 tablet (50 mg) by mouth daily With 25 mg tab for total of 75 mg Daily 90 tablet 3     montelukast (SINGULAIR) 10 MG tablet Take 1 tablet (10 mg) by mouth at bedtime 90 tablet 3     NAPROXEN PO Take 220 mg by mouth as needed        potassium chloride antonietta ER (KLOR-CON M20) 20 MEQ CR tablet Take 1 tablet (20 mEq) by mouth daily 90 tablet 4     pravastatin (PRAVACHOL) 20 MG tablet Take 1 tablet (20 mg) by mouth daily 90 tablet 4     theophylline (DENIA-24) 400 MG 24 hr capsule Take 1 capsule (400 mg) by mouth daily. 90 capsule 3     triamterene-HCTZ (MAXZIDE) 75-50 MG tablet Take 1 tablet by mouth daily 90 tablet 4     No current facility-administered medications for this visit.      Past Medical History:   Patient Active Problem List   Diagnosis     Seizures (H)     Thoracic aortic aneurysm without rupture (H)     Moderate persistent asthma without complication     Prediabetes     Pulmonary nodules     Essential hypertension     Osteopenia of multiple sites     Aneurysm of right renal artery (H)     Severe persistent asthma without complication (H)     Abnormal CT scan of head     Left shoulder pain, unspecified chronicity     Pain in both hands     Past Medical History:   Diagnosis Date     Abdominal aortic aneurysm without rupture (H)  11/10/2015    [  ] repeat imaging due spring 2016 Descending aortic aneurysm.  Being monitored with serial angiogram      Aneurysm of right renal artery (H) 04/09/2019     Arthritis 1998     COPD (chronic obstructive pulmonary disease) (H) 11/10/2015    Not sure of this diagnosis.  May be a consequence of asthma.     Esophageal hypertension 01/25/2017     Essential hypertension 01/25/2017     Female bladder prolapse 11/10/2015     Hearing loss      Heart disease 2005     Pulmonary nodules 01/25/2017     Seizures (H) 11/10/2015    Temporal seziure d/o.  Does not have LOC.  Has prodromal symptoms      Severe persistent asthma (H) 11/10/2015    Since childhood.  Has been steroid dependent for many years. Has had cydney x2          CC No referring provider defined for this encounter. on close of this encounter.      Again, thank you for allowing me to participate in the care of your patient.      Sincerely,    Laura Bai MD

## 2024-12-31 NOTE — PROGRESS NOTES
Hand Surgery    REFERRING PHYSICIAN: Referred Self   PRIMARY CARE PHYSICIAN: Mara Combs    Diagnosis: Bilateral middle/ring/small extensor tendon subluxation  Treatment: hand therapy     Chief Complaint:   RECHECK (Follow-up bilateral extensor tendon subluxation of the right middle, ring and small finger//)    History of Present Illness:     MAURICE Estes 9/23/2024: Niurka Cisneros is a 79 year old right -hand dominant female who presents for evaluation of snapping of the bilateral middle, ring, small fingers, right worse than left.  Patient reports that she started to develop this about a year ago in her right hand, and has recently started to note symptoms in her left hand as well.  She notes that she was seen by sports medicine last year sometime at which time she was given splints for trigger fingers.  She states it did not help.  She has most difficulty straightening her fingers when doing activities such as typing.    Patient denies history of inflammatory arthropathy, she does note she has been worked up for genetic collagen disorder.  She notes she was diagnosed with a aortic aneurysm and underwent subsequent genetic testing.  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    10/16/2024: She reports that hand therapy was unable to make her relative motion splint due to multiple digit involvement.  She does not have pain in the hand but notes that on the right side she is unable to actively extend the middle/ring/small fingers from a flexed position.  On the left side she has snapping, but is still able to actively extend.    11/27/2024: No change in her symptoms with the splint. She has not seen rheumatology. No pain in the hand but still has difficulty with motion of the right middle/ring/small fingers primarily    1/7/2025: symptoms are similar, she's thinking about seeing rheumatology. She's not sure the splint is optimized for her hands.    Past Medical History:     Past  Medical History:   Diagnosis Date    Abdominal aortic aneurysm without rupture (H) 11/10/2015    [  ] repeat imaging due spring 2016 Descending aortic aneurysm.  Being monitored with serial angiogram     Aneurysm of right renal artery (H) 04/09/2019    Arthritis 1998    COPD (chronic obstructive pulmonary disease) (H) 11/10/2015    Not sure of this diagnosis.  May be a consequence of asthma.    Esophageal hypertension 01/25/2017    Essential hypertension 01/25/2017    Female bladder prolapse 11/10/2015    Hearing loss     Heart disease 2005    Pulmonary nodules 01/25/2017    Seizures (H) 11/10/2015    Temporal seziure d/o.  Does not have LOC.  Has prodromal symptoms     Severe persistent asthma (H) 11/10/2015    Since childhood.  Has been steroid dependent for many years. Has had cydney x2       Patient Active Problem List   Diagnosis    Seizures (H)    Thoracic aortic aneurysm without rupture (H)    Moderate persistent asthma without complication    Prediabetes    Pulmonary nodules    Essential hypertension    Osteopenia of multiple sites    Aneurysm of right renal artery (H)    Severe persistent asthma without complication (H)    Abnormal CT scan of head    Left shoulder pain, unspecified chronicity    Pain in both hands         Physical Exam:   Musculoskeletal: A focused physical examination of the bilateral hands reveals:   No edema or ecchymosis.  Arthritic changes to the bilateral hands.    Synovitis diffusely throughout the MCPs.    Ulnar drift of the right middle, ring, small fingers. Right worse then left.  Palpable ulnar subluxation of the extensor tendons to the bilateral middle, ring, small fingers with snapping.   Perching of the extensor tendon of the bilateral index fingers ulnarly, no lenny subluxation.  Synovitis right index MCP.  Neurovascular- intact light touch sensation and motor to distribution of the median, ulnar and radial nerves.   Brisk capillary refill to the distal fingers.  Range of  Motion: Able to make a full composite fist.  Able to fully extend all fingers, but difficulty with right MF/RF/SF    Imaging:   XR (3 views) bilateral hand obtained 10/16/2024 shows: Polyarticular arthritis.  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  No prior history of inflammatory arthritis, but her rheumatoid factor is elevated.    Plan: We discussed clinical findings, diagnosis, and treatment plan.  Fortunately the patient does not have pain currently.  Because a relative motion orthosis could not be fabricated, I recommend a resting splint with the MCPs in extension to try to centralize the extensor tendons.  She does not have significant MCP arthritis of the joints that are most affected with extensor tendon subluxation, so I do not recommend arthroplasty at this time.  We discussed extensor tendon centralization along with the risks/benefits/recovery.  The patient would like to avoid surgery at this time and would like to try splint adjustments.  Surgery may be indicated if splinting fails to improve her symptoms.    -Continue with MCP extension splint, will discuss adjustments with hand therapy to avoid ulnar deviation  -Rheumatology referral previously placed for elevated rheumatoid factor and multiple extensor tendon subluxations. Patient has not yet scheduled  -Return to clinic in 6 weeks for symptom check    JERRY THOMAS MD

## 2025-01-06 ENCOUNTER — OFFICE VISIT (OUTPATIENT)
Dept: PULMONOLOGY | Facility: CLINIC | Age: 80
End: 2025-01-06
Payer: MEDICARE

## 2025-01-06 VITALS
SYSTOLIC BLOOD PRESSURE: 123 MMHG | HEART RATE: 76 BPM | BODY MASS INDEX: 25.92 KG/M2 | WEIGHT: 165.5 LBS | OXYGEN SATURATION: 95 % | DIASTOLIC BLOOD PRESSURE: 85 MMHG

## 2025-01-06 DIAGNOSIS — J45.50 SEVERE PERSISTENT ASTHMA, UNSPECIFIED WHETHER COMPLICATED (H): ICD-10-CM

## 2025-01-06 PROCEDURE — 99214 OFFICE O/P EST MOD 30 MIN: CPT

## 2025-01-06 PROCEDURE — G0463 HOSPITAL OUTPT CLINIC VISIT: HCPCS

## 2025-01-06 RX ORDER — MONTELUKAST SODIUM 10 MG/1
10 TABLET ORAL AT BEDTIME
Qty: 90 TABLET | Refills: 3 | Status: SHIPPED | OUTPATIENT
Start: 2025-01-06

## 2025-01-06 RX ORDER — FLUTICASONE PROPIONATE AND SALMETEROL 500; 50 UG/1; UG/1
1 POWDER RESPIRATORY (INHALATION) 2 TIMES DAILY
Qty: 180 EACH | Refills: 3 | Status: SHIPPED | OUTPATIENT
Start: 2025-01-06

## 2025-01-06 RX ORDER — ALBUTEROL SULFATE 90 UG/1
2 INHALANT RESPIRATORY (INHALATION) EVERY 6 HOURS PRN
Qty: 54 G | Refills: 5 | Status: SHIPPED | OUTPATIENT
Start: 2025-01-06

## 2025-01-06 ASSESSMENT — PAIN SCALES - GENERAL: PAINLEVEL_OUTOF10: NO PAIN (0)

## 2025-01-06 ASSESSMENT — ASTHMA QUESTIONNAIRES
QUESTION_4 LAST FOUR WEEKS HOW OFTEN HAVE YOU USED YOUR RESCUE INHALER OR NEBULIZER MEDICATION (SUCH AS ALBUTEROL): ONE OR TWO TIMES PER DAY
ACT_TOTALSCORE: 17
ACT_TOTALSCORE: 17
QUESTION_2 LAST FOUR WEEKS HOW OFTEN HAVE YOU HAD SHORTNESS OF BREATH: THREE TO SIX TIMES A WEEK
QUESTION_5 LAST FOUR WEEKS HOW WOULD YOU RATE YOUR ASTHMA CONTROL: WELL CONTROLLED
QUESTION_1 LAST FOUR WEEKS HOW MUCH OF THE TIME DID YOUR ASTHMA KEEP YOU FROM GETTING AS MUCH DONE AT WORK, SCHOOL OR AT HOME: A LITTLE OF THE TIME
QUESTION_3 LAST FOUR WEEKS HOW OFTEN DID YOUR ASTHMA SYMPTOMS (WHEEZING, COUGHING, SHORTNESS OF BREATH, CHEST TIGHTNESS OR PAIN) WAKE YOU UP AT NIGHT OR EARLIER THAN USUAL IN THE MORNING: ONCE OR TWICE

## 2025-01-06 NOTE — PROGRESS NOTES
[facility] [address] [address]  Phone: [phone]  Fax: [phone]    Patient:  Niurka Cisneros, Date of birth 1945  Date of Visit:  01/06/2025  Referring Provider Jose De Jesus Vega      Assessment & Plan      Severe persistent asthma  Eosinophilia  Moderate COPD    She has had stable respiratory symptoms over the last year.  I will keep all of her inhalers and medications the same.  I refilled them again for the next year.    We did talk about options of how to improve the amount of mucus that she coughs up every morning.  It is likely that this is a result of her chronic airways disease.  I offered to try different nebulized medications such as saline to see if we could get a little bit better control of it over the long-term.  However she does not want to add any new medications that are overly burdensome at this time.  So we will keep her inhalers as is.    Regarding her concerns about getting diagnosed with COVID when [place] is out of the [place], I let her know that she can get in touch with her clinic if she is diagnosed with COVID-19.  If she is at an urgent care facility and [place], she will likely be prescribed at there.  If not, we can try to get her a prescription.  She has only 1 mild concerning drug-drug interaction and that would be her theophylline.  Paxlovid may slightly decrease her theophylline levels in her bloodstream, which would not be too big of a deal up as prescribed.        Medical Decision Making      I spent a total of 31 minutes on the day of the visit.   Time spent by me today doing chart review, history and exam, documentation and further activities per the note       Jose De Jesus Vega MD                  Today's visit note:     Chief Complaint: Niurka Cisneros is a 79 year old year old female who is being seen for asthma/copd    HISTORY OF PRESENT ILLNESS:    Is a 79-year-old  "female with a history of severe persistent asthma who is presenting for her yearly follow-up.  Over the last year, her respiratory symptoms have been under good control.  She has not had any exacerbations requiring prednisone or antibiotics.  She just completed participation in the severe asthma research program after being a part of that for 15 years.  Her last spirometry in that trial showed an FVC of 2.04 which is 71% predicted and an FEV1 of 1.15 which is 53% predicted.    Her daily inhalers include Wixela 500 mcg twice a day, and albuterol which she takes usually before her Wixela in the morning.  She is also on Singulair, theophylline and Fasenra.  She is not having any bad reactions to any of the medications.    She does note that she has a \"smoker's cough\" which is usually present in the morning.  She does cough up a little bit of clear mucus but that clears up by late morning.  This has been chronic and unchanged for a long time.    She is going to be traveling to [place] in March and staying there for a month.  She is concerned about what would happen if she gets COVID when she is there since she has never had it before.         Past Medical and Surgical History:     Past Medical History:   Diagnosis Date    Abdominal aortic aneurysm without rupture (H) 11/10/2015    [  ] repeat imaging due spring 2016 Descending aortic aneurysm.  Being monitored with serial angiogram     Aneurysm of right renal artery (H) 04/09/2019    Arthritis 1998    COPD (chronic obstructive pulmonary disease) (H) 11/10/2015    Not sure of this diagnosis.  May be a consequence of asthma.    Esophageal hypertension 01/25/2017    Essential hypertension 01/25/2017    Female bladder prolapse 11/10/2015    Hearing loss     Heart disease 2005    Pulmonary nodules 01/25/2017    Seizures (H) 11/10/2015    Temporal seziure d/o.  Does not have LOC.  Has prodromal symptoms     Severe persistent asthma (H) 11/10/2015    Since childhood.  Has " "been steroid dependent for many years. Has had cydney x2       Past Surgical History:   Procedure Laterality Date    APPENDECTOMY      CATARACT IOL, RT/LT Bilateral     COLONOSCOPY N/A 2019    Procedure: COLONOSCOPY;  Surgeon: Haim Burgos MD;  Location:  GI    GI SURGERY      nissn x 2    GYN SURGERY  2001    H CATARACT SURGICAL PACKAGE      HYSTERECTOMY      fibroids    JOINT REPLACEMENT Left     NISSEN FUNDOPLICATION      x2    ORTHOPEDIC SURGERY      SOFT TISSUE SURGERY             Family History:     Family History   Problem Relation Age of Onset    Dementia Mother     Heart Failure Mother     Hypertension Mother     Breast Cancer Mother         post menopausal    Diabetes Mother     Cancer Father         prostate cancer    Hypertension Father     Prostate Cancer Father          of it at 82    Asthma Father         \"outgrew\" it    Schizophrenia Maternal Grandmother     Coronary Artery Disease Maternal Grandmother          of aortic aneurysm at 89    Mental Illness Maternal Grandmother     Coronary Artery Disease Maternal Grandfather         he was diabetic and smoked    Asthma Sister         x2    Depression Sister     Coronary Artery Disease Sister         MI when being ventilated for asthma    Asthma Sister         both sisters with asthma, one severe    Depression Sister     Other - See Comments Daughter         Enlarged Aorta    Melanoma Daughter     Breast Cancer Maternal Aunt     Coronary Artery Disease Daughter         Mildly dilated thoracic aorta    Other Cancer Daughter         Melanoma    Hypertension Son     Diabetes Son         Grandson. Type 1    Coronary Artery Disease Cousin         Dilated aorta              Social History:     Social History     Socioeconomic History    Marital status:      Spouse name: Not on file    Number of children: Not on file    Years of education: 20    Highest education level: Not on file   Occupational History    Occupation: " pediatrician   Tobacco Use    Smoking status: Never    Smokeless tobacco: Never   Substance and Sexual Activity    Alcohol use: Not Currently     Comment: less than a drink a day    Drug use: Never    Sexual activity: Yes     Partners: Male     Birth control/protection: Post-menopausal, Male Surgical   Other Topics Concern    Parent/sibling w/ CABG, MI or angioplasty before 65F 55M? 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     Social Drivers of Health     Financial Resource Strain: Low Risk  (4/5/2024)    Financial Resource Strain     Within the past 12 months, have you or your family members you live with been unable to get utilities (heat, electricity) when it was really needed?: No   Food Insecurity: Low Risk  (4/5/2024)    Food Insecurity     Within the past 12 months, did you worry that your food would run out before you got money to buy more?: No     Within the past 12 months, did the food you bought just not last and you didn t have money to get more?: No   Transportation Needs: Low Risk  (4/5/2024)    Transportation Needs     Within the past 12 months, has lack of transportation kept you from medical appointments, getting your medicines, non-medical meetings or appointments, work, or from getting things that you need?: No   Physical Activity: Sufficiently Active (4/5/2024)    Exercise Vital Sign     Days of Exercise per Week: 5 days     Minutes of Exercise per Session: 50 min   Stress: No Stress Concern Present (4/5/2024)    [employer] - Occupational Stress Questionnaire     Feeling of Stress : Only a little   Social Connections: Unknown (4/5/2024)    Social Connection and Isolation Panel [NHANES]     Frequency of Communication with Friends and Family: Not on file     Frequency of Social Gatherings with Friends and Family: More than three times a week     Attends Jainism Services: Not on file     Active Member of Clubs or  Organizations: Not on file     Attends Club or Organization Meetings: Not on file     Marital Status: Not on file   Interpersonal Safety: Low Risk  (4/12/2024)    Interpersonal Safety     Do you feel physically and emotionally safe where you currently live?: Yes     Within the past 12 months, have you been hit, slapped, kicked or otherwise physically hurt by someone?: No     Within the past 12 months, have you been humiliated or emotionally abused in other ways by your partner or ex-partner?: No   Housing Stability: Low Risk  (4/5/2024)    Housing Stability     Do you have housing? : Yes     Are you worried about losing your housing?: No            Medications:     Current Outpatient Medications   Medication Sig Dispense Refill    albuterol (PROAIR HFA/PROVENTIL HFA/VENTOLIN HFA) 108 (90 Base) MCG/ACT inhaler Inhale 2 puffs into the lungs every 6 hours as needed for shortness of breath or wheezing. 54 g 5    benralizumab (FASENRA) 30 MG/ML SOSY injection Inject 30 mg Subcutaneous once      carBAMazepine (TEGRETOL XR) 200 MG 12 hr tablet Take 1 tablet (200 mg) by mouth daily as needed (seizure) 30 tablet 1    diclofenac (VOLTAREN) 1 % topical gel Apply 4 g topically 4 times daily 350 g 3    estradiol (ESTRING) 7.5 MCG/24HR vaginal ring Place 1 each (1 Vaginal ring) vaginally every 3 months 1 each 4    fluticasone-salmeterol (ADVAIR) 500-50 MCG/ACT inhaler Inhale 1 puff into the lungs 2 times daily. 180 each 0    magnesium 250 MG tablet Take 1 tablet by mouth daily      metoprolol succinate ER (TOPROL XL) 25 MG 24 hr tablet TAKE 1 TABLET DAILY WITH   50MG TABLET FOR TOTAL OF   75MG DAILY. 90 tablet 3    metoprolol succinate ER (TOPROL XL) 50 MG 24 hr tablet Take 1 tablet (50 mg) by mouth daily With 25 mg tab for total of 75 mg Daily 90 tablet 3    montelukast (SINGULAIR) 10 MG tablet Take 1 tablet (10 mg) by mouth at bedtime 90 tablet 3    NAPROXEN PO Take 220 mg by mouth as needed       potassium chloride antonietta ER  (KLOR-CON M20) 20 MEQ CR tablet Take 1 tablet (20 mEq) by mouth daily 90 tablet 4    pravastatin (PRAVACHOL) 20 MG tablet Take 1 tablet (20 mg) by mouth daily 90 tablet 4    theophylline (DENIA-24) 400 MG 24 hr capsule Take 1 capsule (400 mg) by mouth daily. 90 capsule 3    triamterene-HCTZ (MAXZIDE) 75-50 MG tablet Take 1 tablet by mouth daily 90 tablet 4     No current facility-administered medications for this visit.            Review of Systems:     All other review of systems are negative        PHYSICAL EXAM:  /85 (BP Location: Right arm, Patient Position: Chair, Cuff Size: Adult Regular)   Pulse 76   Wt 75.1 kg (165 lb 8 oz)   LMP  (LMP Unknown)   SpO2 95%   BMI 25.92 kg/m       General: Pleasant, no apparent distress  Eyes: Anicteric  Neck: supple  Respiratory: Mild expiratory wheezing in the left lower lobe.  Otherwise clear.  No accessory muscle use.  Coarse cough on exam.  Skin: No obvious rashes.  No peripheral edema.  Extremities: No cyanosis or clubbing  Neuro: Normal mentation. Normal speech.  Psych:Normal affect           Data:   All laboratory and imaging data reviewed.      Absolute eosinophils in May 2016 were 500    PFT:       PFT Interpretation:  Moderate airflow obstruction.  Valid Maneuver

## 2025-01-06 NOTE — LETTER
1/6/2025      Niurka Cisneros  [address]  [place] [address]      Dear Colleague,    Thank you for referring your patient, Niurka Cisneros, to the [facility]. Please see a copy of my visit note below.      [facility]  [address] [address]  Phone: [phone]  Fax: [phone]    Patient:  Niurka Cisneros, Date of birth 1945  Date of Visit:  01/06/2025  Referring Provider Jose De Jesus Vega      Assessment & Plan     Severe persistent asthma  Eosinophilia  Moderate COPD    She has had stable respiratory symptoms over the last year.  I will keep all of her inhalers and medications the same.  I refilled them again for the next year.    We did talk about options of how to improve the amount of mucus that she coughs up every morning.  It is likely that this is a result of her chronic airways disease.  I offered to try different nebulized medications such as saline to see if we could get a little bit better control of it over the long-term.  However she does not want to add any new medications that are overly burdensome at this time.  So we will keep her inhalers as is.    Regarding her concerns about getting diagnosed with COVID when she is out of the state, I let her know that she can get in touch with her clinic if she is diagnosed with COVID-19.  If she is at an urgent care facility and [place], she will likely be prescribed at there.  If not, we can try to get her a prescription.  She has only 1 mild concerning drug-drug interaction and that would be her theophylline.  Paxlovid may slightly decrease her theophylline levels in her bloodstream, which would not be too big of a deal up as prescribed.        Medical Decision Making     I spent a total of 31 minutes on the day of the visit.   Time spent by me today doing chart review, history and  "exam, documentation and further activities per the note       Jose De Jesus Vega MD                  Today's visit note:     Chief Complaint: Niurka Cisneros is a 79 year old year old female who is being seen for asthma/copd    HISTORY OF PRESENT ILLNESS:    Is a 79-year-old female with a history of severe persistent asthma who is presenting for her yearly follow-up.  Over the last year, her respiratory symptoms have been under good control.  She has not had any exacerbations requiring prednisone or antibiotics.  She just completed participation in the severe asthma research program after being a part of that for 15 years.  Her last spirometry in that trial showed an FVC of 2.04 which is 71% predicted and an FEV1 of 1.15 which is 53% predicted.    Her daily inhalers include Wixela 500 mcg twice a day, and albuterol which she takes usually before her Wixela in the morning.  She is also on Singulair, theophylline and Fasenra.  She is not having any bad reactions to any of the medications.    She does note that she has a \"smoker's cough\" which is usually present in the morning.  She does cough up a little bit of clear mucus but that clears up by late morning.  This has been chronic and unchanged for a long time.    She is going to be traveling to [place] in March and staying there for a month.  She is concerned about what would happen if she gets COVID when she is there since she has never had it before.         Past Medical and Surgical History:     Past Medical History:   Diagnosis Date     Abdominal aortic aneurysm without rupture (H) 11/10/2015    [  ] repeat imaging due spring 2016 Descending aortic aneurysm.  Being monitored with serial angiogram      Aneurysm of right renal artery (H) 04/09/2019     Arthritis 1998     COPD (chronic obstructive pulmonary disease) (H) 11/10/2015    Not sure of this diagnosis.  May be a consequence of asthma.     Esophageal hypertension 01/25/2017     Essential " "hypertension 2017     Female bladder prolapse 11/10/2015     Hearing loss      Heart disease 2005     Pulmonary nodules 2017     Seizures (H) 11/10/2015    Temporal seziure d/o.  Does not have LOC.  Has prodromal symptoms      Severe persistent asthma (H) 11/10/2015    Since childhood.  Has been steroid dependent for many years. Has had cydney x2       Past Surgical History:   Procedure Laterality Date     APPENDECTOMY       CATARACT IOL, RT/LT Bilateral      COLONOSCOPY N/A 2019    Procedure: COLONOSCOPY;  Surgeon: Haim Burgos MD;  Location:  GI     GI SURGERY      nissn x 2     GYN SURGERY       H CATARACT SURGICAL PACKAGE       HYSTERECTOMY      fibroids     JOINT REPLACEMENT Left      NISSEN FUNDOPLICATION      x2     ORTHOPEDIC SURGERY       SOFT TISSUE SURGERY             Family History:     Family History   Problem Relation Age of Onset     Dementia Mother      Heart Failure Mother      Hypertension Mother      Breast Cancer Mother         post menopausal     Diabetes Mother      Cancer Father         prostate cancer     Hypertension Father      Prostate Cancer Father          of it at 82     Asthma Father         \"outgrew\" it     Schizophrenia Maternal Grandmother      Coronary Artery Disease Maternal Grandmother          of aortic aneurysm at 89     Mental Illness Maternal Grandmother      Coronary Artery Disease Maternal Grandfather         he was diabetic and smoked     Asthma Sister         x2     Depression Sister      Coronary Artery Disease Sister         MI when being ventilated for asthma     Asthma Sister         both sisters with asthma, one severe     Depression Sister      Other - See Comments Daughter         Enlarged Aorta     Melanoma Daughter      Breast Cancer Maternal Aunt      Coronary Artery Disease Daughter         Mildly dilated thoracic aorta     Other Cancer Daughter         Melanoma     Hypertension Son      Diabetes Son         " Grandson. Type 1     Coronary Artery Disease Cousin         Dilated aorta              Social History:     Social History     Socioeconomic History     Marital status:      Spouse name: Not on file     Number of children: Not on file     Years of education: 20     Highest education level: Not on file   Occupational History     Occupation: pediatrician   Tobacco Use     Smoking status: Never     Smokeless tobacco: Never   Substance and Sexual Activity     Alcohol use: Not Currently     Comment: less than a drink a day     Drug use: Never     Sexual activity: Yes     Partners: Male     Birth control/protection: Post-menopausal, Male Surgical   Other Topics Concern     Parent/sibling w/ CABG, MI or angioplasty before 65F 55M? 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     Social Drivers of Health     Financial Resource Strain: Low Risk  (4/5/2024)    Financial Resource Strain      Within the past 12 months, have you or your family members you live with been unable to get utilities (heat, electricity) when it was really needed?: No   Food Insecurity: Low Risk  (4/5/2024)    Food Insecurity      Within the past 12 months, did you worry that your food would run out before you got money to buy more?: No      Within the past 12 months, did the food you bought just not last and you didn t have money to get more?: No   Transportation Needs: Low Risk  (4/5/2024)    Transportation Needs      Within the past 12 months, has lack of transportation kept you from medical appointments, getting your medicines, non-medical meetings or appointments, work, or from getting things that you need?: No   Physical Activity: Sufficiently Active (4/5/2024)    Exercise Vital Sign      Days of Exercise per Week: 5 days      Minutes of Exercise per Session: 50 min   Stress: No Stress Concern Present (4/5/2024)    [employer]ational [employer] - Occupational Stress Questionnaire       Feeling of Stress : Only a little   Social Connections: Unknown (4/5/2024)    Social Connection and Isolation Panel [NHANES]      Frequency of Communication with Friends and Family: Not on file      Frequency of Social Gatherings with Friends and Family: More than three times a week      Attends Zoroastrianism Services: Not on file      Active Member of Clubs or Organizations: Not on file      Attends Club or Organization Meetings: Not on file      Marital Status: Not on file   Interpersonal Safety: Low Risk  (4/12/2024)    Interpersonal Safety      Do you feel physically and emotionally safe where you currently live?: Yes      Within the past 12 months, have you been hit, slapped, kicked or otherwise physically hurt by someone?: No      Within the past 12 months, have you been humiliated or emotionally abused in other ways by your partner or ex-partner?: No   Housing Stability: Low Risk  (4/5/2024)    Housing Stability      Do you have housing? : Yes      Are you worried about losing your housing?: No            Medications:     Current Outpatient Medications   Medication Sig Dispense Refill     albuterol (PROAIR HFA/PROVENTIL HFA/VENTOLIN HFA) 108 (90 Base) MCG/ACT inhaler Inhale 2 puffs into the lungs every 6 hours as needed for shortness of breath or wheezing. 54 g 5     benralizumab (FASENRA) 30 MG/ML SOSY injection Inject 30 mg Subcutaneous once       carBAMazepine (TEGRETOL XR) 200 MG 12 hr tablet Take 1 tablet (200 mg) by mouth daily as needed (seizure) 30 tablet 1     diclofenac (VOLTAREN) 1 % topical gel Apply 4 g topically 4 times daily 350 g 3     estradiol (ESTRING) 7.5 MCG/24HR vaginal ring Place 1 each (1 Vaginal ring) vaginally every 3 months 1 each 4     fluticasone-salmeterol (ADVAIR) 500-50 MCG/ACT inhaler Inhale 1 puff into the lungs 2 times daily. 180 each 0     magnesium 250 MG tablet Take 1 tablet by mouth daily       metoprolol succinate ER (TOPROL XL) 25 MG 24 hr tablet TAKE 1 TABLET DAILY  WITH   50MG TABLET FOR TOTAL OF   75MG DAILY. 90 tablet 3     metoprolol succinate ER (TOPROL XL) 50 MG 24 hr tablet Take 1 tablet (50 mg) by mouth daily With 25 mg tab for total of 75 mg Daily 90 tablet 3     montelukast (SINGULAIR) 10 MG tablet Take 1 tablet (10 mg) by mouth at bedtime 90 tablet 3     NAPROXEN PO Take 220 mg by mouth as needed        potassium chloride antonietta ER (KLOR-CON M20) 20 MEQ CR tablet Take 1 tablet (20 mEq) by mouth daily 90 tablet 4     pravastatin (PRAVACHOL) 20 MG tablet Take 1 tablet (20 mg) by mouth daily 90 tablet 4     theophylline (DENIA-24) 400 MG 24 hr capsule Take 1 capsule (400 mg) by mouth daily. 90 capsule 3     triamterene-HCTZ (MAXZIDE) 75-50 MG tablet Take 1 tablet by mouth daily 90 tablet 4     No current facility-administered medications for this visit.            Review of Systems:     All other review of systems are negative        PHYSICAL EXAM:  /85 (BP Location: Right arm, Patient Position: Chair, Cuff Size: Adult Regular)   Pulse 76   Wt 75.1 kg (165 lb 8 oz)   LMP  (LMP Unknown)   SpO2 95%   BMI 25.92 kg/m       General: Pleasant, no apparent distress  Eyes: Anicteric  Neck: supple  Respiratory: Mild expiratory wheezing in the left lower lobe.  Otherwise clear.  No accessory muscle use.  Coarse cough on exam.  Skin: No obvious rashes.  No peripheral edema.  Extremities: No cyanosis or clubbing  Neuro: Normal mentation. Normal speech.  Psych:Normal affect           Data:   All laboratory and imaging data reviewed.      Absolute eosinophils in May 2016 were 500    PFT:       PFT Interpretation:  Moderate airflow obstruction.  Valid Maneuver            Again, thank you for allowing me to participate in the care of your patient.        Sincerely,        Jose De Jesus Vega MD    Electronically signed

## 2025-01-06 NOTE — NURSING NOTE
Chief Complaint   Patient presents with    Follow Up     Return Pulmonary - 1 yr        Vitals were taken, medications reconciled.    Akosua Melo, Clinic Assistant   7:28 AM

## 2025-01-07 ENCOUNTER — OFFICE VISIT (OUTPATIENT)
Dept: ORTHOPEDICS | Facility: CLINIC | Age: 80
End: 2025-01-07
Payer: COMMERCIAL

## 2025-01-07 DIAGNOSIS — M79.641 BILATERAL HAND PAIN: Primary | ICD-10-CM

## 2025-01-07 DIAGNOSIS — M79.642 BILATERAL HAND PAIN: Primary | ICD-10-CM

## 2025-01-07 PROCEDURE — 99213 OFFICE O/P EST LOW 20 MIN: CPT | Performed by: STUDENT IN AN ORGANIZED HEALTH CARE EDUCATION/TRAINING PROGRAM

## 2025-01-07 NOTE — LETTER
1/7/2025      Niurka Cisneros  [address]  [place] [address]      Dear Colleague,    Thank you for referring your patient, Niurka Cisneros, to the [facility] [place]. Please see a copy of my visit note below.    Hand Surgery    REFERRING PHYSICIAN: Referred Self   PRIMARY CARE PHYSICIAN: Mara Combs    Diagnosis: Bilateral middle/ring/small extensor tendon subluxation  Treatment: hand therapy     Chief Complaint:   RECHECK (Follow-up bilateral extensor tendon subluxation of the right middle, ring and small finger//)    History of Present Illness:     MAURICE Estes 9/23/2024: Niurka Cisneros is a 79 year old right -hand dominant female who presents for evaluation of snapping of the bilateral middle, ring, small fingers, right worse than left.  Patient reports that she started to develop this about a year ago in her right hand, and has recently started to note symptoms in her left hand as well.  She notes that she was seen by sports medicine last year sometime at which time she was given splints for trigger fingers.  She states it did not help.  She has most difficulty straightening her fingers when doing activities such as typing.    Patient denies history of inflammatory arthropathy, she does note she has been worked up for genetic collagen disorder.  She notes she was diagnosed with a aortic aneurysm and underwent subsequent genetic testing.  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    10/16/2024: She reports that hand therapy was unable to make her relative motion splint due to multiple digit involvement.  She does not have pain in the hand but notes that on the right side she is unable to actively extend the middle/ring/small fingers from a flexed position.  On the left side she has snapping, but is still able to actively extend.    11/27/2024: No change in her symptoms with the splint. She has not seen rheumatology. No pain in the  hand but still has difficulty with motion of the right middle/ring/small fingers primarily    1/7/2025: symptoms are similar, she's thinking about seeing rheumatology. She's not sure the splint is optimized for her hands.    Past Medical History:     Past Medical History:   Diagnosis Date     Abdominal aortic aneurysm without rupture (H) 11/10/2015    [  ] repeat imaging due spring 2016 Descending aortic aneurysm.  Being monitored with serial angiogram      Aneurysm of right renal artery (H) 04/09/2019     Arthritis 1998     COPD (chronic obstructive pulmonary disease) (H) 11/10/2015    Not sure of this diagnosis.  May be a consequence of asthma.     Esophageal hypertension 01/25/2017     Essential hypertension 01/25/2017     Female bladder prolapse 11/10/2015     Hearing loss      Heart disease 2005     Pulmonary nodules 01/25/2017     Seizures (H) 11/10/2015    Temporal seziure d/o.  Does not have LOC.  Has prodromal symptoms      Severe persistent asthma (H) 11/10/2015    Since childhood.  Has been steroid dependent for many years. Has had cydney x2       Patient Active Problem List   Diagnosis     Seizures (H)     Thoracic aortic aneurysm without rupture (H)     Moderate persistent asthma without complication     Prediabetes     Pulmonary nodules     Essential hypertension     Osteopenia of multiple sites     Aneurysm of right renal artery (H)     Severe persistent asthma without complication (H)     Abnormal CT scan of head     Left shoulder pain, unspecified chronicity     Pain in both hands         Physical Exam:   Musculoskeletal: A focused physical examination of the bilateral hands reveals:   No edema or ecchymosis.  Arthritic changes to the bilateral hands.    Synovitis diffusely throughout the MCPs.    Ulnar drift of the right middle, ring, small fingers. Right worse then left.  Palpable ulnar subluxation of the extensor tendons to the bilateral middle, ring, small fingers with snapping.   Perching of  the extensor tendon of the bilateral index fingers ulnarly, no lenny subluxation.  Synovitis right index MCP.  Neurovascular- intact light touch sensation and motor to distribution of the median, ulnar and radial nerves.   Brisk capillary refill to the distal fingers.  Range of Motion: Able to make a full composite fist.  Able to fully extend all fingers, but difficulty with right MF/RF/SF    Imaging:   XR (3 views) bilateral hand obtained 10/16/2024 shows: Polyarticular arthritis.  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  No prior history of inflammatory arthritis, but her rheumatoid factor is elevated.    Plan: We discussed clinical findings, diagnosis, and treatment plan.  Fortunately the patient does not have pain currently.  Because a relative motion orthosis could not be fabricated, I recommend a resting splint with the MCPs in extension to try to centralize the extensor tendons.  She does not have significant MCP arthritis of the joints that are most affected with extensor tendon subluxation, so I do not recommend arthroplasty at this time.  We discussed extensor tendon centralization along with the risks/benefits/recovery.  The patient would like to avoid surgery at this time and would like to try splint adjustments.  Surgery may be indicated if splinting fails to improve her symptoms.    -Continue with MCP extension splint, will discuss adjustments with hand therapy to avoid ulnar deviation  -Rheumatology referral previously placed for elevated rheumatoid factor and multiple extensor tendon subluxations. Patient has not yet scheduled  -Return to clinic in 6 weeks for symptom check    JERRY THOMAS MD      Again, thank you for allowing me to participate in the care of  your patient.        Sincerely,        JERRY THOMAS MD    Electronically signed

## 2025-01-07 NOTE — NURSING NOTE
Reason For Visit:   Chief Complaint   Patient presents with    RECHECK     Follow-up bilateral extensor tendon subluxation of the right middle, ring and small finger           Primary MD: Mara Combs  Ref. MD: Elmo    Age: 79 year old    ?  No      LMP  (LMP Unknown)       Pain Assessment  Patient Currently in Pain: No    Hand Dominance Evaluation  Hand Dominance: Right          QuickDASH Assessment      1/2/2025     9:11 AM   QuickDASH Main   1. Open a tight or new jar Mild difficulty   2. Do heavy household chores (e.g., wash walls, floors) No difficulty   3. Carry a shopping bag or briefcase Mild difficulty   4. Wash your back No difficulty   5. Use a knife to cut food Mild difficulty   6. Recreational activities in which you take some force or impact through your arm, shoulder or hand (e.g., golf, hammering, tennis, etc.) No difficulty   7. During the past week, to what extent has your arm, shoulder or hand problem interfered with your normal social activities with family, friends, neighbours or groups Slightly   8. During the past week, were you limited in your work or other regular daily activities as a result of your arm, shoulder or hand problem Slightly limited   9. Arm, shoulder or hand pain None   10.Tingling (pins and needles) in your arm,shoulder or hand None   11. During the past week, how much difficulty have you had sleeping because of the pain in your arm, shoulder or hand No difficulty   Quickdash Ability Score 11.36          Current Outpatient Medications   Medication Sig Dispense Refill    albuterol (PROAIR HFA/PROVENTIL HFA/VENTOLIN HFA) 108 (90 Base) MCG/ACT inhaler Inhale 2 puffs into the lungs every 6 hours as needed for shortness of breath or wheezing. 54 g 5    benralizumab (FASENRA) 30 MG/ML SOSY injection Inject 30 mg Subcutaneous once      carBAMazepine (TEGRETOL XR) 200 MG 12 hr tablet Take 1 tablet (200 mg) by mouth daily as needed (seizure) 30 tablet 1     diclofenac (VOLTAREN) 1 % topical gel Apply 4 g topically 4 times daily 350 g 3    estradiol (ESTRING) 7.5 MCG/24HR vaginal ring Place 1 each (1 Vaginal ring) vaginally every 3 months 1 each 4    fluticasone-salmeterol (WIXELA INHUB) 500-50 MCG/ACT inhaler Inhale 1 puff into the lungs 2 times daily. 180 each 3    magnesium 250 MG tablet Take 1 tablet by mouth daily      metoprolol succinate ER (TOPROL XL) 25 MG 24 hr tablet TAKE 1 TABLET DAILY WITH   50MG TABLET FOR TOTAL OF   75MG DAILY. 90 tablet 3    metoprolol succinate ER (TOPROL XL) 50 MG 24 hr tablet Take 1 tablet (50 mg) by mouth daily With 25 mg tab for total of 75 mg Daily 90 tablet 3    montelukast (SINGULAIR) 10 MG tablet Take 1 tablet (10 mg) by mouth at bedtime. 90 tablet 3    NAPROXEN PO Take 220 mg by mouth as needed       potassium chloride antonietta ER (KLOR-CON M20) 20 MEQ CR tablet Take 1 tablet (20 mEq) by mouth daily 90 tablet 4    pravastatin (PRAVACHOL) 20 MG tablet Take 1 tablet (20 mg) by mouth daily 90 tablet 4    theophylline (DENIA-24) 400 MG 24 hr capsule Take 1 capsule (400 mg) by mouth daily. 90 capsule 3    triamterene-HCTZ (MAXZIDE) 75-50 MG tablet Take 1 tablet by mouth daily 90 tablet 4       Allergies   Allergen Reactions    Quinolones        Tyra Langford, ATC

## 2025-01-30 ENCOUNTER — OFFICE VISIT (OUTPATIENT)
Dept: RHEUMATOLOGY | Facility: CLINIC | Age: 80
End: 2025-01-30
Payer: COMMERCIAL

## 2025-01-30 ENCOUNTER — LAB (OUTPATIENT)
Dept: LAB | Facility: CLINIC | Age: 80
End: 2025-01-30
Payer: COMMERCIAL

## 2025-01-30 VITALS
OXYGEN SATURATION: 95 % | HEIGHT: 67 IN | BODY MASS INDEX: 26.48 KG/M2 | SYSTOLIC BLOOD PRESSURE: 123 MMHG | WEIGHT: 168.7 LBS | DIASTOLIC BLOOD PRESSURE: 67 MMHG | HEART RATE: 77 BPM

## 2025-01-30 DIAGNOSIS — R76.8 RHEUMATOID FACTOR POSITIVE: ICD-10-CM

## 2025-01-30 DIAGNOSIS — Z11.1 SCREENING-PULMONARY TB: ICD-10-CM

## 2025-01-30 DIAGNOSIS — R76.8 RHEUMATOID FACTOR POSITIVE: Primary | ICD-10-CM

## 2025-01-30 DIAGNOSIS — M79.642 BILATERAL HAND PAIN: ICD-10-CM

## 2025-01-30 DIAGNOSIS — Z11.59 NEED FOR HEPATITIS B SCREENING TEST: ICD-10-CM

## 2025-01-30 DIAGNOSIS — M79.641 BILATERAL HAND PAIN: ICD-10-CM

## 2025-01-30 DIAGNOSIS — R91.8 PULMONARY NODULES: ICD-10-CM

## 2025-01-30 DIAGNOSIS — M66.241 NONTRAUMATIC RUPTURE OF SAGITTAL BAND OF EXTENSOR TENDON OF RIGHT UPPER EXTREMITY: ICD-10-CM

## 2025-01-30 DIAGNOSIS — Z71.89 ENCOUNTER TO DISCUSS TREATMENT OPTIONS: ICD-10-CM

## 2025-01-30 LAB
ALT SERPL W P-5'-P-CCNC: 16 U/L (ref 0–50)
CREAT SERPL-MCNC: 0.84 MG/DL (ref 0.51–0.95)
CRP SERPL-MCNC: 7.7 MG/L
EGFRCR SERPLBLD CKD-EPI 2021: 71 ML/MIN/1.73M2
ERYTHROCYTE [DISTWIDTH] IN BLOOD BY AUTOMATED COUNT: 13.7 % (ref 10–15)
ERYTHROCYTE [SEDIMENTATION RATE] IN BLOOD BY WESTERGREN METHOD: 19 MM/HR (ref 0–30)
HBV CORE AB SERPL QL IA: NONREACTIVE
HBV SURFACE AG SERPL QL IA: NONREACTIVE
HCT VFR BLD AUTO: 40 % (ref 35–47)
HCV AB SERPL QL IA: NONREACTIVE
HGB BLD-MCNC: 13 G/DL (ref 11.7–15.7)
MCH RBC QN AUTO: 26.9 PG (ref 26.5–33)
MCHC RBC AUTO-ENTMCNC: 32.5 G/DL (ref 31.5–36.5)
MCV RBC AUTO: 83 FL (ref 78–100)
PLATELET # BLD AUTO: 325 10E3/UL (ref 150–450)
RBC # BLD AUTO: 4.84 10E6/UL (ref 3.8–5.2)
RHEUMATOID FACT SERPL-ACNC: 17 IU/ML
WBC # BLD AUTO: 7.1 10E3/UL (ref 4–11)

## 2025-01-30 NOTE — PROGRESS NOTES
This document was created using a software with less than 100% fidelity, at times resulting in unintended, even erroneous syntax and grammar.  The reader is advised to keep this under consideration while reviewing, interpreting this note.      Rheumatology Consult Note      Niurka Cisneros     YOB: 1945 Age: 79 year old    Date of visit: 1/30/25    PCP: Mara Combs    Chief Complaint     Subluxation with ulnar deviation of fingers (right hand more than left),+ RF        Assessment and Plan     Roopa has impressive ulnar deviation with subluxation of the MCP joints in the right hand which has progressed over the past year, however curiously she DOES NOT have much joint pain or synovitis on her joint exam.  The subluxation and ulnar deviation in the setting of a positive rheumatoid factor are quite suggestive of RA, however the absence of pain and synovitis is somewhat unusual.  We went over her hand x-rays which show loss of joint space and cartilage in the MCP joints of the index finger more pronounced in the right index finger.  The x-rays do not show any erosions, however x-rays are not very sensitive in picking early erosions or bone edema seen in rheumatoid arthritis and an MRI of the hand would be very helpful in clarifying the situation.  If there is active synovitis, bony edema or erosions on the MRI that would confirm the diagnosis of rheumatoid arthritis and would warrant more aggressive treatment (methotrexate, Biologics).  If the MRI does not show additional erosions or structural damage which is not picked on plain x-rays then a milder medication like hydroxychloroquine can be used.    I have also ordered Sjogren antibodies SSA, SSB and screening for hepatitis B, C which can occasionally give a positive rheumatoid factor.    Reversible, nonpainful subluxation and deformity of the hands can occasionally be seen in Jaccoud arthropathy, however that condition is typically seen in  "younger population, usually in the setting of her history of rheumatic fever.  Her hand changes occurred over the past year and therefore you Karancoud  would be unlikely explanation.    I discussed this with Roopa she is in agreement with this plan to get a more definitive diagnosis before committing her to long-term DMARD/immunosuppressive therapy.    CC PCP        HPI     Roopa is a delightful 79-year-old lady, she is a retired pediatrician.  She is referred to rheumatology because over the past year she has noticed onset of subluxation and ulnar deviation of her fingers in the right hand especially at the metacarpophalangeal joint levels.  Interestingly she does not have much in the way of pain or swelling in her hand.  However these changes are quite striking and progressed over the past year to a year and a half.  She has noticed \"snapping \"of her middle, ring and small fingers, right more than left.  She was seen by sports medicine and prescribed splints for fingers but they did not help.  She denies any pain, swelling or stiffness in either her hands, wrists or other peripheral joints.  She does not have any Sjogren type symptoms of dry eyes or dry mouth (Sjogren's can occasionally be associated with a positive rheumatoid factor).  There is no history of exposure to hepatitis B or hepatitis C.    Blood work showed a positive rheumatoid factor, CCP was negative, BIANCA negative.  ESR 10, CRP 3 were normal.    X-rays of the hands personally reviewed with the patient show loss of joint space in the index finger MCP knuckle, no erosions are seen on plain x-rays, she has some osteoarthritic changes in the PIP and DIP knuckles.    She has had 2 steroid injections for right sacroiliac joint pain.    Family history is negative for rheumatoid arthritis, psoriasis.    There is no history of childhood rheumatic fever.  She has a history of pulmonary nodules since 2008 which have been stable.  She has a longstanding history of " asthma/constrictive airway disease and follows with pulmonology.  There is no history of nosebleeds, recurrent sinusitis etc.    Social history.  She is a retired pediatrician (retired 2017), lives with her , her daughter also lives in the area.  She used to practice in [place] in [place].  No tobacco or alcohol use.         Active Problem List     Patient Active Problem List   Diagnosis    Seizures (H)    Thoracic aortic aneurysm without rupture    Moderate persistent asthma without complication    Prediabetes    Pulmonary nodules    Essential hypertension    Osteopenia of multiple sites    Aneurysm of right renal artery    Severe persistent asthma without complication (H)    Abnormal CT scan of head    Left shoulder pain, unspecified chronicity    Pain in both hands     Past Medical History     Past Medical History:   Diagnosis Date    Abdominal aortic aneurysm without rupture 11/10/2015    [  ] repeat imaging due spring 2016 Descending aortic aneurysm.  Being monitored with serial angiogram     Aneurysm of right renal artery 04/09/2019    Arthritis 1998    COPD (chronic obstructive pulmonary disease) (H) 11/10/2015    Not sure of this diagnosis.  May be a consequence of asthma.    Esophageal hypertension 01/25/2017    Essential hypertension 01/25/2017    Female bladder prolapse 11/10/2015    Hearing loss     Heart disease 2005    Pulmonary nodules 01/25/2017    Seizures (H) 11/10/2015    Temporal seziure d/o.  Does not have LOC.  Has prodromal symptoms     Severe persistent asthma (H) 11/10/2015    Since childhood.  Has been steroid dependent for many years. Has had cydney x2       Past Surgical History     Past Surgical History:   Procedure Laterality Date    APPENDECTOMY  1998    CATARACT IOL, RT/LT Bilateral     COLONOSCOPY N/A 02/14/2019    Procedure: COLONOSCOPY;  Surgeon: Haim Burgos MD;  Location:  GI    GI SURGERY      nissn x 2    GYN SURGERY  2001    H CATARACT SURGICAL PACKAGE       HYSTERECTOMY      fibroids    JOINT REPLACEMENT Left     NISSEN FUNDOPLICATION      x2    ORTHOPEDIC SURGERY      SOFT TISSUE SURGERY       Allergy     Allergies   Allergen Reactions    Quinolones      Current Medication List     Current Outpatient Medications   Medication Sig Dispense Refill    albuterol (PROAIR HFA/PROVENTIL HFA/VENTOLIN HFA) 108 (90 Base) MCG/ACT inhaler Inhale 2 puffs into the lungs every 6 hours as needed for shortness of breath or wheezing. 54 g 5    benralizumab (FASENRA) 30 MG/ML SOSY injection Inject 30 mg Subcutaneous once      carBAMazepine (TEGRETOL XR) 200 MG 12 hr tablet Take 1 tablet (200 mg) by mouth daily as needed (seizure) 30 tablet 1    diclofenac (VOLTAREN) 1 % topical gel Apply 4 g topically 4 times daily 350 g 3    estradiol (ESTRING) 7.5 MCG/24HR vaginal ring Place 1 each (1 Vaginal ring) vaginally every 3 months 1 each 4    magnesium 250 MG tablet Take 1 tablet by mouth daily      metoprolol succinate ER (TOPROL XL) 25 MG 24 hr tablet TAKE 1 TABLET DAILY WITH   50MG TABLET FOR TOTAL OF   75MG DAILY. 90 tablet 3    metoprolol succinate ER (TOPROL XL) 50 MG 24 hr tablet Take 1 tablet (50 mg) by mouth daily With 25 mg tab for total of 75 mg Daily 90 tablet 3    montelukast (SINGULAIR) 10 MG tablet Take 1 tablet (10 mg) by mouth at bedtime. 90 tablet 3    NAPROXEN PO Take 220 mg by mouth as needed       potassium chloride antonietta ER (KLOR-CON M20) 20 MEQ CR tablet Take 1 tablet (20 mEq) by mouth daily 90 tablet 4    pravastatin (PRAVACHOL) 20 MG tablet Take 1 tablet (20 mg) by mouth daily 90 tablet 4    theophylline (DENIA-24) 400 MG 24 hr capsule Take 1 capsule (400 mg) by mouth daily. 90 capsule 3    triamterene-HCTZ (MAXZIDE) 75-50 MG tablet Take 1 tablet by mouth daily 90 tablet 4    fluticasone-salmeterol (WIXELA INHUB) 500-50 MCG/ACT inhaler Inhale 1 puff into the lungs 2 times daily. (Patient not taking: Reported on 1/30/2025) 180 each 3     No current  "facility-administered medications for this visit.       No current facility-administered medications for this visit.        Family History     Family History   Problem Relation Age of Onset    Dementia Mother     Heart Failure Mother     Hypertension Mother     Breast Cancer Mother         post menopausal    Diabetes Mother     Cancer Father         prostate cancer    Hypertension Father     Prostate Cancer Father          of it at 82    Asthma Father         \"outgrew\" it    Schizophrenia Maternal Grandmother     Coronary Artery Disease Maternal Grandmother          of aortic aneurysm at 89    Mental Illness Maternal Grandmother     Coronary Artery Disease Maternal Grandfather         he was diabetic and smoked    Asthma Sister         x2    Depression Sister     Coronary Artery Disease Sister         MI when being ventilated for asthma    Asthma Sister         both sisters with asthma, one severe    Depression Sister     Other - See Comments Daughter         Enlarged Aorta    Melanoma Daughter     Breast Cancer Maternal Aunt     Coronary Artery Disease Daughter         Mildly dilated thoracic aorta    Other Cancer Daughter         Melanoma    Hypertension Son     Diabetes Son         Grandson. Type 1    Coronary Artery Disease Cousin         Dilated aorta         Physical Exam     COMPREHENSIVE EXAMINATION:  Head/neck: No butterfly rash, no jaundice, no conjunctival pallor, no ocular redness, no facial asymmetry, no enlargement of the major salivary glands    Lungs: clear to auscultation, no crackles or wheezing    Heart sounds :regular    Abdomen: Soft, nontender    Musculoskeletal:    Right hand: There is ulnar deviation of the fingers at the metacarpophalangeal joint level with subluxation of the MCPs, there is mild puffiness around the index finger MCP but no synovitis of the remaining MCPs on clinical exam and no tenderness.  Mild osteoarthritic changes are noted in the distal interphalangeal " joints.    Left hand: No synovitis or tenderness of 1-5 MCP joints, no synovitis or tenderness of 1-5 PIP joints, no swan-neck or boutonniere deformities    Right wrist: no synovitis,no tenderness    Left wrist: No tenderness, no synovitis    Elbows: Full range of motion, no swelling or synovitis    Right shoulder: Normal abduction, internal and external rotation    Left shoulder: Normal abduction, internal and external rotation    Cervical spine: Normal flexion, lateral rotation bilaterally full    Spine: No alignment abnormalities noted    Right hip:Full  Flexion internal and external rotation    Left hip: Full flexion, internal and external rotation    Right knee: no effusion, no redness, full ROM    Left knee: No effusion, no redness, full ROM    Rt ankle: No swelling, redness or tenderness    Left ankle: No swelling, redness or tenderness    Right foot: Hallux valgus big toe noted, no swelling, redness or tenderness of the toes/MTPs    Left foot: Hallux valgus changes of the big toe noted, no swelling, redness or tenderness of the toes/MTP joints            Labs / Imaging (select studies)     Rheumatoid Factor   Date Value Ref Range Status   10/09/2024 16 (H) <14 IU/mL Final      Hemoglobin   Date Value Ref Range Status   10/09/2024 11.9 11.7 - 15.7 g/dL Final   06/30/2023 13.1 11.7 - 15.7 g/dL Final   05/16/2016 12.7 11.7 - 15.7 g/dL Final   11/10/2015 14.1 11.7 - 15.7 g/dL Final     Urea Nitrogen   Date Value Ref Range Status   07/05/2023 25.0 (H) 8.0 - 23.0 mg/dL Final   06/30/2023 24.9 (H) 8.0 - 23.0 mg/dL Final   04/22/2022 21 7 - 30 mg/dL Final   02/12/2021 14 7 - 30 mg/dL Final   12/27/2019 15 7 - 30 mg/dL Final   04/15/2019 19 7 - 30 mg/dL Final     Creatinine   Date Value Ref Range Status   10/22/2020 0.8 0.52 - 1.04 mg/dL Final     Sed Rate   Date Value Ref Range Status   11/18/2020 14 0 - 30 mm/h Final   05/16/2016 8 0 - 30 mm/h Final     Erythrocyte Sedimentation Rate   Date Value Ref Range Status    10/09/2024 10 0 - 30 mm/hr Final     CRP Inflammation   Date Value Ref Range Status   11/18/2020 3.1 0.0 - 8.0 mg/L Final   05/16/2016 <2.9 0.0 - 8.0 mg/L Final     AST   Date Value Ref Range Status   06/30/2023 23 0 - 45 U/L Final     Comment:     Reference intervals for this test were updated on 6/12/2023 to more accurately reflect our healthy population. There may be differences in the flagging of prior results with similar values performed with this method. Interpretation of those prior results can be made in the context of the updated reference intervals.     Albumin   Date Value Ref Range Status   06/30/2023 4.5 3.5 - 5.2 g/dL Final     Alkaline Phosphatase   Date Value Ref Range Status   06/30/2023 62 35 - 104 U/L Final     ALT   Date Value Ref Range Status   06/30/2023 17 0 - 50 U/L Final     Comment:     Reference intervals for this test were updated on 6/12/2023 to more accurately reflect our healthy population. There may be differences in the flagging of prior results with similar values performed with this method. Interpretation of those prior results can be made in the context of the updated reference intervals.       Rheumatoid Factor   Date Value Ref Range Status   10/09/2024 16 (H) <14 IU/mL Final          Immunization History     Immunization History   Administered Date(s) Administered    COVID-19 12+ (Pfizer) 10/09/2023    COVID-19 Bivalent 12+ (Pfizer) 09/19/2022, 05/27/2023    COVID-19 MONOVALENT 12+ (Pfizer) 02/19/2021, 03/12/2021, 10/03/2021    COVID-19 Monovalent 12+ (Pfizer 2022) 04/04/2022    HEPA 11/10/2008, 05/16/2014    Hepatitis A (ADULT 19+) 11/10/2008, 05/16/2014    Influenza (H1N1) 10/06/2017    Influenza (High Dose) Trivalent,PF (Fluzone) 10/13/2015, 10/30/2016, 10/06/2017, 10/26/2018, 10/15/2019    Influenza (IIV3) PF 09/22/2009, 10/18/2011, 09/14/2012, 09/12/2013, 09/04/2014, 10/13/2015    Influenza (prior to 2024) 09/12/2013    Influenza Vaccine 65+ (FLUAD) 10/09/2023     Influenza Vaccine 65+ (Fluzone HD) 10/15/2019, 08/27/2020, 09/23/2021, 09/19/2022    Influenza Vaccine >6 months,quad, PF 09/04/2014    Pneumo Conj 13-V (2010&after) 09/04/2014    Pneumococcal 23 valent 04/25/2013    RSV Vaccine (Abrysvo) 10/09/2023    TDAP (Adacel,Boostrix) 01/01/2007, 09/23/2021    TDAP Vaccine (Boostrix) 09/07/2016    Zoster recombinant adjuvanted (SHINGRIX) 02/15/2019, 10/15/2019    Zoster vaccine, live 10/01/2007

## 2025-02-01 LAB
GAMMA INTERFERON BACKGROUND BLD IA-ACNC: 0.03 IU/ML
M TB IFN-G BLD-IMP: NEGATIVE
M TB IFN-G CD4+ BCKGRND COR BLD-ACNC: 9.97 IU/ML
MITOGEN IGNF BCKGRD COR BLD-ACNC: -0.01 IU/ML
MITOGEN IGNF BCKGRD COR BLD-ACNC: 0.01 IU/ML

## 2025-02-03 ENCOUNTER — INFUSION THERAPY VISIT (OUTPATIENT)
Dept: INFUSION THERAPY | Facility: CLINIC | Age: 80
End: 2025-02-03
Payer: MEDICARE

## 2025-02-03 VITALS
TEMPERATURE: 97.6 F | HEART RATE: 68 BPM | SYSTOLIC BLOOD PRESSURE: 121 MMHG | OXYGEN SATURATION: 97 % | DIASTOLIC BLOOD PRESSURE: 74 MMHG | RESPIRATION RATE: 16 BRPM

## 2025-02-03 DIAGNOSIS — J45.50 SEVERE PERSISTENT ASTHMA WITHOUT COMPLICATION (H): Primary | ICD-10-CM

## 2025-02-03 PROCEDURE — 96372 THER/PROPH/DIAG INJ SC/IM: CPT

## 2025-02-03 PROCEDURE — 250N000011 HC RX IP 250 OP 636: Mod: JZ

## 2025-02-03 RX ORDER — METHYLPREDNISOLONE SODIUM SUCCINATE 40 MG/ML
40 INJECTION INTRAMUSCULAR; INTRAVENOUS
Start: 2025-02-18

## 2025-02-03 RX ORDER — ALBUTEROL SULFATE 90 UG/1
1-2 INHALANT RESPIRATORY (INHALATION)
Start: 2025-02-18

## 2025-02-03 RX ORDER — EPINEPHRINE 1 MG/ML
0.3 INJECTION, SOLUTION INTRAMUSCULAR; SUBCUTANEOUS EVERY 5 MIN PRN
OUTPATIENT
Start: 2025-02-18

## 2025-02-03 RX ORDER — DIPHENHYDRAMINE HYDROCHLORIDE 50 MG/ML
25 INJECTION INTRAMUSCULAR; INTRAVENOUS
Start: 2025-02-18

## 2025-02-03 RX ORDER — ALBUTEROL SULFATE 0.83 MG/ML
2.5 SOLUTION RESPIRATORY (INHALATION)
OUTPATIENT
Start: 2025-02-18

## 2025-02-03 RX ORDER — MEPERIDINE HYDROCHLORIDE 25 MG/ML
25 INJECTION INTRAMUSCULAR; INTRAVENOUS; SUBCUTANEOUS
OUTPATIENT
Start: 2025-02-18

## 2025-02-03 RX ORDER — DIPHENHYDRAMINE HYDROCHLORIDE 50 MG/ML
50 INJECTION INTRAMUSCULAR; INTRAVENOUS
Start: 2025-02-18

## 2025-02-03 RX ADMIN — BENRALIZUMAB 30 MG: 30 INJECTION, SOLUTION SUBCUTANEOUS at 09:38

## 2025-02-03 ASSESSMENT — PAIN SCALES - GENERAL: PAINLEVEL_OUTOF10: NO PAIN (0)

## 2025-02-03 NOTE — PROGRESS NOTES
Nursing Note:    Niurka Cisneros presents today for Fasenra.   Patient seen by provider today: No   present during visit today: Not Applicable.     Note: N/A.     Treatment Conditions:  Biological Infusion Checklist:  ~~~ NOTE: If the patient answers yes to any of the questions below, hold the infusion and contact ordering provider or on-call provider.    Have you recently had an elevated temperature, fever, chills, productive cough, coughing for 3 weeks or longer or hemoptysis,  abnormal vital signs, night sweats,  chest pain or have you noticed a decrease in your appetite, unexplained weight loss or fatigue? No  Do you have any open wounds or new incisions? No  Do you have any upcoming hospitalizations or surgeries? Does not include esophagogastroduodenoscopy, colonoscopy, endoscopic retrograde cholangiopancreatography (ERCP), endoscopic ultrasound (EUS), dental procedures or joint aspiration/steroid injections No  Do you currently have any signs of illness or infection or are you on any antibiotics? No  Have you had any new, sudden or worsening abdominal pain? No  Have you or anyone in your household received a live vaccination in the past 4 weeks? Please note: No live vaccines while on biologic/chemotherapy until 6 months after the last treatment. Patient can receive the flu vaccine (shot only), pneumovax and the Covid vaccine. It is optimal for the patient to get these vaccines mid cycle, but they can be given at any time as long as it is not on the day of the infusion. No  Have you recently been diagnosed with any new nervous system diseases (ie. Multiple sclerosis, Guillain [place], seizures, neurological changes) or cancer diagnosis? Are you on any form of radiation or chemotherapy? No  Are you pregnant or breast feeding or do you have plans of pregnancy in the future? No  Have you been having any signs of worsening depression or suicidal ideations?  (benlysta only) No  Have there been any other  new onset medical symptoms? No  Have you had any new blood clots? (IVIG only) No  I released the medication and delegated administration to the supportive staff team member. Please see MAR and administration note for additional details.     Maddie Barebr RN

## 2025-02-03 NOTE — PROGRESS NOTES
Patient presents to the [facility] for Fasenra injection.  Order written by Dr. Vega was completed today. Name and  verified with patient. See MAR for medication details. Medication was provided in one syringe by pharmacy and given in the following sites SHIRA. Patient tolerated injection well and was observed for 30 minutes post injection. Patient was discharged to home. Patient to return for next appt in eight weeks. Message sent to scheduling.  Administrations This Visit       benralizumab (FASENRA) injection 30 mg       Admin Date  2025 Action  $Given Dose  30 mg Route  Subcutaneous Documented By  Kay Edwrads MA

## 2025-02-04 LAB
ENA SS-A AB SER IA-ACNC: <0.5 U/ML
ENA SS-A AB SER IA-ACNC: NEGATIVE
ENA SS-B IGG SER IA-ACNC: <0.6 U/ML
ENA SS-B IGG SER IA-ACNC: NEGATIVE

## 2025-02-13 NOTE — PROGRESS NOTES
Hand Surgery    REFERRING PHYSICIAN: Referred Self   PRIMARY CARE PHYSICIAN: Mara Combs    Diagnosis: Bilateral middle/ring/small extensor tendon subluxation  Treatment: hand therapy     Chief Complaint:   RECHECK (Follow-up bilateral extensor tendon subluxation of the right middle, ring and small finger )    History of Present Illness:     MAURICE Estes 9/23/2024: Niurka Cisneros is a 79 year old right -hand dominant female who presents for evaluation of snapping of the bilateral middle, ring, small fingers, right worse than left.  Patient reports that she started to develop this about a year ago in her right hand, and has recently started to note symptoms in her left hand as well.  She notes that she was seen by sports medicine last year sometime at which time she was given splints for trigger fingers.  She states it did not help.  She has most difficulty straightening her fingers when doing activities such as typing.    Patient denies history of inflammatory arthropathy, she does note she has been worked up for genetic collagen disorder.  She notes she was diagnosed with a aortic aneurysm and underwent subsequent genetic testing.  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    10/16/2024: She reports that hand therapy was unable to make her relative motion splint due to multiple digit involvement.  She does not have pain in the hand but notes that on the right side she is unable to actively extend the middle/ring/small fingers from a flexed position.  On the left side she has snapping, but is still able to actively extend.    11/27/2024: No change in her symptoms with the splint. She has not seen rheumatology. No pain in the hand but still has difficulty with motion of the right middle/ring/small fingers primarily    1/7/2025: symptoms are similar, she's thinking about seeing rheumatology. She's not sure the splint is optimized for her hands.    2/18/2025: seen by rheumatology who  recommend hand MRI and is considering medication treatment. Her hands continue to be painless. She has not had success with her splinting.    Past Medical History:     Past Medical History:   Diagnosis Date    Abdominal aortic aneurysm without rupture 11/10/2015    [  ] repeat imaging due spring 2016 Descending aortic aneurysm.  Being monitored with serial angiogram     Aneurysm of right renal artery 04/09/2019    Arthritis 1998    COPD (chronic obstructive pulmonary disease) (H) 11/10/2015    Not sure of this diagnosis.  May be a consequence of asthma.    Esophageal hypertension 01/25/2017    Essential hypertension 01/25/2017    Female bladder prolapse 11/10/2015    Hearing loss     Heart disease 2005    Pulmonary nodules 01/25/2017    Seizures (H) 11/10/2015    Temporal seziure d/o.  Does not have LOC.  Has prodromal symptoms     Severe persistent asthma (H) 11/10/2015    Since childhood.  Has been steroid dependent for many years. Has had cydney x2       Patient Active Problem List   Diagnosis    Seizures (H)    Thoracic aortic aneurysm without rupture    Moderate persistent asthma without complication    Prediabetes    Pulmonary nodules    Essential hypertension    Osteopenia of multiple sites    Aneurysm of right renal artery    Severe persistent asthma without complication (H)    Abnormal CT scan of head    Left shoulder pain, unspecified chronicity    Pain in both hands         Physical Exam:   Musculoskeletal: A focused physical examination of the bilateral hands reveals:   No edema or ecchymosis.  Arthritic changes to the bilateral hands.    Synovitis diffusely throughout the MCPs.    Ulnar drift of the right middle, ring, small fingers. Right worse then left.  Palpable ulnar subluxation of the extensor tendons to the bilateral middle, ring, small fingers with snapping.   Perching of the extensor tendon of the bilateral index fingers ulnarly, no lenny subluxation.  Synovitis right index MCP.  Neurovascular-  intact light touch sensation and motor to distribution of the median, ulnar and radial nerves.   Brisk capillary refill to the distal fingers.  Range of Motion: Able to make a full composite fist.  Able to fully extend all fingers, but difficulty with right MF/RF/SF    Imaging:   XR (3 views) bilateral hand obtained 10/16/2024 shows: Polyarticular arthritis.  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  No prior history of inflammatory arthritis, but her rheumatoid factor is elevated.    Plan: We discussed clinical findings, diagnosis, and treatment plan.  Fortunately the patient does not have pain currently.  Continue splint with MCPs in extension to try to centralize the extensor tendons.  She does not have significant MCP arthritis of the joints that are most affected with extensor tendon subluxation, so I do not recommend arthroplasty at this time.  We discussed extensor tendon centralization along with the risks/benefits/recovery.  The patient would like to avoid surgery at this time.  Surgery may be indicated if splinting fails to improve her symptoms or if she develops pain.    -Continue with MCP extension splint if helpful  -Appreciate rheumatology evaluation, will follow up MRI results  -Return to clinic as needed if she wants further treatment    JERRY THOMAS MD

## 2025-02-17 ENCOUNTER — MYC MEDICAL ADVICE (OUTPATIENT)
Dept: CARDIOLOGY | Facility: CLINIC | Age: 80
End: 2025-02-17
Payer: MEDICARE

## 2025-02-18 ENCOUNTER — OFFICE VISIT (OUTPATIENT)
Dept: ORTHOPEDICS | Facility: CLINIC | Age: 80
End: 2025-02-18
Payer: COMMERCIAL

## 2025-02-18 DIAGNOSIS — M66.241 NONTRAUMATIC RUPTURE OF SAGITTAL BAND OF EXTENSOR TENDON OF RIGHT UPPER EXTREMITY: Primary | ICD-10-CM

## 2025-02-18 PROCEDURE — 99213 OFFICE O/P EST LOW 20 MIN: CPT | Performed by: STUDENT IN AN ORGANIZED HEALTH CARE EDUCATION/TRAINING PROGRAM

## 2025-02-18 NOTE — LETTER
2/18/2025      Niurka Cisneros  [address]  [place] [address]      Dear Colleague,    Thank you for referring your patient, Niurka Cisneros, to the [facility] [place]. Please see a copy of my visit note below.    Hand Surgery    REFERRING PHYSICIAN: Referred Self   PRIMARY CARE PHYSICIAN: Mara Combs    Diagnosis: Bilateral middle/ring/small extensor tendon subluxation  Treatment: hand therapy     Chief Complaint:   RECHECK (Follow-up bilateral extensor tendon subluxation of the right middle, ring and small finger )    History of Present Illness:     MAURICE Estes 9/23/2024: Niurka Cisneros is a 79 year old right -hand dominant female who presents for evaluation of snapping of the bilateral middle, ring, small fingers, right worse than left.  Patient reports that she started to develop this about a year ago in her right hand, and has recently started to note symptoms in her left hand as well.  She notes that she was seen by sports medicine last year sometime at which time she was given splints for trigger fingers.  She states it did not help.  She has most difficulty straightening her fingers when doing activities such as typing.    Patient denies history of inflammatory arthropathy, she does note she has been worked up for genetic collagen disorder.  She notes she was diagnosed with a aortic aneurysm and underwent subsequent genetic testing.  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    10/16/2024: She reports that hand therapy was unable to make her relative motion splint due to multiple digit involvement.  She does not have pain in the hand but notes that on the right side she is unable to actively extend the middle/ring/small fingers from a flexed position.  On the left side she has snapping, but is still able to actively extend.    11/27/2024: No change in her symptoms with the splint. She has not seen rheumatology. No pain in the  hand but still has difficulty with motion of the right middle/ring/small fingers primarily    1/7/2025: symptoms are similar, she's thinking about seeing rheumatology. She's not sure the splint is optimized for her hands.    2/18/2025: seen by rheumatology who recommend hand MRI and is considering medication treatment. Her hands continue to be painless. She has not had success with her splinting.    Past Medical History:     Past Medical History:   Diagnosis Date     Abdominal aortic aneurysm without rupture 11/10/2015    [  ] repeat imaging due spring 2016 Descending aortic aneurysm.  Being monitored with serial angiogram      Aneurysm of right renal artery 04/09/2019     Arthritis 1998     COPD (chronic obstructive pulmonary disease) (H) 11/10/2015    Not sure of this diagnosis.  May be a consequence of asthma.     Esophageal hypertension 01/25/2017     Essential hypertension 01/25/2017     Female bladder prolapse 11/10/2015     Hearing loss      Heart disease 2005     Pulmonary nodules 01/25/2017     Seizures (H) 11/10/2015    Temporal seziure d/o.  Does not have LOC.  Has prodromal symptoms      Severe persistent asthma (H) 11/10/2015    Since childhood.  Has been steroid dependent for many years. Has had cydney x2       Patient Active Problem List   Diagnosis     Seizures (H)     Thoracic aortic aneurysm without rupture     Moderate persistent asthma without complication     Prediabetes     Pulmonary nodules     Essential hypertension     Osteopenia of multiple sites     Aneurysm of right renal artery     Severe persistent asthma without complication (H)     Abnormal CT scan of head     Left shoulder pain, unspecified chronicity     Pain in both hands         Physical Exam:   Musculoskeletal: A focused physical examination of the bilateral hands reveals:   No edema or ecchymosis.  Arthritic changes to the bilateral hands.    Synovitis diffusely throughout the MCPs.    Ulnar drift of the right middle, ring,  small fingers. Right worse then left.  Palpable ulnar subluxation of the extensor tendons to the bilateral middle, ring, small fingers with snapping.   Perching of the extensor tendon of the bilateral index fingers ulnarly, no lenny subluxation.  Synovitis right index MCP.  Neurovascular- intact light touch sensation and motor to distribution of the median, ulnar and radial nerves.   Brisk capillary refill to the distal fingers.  Range of Motion: Able to make a full composite fist.  Able to fully extend all fingers, but difficulty with right MF/RF/SF    Imaging:   XR (3 views) bilateral hand obtained 10/16/2024 shows: Polyarticular arthritis.  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  No prior history of inflammatory arthritis, but her rheumatoid factor is elevated.    Plan: We discussed clinical findings, diagnosis, and treatment plan.  Fortunately the patient does not have pain currently.  Continue splint with MCPs in extension to try to centralize the extensor tendons.  She does not have significant MCP arthritis of the joints that are most affected with extensor tendon subluxation, so I do not recommend arthroplasty at this time.  We discussed extensor tendon centralization along with the risks/benefits/recovery.  The patient would like to avoid surgery at this time.  Surgery may be indicated if splinting fails to improve her symptoms or if she develops pain.    -Continue with MCP extension splint if helpful  -Appreciate rheumatology evaluation, will follow up MRI results  -Return to clinic as needed if she wants further treatment    JERRY THOMAS MD      Again, thank you for allowing me to participate in the care of your patient.        Sincerely,        JERRY THOMAS,  MD    Electronically signed

## 2025-02-18 NOTE — NURSING NOTE
Reason For Visit:   Chief Complaint   Patient presents with    RECHECK     Follow-up bilateral extensor tendon subluxation of the right middle, ring and small finger        Primary MD: Mara Combs  Ref. MD: Elmo    Age: 79 year old    ?  No      LMP  (LMP Unknown)       Pain Assessment  Patient Currently in Pain: No (minimal to no pain)    Hand Dominance Evaluation  Hand Dominance: Right          QuickDASH Assessment      2/13/2025     4:38 PM   QuickDASH Main   1. Open a tight or new jar Mild difficulty   2. Do heavy household chores (e.g., wash walls, floors) Mild difficulty   3. Carry a shopping bag or briefcase No difficulty   4. Wash your back Mild difficulty   5. Use a knife to cut food No difficulty   6. Recreational activities in which you take some force or impact through your arm, shoulder or hand (e.g., golf, hammering, tennis, etc.) No difficulty   7. During the past week, to what extent has your arm, shoulder or hand problem interfered with your normal social activities with family, friends, neighbours or groups Slightly   8. During the past week, were you limited in your work or other regular daily activities as a result of your arm, shoulder or hand problem Slightly limited   9. Arm, shoulder or hand pain None   10.Tingling (pins and needles) in your arm,shoulder or hand None   11. During the past week, how much difficulty have you had sleeping because of the pain in your arm, shoulder or hand No difficulty   Quickdash Ability Score 11.36          Current Outpatient Medications   Medication Sig Dispense Refill    albuterol (PROAIR HFA/PROVENTIL HFA/VENTOLIN HFA) 108 (90 Base) MCG/ACT inhaler Inhale 2 puffs into the lungs every 6 hours as needed for shortness of breath or wheezing. 54 g 5    benralizumab (FASENRA) 30 MG/ML SOSY injection Inject 30 mg Subcutaneous once      carBAMazepine (TEGRETOL XR) 200 MG 12 hr tablet Take 1 tablet (200 mg) by mouth daily as needed (seizure) 30  tablet 1    diclofenac (VOLTAREN) 1 % topical gel Apply 4 g topically 4 times daily 350 g 3    estradiol (ESTRING) 7.5 MCG/24HR vaginal ring Place 1 each (1 Vaginal ring) vaginally every 3 months 1 each 4    fluticasone-salmeterol (WIXELA INHUB) 500-50 MCG/ACT inhaler Inhale 1 puff into the lungs 2 times daily. (Patient not taking: Reported on 1/30/2025) 180 each 3    magnesium 250 MG tablet Take 1 tablet by mouth daily      metoprolol succinate ER (TOPROL XL) 25 MG 24 hr tablet TAKE 1 TABLET DAILY WITH   50MG TABLET FOR TOTAL OF   75MG DAILY. 90 tablet 3    metoprolol succinate ER (TOPROL XL) 50 MG 24 hr tablet Take 1 tablet (50 mg) by mouth daily With 25 mg tab for total of 75 mg Daily 90 tablet 3    montelukast (SINGULAIR) 10 MG tablet Take 1 tablet (10 mg) by mouth at bedtime. 90 tablet 3    NAPROXEN PO Take 220 mg by mouth as needed       potassium chloride antonietta ER (KLOR-CON M20) 20 MEQ CR tablet Take 1 tablet (20 mEq) by mouth daily 90 tablet 4    pravastatin (PRAVACHOL) 20 MG tablet Take 1 tablet (20 mg) by mouth daily 90 tablet 4    theophylline (DENIA-24) 400 MG 24 hr capsule Take 1 capsule (400 mg) by mouth daily. 90 capsule 3    triamterene-HCTZ (MAXZIDE) 75-50 MG tablet Take 1 tablet by mouth daily 90 tablet 4       Allergies   Allergen Reactions    Quinolones        GALINDO ZAMORA, ATC

## 2025-02-19 ENCOUNTER — ANCILLARY PROCEDURE (OUTPATIENT)
Dept: MRI IMAGING | Facility: CLINIC | Age: 80
End: 2025-02-19
Attending: INTERNAL MEDICINE
Payer: COMMERCIAL

## 2025-02-19 DIAGNOSIS — M79.642 BILATERAL HAND PAIN: ICD-10-CM

## 2025-02-19 DIAGNOSIS — M79.641 BILATERAL HAND PAIN: ICD-10-CM

## 2025-02-19 DIAGNOSIS — R76.8 RHEUMATOID FACTOR POSITIVE: ICD-10-CM

## 2025-02-19 PROCEDURE — A9585 GADOBUTROL INJECTION: HCPCS | Mod: JZ | Performed by: RADIOLOGY

## 2025-02-19 PROCEDURE — 73220 MRI UPPR EXTREMITY W/O&W/DYE: CPT | Mod: RT | Performed by: RADIOLOGY

## 2025-02-19 RX ORDER — GADOBUTROL 604.72 MG/ML
7.5 INJECTION INTRAVENOUS ONCE
Status: COMPLETED | OUTPATIENT
Start: 2025-02-19 | End: 2025-02-19

## 2025-02-19 RX ADMIN — GADOBUTROL 7.5 ML: 604.72 INJECTION INTRAVENOUS at 09:55

## 2025-02-19 NOTE — DISCHARGE INSTRUCTIONS
MRI Contrast Discharge Instructions    The IV contrast you received today will pass out of your body in your  urine. This will happen in the next 24 hours. You will not feel this process.  Your urine will not change color.    Drink at least 4 extra glasses of water or juice today (unless your doctor  has restricted your fluids). This reduces the stress on your kidneys.  You may take your regular medicines.    If you are on dialysis: It is best to have dialysis today.    If you have a reaction: Most reactions happen right away. If you have  any new symptoms after leaving the hospital (such as hives or swelling),  call your hospital at the correct number below. Or call your family doctor.  If you have breathing distress or wheezing, call 911.    Special instructions: ***    I have read and understand the above information.    Signature:______________________________________ Date:___________    Staff:__________________________________________ Date:___________     Time:__________    [place] [facility] Departments:    ___[place]: [phone]  ___[facility]: [phone]  ___[place]: [phone] ___[facility]: [phone]  ___[facility]: [phone]  ___[facility]: [phone]  ___[phone]  ___[facility]: [phone]  ___[phone]

## 2025-02-20 ENCOUNTER — OFFICE VISIT (OUTPATIENT)
Dept: RHEUMATOLOGY | Facility: CLINIC | Age: 80
End: 2025-02-20
Payer: COMMERCIAL

## 2025-02-20 VITALS
BODY MASS INDEX: 26.37 KG/M2 | DIASTOLIC BLOOD PRESSURE: 71 MMHG | HEIGHT: 67 IN | HEART RATE: 79 BPM | OXYGEN SATURATION: 96 % | SYSTOLIC BLOOD PRESSURE: 128 MMHG | WEIGHT: 168 LBS

## 2025-02-20 DIAGNOSIS — Z79.899 HIGH RISK MEDICATION USE: ICD-10-CM

## 2025-02-20 DIAGNOSIS — R76.8 RHEUMATOID FACTOR POSITIVE: ICD-10-CM

## 2025-02-20 DIAGNOSIS — M05.79 RHEUMATOID ARTHRITIS, SEROPOSITIVE, MULTIPLE SITES (H): Primary | ICD-10-CM

## 2025-02-20 DIAGNOSIS — Z71.89 ENCOUNTER TO DISCUSS TREATMENT OPTIONS: ICD-10-CM

## 2025-02-20 RX ORDER — FOLIC ACID 1 MG/1
1 TABLET ORAL DAILY
Qty: 90 TABLET | Refills: 3 | Status: SHIPPED | OUTPATIENT
Start: 2025-02-20

## 2025-02-20 RX ORDER — METHOTREXATE 2.5 MG/1
10 TABLET ORAL
Qty: 48 TABLET | Refills: 1 | Status: SHIPPED | OUTPATIENT
Start: 2025-02-20

## 2025-02-20 NOTE — PROGRESS NOTES
"  Assessment and Plan    1.  Seropositive rheumatoid arthritis.  The MRI findings of the right hand and wrist are confirmatory for rheumatoid arthritis (erosions in the carpus, third MCP) with synovitis.  Therefore DMARD therapy would be indicated to prevent further joint damage and deformity progression.    We will start her on methotrexate 10 mg weekly with daily folic acid.    We specifically discussed methotrexate, its route of administration (weekly oral or subcutaneous injection), the 6 to 8-week \"lag\" in onset of action in rheumatologic disorders(RA, psoriatic arthritis, lupus etc.).  The need for regular blood work monitoring (hepatotoxicity, bone marrow suppression) and avoiding/minimizing alcohol use (to reduce the risk of liver toxicity).  She will be leaving for [place] in March and will have a CBC, ALT and creatinine just prior to leaving.  We will keep her on 10 mg methotrexate weekly until she returns in April at which point we will reevaluate her and consider increasing the methotrexate dose to 15 mg weekly.    Follow-up 2 months    CC PCP              Rheumatology follow-up visit note       Roopa is a delightful 79-year-old lady, she is a retired pediatrician.  She is referred to rheumatology because over the past year she has noticed onset of subluxation and ulnar deviation of her fingers in the right hand especially at the metacarpophalangeal joint levels.  Interestingly she does not have much in the way of pain or swelling in her hand.  However these changes are quite striking and progressed over the past year to a year and a half.  She has noticed \"snapping \"of her middle, ring and small fingers, right more than left.  She was seen by sports medicine and prescribed splints for fingers but they did not help.  She denies any pain, swelling or stiffness in either her hands, wrists or other peripheral joints.  She does not have any Sjogren type symptoms of dry eyes or dry mouth (Sjogren's can " "occasionally be associated with a positive rheumatoid factor).  There is no history of exposure to hepatitis B or hepatitis C.    Blood work showed a positive rheumatoid factor, CCP was negative, BIANCA negative.  ESR 10, CRP 3 were normal.    Given the lack of joint pain we ordered an MRI of the right hand and wrist for more details, erosions and synovitis etc. which could not be seen on plain x-rays.  The MRI is quite helpful and shows EROSIONS IN THE CAPITATE,TRIQUETRUM AND 3RD MCP, which are consistent with early rheumatoid arthritis.  Gadolinium administration let to some enhancement which is also suggestive of inflammation.    Screening test for hepatitis B, C were negative.  Sjogren antibodies were also undetectable.    She has had 2 steroid injections for right sacroiliac joint pain.    Family history is negative for rheumatoid arthritis, psoriasis.     She has a history of pulmonary nodules since 2008 which have been stable.  She has a longstanding history of asthma/constrictive airway disease and follows with pulmonology.  There is no history of nosebleeds, recurrent sinusitis etc.    Social history.  She is a retired pediatrician (retired 2017), lives with her , her daughter also lives in the area.  She used to practice in [place], [place].  No tobacco or alcohol use.      DETAILED EXAMINATION  02/20/25  :    Vitals:    02/20/25 1347   BP: 128/71   BP Location: Right arm   Patient Position: Sitting   Cuff Size: Adult Regular   Pulse: 79   SpO2: 96%   Weight: 76.2 kg (168 lb)   Height: 1.702 m (5' 7\")         Head/neck: No butterfly rash, no jaundice, no conjunctival pallor, no ocular redness, no facial asymmetry, no enlargement of the major salivary glands    Lungs: clear to auscultation, no crackles or wheezing    Heart sounds :regular        Musculoskeletal:    Right hand: There is ulnar deviation of the fingers at the metacarpophalangeal joint level with subluxation of the MCPs, there is mild " puffiness around the index finger MCP but no synovitis of the remaining MCPs on clinical exam and no tenderness.  Mild osteoarthritic changes are noted in the distal interphalangeal joints.    Left hand: Slight synovitis of the second, third MCP knuckles noted without tenderness, the remaining MCPs are nonswollen and nontender, no synovitis or tenderness of 1-5 PIP joints, no swan-neck or boutonniere deformities    Right wrist: no synovitis,no tenderness    Left wrist: No tenderness, no synovitis    Elbows: Full range of motion, no swelling or synovitis    Shoulders full ROM    Spine: No alignment abnormalities noted    Hips pain-free range of motion    Knees full range of motion           Patient Active Problem List    Diagnosis Date Noted    Pain in both hands 10/21/2024     Priority: Medium    Left shoulder pain, unspecified chronicity 07/03/2023     Priority: Medium    Abnormal CT scan of head 07/11/2019     Priority: Medium    Severe persistent asthma without complication (H) 04/15/2019     Priority: Medium    Aneurysm of right renal artery 04/09/2019     Priority: Medium    Osteopenia of multiple sites 12/15/2017     Priority: Medium     Treated with alendronate while in [place].  Told that she failed therapy and discontinued.  1/2020 starting zolendronic acid   12/2017: major osteoporotic 11%, hip fracture 2.1%      Pulmonary nodules 01/25/2017     Priority: Medium    Essential hypertension 01/25/2017     Priority: Medium    Prediabetes 09/07/2016     Priority: Medium    Seizures (H) 11/10/2015     Priority: Medium     Temporal seziure d/o.  Does not have LOC.  Has prodromal symptoms.  Uses carbamazepine as needed with symptoms, typically ~2 times per year      Thoracic aortic aneurysm without rupture 11/10/2015     Priority: Medium     [  ] repeat imaging due spring 2016  Descending aortic aneurysm.  Being monitored with serial angiogram  --------------  9/19/2016: 1. 4.4 cm aneurysmal dilatation of  ascending aorta, not significant changed as compared to 6/17/2015 exam where it measures 4.5 cm.   2. 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  3. Multiple stable bilateral pulmonary nodules, the largest is a 7 x 5 mm posterior left lower lobe pulmonary nodule, previously measured 7 x 4 mm on 6/17/2013 exam.      Moderate persistent asthma without complication 11/10/2015     Priority: Medium     Past Surgical History:   Procedure Laterality Date    APPENDECTOMY  1998    CATARACT IOL, RT/LT Bilateral     COLONOSCOPY N/A 02/14/2019    Procedure: COLONOSCOPY;  Surgeon: Haim Burgos MD;  Location:  GI    GI SURGERY      nissn x 2    GYN SURGERY  2001    H CATARACT SURGICAL PACKAGE      HYSTERECTOMY      fibroids    JOINT REPLACEMENT Left     NISSEN FUNDOPLICATION      x2    ORTHOPEDIC SURGERY      SOFT TISSUE SURGERY        Past Medical History:   Diagnosis Date    Abdominal aortic aneurysm without rupture 11/10/2015    [  ] repeat imaging due spring 2016 Descending aortic aneurysm.  Being monitored with serial angiogram     Aneurysm of right renal artery 04/09/2019    Arthritis 1998    COPD (chronic obstructive pulmonary disease) (H) 11/10/2015    Not sure of this diagnosis.  May be a consequence of asthma.    Esophageal hypertension 01/25/2017    Essential hypertension 01/25/2017    Female bladder prolapse 11/10/2015    Hearing loss     Heart disease 2005    Pulmonary nodules 01/25/2017    Seizures (H) 11/10/2015    Temporal seziure d/o.  Does not have LOC.  Has prodromal symptoms     Severe persistent asthma (H) 11/10/2015    Since childhood.  Has been steroid dependent for many years. Has had cydney x2       Allergies   Allergen Reactions    Quinolones      Current Outpatient Medications   Medication Sig Dispense Refill    albuterol  (PROAIR HFA/PROVENTIL HFA/VENTOLIN HFA) 108 (90 Base) MCG/ACT inhaler Inhale 2 puffs into the lungs every 6 hours as needed for shortness of breath or wheezing. 54 g 5    benralizumab (FASENRA) 30 MG/ML SOSY injection Inject 30 mg Subcutaneous once      carBAMazepine (TEGRETOL XR) 200 MG 12 hr tablet Take 1 tablet (200 mg) by mouth daily as needed (seizure) 30 tablet 1    diclofenac (VOLTAREN) 1 % topical gel Apply 4 g topically 4 times daily 350 g 3    estradiol (ESTRING) 7.5 MCG/24HR vaginal ring Place 1 each (1 Vaginal ring) vaginally every 3 months 1 each 4    magnesium 250 MG tablet Take 1 tablet by mouth daily      metoprolol succinate ER (TOPROL XL) 25 MG 24 hr tablet TAKE 1 TABLET DAILY WITH   50MG TABLET FOR TOTAL OF   75MG DAILY. 90 tablet 3    metoprolol succinate ER (TOPROL XL) 50 MG 24 hr tablet Take 1 tablet (50 mg) by mouth daily With 25 mg tab for total of 75 mg Daily 90 tablet 3    montelukast (SINGULAIR) 10 MG tablet Take 1 tablet (10 mg) by mouth at bedtime. 90 tablet 3    NAPROXEN PO Take 220 mg by mouth as needed       potassium chloride antonietta ER (KLOR-CON M20) 20 MEQ CR tablet Take 1 tablet (20 mEq) by mouth daily 90 tablet 4    pravastatin (PRAVACHOL) 20 MG tablet Take 1 tablet (20 mg) by mouth daily 90 tablet 4    theophylline (DENIA-24) 400 MG 24 hr capsule Take 1 capsule (400 mg) by mouth daily. 90 capsule 3    triamterene-HCTZ (MAXZIDE) 75-50 MG tablet Take 1 tablet by mouth daily 90 tablet 4    fluticasone-salmeterol (WIXELA INHUB) 500-50 MCG/ACT inhaler Inhale 1 puff into the lungs 2 times daily. (Patient not taking: Reported on 2/20/2025) 180 each 3     family history includes Asthma in her father, sister, and sister; Breast Cancer in her maternal aunt and mother; Cancer in her father; Coronary Artery Disease in her cousin, daughter, maternal grandfather, maternal grandmother, and sister; Dementia in her mother; Depression in her sister and sister; Diabetes in her mother and son; Heart  Failure in her mother; Hypertension in her father, mother, and son; Melanoma in her daughter; Mental Illness in her maternal grandmother; Other - See Comments in her daughter; Other Cancer in her daughter; Prostate Cancer in her father; Schizophrenia in her maternal grandmother.  Social Connections: Unknown (4/5/2024)    Social Connection and Isolation Panel [NHANES]     Frequency of Communication with Friends and Family: Not on file     Frequency of Social Gatherings with Friends and Family: More than three times a week     Attends Anabaptist Services: Not on file     Active Member of Clubs or Organizations: Not on file     Attends Club or Organization Meetings: Not on file     Marital Status: Not on file          WBC   Date Value Ref Range Status   05/16/2016 6.4 4.0 - 11.0 10e9/L Final     WBC Count   Date Value Ref Range Status   01/30/2025 7.1 4.0 - 11.0 10e3/uL Final     RBC Count   Date Value Ref Range Status   01/30/2025 4.84 3.80 - 5.20 10e6/uL Final   05/16/2016 4.56 3.8 - 5.2 10e12/L Final     Hemoglobin   Date Value Ref Range Status   01/30/2025 13.0 11.7 - 15.7 g/dL Final   05/16/2016 12.7 11.7 - 15.7 g/dL Final     Hematocrit   Date Value Ref Range Status   01/30/2025 40.0 35.0 - 47.0 % Final   05/16/2016 38.0 35.0 - 47.0 % Final     MCV   Date Value Ref Range Status   01/30/2025 83 78 - 100 fL Final   05/16/2016 83 78 - 100 fl Final     MCH   Date Value Ref Range Status   01/30/2025 26.9 26.5 - 33.0 pg Final   05/16/2016 27.9 26.5 - 33.0 pg Final     Platelet Count   Date Value Ref Range Status   01/30/2025 325 150 - 450 10e3/uL Final   05/16/2016 254 150 - 450 10e9/L Final     % Lymphocytes   Date Value Ref Range Status   05/16/2016 24.3 % Final     AST   Date Value Ref Range Status   06/30/2023 23 0 - 45 U/L Final     Comment:     Reference intervals for this test were updated on 6/12/2023 to more accurately reflect our healthy population. There may be differences in the flagging of prior results  with similar values performed with this method. Interpretation of those prior results can be made in the context of the updated reference intervals.     ALT   Date Value Ref Range Status   01/30/2025 16 0 - 50 U/L Final     Albumin   Date Value Ref Range Status   06/30/2023 4.5 3.5 - 5.2 g/dL Final     Alkaline Phosphatase   Date Value Ref Range Status   06/30/2023 62 35 - 104 U/L Final     Creatinine   Date Value Ref Range Status   01/30/2025 0.84 0.51 - 0.95 mg/dL Final   02/12/2021 0.76 0.52 - 1.04 mg/dL Final     GFR Estimate   Date Value Ref Range Status   01/30/2025 71 >60 mL/min/1.73m2 Final     Comment:     eGFR calculated using 2021 CKD-EPI equation.   02/12/2021 78 >60 mL/min/[1.73_m2] Final     Comment:     Non  GFR Calc  Starting 12/18/2018, serum creatinine based estimated GFR (eGFR) will be   calculated using the Chronic Kidney Disease Epidemiology Collaboration   (CKD-EPI) equation.       GFR, ESTIMATED POCT   Date Value Ref Range Status   01/12/2024 58 (L) >60 mL/min/1.73m2 Final     GFR Estimate If Black   Date Value Ref Range Status   02/12/2021 >90 >60 mL/min/[1.73_m2] Final     Comment:      GFR Calc  Starting 12/18/2018, serum creatinine based estimated GFR (eGFR) will be   calculated using the Chronic Kidney Disease Epidemiology Collaboration   (CKD-EPI) equation.       Sed Rate   Date Value Ref Range Status   11/18/2020 14 0 - 30 mm/h Final     Erythrocyte Sedimentation Rate   Date Value Ref Range Status   01/30/2025 19 0 - 30 mm/hr Final     CRP Inflammation   Date Value Ref Range Status   11/18/2020 3.1 0.0 - 8.0 mg/L Final

## 2025-02-26 DIAGNOSIS — E78.5 HYPERLIPIDEMIA LDL GOAL <100: ICD-10-CM

## 2025-02-26 DIAGNOSIS — I71.21 ANEURYSM OF ASCENDING AORTA WITHOUT RUPTURE: ICD-10-CM

## 2025-02-26 DIAGNOSIS — I72.2 ANEURYSM OF RIGHT RENAL ARTERY: ICD-10-CM

## 2025-02-26 DIAGNOSIS — I10 BENIGN ESSENTIAL HYPERTENSION: ICD-10-CM

## 2025-03-03 ENCOUNTER — LAB (OUTPATIENT)
Dept: LAB | Facility: CLINIC | Age: 80
End: 2025-03-03
Payer: COMMERCIAL

## 2025-03-03 DIAGNOSIS — Z79.899 HIGH RISK MEDICATION USE: ICD-10-CM

## 2025-03-03 DIAGNOSIS — R76.8 RHEUMATOID FACTOR POSITIVE: ICD-10-CM

## 2025-03-03 LAB
ALT SERPL W P-5'-P-CCNC: 17 U/L (ref 0–50)
BASOPHILS # BLD AUTO: 0 10E3/UL (ref 0–0.2)
BASOPHILS NFR BLD AUTO: 0 %
CREAT SERPL-MCNC: 0.93 MG/DL (ref 0.51–0.95)
EGFRCR SERPLBLD CKD-EPI 2021: 63 ML/MIN/1.73M2
EOSINOPHIL # BLD AUTO: 0 10E3/UL (ref 0–0.7)
EOSINOPHIL NFR BLD AUTO: 0 %
ERYTHROCYTE [DISTWIDTH] IN BLOOD BY AUTOMATED COUNT: 14 % (ref 10–15)
HCT VFR BLD AUTO: 41.7 % (ref 35–47)
HGB BLD-MCNC: 13.6 G/DL (ref 11.7–15.7)
IMM GRANULOCYTES # BLD: 0 10E3/UL
IMM GRANULOCYTES NFR BLD: 0 %
LYMPHOCYTES # BLD AUTO: 1.3 10E3/UL (ref 0.8–5.3)
LYMPHOCYTES NFR BLD AUTO: 23 %
MCH RBC QN AUTO: 26.3 PG (ref 26.5–33)
MCHC RBC AUTO-ENTMCNC: 32.6 G/DL (ref 31.5–36.5)
MCV RBC AUTO: 81 FL (ref 78–100)
MONOCYTES # BLD AUTO: 0.4 10E3/UL (ref 0–1.3)
MONOCYTES NFR BLD AUTO: 8 %
NEUTROPHILS # BLD AUTO: 3.9 10E3/UL (ref 1.6–8.3)
NEUTROPHILS NFR BLD AUTO: 69 %
PLATELET # BLD AUTO: 251 10E3/UL (ref 150–450)
RBC # BLD AUTO: 5.18 10E6/UL (ref 3.8–5.2)
WBC # BLD AUTO: 5.6 10E3/UL (ref 4–11)

## 2025-03-03 PROCEDURE — 85025 COMPLETE CBC W/AUTO DIFF WBC: CPT

## 2025-03-03 PROCEDURE — 84460 ALANINE AMINO (ALT) (SGPT): CPT

## 2025-03-03 PROCEDURE — 82565 ASSAY OF CREATININE: CPT

## 2025-03-03 PROCEDURE — 36415 COLL VENOUS BLD VENIPUNCTURE: CPT

## 2025-03-05 NOTE — TELEPHONE ENCOUNTER
Last Written Prescription:  4/12/24 90 : 4  ----------------------  Last Visit Date: 4/12/24  Future Visit Date: 4/8/25  ----------------------  Refill decision: Medication unable to be refilled by RN due to: Overdue labs/test:  K+ 2022  LDL 2023    30 day refill pending    Request from pharmacy:  Requested Prescriptions   Pending Prescriptions Disp Refills    pravastatin (PRAVACHOL) 20 MG tablet [Pharmacy Med Name: PRAVASTATIN  TAB 20MG] 90 tablet 3     Sig: TAKE 1 TABLET DAILY       Antihyperlipidemic agents Failed - 3/4/2025  9:54 PM        Failed - LDL on file in the past 12 months        Passed - Medication is active on med list and the sig matches. RN to manually verify dose and sig if red X/fail.     If the protocol passes (green check), you do not need to verify med dose and sig.    A prescription matches if they are the same clinical intention.    For Example: once daily and every morning are the same.    For all fails (red x), verify dose and sig.    If the refill does match what is on file, the RN can still proceed to approve the refill request.     If they do not match, route to the appropriate provider.             Passed - Recent (12 mo) or future (90 days) visit within the authorizing provider's specialty     The patient must have completed an in-person or virtual visit within the past 12 months or has a future visit scheduled within the next 90 days with the authorizing provider s specialty.  Urgent care and e-visits do not qualify as an office visit for this protocol.          Passed - Patient is age 18 years or older        Passed - No active pregnancy on record        Passed - No positive pregnancy test in past 12 mos          triamterene-HCTZ (MAXZIDE) 75-50 MG tablet [Pharmacy Med Name: TRIAMT/HCTZ  TAB 75-50MG] 90 tablet 3     Sig: TAKE 1 TABLET DAILY       Diuretics (Including Combos) Protocol Failed - 3/4/2025  9:54 PM        Failed - Potassium level on file in past 12 months        Passed -  Most recent blood pressure under 140/90 in past 12 months     BP Readings from Last 3 Encounters:   02/20/25 128/71   02/03/25 121/74   01/30/25 123/67       No data recorded            Passed - Medication is active on med list and the sig matches. RN to manually verify dose and sig if red X/fail.     If the protocol passes (green check), you do not need to verify med dose and sig.    A prescription matches if they are the same clinical intention.    For Example: once daily and every morning are the same.    For all fails (red x), verify dose and sig.    If the refill does match what is on file, the RN can still proceed to approve the refill request.     If they do not match, route to the appropriate provider.             Passed - Medication indicated for associated diagnosis     Medication is associated with one or more of the following diagnoses:     Edema   Hypertension   Heart Failure   Meniere's Disease   Bilateral localized swelling of lower limbs   Pulmonary Hypertension          Passed - Has GFR on file in past 12 months and most recent value is normal        Passed - Recent (12 mo) or future (90 days) visit within the authorizing provider's specialty     The patient must have completed an in-person or virtual visit within the past 12 months or has a future visit scheduled within the next 90 days with the authorizing provider s specialty.  Urgent care and e-visits do not qualify as an office visit for this protocol.          Passed - Patient is age 18 or older        Passed - No active pregancy on record        Passed - No positive pregnancy test in past 12 months          KLOR-CON M20 20 MEQ CR tablet [Pharmacy Med Name: KLOR-CON M20 TAB 20MEQ ER] 90 tablet 3     Sig: TAKE 1 TABLET DAILY       Potassium Supplements Protocol Failed - 3/4/2025  9:54 PM        Failed - Normal serum potassium in past 12 months     Recent Labs   Lab Test 07/05/23  0733   POTASSIUM 3.6                    Passed - Medication is  active on med list and the sig matches. RN to manually verify dose and sig if red X/fail.     If the protocol passes (green check), you do not need to verify med dose and sig.    A prescription matches if they are the same clinical intention.    For Example: once daily and every morning are the same.    For all fails (red x), verify dose and sig.    If the refill does match what is on file, the RN can still proceed to approve the refill request.     If they do not match, route to the appropriate provider.             Passed - Medication indicated for associated diagnosis     Potassium is associated with one of the following diagnoses:    Hypokalemia    Hypokalemia prophylaxis   Hypertension   Heart failure   Edema            Passed - Recent (12 mo) or future (90 days) visit within the authorizing provider's department     The patient must have completed an in-person or virtual visit within the past 12 months or has a future visit scheduled within the next 90 days with the authorizing provider s specialty.  Urgent care and e-visits do not qualify as an office visit for this protocol.          Passed - Patient is age 18 or older

## 2025-03-06 RX ORDER — POTASSIUM CHLORIDE 1500 MG/1
20 TABLET, EXTENDED RELEASE ORAL DAILY
Qty: 30 TABLET | Refills: 3 | Status: SHIPPED | OUTPATIENT
Start: 2025-03-06

## 2025-03-06 RX ORDER — TRIAMTERENE AND HYDROCHLOROTHIAZIDE 75; 50 MG/1; MG/1
1 TABLET ORAL DAILY
Qty: 30 TABLET | Refills: 0 | Status: SHIPPED | OUTPATIENT
Start: 2025-03-06

## 2025-03-06 RX ORDER — PRAVASTATIN SODIUM 20 MG
20 TABLET ORAL DAILY
Qty: 30 TABLET | Refills: 0 | Status: SHIPPED | OUTPATIENT
Start: 2025-03-06

## 2025-04-14 ENCOUNTER — INFUSION THERAPY VISIT (OUTPATIENT)
Dept: INFUSION THERAPY | Facility: CLINIC | Age: 80
End: 2025-04-14
Payer: MEDICARE

## 2025-04-14 VITALS
DIASTOLIC BLOOD PRESSURE: 78 MMHG | OXYGEN SATURATION: 96 % | TEMPERATURE: 98 F | SYSTOLIC BLOOD PRESSURE: 112 MMHG | RESPIRATION RATE: 16 BRPM | HEART RATE: 83 BPM

## 2025-04-14 DIAGNOSIS — J45.50 SEVERE PERSISTENT ASTHMA WITHOUT COMPLICATION (H): Primary | ICD-10-CM

## 2025-04-14 PROCEDURE — 96372 THER/PROPH/DIAG INJ SC/IM: CPT

## 2025-04-14 PROCEDURE — 250N000011 HC RX IP 250 OP 636: Mod: JZ

## 2025-04-14 RX ORDER — ALBUTEROL SULFATE 90 UG/1
1-2 INHALANT RESPIRATORY (INHALATION)
Start: 2025-04-15

## 2025-04-14 RX ORDER — METHYLPREDNISOLONE SODIUM SUCCINATE 40 MG/ML
40 INJECTION INTRAMUSCULAR; INTRAVENOUS
Start: 2025-04-15

## 2025-04-14 RX ORDER — MEPERIDINE HYDROCHLORIDE 25 MG/ML
25 INJECTION INTRAMUSCULAR; INTRAVENOUS; SUBCUTANEOUS
OUTPATIENT
Start: 2025-04-15

## 2025-04-14 RX ORDER — DIPHENHYDRAMINE HYDROCHLORIDE 50 MG/ML
25 INJECTION, SOLUTION INTRAMUSCULAR; INTRAVENOUS
Start: 2025-04-15

## 2025-04-14 RX ORDER — EPINEPHRINE 1 MG/ML
0.3 INJECTION, SOLUTION INTRAMUSCULAR; SUBCUTANEOUS EVERY 5 MIN PRN
OUTPATIENT
Start: 2025-04-15

## 2025-04-14 RX ORDER — ALBUTEROL SULFATE 0.83 MG/ML
2.5 SOLUTION RESPIRATORY (INHALATION)
OUTPATIENT
Start: 2025-04-15

## 2025-04-14 RX ORDER — DIPHENHYDRAMINE HYDROCHLORIDE 50 MG/ML
50 INJECTION, SOLUTION INTRAMUSCULAR; INTRAVENOUS
Start: 2025-04-15

## 2025-04-14 RX ADMIN — BENRALIZUMAB 30 MG: 30 INJECTION, SOLUTION SUBCUTANEOUS at 09:21

## 2025-04-14 NOTE — PATIENT INSTRUCTIONS
Dear Niurka Cisneros    Thank you for choosing [facility] ([facility] for your injection.  The following information is a summary of our appointment as well as important reminders.      EDUCATION POST BIOLOGICAL/CHEMOTHERAPY INFUSION  Call the triage nurse at your clinic or seek medical attention if you have chills and/or temperature greater than or equal to 100.5, uncontrolled nausea/vomiting, diarrhea, constipation, dizziness, shortness of breath, chest pain, heart palpitations, weakness or any other new or concerning symptoms, questions or concerns.  You can not have any live virus vaccines prior to or during treatment or up to 6 months post infusion.  If you have an upcoming surgery, medical procedure or dental procedure during treatment, this should be discussed with your ordering physician and your surgeon/dentist.  If you are having any concerning symptom, if you are unsure if you should get your next infusion or wish to speak to a provider before your next infusion, please call your care coordinator or triage nurse at your clinic to notify them so we can adequately serve you.     If you have any questions on your upcoming Specialty Infusion appointments, please call scheduling at [phone].  It was a pleasure taking care of you today.    Sincerely,    [facility]  [address] [place]  [address]  Phone:  [phone]

## 2025-04-14 NOTE — PROGRESS NOTES
Infusion Nursing Note:  Niurka Cisneros presents today for Fasenra.    Patient seen by provider today: No   present during visit today: Not Applicable.    Note: Fasenra injection given Subcutaneous on left upper arm, back. Observed for 30 minutes post injection.     Administrations This Visit       benralizumab (FASENRA) injection 30 mg       Admin Date  04/14/2025 Action  $Given Dose  30 mg Route  Subcutaneous Documented By  Юлия Lam RN                   Intravenous Access:  N/A    Treatment Conditions:  Biological Infusion Checklist:  ~~~ NOTE: If the patient answers yes to any of the questions below, hold the infusion and contact ordering provider or on-call provider.    Have you recently had an elevated temperature, fever, chills, productive cough, coughing for 3 weeks or longer or hemoptysis,  abnormal vital signs, night sweats,  chest pain or have you noticed a decrease in your appetite, unexplained weight loss or fatigue? No  Do you have any open wounds or new incisions? No  Do you have any upcoming hospitalizations or surgeries? Does not include esophagogastroduodenoscopy, colonoscopy, endoscopic retrograde cholangiopancreatography (ERCP), endoscopic ultrasound (EUS), dental procedures or joint aspiration/steroid injections No  Do you currently have any signs of illness or infection or are you on any antibiotics? No  Have you had any new, sudden or worsening abdominal pain? No  Have you or anyone in your household received a live vaccination in the past 4 weeks? Please note: No live vaccines while on biologic/chemotherapy until 6 months after the last treatment. Patient can receive the flu vaccine (shot only), pneumovax and the Covid vaccine. It is optimal for the patient to get these vaccines mid cycle, but they can be given at any time as long as it is not on the day of the infusion. No  Have you recently been diagnosed with any new nervous system diseases (ie. Multiple sclerosis, Guillain  [employer], seizures, neurological changes) or cancer diagnosis? Are you on any form of radiation or chemotherapy? No  Are you pregnant or breast feeding or do you have plans of pregnancy in the future? No  Have you been having any signs of worsening depression or suicidal ideations?  (benlysta only) No  Have there been any other new onset medical symptoms? No  Have you had any new blood clots? (IVIG only) No      Post Infusion Assessment:  Patient tolerated injection without incident.  Site patent and intact, free from redness, edema or discomfort.  No evidence of extravasations.  Biologic Infusion Post Education: Call the triage nurse at your clinic or seek medical attention if you have chills and/or temperature greater than or equal to 100.5, uncontrolled nausea/vomiting, diarrhea, constipation, dizziness, shortness of breath, chest pain, heart palpitations, weakness or any other new or concerning symptoms, questions or concerns.  You cannot have any live virus vaccines prior to or during treatment or up to 6 months post infusion.  If you have an upcoming surgery, medical procedure or dental procedure during treatment, this should be discussed with your ordering physician and your surgeon/dentist.  If you are having any concerning symptom, if you are unsure if you should get your next infusion or wish to speak to a provider before your next infusion, please call your care coordinator or triage nurse at your clinic to notify them so we can adequately serve you.       Discharge Plan:   Discharge instructions reviewed with: Patient.  Patient and/or family verbalized understanding of discharge instructions and all questions answered.  AVS to patient via [facility].  Patient will return after 8 weeks for next appointment. Messaged  to book next appt.  Patient discharged in stable condition accompanied by: self.  Departure Mode: Ambulatory.    /78 (BP Location: Left arm, Patient Position: Sitting, Cuff Size:  Adult Regular)   Pulse 83   Temp 98  F (36.7  C) (Oral)   Resp 16   LMP  (LMP Unknown)   SpO2 96%      Юлия Lam RN

## 2025-04-16 ENCOUNTER — ANCILLARY PROCEDURE (OUTPATIENT)
Dept: CARDIOLOGY | Facility: CLINIC | Age: 80
End: 2025-04-16
Attending: INTERNAL MEDICINE
Payer: MEDICARE

## 2025-04-16 ENCOUNTER — LAB (OUTPATIENT)
Dept: LAB | Facility: CLINIC | Age: 80
End: 2025-04-16
Payer: COMMERCIAL

## 2025-04-16 ENCOUNTER — ANCILLARY PROCEDURE (OUTPATIENT)
Dept: ULTRASOUND IMAGING | Facility: CLINIC | Age: 80
End: 2025-04-16
Attending: INTERNAL MEDICINE
Payer: COMMERCIAL

## 2025-04-16 ENCOUNTER — OFFICE VISIT (OUTPATIENT)
Dept: RHEUMATOLOGY | Facility: CLINIC | Age: 80
End: 2025-04-16
Payer: COMMERCIAL

## 2025-04-16 ENCOUNTER — TELEPHONE (OUTPATIENT)
Dept: CARDIOLOGY | Facility: CLINIC | Age: 80
End: 2025-04-16

## 2025-04-16 VITALS
BODY MASS INDEX: 26.37 KG/M2 | DIASTOLIC BLOOD PRESSURE: 65 MMHG | HEIGHT: 67 IN | HEART RATE: 73 BPM | WEIGHT: 168 LBS | SYSTOLIC BLOOD PRESSURE: 137 MMHG | OXYGEN SATURATION: 93 %

## 2025-04-16 DIAGNOSIS — M05.79 RHEUMATOID ARTHRITIS, SEROPOSITIVE, MULTIPLE SITES (H): Primary | ICD-10-CM

## 2025-04-16 DIAGNOSIS — Z71.89 ENCOUNTER TO DISCUSS TREATMENT OPTIONS: ICD-10-CM

## 2025-04-16 DIAGNOSIS — M05.79 RHEUMATOID ARTHRITIS, SEROPOSITIVE, MULTIPLE SITES (H): ICD-10-CM

## 2025-04-16 DIAGNOSIS — I70.1 RENAL ARTERY STENOSIS: Primary | ICD-10-CM

## 2025-04-16 DIAGNOSIS — I71.20 THORACIC AORTIC ANEURYSM WITHOUT RUPTURE, UNSPECIFIED PART: ICD-10-CM

## 2025-04-16 DIAGNOSIS — R76.8 RHEUMATOID FACTOR POSITIVE: ICD-10-CM

## 2025-04-16 DIAGNOSIS — Z79.899 HIGH RISK MEDICATION USE: ICD-10-CM

## 2025-04-16 DIAGNOSIS — I70.1 RENAL ARTERY STENOSIS: ICD-10-CM

## 2025-04-16 LAB
ALT SERPL W P-5'-P-CCNC: 19 U/L (ref 0–50)
CREAT SERPL-MCNC: 1 MG/DL (ref 0.51–0.95)
CRP SERPL-MCNC: <3 MG/L
EGFRCR SERPLBLD CKD-EPI 2021: 58 ML/MIN/1.73M2
ERYTHROCYTE [DISTWIDTH] IN BLOOD BY AUTOMATED COUNT: 15 % (ref 10–15)
ERYTHROCYTE [SEDIMENTATION RATE] IN BLOOD BY WESTERGREN METHOD: 11 MM/HR (ref 0–30)
HCT VFR BLD AUTO: 42.8 % (ref 35–47)
HGB BLD-MCNC: 14.4 G/DL (ref 11.7–15.7)
LVEF ECHO: NORMAL
MCH RBC QN AUTO: 27.9 PG (ref 26.5–33)
MCHC RBC AUTO-ENTMCNC: 33.6 G/DL (ref 31.5–36.5)
MCV RBC AUTO: 83 FL (ref 78–100)
PLATELET # BLD AUTO: 268 10E3/UL (ref 150–450)
RBC # BLD AUTO: 5.17 10E6/UL (ref 3.8–5.2)
WBC # BLD AUTO: 7.1 10E3/UL (ref 4–11)

## 2025-04-16 PROCEDURE — 76770 US EXAM ABDO BACK WALL COMP: CPT | Mod: XU | Performed by: STUDENT IN AN ORGANIZED HEALTH CARE EDUCATION/TRAINING PROGRAM

## 2025-04-16 PROCEDURE — 93306 TTE W/DOPPLER COMPLETE: CPT | Mod: GC | Performed by: INTERNAL MEDICINE

## 2025-04-16 PROCEDURE — 93975 VASCULAR STUDY: CPT | Performed by: STUDENT IN AN ORGANIZED HEALTH CARE EDUCATION/TRAINING PROGRAM

## 2025-04-16 PROCEDURE — 36415 COLL VENOUS BLD VENIPUNCTURE: CPT

## 2025-04-16 PROCEDURE — 3075F SYST BP GE 130 - 139MM HG: CPT | Performed by: INTERNAL MEDICINE

## 2025-04-16 PROCEDURE — 99214 OFFICE O/P EST MOD 30 MIN: CPT | Performed by: INTERNAL MEDICINE

## 2025-04-16 PROCEDURE — G2211 COMPLEX E/M VISIT ADD ON: HCPCS | Performed by: INTERNAL MEDICINE

## 2025-04-16 PROCEDURE — 82565 ASSAY OF CREATININE: CPT

## 2025-04-16 PROCEDURE — 85652 RBC SED RATE AUTOMATED: CPT

## 2025-04-16 PROCEDURE — 3078F DIAST BP <80 MM HG: CPT | Performed by: INTERNAL MEDICINE

## 2025-04-16 PROCEDURE — 86140 C-REACTIVE PROTEIN: CPT

## 2025-04-16 PROCEDURE — 85027 COMPLETE CBC AUTOMATED: CPT

## 2025-04-16 PROCEDURE — 84460 ALANINE AMINO (ALT) (SGPT): CPT

## 2025-04-16 RX ORDER — METHOTREXATE 2.5 MG/1
15 TABLET ORAL
Qty: 72 TABLET | Refills: 0 | Status: SHIPPED | OUTPATIENT
Start: 2025-04-16

## 2025-04-16 NOTE — TELEPHONE ENCOUNTER
Routing Renal artery US to Dr. Wang-    Renal artery aneurysm:  will continue to monitor. Not at a point needing repair at this time and stable in size on surveillance imaging. Do not suspect it is altering any renal blood flow. We discussed CTA every few years with ultrasound in between. Will also monitor kidney function    Renal US- Known right renal artery aneurysm is not well visualized on this  study.    Do you recommend CTA for better assessment of renal artery aneurysm . Pt also due for yearly visit but not scheduled.     Soumya Osorio RN

## 2025-04-16 NOTE — PROGRESS NOTES
"  Assessment and Plan    1.  Seropositive rheumatoid arthritis. Roopa is tolerating methotrexate without any side effects.  We will update her methotrexate labs today including CBC, ALT, creatinine, ESR and C-reactive protein.  If her labs are stable she will INCREASE METHOTREXATE  dose to 15 mg weekly    Follow-up in 2 months, she will have a CBC, ALT, creatinine and C-reactive protein checked a couple of days prior to her next visit.    CC PCP          HPI:    Subjective: Started methotrexate (10 mg weekly), no side effects      Roopa is a delightful 79-year-old lady, she is a retired pediatrician.  She is referred to rheumatology because over the past year she has noticed onset of subluxation and ulnar deviation of her fingers in the right hand especially at the metacarpophalangeal joint levels.  Interestingly she does not have much in the way of pain or swelling in her hand.  However these changes are quite striking and progressed over the past year to a year and a half.  She has noticed \"snapping \"of her middle, ring and small fingers, right more than left.  She was seen by sports medicine and prescribed splints for fingers but they did not help.  She denies any pain, swelling or stiffness in either her hands, wrists or other peripheral joints.  She does not have any Sjogren type symptoms of dry eyes or dry mouth (Sjogren's can occasionally be associated with a positive rheumatoid factor).  There is no history of exposure to hepatitis B or hepatitis C.    Blood work showed a positive rheumatoid factor, CCP was negative, BIANCA negative.  ESR 10, was mildly elevated (7.7)    Given the lack of joint pain we ordered an MRI of the right hand and wrist for more details, erosions and synovitis etc. which could not be seen on plain x-rays.  The MRI is quite helpful and shows EROSIONS IN THE CAPITATE,TRIQUETRUM AND 3RD MCP, which are consistent with early rheumatoid arthritis.  Gadolinium administration let to some " "enhancement which is also suggestive of inflammation.    Screening test for hepatitis B, C were negative.  Sjogren antibodies were also undetectable.    She has had 2 steroid injections for right sacroiliac joint pain.    Family history is negative for rheumatoid arthritis, psoriasis.     She has a history of pulmonary nodules since 2008 which have been stable.  She has a longstanding history of asthma/constrictive airway disease and follows with pulmonology.  There is no history of nosebleeds, recurrent sinusitis etc.    Social history.  She is a retired pediatrician (retired 2017), lives with her , her daughter also lives in the area.  She used to practice in [place], [place].  No tobacco or alcohol use.        DETAILED EXAMINATION  04/16/25  :    Vitals:    04/16/25 1358   BP: 137/65   BP Location: Right arm   Patient Position: Sitting   Cuff Size: Adult Regular   Pulse: 73   SpO2: 93%   Weight: 76.2 kg (168 lb)   Height: 1.702 m (5' 7\")         Head/neck: No butterfly rash, no jaundice, no conjunctival pallor, no ocular redness, no facial asymmetry, no enlargement of the major salivary glands    Lungs: clear to auscultation, no crackles or wheezing    Heart sounds :regular        Musculoskeletal:    Right hand: There is ulnar deviation of the fingers at the metacarpophalangeal joint level with subluxation of the MCPs, there is mild puffiness around the index finger MCP but no synovitis of the remaining MCPs on clinical exam and no tenderness.  Mild osteoarthritic changes are noted in the distal interphalangeal joints.    Left hand: + Mild synovitis without tenderness of the middle finger MCP knuckle noted, the remaining MCP, PIP joints are nonswollen, nontender.  No swan-neck or boutonniere deformities.    Right wrist: no synovitis,no tenderness    Left wrist: No tenderness, no synovitis    Elbows: Full range of motion, no swelling or synovitis    Shoulders full ROM        Hips pain-free range of " motion    Knees full range of motion     Patient Active Problem List    Diagnosis Date Noted    Pain in both hands 10/21/2024     Priority: Medium    Left shoulder pain, unspecified chronicity 07/03/2023     Priority: Medium    Abnormal CT scan of head 07/11/2019     Priority: Medium    Severe persistent asthma without complication (H) 04/15/2019     Priority: Medium    Aneurysm of right renal artery 04/09/2019     Priority: Medium    Osteopenia of multiple sites 12/15/2017     Priority: Medium     Treated with alendronate while in [place].  Told that she failed therapy and discontinued.  1/2020 starting zolendronic acid   12/2017: major osteoporotic 11%, hip fracture 2.1%      Pulmonary nodules 01/25/2017     Priority: Medium    Essential hypertension 01/25/2017     Priority: Medium    Prediabetes 09/07/2016     Priority: Medium    Seizures (H) 11/10/2015     Priority: Medium     Temporal seziure d/o.  Does not have LOC.  Has prodromal symptoms.  Uses carbamazepine as needed with symptoms, typically ~2 times per year      Thoracic aortic aneurysm without rupture 11/10/2015     Priority: Medium     [  ] repeat imaging due spring 2016  Descending aortic aneurysm.  Being monitored with serial angiogram  --------------  9/19/2016: 1. 4.4 cm aneurysmal dilatation of ascending aorta, not significant changed as compared to 6/17/2015 exam where it measures 4.5 cm.   2. 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  3. Multiple stable bilateral pulmonary nodules, the largest is a 7 x 5 mm posterior left lower lobe pulmonary nodule, previously measured 7 x 4 mm on 6/17/2013 exam.      Moderate persistent asthma without complication 11/10/2015     Priority: Medium     Past Surgical History:   Procedure Laterality Date    APPENDECTOMY  1998    CATARACT IOL, RT/LT  Bilateral     COLONOSCOPY N/A 02/14/2019    Procedure: COLONOSCOPY;  Surgeon: Haim Burgos MD;  Location: [facility]    GI SURGERY      nissn x 2    GYN SURGERY  2001    [facility] CATARACT SURGICAL PACKAGE      HYSTERECTOMY      fibroids    JOINT REPLACEMENT Left     NISSEN FUNDOPLICATION      x2    ORTHOPEDIC SURGERY      SOFT TISSUE SURGERY        Past Medical History:   Diagnosis Date    Abdominal aortic aneurysm without rupture 11/10/2015    [  ] repeat imaging due spring 2016 Descending aortic aneurysm.  Being monitored with serial angiogram     Aneurysm of right renal artery 04/09/2019    Arthritis 1998    COPD (chronic obstructive pulmonary disease) (H) 11/10/2015    Not sure of this diagnosis.  May be a consequence of asthma.    Esophageal hypertension 01/25/2017    Essential hypertension 01/25/2017    Female bladder prolapse 11/10/2015    Hearing loss     Heart disease 2005    Pulmonary nodules 01/25/2017    Seizures (H) 11/10/2015    Temporal seziure d/o.  Does not have LOC.  Has prodromal symptoms     Severe persistent asthma (H) 11/10/2015    Since childhood.  Has been steroid dependent for many years. Has had cydney x2       Allergies   Allergen Reactions    Quinolones      Current Outpatient Medications   Medication Sig Dispense Refill    albuterol (PROAIR HFA/PROVENTIL HFA/VENTOLIN HFA) 108 (90 Base) MCG/ACT inhaler Inhale 2 puffs into the lungs every 6 hours as needed for shortness of breath or wheezing. 54 g 5    benralizumab (FASENRA) 30 MG/ML SOSY injection Inject 30 mg Subcutaneous once      carBAMazepine (TEGRETOL XR) 200 MG 12 hr tablet Take 1 tablet (200 mg) by mouth daily as needed (seizure) 30 tablet 1    diclofenac (VOLTAREN) 1 % topical gel Apply 4 g topically 4 times daily 350 g 3    estradiol (ESTRING) 7.5 MCG/24HR vaginal ring Place 1 each (1 Vaginal ring) vaginally every 3 months 1 each 4    folic acid (FOLVITE) 1 MG tablet Take 1 tablet (1 mg) by mouth daily. 90 tablet 3    KLOR-CON M20  20 MEQ CR tablet TAKE 1 TABLET DAILY 30 tablet 3    magnesium 250 MG tablet Take 1 tablet by mouth daily      methotrexate 2.5 MG tablet Take 4 tablets (10 mg) by mouth every 7 days. 48 tablet 1    metoprolol succinate ER (TOPROL XL) 25 MG 24 hr tablet TAKE 1 TABLET DAILY WITH   50MG TABLET FOR TOTAL OF   75MG DAILY. 90 tablet 3    metoprolol succinate ER (TOPROL XL) 50 MG 24 hr tablet Take 1 tablet (50 mg) by mouth daily With 25 mg tab for total of 75 mg Daily 90 tablet 3    montelukast (SINGULAIR) 10 MG tablet Take 1 tablet (10 mg) by mouth at bedtime. 90 tablet 3    NAPROXEN PO Take 220 mg by mouth as needed       pravastatin (PRAVACHOL) 20 MG tablet TAKE 1 TABLET DAILY 30 tablet 0    theophylline (DENIA-24) 400 MG 24 hr capsule Take 1 capsule (400 mg) by mouth daily. 90 capsule 3    triamterene-HCTZ (MAXZIDE) 75-50 MG tablet TAKE 1 TABLET DAILY 30 tablet 0    fluticasone-salmeterol (WIXELA INHUB) 500-50 MCG/ACT inhaler Inhale 1 puff into the lungs 2 times daily. (Patient not taking: Reported on 1/30/2025) 180 each 3     family history includes Asthma in her father, sister, and sister; Breast Cancer in her maternal aunt and mother; Cancer in her father; Coronary Artery Disease in her cousin, daughter, maternal grandfather, maternal grandmother, and sister; Dementia in her mother; Depression in her sister and sister; Diabetes in her mother and son; Heart Failure in her mother; Hypertension in her father, mother, and son; Melanoma in her daughter; Mental Illness in her maternal grandmother; Other - See Comments in her daughter; Other Cancer in her daughter; Prostate Cancer in her father; Schizophrenia in her maternal grandmother.  Social Connections: Unknown (4/5/2024)    Social Connection and Isolation Panel [NHANES]     Frequency of Communication with Friends and Family: Not on file     Frequency of Social Gatherings with Friends and Family: More than three times a week     Attends Oriental orthodox Services: Not on file      Active Member of Clubs or Organizations: Not on file     Attends Club or Organization Meetings: Not on file     Marital Status: Not on file          WBC   Date Value Ref Range Status   05/16/2016 6.4 4.0 - 11.0 10e9/L Final     WBC Count   Date Value Ref Range Status   03/03/2025 5.6 4.0 - 11.0 10e3/uL Final     RBC Count   Date Value Ref Range Status   03/03/2025 5.18 3.80 - 5.20 10e6/uL Final   05/16/2016 4.56 3.8 - 5.2 10e12/L Final     Hemoglobin   Date Value Ref Range Status   03/03/2025 13.6 11.7 - 15.7 g/dL Final   05/16/2016 12.7 11.7 - 15.7 g/dL Final     Hematocrit   Date Value Ref Range Status   03/03/2025 41.7 35.0 - 47.0 % Final   05/16/2016 38.0 35.0 - 47.0 % Final     MCV   Date Value Ref Range Status   03/03/2025 81 78 - 100 fL Final   05/16/2016 83 78 - 100 fl Final     MCH   Date Value Ref Range Status   03/03/2025 26.3 (L) 26.5 - 33.0 pg Final   05/16/2016 27.9 26.5 - 33.0 pg Final     Platelet Count   Date Value Ref Range Status   03/03/2025 251 150 - 450 10e3/uL Final   05/16/2016 254 150 - 450 10e9/L Final     % Lymphocytes   Date Value Ref Range Status   03/03/2025 23 % Final   05/16/2016 24.3 % Final     AST   Date Value Ref Range Status   06/30/2023 23 0 - 45 U/L Final     Comment:     Reference intervals for this test were updated on 6/12/2023 to more accurately reflect our healthy population. There may be differences in the flagging of prior results with similar values performed with this method. Interpretation of those prior results can be made in the context of the updated reference intervals.     ALT   Date Value Ref Range Status   03/03/2025 17 0 - 50 U/L Final     Albumin   Date Value Ref Range Status   06/30/2023 4.5 3.5 - 5.2 g/dL Final     Alkaline Phosphatase   Date Value Ref Range Status   06/30/2023 62 35 - 104 U/L Final     Creatinine   Date Value Ref Range Status   03/03/2025 0.93 0.51 - 0.95 mg/dL Final   02/12/2021 0.76 0.52 - 1.04 mg/dL Final     GFR Estimate   Date  Value Ref Range Status   03/03/2025 63 >60 mL/min/1.73m2 Final     Comment:     eGFR calculated using 2021 CKD-EPI equation.   02/12/2021 78 >60 mL/min/[1.73_m2] Final     Comment:     Non  GFR Calc  Starting 12/18/2018, serum creatinine based estimated GFR (eGFR) will be   calculated using the Chronic Kidney Disease Epidemiology Collaboration   (CKD-EPI) equation.       GFR, ESTIMATED POCT   Date Value Ref Range Status   01/12/2024 58 (L) >60 mL/min/1.73m2 Final     GFR Estimate If Black   Date Value Ref Range Status   02/12/2021 >90 >60 mL/min/[1.73_m2] Final     Comment:      GFR Calc  Starting 12/18/2018, serum creatinine based estimated GFR (eGFR) will be   calculated using the Chronic Kidney Disease Epidemiology Collaboration   (CKD-EPI) equation.       Sed Rate   Date Value Ref Range Status   11/18/2020 14 0 - 30 mm/h Final     Erythrocyte Sedimentation Rate   Date Value Ref Range Status   01/30/2025 19 0 - 30 mm/hr Final     CRP Inflammation   Date Value Ref Range Status   11/18/2020 3.1 0.0 - 8.0 mg/L Final

## 2025-05-02 ENCOUNTER — MYC MEDICAL ADVICE (OUTPATIENT)
Dept: INTERNAL MEDICINE | Facility: CLINIC | Age: 80
End: 2025-05-02

## 2025-05-27 ENCOUNTER — OFFICE VISIT (OUTPATIENT)
Dept: OPHTHALMOLOGY | Facility: CLINIC | Age: 80
End: 2025-05-27
Payer: COMMERCIAL

## 2025-05-27 DIAGNOSIS — G43.109 OCULAR MIGRAINE: ICD-10-CM

## 2025-05-27 DIAGNOSIS — H04.123 DRY EYES, BILATERAL: ICD-10-CM

## 2025-05-27 DIAGNOSIS — H01.02B SQUAMOUS BLEPHARITIS OF UPPER AND LOWER EYELIDS OF BOTH EYES: ICD-10-CM

## 2025-05-27 DIAGNOSIS — H01.02A SQUAMOUS BLEPHARITIS OF UPPER AND LOWER EYELIDS OF BOTH EYES: ICD-10-CM

## 2025-05-27 DIAGNOSIS — Z01.00 EXAMINATION OF EYES AND VISION: ICD-10-CM

## 2025-05-27 DIAGNOSIS — H52.13 MYOPIA, BILATERAL: ICD-10-CM

## 2025-05-27 DIAGNOSIS — Z96.1 PSEUDOPHAKIA, BOTH EYES: Primary | ICD-10-CM

## 2025-05-27 ASSESSMENT — CONF VISUAL FIELD
OD_NORMAL: 1
OS_NORMAL: 1
OS_INFERIOR_NASAL_RESTRICTION: 0
OS_SUPERIOR_NASAL_RESTRICTION: 0
OD_INFERIOR_NASAL_RESTRICTION: 0
OS_INFERIOR_TEMPORAL_RESTRICTION: 0
METHOD: COUNTING FINGERS
OD_SUPERIOR_NASAL_RESTRICTION: 0
OD_INFERIOR_TEMPORAL_RESTRICTION: 0
OD_SUPERIOR_TEMPORAL_RESTRICTION: 0
OS_SUPERIOR_TEMPORAL_RESTRICTION: 0

## 2025-05-27 ASSESSMENT — VISUAL ACUITY
CORRECTION_TYPE: GLASSES
OS_CC: 20/25
OD_CC: 20/20
OS_CC+: -1
OD_CC: J1+/-
OD_CC+: -2
OS_CC: J1+/-3
METHOD: SNELLEN - LINEAR

## 2025-05-27 ASSESSMENT — REFRACTION_WEARINGRX
SPECS_TYPE: BIFOCAL
OD_AXIS: 165
OS_SPHERE: -0.75
OD_SPHERE: -0.75
OS_AXIS: 005
OD_ADD: +2.50
OD_CYLINDER: +1.00
OS_CYLINDER: +0.50
OS_ADD: +2.50

## 2025-05-27 ASSESSMENT — REFRACTION_MANIFEST
OS_AXIS: 005
OD_CYLINDER: +0.75
OS_SPHERE: -1.00
OD_AXIS: 162
OS_CYLINDER: +0.50
OS_ADD: +2.50
OD_ADD: +2.50
OD_SPHERE: -0.75

## 2025-05-27 ASSESSMENT — TONOMETRY
OD_IOP_MMHG: 10
OS_IOP_MMHG: 10
IOP_METHOD: ICARE

## 2025-05-27 ASSESSMENT — EXTERNAL EXAM - RIGHT EYE: OD_EXAM: NORMAL

## 2025-05-27 ASSESSMENT — EXTERNAL EXAM - LEFT EYE: OS_EXAM: NORMAL

## 2025-05-27 ASSESSMENT — CUP TO DISC RATIO
OD_RATIO: 0.35
OS_RATIO: 0.3

## 2025-05-27 NOTE — PROGRESS NOTES
HPI:  Niurka Cisneros is a 79 year old female who presents today for comprehensive eye exam.  When she reads things seem to start out clear then progressively get blurry after about an hour and drops or warm compresses didn't help this in the past.  Denies current irritation, eye pain, or tearing.    HPI       COMPREHENSIVE EYE EXAM    In both eyes.  Treatments tried include no treatments.  Pain was noted as 0/10. Additional comments: Comprehensive              Comments    Some trouble with reading but this is not new and seem to be ongoing since last visit. Each eye get tired after an hour or two has to switch to audio books otherwise blurry or harder to focus.  Has some episodes the past month or two where there is a kaleidescope effect with left eye lasting 2-3 seconds that then it resolves. Some flashes with colors and shapes in periphery. A few time a week noticed usually when reading. No headache pain. No new floater since last visit.   Vision seems mostly stable with each eye otherwise.     AJAY Wilson COT 9:59 AM May 27, 2025                 Last edited by Ariane Ibarra COT on 5/27/2025  9:59 AM.          POHx: Pseudophakia both eyes, status-post YAG capsulotomy both eyes     Past medical history:   Past Medical History:   Diagnosis Date    Abdominal aortic aneurysm without rupture 11/10/2015    [  ] repeat imaging due spring 2016 Descending aortic aneurysm.  Being monitored with serial angiogram     Aneurysm of right renal artery 04/09/2019    Arthritis 1998    COPD (chronic obstructive pulmonary disease) (H) 11/10/2015    Not sure of this diagnosis.  May be a consequence of asthma.    Esophageal hypertension 01/25/2017    Essential hypertension 01/25/2017    Female bladder prolapse 11/10/2015    Hearing loss     Heart disease 2005    Pulmonary nodules 01/25/2017    Seizures (H) 11/10/2015    Temporal seziure d/o.  Does not have LOC.  Has prodromal symptoms     Severe persistent asthma (H)  11/10/2015    Since childhood.  Has been steroid dependent for many years. Has had cydney x2        Current Eye Medications: none  All:  quinolones  FH:  no eye diseases      Assessment & Plan     1. Pseudophakia, both eyes - Both Eyes    2. Myopia, bilateral - Both Eyes    3. Ocular migraine - Both Eyes    4. Dry eyes, bilateral - Both Eyes    5. 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        (Z96.1) Pseudophakia, both eyes - Both Eyes  (primary encounter diagnosis)  (Z01.00) Eye exam, routine - Both Eyes  Comment: status-post YAG capsulotomy, clear media  Plan: follow    Myopia of both eyes - Both Eyes  Comment: small change, good visual acuity   Plan:      Manifest refraction done and prescription for glasses given , update as needed     (G43.109) Ocular migraine - Both Eyes  (primary encounter diagnosis)  Comment: normal ophthalmic exam today, no retinal pathology seen on dilated fundus exam   Plan: reassured patient, to call if any new or persistent symptoms occur    (H04.123) Dry eyes, bilateral - Both Eyes  Squamous blepharitis upper and lower eyelids- both eyes   Comment: symptomatic with reading, no cornea signs today  Plan: tried artificial tear drops and warm compresses in the past  artificial tear drops four times a day as needed, recommend before reading and during breaks  Consider xdemvy if worse symptoms      Disposition:  Return in about 2 years (around 5/27/2027) for Comprehensive Exam.   ----------------------------------------------------------------------------------------------------    Complete documentation of historical and exam elements from today's encounter can be found in the full encounter summary report (not reduplicated in this progress note). I personally obtained the chief complaint(s) and history of present illness.  I confirmed and edited as necessary the review of systems, past medical/surgical history, family history, social history, and  examination findings as documented by others.  I examined the patient myself, and I personally reviewed the relevant tests, images, and reports as documented above. I formulated and edited as necessary the assessment and plan and discussed the findings and management plan with the patient and family.     Yue Akins MD

## 2025-05-27 NOTE — NURSING NOTE
Chief Complaints and History of Present Illnesses   Patient presents with    COMPREHENSIVE EYE EXAM     Comprehensive      Chief Complaint(s) and History of Present Illness(es)       COMPREHENSIVE EYE EXAM              Laterality: both eyes    Treatments tried: no treatments    Pain scale: 0/10    Comments: Comprehensive               Comments    Some trouble with reading but this is not new and seem to be ongoing since last visit. Each eye get tired after an hour or two has to switch to audio books otherwise blurry or harder to focus.  Has some episodes the past month or two where there is a kaleidescope effect with left eye lasting 2-3 seconds that then it resolves. Some flashes with colors and shapes in periphery. A few time a week noticed usually when reading. No headache pain. No new floater since last visit.   Vision seems mostly stable with each eye otherwise.     Ariane Ibarra, COT COT 9:59 AM May 27, 2025

## 2025-06-10 ENCOUNTER — LAB (OUTPATIENT)
Dept: LAB | Facility: CLINIC | Age: 80
End: 2025-06-10
Payer: COMMERCIAL

## 2025-06-10 ENCOUNTER — INFUSION THERAPY VISIT (OUTPATIENT)
Dept: INFUSION THERAPY | Facility: CLINIC | Age: 80
End: 2025-06-10
Payer: COMMERCIAL

## 2025-06-10 VITALS
HEART RATE: 73 BPM | RESPIRATION RATE: 16 BRPM | DIASTOLIC BLOOD PRESSURE: 71 MMHG | OXYGEN SATURATION: 97 % | SYSTOLIC BLOOD PRESSURE: 125 MMHG | TEMPERATURE: 98 F

## 2025-06-10 DIAGNOSIS — Z79.899 HIGH RISK MEDICATION USE: ICD-10-CM

## 2025-06-10 DIAGNOSIS — J45.50 SEVERE PERSISTENT ASTHMA WITHOUT COMPLICATION (H): Primary | ICD-10-CM

## 2025-06-10 DIAGNOSIS — R76.8 RHEUMATOID FACTOR POSITIVE: ICD-10-CM

## 2025-06-10 LAB
BASOPHILS # BLD AUTO: 0 10E3/UL (ref 0–0.2)
BASOPHILS NFR BLD AUTO: 0 %
EOSINOPHIL # BLD AUTO: 0 10E3/UL (ref 0–0.7)
EOSINOPHIL NFR BLD AUTO: 0 %
ERYTHROCYTE [DISTWIDTH] IN BLOOD BY AUTOMATED COUNT: 15.5 % (ref 10–15)
HCT VFR BLD AUTO: 37.6 % (ref 35–47)
HGB BLD-MCNC: 12.5 G/DL (ref 11.7–15.7)
IMM GRANULOCYTES # BLD: 0 10E3/UL
IMM GRANULOCYTES NFR BLD: 0 %
LYMPHOCYTES # BLD AUTO: 1.1 10E3/UL (ref 0.8–5.3)
LYMPHOCYTES NFR BLD AUTO: 15 %
MCH RBC QN AUTO: 27.5 PG (ref 26.5–33)
MCHC RBC AUTO-ENTMCNC: 33.2 G/DL (ref 31.5–36.5)
MCV RBC AUTO: 83 FL (ref 78–100)
MONOCYTES # BLD AUTO: 0.4 10E3/UL (ref 0–1.3)
MONOCYTES NFR BLD AUTO: 5 %
NEUTROPHILS # BLD AUTO: 5.9 10E3/UL (ref 1.6–8.3)
NEUTROPHILS NFR BLD AUTO: 80 %
PLATELET # BLD AUTO: 280 10E3/UL (ref 150–450)
RBC # BLD AUTO: 4.55 10E6/UL (ref 3.8–5.2)
WBC # BLD AUTO: 7.4 10E3/UL (ref 4–11)

## 2025-06-10 PROCEDURE — 96372 THER/PROPH/DIAG INJ SC/IM: CPT

## 2025-06-10 PROCEDURE — 250N000011 HC RX IP 250 OP 636: Mod: JZ

## 2025-06-10 RX ORDER — ALBUTEROL SULFATE 90 UG/1
1-2 INHALANT RESPIRATORY (INHALATION)
Start: 2025-07-08

## 2025-06-10 RX ORDER — MEPERIDINE HYDROCHLORIDE 25 MG/ML
25 INJECTION INTRAMUSCULAR; INTRAVENOUS; SUBCUTANEOUS
OUTPATIENT
Start: 2025-07-08

## 2025-06-10 RX ORDER — DIPHENHYDRAMINE HYDROCHLORIDE 50 MG/ML
25 INJECTION, SOLUTION INTRAMUSCULAR; INTRAVENOUS
Start: 2025-07-08

## 2025-06-10 RX ORDER — ALBUTEROL SULFATE 0.83 MG/ML
2.5 SOLUTION RESPIRATORY (INHALATION)
OUTPATIENT
Start: 2025-07-08

## 2025-06-10 RX ORDER — METHYLPREDNISOLONE SODIUM SUCCINATE 40 MG/ML
40 INJECTION INTRAMUSCULAR; INTRAVENOUS
Start: 2025-07-08

## 2025-06-10 RX ORDER — DIPHENHYDRAMINE HYDROCHLORIDE 50 MG/ML
50 INJECTION, SOLUTION INTRAMUSCULAR; INTRAVENOUS
Start: 2025-07-08

## 2025-06-10 RX ORDER — EPINEPHRINE 1 MG/ML
0.3 INJECTION, SOLUTION INTRAMUSCULAR; SUBCUTANEOUS EVERY 5 MIN PRN
OUTPATIENT
Start: 2025-07-08

## 2025-06-10 RX ADMIN — BENRALIZUMAB 30 MG: 30 INJECTION, SOLUTION SUBCUTANEOUS at 09:32

## 2025-06-10 ASSESSMENT — PAIN SCALES - GENERAL: PAINLEVEL_OUTOF10: MILD PAIN (2)

## 2025-06-10 NOTE — PROGRESS NOTES
Nursing Note:    Niurkadeloris Cisneros presents today for   Chief Complaint   Patient presents with    Imm/Inj     Fasenra injection        Treatment Conditions:  Biological Infusion Checklist:  ~~~ NOTE: If the patient answers yes to any of the questions below, hold the infusion and contact ordering provider or on-call provider.    Have you recently had an elevated temperature, fever, chills, productive cough, coughing for 3 weeks or longer or hemoptysis,  abnormal vital signs, night sweats,  chest pain or have you noticed a decrease in your appetite, unexplained weight loss or fatigue? No  Do you have any open wounds or new incisions? No  Do you have any upcoming hospitalizations or surgeries? Does not include esophagogastroduodenoscopy, colonoscopy, endoscopic retrograde cholangiopancreatography (ERCP), endoscopic ultrasound (EUS), dental procedures or joint aspiration/steroid injections No  Do you currently have any signs of illness or infection or are you on any antibiotics? No  Have you had any new, sudden or worsening abdominal pain? No  Have you or anyone in your household received a live vaccination in the past 4 weeks? Please note: No live vaccines while on biologic/chemotherapy until 6 months after the last treatment. Patient can receive the flu vaccine (shot only), pneumovax and the Covid vaccine. It is optimal for the patient to get these vaccines mid cycle, but they can be given at any time as long as it is not on the day of the infusion. No  Have you recently been diagnosed with any new nervous system diseases (ie. Multiple sclerosis, Guillain [place], seizures, neurological changes) or cancer diagnosis? Are you on any form of radiation or chemotherapy? No  Are you pregnant or breast feeding or do you have plans of pregnancy in the future? N/A  Have you been having any signs of worsening depression or suicidal ideations?  (benlysta only) N/A  Have there been any other new onset medical symptoms? No  Have  you had any new blood clots? (IVIG only) N/A    -Patient receives Fasenra every 8 weeks; patient's last injection was 4/14/25.    I released the medication and delegated administration to Javed Cary LPN and GABRIELLA Oswald. Please see MAR and administration note for additional details.       Leela Acuna RN

## 2025-06-10 NOTE — PROGRESS NOTES
Patient presented to the [facility] for Fasenra Injection.The order written by Jose De Jesus Vega MD was completed today. Patient s name and date of birth were verified prior to administration. Refer to the MAR for complete medication details. The medication was prepared by pharmacy, which was administered to the back side of right arm subcutaneously.     The patient was counseled on the purpose, expected effects, and potential side effects of the medication. All questions were answered.    The patient tolerated the injection well and was discharged in stable condition -- Scheduling will be contacted to arrange follow-up appointments as needed.    Patient sat in observation for 30 minutes       Mallory Hui MA

## 2025-06-11 LAB
ALT SERPL W P-5'-P-CCNC: 23 U/L (ref 0–50)
CREAT SERPL-MCNC: 1.29 MG/DL (ref 0.51–0.95)
EGFRCR SERPLBLD CKD-EPI 2021: 43 ML/MIN/1.73M2

## 2025-06-18 ENCOUNTER — LAB (OUTPATIENT)
Dept: LAB | Facility: CLINIC | Age: 80
End: 2025-06-18
Payer: MEDICARE

## 2025-06-18 ENCOUNTER — OFFICE VISIT (OUTPATIENT)
Dept: RHEUMATOLOGY | Facility: CLINIC | Age: 80
End: 2025-06-18
Payer: MEDICARE

## 2025-06-18 VITALS
OXYGEN SATURATION: 95 % | HEART RATE: 66 BPM | DIASTOLIC BLOOD PRESSURE: 75 MMHG | HEIGHT: 67 IN | BODY MASS INDEX: 25.74 KG/M2 | WEIGHT: 164 LBS | SYSTOLIC BLOOD PRESSURE: 132 MMHG

## 2025-06-18 DIAGNOSIS — Z79.899 HIGH RISK MEDICATION USE: ICD-10-CM

## 2025-06-18 DIAGNOSIS — M05.79 RHEUMATOID ARTHRITIS, SEROPOSITIVE, MULTIPLE SITES (H): Primary | ICD-10-CM

## 2025-06-18 DIAGNOSIS — R76.8 RHEUMATOID FACTOR POSITIVE: ICD-10-CM

## 2025-06-18 DIAGNOSIS — Z71.89 ENCOUNTER TO DISCUSS TREATMENT OPTIONS: ICD-10-CM

## 2025-06-18 DIAGNOSIS — R53.83 OTHER FATIGUE: ICD-10-CM

## 2025-06-18 LAB
CREAT SERPL-MCNC: 0.9 MG/DL (ref 0.51–0.95)
EGFRCR SERPLBLD CKD-EPI 2021: 66 ML/MIN/1.73M2

## 2025-06-18 PROCEDURE — 36415 COLL VENOUS BLD VENIPUNCTURE: CPT

## 2025-06-18 PROCEDURE — 99214 OFFICE O/P EST MOD 30 MIN: CPT | Performed by: INTERNAL MEDICINE

## 2025-06-18 PROCEDURE — G2211 COMPLEX E/M VISIT ADD ON: HCPCS | Performed by: INTERNAL MEDICINE

## 2025-06-18 PROCEDURE — 82565 ASSAY OF CREATININE: CPT

## 2025-06-18 PROCEDURE — 3078F DIAST BP <80 MM HG: CPT | Performed by: INTERNAL MEDICINE

## 2025-06-18 PROCEDURE — 3075F SYST BP GE 130 - 139MM HG: CPT | Performed by: INTERNAL MEDICINE

## 2025-06-18 NOTE — PROGRESS NOTES
"  Assessment and Plan    1.  Seropositive rheumatoid arthritis    2.  Fatigue (likely methotrexate related)    3.  Increased creatinine (slight)    Roopa has noticed increased fatigability for the past few weeks for a couple of days after she takes methotrexate.  She might have noticed this more since increasing her methotrexate to 15 mg weekly.  I advised her to skip methotrexate for the next couple of weeks, if the fatigue resolves then methotrexate is a likely cause.  In that case we could switch her either to hydroxychloroquine or leflunomide (Arava), both meds are discussed.    Her creatinine went up slightly last week, most likely due to some dehydration.  We will repeat her creatinine today.    She will let me know in a couple of weeks, if the fatigue is due to methotrexate will discuss switching to either hydroxychloroquine or leflunomide depending on her preference.    Follow-up 4 months.    CC PCP            Rheumatology follow-up visit note         HPI        Roopa is a delightful 79-year-old lady, she is a retired pediatrician.  She is referred to rheumatology because over the past year she has noticed onset of subluxation and ulnar deviation of her fingers in the right hand especially at the metacarpophalangeal joint levels.  Interestingly she does not have much in the way of pain or swelling in her hand.  However these changes are quite striking and progressed over the past year to a year and a half.  She has noticed \"snapping \"of her middle, ring and small fingers, right more than left.  She was seen by sports medicine and prescribed splints for fingers but they did not help.  She denies any pain, swelling or stiffness in either her hands, wrists or other peripheral joints.  She does not have any Sjogren type symptoms of dry eyes or dry mouth (Sjogren's can occasionally be associated with a positive rheumatoid factor).  There is no history of exposure to hepatitis B or hepatitis C.    Blood work showed a " "positive rheumatoid factor, CCP was negative, BIANCA negative.  ESR 10, was mildly elevated (7.7)    Given the lack of joint pain we ordered an MRI of the right hand and wrist for more details, erosions and synovitis etc. which could not be seen on plain x-rays.  The MRI is quite helpful and shows EROSIONS IN THE CAPITATE,TRIQUETRUM AND 3RD MCP, which are consistent with early rheumatoid arthritis.  Gadolinium administration let to some enhancement which is also suggestive of inflammation.    Screening test for hepatitis B, C were negative.  Sjogren antibodies were also undetectable.    She has had 2 steroid injections for right sacroiliac joint pain.    Family history is negative for rheumatoid arthritis, psoriasis.     She has a history of pulmonary nodules since 2008 which have been stable.  She has a longstanding history of asthma/constrictive airway disease and follows with pulmonology.  There is no history of nosebleeds, recurrent sinusitis etc.    Social history.  She is a retired pediatrician (retired 2017), lives with her , her daughter also lives in the area.  She used to practice in [place], [place].  No tobacco or alcohol use.          DETAILED EXAMINATION  06/18/25  :    Vitals:    06/18/25 1119   BP: 132/75   BP Location: Right arm   Patient Position: Sitting   Cuff Size: Adult Regular   Pulse: 66   SpO2: 95%   Weight: 74.4 kg (164 lb)   Height: 1.702 m (5' 7.01\")         Head/neck: No butterfly rash, no jaundice, no conjunctival pallor, no ocular redness, no facial asymmetry, no enlargement of the major salivary glands    Lungs: clear to auscultation, no crackles or wheezing    Heart sounds :regular        Musculoskeletal:    Right hand: There is ulnar deviation of the fingers at the metacarpophalangeal joint level with subluxation of the MCPs, there is mild puffiness around the index finger MCP but no synovitis of the remaining MCPs on clinical exam and no tenderness.  Mild osteoarthritic " changes are noted in the distal interphalangeal joints.    Left hand: + Mild synovitis without tenderness of the middle finger MCP knuckle noted, the remaining MCP, PIP joints are nonswollen, nontender.  No swan-neck or boutonniere deformities.    Right wrist: no synovitis,no tenderness    Left wrist: No tenderness, no synovitis    Elbows: Full range of motion, no swelling or synovitis    Shoulders full ROM        Hips pain-free range of motion    Knees no swelling       Patient Active Problem List    Diagnosis Date Noted    Pain in both hands 10/21/2024     Priority: Medium    Left shoulder pain, unspecified chronicity 07/03/2023     Priority: Medium    Abnormal CT scan of head 07/11/2019     Priority: Medium    Severe persistent asthma without complication (H) 04/15/2019     Priority: Medium    Aneurysm of right renal artery 04/09/2019     Priority: Medium    Osteopenia of multiple sites 12/15/2017     Priority: Medium     Treated with alendronate while in [place].  Told that she failed therapy and discontinued.  1/2020 starting zolendronic acid   12/2017: major osteoporotic 11%, hip fracture 2.1%      Pulmonary nodules 01/25/2017     Priority: Medium    Essential hypertension 01/25/2017     Priority: Medium    Prediabetes 09/07/2016     Priority: Medium    Seizures (H) 11/10/2015     Priority: Medium     Temporal seziure d/o.  Does not have LOC.  Has prodromal symptoms.  Uses carbamazepine as needed with symptoms, typically ~2 times per year      Thoracic aortic aneurysm without rupture 11/10/2015     Priority: Medium     [  ] repeat imaging due spring 2016  Descending aortic aneurysm.  Being monitored with serial angiogram  --------------  9/19/2016: 1. 4.4 cm aneurysmal dilatation of ascending aorta, not significant changed as compared to 6/17/2015 exam where it measures 4.5 cm.   2. Focal saccular partially calcified aneurysm of the distal right renal artery, measuring 1.8 cm x 1.4 cm maximum diameter, not  significantly changed as compared to 6/25/2012 exam, where it measured 1.7 x 1.5 cm.  3. Multiple stable bilateral pulmonary nodules, the largest is a 7 x 5 mm posterior left lower lobe pulmonary nodule, previously measured 7 x 4 mm on 6/17/2013 exam.      Moderate persistent asthma without complication 11/10/2015     Priority: Medium     Past Surgical History:   Procedure Laterality Date    APPENDECTOMY  1998    CATARACT IOL, RT/LT Bilateral     COLONOSCOPY N/A 02/14/2019    Procedure: COLONOSCOPY;  Surgeon: Haim Burgos MD;  Location:  GI    GI SURGERY      nissn x 2    GYN SURGERY  2001    H CATARACT SURGICAL PACKAGE      HYSTERECTOMY      fibroids    JOINT REPLACEMENT Left     NISSEN FUNDOPLICATION      x2    ORTHOPEDIC SURGERY      SOFT TISSUE SURGERY        Past Medical History:   Diagnosis Date    Abdominal aortic aneurysm without rupture 11/10/2015    [  ] repeat imaging due spring 2016 Descending aortic aneurysm.  Being monitored with serial angiogram     Aneurysm of right renal artery 04/09/2019    Arthritis 1998    COPD (chronic obstructive pulmonary disease) (H) 11/10/2015    Not sure of this diagnosis.  May be a consequence of asthma.    Esophageal hypertension 01/25/2017    Essential hypertension 01/25/2017    Female bladder prolapse 11/10/2015    Hearing loss     Heart disease 2005    Pulmonary nodules 01/25/2017    Seizures (H) 11/10/2015    Temporal seziure d/o.  Does not have LOC.  Has prodromal symptoms     Severe persistent asthma (H) 11/10/2015    Since childhood.  Has been steroid dependent for many years. Has had cydney x2       Allergies   Allergen Reactions    Quinolones      Current Outpatient Medications   Medication Sig Dispense Refill    albuterol (PROAIR HFA/PROVENTIL HFA/VENTOLIN HFA) 108 (90 Base) MCG/ACT inhaler Inhale 2 puffs into the lungs every 6 hours as needed for shortness of breath or wheezing. 54 g 5    benralizumab (FASENRA) 30 MG/ML SOSY injection Inject 30 mg  Subcutaneous once      carBAMazepine (TEGRETOL XR) 200 MG 12 hr tablet Take 1 tablet (200 mg) by mouth daily as needed (seizure) 30 tablet 1    diclofenac (VOLTAREN) 1 % topical gel Apply 4 g topically 4 times daily 350 g 3    estradiol (ESTRING) 7.5 MCG/24HR vaginal ring Place 1 each (1 Vaginal ring) vaginally every 3 months. 1 each 3    fluticasone-salmeterol (WIXELA INHUB) 500-50 MCG/ACT inhaler Inhale 1 puff into the lungs 2 times daily. 180 each 3    folic acid (FOLVITE) 1 MG tablet Take 1 tablet (1 mg) by mouth daily. 90 tablet 3    magnesium 250 MG tablet Take 1 tablet by mouth daily      methotrexate 2.5 MG tablet Take 6 tablets (15 mg) by mouth every 7 days. 72 tablet 0    metoprolol succinate ER (TOPROL XL) 25 MG 24 hr tablet TAKE 1 TABLET DAILY WITH   50MG TABLET FOR TOTAL OF   75MG DAILY. 90 tablet 4    metoprolol succinate ER (TOPROL XL) 50 MG 24 hr tablet Take 1 tablet (50 mg) by mouth daily. With 25 mg tab for total of 75 mg Daily 90 tablet 4    montelukast (SINGULAIR) 10 MG tablet Take 1 tablet (10 mg) by mouth at bedtime. 90 tablet 3    NAPROXEN PO Take 220 mg by mouth as needed       potassium chloride antonietta ER (KLOR-CON M20) 20 MEQ CR tablet Take 1 tablet (20 mEq) by mouth daily. 90 tablet 4    pravastatin (PRAVACHOL) 20 MG tablet Take 1 tablet (20 mg) by mouth daily. 90 tablet 4    theophylline (DENIA-24) 400 MG 24 hr capsule Take 1 capsule (400 mg) by mouth daily. 90 capsule 3    triamterene-HCTZ (MAXZIDE) 75-50 MG tablet Take 1 tablet by mouth daily. 90 tablet 4    valACYclovir (VALTREX) 1000 mg tablet Take 2 tablets (2,000 mg) by mouth 2 times daily for 1 day. 4 tablet 1     family history includes Asthma in her father, sister, and sister; Breast Cancer in her maternal aunt and mother; Cancer in her father; Coronary Artery Disease in her cousin, daughter, maternal grandfather, maternal grandmother, and sister; Dementia in her mother; Depression in her sister and sister; Diabetes in her mother  and son; Heart Failure in her mother; Hypertension in her father, mother, and son; Melanoma in her daughter; Mental Illness in her maternal grandmother; Other - See Comments in her daughter; Other Cancer in her daughter; Prostate Cancer in her father; Schizophrenia in her maternal grandmother.  Social Connections: Unknown (4/18/2025)    Social Connection and Isolation Panel [NHANES]     Frequency of Communication with Friends and Family: Not on file     Frequency of Social Gatherings with Friends and Family: More than three times a week     Attends Taoism Services: Not on file     Active Member of Clubs or Organizations: Not on file     Attends Club or Organization Meetings: Not on file     Marital Status: Not on file          WBC   Date Value Ref Range Status   05/16/2016 6.4 4.0 - 11.0 10e9/L Final     WBC Count   Date Value Ref Range Status   06/10/2025 7.4 4.0 - 11.0 10e3/uL Final     RBC Count   Date Value Ref Range Status   06/10/2025 4.55 3.80 - 5.20 10e6/uL Final   05/16/2016 4.56 3.8 - 5.2 10e12/L Final     Hemoglobin   Date Value Ref Range Status   06/10/2025 12.5 11.7 - 15.7 g/dL Final   05/16/2016 12.7 11.7 - 15.7 g/dL Final     Hematocrit   Date Value Ref Range Status   06/10/2025 37.6 35.0 - 47.0 % Final   05/16/2016 38.0 35.0 - 47.0 % Final     MCV   Date Value Ref Range Status   06/10/2025 83 78 - 100 fL Final   05/16/2016 83 78 - 100 fl Final     MCH   Date Value Ref Range Status   06/10/2025 27.5 26.5 - 33.0 pg Final   05/16/2016 27.9 26.5 - 33.0 pg Final     Platelet Count   Date Value Ref Range Status   06/10/2025 280 150 - 450 10e3/uL Final   05/16/2016 254 150 - 450 10e9/L Final     % Lymphocytes   Date Value Ref Range Status   06/10/2025 15 % Final   05/16/2016 24.3 % Final     AST   Date Value Ref Range Status   06/30/2023 23 0 - 45 U/L Final     Comment:     Reference intervals for this test were updated on 6/12/2023 to more accurately reflect our healthy population. There may be  differences in the flagging of prior results with similar values performed with this method. Interpretation of those prior results can be made in the context of the updated reference intervals.     ALT   Date Value Ref Range Status   06/10/2025 23 0 - 50 U/L Final     Albumin   Date Value Ref Range Status   06/30/2023 4.5 3.5 - 5.2 g/dL Final     Alkaline Phosphatase   Date Value Ref Range Status   06/30/2023 62 35 - 104 U/L Final     Creatinine   Date Value Ref Range Status   06/10/2025 1.29 (H) 0.51 - 0.95 mg/dL Final   02/12/2021 0.76 0.52 - 1.04 mg/dL Final     GFR Estimate   Date Value Ref Range Status   06/10/2025 43 (L) >60 mL/min/1.73m2 Final     Comment:     eGFR calculated using 2021 CKD-EPI equation.   02/12/2021 78 >60 mL/min/[1.73_m2] Final     Comment:     Non  GFR Calc  Starting 12/18/2018, serum creatinine based estimated GFR (eGFR) will be   calculated using the Chronic Kidney Disease Epidemiology Collaboration   (CKD-EPI) equation.       GFR, ESTIMATED POCT   Date Value Ref Range Status   01/12/2024 58 (L) >60 mL/min/1.73m2 Final     GFR Estimate If Black   Date Value Ref Range Status   02/12/2021 >90 >60 mL/min/[1.73_m2] Final     Comment:      GFR Calc  Starting 12/18/2018, serum creatinine based estimated GFR (eGFR) will be   calculated using the Chronic Kidney Disease Epidemiology Collaboration   (CKD-EPI) equation.       Creatinine Urine mg/dL   Date Value Ref Range Status   05/02/2025 71.4 mg/dL Final     Comment:     The reference ranges have not been established in urine creatinine. The results should be integrated into the clinical context for interpretation.     Sed Rate   Date Value Ref Range Status   11/18/2020 14 0 - 30 mm/h Final     Erythrocyte Sedimentation Rate   Date Value Ref Range Status   04/16/2025 11 0 - 30 mm/hr Final     CRP Inflammation   Date Value Ref Range Status   11/18/2020 3.1 0.0 - 8.0 mg/L Final

## 2025-07-22 ENCOUNTER — VIRTUAL VISIT (OUTPATIENT)
Dept: CARDIOLOGY | Facility: CLINIC | Age: 80
End: 2025-07-22
Payer: MEDICARE

## 2025-07-22 DIAGNOSIS — I71.21 ANEURYSM OF ASCENDING AORTA WITHOUT RUPTURE: ICD-10-CM

## 2025-07-22 DIAGNOSIS — I10 ESSENTIAL HYPERTENSION: Primary | ICD-10-CM

## 2025-07-22 DIAGNOSIS — I10 BENIGN ESSENTIAL HYPERTENSION: ICD-10-CM

## 2025-07-22 DIAGNOSIS — I72.2 ANEURYSM OF RIGHT RENAL ARTERY: ICD-10-CM

## 2025-07-22 DIAGNOSIS — E78.5 HYPERLIPIDEMIA LDL GOAL <100: ICD-10-CM

## 2025-07-22 RX ORDER — TRIAMTERENE AND HYDROCHLOROTHIAZIDE 75; 50 MG/1; MG/1
1 TABLET ORAL DAILY
Qty: 90 TABLET | Refills: 3 | Status: SHIPPED | OUTPATIENT
Start: 2025-07-22

## 2025-07-22 RX ORDER — METOPROLOL SUCCINATE 25 MG/1
TABLET, EXTENDED RELEASE ORAL
Qty: 90 TABLET | Refills: 3 | Status: SHIPPED | OUTPATIENT
Start: 2025-07-22

## 2025-07-22 RX ORDER — PRAVASTATIN SODIUM 20 MG
20 TABLET ORAL DAILY
Qty: 90 TABLET | Refills: 3 | Status: SHIPPED | OUTPATIENT
Start: 2025-07-22

## 2025-07-22 RX ORDER — METOPROLOL SUCCINATE 50 MG/1
50 TABLET, EXTENDED RELEASE ORAL DAILY
Qty: 90 TABLET | Refills: 3 | Status: SHIPPED | OUTPATIENT
Start: 2025-07-22

## 2025-07-22 NOTE — PROGRESS NOTES
"Roopa is a 79 year old who is being evaluated via a billable video visit.    How would you like to obtain your AVS? MyChart  If the video visit is dropped, the invitation should be resent by: Send to e-mail at: [email or web address]  Will anyone else be joining your video visit? No    Review Of Systems  Skin: NEGATIVE  Eyes:Ears/Nose/Throat: NEGATIVE  Respiratory: asthma  Cardiovascular:NEGATIVE  Gastrointestinal: NEGATIVE  Genitourinary:NEGATIVE   Musculoskeletal: NEGATIVE  Neurologic: NEGATIVE  Psychiatric: NEGATIVE  Hematologic/Lymphatic/Immunologic: NEGATIVE  Endocrine:  NEGATIVE  Vitals - Patient Reported  Systolic (Patient Reported):  (n/a)  Weight (Patient Reported): 72.6 kg (160 lb)  Height (Patient Reported): 170.2 cm (5' 7\")  BMI (Based on Pt Reported Ht/Wt): 25.06  Pulse (Patient Reported):  (n/a)     Telephone number of patient: [phone]    Sheeba Cheek LPN  Video-Visit Details    Type of service:  Video Visit   Video End Time:11:37 AM  Originating Location (pt. Location): Home    Distant Location (provider location):  On-site  Platform used for Video Visit: [employer]avian     -----------------------------------------------------------------------------------------------------------------------------    Primary Cardiologist: Dr. Wang    Reason for Video Visit: Annual follow up     HPI:  Niurka Cisneros is a very pleasant 79 year old female with past medical history notable for hypertension, saccular aneurysm of renal artery, ascending aortic aneurysm, and rheumatoid arthritis.  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     Niurka presents to clinic today via video call stating she is overall doing well.  She was diagnosed with rheumatoid arthritis earlier this year.  She denies chest discomfort, shortness of breath, palpitations, bleeding issues, orthopnea, PND, or peripheral edema.    ROS:  12-pt ROS is negative except for as noted above.     Physical Exam:  A limited exam was " conducted via video.  Constitutional:  Patient is pleasant, alert, cooperative, and in NAD.  HEENT:  NCAT. EOM's intact.   Neck:  CVP appears normal.   Pulmonary: Normal respiratory effort.   Extremities: No edema, erythema, cyanosis appreciated.  Skin:  No rashes or lesions appreciated.   Neurological:  No gross motor or sensory deficits.   Psych: Appropriate affect.       A/P:   Niurka Cisneros is a very pleasant 79 year old female with past medical history notable for hypertension, saccular aneurysm of renal artery, ascending aortic aneurysm, and rheumatoid arthritis.  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     Niurka is doing well from a cardiovascular standpoint.  Recent echocardiogram showed stable aortic aneurysm measuring 4.6 cm with preserved LVEF and no known valve disease.  Her renal ultrasound earlier this year also showed stable findings and Dr. Wang recommended repeating CTA chest/abdomen/pelvis in 1 year.  Will also have her repeat echocardiogram at that time.  We will see her back in 4/2025.    Video start time: 11:21 AM  Video end time: 11:41 AM  Originating Location (pt. Location): home  Distant Location (provider location):  [facility]   Mode of Communication:  Video Conference via [employer] phone application       This visit is being conducted as a virtual visit due to the emphasis on mitigation of the COVID-19 virus pandemic. The clinician has decided that the risk of an in-office visit outweighs the benefit for this patient. The rest of the comprehensive physical examination is deferred due to public health emergency video visit restrictions.         Anuradha Licona PA-C   7/22/2025  Pager: Ellie

## 2025-07-22 NOTE — LETTER
"7/22/2025    Mara Combs MD  [address]  [place] [address]    RE: Niurka Cisneros       Dear Colleague,     I had the pleasure of seeing Niurka Cisneros in the [facility].  Roopa is a 79 year old who is being evaluated via a billable video visit.    How would you like to obtain your AVS? MyChart  If the video visit is dropped, the invitation should be resent by: Send to e-mail at: [email or web address]  Will anyone else be joining your video visit? No    Review Of Systems  Skin: NEGATIVE  Eyes:Ears/Nose/Throat: NEGATIVE  Respiratory: asthma  Cardiovascular:NEGATIVE  Gastrointestinal: NEGATIVE  Genitourinary:NEGATIVE   Musculoskeletal: NEGATIVE  Neurologic: NEGATIVE  Psychiatric: NEGATIVE  Hematologic/Lymphatic/Immunologic: NEGATIVE  Endocrine:  NEGATIVE  Vitals - Patient Reported  Systolic (Patient Reported):  (n/a)  Weight (Patient Reported): 72.6 kg (160 lb)  Height (Patient Reported): 170.2 cm (5' 7\")  BMI (Based on Pt Reported Ht/Wt): 25.06  Pulse (Patient Reported):  (n/a)     Telephone number of patient: [phone]    Sheeba Cheek LPN  Video-Visit Details    Type of service:  Video Visit   Video End Time:11:37 AM  Originating Location (pt. Location): Home    Distant Location (provider location):  On-site  Platform used for Video Visit: Favian     -----------------------------------------------------------------------------------------------------------------------------    Primary Cardiologist: Dr. Wang    Reason for Video Visit: Annual follow up     HPI:  Niurka Cisneros is a very pleasant 79 year old female with past medical history notable for hypertension, saccular aneurysm of renal artery, ascending aortic aneurysm, and rheumatoid arthritis.  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     Niurka presents to clinic today via video call stating she is overall doing well.  She was diagnosed with rheumatoid arthritis earlier this " year.  She denies chest discomfort, shortness of breath, palpitations, bleeding issues, orthopnea, PND, or peripheral edema.    ROS:  12-pt ROS is negative except for as noted above.     Physical Exam:  A limited exam was conducted via video.  Constitutional:  Patient is pleasant, alert, cooperative, and in NAD.  HEENT:  NCAT. EOM's intact.   Neck:  CVP appears normal.   Pulmonary: Normal respiratory effort.   Extremities: No edema, erythema, cyanosis appreciated.  Skin:  No rashes or lesions appreciated.   Neurological:  No gross motor or sensory deficits.   Psych: Appropriate affect.       A/P:   Niurka Cisneros is a very pleasant 79 year old female with past medical history notable for hypertension, saccular aneurysm of renal artery, ascending aortic aneurysm, and rheumatoid arthritis.  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     Niurka is doing well from a cardiovascular standpoint.  Recent echocardiogram showed stable aortic aneurysm measuring 4.6 cm with preserved LVEF and no known valve disease.  Her renal ultrasound earlier this year also showed stable findings and Dr. Wang recommended repeating CTA chest/abdomen/pelvis in 1 year.  Will also have her repeat echocardiogram at that time.  We will see her back in 4/2025.    Video start time: 11:21 AM  Video end time: 11:41 AM  Originating Location (pt. Location): home  Distant Location (provider location):  [facility]   Mode of Communication:  Video Conference via [employer] phone application       This visit is being conducted as a virtual visit due to the emphasis on mitigation of the COVID-19 virus pandemic. The clinician has decided that the risk of an in-office visit outweighs the benefit for this patient. The rest of the comprehensive physical examination is deferred due to public health emergency video visit restrictions.         Anuradha Licona PA-C   7/22/2025  Pager: Ellie      Thank you  for allowing me to participate in the care of your patient.      Sincerely,     Anuradha Licona PA-C     [facility]  cc:   Referred Self, MD  No address on file

## 2025-07-30 ENCOUNTER — OFFICE VISIT (OUTPATIENT)
Dept: RHEUMATOLOGY | Facility: CLINIC | Age: 80
End: 2025-07-30
Payer: MEDICARE

## 2025-07-30 ENCOUNTER — LAB (OUTPATIENT)
Dept: LAB | Facility: CLINIC | Age: 80
End: 2025-07-30
Payer: MEDICARE

## 2025-07-30 VITALS
DIASTOLIC BLOOD PRESSURE: 74 MMHG | SYSTOLIC BLOOD PRESSURE: 128 MMHG | WEIGHT: 164 LBS | HEIGHT: 67 IN | HEART RATE: 86 BPM | BODY MASS INDEX: 25.74 KG/M2 | OXYGEN SATURATION: 94 %

## 2025-07-30 DIAGNOSIS — M05.79 RHEUMATOID ARTHRITIS, SEROPOSITIVE, MULTIPLE SITES (H): Primary | ICD-10-CM

## 2025-07-30 DIAGNOSIS — M65.341 TRIGGER FINGER, RIGHT RING FINGER: ICD-10-CM

## 2025-07-30 DIAGNOSIS — M65.331 TRIGGER FINGER, RIGHT MIDDLE FINGER: ICD-10-CM

## 2025-07-30 DIAGNOSIS — R73.03 PREDIABETES: Primary | ICD-10-CM

## 2025-07-30 DIAGNOSIS — Z79.899 HIGH RISK MEDICATION USE: ICD-10-CM

## 2025-07-30 DIAGNOSIS — R76.8 RHEUMATOID FACTOR POSITIVE: ICD-10-CM

## 2025-07-30 DIAGNOSIS — Z71.89 ENCOUNTER TO DISCUSS TREATMENT OPTIONS: ICD-10-CM

## 2025-07-30 LAB
BASOPHILS # BLD AUTO: 0 10E3/UL (ref 0–0.2)
BASOPHILS NFR BLD AUTO: 0 %
EOSINOPHIL # BLD AUTO: 0 10E3/UL (ref 0–0.7)
EOSINOPHIL NFR BLD AUTO: 0 %
ERYTHROCYTE [DISTWIDTH] IN BLOOD BY AUTOMATED COUNT: 13.9 % (ref 10–15)
EST. AVERAGE GLUCOSE BLD GHB EST-MCNC: 120 MG/DL
HBA1C MFR BLD: 5.8 % (ref 0–5.6)
HCT VFR BLD AUTO: 37.6 % (ref 35–47)
HGB BLD-MCNC: 12.7 G/DL (ref 11.7–15.7)
IMM GRANULOCYTES # BLD: 0 10E3/UL
IMM GRANULOCYTES NFR BLD: 0 %
LYMPHOCYTES # BLD AUTO: 1.2 10E3/UL (ref 0.8–5.3)
LYMPHOCYTES NFR BLD AUTO: 18 %
MCH RBC QN AUTO: 27.9 PG (ref 26.5–33)
MCHC RBC AUTO-ENTMCNC: 33.8 G/DL (ref 31.5–36.5)
MCV RBC AUTO: 83 FL (ref 78–100)
MONOCYTES # BLD AUTO: 0.7 10E3/UL (ref 0–1.3)
MONOCYTES NFR BLD AUTO: 10 %
NEUTROPHILS # BLD AUTO: 4.7 10E3/UL (ref 1.6–8.3)
NEUTROPHILS NFR BLD AUTO: 71 %
PLATELET # BLD AUTO: 246 10E3/UL (ref 150–450)
RBC # BLD AUTO: 4.55 10E6/UL (ref 3.8–5.2)
WBC # BLD AUTO: 6.6 10E3/UL (ref 4–11)

## 2025-07-30 PROCEDURE — 84460 ALANINE AMINO (ALT) (SGPT): CPT

## 2025-07-30 PROCEDURE — 3044F HG A1C LEVEL LT 7.0%: CPT

## 2025-07-30 PROCEDURE — 99214 OFFICE O/P EST MOD 30 MIN: CPT | Mod: 25 | Performed by: INTERNAL MEDICINE

## 2025-07-30 PROCEDURE — 82565 ASSAY OF CREATININE: CPT

## 2025-07-30 PROCEDURE — 3078F DIAST BP <80 MM HG: CPT | Performed by: INTERNAL MEDICINE

## 2025-07-30 PROCEDURE — 3074F SYST BP LT 130 MM HG: CPT | Performed by: INTERNAL MEDICINE

## 2025-07-30 PROCEDURE — 20550 NJX 1 TENDON SHEATH/LIGAMENT: CPT | Mod: F7 | Performed by: INTERNAL MEDICINE

## 2025-07-30 PROCEDURE — 83036 HEMOGLOBIN GLYCOSYLATED A1C: CPT

## 2025-07-30 PROCEDURE — 36415 COLL VENOUS BLD VENIPUNCTURE: CPT

## 2025-07-30 PROCEDURE — 85025 COMPLETE CBC W/AUTO DIFF WBC: CPT

## 2025-07-30 RX ORDER — LEFLUNOMIDE 20 MG/1
20 TABLET ORAL DAILY
Qty: 90 TABLET | Refills: 0 | Status: SHIPPED | OUTPATIENT
Start: 2025-07-30

## 2025-07-30 RX ORDER — TRIAMCINOLONE ACETONIDE 40 MG/ML
20 INJECTION, SUSPENSION INTRA-ARTICULAR; INTRAMUSCULAR ONCE
Status: COMPLETED | OUTPATIENT
Start: 2025-07-30 | End: 2025-07-30

## 2025-07-30 RX ADMIN — TRIAMCINOLONE ACETONIDE 20 MG: 40 INJECTION, SUSPENSION INTRA-ARTICULAR; INTRAMUSCULAR at 15:46

## 2025-07-30 RX ADMIN — TRIAMCINOLONE ACETONIDE 20 MG: 40 INJECTION, SUSPENSION INTRA-ARTICULAR; INTRAMUSCULAR at 15:47

## 2025-07-30 NOTE — PROGRESS NOTES
"  Assessment and Plan    1.  Seropositive rheumatoid arthritis.  She is tolerating Arava (leflunomide) much better and her fatigue is 80% better since switching from methotrexate.    We will check her monitoring labs including creatinine, CBC, ALT and inflammatory markers today.    2.  Trigger fingers.  She has intermittent locking (snapping, getting stuck) of the right middle and ring fingers which we will inject.    Procedure 1: Maintaining strict sterile and aseptic precautions the right middle finger was injected with 20 mg Kenalog at its A1 pulley.  Patient tolerated seizure well.    Procedure 2: Maintaining strict sterile and aseptic precautions the right ring finger is injected with 20 mg Kenalog at its A1 pulley.  Patient tolerated procedure well.    Hopefully in 2-3 weeks the finger locking and snapping will improve after these injections.    We will recheck her RA while monitoring labs in 3 months.    Follow-up 6 months.    CC PCP                    Rheumatology follow-up visit note         HPI        Roopa is a delightful 79-year-old lady, she is a retired pediatrician.  She is referred to rheumatology because over the past year she has noticed onset of subluxation and ulnar deviation of her fingers in the right hand especially at the metacarpophalangeal joint levels.  Interestingly she does not have much in the way of pain or swelling in her hand.  However these changes are quite striking and progressed over the past year to a year and a half.  She was noticing  \"snapping \"of her middle, ring and small fingers, right more than left.  She was seen by sports medicine and prescribed splints for fingers but they did not help.  She denies any pain, swelling or stiffness in either her hands, wrists or other peripheral joints.  She does not have any Sjogren type symptoms of dry eyes or dry mouth (Sjogren's can occasionally be associated with a positive rheumatoid factor).  There is no history of exposure to " "hepatitis B or hepatitis C.    Blood work showed a positive rheumatoid factor, CCP was negative, BIANCA negative.  ESR 10, was mildly elevated (7.7)    Given the lack of joint pain we ordered an MRI of the right hand and wrist for more details, erosions and synovitis etc. which could not be seen on plain x-rays.  The MRI is quite helpful and shows EROSIONS IN THE CAPITATE,TRIQUETRUM AND 3RD MCP, which are consistent with early rheumatoid arthritis.  Gadolinium administration let to some enhancement which is also suggestive of inflammation.    She was initially started on methotrexate but had a lot of fatigue and side effects due to which methotrexate was stopped and switched to leflunomide (Arava) which she tolerated better.    Screening test for hepatitis B, C were negative.  Sjogren antibodies were also undetectable.    She has had 2 steroid injections for right sacroiliac joint pain.    Family history is negative for rheumatoid arthritis, psoriasis.     She has a history of pulmonary nodules since 2008 which have been stable.  She has a longstanding history of asthma/constrictive airway disease and follows with pulmonology.  There is no history of nosebleeds, recurrent sinusitis etc.    Social history.  She is a retired pediatrician (retired 2017), lives with her , her daughter also lives in the area.  She used to practice in [place], [place].  No tobacco or alcohol use.        DETAILED EXAMINATION  07/30/25  :    Vitals:    07/30/25 1456   BP: 128/74   BP Location: Right arm   Patient Position: Sitting   Cuff Size: Adult Regular   Pulse: 86   SpO2: 94%   Weight: 74.4 kg (164 lb)   Height: 1.702 m (5' 7.01\")         Head/neck: No butterfly rash, no jaundice, no conjunctival pallor, no ocular redness, no facial asymmetry, no enlargement of the major salivary glands    Lungs: clear to auscultation, no crackles or wheezing    Heart sounds :regular        Musculoskeletal:    Right hand: There is ulnar " deviation of the fingers at the metacarpophalangeal joint level with subluxation of the MCPs, there is mild puffiness around the index finger MCP but no synovitis of the remaining MCPs on clinical exam and no tenderness.  Mild osteoarthritic changes are noted in the distal interphalangeal joints.    Left hand: There is no synovitis or tenderness of the MCP, PIP or DIP joints.+ Crepitus and triggering elicited at the A1 pulleys of the middle and ring fingers.    Right wrist: no synovitis,no tenderness    Left wrist: No tenderness, no synovitis    Elbows: Full range of motion, no swelling or synovitis    Shoulders full ROM        Hips pain-free range of motion    Knees no swelling   Patient Active Problem List    Diagnosis Date Noted    Pain in both hands 10/21/2024     Priority: Medium    Left shoulder pain, unspecified chronicity 07/03/2023     Priority: Medium    Abnormal CT scan of head 07/11/2019     Priority: Medium    Severe persistent asthma without complication (H) 04/15/2019     Priority: Medium    Aneurysm of right renal artery 04/09/2019     Priority: Medium    Osteopenia of multiple sites 12/15/2017     Priority: Medium     Treated with alendronate while in [place].  Told that she failed therapy and discontinued.  1/2020 starting zolendronic acid   12/2017: major osteoporotic 11%, hip fracture 2.1%      Pulmonary nodules 01/25/2017     Priority: Medium    Essential hypertension 01/25/2017     Priority: Medium    Prediabetes 09/07/2016     Priority: Medium    Seizures (H) 11/10/2015     Priority: Medium     Temporal seziure d/o.  Does not have LOC.  Has prodromal symptoms.  Uses carbamazepine as needed with symptoms, typically ~2 times per year      Thoracic aortic aneurysm without rupture 11/10/2015     Priority: Medium     [  ] repeat imaging due spring 2016  Descending aortic aneurysm.  Being monitored with serial angiogram  --------------  9/19/2016: 1. 4.4 cm aneurysmal dilatation of ascending  aorta, not significant changed as compared to 6/17/2015 exam where it measures 4.5 cm.   2. 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  3. Multiple stable bilateral pulmonary nodules, the largest is a 7 x 5 mm posterior left lower lobe pulmonary nodule, previously measured 7 x 4 mm on 6/17/2013 exam.      Moderate persistent asthma without complication 11/10/2015     Priority: Medium     Past Surgical History:   Procedure Laterality Date    APPENDECTOMY  1998    CATARACT IOL, RT/LT Bilateral     COLONOSCOPY N/A 02/14/2019    Procedure: COLONOSCOPY;  Surgeon: Haim Burgos MD;  Location:  GI    GI SURGERY      nissn x 2    GYN SURGERY  2001    H CATARACT SURGICAL PACKAGE      HYSTERECTOMY      fibroids    JOINT REPLACEMENT Left     NISSEN FUNDOPLICATION      x2    ORTHOPEDIC SURGERY      SOFT TISSUE SURGERY        Past Medical History:   Diagnosis Date    Abdominal aortic aneurysm without rupture 11/10/2015    [  ] repeat imaging due spring 2016 Descending aortic aneurysm.  Being monitored with serial angiogram     Aneurysm of right renal artery 04/09/2019    Arthritis 1998    COPD (chronic obstructive pulmonary disease) (H) 11/10/2015    Not sure of this diagnosis.  May be a consequence of asthma.    Esophageal hypertension 01/25/2017    Essential hypertension 01/25/2017    Female bladder prolapse 11/10/2015    Hearing loss     Heart disease 2005    Pulmonary nodules 01/25/2017    Seizures (H) 11/10/2015    Temporal seziure d/o.  Does not have LOC.  Has prodromal symptoms     Severe persistent asthma (H) 11/10/2015    Since childhood.  Has been steroid dependent for many years. Has had cydney x2       Allergies   Allergen Reactions    Quinolones      Current Outpatient Medications   Medication Sig Dispense Refill    albuterol (PROAIR  HFA/PROVENTIL HFA/VENTOLIN HFA) 108 (90 Base) MCG/ACT inhaler Inhale 2 puffs into the lungs every 6 hours as needed for shortness of breath or wheezing. 54 g 5    benralizumab (FASENRA) 30 MG/ML SOSY injection Inject 30 mg Subcutaneous once      carBAMazepine (TEGRETOL XR) 200 MG 12 hr tablet Take 1 tablet (200 mg) by mouth daily as needed (seizure) 30 tablet 1    diclofenac (VOLTAREN) 1 % topical gel Apply 4 g topically 4 times daily 350 g 3    estradiol (ESTRING) 7.5 MCG/24HR vaginal ring Place 1 each (1 Vaginal ring) vaginally every 3 months. 1 each 3    fluticasone-salmeterol (WIXELA INHUB) 500-50 MCG/ACT inhaler Inhale 1 puff into the lungs 2 times daily. 180 each 3    folic acid (FOLVITE) 1 MG tablet Take 1 tablet (1 mg) by mouth daily. 90 tablet 3    leflunomide (ARAVA) 20 MG tablet Take 1 tablet (20 mg) by mouth daily. Labs every 8-12 weeks 30 tablet 0    magnesium 250 MG tablet Take 1 tablet by mouth daily      metoprolol succinate ER (TOPROL XL) 25 MG 24 hr tablet TAKE 1 TABLET DAILY WITH   50MG TABLET FOR TOTAL OF   75MG DAILY. 90 tablet 3    metoprolol succinate ER (TOPROL XL) 50 MG 24 hr tablet Take 1 tablet (50 mg) by mouth daily. With 25 mg tab for total of 75 mg Daily 90 tablet 3    montelukast (SINGULAIR) 10 MG tablet Take 1 tablet (10 mg) by mouth at bedtime. 90 tablet 3    NAPROXEN PO Take 220 mg by mouth as needed       potassium chloride antonietta ER (KLOR-CON M20) 20 MEQ CR tablet Take 1 tablet (20 mEq) by mouth daily. 90 tablet 4    pravastatin (PRAVACHOL) 20 MG tablet Take 1 tablet (20 mg) by mouth daily. 90 tablet 3    theophylline (DENIA-24) 400 MG 24 hr capsule Take 1 capsule (400 mg) by mouth daily. 90 capsule 3    triamterene-HCTZ (MAXZIDE) 75-50 MG tablet Take 1 tablet by mouth daily. 90 tablet 3    valACYclovir (VALTREX) 1000 mg tablet Take 2 tablets (2,000 mg) by mouth 2 times daily for 1 day. 4 tablet 1     family history includes Asthma in her father, sister, and sister; Breast Cancer in  her maternal aunt and mother; Cancer in her father; Coronary Artery Disease in her cousin, daughter, maternal grandfather, maternal grandmother, and sister; Dementia in her mother; Depression in her sister and sister; Diabetes in her mother and son; Heart Failure in her mother; Hypertension in her father, mother, and son; Melanoma in her daughter; Mental Illness in her maternal grandmother; Other - See Comments in her daughter; Other Cancer in her daughter; Prostate Cancer in her father; Schizophrenia in her maternal grandmother.  Social Connections: Unknown (4/18/2025)    Social Connection and Isolation Panel [NHANES]     Frequency of Communication with Friends and Family: Not on file     Frequency of Social Gatherings with Friends and Family: More than three times a week     Attends Spiritism Services: Not on file     Active Member of Clubs or Organizations: Not on file     Attends Club or Organization Meetings: Not on file     Marital Status: Not on file          WBC   Date Value Ref Range Status   05/16/2016 6.4 4.0 - 11.0 10e9/L Final     WBC Count   Date Value Ref Range Status   06/10/2025 7.4 4.0 - 11.0 10e3/uL Final     RBC Count   Date Value Ref Range Status   06/10/2025 4.55 3.80 - 5.20 10e6/uL Final   05/16/2016 4.56 3.8 - 5.2 10e12/L Final     Hemoglobin   Date Value Ref Range Status   06/10/2025 12.5 11.7 - 15.7 g/dL Final   05/16/2016 12.7 11.7 - 15.7 g/dL Final     Hematocrit   Date Value Ref Range Status   06/10/2025 37.6 35.0 - 47.0 % Final   05/16/2016 38.0 35.0 - 47.0 % Final     MCV   Date Value Ref Range Status   06/10/2025 83 78 - 100 fL Final   05/16/2016 83 78 - 100 fl Final     MCH   Date Value Ref Range Status   06/10/2025 27.5 26.5 - 33.0 pg Final   05/16/2016 27.9 26.5 - 33.0 pg Final     Platelet Count   Date Value Ref Range Status   06/10/2025 280 150 - 450 10e3/uL Final   05/16/2016 254 150 - 450 10e9/L Final     % Lymphocytes   Date Value Ref Range Status   06/10/2025 15 % Final    05/16/2016 24.3 % Final     AST   Date Value Ref Range Status   06/30/2023 23 0 - 45 U/L Final     Comment:     Reference intervals for this test were updated on 6/12/2023 to more accurately reflect our healthy population. There may be differences in the flagging of prior results with similar values performed with this method. Interpretation of those prior results can be made in the context of the updated reference intervals.     ALT   Date Value Ref Range Status   06/10/2025 23 0 - 50 U/L Final     Albumin   Date Value Ref Range Status   06/30/2023 4.5 3.5 - 5.2 g/dL Final     Alkaline Phosphatase   Date Value Ref Range Status   06/30/2023 62 35 - 104 U/L Final     Creatinine   Date Value Ref Range Status   06/18/2025 0.90 0.51 - 0.95 mg/dL Final   02/12/2021 0.76 0.52 - 1.04 mg/dL Final     GFR Estimate   Date Value Ref Range Status   06/18/2025 66 >60 mL/min/1.73m2 Final     Comment:     eGFR calculated using 2021 CKD-EPI equation.   02/12/2021 78 >60 mL/min/[1.73_m2] Final     Comment:     Non  GFR Calc  Starting 12/18/2018, serum creatinine based estimated GFR (eGFR) will be   calculated using the Chronic Kidney Disease Epidemiology Collaboration   (CKD-EPI) equation.       GFR, ESTIMATED POCT   Date Value Ref Range Status   01/12/2024 58 (L) >60 mL/min/1.73m2 Final     GFR Estimate If Black   Date Value Ref Range Status   02/12/2021 >90 >60 mL/min/[1.73_m2] Final     Comment:      GFR Calc  Starting 12/18/2018, serum creatinine based estimated GFR (eGFR) will be   calculated using the Chronic Kidney Disease Epidemiology Collaboration   (CKD-EPI) equation.       Creatinine Urine mg/dL   Date Value Ref Range Status   05/02/2025 71.4 mg/dL Final     Comment:     The reference ranges have not been established in urine creatinine. The results should be integrated into the clinical context for interpretation.     Sed Rate   Date Value Ref Range Status   11/18/2020 14 0 - 30 mm/h  Final     Erythrocyte Sedimentation Rate   Date Value Ref Range Status   04/16/2025 11 0 - 30 mm/hr Final     CRP Inflammation   Date Value Ref Range Status   11/18/2020 3.1 0.0 - 8.0 mg/L Final

## 2025-07-31 LAB
ALT SERPL W P-5'-P-CCNC: 15 U/L (ref 0–50)
CREAT SERPL-MCNC: 0.93 MG/DL (ref 0.51–0.95)
EGFRCR SERPLBLD CKD-EPI 2021: 63 ML/MIN/1.73M2

## 2025-08-05 ENCOUNTER — INFUSION THERAPY VISIT (OUTPATIENT)
Dept: INFUSION THERAPY | Facility: CLINIC | Age: 80
End: 2025-08-05
Payer: MEDICARE

## 2025-08-05 VITALS
TEMPERATURE: 97.6 F | RESPIRATION RATE: 18 BRPM | OXYGEN SATURATION: 96 % | DIASTOLIC BLOOD PRESSURE: 70 MMHG | SYSTOLIC BLOOD PRESSURE: 134 MMHG | HEART RATE: 63 BPM

## 2025-08-05 DIAGNOSIS — J45.50 SEVERE PERSISTENT ASTHMA WITHOUT COMPLICATION (H): Primary | ICD-10-CM

## 2025-08-05 PROCEDURE — 250N000011 HC RX IP 250 OP 636: Mod: JZ

## 2025-08-05 PROCEDURE — 96372 THER/PROPH/DIAG INJ SC/IM: CPT

## 2025-08-05 RX ORDER — ALBUTEROL SULFATE 0.83 MG/ML
2.5 SOLUTION RESPIRATORY (INHALATION)
OUTPATIENT
Start: 2025-09-02

## 2025-08-05 RX ORDER — MEPERIDINE HYDROCHLORIDE 25 MG/ML
25 INJECTION INTRAMUSCULAR; INTRAVENOUS; SUBCUTANEOUS
OUTPATIENT
Start: 2025-09-02

## 2025-08-05 RX ORDER — METHYLPREDNISOLONE SODIUM SUCCINATE 40 MG/ML
40 INJECTION INTRAMUSCULAR; INTRAVENOUS
Start: 2025-09-02

## 2025-08-05 RX ORDER — ALBUTEROL SULFATE 90 UG/1
1-2 INHALANT RESPIRATORY (INHALATION)
Start: 2025-09-02

## 2025-08-05 RX ORDER — DIPHENHYDRAMINE HYDROCHLORIDE 50 MG/ML
25 INJECTION, SOLUTION INTRAMUSCULAR; INTRAVENOUS
Start: 2025-09-02

## 2025-08-05 RX ORDER — EPINEPHRINE 1 MG/ML
0.3 INJECTION, SOLUTION INTRAMUSCULAR; SUBCUTANEOUS EVERY 5 MIN PRN
OUTPATIENT
Start: 2025-09-02

## 2025-08-05 RX ORDER — DIPHENHYDRAMINE HYDROCHLORIDE 50 MG/ML
50 INJECTION, SOLUTION INTRAMUSCULAR; INTRAVENOUS
Start: 2025-09-02

## 2025-08-05 RX ADMIN — BENRALIZUMAB 30 MG: 30 INJECTION, SOLUTION SUBCUTANEOUS at 09:42

## 2025-08-23 ENCOUNTER — MYC REFILL (OUTPATIENT)
Dept: INTERNAL MEDICINE | Facility: CLINIC | Age: 80
End: 2025-08-23
Payer: MEDICARE

## 2025-08-23 DIAGNOSIS — I10 BENIGN ESSENTIAL HYPERTENSION: ICD-10-CM

## 2025-08-25 RX ORDER — POTASSIUM CHLORIDE 1500 MG/1
20 TABLET, EXTENDED RELEASE ORAL DAILY
Qty: 90 TABLET | Refills: 2 | Status: SHIPPED | OUTPATIENT
Start: 2025-08-25

## (undated) RX ORDER — ALBUTEROL SULFATE 0.83 MG/ML
SOLUTION RESPIRATORY (INHALATION)
Status: DISPENSED
Start: 2017-04-10

## (undated) RX ORDER — LIDOCAINE HYDROCHLORIDE 10 MG/ML
INJECTION, SOLUTION EPIDURAL; INFILTRATION; INTRACAUDAL; PERINEURAL
Status: DISPENSED
Start: 2024-09-17

## (undated) RX ORDER — SIMETHICONE 20 MG/.3ML
EMULSION ORAL
Status: DISPENSED
Start: 2019-02-14

## (undated) RX ORDER — TRIAMCINOLONE ACETONIDE 40 MG/ML
INJECTION, SUSPENSION INTRA-ARTICULAR; INTRAMUSCULAR
Status: DISPENSED
Start: 2024-09-17

## (undated) RX ORDER — LIDOCAINE HYDROCHLORIDE 10 MG/ML
INJECTION, SOLUTION EPIDURAL; INFILTRATION; INTRACAUDAL; PERINEURAL
Status: DISPENSED
Start: 2021-06-22

## (undated) RX ORDER — TRIAMCINOLONE ACETONIDE 40 MG/ML
INJECTION, SUSPENSION INTRA-ARTICULAR; INTRAMUSCULAR
Status: DISPENSED
Start: 2024-05-02

## (undated) RX ORDER — BUPIVACAINE HYDROCHLORIDE 2.5 MG/ML
INJECTION, SOLUTION EPIDURAL; INFILTRATION; INTRACAUDAL
Status: DISPENSED
Start: 2024-09-17

## (undated) RX ORDER — BUPIVACAINE HYDROCHLORIDE 2.5 MG/ML
INJECTION, SOLUTION EPIDURAL; INFILTRATION; INTRACAUDAL
Status: DISPENSED
Start: 2024-05-02

## (undated) RX ORDER — LIDOCAINE HYDROCHLORIDE 10 MG/ML
INJECTION, SOLUTION EPIDURAL; INFILTRATION; INTRACAUDAL; PERINEURAL
Status: DISPENSED
Start: 2024-05-02

## (undated) RX ORDER — TRIAMCINOLONE ACETONIDE 40 MG/ML
INJECTION, SUSPENSION INTRA-ARTICULAR; INTRAMUSCULAR
Status: DISPENSED
Start: 2021-06-22

## (undated) RX ORDER — FENTANYL CITRATE 50 UG/ML
INJECTION, SOLUTION INTRAMUSCULAR; INTRAVENOUS
Status: DISPENSED
Start: 2019-02-14